# Patient Record
Sex: FEMALE | Race: WHITE | Employment: OTHER | URBAN - METROPOLITAN AREA
[De-identification: names, ages, dates, MRNs, and addresses within clinical notes are randomized per-mention and may not be internally consistent; named-entity substitution may affect disease eponyms.]

---

## 2017-01-05 PROBLEM — J45.41 MODERATE PERSISTENT ASTHMA WITH ACUTE EXACERBATION: Status: ACTIVE | Noted: 2017-01-05

## 2017-01-23 PROBLEM — J45.41 MODERATE PERSISTENT ASTHMA WITH ACUTE EXACERBATION: Status: RESOLVED | Noted: 2017-01-05 | Resolved: 2017-01-23

## 2017-01-23 PROBLEM — J45.20 MILD INTERMITTENT ASTHMA WITHOUT COMPLICATION: Status: ACTIVE | Noted: 2017-01-23

## 2017-03-01 PROBLEM — J96.90 RESPIRATORY FAILURE (HCC): Status: ACTIVE | Noted: 2017-03-01

## 2017-03-01 PROBLEM — J45.901 ASTHMA ATTACK: Status: ACTIVE | Noted: 2017-03-01

## 2017-03-01 PROBLEM — I10 HTN (HYPERTENSION): Status: ACTIVE | Noted: 2017-03-01

## 2017-11-14 PROBLEM — Z98.84 S/P GASTRIC BYPASS: Status: ACTIVE | Noted: 2017-11-14

## 2018-01-26 PROBLEM — M47.816 LUMBAR SPONDYLOSIS: Status: ACTIVE | Noted: 2018-01-26

## 2018-02-08 PROBLEM — M17.11 PRIMARY OSTEOARTHRITIS OF RIGHT KNEE: Status: ACTIVE | Noted: 2018-02-08

## 2018-03-08 PROBLEM — J11.1 INFLUENZA WITH RESPIRATORY MANIFESTATION OTHER THAN PNEUMONIA: Status: ACTIVE | Noted: 2018-03-08

## 2018-03-14 ENCOUNTER — OFFICE VISIT (OUTPATIENT)
Dept: FAMILY MEDICINE CLINIC | Age: 43
End: 2018-03-14
Payer: COMMERCIAL

## 2018-03-14 VITALS
HEIGHT: 66 IN | OXYGEN SATURATION: 98 % | TEMPERATURE: 98.3 F | HEART RATE: 88 BPM | BODY MASS INDEX: 47.09 KG/M2 | SYSTOLIC BLOOD PRESSURE: 101 MMHG | WEIGHT: 293 LBS | DIASTOLIC BLOOD PRESSURE: 65 MMHG

## 2018-03-14 DIAGNOSIS — J11.1 BRONCHITIS WITH FLU: Primary | ICD-10-CM

## 2018-03-14 PROCEDURE — 99213 OFFICE O/P EST LOW 20 MIN: CPT | Performed by: NURSE PRACTITIONER

## 2018-03-14 PROCEDURE — 99212 OFFICE O/P EST SF 10 MIN: CPT | Performed by: NURSE PRACTITIONER

## 2018-03-14 RX ORDER — AZITHROMYCIN 250 MG/1
TABLET, FILM COATED ORAL
Qty: 1 PACKET | Refills: 0 | Status: SHIPPED | OUTPATIENT
Start: 2018-03-14 | End: 2018-05-23 | Stop reason: ALTCHOICE

## 2018-03-14 ASSESSMENT — ENCOUNTER SYMPTOMS
COUGH: 1
CONSTIPATION: 0
WHEEZING: 0
DOUBLE VISION: 0
DIARRHEA: 0
NAUSEA: 0
SHORTNESS OF BREATH: 0
BLURRED VISION: 0
VOMITING: 0

## 2018-03-14 NOTE — PROGRESS NOTES
Chief Complaint   Patient presents with    Follow-Up from Hospital     was diagnosed with flu       HPI:  Patient presents today for  Follow up of influenza A. Was diagnosed on 3/8/18. She does report that she still feels a little tired. No longer has fevers or body aches. She has been taking ibuprofen 400 mg daily. She reports that she has being staying hydrated. Appetite has been better. She reports that she has started to  cough up a small amount of thick yellow sputum and is getting flushed often. No documented fever. 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 Denies n/v. No diarrhea. Prior to Visit Medications    Medication Sig Taking?  Authorizing Provider   hydrOXYzine (VISTARIL) 25 MG capsule Take 1 capsule by mouth 3 times daily as needed for Anxiety Yes Shubham Lindo, DO   ibuprofen (ADVIL;MOTRIN) 200 MG tablet Take 200 mg by mouth daily Yes Historical Provider, MD   TRINTELLIX 10 MG TABS tablet  Yes Historical Provider, MD   Multiple Vitamins-Minerals (THERAPEUTIC MULTIVITAMIN-MINERALS) tablet Take 1 tablet by mouth 2 times daily Yes Historical Provider, MD   Cholecalciferol (VITAMIN D3) 5000 units TABS Take by mouth daily Yes Historical Provider, MD   Ferrous Gluconate (IRON 27 PO) Take by mouth daily Yes Historical Provider, MD   CPAP Machine MISC by Does not apply route Yes Historical Provider, MD   omeprazole (PRILOSEC) 20 MG delayed release capsule Take 1 capsule by mouth Daily Yes Alysa Cobos MD   Fluticasone Furoate-Vilanterol (BREO ELLIPTA) 200-25 MCG/INH AEPB Inhale 200 mcg into the lungs daily Yes Evelia Watt DO   albuterol sulfate HFA (VENTOLIN HFA) 108 (90 BASE) MCG/ACT inhaler Inhale 2 puffs into the lungs every 6 hours as needed for Wheezing Yes Evelia Watt DO   albuterol (PROVENTIL) (2.5 MG/3ML) 0.083% nebulizer solution Take 3 mLs by nebulization every 6

## 2018-03-20 ENCOUNTER — OFFICE VISIT (OUTPATIENT)
Dept: OBGYN | Age: 43
End: 2018-03-20
Payer: COMMERCIAL

## 2018-03-20 VITALS
HEIGHT: 66 IN | RESPIRATION RATE: 14 BRPM | BODY MASS INDEX: 47.09 KG/M2 | DIASTOLIC BLOOD PRESSURE: 76 MMHG | SYSTOLIC BLOOD PRESSURE: 127 MMHG | HEART RATE: 92 BPM | WEIGHT: 293 LBS | TEMPERATURE: 98.7 F

## 2018-03-20 DIAGNOSIS — N93.8 DUB (DYSFUNCTIONAL UTERINE BLEEDING): Primary | ICD-10-CM

## 2018-03-20 DIAGNOSIS — D25.9 UTERINE LEIOMYOMA, UNSPECIFIED LOCATION: ICD-10-CM

## 2018-03-20 PROCEDURE — G8482 FLU IMMUNIZE ORDER/ADMIN: HCPCS | Performed by: NURSE PRACTITIONER

## 2018-03-20 PROCEDURE — G8427 DOCREV CUR MEDS BY ELIG CLIN: HCPCS | Performed by: NURSE PRACTITIONER

## 2018-03-20 PROCEDURE — 1036F TOBACCO NON-USER: CPT | Performed by: NURSE PRACTITIONER

## 2018-03-20 PROCEDURE — 99212 OFFICE O/P EST SF 10 MIN: CPT

## 2018-03-20 PROCEDURE — G8417 CALC BMI ABV UP PARAM F/U: HCPCS | Performed by: NURSE PRACTITIONER

## 2018-03-20 PROCEDURE — 99213 OFFICE O/P EST LOW 20 MIN: CPT | Performed by: NURSE PRACTITIONER

## 2018-03-20 NOTE — PROGRESS NOTES
Subjective:      Patient ID: Lalitha Friedman is a 38 y.o. female. HPI  Patient in today to discuss issues with menses. Did have an endometrial biopsy on 9/27/17 which showed:  Diagnosis:  Endometrium, biopsy: Scant disrupted fragments of proliferative phase  endometrium with evidence of glandular and stromal breakdown. Patient had weight loss surgery in November. Has lost ~140 lbs. States irregular periods have persisted despite weight loss. Last period last several weeks. Patient's last menstrual period was 02/24/2018. Has not been sexually active for many years, is considering resuming IC soon. Pelvic Ultrasound from 8/8/17 showed:  Comparison: Comparison is made to pelvic sonogram dated 03/10/2010. Findings: The uterus demonstrates normal smooth contour and   ultrasonographic appearance, measuring approximately 9.7 cm x 6.7 cm x   6.5 cm. There is a heterogeneous mass in the posterior uterine body   which measures 4.6 cm x 4.6 cm x 4.6 cm in size. There is a   well-circumscribed anechoic nodule in the cervix which measures 1.2 cm   x 1.2 cm x 1.3 cm in size that does not demonstrate flow on color   Doppler images. The endometrial stripe measures approximately 9.7 mm   in diameter, which is within normal limits for this age group. The right and left ovaries are not imaged or evaluated. Color doppler   flow imaging of the bilateral adnexa was not performed. Limited views provided of the urinary bladder are unremarkable. No   free intra-abdominal or pelvic fluid is visualized. Impression   1. Enlarged, myomatous uterus. 2. Nabothian cysts, as described above. 3. Right and left ovaries not imaged or evaluated. 4. Color Doppler flow imaging of the bilateral adnexa not performed. Review of Systems   Constitutional: Negative. Endocrine: Negative. Genitourinary: Positive for menstrual problem.        Objective:   Physical Exam   Constitutional: She appears

## 2018-05-07 ENCOUNTER — TELEPHONE (OUTPATIENT)
Dept: PHYSICAL MEDICINE AND REHAB | Age: 43
End: 2018-05-07

## 2018-05-09 ENCOUNTER — TELEPHONE (OUTPATIENT)
Dept: BARIATRICS/WEIGHT MGMT | Age: 43
End: 2018-05-09

## 2018-05-14 ENCOUNTER — HOSPITAL ENCOUNTER (OUTPATIENT)
Age: 43
Discharge: HOME | End: 2018-05-14
Payer: COMMERCIAL

## 2018-05-14 DIAGNOSIS — K91.2 POSTSURGICAL NONABSORPTION: ICD-10-CM

## 2018-05-14 LAB
ALBUMIN SERPL-MCNC: 3.6 G/DL (ref 3.5–5.2)
ALP BLD-CCNC: 78 U/L (ref 35–104)
ALT SERPL-CCNC: 8 U/L (ref 0–32)
ANION GAP SERPL CALCULATED.3IONS-SCNC: 11 MMOL/L (ref 7–16)
AST SERPL-CCNC: 13 U/L (ref 0–31)
BILIRUB SERPL-MCNC: 0.2 MG/DL (ref 0–1.2)
BUN BLDV-MCNC: 17 MG/DL (ref 6–20)
CALCIUM SERPL-MCNC: 8.8 MG/DL (ref 8.6–10.2)
CHLORIDE BLD-SCNC: 102 MMOL/L (ref 98–107)
CHOLESTEROL, TOTAL: 150 MG/DL (ref 0–199)
CO2: 26 MMOL/L (ref 22–29)
CREAT SERPL-MCNC: 0.7 MG/DL (ref 0.5–1)
FERRITIN: 8 NG/ML
FOLATE: 9.5 NG/ML (ref 4.8–24.2)
GFR AFRICAN AMERICAN: >60
GFR NON-AFRICAN AMERICAN: >60 ML/MIN/1.73
GLUCOSE BLD-MCNC: 96 MG/DL (ref 74–109)
HCT VFR BLD CALC: 33.1 % (ref 34–48)
HEMOGLOBIN: 10.3 G/DL (ref 11.5–15.5)
MCH RBC QN AUTO: 25.8 PG (ref 26–35)
MCHC RBC AUTO-ENTMCNC: 31.1 % (ref 32–34.5)
MCV RBC AUTO: 82.8 FL (ref 80–99.9)
PDW BLD-RTO: 15.4 FL (ref 11.5–15)
PLATELET # BLD: 582 E9/L (ref 130–450)
PMV BLD AUTO: 9.3 FL (ref 7–12)
POTASSIUM SERPL-SCNC: 4 MMOL/L (ref 3.5–5)
PREALBUMIN: 18 MG/DL (ref 20–40)
RBC # BLD: 4 E12/L (ref 3.5–5.5)
SODIUM BLD-SCNC: 139 MMOL/L (ref 132–146)
TOTAL PROTEIN: 6.7 G/DL (ref 6.4–8.3)
TRIGL SERPL-MCNC: 112 MG/DL (ref 0–149)
VITAMIN B-12: 540 PG/ML (ref 211–946)
VITAMIN D 25-HYDROXY: 23 NG/ML (ref 30–100)
WBC # BLD: 9.3 E9/L (ref 4.5–11.5)

## 2018-05-14 PROCEDURE — 85027 COMPLETE CBC AUTOMATED: CPT

## 2018-05-14 PROCEDURE — 82607 VITAMIN B-12: CPT

## 2018-05-14 PROCEDURE — 84478 ASSAY OF TRIGLYCERIDES: CPT

## 2018-05-14 PROCEDURE — 36415 COLL VENOUS BLD VENIPUNCTURE: CPT

## 2018-05-14 PROCEDURE — 82728 ASSAY OF FERRITIN: CPT

## 2018-05-14 PROCEDURE — 84134 ASSAY OF PREALBUMIN: CPT

## 2018-05-14 PROCEDURE — 82465 ASSAY BLD/SERUM CHOLESTEROL: CPT

## 2018-05-14 PROCEDURE — 80053 COMPREHEN METABOLIC PANEL: CPT

## 2018-05-14 PROCEDURE — 84425 ASSAY OF VITAMIN B-1: CPT

## 2018-05-14 PROCEDURE — 84630 ASSAY OF ZINC: CPT

## 2018-05-14 PROCEDURE — 82746 ASSAY OF FOLIC ACID SERUM: CPT

## 2018-05-14 PROCEDURE — 82306 VITAMIN D 25 HYDROXY: CPT

## 2018-05-17 LAB — ZINC: 57 UG/DL (ref 60–120)

## 2018-05-18 ENCOUNTER — TELEPHONE (OUTPATIENT)
Dept: PHYSICAL MEDICINE AND REHAB | Age: 43
End: 2018-05-18

## 2018-05-18 ENCOUNTER — TELEPHONE (OUTPATIENT)
Dept: FAMILY MEDICINE CLINIC | Age: 43
End: 2018-05-18

## 2018-05-18 LAB — VITAMIN B1 WHOLE BLOOD: 117 NMOL/L (ref 70–180)

## 2018-05-21 ENCOUNTER — OFFICE VISIT (OUTPATIENT)
Dept: OBGYN | Age: 43
End: 2018-05-21
Payer: COMMERCIAL

## 2018-05-21 VITALS
SYSTOLIC BLOOD PRESSURE: 139 MMHG | RESPIRATION RATE: 14 BRPM | DIASTOLIC BLOOD PRESSURE: 75 MMHG | WEIGHT: 293 LBS | BODY MASS INDEX: 47.09 KG/M2 | HEIGHT: 66 IN | TEMPERATURE: 98.5 F | HEART RATE: 103 BPM

## 2018-05-21 DIAGNOSIS — Z12.39 BREAST CANCER SCREENING: ICD-10-CM

## 2018-05-21 DIAGNOSIS — F32.81 PMDD (PREMENSTRUAL DYSPHORIC DISORDER): Primary | ICD-10-CM

## 2018-05-21 PROCEDURE — G8427 DOCREV CUR MEDS BY ELIG CLIN: HCPCS | Performed by: NURSE PRACTITIONER

## 2018-05-21 PROCEDURE — 99213 OFFICE O/P EST LOW 20 MIN: CPT | Performed by: NURSE PRACTITIONER

## 2018-05-21 PROCEDURE — G8417 CALC BMI ABV UP PARAM F/U: HCPCS | Performed by: NURSE PRACTITIONER

## 2018-05-21 PROCEDURE — 4004F PT TOBACCO SCREEN RCVD TLK: CPT | Performed by: NURSE PRACTITIONER

## 2018-05-21 PROCEDURE — 99212 OFFICE O/P EST SF 10 MIN: CPT | Performed by: NURSE PRACTITIONER

## 2018-05-21 RX ORDER — VORTIOXETINE 20 MG/1
20 TABLET, FILM COATED ORAL EVERY MORNING
Refills: 0 | COMMUNITY
Start: 2018-04-18 | End: 2020-01-24

## 2018-05-23 ENCOUNTER — INITIAL CONSULT (OUTPATIENT)
Dept: BARIATRICS/WEIGHT MGMT | Age: 43
End: 2018-05-23
Payer: COMMERCIAL

## 2018-05-23 ENCOUNTER — OFFICE VISIT (OUTPATIENT)
Dept: BARIATRICS/WEIGHT MGMT | Age: 43
End: 2018-05-23
Payer: COMMERCIAL

## 2018-05-23 VITALS
SYSTOLIC BLOOD PRESSURE: 122 MMHG | HEIGHT: 66 IN | BODY MASS INDEX: 47.09 KG/M2 | RESPIRATION RATE: 20 BRPM | TEMPERATURE: 98 F | DIASTOLIC BLOOD PRESSURE: 80 MMHG | WEIGHT: 293 LBS | HEART RATE: 74 BPM

## 2018-05-23 VITALS — BODY MASS INDEX: 47.09 KG/M2 | WEIGHT: 293 LBS | HEIGHT: 66 IN

## 2018-05-23 DIAGNOSIS — Z13.820 OSTEOPOROSIS SCREENING: ICD-10-CM

## 2018-05-23 DIAGNOSIS — Z71.3 DIETARY COUNSELING: Primary | ICD-10-CM

## 2018-05-23 DIAGNOSIS — K91.2 MALNUTRITION FOLLOWING GASTROINTESTINAL SURGERY: Primary | ICD-10-CM

## 2018-05-23 PROCEDURE — 4004F PT TOBACCO SCREEN RCVD TLK: CPT | Performed by: SURGERY

## 2018-05-23 PROCEDURE — 99999 PR OFFICE/OUTPT VISIT,PROCEDURE ONLY: CPT | Performed by: SURGERY

## 2018-05-23 PROCEDURE — G8417 CALC BMI ABV UP PARAM F/U: HCPCS | Performed by: SURGERY

## 2018-05-23 PROCEDURE — 99212 OFFICE O/P EST SF 10 MIN: CPT

## 2018-05-23 PROCEDURE — G8427 DOCREV CUR MEDS BY ELIG CLIN: HCPCS | Performed by: SURGERY

## 2018-05-23 PROCEDURE — 99214 OFFICE O/P EST MOD 30 MIN: CPT | Performed by: SURGERY

## 2018-06-07 ENCOUNTER — TELEPHONE (OUTPATIENT)
Dept: BARIATRICS/WEIGHT MGMT | Age: 43
End: 2018-06-07

## 2018-06-14 ENCOUNTER — OFFICE VISIT (OUTPATIENT)
Dept: FAMILY MEDICINE CLINIC | Age: 43
End: 2018-06-14
Payer: MEDICARE

## 2018-06-14 VITALS
DIASTOLIC BLOOD PRESSURE: 64 MMHG | SYSTOLIC BLOOD PRESSURE: 100 MMHG | RESPIRATION RATE: 18 BRPM | BODY MASS INDEX: 47.09 KG/M2 | HEIGHT: 66 IN | WEIGHT: 293 LBS | TEMPERATURE: 98.2 F | OXYGEN SATURATION: 98 % | HEART RATE: 88 BPM

## 2018-06-14 DIAGNOSIS — Z98.84 S/P GASTRIC BYPASS: Primary | ICD-10-CM

## 2018-06-14 DIAGNOSIS — N92.6 ABNORMAL MENSES: ICD-10-CM

## 2018-06-14 DIAGNOSIS — M47.816 SPONDYLOSIS OF LUMBAR REGION WITHOUT MYELOPATHY OR RADICULOPATHY: ICD-10-CM

## 2018-06-14 DIAGNOSIS — F41.9 ANXIETY: ICD-10-CM

## 2018-06-14 PROCEDURE — 4004F PT TOBACCO SCREEN RCVD TLK: CPT | Performed by: FAMILY MEDICINE

## 2018-06-14 PROCEDURE — 99213 OFFICE O/P EST LOW 20 MIN: CPT | Performed by: FAMILY MEDICINE

## 2018-06-14 PROCEDURE — G8427 DOCREV CUR MEDS BY ELIG CLIN: HCPCS | Performed by: FAMILY MEDICINE

## 2018-06-14 PROCEDURE — G8417 CALC BMI ABV UP PARAM F/U: HCPCS | Performed by: FAMILY MEDICINE

## 2018-06-14 PROCEDURE — 99212 OFFICE O/P EST SF 10 MIN: CPT | Performed by: FAMILY MEDICINE

## 2018-06-14 RX ORDER — HYDROXYZINE PAMOATE 25 MG/1
25 CAPSULE ORAL 3 TIMES DAILY PRN
Qty: 30 CAPSULE | Refills: 0 | Status: SHIPPED | OUTPATIENT
Start: 2018-06-14 | End: 2019-07-11 | Stop reason: SDUPTHER

## 2018-06-14 RX ORDER — TOPIRAMATE 25 MG/1
TABLET ORAL
Refills: 0 | COMMUNITY
Start: 2018-06-12 | End: 2018-10-29 | Stop reason: ALTCHOICE

## 2018-06-19 ASSESSMENT — ENCOUNTER SYMPTOMS
SHORTNESS OF BREATH: 0
DIARRHEA: 0
VOMITING: 0
COUGH: 0
CONSTIPATION: 0
WHEEZING: 0
NAUSEA: 0
ABDOMINAL PAIN: 0

## 2018-07-09 ENCOUNTER — TELEPHONE (OUTPATIENT)
Dept: PHYSICAL MEDICINE AND REHAB | Age: 43
End: 2018-07-09

## 2018-07-09 NOTE — TELEPHONE ENCOUNTER
Patient called the office stating that she is having pain in left knee which started approx 3-4 days ago. The pain is located in lower thigh behind the knee. Patient stated that she rates the pain at a 8  On a scale of 1-10 (ten being where the pain is as bad as it can be (when standing). Patient did make an appt to come in on 07/27/18. Is there something that she can do before her appt she can not take much motrin d/t history of gastric bypass which was done in November 2017. Patient was notified that Dr Ijeoma Spencer is out of the office until tomorrow. Please advise.  thank you

## 2018-07-10 NOTE — TELEPHONE ENCOUNTER
I have availability this week. She can come in for injection Ezra Vila will last longer so I suggest we try that but it will need a prior auth. It is a long acting corticosteroid. Please see if she wants to proceed with that or we can do the regular Kenalog that she had in the past but it probably won't last as long.

## 2018-07-11 NOTE — TELEPHONE ENCOUNTER
Spoke with patient and she was notified that we will need prior auth for the Roberto Leonardo and then the medication will be ordered and that will not be able to be completed before her appt on 07/17/18.  Patient decided to get the kenalog this time since she is in a lot of pain she will discuss Roberto Leonardo at her visit

## 2018-07-16 ENCOUNTER — TELEPHONE (OUTPATIENT)
Dept: OBGYN | Age: 43
End: 2018-07-16

## 2018-07-17 ENCOUNTER — OFFICE VISIT (OUTPATIENT)
Dept: PHYSICAL MEDICINE AND REHAB | Age: 43
End: 2018-07-17
Payer: MEDICARE

## 2018-07-17 VITALS
TEMPERATURE: 98.3 F | BODY MASS INDEX: 47.09 KG/M2 | WEIGHT: 293 LBS | SYSTOLIC BLOOD PRESSURE: 118 MMHG | DIASTOLIC BLOOD PRESSURE: 72 MMHG | HEART RATE: 77 BPM | HEIGHT: 66 IN | OXYGEN SATURATION: 96 %

## 2018-07-17 DIAGNOSIS — M17.12 ARTHRITIS OF KNEE, LEFT: Primary | ICD-10-CM

## 2018-07-17 PROCEDURE — 20611 DRAIN/INJ JOINT/BURSA W/US: CPT | Performed by: PHYSICAL MEDICINE & REHABILITATION

## 2018-07-17 RX ORDER — BUPIVACAINE HYDROCHLORIDE 2.5 MG/ML
7 INJECTION, SOLUTION EPIDURAL; INFILTRATION; INTRACAUDAL ONCE
Status: COMPLETED | OUTPATIENT
Start: 2018-07-17 | End: 2018-07-17

## 2018-07-17 RX ORDER — TRIAMCINOLONE ACETONIDE 40 MG/ML
40 INJECTION, SUSPENSION INTRA-ARTICULAR; INTRAMUSCULAR ONCE
Status: COMPLETED | OUTPATIENT
Start: 2018-07-17 | End: 2018-07-17

## 2018-07-17 RX ADMIN — TRIAMCINOLONE ACETONIDE 40 MG: 40 INJECTION, SUSPENSION INTRA-ARTICULAR; INTRAMUSCULAR at 10:59

## 2018-07-17 RX ADMIN — BUPIVACAINE HYDROCHLORIDE 17.5 MG: 2.5 INJECTION, SOLUTION EPIDURAL; INFILTRATION; INTRACAUDAL at 10:58

## 2018-07-17 NOTE — PROGRESS NOTES
Elena Lindsey D.O. [facility] Physical Medicine [facility] Rehabilitation  [address] [facility], [address] [place] [address] [place]  Phone: [phone]  Fax: [phone]    7/17/2018    Chief Complaint   Patient presents with    Injections     Left Knee Injection with US Guidance    Knee Pain     Left       HPI: Patient is here for right knee injection due to OA. Patient had gastric bypass in November reports a total of 200# weight loss. She is in lymphedema therapy for her legs. Since starting the lymphedema therapy she has increased low back pain radiating to her right lateral hip. Interval history has not changed since last visit 2/8/18. The past medical, past surgical, family and social histories were reviewed. Medications and allergies were reviewed. Allergies   Allergen Reactions    Pcn [Penicillins] Anaphylaxis and Other (See Comments)     Patient unsure    Food Hives     Berries - Red Blotches and Itching   Lactose Intolerance  Soy    Oxycontin [Oxycodone Hcl] Itching       Current Outpatient Prescriptions   Medication Sig Dispense Refill    topiramate (TOPAMAX) 25 MG tablet take 1 tablet by mouth at bedtime  0    hydrOXYzine (VISTARIL) 25 MG capsule Take 1 capsule by mouth 3 times daily as needed for Anxiety 30 capsule 0    TRINTELLIX 20 MG TABS tablet daily   0    ibuprofen (ADVIL;MOTRIN) 200 MG tablet Take 200 mg by mouth daily       ammonium lactate (LAC-HYDRIN) 12 % lotion Apply topically daily.  567 g 3    Multiple Vitamins-Minerals (THERAPEUTIC MULTIVITAMIN-MINERALS) tablet Take 1 tablet by mouth 2 times daily      Calcium-Iron-Vit D-Vit K (CALCIUM SOFT CHEWS) 605-3-1651-40 MG-UNT-MCG CHEW Take by mouth 3 times daily      Cholecalciferol (VITAMIN D3) 5000 units TABS Take by mouth daily      Ferrous Gluconate (IRON 27 PO) Take by mouth daily      CPAP Machine MISC by Does not apply route      omeprazole (PRILOSEC) 20 MG delayed release capsule Take 1 capsule by mouth There are no discontinued medications. PROCEDURE NOTE:   After explaining the indications, risks, benefits and alternatives of a left knee joint injection, the patient agreed to proceed. A permit was signed and scanned into the chart. The skin on the lateral knee was prepared with chloraprep. Using sterile technique, a 22 gauge, 1.5\" needle with 1 cc of Kenalog 40mg/cc and 8 cc of 1% lidocaine was directed to the knee joint using US guidance. After negative aspiration, the medication was injected. Adequate hemostasis was achieved and a bandage applied. The patient tolerated the procedure well and was educated in post injection care. The patient was clinically monitored after the injection and left the office without incident. There was post injection reduction in pain. Ultrasound images are scanned separately into the EMR. The patient was educated about the diagnosis, prognosis, indications, risks and benefits of treatment. An opportunity to ask questions was given to the patient and questions were answered. The patient agreed to proceed with the recommended treatment as described above. Follow up one week for right knee injection. Thank you for allowing me to participate in the care of your patient. Amada aLura D.O., P.T.   Board Certified Physical Medicine and Rehabilitation  Board Certified Electrodiagnostic Medicine

## 2018-07-26 ENCOUNTER — OFFICE VISIT (OUTPATIENT)
Dept: PHYSICAL MEDICINE AND REHAB | Age: 43
End: 2018-07-26
Payer: MEDICARE

## 2018-07-26 VITALS
HEIGHT: 66 IN | HEART RATE: 70 BPM | DIASTOLIC BLOOD PRESSURE: 80 MMHG | TEMPERATURE: 97.9 F | WEIGHT: 293 LBS | BODY MASS INDEX: 47.09 KG/M2 | SYSTOLIC BLOOD PRESSURE: 108 MMHG | OXYGEN SATURATION: 100 %

## 2018-07-26 DIAGNOSIS — M17.10 ARTHRITIS OF KNEE: Primary | ICD-10-CM

## 2018-07-26 PROCEDURE — 20611 DRAIN/INJ JOINT/BURSA W/US: CPT | Performed by: PHYSICAL MEDICINE & REHABILITATION

## 2018-07-26 RX ORDER — TRIAMCINOLONE ACETONIDE 40 MG/ML
40 INJECTION, SUSPENSION INTRA-ARTICULAR; INTRAMUSCULAR ONCE
Status: COMPLETED | OUTPATIENT
Start: 2018-07-26 | End: 2018-07-26

## 2018-07-26 RX ORDER — BUPIVACAINE HYDROCHLORIDE 2.5 MG/ML
7 INJECTION, SOLUTION INFILTRATION; PERINEURAL ONCE
Status: COMPLETED | OUTPATIENT
Start: 2018-07-26 | End: 2018-07-26

## 2018-07-26 RX ADMIN — BUPIVACAINE HYDROCHLORIDE 17.5 MG: 2.5 INJECTION, SOLUTION INFILTRATION; PERINEURAL at 10:18

## 2018-07-26 RX ADMIN — TRIAMCINOLONE ACETONIDE 40 MG: 40 INJECTION, SUSPENSION INTRA-ARTICULAR; INTRAMUSCULAR at 10:17

## 2018-07-26 NOTE — PROGRESS NOTES
Jackelin Roy D.O. [facility] Physical Medicine and Rehabilitation  [address] [facility] [place], [address] [place] [address] [place]  Phone: [phone]  Fax: [phone]    7/26/2018    Chief Complaint   Patient presents with    Injections     Right Knee Injection with US Guidance     Knee Pain       HPI: Patient is here for right knee injection due to OA. Interval history has not changed since last visit 7/1/18    The past medical, past surgical, family and social histories were reviewed. Medications and allergies were reviewed. Allergies   Allergen Reactions    Pcn [Penicillins] Anaphylaxis and Other (See Comments)     Patient unsure    Food Hives     Berries - Red Blotches and Itching   Lactose Intolerance  Soy    Oxycontin [Oxycodone Hcl] Itching       Current Outpatient Prescriptions   Medication Sig Dispense Refill    topiramate (TOPAMAX) 25 MG tablet take 1 tablet by mouth at bedtime  0    hydrOXYzine (VISTARIL) 25 MG capsule Take 1 capsule by mouth 3 times daily as needed for Anxiety 30 capsule 0    TRINTELLIX 20 MG TABS tablet daily   0    ibuprofen (ADVIL;MOTRIN) 200 MG tablet Take 200 mg by mouth daily       ammonium lactate (LAC-HYDRIN) 12 % lotion Apply topically daily. 567 g 3    Multiple Vitamins-Minerals (THERAPEUTIC MULTIVITAMIN-MINERALS) tablet Take 1 tablet by mouth 2 times daily      Calcium-Iron-Vit D-Vit K (CALCIUM SOFT CHEWS) 911-9-7234-40 MG-UNT-MCG CHEW Take by mouth 3 times daily      Cholecalciferol (VITAMIN D3) 5000 units TABS Take by mouth daily      Ferrous Gluconate (IRON 27 PO) Take by mouth daily      CPAP Machine MISC by Does not apply route      omeprazole (PRILOSEC) 20 MG delayed release capsule Take 1 capsule by mouth Daily 30 capsule 12    Menthol-Zinc Oxide (GOLD BOND EXTRA STRENGTH) POWD Apply to rash as needed.  1 Bottle 0    Fluticasone Furoate-Vilanterol (BREO ELLIPTA) 200-25 MCG/INH AEPB Inhale 200 mcg into the lungs daily 1 each 3    acetaminophen (APAP EXTRA STRENGTH) 500 MG tablet Take 1 tablet by mouth every 6 hours as needed for Pain 120 tablet 3    albuterol sulfate HFA (VENTOLIN HFA) 108 (90 BASE) MCG/ACT inhaler Inhale 2 puffs into the lungs every 6 hours as needed for Wheezing 1 Inhaler 3    albuterol (PROVENTIL) (2.5 MG/3ML) 0.083% nebulizer solution Take 3 mLs by nebulization every 6 hours as needed for Wheezing 120 each 3    Handicap Placard MISC by Does not apply route Duration: 5 years 1 each 0     Current Facility-Administered Medications   Medication Dose Route Frequency Provider Last Rate Last Dose    triamcinolone acetonide (KENALOG-40) injection 40 mg  40 mg Intra-articular Once Elvie Joe,         bupivacaine (MARCAINE) 0.25 % injection 17.5 mg  7 mL Intradermal Once Elviegypsy Joe DO           ROS: No fevers, chills, night sweats, new weakness, new paresthesia, incontinence of bowel or bladder. Physical exam  Blood pressure 108/80, pulse 70, temperature 97.9 °F (36.6 °C), height 5' 6\" (1.676 m), weight 296 lb (134.3 kg), SpO2 100 %, not currently breastfeeding. General: NAD, AA&OX3  There is no joint effusion, deformity, instability, swelling, erythema or warmth. Tender bilateral knee joint. AROM 0-90* bilaterally. Negative SLR. Tender lumbar paraspinals and right gluteal muscles. Impression:   1. Arthritis of knee        Plan:  Orders Placed This Encounter   Procedures    RI ARTHROCENTESIS ASPIR&/INJ MAJOR JT/BURSA W/US       Orders Placed This Encounter   Medications    triamcinolone acetonide (KENALOG-40) injection 40 mg    bupivacaine (MARCAINE) 0.25 % injection 17.5 mg   Check with Dr. Shavon Cuellar if ok for Prednisone due to recent gastric bypass. There are no discontinued medications. PROCEDURE NOTE:   After explaining the indications, risks, benefits and alternatives of a rightknee joint injection, the patient agreed to proceed. A permit was signed and scanned into the chart.  The skin on the lateral knee was prepared with chloraprep. Using sterile technique, a 22 gauge, 1.5\" needle with 1 cc of Kenalog 40mg/cc and 8 cc of 1% lidocaine was directed to the knee joint using US guidance. After negative aspiration, the medication was injected. Adequate hemostasis was achieved and a bandage applied. The patient tolerated the procedure well and was educated in post injection care. The patient was clinically monitored after the injection and left the office without incident. There was post injection reduction in pain. Ultrasound images are scanned separately into the EMR. The patient was educated about the diagnosis, prognosis, indications, risks and benefits of treatment. An opportunity to ask questions was given to the patient and questions were answered. The patient agreed to proceed with the recommended treatment as described above. Follow up prn    Thank you for allowing me to participate in the care of your patient. Jackelin Roy D.O., P.T.   Board Certified Physical Medicine and Rehabilitation  Board Certified Electrodiagnostic Medicine

## 2018-07-27 ENCOUNTER — OFFICE VISIT (OUTPATIENT)
Dept: BARIATRICS/WEIGHT MGMT | Age: 43
End: 2018-07-27
Payer: MEDICARE

## 2018-07-27 DIAGNOSIS — E61.1 IRON DEFICIENCY: ICD-10-CM

## 2018-07-27 DIAGNOSIS — E55.9 VITAMIN D DEFICIENCY: Primary | ICD-10-CM

## 2018-07-27 DIAGNOSIS — E60 ZINC DEFICIENCY: ICD-10-CM

## 2018-07-27 PROCEDURE — G8428 CUR MEDS NOT DOCUMENT: HCPCS | Performed by: INTERNAL MEDICINE

## 2018-07-27 PROCEDURE — G8417 CALC BMI ABV UP PARAM F/U: HCPCS | Performed by: INTERNAL MEDICINE

## 2018-07-27 PROCEDURE — 4004F PT TOBACCO SCREEN RCVD TLK: CPT | Performed by: INTERNAL MEDICINE

## 2018-07-27 PROCEDURE — 99211 OFF/OP EST MAY X REQ PHY/QHP: CPT

## 2018-07-27 PROCEDURE — 99201 PR OFFICE OUTPATIENT NEW 10 MINUTES: CPT | Performed by: INTERNAL MEDICINE

## 2018-07-27 RX ORDER — CHOLECALCIFEROL (VITAMIN D3) 1250 MCG
CAPSULE ORAL
Qty: 12 CAPSULE | Refills: 0 | Status: SHIPPED | OUTPATIENT
Start: 2018-07-27 | End: 2018-10-26 | Stop reason: DRUGHIGH

## 2018-07-27 RX ORDER — PRENATAL VIT/IRON FUM/FOLIC AC 28MG-0.8MG
2 TABLET ORAL DAILY
Qty: 30 TABLET | Refills: 0 | Status: ON HOLD | OUTPATIENT
Start: 2018-07-27 | End: 2019-06-23

## 2018-07-27 RX ORDER — ASCORBIC ACID 250 MG
250 TABLET ORAL DAILY
Qty: 30 TABLET | Refills: 3 | Status: ON HOLD | OUTPATIENT
Start: 2018-07-27 | End: 2019-06-23

## 2018-07-27 RX ORDER — LANOLIN ALCOHOL/MO/W.PET/CERES
325 CREAM (GRAM) TOPICAL
Qty: 90 TABLET | Refills: 3 | Status: SHIPPED | OUTPATIENT
Start: 2018-07-27 | End: 2018-08-01 | Stop reason: ALTCHOICE

## 2018-08-01 ENCOUNTER — TELEPHONE (OUTPATIENT)
Dept: BARIATRICS/WEIGHT MGMT | Age: 43
End: 2018-08-01

## 2018-08-01 DIAGNOSIS — E60 ZINC DEFICIENCY: ICD-10-CM

## 2018-08-01 DIAGNOSIS — E55.9 VITAMIN D DEFICIENCY: Primary | ICD-10-CM

## 2018-08-01 DIAGNOSIS — E61.1 IRON DEFICIENCY: Primary | ICD-10-CM

## 2018-08-01 DIAGNOSIS — E61.1 IRON DEFICIENCY: ICD-10-CM

## 2018-08-01 RX ORDER — FERROUS SULFATE 325(65) MG
325 TABLET ORAL
Qty: 90 TABLET | Refills: 3 | Status: ON HOLD | OUTPATIENT
Start: 2018-08-01 | End: 2019-06-23

## 2018-08-29 ENCOUNTER — HOSPITAL ENCOUNTER (OUTPATIENT)
Age: 43
Discharge: HOME | End: 2018-08-31
Payer: MEDICARE

## 2018-08-29 ENCOUNTER — OFFICE VISIT (OUTPATIENT)
Dept: OBGYN | Age: 43
End: 2018-08-29
Payer: MEDICARE

## 2018-08-29 ENCOUNTER — HOSPITAL ENCOUNTER (OUTPATIENT)
Age: 43
Discharge: HOME | End: 2018-08-29
Payer: MEDICARE

## 2018-08-29 VITALS
HEART RATE: 92 BPM | SYSTOLIC BLOOD PRESSURE: 144 MMHG | BODY MASS INDEX: 45 KG/M2 | TEMPERATURE: 98.7 F | RESPIRATION RATE: 18 BRPM | HEIGHT: 66 IN | DIASTOLIC BLOOD PRESSURE: 81 MMHG | WEIGHT: 280 LBS

## 2018-08-29 DIAGNOSIS — Z11.3 ROUTINE SCREENING FOR STI (SEXUALLY TRANSMITTED INFECTION): ICD-10-CM

## 2018-08-29 DIAGNOSIS — Z31.89 ENCOUNTER FOR FERTILITY PLANNING: ICD-10-CM

## 2018-08-29 DIAGNOSIS — N93.8 DUB (DYSFUNCTIONAL UTERINE BLEEDING): ICD-10-CM

## 2018-08-29 DIAGNOSIS — Z72.0 TOBACCO ABUSE: ICD-10-CM

## 2018-08-29 DIAGNOSIS — N93.8 DUB (DYSFUNCTIONAL UTERINE BLEEDING): Primary | ICD-10-CM

## 2018-08-29 LAB
CONTROL: NORMAL
FOLLICLE STIMULATING HORMONE: 6.6 MIU/ML
PREGNANCY TEST URINE, POC: NORMAL

## 2018-08-29 PROCEDURE — 81025 URINE PREGNANCY TEST: CPT | Performed by: NURSE PRACTITIONER

## 2018-08-29 PROCEDURE — 36415 COLL VENOUS BLD VENIPUNCTURE: CPT

## 2018-08-29 PROCEDURE — G8417 CALC BMI ABV UP PARAM F/U: HCPCS | Performed by: NURSE PRACTITIONER

## 2018-08-29 PROCEDURE — 80074 ACUTE HEPATITIS PANEL: CPT

## 2018-08-29 PROCEDURE — 83001 ASSAY OF GONADOTROPIN (FSH): CPT

## 2018-08-29 PROCEDURE — 86592 SYPHILIS TEST NON-TREP QUAL: CPT

## 2018-08-29 PROCEDURE — G8427 DOCREV CUR MEDS BY ELIG CLIN: HCPCS | Performed by: NURSE PRACTITIONER

## 2018-08-29 PROCEDURE — 87491 CHLMYD TRACH DNA AMP PROBE: CPT

## 2018-08-29 PROCEDURE — 86703 HIV-1/HIV-2 1 RESULT ANTBDY: CPT

## 2018-08-29 PROCEDURE — 99214 OFFICE O/P EST MOD 30 MIN: CPT | Performed by: NURSE PRACTITIONER

## 2018-08-29 PROCEDURE — 99212 OFFICE O/P EST SF 10 MIN: CPT | Performed by: NURSE PRACTITIONER

## 2018-08-29 PROCEDURE — 87591 N.GONORRHOEAE DNA AMP PROB: CPT

## 2018-08-29 PROCEDURE — 4004F PT TOBACCO SCREEN RCVD TLK: CPT | Performed by: NURSE PRACTITIONER

## 2018-08-29 NOTE — PROGRESS NOTES
Subjective:      Patient ID: Steven Verma is a 37 y.o. female. HPI  Patient in today for a problem visit as an established patient. Patient last last seen in our office in May of this year. Patient did have gastric bypass last year and has lost over 200 lbs. Patient has struggled with intermittent DUB. States periods were regular May, June and July. Started bleeding again 8/8 and has not stopped. Moderate flow. Not currently cramping. Is sexually active. One partner. Would like STD testing. Denies any symptoms or concerns about known exposures. Is considering trying to conceive. No other concerns today. Review of Systems   Constitutional: Negative. Endocrine: Negative. Genitourinary: Positive for menstrual problem. Negative for pelvic pain. Psychiatric/Behavioral: Negative. All other systems reviewed and are negative. Objective:   Physical Exam   Constitutional: She is oriented to person, place, and time. She appears well-developed and well-nourished. Genitourinary:   Genitourinary Comments: Pelvic deferred per patient request   Neurological: She is alert and oriented to person, place, and time. Psychiatric: She has a normal mood and affect. Nursing note and vitals reviewed. Assessment:      1. DUB (dysfunctional uterine bleeding)    2. Routine screening for STI (sexually transmitted infection)    3. Encounter for fertility planning    4. Tobacco abuse            Plan:      Orders Placed This Encounter   Procedures    US NON OB TRANSVAGINAL    US Pelvis Complete    FOLLICLE STIMULATING HORMONE    HIV Screen    RPR Reflex to Titer and TPPA    HEPATITIS PANEL, ACUTE    Chlamydia Probe RNA    GC Probe RNA    POCT urine pregnancy         Thirty minutes spent with patient. Greater than half was spent counseling patient on DUB treatment options and fertility.   I reviewed with patient the risks associated with advanced maternal age including but not limited to decreased

## 2018-08-30 LAB
HAV IGM SER IA-ACNC: NORMAL
HEPATITIS B CORE IGM ANTIBODY: NORMAL
HEPATITIS B SURFACE ANTIGEN INTERPRETATION: NORMAL
HEPATITIS C ANTIBODY INTERPRETATION: NORMAL
HIV-1 AND HIV-2 ANTIBODIES: NORMAL
RPR: NORMAL

## 2018-09-02 ENCOUNTER — HOSPITAL ENCOUNTER (OUTPATIENT)
Dept: ULTRASOUND IMAGING | Age: 43
Discharge: HOME | End: 2018-09-04
Payer: MEDICARE

## 2018-09-02 DIAGNOSIS — N93.8 DUB (DYSFUNCTIONAL UTERINE BLEEDING): ICD-10-CM

## 2018-09-02 PROCEDURE — 76830 TRANSVAGINAL US NON-OB: CPT

## 2018-09-02 PROCEDURE — 76856 US EXAM PELVIC COMPLETE: CPT

## 2018-09-05 ENCOUNTER — OFFICE VISIT (OUTPATIENT)
Dept: OBGYN | Age: 43
End: 2018-09-05
Payer: MEDICARE

## 2018-09-05 VITALS
RESPIRATION RATE: 14 BRPM | SYSTOLIC BLOOD PRESSURE: 123 MMHG | HEART RATE: 77 BPM | BODY MASS INDEX: 45 KG/M2 | TEMPERATURE: 98.8 F | DIASTOLIC BLOOD PRESSURE: 83 MMHG | WEIGHT: 280 LBS | HEIGHT: 66 IN

## 2018-09-05 DIAGNOSIS — D25.9 UTERINE LEIOMYOMA, UNSPECIFIED LOCATION: ICD-10-CM

## 2018-09-05 DIAGNOSIS — N93.8 DUB (DYSFUNCTIONAL UTERINE BLEEDING): Primary | ICD-10-CM

## 2018-09-05 DIAGNOSIS — Z31.89 ENCOUNTER FOR FERTILITY PLANNING: ICD-10-CM

## 2018-09-05 PROCEDURE — 99212 OFFICE O/P EST SF 10 MIN: CPT | Performed by: NURSE PRACTITIONER

## 2018-09-05 PROCEDURE — G8417 CALC BMI ABV UP PARAM F/U: HCPCS | Performed by: NURSE PRACTITIONER

## 2018-09-05 PROCEDURE — 99213 OFFICE O/P EST LOW 20 MIN: CPT | Performed by: NURSE PRACTITIONER

## 2018-09-05 PROCEDURE — 4004F PT TOBACCO SCREEN RCVD TLK: CPT | Performed by: NURSE PRACTITIONER

## 2018-09-05 PROCEDURE — G8427 DOCREV CUR MEDS BY ELIG CLIN: HCPCS | Performed by: NURSE PRACTITIONER

## 2018-09-06 LAB
CHLAMYDIA TRACHOMATIS AMPLIFIED DET: NORMAL
N GONORRHOEAE AMPLIFIED DET: NORMAL

## 2018-09-10 ENCOUNTER — OFFICE VISIT (OUTPATIENT)
Dept: FAMILY MEDICINE CLINIC | Age: 43
End: 2018-09-10
Payer: MEDICARE

## 2018-09-10 VITALS
OXYGEN SATURATION: 99 % | SYSTOLIC BLOOD PRESSURE: 102 MMHG | HEIGHT: 66 IN | TEMPERATURE: 97.9 F | WEIGHT: 282 LBS | DIASTOLIC BLOOD PRESSURE: 69 MMHG | BODY MASS INDEX: 45.32 KG/M2 | HEART RATE: 69 BPM

## 2018-09-10 DIAGNOSIS — L81.1 MELASMA: Primary | ICD-10-CM

## 2018-09-10 DIAGNOSIS — Z86.39 PERSONAL HISTORY OF OTHER ENDOCRINE, NUTRITIONAL AND METABOLIC DISEASE: ICD-10-CM

## 2018-09-10 LAB — HBA1C MFR BLD: 5.5 %

## 2018-09-10 PROCEDURE — G8417 CALC BMI ABV UP PARAM F/U: HCPCS | Performed by: STUDENT IN AN ORGANIZED HEALTH CARE EDUCATION/TRAINING PROGRAM

## 2018-09-10 PROCEDURE — G8427 DOCREV CUR MEDS BY ELIG CLIN: HCPCS | Performed by: STUDENT IN AN ORGANIZED HEALTH CARE EDUCATION/TRAINING PROGRAM

## 2018-09-10 PROCEDURE — 83036 HEMOGLOBIN GLYCOSYLATED A1C: CPT | Performed by: STUDENT IN AN ORGANIZED HEALTH CARE EDUCATION/TRAINING PROGRAM

## 2018-09-10 PROCEDURE — 4004F PT TOBACCO SCREEN RCVD TLK: CPT | Performed by: STUDENT IN AN ORGANIZED HEALTH CARE EDUCATION/TRAINING PROGRAM

## 2018-09-10 PROCEDURE — 99213 OFFICE O/P EST LOW 20 MIN: CPT | Performed by: STUDENT IN AN ORGANIZED HEALTH CARE EDUCATION/TRAINING PROGRAM

## 2018-09-10 PROCEDURE — 99212 OFFICE O/P EST SF 10 MIN: CPT | Performed by: STUDENT IN AN ORGANIZED HEALTH CARE EDUCATION/TRAINING PROGRAM

## 2018-09-10 ASSESSMENT — ENCOUNTER SYMPTOMS
EYE ITCHING: 0
WHEEZING: 0
EYE PAIN: 0
DIARRHEA: 0
STRIDOR: 0
SHORTNESS OF BREATH: 0
PHOTOPHOBIA: 0
NAUSEA: 0
EYE REDNESS: 0
SORE THROAT: 0
ABDOMINAL DISTENTION: 0
CONSTIPATION: 0
VOMITING: 0
COUGH: 0
COLOR CHANGE: 1

## 2018-09-10 NOTE — PROGRESS NOTES
9/10/2018     Pauline Mott (:  1975) is a 37 y.o. female, here for evaluation of the following medical concerns: Mole  Lesion on back with patient's observations stated as history of lesion unknown, it has simply been noticed recently, exam of this area shows normal complete skin exam, no suspicious lesions, skin tags that has been irritated. Cannot comment on changes in size or color. Skin Changes  Pauline Mott is a 37 y.o. female who complains of abnormally colored flat skin lesions for 3 week(s), worst in the summer. The lesions are not painful or itchy. She does comment they appear to be darkening compared to the rest of her skin. Review of Systems   Constitutional: Negative for activity change, appetite change, fatigue and fever. HENT: Negative for congestion, nosebleeds and sore throat. Eyes: Negative for photophobia, pain, redness and itching. Respiratory: Negative for cough, shortness of breath, wheezing and stridor. Cardiovascular: Negative for chest pain and leg swelling. Gastrointestinal: Negative for abdominal distention, constipation, diarrhea, nausea and vomiting. Endocrine: Negative for polydipsia and polyuria. Genitourinary: Negative for dysuria, frequency and urgency. Musculoskeletal: Negative for arthralgias, gait problem and myalgias. Skin: Positive for color change and rash. Negative for pallor and wound. Neurological: Negative for dizziness, weakness and light-headedness. Psychiatric/Behavioral: Negative for agitation and suicidal ideas. The patient is not nervous/anxious. Prior to Visit Medications    Medication Sig Taking?  Authorizing Provider   fluocin-hydroquinone-tretinoin (TRI-HI) 0.01-4-0.05 % cream Apply topically daily Yes Shasta Hollingsworth MD   zinc 50 MG CAPS Take 50 mg by mouth daily For two weeks Yes Cristobal Guerrero MD   ferrous sulfate 325 (65 Fe) MG tablet Take 1 tablet by mouth daily (with breakfast) , take with Vitamin C 250 mg tablet Yes Federica Acevedo MD   Cholecalciferol (VITAMIN D3) 76805 units CAPS Twice each week for 6 weeks Yes Federica Acevedo MD   Prenatal Vit-Fe Fumarate-FA (RA PRENATAL) 28-0.8 MG TABS Take 2 tablets by mouth daily Post bariatric surgery patient Yes Federica Acevedo MD   ascorbic acid (V-R VITAMIN C) 250 MG tablet Take 1 tablet by mouth daily Take with the iron supplement Yes Federica Acevedo MD   hydrOXYzine (VISTARIL) 25 MG capsule Take 1 capsule by mouth 3 times daily as needed for Anxiety Yes Jose Manuel Olson DO   TRINTELLIX 20 MG TABS tablet daily  Yes Historical Provider, MD   ibuprofen (ADVIL;MOTRIN) 200 MG tablet Take 200 mg by mouth daily  Yes Historical Provider, MD   Calcium-Iron-Vit D-Vit K (CALCIUM SOFT CHEWS) 708-8-8625-40 MG-UNT-MCG CHEW Take by mouth 3 times daily Yes Historical Provider, MD   Cholecalciferol (VITAMIN D3) 5000 units TABS Take by mouth daily Yes Historical Provider, MD   omeprazole (PRILOSEC) 20 MG delayed release capsule Take 1 capsule by mouth Daily Yes Anastasia Nieto MD   Menthol-Zinc Oxide (GOLD BOND EXTRA STRENGTH) POWD Apply to rash as needed. Yes Jose Manuel Olson DO   albuterol sulfate HFA (VENTOLIN HFA) 108 (90 BASE) MCG/ACT inhaler Inhale 2 puffs into the lungs every 6 hours as needed for Wheezing Yes Jose Manuel Olson DO   albuterol (PROVENTIL) (2.5 MG/3ML) 0.083% nebulizer solution Take 3 mLs by nebulization every 6 hours as needed for Wheezing Yes Jose Manuel Olson DO   topiramate (TOPAMAX) 25 MG tablet take 1 tablet by mouth at bedtime  Historical Provider, MD   ammonium lactate (LAC-HYDRIN) 12 % lotion Apply topically daily.   Jose Manuel Olson DO   Multiple Vitamins-Minerals (THERAPEUTIC MULTIVITAMIN-MINERALS) tablet Take 1 tablet by mouth 2 times daily  Historical Provider, MD   CPAP Machine MISC by Does not apply route  Historical Provider, MD   Fluticasone Furoate-Vilanterol (BREO ELLIPTA) 200-25 MCG/INH AEPB Inhale 200 mcg into the lungs daily  Jose Manuel Olson DO Her behavior is normal. Judgment and thought content normal.       ASSESSMENT/PLAN:  1. Melasma  - versus tinea versicolor  - Application of sunscreen daily  - fluocin-hydroquinone-tretinoin (TRI-HI) 0.01-4-0.05 % cream; Apply topically daily  Dispense: 30 g; Refill: 0    2. Personal history of other endocrine, nutritional and metabolic disease   - Screening  - POCT glycosylated hemoglobin (Hb A1C)      Return in about 2 months (around 11/10/2018) for f/u skin color changes. An electronic signature was used to authenticate this note.     --Maude Page MD on 9/10/2018 at 11:17 AM

## 2018-09-11 ENCOUNTER — TELEPHONE (OUTPATIENT)
Dept: FAMILY MEDICINE CLINIC | Age: 43
End: 2018-09-11

## 2018-09-18 ENCOUNTER — TELEPHONE (OUTPATIENT)
Dept: OBGYN | Age: 43
End: 2018-09-18

## 2018-09-25 ENCOUNTER — OFFICE VISIT (OUTPATIENT)
Dept: OBGYN | Age: 43
End: 2018-09-25
Payer: MEDICARE

## 2018-09-25 VITALS
HEIGHT: 66 IN | RESPIRATION RATE: 18 BRPM | DIASTOLIC BLOOD PRESSURE: 85 MMHG | SYSTOLIC BLOOD PRESSURE: 134 MMHG | TEMPERATURE: 98.1 F | WEIGHT: 280 LBS | HEART RATE: 93 BPM | BODY MASS INDEX: 45 KG/M2

## 2018-09-25 DIAGNOSIS — N88.2 CERVICAL OS STENOSIS: ICD-10-CM

## 2018-09-25 DIAGNOSIS — N93.8 DUB (DYSFUNCTIONAL UTERINE BLEEDING): Primary | ICD-10-CM

## 2018-09-25 LAB
CONTROL: NORMAL
PREGNANCY TEST URINE, POC: NORMAL

## 2018-09-25 PROCEDURE — 99214 OFFICE O/P EST MOD 30 MIN: CPT | Performed by: NURSE PRACTITIONER

## 2018-09-25 PROCEDURE — G8427 DOCREV CUR MEDS BY ELIG CLIN: HCPCS | Performed by: NURSE PRACTITIONER

## 2018-09-25 PROCEDURE — 81025 URINE PREGNANCY TEST: CPT | Performed by: NURSE PRACTITIONER

## 2018-09-25 PROCEDURE — G8417 CALC BMI ABV UP PARAM F/U: HCPCS | Performed by: NURSE PRACTITIONER

## 2018-09-25 PROCEDURE — 99213 OFFICE O/P EST LOW 20 MIN: CPT | Performed by: NURSE PRACTITIONER

## 2018-09-25 PROCEDURE — 4004F PT TOBACCO SCREEN RCVD TLK: CPT | Performed by: NURSE PRACTITIONER

## 2018-09-25 RX ORDER — MEDROXYPROGESTERONE ACETATE 10 MG/1
10 TABLET ORAL DAILY
Qty: 30 TABLET | Refills: 3 | Status: SHIPPED | OUTPATIENT
Start: 2018-09-25 | End: 2018-10-29 | Stop reason: ALTCHOICE

## 2018-09-25 NOTE — PROGRESS NOTES
Endometrial Biopsy Procedure Note    Pre-operative Diagnosis: DUB    Post-operative Diagnosis: same    Indications: abnormal uterine bleeding    Procedure Details    Urine pregnancy test was done and result was negative. The risks (including infection, bleeding, pain, and uterine perforation) and benefits of the procedure were explained to the patient and Written informed consent was obtained. Antibiotic prophylaxis against endocarditis was not indicated. The patient was placed in the dorsal lithotomy position. Bimanual exam showed the uterus to be in the neutral position. A Graves' speculum inserted in the vagina, and the cervix prepped with povidone iodine. Endocervical curettage with a Kevorkian curette was not performed. A sharp tenaculum was applied to the anterior lip of the cervix for stabilization. Cervical os stenosis encountered and procedure could not be completed. Condition:  Stable    Complications:  Cervical os stenosis    Plan:    I reviewed with patient that I was unable to complete procedure. I did encounter some cervical os stenosis and patient requested that I stop procedure due to discomfort. Will try Provera. If this does not offer relief will need referred to Dr. Macrina Gonzalez. Patient voiced understanding. Orders Placed This Encounter   Procedures    CBC    POCT urine pregnancy     Orders Placed This Encounter   Medications    medroxyPROGESTERone (PROVERA) 10 MG tablet     Sig: Take 1 tablet by mouth daily     Dispense:  30 tablet     Refill:  3     Return in about 4 weeks (around 10/23/2018) for follow-up. All questions and concerns addressed. Patient voices understanding of plan of care.

## 2018-09-25 NOTE — PROGRESS NOTES
Here for procedure endometrial biopsy. Procedure process reviewed with patient by nestor zabala cnp. Permit obtained. Urine pregnancy test done and was neg. Patient prepared for endometrial biopsy. .  Procedure was unsuccessful and could not be completed due to patient unable to tolerate and nestor zabala had to stop procedure. Patient very upset and crying . States is frustrated about her bleeding situation. Provera tab rx sent to pharmacy and instructions given by nestor zabala cnm. Discharge instructions given by nestor zabala cnm. Lab order given with instructions to have drawn in lab.

## 2018-10-02 ENCOUNTER — TELEPHONE (OUTPATIENT)
Dept: OBGYN | Age: 43
End: 2018-10-02

## 2018-10-02 NOTE — TELEPHONE ENCOUNTER
Patient called back, has been on provera x 7 days, is spotting and today noted scant flow. Told her provera is to help menstruation to become regular, not stop bleeding or menstrual flow.

## 2018-10-19 ENCOUNTER — HOSPITAL ENCOUNTER (OUTPATIENT)
Age: 43
Discharge: HOME | End: 2018-10-19
Payer: MEDICARE

## 2018-10-19 LAB
FERRITIN: 5 NG/ML
VITAMIN D 25-HYDROXY: 20 NG/ML (ref 30–100)

## 2018-10-19 PROCEDURE — 82728 ASSAY OF FERRITIN: CPT

## 2018-10-19 PROCEDURE — 84630 ASSAY OF ZINC: CPT

## 2018-10-19 PROCEDURE — 82306 VITAMIN D 25 HYDROXY: CPT

## 2018-10-19 PROCEDURE — 36415 COLL VENOUS BLD VENIPUNCTURE: CPT

## 2018-10-23 ENCOUNTER — HOSPITAL ENCOUNTER (OUTPATIENT)
Age: 43
Discharge: HOME | End: 2018-10-23
Payer: MEDICARE

## 2018-10-23 ENCOUNTER — OFFICE VISIT (OUTPATIENT)
Dept: OBGYN | Age: 43
End: 2018-10-23
Payer: MEDICARE

## 2018-10-23 VITALS
TEMPERATURE: 98 F | SYSTOLIC BLOOD PRESSURE: 119 MMHG | WEIGHT: 272 LBS | DIASTOLIC BLOOD PRESSURE: 80 MMHG | RESPIRATION RATE: 16 BRPM | HEART RATE: 78 BPM | BODY MASS INDEX: 43.9 KG/M2

## 2018-10-23 DIAGNOSIS — N93.8 DUB (DYSFUNCTIONAL UTERINE BLEEDING): Primary | ICD-10-CM

## 2018-10-23 DIAGNOSIS — Z83.2 FAMILY HISTORY OF FACTOR V LEIDEN MUTATION: ICD-10-CM

## 2018-10-23 LAB
CONTROL: NORMAL
PREGNANCY TEST URINE, POC: NORMAL

## 2018-10-23 PROCEDURE — G8417 CALC BMI ABV UP PARAM F/U: HCPCS | Performed by: NURSE PRACTITIONER

## 2018-10-23 PROCEDURE — 99212 OFFICE O/P EST SF 10 MIN: CPT | Performed by: NURSE PRACTITIONER

## 2018-10-23 PROCEDURE — G8427 DOCREV CUR MEDS BY ELIG CLIN: HCPCS | Performed by: NURSE PRACTITIONER

## 2018-10-23 PROCEDURE — 99213 OFFICE O/P EST LOW 20 MIN: CPT | Performed by: NURSE PRACTITIONER

## 2018-10-23 PROCEDURE — 81291 MTHFR GENE: CPT

## 2018-10-23 PROCEDURE — 81241 F5 GENE: CPT

## 2018-10-23 PROCEDURE — 81025 URINE PREGNANCY TEST: CPT | Performed by: NURSE PRACTITIONER

## 2018-10-23 PROCEDURE — G8484 FLU IMMUNIZE NO ADMIN: HCPCS | Performed by: NURSE PRACTITIONER

## 2018-10-23 PROCEDURE — 81400 MOPATH PROCEDURE LEVEL 1: CPT

## 2018-10-23 PROCEDURE — 81240 F2 GENE: CPT

## 2018-10-23 PROCEDURE — 36415 COLL VENOUS BLD VENIPUNCTURE: CPT

## 2018-10-23 PROCEDURE — 4004F PT TOBACCO SCREEN RCVD TLK: CPT | Performed by: NURSE PRACTITIONER

## 2018-10-24 LAB — ZINC: 63 UG/DL (ref 60–120)

## 2018-10-26 ENCOUNTER — OFFICE VISIT (OUTPATIENT)
Dept: BARIATRICS/WEIGHT MGMT | Age: 43
End: 2018-10-26
Payer: MEDICARE

## 2018-10-26 DIAGNOSIS — E60 ZINC DEFICIENCY: ICD-10-CM

## 2018-10-26 DIAGNOSIS — E55.9 VITAMIN D DEFICIENCY: Primary | ICD-10-CM

## 2018-10-26 DIAGNOSIS — E61.1 IRON DEFICIENCY: ICD-10-CM

## 2018-10-26 PROCEDURE — 99211 OFF/OP EST MAY X REQ PHY/QHP: CPT

## 2018-10-26 PROCEDURE — 99213 OFFICE O/P EST LOW 20 MIN: CPT | Performed by: INTERNAL MEDICINE

## 2018-10-26 PROCEDURE — G8484 FLU IMMUNIZE NO ADMIN: HCPCS | Performed by: INTERNAL MEDICINE

## 2018-10-26 PROCEDURE — G8428 CUR MEDS NOT DOCUMENT: HCPCS | Performed by: INTERNAL MEDICINE

## 2018-10-26 PROCEDURE — G8417 CALC BMI ABV UP PARAM F/U: HCPCS | Performed by: INTERNAL MEDICINE

## 2018-10-26 PROCEDURE — 4004F PT TOBACCO SCREEN RCVD TLK: CPT | Performed by: INTERNAL MEDICINE

## 2018-10-26 RX ORDER — CHOLECALCIFEROL (VITAMIN D3) 1250 MCG
CAPSULE ORAL
Qty: 12 CAPSULE | Refills: 0 | Status: ON HOLD | OUTPATIENT
Start: 2018-10-26 | End: 2019-06-23

## 2018-10-26 RX ORDER — MULTIVITAMIN,THERAPEUTIC
TABLET ORAL
Qty: 120 TABLET | Refills: 3 | Status: SHIPPED | OUTPATIENT
Start: 2018-10-26 | End: 2018-10-29 | Stop reason: ALTCHOICE

## 2018-10-26 NOTE — PROGRESS NOTES
Date of Encounter: 10/26/18    Chief Complaint: Low vitamin D    HPI:     Thomas Boyd presents to the clinic today for evaluation and treatment of a low vitamin D level. She had a RYGB on 11/14/18 and has developed vit D def, Zinc def and iron def    Lab and treatment history are as follows:   Date     Vit D-OH level  Ferritin  Zinc   Treatment prescribed    05/14/2018     23  8             57    None yet (not taking any vitamins due to cost); Vitamin D3 50k IU twice each week for six weeks; Zinc 50mg daily for 14 days, Ferrous sulfate 325 mg, two tablets with breakfast or lunch each day, take with Vitamin C   10/19/2018     20                        5                     63   Did not tolerate the iron b/c of GI problems and takes it infrequently (once weekly)  Having DUB with a lot of blood loss and is working with an OB/Gyn for this  Taking the vitamin D infrequently    ROS: GI: + N and constipation     PE:     There were no vitals taken for this visit. Pt is WN, WD, and in NAD    A/P:   1. Low Vitamin D - uncontrolled   Vitamin D3 50k IU three times each week for 4 weeks     2. Zinc def - controlled   Resume multivitamin     3. Iron def - uncontrolled           Ferrous sulfate 325 mg, one tablet with breakfast every other day take with Vitamin C      4. Post- RYGB          Cannot afford bariatric surgery vitamins (do not take with iron)   Takea multvitamin without iron two daily    2.   Follow up          Return to see me in five weeks          Repeat Vit D 25OH, ferritin, and zinc levels 2-3 days prior to appt with me    Svetlana Hall MD  10/26/18

## 2018-10-29 ENCOUNTER — APPOINTMENT (OUTPATIENT)
Dept: GENERAL RADIOLOGY | Age: 43
DRG: 853 | End: 2018-10-29
Payer: MEDICARE

## 2018-10-29 ENCOUNTER — HOSPITAL ENCOUNTER (INPATIENT)
Age: 43
LOS: 5 days | Discharge: HOME | DRG: 853 | End: 2018-11-03
Attending: EMERGENCY MEDICINE | Admitting: SURGERY
Payer: MEDICARE

## 2018-10-29 ENCOUNTER — HOSPITAL ENCOUNTER (EMERGENCY)
Age: 43
End: 2018-10-29
Payer: MEDICARE

## 2018-10-29 ENCOUNTER — ANESTHESIA (OUTPATIENT)
Dept: OPERATING ROOM | Age: 43
DRG: 853 | End: 2018-10-29
Payer: MEDICARE

## 2018-10-29 ENCOUNTER — ANESTHESIA EVENT (OUTPATIENT)
Dept: OPERATING ROOM | Age: 43
DRG: 853 | End: 2018-10-29
Payer: MEDICARE

## 2018-10-29 ENCOUNTER — APPOINTMENT (OUTPATIENT)
Dept: CT IMAGING | Age: 43
DRG: 853 | End: 2018-10-29
Payer: MEDICARE

## 2018-10-29 VITALS
OXYGEN SATURATION: 100 % | SYSTOLIC BLOOD PRESSURE: 139 MMHG | TEMPERATURE: 99.3 F | DIASTOLIC BLOOD PRESSURE: 83 MMHG | RESPIRATION RATE: 15 BRPM

## 2018-10-29 DIAGNOSIS — R19.8 PERFORATED VISCUS: Primary | ICD-10-CM

## 2018-10-29 PROBLEM — A41.9 SEVERE SEPSIS (HCC): Status: ACTIVE | Noted: 2018-10-29

## 2018-10-29 PROBLEM — R65.20 SEVERE SEPSIS (HCC): Status: ACTIVE | Noted: 2018-10-29

## 2018-10-29 LAB
ABO/RH: NORMAL
ALBUMIN SERPL-MCNC: 3.9 G/DL (ref 3.5–5.2)
ALP BLD-CCNC: 97 U/L (ref 35–104)
ALT SERPL-CCNC: 8 U/L (ref 0–32)
ANION GAP SERPL CALCULATED.3IONS-SCNC: 17 MMOL/L (ref 7–16)
ANTIBODY SCREEN: NORMAL
AST SERPL-CCNC: 25 U/L (ref 0–31)
BACTERIA: ABNORMAL /HPF
BASOPHILS ABSOLUTE: 0.07 E9/L (ref 0–0.2)
BASOPHILS RELATIVE PERCENT: 0.6 % (ref 0–2)
BILIRUB SERPL-MCNC: 0.3 MG/DL (ref 0–1.2)
BILIRUBIN URINE: NEGATIVE
BLOOD, URINE: ABNORMAL
BUN BLDV-MCNC: 12 MG/DL (ref 6–20)
CALCIUM SERPL-MCNC: 8.6 MG/DL (ref 8.6–10.2)
CHLORIDE BLD-SCNC: 101 MMOL/L (ref 98–107)
CLARITY: CLEAR
CO2: 19 MMOL/L (ref 22–29)
COLOR: YELLOW
CREAT SERPL-MCNC: 0.7 MG/DL (ref 0.5–1)
EKG ATRIAL RATE: 65 BPM
EKG P AXIS: 48 DEGREES
EKG P-R INTERVAL: 128 MS
EKG Q-T INTERVAL: 418 MS
EKG QRS DURATION: 86 MS
EKG QTC CALCULATION (BAZETT): 434 MS
EKG R AXIS: 29 DEGREES
EKG T AXIS: 38 DEGREES
EKG VENTRICULAR RATE: 65 BPM
EOSINOPHILS ABSOLUTE: 0.05 E9/L (ref 0.05–0.5)
EOSINOPHILS RELATIVE PERCENT: 0.4 % (ref 0–6)
EPITHELIAL CELLS, UA: ABNORMAL /HPF
GFR AFRICAN AMERICAN: >60
GFR NON-AFRICAN AMERICAN: >60 ML/MIN/1.73
GLUCOSE BLD-MCNC: 124 MG/DL (ref 74–109)
GLUCOSE URINE: NEGATIVE MG/DL
HCG QUALITATIVE: NEGATIVE
HCG(URINE) PREGNANCY TEST: NEGATIVE
HCT VFR BLD CALC: 29.3 % (ref 34–48)
HEMOGLOBIN: 8.6 G/DL (ref 11.5–15.5)
IMMATURE GRANULOCYTES #: 0.06 E9/L
IMMATURE GRANULOCYTES %: 0.5 % (ref 0–5)
INR BLD: 1.1
KETONES, URINE: 15 MG/DL
LACTIC ACID: 2.7 MMOL/L (ref 0.5–2.2)
LEUKOCYTE ESTERASE, URINE: NEGATIVE
LIPASE: 11 U/L (ref 13–60)
LYMPHOCYTES ABSOLUTE: 2.45 E9/L (ref 1.5–4)
LYMPHOCYTES RELATIVE PERCENT: 20.1 % (ref 20–42)
MCH RBC QN AUTO: 21.4 PG (ref 26–35)
MCHC RBC AUTO-ENTMCNC: 29.4 % (ref 32–34.5)
MCV RBC AUTO: 72.9 FL (ref 80–99.9)
MONOCYTES ABSOLUTE: 0.66 E9/L (ref 0.1–0.95)
MONOCYTES RELATIVE PERCENT: 5.4 % (ref 2–12)
MUCUS: PRESENT
NEUTROPHILS ABSOLUTE: 8.88 E9/L (ref 1.8–7.3)
NEUTROPHILS RELATIVE PERCENT: 73 % (ref 43–80)
NITRITE, URINE: POSITIVE
PDW BLD-RTO: 17.8 FL (ref 11.5–15)
PH UA: 5 (ref 5–9)
PLATELET # BLD: 900 E9/L (ref 130–450)
PMV BLD AUTO: 9.2 FL (ref 7–12)
POTASSIUM REFLEX MAGNESIUM: 3.7 MMOL/L (ref 3.5–5)
PROTEIN UA: NEGATIVE MG/DL
PROTHROMBIN TIME: 12.5 SEC (ref 9.3–12.4)
RBC # BLD: 4.02 E12/L (ref 3.5–5.5)
RBC UA: ABNORMAL /HPF (ref 0–2)
SODIUM BLD-SCNC: 137 MMOL/L (ref 132–146)
SPECIFIC GRAVITY UA: 1.01 (ref 1–1.03)
TOTAL PROTEIN: 7.2 G/DL (ref 6.4–8.3)
TROPONIN: <0.01 NG/ML (ref 0–0.03)
UROBILINOGEN, URINE: 0.2 E.U./DL
WBC # BLD: 12.2 E9/L (ref 4.5–11.5)
WBC UA: ABNORMAL /HPF (ref 0–5)

## 2018-10-29 PROCEDURE — 6360000002 HC RX W HCPCS: Performed by: EMERGENCY MEDICINE

## 2018-10-29 PROCEDURE — 74177 CT ABD & PELVIS W/CONTRAST: CPT

## 2018-10-29 PROCEDURE — 2580000003 HC RX 258: Performed by: STUDENT IN AN ORGANIZED HEALTH CARE EDUCATION/TRAINING PROGRAM

## 2018-10-29 PROCEDURE — 6360000002 HC RX W HCPCS

## 2018-10-29 PROCEDURE — 3600000002 HC SURGERY LEVEL 2 BASE: Performed by: SURGERY

## 2018-10-29 PROCEDURE — 93005 ELECTROCARDIOGRAM TRACING: CPT | Performed by: STUDENT IN AN ORGANIZED HEALTH CARE EDUCATION/TRAINING PROGRAM

## 2018-10-29 PROCEDURE — 96375 TX/PRO/DX INJ NEW DRUG ADDON: CPT

## 2018-10-29 PROCEDURE — 2500000003 HC RX 250 WO HCPCS: Performed by: EMERGENCY MEDICINE

## 2018-10-29 PROCEDURE — 71045 X-RAY EXAM CHEST 1 VIEW: CPT

## 2018-10-29 PROCEDURE — 85610 PROTHROMBIN TIME: CPT

## 2018-10-29 PROCEDURE — 84703 CHORIONIC GONADOTROPIN ASSAY: CPT

## 2018-10-29 PROCEDURE — 83690 ASSAY OF LIPASE: CPT

## 2018-10-29 PROCEDURE — 86901 BLOOD TYPING SEROLOGIC RH(D): CPT

## 2018-10-29 PROCEDURE — 84484 ASSAY OF TROPONIN QUANT: CPT

## 2018-10-29 PROCEDURE — 99223 1ST HOSP IP/OBS HIGH 75: CPT | Performed by: SURGERY

## 2018-10-29 PROCEDURE — 3600000012 HC SURGERY LEVEL 2 ADDTL 15MIN: Performed by: SURGERY

## 2018-10-29 PROCEDURE — C9113 INJ PANTOPRAZOLE SODIUM, VIA: HCPCS | Performed by: STUDENT IN AN ORGANIZED HEALTH CARE EDUCATION/TRAINING PROGRAM

## 2018-10-29 PROCEDURE — 36415 COLL VENOUS BLD VENIPUNCTURE: CPT

## 2018-10-29 PROCEDURE — 96365 THER/PROPH/DIAG IV INF INIT: CPT

## 2018-10-29 PROCEDURE — 86920 COMPATIBILITY TEST SPIN: CPT

## 2018-10-29 PROCEDURE — 6360000004 HC RX CONTRAST MEDICATION: Performed by: RADIOLOGY

## 2018-10-29 PROCEDURE — 2580000003 HC RX 258: Performed by: NURSE ANESTHETIST, CERTIFIED REGISTERED

## 2018-10-29 PROCEDURE — 6360000002 HC RX W HCPCS: Performed by: STUDENT IN AN ORGANIZED HEALTH CARE EDUCATION/TRAINING PROGRAM

## 2018-10-29 PROCEDURE — 43840 GSTRRPHY SUTR DUOL/GSTR ULCR: CPT | Performed by: SURGERY

## 2018-10-29 PROCEDURE — 99999 PR OFFICE/OUTPT VISIT,PROCEDURE ONLY: CPT | Performed by: HOSPITALIST

## 2018-10-29 PROCEDURE — 6360000002 HC RX W HCPCS: Performed by: SURGERY

## 2018-10-29 PROCEDURE — 2500000003 HC RX 250 WO HCPCS: Performed by: SURGERY

## 2018-10-29 PROCEDURE — 86900 BLOOD TYPING SEROLOGIC ABO: CPT

## 2018-10-29 PROCEDURE — 86850 RBC ANTIBODY SCREEN: CPT

## 2018-10-29 PROCEDURE — 94761 N-INVAS EAR/PLS OXIMETRY MLT: CPT

## 2018-10-29 PROCEDURE — 80053 COMPREHEN METABOLIC PANEL: CPT

## 2018-10-29 PROCEDURE — 2709999900 HC NON-CHARGEABLE SUPPLY: Performed by: SURGERY

## 2018-10-29 PROCEDURE — 6360000002 HC RX W HCPCS: Performed by: NURSE ANESTHETIST, CERTIFIED REGISTERED

## 2018-10-29 PROCEDURE — 96376 TX/PRO/DX INJ SAME DRUG ADON: CPT

## 2018-10-29 PROCEDURE — 6370000000 HC RX 637 (ALT 250 FOR IP): Performed by: STUDENT IN AN ORGANIZED HEALTH CARE EDUCATION/TRAINING PROGRAM

## 2018-10-29 PROCEDURE — 99285 EMERGENCY DEPT VISIT HI MDM: CPT

## 2018-10-29 PROCEDURE — 2500000003 HC RX 250 WO HCPCS: Performed by: NURSE ANESTHETIST, CERTIFIED REGISTERED

## 2018-10-29 PROCEDURE — 96374 THER/PROPH/DIAG INJ IV PUSH: CPT

## 2018-10-29 PROCEDURE — 7100000001 HC PACU RECOVERY - ADDTL 15 MIN: Performed by: SURGERY

## 2018-10-29 PROCEDURE — 87081 CULTURE SCREEN ONLY: CPT

## 2018-10-29 PROCEDURE — 81025 URINE PREGNANCY TEST: CPT

## 2018-10-29 PROCEDURE — 2060000000 HC ICU INTERMEDIATE R&B

## 2018-10-29 PROCEDURE — 2720000010 HC SURG SUPPLY STERILE: Performed by: SURGERY

## 2018-10-29 PROCEDURE — 7100000000 HC PACU RECOVERY - FIRST 15 MIN: Performed by: SURGERY

## 2018-10-29 PROCEDURE — 3700000000 HC ANESTHESIA ATTENDED CARE: Performed by: SURGERY

## 2018-10-29 PROCEDURE — 2580000003 HC RX 258: Performed by: EMERGENCY MEDICINE

## 2018-10-29 PROCEDURE — 2500000003 HC RX 250 WO HCPCS: Performed by: STUDENT IN AN ORGANIZED HEALTH CARE EDUCATION/TRAINING PROGRAM

## 2018-10-29 PROCEDURE — 6360000002 HC RX W HCPCS: Performed by: ANESTHESIOLOGY

## 2018-10-29 PROCEDURE — 0DU947Z SUPPLEMENT DUODENUM WITH AUTOLOGOUS TISSUE SUBSTITUTE, PERCUTANEOUS ENDOSCOPIC APPROACH: ICD-10-PCS | Performed by: SURGERY

## 2018-10-29 PROCEDURE — 81001 URINALYSIS AUTO W/SCOPE: CPT

## 2018-10-29 PROCEDURE — 49905 OMENTAL FLAP INTRA-ABDOM: CPT | Performed by: SURGERY

## 2018-10-29 PROCEDURE — 85025 COMPLETE CBC W/AUTO DIFF WBC: CPT

## 2018-10-29 PROCEDURE — 3700000001 HC ADD 15 MINUTES (ANESTHESIA): Performed by: SURGERY

## 2018-10-29 PROCEDURE — 83605 ASSAY OF LACTIC ACID: CPT

## 2018-10-29 RX ORDER — 0.9 % SODIUM CHLORIDE 0.9 %
10 VIAL (ML) INJECTION ONCE
Status: COMPLETED | OUTPATIENT
Start: 2018-10-29 | End: 2018-10-29

## 2018-10-29 RX ORDER — SODIUM CHLORIDE 0.9 % (FLUSH) 0.9 %
10 SYRINGE (ML) INJECTION PRN
Status: DISCONTINUED | OUTPATIENT
Start: 2018-10-29 | End: 2018-11-03 | Stop reason: HOSPADM

## 2018-10-29 RX ORDER — DIMETHICONE, OXYBENZONE, AND PADIMATE O 2; 2.5; 6.6 G/100G; G/100G; G/100G
STICK TOPICAL
Status: DISPENSED
Start: 2018-10-29 | End: 2018-10-30

## 2018-10-29 RX ORDER — NEOSTIGMINE METHYLSULFATE 1 MG/ML
INJECTION, SOLUTION INTRAVENOUS PRN
Status: DISCONTINUED | OUTPATIENT
Start: 2018-10-29 | End: 2018-10-29 | Stop reason: SDUPTHER

## 2018-10-29 RX ORDER — MORPHINE SULFATE 2 MG/ML
2 INJECTION, SOLUTION INTRAMUSCULAR; INTRAVENOUS
Status: DISCONTINUED | OUTPATIENT
Start: 2018-10-29 | End: 2018-10-29 | Stop reason: DRUGHIGH

## 2018-10-29 RX ORDER — 0.9 % SODIUM CHLORIDE 0.9 %
10 VIAL (ML) INJECTION DAILY
Status: DISCONTINUED | OUTPATIENT
Start: 2018-10-29 | End: 2018-10-30

## 2018-10-29 RX ORDER — BUPIVACAINE HYDROCHLORIDE AND EPINEPHRINE 2.5; 5 MG/ML; UG/ML
INJECTION, SOLUTION EPIDURAL; INFILTRATION; INTRACAUDAL; PERINEURAL PRN
Status: DISCONTINUED | OUTPATIENT
Start: 2018-10-29 | End: 2018-10-29 | Stop reason: HOSPADM

## 2018-10-29 RX ORDER — LORAZEPAM 2 MG/ML
0.5 INJECTION INTRAMUSCULAR EVERY 6 HOURS PRN
Status: DISCONTINUED | OUTPATIENT
Start: 2018-10-29 | End: 2018-11-03 | Stop reason: HOSPADM

## 2018-10-29 RX ORDER — ONDANSETRON 2 MG/ML
INJECTION INTRAMUSCULAR; INTRAVENOUS PRN
Status: DISCONTINUED | OUTPATIENT
Start: 2018-10-29 | End: 2018-10-29 | Stop reason: SDUPTHER

## 2018-10-29 RX ORDER — PANTOPRAZOLE SODIUM 40 MG/10ML
40 INJECTION, POWDER, LYOPHILIZED, FOR SOLUTION INTRAVENOUS ONCE
Status: COMPLETED | OUTPATIENT
Start: 2018-10-29 | End: 2018-10-29

## 2018-10-29 RX ORDER — LIDOCAINE HYDROCHLORIDE 20 MG/ML
INJECTION, SOLUTION INFILTRATION; PERINEURAL PRN
Status: DISCONTINUED | OUTPATIENT
Start: 2018-10-29 | End: 2018-10-29 | Stop reason: SDUPTHER

## 2018-10-29 RX ORDER — MORPHINE SULFATE 2 MG/ML
6 INJECTION, SOLUTION INTRAMUSCULAR; INTRAVENOUS ONCE
Status: COMPLETED | OUTPATIENT
Start: 2018-10-29 | End: 2018-10-29

## 2018-10-29 RX ORDER — MORPHINE SULFATE 2 MG/ML
4 INJECTION, SOLUTION INTRAMUSCULAR; INTRAVENOUS ONCE
Status: DISCONTINUED | OUTPATIENT
Start: 2018-10-29 | End: 2018-10-29

## 2018-10-29 RX ORDER — DEXAMETHASONE SODIUM PHOSPHATE 4 MG/ML
INJECTION, SOLUTION INTRA-ARTICULAR; INTRALESIONAL; INTRAMUSCULAR; INTRAVENOUS; SOFT TISSUE PRN
Status: DISCONTINUED | OUTPATIENT
Start: 2018-10-29 | End: 2018-10-29 | Stop reason: SDUPTHER

## 2018-10-29 RX ORDER — DIPHENHYDRAMINE HYDROCHLORIDE 50 MG/ML
25 INJECTION INTRAMUSCULAR; INTRAVENOUS ONCE
Status: COMPLETED | OUTPATIENT
Start: 2018-10-29 | End: 2018-10-29

## 2018-10-29 RX ORDER — ONDANSETRON 2 MG/ML
4 INJECTION INTRAMUSCULAR; INTRAVENOUS
Status: DISCONTINUED | OUTPATIENT
Start: 2018-10-29 | End: 2018-10-29 | Stop reason: HOSPADM

## 2018-10-29 RX ORDER — FENTANYL CITRATE 50 UG/ML
INJECTION, SOLUTION INTRAMUSCULAR; INTRAVENOUS PRN
Status: DISCONTINUED | OUTPATIENT
Start: 2018-10-29 | End: 2018-10-29 | Stop reason: SDUPTHER

## 2018-10-29 RX ORDER — PROPOFOL 10 MG/ML
INJECTION, EMULSION INTRAVENOUS PRN
Status: DISCONTINUED | OUTPATIENT
Start: 2018-10-29 | End: 2018-10-29 | Stop reason: SDUPTHER

## 2018-10-29 RX ORDER — ONDANSETRON 2 MG/ML
4 INJECTION INTRAMUSCULAR; INTRAVENOUS EVERY 6 HOURS PRN
Status: DISCONTINUED | OUTPATIENT
Start: 2018-10-29 | End: 2018-11-03 | Stop reason: HOSPADM

## 2018-10-29 RX ORDER — HYDROXYZINE PAMOATE 25 MG/1
25 CAPSULE ORAL 3 TIMES DAILY PRN
Status: DISCONTINUED | OUTPATIENT
Start: 2018-10-29 | End: 2018-11-03 | Stop reason: HOSPADM

## 2018-10-29 RX ORDER — LIDOCAINE HYDROCHLORIDE 10 MG/ML
INJECTION, SOLUTION EPIDURAL; INFILTRATION; INTRACAUDAL; PERINEURAL
Status: DISPENSED
Start: 2018-10-29 | End: 2018-10-30

## 2018-10-29 RX ORDER — FLUCONAZOLE 2 MG/ML
400 INJECTION, SOLUTION INTRAVENOUS EVERY 24 HOURS
Status: DISCONTINUED | OUTPATIENT
Start: 2018-10-29 | End: 2018-11-03 | Stop reason: HOSPADM

## 2018-10-29 RX ORDER — SODIUM CHLORIDE 9 MG/ML
INJECTION, SOLUTION INTRAVENOUS CONTINUOUS PRN
Status: DISCONTINUED | OUTPATIENT
Start: 2018-10-29 | End: 2018-10-29 | Stop reason: SDUPTHER

## 2018-10-29 RX ORDER — MORPHINE SULFATE 2 MG/ML
4 INJECTION, SOLUTION INTRAMUSCULAR; INTRAVENOUS
Status: DISCONTINUED | OUTPATIENT
Start: 2018-10-29 | End: 2018-11-03 | Stop reason: HOSPADM

## 2018-10-29 RX ORDER — MORPHINE SULFATE 2 MG/ML
2 INJECTION, SOLUTION INTRAMUSCULAR; INTRAVENOUS
Status: DISCONTINUED | OUTPATIENT
Start: 2018-10-29 | End: 2018-11-03 | Stop reason: HOSPADM

## 2018-10-29 RX ORDER — MORPHINE SULFATE 2 MG/ML
2 INJECTION, SOLUTION INTRAMUSCULAR; INTRAVENOUS ONCE
Status: COMPLETED | OUTPATIENT
Start: 2018-10-29 | End: 2018-10-29

## 2018-10-29 RX ORDER — ALBUTEROL SULFATE 2.5 MG/3ML
2.5 SOLUTION RESPIRATORY (INHALATION) EVERY 6 HOURS PRN
Status: DISCONTINUED | OUTPATIENT
Start: 2018-10-29 | End: 2018-11-03 | Stop reason: HOSPADM

## 2018-10-29 RX ORDER — GLYCOPYRROLATE 0.2 MG/ML
INJECTION INTRAMUSCULAR; INTRAVENOUS PRN
Status: DISCONTINUED | OUTPATIENT
Start: 2018-10-29 | End: 2018-10-29 | Stop reason: SDUPTHER

## 2018-10-29 RX ORDER — SODIUM CHLORIDE 9 MG/ML
INJECTION, SOLUTION INTRAVENOUS CONTINUOUS
Status: DISCONTINUED | OUTPATIENT
Start: 2018-10-29 | End: 2018-11-01

## 2018-10-29 RX ORDER — MORPHINE SULFATE 4 MG/ML
4 INJECTION, SOLUTION INTRAMUSCULAR; INTRAVENOUS
Status: DISCONTINUED | OUTPATIENT
Start: 2018-10-29 | End: 2018-10-29 | Stop reason: DRUGHIGH

## 2018-10-29 RX ORDER — SUCCINYLCHOLINE CHLORIDE 20 MG/ML
INJECTION INTRAMUSCULAR; INTRAVENOUS PRN
Status: DISCONTINUED | OUTPATIENT
Start: 2018-10-29 | End: 2018-10-29 | Stop reason: SDUPTHER

## 2018-10-29 RX ORDER — MIDAZOLAM HYDROCHLORIDE 1 MG/ML
INJECTION INTRAMUSCULAR; INTRAVENOUS
Status: COMPLETED
Start: 2018-10-29 | End: 2018-10-29

## 2018-10-29 RX ORDER — PANTOPRAZOLE SODIUM 40 MG/10ML
40 INJECTION, POWDER, LYOPHILIZED, FOR SOLUTION INTRAVENOUS DAILY
Status: DISCONTINUED | OUTPATIENT
Start: 2018-10-29 | End: 2018-10-30

## 2018-10-29 RX ORDER — ACETAMINOPHEN 325 MG/1
650 TABLET ORAL EVERY 4 HOURS PRN
Status: DISCONTINUED | OUTPATIENT
Start: 2018-10-29 | End: 2018-11-03 | Stop reason: HOSPADM

## 2018-10-29 RX ORDER — SODIUM CHLORIDE 0.9 % (FLUSH) 0.9 %
10 SYRINGE (ML) INJECTION EVERY 12 HOURS SCHEDULED
Status: DISCONTINUED | OUTPATIENT
Start: 2018-10-29 | End: 2018-11-03 | Stop reason: HOSPADM

## 2018-10-29 RX ORDER — MIDAZOLAM HYDROCHLORIDE 1 MG/ML
INJECTION INTRAMUSCULAR; INTRAVENOUS PRN
Status: DISCONTINUED | OUTPATIENT
Start: 2018-10-29 | End: 2018-10-29 | Stop reason: SDUPTHER

## 2018-10-29 RX ORDER — OYSTER SHELL CALCIUM WITH VITAMIN D 500; 200 MG/1; [IU]/1
1 TABLET, FILM COATED ORAL DAILY
Status: ON HOLD | COMMUNITY
End: 2019-06-23

## 2018-10-29 RX ADMIN — HYDROMORPHONE HYDROCHLORIDE 1 MG: 1 INJECTION, SOLUTION INTRAMUSCULAR; INTRAVENOUS; SUBCUTANEOUS at 23:30

## 2018-10-29 RX ADMIN — HYDROMORPHONE HYDROCHLORIDE 0.5 MG: 1 INJECTION, SOLUTION INTRAMUSCULAR; INTRAVENOUS; SUBCUTANEOUS at 18:01

## 2018-10-29 RX ADMIN — LIDOCAINE HYDROCHLORIDE: 20 SOLUTION ORAL; TOPICAL at 11:13

## 2018-10-29 RX ADMIN — DIPHENHYDRAMINE HYDROCHLORIDE 25 MG: 50 INJECTION, SOLUTION INTRAMUSCULAR; INTRAVENOUS at 11:08

## 2018-10-29 RX ADMIN — Medication 10 ML: at 20:42

## 2018-10-29 RX ADMIN — ONDANSETRON 4 MG: 2 INJECTION INTRAMUSCULAR; INTRAVENOUS at 11:03

## 2018-10-29 RX ADMIN — MORPHINE SULFATE 6 MG: 2 INJECTION, SOLUTION INTRAMUSCULAR; INTRAVENOUS at 11:03

## 2018-10-29 RX ADMIN — SUCCINYLCHOLINE CHLORIDE 140 MG: 20 INJECTION, SOLUTION INTRAMUSCULAR; INTRAVENOUS at 16:59

## 2018-10-29 RX ADMIN — PANTOPRAZOLE SODIUM 40 MG: 40 INJECTION, POWDER, FOR SOLUTION INTRAVENOUS at 11:07

## 2018-10-29 RX ADMIN — IOPAMIDOL 110 ML: 755 INJECTION, SOLUTION INTRAVENOUS at 12:29

## 2018-10-29 RX ADMIN — HYDROMORPHONE HYDROCHLORIDE 0.5 MG: 1 INJECTION, SOLUTION INTRAMUSCULAR; INTRAVENOUS; SUBCUTANEOUS at 18:32

## 2018-10-29 RX ADMIN — METRONIDAZOLE 500 MG: 500 INJECTION, SOLUTION INTRAVENOUS at 14:54

## 2018-10-29 RX ADMIN — METRONIDAZOLE 500 MG: 500 INJECTION, SOLUTION INTRAVENOUS at 23:32

## 2018-10-29 RX ADMIN — FENTANYL CITRATE 50 MCG: 50 INJECTION, SOLUTION INTRAMUSCULAR; INTRAVENOUS at 17:13

## 2018-10-29 RX ADMIN — HYDROMORPHONE HYDROCHLORIDE 1 MG: 1 INJECTION, SOLUTION INTRAMUSCULAR; INTRAVENOUS; SUBCUTANEOUS at 15:53

## 2018-10-29 RX ADMIN — SODIUM CHLORIDE 1000 ML: 9 INJECTION, SOLUTION INTRAVENOUS at 13:21

## 2018-10-29 RX ADMIN — DEXAMETHASONE SODIUM PHOSPHATE 10 MG: 4 INJECTION, SOLUTION INTRAMUSCULAR; INTRAVENOUS at 17:08

## 2018-10-29 RX ADMIN — FENTANYL CITRATE 50 MCG: 50 INJECTION, SOLUTION INTRAMUSCULAR; INTRAVENOUS at 17:44

## 2018-10-29 RX ADMIN — MIDAZOLAM HYDROCHLORIDE 2 MG: 1 INJECTION, SOLUTION INTRAMUSCULAR; INTRAVENOUS at 15:53

## 2018-10-29 RX ADMIN — GLYCOPYRROLATE 0.6 MG: 0.2 INJECTION, SOLUTION INTRAMUSCULAR; INTRAVENOUS at 17:28

## 2018-10-29 RX ADMIN — HYDROMORPHONE HYDROCHLORIDE 1 MG: 1 INJECTION, SOLUTION INTRAMUSCULAR; INTRAVENOUS; SUBCUTANEOUS at 20:40

## 2018-10-29 RX ADMIN — FENTANYL CITRATE 100 MCG: 50 INJECTION, SOLUTION INTRAMUSCULAR; INTRAVENOUS at 16:59

## 2018-10-29 RX ADMIN — PROPOFOL 150 MG: 10 INJECTION, EMULSION INTRAVENOUS at 16:59

## 2018-10-29 RX ADMIN — Medication 3 MG: at 17:28

## 2018-10-29 RX ADMIN — FENTANYL CITRATE 50 MCG: 50 INJECTION, SOLUTION INTRAMUSCULAR; INTRAVENOUS at 17:39

## 2018-10-29 RX ADMIN — HYDROMORPHONE HYDROCHLORIDE 1 MG: 1 INJECTION, SOLUTION INTRAMUSCULAR; INTRAVENOUS; SUBCUTANEOUS at 13:20

## 2018-10-29 RX ADMIN — FLUCONAZOLE 400 MG: 400 INJECTION, SOLUTION INTRAVENOUS at 20:46

## 2018-10-29 RX ADMIN — MIDAZOLAM HYDROCHLORIDE 2 MG: 1 INJECTION, SOLUTION INTRAMUSCULAR; INTRAVENOUS at 17:00

## 2018-10-29 RX ADMIN — MORPHINE SULFATE 2 MG: 2 INJECTION, SOLUTION INTRAMUSCULAR; INTRAVENOUS at 11:55

## 2018-10-29 RX ADMIN — ONDANSETRON HYDROCHLORIDE 4 MG: 2 INJECTION, SOLUTION INTRAMUSCULAR; INTRAVENOUS at 17:08

## 2018-10-29 RX ADMIN — PANTOPRAZOLE SODIUM 40 MG: 40 INJECTION, POWDER, FOR SOLUTION INTRAVENOUS at 20:42

## 2018-10-29 RX ADMIN — SODIUM CHLORIDE: 9 INJECTION, SOLUTION INTRAVENOUS at 16:50

## 2018-10-29 RX ADMIN — CEFTRIAXONE SODIUM 2 G: 2 INJECTION, POWDER, FOR SOLUTION INTRAMUSCULAR; INTRAVENOUS at 13:24

## 2018-10-29 RX ADMIN — Medication 10 ML: at 11:07

## 2018-10-29 RX ADMIN — LIDOCAINE HYDROCHLORIDE 100 MG: 20 INJECTION, SOLUTION INFILTRATION; PERINEURAL at 16:59

## 2018-10-29 RX ADMIN — HYDROMORPHONE HYDROCHLORIDE 0.5 MG: 1 INJECTION, SOLUTION INTRAMUSCULAR; INTRAVENOUS; SUBCUTANEOUS at 18:10

## 2018-10-29 RX ADMIN — SODIUM CHLORIDE: 9 INJECTION, SOLUTION INTRAVENOUS at 14:52

## 2018-10-29 ASSESSMENT — PAIN DESCRIPTION - PAIN TYPE
TYPE: SURGICAL PAIN
TYPE: ACUTE PAIN

## 2018-10-29 ASSESSMENT — ENCOUNTER SYMPTOMS
COUGH: 0
NAUSEA: 1
HEMATEMESIS: 0
CONSTIPATION: 0
SORE THROAT: 0
ABDOMINAL PAIN: 1
VOMITING: 1
DIARRHEA: 0
HEMATOCHEZIA: 0
FLATUS: 0
BELCHING: 1
SHORTNESS OF BREATH: 0

## 2018-10-29 ASSESSMENT — PAIN SCALES - GENERAL
PAINLEVEL_OUTOF10: 10
PAINLEVEL_OUTOF10: 10
PAINLEVEL_OUTOF10: 8
PAINLEVEL_OUTOF10: 10
PAINLEVEL_OUTOF10: 10
PAINLEVEL_OUTOF10: 7
PAINLEVEL_OUTOF10: 10
PAINLEVEL_OUTOF10: 10
PAINLEVEL_OUTOF10: 7
PAINLEVEL_OUTOF10: 8
PAINLEVEL_OUTOF10: 10

## 2018-10-29 ASSESSMENT — PAIN DESCRIPTION - DESCRIPTORS
DESCRIPTORS: SHARP;SQUEEZING
DESCRIPTORS: SHARP;SQUEEZING

## 2018-10-29 ASSESSMENT — PULMONARY FUNCTION TESTS
PIF_VALUE: 2
PIF_VALUE: 11
PIF_VALUE: 25
PIF_VALUE: 31
PIF_VALUE: 0
PIF_VALUE: 3
PIF_VALUE: 28
PIF_VALUE: 32
PIF_VALUE: 31
PIF_VALUE: 1
PIF_VALUE: 31
PIF_VALUE: 25
PIF_VALUE: 4
PIF_VALUE: 4
PIF_VALUE: 1
PIF_VALUE: 28
PIF_VALUE: 27
PIF_VALUE: 19
PIF_VALUE: 0
PIF_VALUE: 31
PIF_VALUE: 31
PIF_VALUE: 3
PIF_VALUE: 25
PIF_VALUE: 29
PIF_VALUE: 32
PIF_VALUE: 23
PIF_VALUE: 1
PIF_VALUE: 31
PIF_VALUE: 25
PIF_VALUE: 31
PIF_VALUE: 28
PIF_VALUE: 25
PIF_VALUE: 1
PIF_VALUE: 27
PIF_VALUE: 0
PIF_VALUE: 1
PIF_VALUE: 28
PIF_VALUE: 24
PIF_VALUE: 32
PIF_VALUE: 3
PIF_VALUE: 31
PIF_VALUE: 3
PIF_VALUE: 29
PIF_VALUE: 3
PIF_VALUE: 31
PIF_VALUE: 30

## 2018-10-29 ASSESSMENT — PAIN DESCRIPTION - PROGRESSION: CLINICAL_PROGRESSION: GRADUALLY WORSENING

## 2018-10-29 ASSESSMENT — PAIN - FUNCTIONAL ASSESSMENT: PAIN_FUNCTIONAL_ASSESSMENT: 0-10

## 2018-10-29 ASSESSMENT — PAIN DESCRIPTION - LOCATION
LOCATION: ABDOMEN

## 2018-10-29 ASSESSMENT — PAIN DESCRIPTION - ONSET: ONSET: GRADUAL

## 2018-10-29 ASSESSMENT — PAIN DESCRIPTION - FREQUENCY: FREQUENCY: CONTINUOUS

## 2018-10-29 NOTE — ANESTHESIA PRE PROCEDURE
by Does not apply route    Historical Provider, MD   albuterol sulfate HFA (VENTOLIN HFA) 108 (90 BASE) MCG/ACT inhaler Inhale 2 puffs into the lungs every 6 hours as needed for Wheezing 2/28/17   Chari GarvinchesterDO   albuterol (PROVENTIL) (2.5 MG/3ML) 0.083% nebulizer solution Take 3 mLs by nebulization every 6 hours as needed for Wheezing 2/26/17   Chari GarvinchesterDO   Handicap Placard MISC by Does not apply route Duration: 5 years 7/19/16   Costella Boeck, DO       Current medications:    Current Facility-Administered Medications   Medication Dose Route Frequency Provider Last Rate Last Dose    ondansetron (ZOFRAN) injection 4 mg  4 mg Intravenous Q6H PRN Keyshawn De La Rosa DO   4 mg at 10/29/18 1103    0.9 % sodium chloride infusion   Intravenous Continuous Meena Mustafa  mL/hr at 10/29/18 1452      cefTRIAXone (ROCEPHIN) 1 g in dextrose 5 % 50 mL IVPB (vial-mate)  1 g Intravenous Q24H Meena Mustafa MD        metronidazole (FLAGYL) 500 mg in NaCl 100 mL IVPB premix  500 mg Intravenous Q8H Meena Mustafa MD        HYDROmorphone (DILAUDID) injection 1 mg  1 mg Intravenous Q2H PRN Meena Mustafa MD   1 mg at 10/29/18 1553    ondansetron (ZOFRAN) injection 4 mg  4 mg Intravenous Q6H PRN Meena Mustafa MD        lidocaine PF 1 % injection              Current Outpatient Prescriptions   Medication Sig Dispense Refill    calcium-vitamin D (OSCAL-500) 500-200 MG-UNIT per tablet Take 1 tablet by mouth daily      Cholecalciferol (VITAMIN D3) 23370 units CAPS Take one capsule every Mon-Wed-Fri for four weeks (Patient taking differently: Take 1 capsule by mouth three times a week Monday, Wednesday, Thursday) 12 capsule 0    ferrous sulfate 325 (65 Fe) MG tablet Take 1 tablet by mouth daily (with breakfast) , take with Vitamin C 250 mg tablet (Patient taking differently: Take 325 mg by mouth daily (with breakfast) ) 90 tablet 3    Prenatal Vit-Fe Fumarate-FA (RA PRENATAL) 28-0.8 MG TABS Take 2 tablets without complication N87.31    Asthma attack J45. 0    HTN (hypertension) I10    Respiratory failure (HCC) J96.90    GERD without esophagitis K21.9    S/P gastric bypass Z98.84    Lumbar spondylosis M47.816    Primary osteoarthritis of right knee M17.11    Influenza with respiratory manifestation other than pneumonia J11.1    Vitamin D deficiency E55.9    Zinc deficiency E60    Iron deficiency E61.1    Perforated viscus R19.8       Past Medical History:        Diagnosis Date    Anemia     Arthritis     Asthma     Depression     Diabetes mellitus ([facility] [address]     pre diabetic    GERD without esophagitis     History of blood transfusion     History of gastric bypass     Hypertension     no meds    Iron deficiency 8/1/2018    Lumbar spondylosis 1/26/2018    Lymphedema     Obesity     Panic attacks     PONV (postoperative nausea and vomiting)     Sleep apnea, obstructive     Vitamin D deficiency 7/27/2018    Zinc deficiency 8/1/2018       Past Surgical History:        Procedure Laterality Date    APPENDECTOMY  1996    NATHANAEL-EN-Y GASTRIC BYPASS  11/14/2017       Social History:    Social History   Substance Use Topics    Smoking status: Current Every Day Smoker     Packs/day: 1.00     Years: 15.00     Types: Cigarettes     Start date: 10/29/1991     Last attempt to quit: 11/3/2016    Smokeless tobacco: Never Used    Alcohol use 3.6 oz/week     6 Standard drinks or equivalent per week      Comment: social                                Ready to quit: Not Answered  Counseling given: Not Answered      Vital Signs (Current):   Vitals:    10/29/18 1159 10/29/18 1333 10/29/18 1458 10/29/18 1531   BP: 116/66 122/71 136/81 136/85   Pulse: 70 78 77 84   Resp: 20 20 16 14   Temp:   98 °F (36.7 °C) 99.5 °F (37.5 °C)   TempSrc:    Temporal   SpO2: 100% 100% 98% 99%   Weight:       Height:                                                  BP Readings from Last 3 Encounters:   10/29/18 136/85   10/23/18

## 2018-10-29 NOTE — ANESTHESIA PROCEDURE NOTES
Arterial Line:    An arterial line was placed in the holding area for the following indication(s): continuous blood pressure monitoring. A 20 gauge (size), 1 and 3/4 inch (length), Arrow (type) catheter was placed, Seldinger technique not used, into the right radial artery and Lidocaine 1%, secured by Tegaderm. Anesthesia type: Local  Local infiltration: Injection    Events:  patient tolerated procedure well with no complications.     Additional notes:  Versed 2 mg IV given for sedation for a line placement  10/29/2018 4:30 PM10/29/2018 4:40 PM  Anesthesiologist: Milagro Ureña  Performed: Anesthesiologist   Preanesthetic Checklist  Completed: patient identified, site marked, surgical consent, pre-op evaluation, timeout performed, IV checked, risks and benefits discussed, monitors and equipment checked, anesthesia consent given, oxygen available and patient being monitored

## 2018-10-29 NOTE — ED PROVIDER NOTES
Gastrointestinal: Positive for abdominal pain, nausea and vomiting. Negative for anorexia, constipation, diarrhea, flatus, hematemesis, hematochezia and melena. Genitourinary: Negative for dysuria, hematuria, vaginal bleeding and vaginal discharge. Physical Exam   Constitutional: She is oriented to person, place, and time. Vital signs are normal. She appears well-developed and well-nourished. She is active and cooperative. She appears distressed. Morbidly obese female. HENT:   Head: Normocephalic and atraumatic. Right Ear: Hearing and external ear normal.   Left Ear: Hearing and external ear normal.   Nose: Nose normal.   Cardiovascular: Normal rate, regular rhythm and normal heart sounds. Pulmonary/Chest: Effort normal and breath sounds normal.   Abdominal: Soft. Normal appearance and bowel sounds are normal. There is tenderness in the epigastric area. Neurological: She is alert and oriented to person, place, and time. GCS eye subscore is 4. GCS verbal subscore is 5. GCS motor subscore is 6. Skin: Skin is warm, dry and intact. Psychiatric: She has a normal mood and affect. Her speech is normal and behavior is normal. Thought content normal.       Procedures    MDM  Number of Diagnoses or Management Options  Diagnosis management comments: I received a call from the radiologist informed me that the patient has large amount of free fluid in her bowel. He states that he is unsure exactly of the source but suspects a leak of the Viviane-en-Y anastomosis. I called Dr. Jo Callahan, patient's surgeon and informed him of this. He states that he will admit her and bring her in today. I spoke to patient and informed her of this, she is agreeable to admission at this time. I also started patient on Rocephin and Flagyl. Patient also given analgesia.            Labs      Radiology      EKG Interpretation.         --------------------------------------------- PAST HISTORY history, exam, medical decision making, and procedures and agree with all pertinent clinical information. CRITICAL CARE:   35 MINUTES. Please note that the withdrawal or failure to initiate urgent interventions for this patient would likely result in a life threatening deterioration or permanent disability. Accordingly this patient received the above mentioned time, excluding separately billable procedures. I have reviewed my findings and recommendations with the assigned Medical Resident, Delores Archuleta and members of family present at the time of disposition. My findings/plan: The encounter diagnosis was Perforated viscus.   Current Discharge Medication List        DO Manasa Noland DO  10/30/18 1147

## 2018-10-29 NOTE — ANESTHESIA POSTPROCEDURE EVALUATION
Department of Anesthesiology  Postprocedure Note    Patient: Stephanie Steele  MRN: [id number]  YOB: 1975  Date of evaluation: 10/29/2018  Time:  5:57 PM     Procedure Summary     Date:  10/29/18 Room / Location:  [facility] OR  / [facility] OR    Anesthesia Start:  1016 Anesthesia Stop:  2611    Procedure:  DIAGNOSTIC LAPAROSCOPY REPAIR PERFORATED VISCUS (N/A Abdomen) Diagnosis:  (UNKNOWN)    Surgeon:  Malorie Alexandra MD Responsible Provider:  Yfn Jeter MD    Anesthesia Type:  general ASA Status:  3 - Emergent          Anesthesia Type: general    Marielena Phase I: Marielena Score: 8    Marielena Phase II:      Last vitals: Reviewed and per EMR flowsheets.        Anesthesia Post Evaluation    Patient location during evaluation: PACU  Patient participation: complete - patient participated  Level of consciousness: awake and alert  Pain score: 3  Airway patency: patent  Nausea & Vomiting: no vomiting and no nausea  Complications: no  Cardiovascular status: hemodynamically stable  Respiratory status: spontaneous ventilation  Hydration status: stable

## 2018-10-29 NOTE — H&P
Take 325 mg by mouth daily (with breakfast)  8/1/18  Yes Patricia Ayala MD   Prenatal Vit-Fe Fumarate-FA (RA PRENATAL) 28-0.8 MG TABS Take 2 tablets by mouth daily Post bariatric surgery patient 7/27/18  Yes Patricia Ayala MD   ascorbic acid (V-R VITAMIN C) 250 MG tablet Take 1 tablet by mouth daily Take with the iron supplement  Patient taking differently: Take 250 mg by mouth daily  7/27/18  Yes Patricia Ayala MD   hydrOXYzine (VISTARIL) 25 MG capsule Take 1 capsule by mouth 3 times daily as needed for Anxiety 6/14/18  Yes Amanda Gama DO   TRINTELLIX 20 MG TABS tablet Take 20 mg by mouth every morning  4/18/18  Yes Historical Provider, MD   ibuprofen (ADVIL;MOTRIN) 200 MG tablet Take 200 mg by mouth every 8 hours as needed for Pain or Fever    Yes Historical Provider, MD   omeprazole (PRILOSEC) 20 MG delayed release capsule Take 1 capsule by mouth Daily  Patient taking differently: Take 20 mg by mouth every morning  11/1/17 11/1/18 Yes Yudy Ramirez MD   CPAP Machine MISC by Does not apply route    Historical Provider, MD   albuterol sulfate HFA (VENTOLIN HFA) 108 (90 BASE) MCG/ACT inhaler Inhale 2 puffs into the lungs every 6 hours as needed for Wheezing 2/28/17   Amanda Gama DO   albuterol (PROVENTIL) (2.5 MG/3ML) 0.083% nebulizer solution Take 3 mLs by nebulization every 6 hours as needed for Wheezing 2/26/17   Amanda Gama DO   Handicap Placard MISC by Does not apply route Duration: 5 years 7/19/16   Silke Mckeon DO       Allergies   Allergen Reactions    Pcn [Penicillins] Anaphylaxis and Other (See Comments)     Patient unsure    Food Hives     Berries - Red Blotches and Itching   Lactose Intolerance  Soy    Oxycontin [Oxycodone Hcl] Itching       Family History   Problem Relation Age of Onset    Heart Attack Mother 61        death from this    Diabetes Mother     Depression Mother     Diabetes Father     Stroke Father     Cancer Maternal Grandfather     Cancer Paternal Grandmother input(s): INR, PTT in the last 72 hours. Invalid input(s): PT    RADIOLOGY    Ct Abdomen Pelvis W Iv Contrast Additional Contrast? None    Result Date: 10/29/2018  Patient MRN:  [id number] : 1975 Age: 37 years Gender: Female Order Date:  10/29/2018 10:30 AM EXAM: CT ABDOMEN PELVIS W IV CONTRAST NUMBER OF IMAGES \ views:  1955 INDICATION:  abdominal pain s/p viviane en y gastric bypass COMPARISON: CT of the lumbar spine performed on 10/13/2016 TECHNIQUE: Axial computerized tomography sections of the abdomen and pelvis with sagittal and coronal MPR reconstructions were obtained from the top of the diaphragm to the pelvis. Low-dose CT  acquisition technique included one of following options; 1 . Automated exposure control, 2. Adjustment of MA and or KV according to patient's size or 3. Use of iterative reconstruction. FINDINGS: This study is limited in its evaluation of the gastrointestinal tract due to the lack of oral contrast. THORACIC BASE: Unremarkable. LIVER: Unremarkable. BILIARY: The gallbladder is present. PANCREAS: Unremarkable. SPLEEN: Unremarkable. ADRENALS:  Unremarkable. KIDNEYS:  Unremarkable. GI: Postsurgical changes consistent with a Viviane-en-Y are identified. A large amount of free air is identified within the upper abdomen. Multiple punctate foci of extraluminal air are identified surrounding the Viviane limb where it anastomoses with the proximal pouch of the stomach. The appendix is not visualized. PELVIS: Unremarkable. MSK: No acute osseous findings. Old T11, T12 and L1 compression fractures are again identified. OTHER: None. Massive amount of free intra-abdominal air consistent with a bowel perforation. The exact source of the free air is not certain but multiple small foci of extraluminal air raises the possibility of an anastomotic leak.  ALERT:  THIS IS AN ABNORMAL REPORT The findings were discussed by Dr. Zulma Ram with the ordering provider, [facility], at 1306 hours on

## 2018-10-30 LAB
ANION GAP SERPL CALCULATED.3IONS-SCNC: 9 MMOL/L (ref 7–16)
ANISOCYTOSIS: ABNORMAL
BASOPHILS ABSOLUTE: 0.03 E9/L (ref 0–0.2)
BASOPHILS RELATIVE PERCENT: 0.1 % (ref 0–2)
BUN BLDV-MCNC: 10 MG/DL (ref 6–20)
CALCIUM SERPL-MCNC: 8.4 MG/DL (ref 8.6–10.2)
CHLORIDE BLD-SCNC: 104 MMOL/L (ref 98–107)
CO2: 23 MMOL/L (ref 22–29)
CREAT SERPL-MCNC: 0.6 MG/DL (ref 0.5–1)
EOSINOPHILS ABSOLUTE: 0 E9/L (ref 0.05–0.5)
EOSINOPHILS RELATIVE PERCENT: 0 % (ref 0–6)
GFR AFRICAN AMERICAN: >60
GFR NON-AFRICAN AMERICAN: >60 ML/MIN/1.73
GLUCOSE BLD-MCNC: 156 MG/DL (ref 74–109)
HCT VFR BLD CALC: 26 % (ref 34–48)
HEMOGLOBIN: 7.7 G/DL (ref 11.5–15.5)
HYPOCHROMIA: ABNORMAL
IMMATURE GRANULOCYTES #: 0.29 E9/L
IMMATURE GRANULOCYTES %: 1 % (ref 0–5)
LACTIC ACID: 1 MMOL/L (ref 0.5–2.2)
LYMPHOCYTES ABSOLUTE: 0.87 E9/L (ref 1.5–4)
LYMPHOCYTES RELATIVE PERCENT: 2.9 % (ref 20–42)
MCH RBC QN AUTO: 21.9 PG (ref 26–35)
MCHC RBC AUTO-ENTMCNC: 29.6 % (ref 32–34.5)
MCV RBC AUTO: 73.9 FL (ref 80–99.9)
MONOCYTES ABSOLUTE: 0.9 E9/L (ref 0.1–0.95)
MONOCYTES RELATIVE PERCENT: 3 % (ref 2–12)
NEUTROPHILS ABSOLUTE: 27.84 E9/L (ref 1.8–7.3)
NEUTROPHILS RELATIVE PERCENT: 93 % (ref 43–80)
PDW BLD-RTO: 18 FL (ref 11.5–15)
PLATELET # BLD: 661 E9/L (ref 130–450)
PMV BLD AUTO: 9.1 FL (ref 7–12)
POIKILOCYTES: ABNORMAL
POTASSIUM REFLEX MAGNESIUM: 4.2 MMOL/L (ref 3.5–5)
RBC # BLD: 3.52 E12/L (ref 3.5–5.5)
SODIUM BLD-SCNC: 136 MMOL/L (ref 132–146)
TARGET CELLS: ABNORMAL
WBC # BLD: 29.9 E9/L (ref 4.5–11.5)

## 2018-10-30 PROCEDURE — 2060000000 HC ICU INTERMEDIATE R&B

## 2018-10-30 PROCEDURE — 2700000000 HC OXYGEN THERAPY PER DAY

## 2018-10-30 PROCEDURE — 80048 BASIC METABOLIC PNL TOTAL CA: CPT

## 2018-10-30 PROCEDURE — 6360000002 HC RX W HCPCS: Performed by: STUDENT IN AN ORGANIZED HEALTH CARE EDUCATION/TRAINING PROGRAM

## 2018-10-30 PROCEDURE — 99232 SBSQ HOSP IP/OBS MODERATE 35: CPT | Performed by: INTERNAL MEDICINE

## 2018-10-30 PROCEDURE — 6360000002 HC RX W HCPCS: Performed by: SURGERY

## 2018-10-30 PROCEDURE — APPSS30 APP SPLIT SHARED TIME 16-30 MINUTES: Performed by: NURSE PRACTITIONER

## 2018-10-30 PROCEDURE — 2500000003 HC RX 250 WO HCPCS: Performed by: STUDENT IN AN ORGANIZED HEALTH CARE EDUCATION/TRAINING PROGRAM

## 2018-10-30 PROCEDURE — 85025 COMPLETE CBC W/AUTO DIFF WBC: CPT

## 2018-10-30 PROCEDURE — 2580000003 HC RX 258: Performed by: SURGERY

## 2018-10-30 PROCEDURE — 36415 COLL VENOUS BLD VENIPUNCTURE: CPT

## 2018-10-30 PROCEDURE — 83605 ASSAY OF LACTIC ACID: CPT

## 2018-10-30 PROCEDURE — 99222 1ST HOSP IP/OBS MODERATE 55: CPT | Performed by: INTERNAL MEDICINE

## 2018-10-30 PROCEDURE — 2580000003 HC RX 258: Performed by: STUDENT IN AN ORGANIZED HEALTH CARE EDUCATION/TRAINING PROGRAM

## 2018-10-30 PROCEDURE — C9113 INJ PANTOPRAZOLE SODIUM, VIA: HCPCS | Performed by: STUDENT IN AN ORGANIZED HEALTH CARE EDUCATION/TRAINING PROGRAM

## 2018-10-30 PROCEDURE — 6360000002 HC RX W HCPCS: Performed by: INTERNAL MEDICINE

## 2018-10-30 PROCEDURE — C9113 INJ PANTOPRAZOLE SODIUM, VIA: HCPCS | Performed by: SURGERY

## 2018-10-30 RX ADMIN — HYDROMORPHONE HYDROCHLORIDE 1 MG: 1 INJECTION, SOLUTION INTRAMUSCULAR; INTRAVENOUS; SUBCUTANEOUS at 15:50

## 2018-10-30 RX ADMIN — PANTOPRAZOLE SODIUM 40 MG: 40 INJECTION, POWDER, FOR SOLUTION INTRAVENOUS at 08:31

## 2018-10-30 RX ADMIN — LORAZEPAM 0.5 MG: 2 INJECTION INTRAMUSCULAR; INTRAVENOUS at 12:49

## 2018-10-30 RX ADMIN — METRONIDAZOLE 500 MG: 500 INJECTION, SOLUTION INTRAVENOUS at 07:48

## 2018-10-30 RX ADMIN — METRONIDAZOLE 500 MG: 500 INJECTION, SOLUTION INTRAVENOUS at 15:51

## 2018-10-30 RX ADMIN — CEFTRIAXONE 1 G: 1 INJECTION, POWDER, FOR SOLUTION INTRAMUSCULAR; INTRAVENOUS at 13:35

## 2018-10-30 RX ADMIN — PANTOPRAZOLE SODIUM 8 MG/HR: 40 INJECTION, POWDER, FOR SOLUTION INTRAVENOUS at 23:23

## 2018-10-30 RX ADMIN — HYDROMORPHONE HYDROCHLORIDE 1 MG: 1 INJECTION, SOLUTION INTRAMUSCULAR; INTRAVENOUS; SUBCUTANEOUS at 18:14

## 2018-10-30 RX ADMIN — PANTOPRAZOLE SODIUM 8 MG/HR: 40 INJECTION, POWDER, FOR SOLUTION INTRAVENOUS at 13:35

## 2018-10-30 RX ADMIN — METRONIDAZOLE 500 MG: 500 INJECTION, SOLUTION INTRAVENOUS at 23:36

## 2018-10-30 RX ADMIN — Medication 10 ML: at 03:50

## 2018-10-30 RX ADMIN — HYDROMORPHONE HYDROCHLORIDE 1 MG: 1 INJECTION, SOLUTION INTRAMUSCULAR; INTRAVENOUS; SUBCUTANEOUS at 01:32

## 2018-10-30 RX ADMIN — ENOXAPARIN SODIUM 40 MG: 40 INJECTION SUBCUTANEOUS at 21:01

## 2018-10-30 RX ADMIN — SODIUM CHLORIDE 125 ML/HR: 9 INJECTION, SOLUTION INTRAVENOUS at 03:40

## 2018-10-30 RX ADMIN — SODIUM CHLORIDE 125 ML/HR: 9 INJECTION, SOLUTION INTRAVENOUS at 23:23

## 2018-10-30 RX ADMIN — HYDROMORPHONE HYDROCHLORIDE 1 MG: 1 INJECTION, SOLUTION INTRAMUSCULAR; INTRAVENOUS; SUBCUTANEOUS at 08:30

## 2018-10-30 RX ADMIN — HYDROMORPHONE HYDROCHLORIDE 1 MG: 1 INJECTION, SOLUTION INTRAMUSCULAR; INTRAVENOUS; SUBCUTANEOUS at 21:01

## 2018-10-30 RX ADMIN — HYDROMORPHONE HYDROCHLORIDE 1 MG: 1 INJECTION, SOLUTION INTRAMUSCULAR; INTRAVENOUS; SUBCUTANEOUS at 12:28

## 2018-10-30 RX ADMIN — HYDROMORPHONE HYDROCHLORIDE 1 MG: 1 INJECTION, SOLUTION INTRAMUSCULAR; INTRAVENOUS; SUBCUTANEOUS at 06:05

## 2018-10-30 RX ADMIN — Medication 10 ML: at 23:37

## 2018-10-30 RX ADMIN — FLUCONAZOLE 400 MG: 400 INJECTION, SOLUTION INTRAVENOUS at 21:01

## 2018-10-30 RX ADMIN — Medication 10 ML: at 01:32

## 2018-10-30 RX ADMIN — HYDROMORPHONE HYDROCHLORIDE 1 MG: 1 INJECTION, SOLUTION INTRAMUSCULAR; INTRAVENOUS; SUBCUTANEOUS at 23:36

## 2018-10-30 RX ADMIN — Medication 10 ML: at 18:14

## 2018-10-30 RX ADMIN — MORPHINE SULFATE 4 MG: 2 INJECTION, SOLUTION INTRAMUSCULAR; INTRAVENOUS at 11:02

## 2018-10-30 RX ADMIN — HYDROMORPHONE HYDROCHLORIDE 1 MG: 1 INJECTION, SOLUTION INTRAMUSCULAR; INTRAVENOUS; SUBCUTANEOUS at 03:49

## 2018-10-30 RX ADMIN — SODIUM CHLORIDE 10 ML: 9 INJECTION, SOLUTION INTRAMUSCULAR; INTRAVENOUS; SUBCUTANEOUS at 08:31

## 2018-10-30 RX ADMIN — Medication 10 ML: at 23:24

## 2018-10-30 RX ADMIN — Medication 10 ML: at 08:31

## 2018-10-30 RX ADMIN — Medication 10 ML: at 15:51

## 2018-10-30 RX ADMIN — Medication 10 ML: at 21:02

## 2018-10-30 RX ADMIN — SODIUM CHLORIDE: 9 INJECTION, SOLUTION INTRAVENOUS at 12:49

## 2018-10-30 ASSESSMENT — PAIN SCALES - GENERAL
PAINLEVEL_OUTOF10: 8
PAINLEVEL_OUTOF10: 6
PAINLEVEL_OUTOF10: 5
PAINLEVEL_OUTOF10: 6
PAINLEVEL_OUTOF10: 5
PAINLEVEL_OUTOF10: 6
PAINLEVEL_OUTOF10: 4
PAINLEVEL_OUTOF10: 8
PAINLEVEL_OUTOF10: 4
PAINLEVEL_OUTOF10: 10
PAINLEVEL_OUTOF10: 8
PAINLEVEL_OUTOF10: 7
PAINLEVEL_OUTOF10: 4
PAINLEVEL_OUTOF10: 6
PAINLEVEL_OUTOF10: 6
PAINLEVEL_OUTOF10: 0
PAINLEVEL_OUTOF10: 7
PAINLEVEL_OUTOF10: 7

## 2018-10-30 ASSESSMENT — PAIN DESCRIPTION - PAIN TYPE
TYPE: SURGICAL PAIN

## 2018-10-30 ASSESSMENT — PAIN DESCRIPTION - LOCATION
LOCATION: SHOULDER
LOCATION: ABDOMEN
LOCATION: SHOULDER
LOCATION: ABDOMEN;SHOULDER
LOCATION: SHOULDER;ABDOMEN
LOCATION: ABDOMEN
LOCATION: SHOULDER;ABDOMEN
LOCATION: SHOULDER;ABDOMEN

## 2018-10-30 ASSESSMENT — PAIN DESCRIPTION - DESCRIPTORS
DESCRIPTORS: BURNING;ACHING;STABBING
DESCRIPTORS: SHARP;SHOOTING;DISCOMFORT
DESCRIPTORS: SHOOTING;SHARP;DISCOMFORT

## 2018-10-30 ASSESSMENT — PAIN DESCRIPTION - ORIENTATION
ORIENTATION: LEFT
ORIENTATION: MID;LEFT
ORIENTATION: LEFT;MID
ORIENTATION: LEFT;MID
ORIENTATION: LEFT
ORIENTATION: LEFT;MID

## 2018-10-30 ASSESSMENT — PAIN DESCRIPTION - ONSET
ONSET: ON-GOING

## 2018-10-30 ASSESSMENT — PAIN DESCRIPTION - FREQUENCY
FREQUENCY: CONTINUOUS

## 2018-10-30 NOTE — OP NOTE
[address] [facility]                               OPERATIVE REPORT    PATIENT NAME: CHRISTIANO JARAMILLO                  :         1975  MED REC NO:   [id number]                            ROOM:  ACCOUNT NO:   [de-identified]                           ADMIT DATE:  10/29/2018  PROVIDER:     Ghazala Guillen MD    DATE OF PROCEDURE:  10/29/2018    PREOPERATIVE DIAGNOSIS:  Perforated viscus. POSTOPERATIVE DIAGNOSIS:  Perforated marginal ulcer. PROCEDURE PERFORMED:  Diagnostic laparoscopy with repair of perforated  ulcer and mobilization and placement of omental patch. SURGEON:  Ghazala Guillen MD.    ASSISTANT:  Dr. Vamsi Burton. ANESTHESIA:  General.    COMPLICATIONS:  None. FLUIDS:  Crystalloid. BLOOD LOSS:  Minimal.    DISPOSITION:  The patient will be admitted to the floor. SPECIMEN:  None. INDICATIONS:  This is a 18-year-old female with the aforementioned  diagnosis. I explained to her the risks, benefits, potential  outcomes, and alternative treatments of the aforementioned procedure  and she agreed to proceed understanding all risks and potential  outcomes. DESCRIPTION OF PROCEDURE:  The patient was brought to the operative  suite, placed under general anesthesia, was already given preoperative  antibiotics. She was then prepped and draped in normal sterile  condition. Coronado catheter had been placed. Once this was done, local  anesthetic was infiltrated in the supraumbilical area. A 5-mm  incision was made. A Veress needle was passed into the peritoneum. CO2 was used to insufflate the abdomen to a pressure of 15 mmHg. At  this time, Veress needle was removed and a 5-mm trocar was put in  place. A 10- and a 5-mm trocar were placed in the right lateral  abdomen and a 5-mm trocar was placed in the left lateral abdomen.    Once we entered the abdomen, obviously there was a perforated

## 2018-10-30 NOTE — PROGRESS NOTES
Surgery Progress Note            Chief complaint:   Patient Active Problem List   Diagnosis    Leg edema    Tobacco abuse    Panic attacks    Tennis elbow    Abnormal menses    Sprain of right shoulder    AYALA on CPAP    Compression fracture of lumbar vertebra (HCC)    Compression fracture of body of thoracic vertebra (HCC)    Spondylosis of lumbar region without myelopathy or radiculopathy    Mechanical low back pain    Morbid obesity ([facility] [address]    Mild intermittent asthma without complication    Asthma attack    HTN (hypertension)    Respiratory failure ([facility] [address]    GERD without esophagitis    S/P gastric bypass    Lumbar spondylosis    Primary osteoarthritis of right knee    Influenza with respiratory manifestation other than pneumonia    Vitamin D deficiency    Zinc deficiency    Iron deficiency    Perforated viscus    Severe sepsis ([facility] [address]       S: having anxiety attack    O:   Vitals:    10/30/18 1215   BP: 133/78   Pulse:    Resp:    Temp:    SpO2: 100%       Intake/Output Summary (Last 24 hours) at 10/30/18 1233  Last data filed at 10/30/18 0757   Gross per 24 hour   Intake           2278.3 ml   Output              845 ml   Net           1433.3 ml           Labs:  Recent Labs      10/29/18   1109  10/30/18   0450   WBC  12.2*  29.9*   HGB  8.6*  7.7*   HCT  29.3*  26.0*     Lab Results   Component Value Date    CREATININE 0.6 10/30/2018    BUN 10 10/30/2018     10/30/2018    K 4.2 10/30/2018     10/30/2018    CO2 23 10/30/2018     Recent Labs      10/29/18   1109   LIPASE  11*       Physical exam:   /78   Pulse 70   Temp 98.3 °F (36.8 °C) (Oral)   Resp 18   Ht 5' 6\" (1.676 m)   Wt 280 lb (127 kg)   LMP 09/06/2018 Comment: still bleeding  SpO2 100%   BMI 45.19 kg/m²   General appearance: NAD  Head: NCAT  Neck: supple, no masses  Lungs: equal chest rise bilateral  Heart: S1S2 present  Abdomen: soft, moderately tender, nondistended,  Incisions clean, drain with

## 2018-10-30 NOTE — CONSULTS
grossly intact to light touch sensation     LABS:  CBC:   Lab Results   Component Value Date    WBC 12.2 10/29/2018    RBC 4.02 10/29/2018    HGB 8.6 10/29/2018    HCT 29.3 10/29/2018     10/29/2018    MCV 72.9 10/29/2018     BMP:    Lab Results   Component Value Date     10/29/2018    K 3.7 10/29/2018     10/29/2018    CO2 19 10/29/2018    BUN 12 10/29/2018    CREATININE 0.7 10/29/2018    GLUCOSE 124 10/29/2018    GLUCOSE 94 2012    CALCIUM 8.6 10/29/2018     Hepatic Function Panel:    Lab Results   Component Value Date    ALKPHOS 97 10/29/2018    AST 25 10/29/2018    ALT 8 10/29/2018    PROT 7.2 10/29/2018    LABALBU 3.9 10/29/2018    LABALBU 3.9 2012    BILITOT 0.3 10/29/2018     Magnesium:    Lab Results   Component Value Date    MG 2.0 2016       PT/INR:    Lab Results   Component Value Date    PROTIME 12.5 10/29/2018    INR 1.1 10/29/2018     U/A:   Lab Results   Component Value Date    LEUKOCYTESUR Negative 10/29/2018    PHUR 5.0 10/29/2018    WBCUA 0-1 10/29/2018    RBCUA 0-1 10/29/2018    BACTERIA FEW 10/29/2018    SPECGRAV 1.015 10/29/2018    BLOODU TRACE-INTACT 10/29/2018    GLUCOSEU Negative 10/29/2018     ABG:  No results found for: PHART, QAO9EPB, PO2ART, G8SDRYHI, NRS2VTJ, BEART  TSH:    Lab Results   Component Value Date    TSH 3.890 2016     Cardiac Enzymes:   Lab Results   Component Value Date    CKTOTAL 36 2011    CKTOTAL 30 (L) 2011    CKTOTAL 36 2011    CKMB <0.2 2011    CKMB <0.2 2011    CKMB <0.2 2011    TROPONINI <0.01 10/29/2018    TROPONINI <0.01 2017    TROPONINI <0.01 2015       Radiology: Ct Abdomen Pelvis W Iv Contrast Additional Contrast? None    Result Date: 10/29/2018  Patient MRN:  [id number] : 1975 Age: 37 years Gender: Female Order Date:  10/29/2018 10:30 AM EXAM: CT ABDOMEN PELVIS W IV CONTRAST NUMBER OF IMAGES \ views:  1955 INDICATION:  abdominal pain s/p dayan en y gastric

## 2018-10-30 NOTE — PROGRESS NOTES
[facility] Progress Note    Admitting Date and Time: 10/29/2018 10:00 AM  Admit Dx: Perforated viscus [R19.8]  Perforated viscus [R19.8]    Subjective:  Patient is being followed for Perforated viscus [R19.8]  Perforated viscus [R19.8]     Patient awake, alert, sitting up in chair in room in no acute distress  Reporting overall pain much improved from prior to surgery  Per patient ongoing epigastric pain radiating into shoulder  Denies flatus  Wants to eat   Denies fevers or chills    ROS: denies fever, chills, cp, sob, n/v, HA unless stated above.       metroNIDAZOLE  500 mg Intravenous Q8H    sodium chloride flush  10 mL Intravenous 2 times per day    enoxaparin  40 mg Subcutaneous Daily    HYDROmorphone  1 mg Intravenous Once    fluconazole  400 mg Intravenous Q24H    cefTRIAXone (ROCEPHIN) IV  1 g Intravenous Q24H    VORTIoxetine  20 mg Oral QAM       ondansetron 4 mg Q6H PRN   HYDROmorphone 1 mg Q2H PRN   ondansetron 4 mg Q6H PRN   albuterol 2.5 mg Q6H PRN   sodium chloride flush 10 mL PRN   acetaminophen 650 mg Q4H PRN   morphine 2 mg Q3H PRN   Or     morphine 4 mg Q3H PRN   LORazepam 0.5 mg Q6H PRN   hydrOXYzine 25 mg TID PRN        Objective:    /78   Pulse 70   Temp 98.3 °F (36.8 °C) (Oral)   Resp 18   Ht 5' 6\" (1.676 m)   Wt 280 lb (127 kg)   LMP 09/06/2018 Comment: still bleeding  SpO2 100%   BMI 45.19 kg/m²      General Appearance: alert and oriented to person, place and time and in no acute distress  Skin: warm and dry  Head: normocephalic and atraumatic  Neck: neck supple and non tender without mass   Pulmonary/Chest: clear to auscultation bilaterally  Cardiovascular: normal rate, normal S1 and S2 and no carotid bruits  Abdomen: obese soft, tender, ndistended, normal bowel sounds, no masses or organomegaly  Extremities: no cyanosis, no clubbing and no edema  Neurologic: speech normal         Recent Labs      10/29/18   1109  10/30/18   0450   NA  137  136   K

## 2018-10-31 LAB
ANION GAP SERPL CALCULATED.3IONS-SCNC: 10 MMOL/L (ref 7–16)
BASOPHILS ABSOLUTE: 0.03 E9/L (ref 0–0.2)
BASOPHILS RELATIVE PERCENT: 0.2 % (ref 0–2)
BLOOD BANK DISPENSE STATUS: NORMAL
BLOOD BANK PRODUCT CODE: NORMAL
BPU ID: NORMAL
BUN BLDV-MCNC: 14 MG/DL (ref 6–20)
CALCIUM SERPL-MCNC: 8 MG/DL (ref 8.6–10.2)
CHLORIDE BLD-SCNC: 109 MMOL/L (ref 98–107)
CO2: 22 MMOL/L (ref 22–29)
CREAT SERPL-MCNC: 0.6 MG/DL (ref 0.5–1)
DESCRIPTION BLOOD BANK: NORMAL
EOSINOPHILS ABSOLUTE: 0.02 E9/L (ref 0.05–0.5)
EOSINOPHILS RELATIVE PERCENT: 0.1 % (ref 0–6)
GFR AFRICAN AMERICAN: >60
GFR NON-AFRICAN AMERICAN: >60 ML/MIN/1.73
GLUCOSE BLD-MCNC: 81 MG/DL (ref 74–109)
HCT VFR BLD CALC: 22.7 % (ref 34–48)
HEMOGLOBIN: 6.7 G/DL (ref 11.5–15.5)
IMMATURE GRANULOCYTES #: 0.16 E9/L
IMMATURE GRANULOCYTES %: 0.8 % (ref 0–5)
LACTIC ACID: 1.1 MMOL/L (ref 0.5–2.2)
LYMPHOCYTES ABSOLUTE: 2.08 E9/L (ref 1.5–4)
LYMPHOCYTES RELATIVE PERCENT: 10.6 % (ref 20–42)
MCH RBC QN AUTO: 21.8 PG (ref 26–35)
MCHC RBC AUTO-ENTMCNC: 29.5 % (ref 32–34.5)
MCV RBC AUTO: 73.7 FL (ref 80–99.9)
MONOCYTES ABSOLUTE: 0.77 E9/L (ref 0.1–0.95)
MONOCYTES RELATIVE PERCENT: 3.9 % (ref 2–12)
NEUTROPHILS ABSOLUTE: 16.61 E9/L (ref 1.8–7.3)
NEUTROPHILS RELATIVE PERCENT: 84.4 % (ref 43–80)
ORGANISM: ABNORMAL
PDW BLD-RTO: 17.8 FL (ref 11.5–15)
PLATELET # BLD: 582 E9/L (ref 130–450)
PMV BLD AUTO: 9.1 FL (ref 7–12)
POTASSIUM REFLEX MAGNESIUM: 4 MMOL/L (ref 3.5–5)
RBC # BLD: 3.08 E12/L (ref 3.5–5.5)
SODIUM BLD-SCNC: 141 MMOL/L (ref 132–146)
WBC # BLD: 19.7 E9/L (ref 4.5–11.5)

## 2018-10-31 PROCEDURE — 2060000000 HC ICU INTERMEDIATE R&B

## 2018-10-31 PROCEDURE — 85025 COMPLETE CBC W/AUTO DIFF WBC: CPT

## 2018-10-31 PROCEDURE — 36415 COLL VENOUS BLD VENIPUNCTURE: CPT

## 2018-10-31 PROCEDURE — 2500000003 HC RX 250 WO HCPCS: Performed by: STUDENT IN AN ORGANIZED HEALTH CARE EDUCATION/TRAINING PROGRAM

## 2018-10-31 PROCEDURE — 99232 SBSQ HOSP IP/OBS MODERATE 35: CPT | Performed by: INTERNAL MEDICINE

## 2018-10-31 PROCEDURE — C9113 INJ PANTOPRAZOLE SODIUM, VIA: HCPCS | Performed by: SURGERY

## 2018-10-31 PROCEDURE — P9016 RBC LEUKOCYTES REDUCED: HCPCS

## 2018-10-31 PROCEDURE — 99024 POSTOP FOLLOW-UP VISIT: CPT | Performed by: SURGERY

## 2018-10-31 PROCEDURE — APPSS30 APP SPLIT SHARED TIME 16-30 MINUTES: Performed by: NURSE PRACTITIONER

## 2018-10-31 PROCEDURE — 2580000003 HC RX 258: Performed by: SURGERY

## 2018-10-31 PROCEDURE — 2580000003 HC RX 258: Performed by: STUDENT IN AN ORGANIZED HEALTH CARE EDUCATION/TRAINING PROGRAM

## 2018-10-31 PROCEDURE — 2580000003 HC RX 258: Performed by: INTERNAL MEDICINE

## 2018-10-31 PROCEDURE — 83605 ASSAY OF LACTIC ACID: CPT

## 2018-10-31 PROCEDURE — 36430 TRANSFUSION BLD/BLD COMPNT: CPT

## 2018-10-31 PROCEDURE — 6370000000 HC RX 637 (ALT 250 FOR IP): Performed by: INTERNAL MEDICINE

## 2018-10-31 PROCEDURE — 6360000002 HC RX W HCPCS: Performed by: STUDENT IN AN ORGANIZED HEALTH CARE EDUCATION/TRAINING PROGRAM

## 2018-10-31 PROCEDURE — 80048 BASIC METABOLIC PNL TOTAL CA: CPT

## 2018-10-31 PROCEDURE — 6360000002 HC RX W HCPCS: Performed by: SURGERY

## 2018-10-31 RX ORDER — 0.9 % SODIUM CHLORIDE 0.9 %
250 INTRAVENOUS SOLUTION INTRAVENOUS ONCE
Status: DISCONTINUED | OUTPATIENT
Start: 2018-10-31 | End: 2018-11-03 | Stop reason: HOSPADM

## 2018-10-31 RX ORDER — HYDROMORPHONE HCL 110MG/55ML
1 PATIENT CONTROLLED ANALGESIA SYRINGE INTRAVENOUS
Status: DISCONTINUED | OUTPATIENT
Start: 2018-10-31 | End: 2018-11-03 | Stop reason: HOSPADM

## 2018-10-31 RX ORDER — 0.9 % SODIUM CHLORIDE 0.9 %
500 INTRAVENOUS SOLUTION INTRAVENOUS ONCE
Status: COMPLETED | OUTPATIENT
Start: 2018-10-31 | End: 2018-10-31

## 2018-10-31 RX ADMIN — PANTOPRAZOLE SODIUM 8 MG/HR: 40 INJECTION, POWDER, FOR SOLUTION INTRAVENOUS at 10:20

## 2018-10-31 RX ADMIN — SODIUM CHLORIDE: 9 INJECTION, SOLUTION INTRAVENOUS at 17:42

## 2018-10-31 RX ADMIN — MORPHINE SULFATE 4 MG: 2 INJECTION, SOLUTION INTRAMUSCULAR; INTRAVENOUS at 11:30

## 2018-10-31 RX ADMIN — MORPHINE SULFATE 2 MG: 2 INJECTION, SOLUTION INTRAMUSCULAR; INTRAVENOUS at 06:00

## 2018-10-31 RX ADMIN — SODIUM CHLORIDE: 9 INJECTION, SOLUTION INTRAVENOUS at 10:21

## 2018-10-31 RX ADMIN — MUPIROCIN: 20 OINTMENT TOPICAL at 13:12

## 2018-10-31 RX ADMIN — ONDANSETRON 4 MG: 2 INJECTION INTRAMUSCULAR; INTRAVENOUS at 11:30

## 2018-10-31 RX ADMIN — METRONIDAZOLE 500 MG: 500 INJECTION, SOLUTION INTRAVENOUS at 17:42

## 2018-10-31 RX ADMIN — HYDROMORPHONE HYDROCHLORIDE 1 MG: 2 INJECTION, SOLUTION INTRAMUSCULAR; INTRAVENOUS; SUBCUTANEOUS at 22:37

## 2018-10-31 RX ADMIN — METRONIDAZOLE 500 MG: 500 INJECTION, SOLUTION INTRAVENOUS at 10:20

## 2018-10-31 RX ADMIN — Medication 10 ML: at 20:19

## 2018-10-31 RX ADMIN — HYDROMORPHONE HYDROCHLORIDE 1 MG: 2 INJECTION, SOLUTION INTRAMUSCULAR; INTRAVENOUS; SUBCUTANEOUS at 13:02

## 2018-10-31 RX ADMIN — Medication 10 ML: at 10:21

## 2018-10-31 RX ADMIN — CEFTRIAXONE 1 G: 1 INJECTION, POWDER, FOR SOLUTION INTRAMUSCULAR; INTRAVENOUS at 13:03

## 2018-10-31 RX ADMIN — HYDROMORPHONE HYDROCHLORIDE 1 MG: 2 INJECTION, SOLUTION INTRAMUSCULAR; INTRAVENOUS; SUBCUTANEOUS at 20:19

## 2018-10-31 RX ADMIN — MUPIROCIN: 20 OINTMENT TOPICAL at 20:19

## 2018-10-31 RX ADMIN — SODIUM CHLORIDE 500 ML: 9 INJECTION, SOLUTION INTRAVENOUS at 05:59

## 2018-10-31 RX ADMIN — HYDROMORPHONE HYDROCHLORIDE 1 MG: 1 INJECTION, SOLUTION INTRAMUSCULAR; INTRAVENOUS; SUBCUTANEOUS at 09:12

## 2018-10-31 RX ADMIN — FLUCONAZOLE 400 MG: 400 INJECTION, SOLUTION INTRAVENOUS at 20:19

## 2018-10-31 RX ADMIN — MORPHINE SULFATE 4 MG: 2 INJECTION, SOLUTION INTRAMUSCULAR; INTRAVENOUS at 18:56

## 2018-10-31 RX ADMIN — HYDROMORPHONE HYDROCHLORIDE 1 MG: 2 INJECTION, SOLUTION INTRAMUSCULAR; INTRAVENOUS; SUBCUTANEOUS at 17:00

## 2018-10-31 RX ADMIN — Medication 10 ML: at 09:34

## 2018-10-31 RX ADMIN — PANTOPRAZOLE SODIUM 8 MG/HR: 40 INJECTION, POWDER, FOR SOLUTION INTRAVENOUS at 18:56

## 2018-10-31 ASSESSMENT — PAIN DESCRIPTION - FREQUENCY
FREQUENCY: CONTINUOUS

## 2018-10-31 ASSESSMENT — PAIN SCALES - GENERAL
PAINLEVEL_OUTOF10: 9
PAINLEVEL_OUTOF10: 0
PAINLEVEL_OUTOF10: 10
PAINLEVEL_OUTOF10: 8
PAINLEVEL_OUTOF10: 0
PAINLEVEL_OUTOF10: 6
PAINLEVEL_OUTOF10: 9
PAINLEVEL_OUTOF10: 10
PAINLEVEL_OUTOF10: 8
PAINLEVEL_OUTOF10: 8

## 2018-10-31 ASSESSMENT — PAIN DESCRIPTION - PAIN TYPE
TYPE: SURGICAL PAIN

## 2018-10-31 ASSESSMENT — PAIN DESCRIPTION - PROGRESSION
CLINICAL_PROGRESSION: GRADUALLY WORSENING

## 2018-10-31 ASSESSMENT — PAIN DESCRIPTION - DESCRIPTORS
DESCRIPTORS: CONSTANT;DISCOMFORT;ACHING
DESCRIPTORS: ACHING;CRAMPING;DISCOMFORT
DESCRIPTORS: ACHING;CONSTANT;CRAMPING
DESCRIPTORS: CONSTANT;OTHER (COMMENT)
DESCRIPTORS: ACHING;CRAMPING
DESCRIPTORS: ACHING;CRAMPING;DISCOMFORT

## 2018-10-31 ASSESSMENT — PAIN DESCRIPTION - ONSET
ONSET: ON-GOING
ONSET: ON-GOING
ONSET: GRADUAL
ONSET: ON-GOING
ONSET: ON-GOING

## 2018-10-31 ASSESSMENT — PAIN DESCRIPTION - LOCATION
LOCATION: ABDOMEN

## 2018-10-31 NOTE — PROGRESS NOTES
Spoke with Dr. Diana Hanley regarding positive MRSA screening. Orders received. Also spoke with Dr. Afua Taveras regarding pt c/o increased pain and blood count. Okay to r/p blood count in AM. No other new orders received.

## 2018-10-31 NOTE — PROGRESS NOTES
Notified Dr. Bobo Edgar of HGB results. Orders received. Also notified surgery of HGB results per Dr. oBbo Edgar.

## 2018-11-01 ENCOUNTER — APPOINTMENT (OUTPATIENT)
Dept: GENERAL RADIOLOGY | Age: 43
DRG: 853 | End: 2018-11-01
Payer: MEDICARE

## 2018-11-01 LAB
ANION GAP SERPL CALCULATED.3IONS-SCNC: 12 MMOL/L (ref 7–16)
BASOPHILS ABSOLUTE: 0.04 E9/L (ref 0–0.2)
BASOPHILS RELATIVE PERCENT: 0.3 % (ref 0–2)
BUN BLDV-MCNC: 10 MG/DL (ref 6–20)
CALCIUM SERPL-MCNC: 8.2 MG/DL (ref 8.6–10.2)
CHLORIDE BLD-SCNC: 108 MMOL/L (ref 98–107)
CO2: 22 MMOL/L (ref 22–29)
CREAT SERPL-MCNC: 0.6 MG/DL (ref 0.5–1)
EOSINOPHILS ABSOLUTE: 0.12 E9/L (ref 0.05–0.5)
EOSINOPHILS RELATIVE PERCENT: 0.9 % (ref 0–6)
GFR AFRICAN AMERICAN: >60
GFR NON-AFRICAN AMERICAN: >60 ML/MIN/1.73
GLUCOSE BLD-MCNC: 87 MG/DL (ref 74–109)
HCT VFR BLD CALC: 24.1 % (ref 34–48)
HEMOGLOBIN: 7 G/DL (ref 11.5–15.5)
IMMATURE GRANULOCYTES #: 0.08 E9/L
IMMATURE GRANULOCYTES %: 0.6 % (ref 0–5)
LACTIC ACID: 0.5 MMOL/L (ref 0.5–2.2)
LYMPHOCYTES ABSOLUTE: 1.87 E9/L (ref 1.5–4)
LYMPHOCYTES RELATIVE PERCENT: 14.3 % (ref 20–42)
MCH RBC QN AUTO: 21.9 PG (ref 26–35)
MCHC RBC AUTO-ENTMCNC: 29 % (ref 32–34.5)
MCV RBC AUTO: 75.5 FL (ref 80–99.9)
MONOCYTES ABSOLUTE: 0.75 E9/L (ref 0.1–0.95)
MONOCYTES RELATIVE PERCENT: 5.7 % (ref 2–12)
NEUTROPHILS ABSOLUTE: 10.22 E9/L (ref 1.8–7.3)
NEUTROPHILS RELATIVE PERCENT: 78.2 % (ref 43–80)
PDW BLD-RTO: 18.2 FL (ref 11.5–15)
PLATELET # BLD: 537 E9/L (ref 130–450)
PMV BLD AUTO: 9.2 FL (ref 7–12)
POTASSIUM REFLEX MAGNESIUM: 3.8 MMOL/L (ref 3.5–5)
RBC # BLD: 3.19 E12/L (ref 3.5–5.5)
SODIUM BLD-SCNC: 142 MMOL/L (ref 132–146)
WBC # BLD: 13.1 E9/L (ref 4.5–11.5)

## 2018-11-01 PROCEDURE — 80048 BASIC METABOLIC PNL TOTAL CA: CPT

## 2018-11-01 PROCEDURE — 2060000000 HC ICU INTERMEDIATE R&B

## 2018-11-01 PROCEDURE — 6360000002 HC RX W HCPCS: Performed by: INTERNAL MEDICINE

## 2018-11-01 PROCEDURE — 85025 COMPLETE CBC W/AUTO DIFF WBC: CPT

## 2018-11-01 PROCEDURE — 6370000000 HC RX 637 (ALT 250 FOR IP): Performed by: STUDENT IN AN ORGANIZED HEALTH CARE EDUCATION/TRAINING PROGRAM

## 2018-11-01 PROCEDURE — 6360000002 HC RX W HCPCS: Performed by: STUDENT IN AN ORGANIZED HEALTH CARE EDUCATION/TRAINING PROGRAM

## 2018-11-01 PROCEDURE — APPSS30 APP SPLIT SHARED TIME 16-30 MINUTES: Performed by: NURSE PRACTITIONER

## 2018-11-01 PROCEDURE — 6360000004 HC RX CONTRAST MEDICATION: Performed by: RADIOLOGY

## 2018-11-01 PROCEDURE — C9113 INJ PANTOPRAZOLE SODIUM, VIA: HCPCS | Performed by: SURGERY

## 2018-11-01 PROCEDURE — 2580000003 HC RX 258: Performed by: SURGERY

## 2018-11-01 PROCEDURE — 36415 COLL VENOUS BLD VENIPUNCTURE: CPT

## 2018-11-01 PROCEDURE — 83605 ASSAY OF LACTIC ACID: CPT

## 2018-11-01 PROCEDURE — 2580000003 HC RX 258: Performed by: INTERNAL MEDICINE

## 2018-11-01 PROCEDURE — 74241 FL UGI W KUB: CPT

## 2018-11-01 PROCEDURE — 2580000003 HC RX 258: Performed by: STUDENT IN AN ORGANIZED HEALTH CARE EDUCATION/TRAINING PROGRAM

## 2018-11-01 PROCEDURE — 6360000002 HC RX W HCPCS: Performed by: SURGERY

## 2018-11-01 PROCEDURE — 99232 SBSQ HOSP IP/OBS MODERATE 35: CPT | Performed by: INTERNAL MEDICINE

## 2018-11-01 PROCEDURE — 2500000003 HC RX 250 WO HCPCS: Performed by: STUDENT IN AN ORGANIZED HEALTH CARE EDUCATION/TRAINING PROGRAM

## 2018-11-01 RX ORDER — HYDROCODONE BITARTRATE AND ACETAMINOPHEN 5; 325 MG/1; MG/1
1 TABLET ORAL EVERY 6 HOURS PRN
Qty: 30 TABLET | Refills: 0 | Status: SHIPPED | OUTPATIENT
Start: 2018-11-01 | End: 2018-11-08

## 2018-11-01 RX ORDER — PANTOPRAZOLE SODIUM 40 MG/1
40 TABLET, DELAYED RELEASE ORAL
Status: DISCONTINUED | OUTPATIENT
Start: 2018-11-01 | End: 2018-11-03 | Stop reason: HOSPADM

## 2018-11-01 RX ORDER — HYDROCODONE BITARTRATE AND ACETAMINOPHEN 5; 325 MG/1; MG/1
1 TABLET ORAL EVERY 6 HOURS PRN
Status: DISCONTINUED | OUTPATIENT
Start: 2018-11-01 | End: 2018-11-03 | Stop reason: HOSPADM

## 2018-11-01 RX ORDER — PANTOPRAZOLE SODIUM 40 MG/1
40 TABLET, DELAYED RELEASE ORAL
Qty: 30 TABLET | Refills: 3 | Status: ON HOLD | OUTPATIENT
Start: 2018-11-01 | End: 2019-06-24 | Stop reason: HOSPADM

## 2018-11-01 RX ADMIN — Medication 10 ML: at 22:21

## 2018-11-01 RX ADMIN — Medication 10 ML: at 21:10

## 2018-11-01 RX ADMIN — FLUCONAZOLE 400 MG: 400 INJECTION, SOLUTION INTRAVENOUS at 22:20

## 2018-11-01 RX ADMIN — CEFTRIAXONE 1 G: 1 INJECTION, POWDER, FOR SOLUTION INTRAMUSCULAR; INTRAVENOUS at 13:57

## 2018-11-01 RX ADMIN — SODIUM CHLORIDE: 9 INJECTION, SOLUTION INTRAVENOUS at 12:51

## 2018-11-01 RX ADMIN — HYDROMORPHONE HYDROCHLORIDE 1 MG: 2 INJECTION, SOLUTION INTRAMUSCULAR; INTRAVENOUS; SUBCUTANEOUS at 02:41

## 2018-11-01 RX ADMIN — HYDROCODONE BITARTRATE AND ACETAMINOPHEN 1 TABLET: 5; 325 TABLET ORAL at 19:47

## 2018-11-01 RX ADMIN — HYDROMORPHONE HYDROCHLORIDE 1 MG: 2 INJECTION, SOLUTION INTRAMUSCULAR; INTRAVENOUS; SUBCUTANEOUS at 12:50

## 2018-11-01 RX ADMIN — MORPHINE SULFATE 4 MG: 2 INJECTION, SOLUTION INTRAMUSCULAR; INTRAVENOUS at 08:13

## 2018-11-01 RX ADMIN — Medication 10 ML: at 11:07

## 2018-11-01 RX ADMIN — ENOXAPARIN SODIUM 40 MG: 40 INJECTION SUBCUTANEOUS at 21:10

## 2018-11-01 RX ADMIN — MUPIROCIN: 20 OINTMENT TOPICAL at 08:13

## 2018-11-01 RX ADMIN — PANTOPRAZOLE SODIUM 8 MG/HR: 40 INJECTION, POWDER, FOR SOLUTION INTRAVENOUS at 13:57

## 2018-11-01 RX ADMIN — DIATRIZOATE MEGLUMINE AND DIATRIZOATE SODIUM 60 ML: 600; 100 SOLUTION ORAL; RECTAL at 12:03

## 2018-11-01 RX ADMIN — METRONIDAZOLE 500 MG: 500 INJECTION, SOLUTION INTRAVENOUS at 02:42

## 2018-11-01 RX ADMIN — METRONIDAZOLE 500 MG: 500 INJECTION, SOLUTION INTRAVENOUS at 12:51

## 2018-11-01 RX ADMIN — PANTOPRAZOLE SODIUM 40 MG: 40 TABLET, DELAYED RELEASE ORAL at 17:47

## 2018-11-01 RX ADMIN — ONDANSETRON 4 MG: 2 INJECTION INTRAMUSCULAR; INTRAVENOUS at 16:16

## 2018-11-01 RX ADMIN — HYDROMORPHONE HYDROCHLORIDE 1 MG: 2 INJECTION, SOLUTION INTRAMUSCULAR; INTRAVENOUS; SUBCUTANEOUS at 16:16

## 2018-11-01 RX ADMIN — METRONIDAZOLE 500 MG: 500 INJECTION, SOLUTION INTRAVENOUS at 21:10

## 2018-11-01 RX ADMIN — PANTOPRAZOLE SODIUM 8 MG/HR: 40 INJECTION, POWDER, FOR SOLUTION INTRAVENOUS at 03:33

## 2018-11-01 RX ADMIN — HYDROMORPHONE HYDROCHLORIDE 1 MG: 2 INJECTION, SOLUTION INTRAMUSCULAR; INTRAVENOUS; SUBCUTANEOUS at 06:26

## 2018-11-01 RX ADMIN — LORAZEPAM 0.5 MG: 2 INJECTION INTRAMUSCULAR; INTRAVENOUS at 21:11

## 2018-11-01 RX ADMIN — MUPIROCIN: 20 OINTMENT TOPICAL at 21:11

## 2018-11-01 RX ADMIN — HYDROMORPHONE HYDROCHLORIDE 1 MG: 2 INJECTION, SOLUTION INTRAMUSCULAR; INTRAVENOUS; SUBCUTANEOUS at 00:37

## 2018-11-01 RX ADMIN — MORPHINE SULFATE 4 MG: 2 INJECTION, SOLUTION INTRAMUSCULAR; INTRAVENOUS at 11:07

## 2018-11-01 ASSESSMENT — PAIN DESCRIPTION - FREQUENCY
FREQUENCY: CONTINUOUS

## 2018-11-01 ASSESSMENT — PAIN SCALES - GENERAL
PAINLEVEL_OUTOF10: 10
PAINLEVEL_OUTOF10: 7
PAINLEVEL_OUTOF10: 7
PAINLEVEL_OUTOF10: 5
PAINLEVEL_OUTOF10: 8
PAINLEVEL_OUTOF10: 7
PAINLEVEL_OUTOF10: 6
PAINLEVEL_OUTOF10: 8
PAINLEVEL_OUTOF10: 6
PAINLEVEL_OUTOF10: 8
PAINLEVEL_OUTOF10: 10
PAINLEVEL_OUTOF10: 6
PAINLEVEL_OUTOF10: 7
PAINLEVEL_OUTOF10: 10

## 2018-11-01 ASSESSMENT — PAIN DESCRIPTION - PROGRESSION
CLINICAL_PROGRESSION: GRADUALLY WORSENING

## 2018-11-01 ASSESSMENT — PAIN DESCRIPTION - ONSET
ONSET: ON-GOING

## 2018-11-01 ASSESSMENT — PAIN DESCRIPTION - DESCRIPTORS
DESCRIPTORS: BURNING
DESCRIPTORS: ACHING;CRAMPING;DISCOMFORT
DESCRIPTORS: ACHING;CRAMPING;DISCOMFORT
DESCRIPTORS: ACHING;CONSTANT;DISCOMFORT

## 2018-11-01 ASSESSMENT — PAIN DESCRIPTION - PAIN TYPE
TYPE: SURGICAL PAIN

## 2018-11-01 ASSESSMENT — PAIN DESCRIPTION - LOCATION
LOCATION: ABDOMEN

## 2018-11-01 ASSESSMENT — PAIN DESCRIPTION - ORIENTATION: ORIENTATION: RIGHT

## 2018-11-01 ASSESSMENT — PAIN DESCRIPTION - DIRECTION: RADIATING_TOWARDS: BACK

## 2018-11-02 LAB
ANION GAP SERPL CALCULATED.3IONS-SCNC: 15 MMOL/L (ref 7–16)
ANISOCYTOSIS: ABNORMAL
BASOPHILS ABSOLUTE: 0.04 E9/L (ref 0–0.2)
BASOPHILS RELATIVE PERCENT: 0.3 % (ref 0–2)
BUN BLDV-MCNC: 8 MG/DL (ref 6–20)
CALCIUM SERPL-MCNC: 8.4 MG/DL (ref 8.6–10.2)
CHLORIDE BLD-SCNC: 105 MMOL/L (ref 98–107)
CO2: 21 MMOL/L (ref 22–29)
CREAT SERPL-MCNC: 0.6 MG/DL (ref 0.5–1)
EOSINOPHILS ABSOLUTE: 0.18 E9/L (ref 0.05–0.5)
EOSINOPHILS RELATIVE PERCENT: 1.5 % (ref 0–6)
GFR AFRICAN AMERICAN: >60
GFR NON-AFRICAN AMERICAN: >60 ML/MIN/1.73
GLUCOSE BLD-MCNC: 82 MG/DL (ref 74–109)
HCT VFR BLD CALC: 28.6 % (ref 34–48)
HEMOGLOBIN: 8.2 G/DL (ref 11.5–15.5)
HYPOCHROMIA: ABNORMAL
IMMATURE GRANULOCYTES #: 0.07 E9/L
IMMATURE GRANULOCYTES %: 0.6 % (ref 0–5)
LYMPHOCYTES ABSOLUTE: 1.66 E9/L (ref 1.5–4)
LYMPHOCYTES RELATIVE PERCENT: 13.7 % (ref 20–42)
MAGNESIUM: 1.7 MG/DL (ref 1.6–2.6)
MCH RBC QN AUTO: 21.9 PG (ref 26–35)
MCHC RBC AUTO-ENTMCNC: 28.7 % (ref 32–34.5)
MCV RBC AUTO: 76.5 FL (ref 80–99.9)
MONOCYTES ABSOLUTE: 0.68 E9/L (ref 0.1–0.95)
MONOCYTES RELATIVE PERCENT: 5.6 % (ref 2–12)
NEUTROPHILS ABSOLUTE: 9.48 E9/L (ref 1.8–7.3)
NEUTROPHILS RELATIVE PERCENT: 78.3 % (ref 43–80)
PDW BLD-RTO: 18.3 FL (ref 11.5–15)
PLATELET # BLD: 593 E9/L (ref 130–450)
PMV BLD AUTO: 8.8 FL (ref 7–12)
POLYCHROMASIA: ABNORMAL
POTASSIUM REFLEX MAGNESIUM: 3.4 MMOL/L (ref 3.5–5)
RBC # BLD: 3.74 E12/L (ref 3.5–5.5)
SODIUM BLD-SCNC: 141 MMOL/L (ref 132–146)
WBC # BLD: 12.1 E9/L (ref 4.5–11.5)

## 2018-11-02 PROCEDURE — 6370000000 HC RX 637 (ALT 250 FOR IP): Performed by: INTERNAL MEDICINE

## 2018-11-02 PROCEDURE — APPSS30 APP SPLIT SHARED TIME 16-30 MINUTES: Performed by: NURSE PRACTITIONER

## 2018-11-02 PROCEDURE — 36415 COLL VENOUS BLD VENIPUNCTURE: CPT

## 2018-11-02 PROCEDURE — 2060000000 HC ICU INTERMEDIATE R&B

## 2018-11-02 PROCEDURE — 83735 ASSAY OF MAGNESIUM: CPT

## 2018-11-02 PROCEDURE — 6370000000 HC RX 637 (ALT 250 FOR IP): Performed by: STUDENT IN AN ORGANIZED HEALTH CARE EDUCATION/TRAINING PROGRAM

## 2018-11-02 PROCEDURE — 2500000003 HC RX 250 WO HCPCS: Performed by: STUDENT IN AN ORGANIZED HEALTH CARE EDUCATION/TRAINING PROGRAM

## 2018-11-02 PROCEDURE — 6360000002 HC RX W HCPCS: Performed by: SURGERY

## 2018-11-02 PROCEDURE — 6360000002 HC RX W HCPCS: Performed by: STUDENT IN AN ORGANIZED HEALTH CARE EDUCATION/TRAINING PROGRAM

## 2018-11-02 PROCEDURE — 2580000003 HC RX 258: Performed by: STUDENT IN AN ORGANIZED HEALTH CARE EDUCATION/TRAINING PROGRAM

## 2018-11-02 PROCEDURE — 80048 BASIC METABOLIC PNL TOTAL CA: CPT

## 2018-11-02 PROCEDURE — 99232 SBSQ HOSP IP/OBS MODERATE 35: CPT | Performed by: INTERNAL MEDICINE

## 2018-11-02 PROCEDURE — 6370000000 HC RX 637 (ALT 250 FOR IP): Performed by: NURSE PRACTITIONER

## 2018-11-02 PROCEDURE — 85025 COMPLETE CBC W/AUTO DIFF WBC: CPT

## 2018-11-02 RX ORDER — POTASSIUM CHLORIDE 20 MEQ/1
20 TABLET, EXTENDED RELEASE ORAL ONCE
Status: COMPLETED | OUTPATIENT
Start: 2018-11-02 | End: 2018-11-02

## 2018-11-02 RX ADMIN — POTASSIUM CHLORIDE 20 MEQ: 20 TABLET, EXTENDED RELEASE ORAL at 14:12

## 2018-11-02 RX ADMIN — PANTOPRAZOLE SODIUM 40 MG: 40 TABLET, DELAYED RELEASE ORAL at 16:24

## 2018-11-02 RX ADMIN — PANTOPRAZOLE SODIUM 40 MG: 40 TABLET, DELAYED RELEASE ORAL at 06:41

## 2018-11-02 RX ADMIN — Medication 10 ML: at 23:32

## 2018-11-02 RX ADMIN — VORTIOXETINE 20 MG: 10 TABLET, FILM COATED ORAL at 08:51

## 2018-11-02 RX ADMIN — ENOXAPARIN SODIUM 40 MG: 40 INJECTION SUBCUTANEOUS at 22:17

## 2018-11-02 RX ADMIN — METRONIDAZOLE 500 MG: 500 INJECTION, SOLUTION INTRAVENOUS at 11:48

## 2018-11-02 RX ADMIN — HYDROMORPHONE HYDROCHLORIDE 1 MG: 2 INJECTION, SOLUTION INTRAMUSCULAR; INTRAVENOUS; SUBCUTANEOUS at 00:42

## 2018-11-02 RX ADMIN — FLUCONAZOLE 400 MG: 400 INJECTION, SOLUTION INTRAVENOUS at 23:32

## 2018-11-02 RX ADMIN — HYDROCODONE BITARTRATE AND ACETAMINOPHEN 1 TABLET: 5; 325 TABLET ORAL at 23:32

## 2018-11-02 RX ADMIN — Medication 10 ML: at 00:42

## 2018-11-02 RX ADMIN — METRONIDAZOLE 500 MG: 500 INJECTION, SOLUTION INTRAVENOUS at 04:57

## 2018-11-02 RX ADMIN — MUPIROCIN: 20 OINTMENT TOPICAL at 08:51

## 2018-11-02 RX ADMIN — METRONIDAZOLE 500 MG: 500 INJECTION, SOLUTION INTRAVENOUS at 22:18

## 2018-11-02 RX ADMIN — CEFTRIAXONE 1 G: 1 INJECTION, POWDER, FOR SOLUTION INTRAMUSCULAR; INTRAVENOUS at 14:11

## 2018-11-02 RX ADMIN — Medication 10 ML: at 08:51

## 2018-11-02 RX ADMIN — HYDROCODONE BITARTRATE AND ACETAMINOPHEN 1 TABLET: 5; 325 TABLET ORAL at 06:41

## 2018-11-02 RX ADMIN — HYDROMORPHONE HYDROCHLORIDE 1 MG: 2 INJECTION, SOLUTION INTRAMUSCULAR; INTRAVENOUS; SUBCUTANEOUS at 08:51

## 2018-11-02 RX ADMIN — HYDROCODONE BITARTRATE AND ACETAMINOPHEN 1 TABLET: 5; 325 TABLET ORAL at 16:20

## 2018-11-02 RX ADMIN — MUPIROCIN: 20 OINTMENT TOPICAL at 22:18

## 2018-11-02 RX ADMIN — ONDANSETRON 4 MG: 2 INJECTION INTRAMUSCULAR; INTRAVENOUS at 14:26

## 2018-11-02 ASSESSMENT — PAIN DESCRIPTION - ONSET
ONSET: ON-GOING

## 2018-11-02 ASSESSMENT — PAIN DESCRIPTION - FREQUENCY
FREQUENCY: CONTINUOUS

## 2018-11-02 ASSESSMENT — PAIN SCALES - GENERAL
PAINLEVEL_OUTOF10: 5
PAINLEVEL_OUTOF10: 7
PAINLEVEL_OUTOF10: 7
PAINLEVEL_OUTOF10: 0
PAINLEVEL_OUTOF10: 8
PAINLEVEL_OUTOF10: 7
PAINLEVEL_OUTOF10: 6

## 2018-11-02 ASSESSMENT — PAIN DESCRIPTION - LOCATION
LOCATION: ABDOMEN

## 2018-11-02 ASSESSMENT — PAIN DESCRIPTION - PROGRESSION
CLINICAL_PROGRESSION: NOT CHANGED
CLINICAL_PROGRESSION: NOT CHANGED
CLINICAL_PROGRESSION: GRADUALLY WORSENING

## 2018-11-02 ASSESSMENT — PAIN DESCRIPTION - ORIENTATION
ORIENTATION: RIGHT

## 2018-11-02 ASSESSMENT — PAIN DESCRIPTION - PAIN TYPE
TYPE: SURGICAL PAIN

## 2018-11-02 ASSESSMENT — PAIN DESCRIPTION - DIRECTION: RADIATING_TOWARDS: BACK

## 2018-11-02 ASSESSMENT — PAIN DESCRIPTION - DESCRIPTORS
DESCRIPTORS: BURNING
DESCRIPTORS: DISCOMFORT;SORE
DESCRIPTORS: TENDER;SORE

## 2018-11-02 NOTE — PATIENT CARE CONFERENCE
P Quality Flow/Interdisciplinary Rounds Progress Note        Quality Flow Rounds held on November 2, 2018    Disciplines Attending:  Bedside Nurse, ,  and Nursing Unit [address] was admitted on 10/29/2018 10:00 AM    Anticipated Discharge Date:  Expected Discharge Date: 11/01/18    Disposition:    Perico Score:  Perico Scale Score: 19    Readmission Risk              Risk of Unplanned Readmission:        15           Discussed patient goal for the day, patient clinical progression, and barriers to discharge.   The following Goal(s) of the Day/Commitment(s) have been identified:  Monitor labs, intake, and output, continue IV rocephin, flagyl, and diflucan, discharge planning      Travis Roberts  November 2, 2018

## 2018-11-03 VITALS
TEMPERATURE: 97.7 F | HEART RATE: 66 BPM | OXYGEN SATURATION: 97 % | WEIGHT: 289.2 LBS | HEIGHT: 66 IN | SYSTOLIC BLOOD PRESSURE: 119 MMHG | BODY MASS INDEX: 46.48 KG/M2 | DIASTOLIC BLOOD PRESSURE: 76 MMHG | RESPIRATION RATE: 18 BRPM

## 2018-11-03 LAB
ANION GAP SERPL CALCULATED.3IONS-SCNC: 15 MMOL/L (ref 7–16)
ANISOCYTOSIS: ABNORMAL
BASOPHILS ABSOLUTE: 0.05 E9/L (ref 0–0.2)
BASOPHILS RELATIVE PERCENT: 0.5 % (ref 0–2)
BUN BLDV-MCNC: 7 MG/DL (ref 6–20)
BURR CELLS: ABNORMAL
CALCIUM SERPL-MCNC: 8.3 MG/DL (ref 8.6–10.2)
CHLORIDE BLD-SCNC: 103 MMOL/L (ref 98–107)
CO2: 20 MMOL/L (ref 22–29)
CREAT SERPL-MCNC: 0.5 MG/DL (ref 0.5–1)
EOSINOPHILS ABSOLUTE: 0.22 E9/L (ref 0.05–0.5)
EOSINOPHILS RELATIVE PERCENT: 2 % (ref 0–6)
GFR AFRICAN AMERICAN: >60
GFR NON-AFRICAN AMERICAN: >60 ML/MIN/1.73
GLUCOSE BLD-MCNC: 76 MG/DL (ref 74–109)
HCT VFR BLD CALC: 25.8 % (ref 34–48)
HEMOGLOBIN: 7.4 G/DL (ref 11.5–15.5)
HYPOCHROMIA: ABNORMAL
IMMATURE GRANULOCYTES #: 0.05 E9/L
IMMATURE GRANULOCYTES %: 0.5 % (ref 0–5)
LYMPHOCYTES ABSOLUTE: 1.79 E9/L (ref 1.5–4)
LYMPHOCYTES RELATIVE PERCENT: 16.7 % (ref 20–42)
MCH RBC QN AUTO: 21.6 PG (ref 26–35)
MCHC RBC AUTO-ENTMCNC: 28.7 % (ref 32–34.5)
MCV RBC AUTO: 75.4 FL (ref 80–99.9)
MONOCYTES ABSOLUTE: 0.63 E9/L (ref 0.1–0.95)
MONOCYTES RELATIVE PERCENT: 5.9 % (ref 2–12)
NEUTROPHILS ABSOLUTE: 8.01 E9/L (ref 1.8–7.3)
NEUTROPHILS RELATIVE PERCENT: 74.4 % (ref 43–80)
PDW BLD-RTO: 18.5 FL (ref 11.5–15)
PLATELET # BLD: 531 E9/L (ref 130–450)
PMV BLD AUTO: 9 FL (ref 7–12)
POIKILOCYTES: ABNORMAL
POTASSIUM REFLEX MAGNESIUM: 3.7 MMOL/L (ref 3.5–5)
RBC # BLD: 3.42 E12/L (ref 3.5–5.5)
SODIUM BLD-SCNC: 138 MMOL/L (ref 132–146)
TARGET CELLS: ABNORMAL
WBC # BLD: 10.8 E9/L (ref 4.5–11.5)

## 2018-11-03 PROCEDURE — 85025 COMPLETE CBC W/AUTO DIFF WBC: CPT

## 2018-11-03 PROCEDURE — 80048 BASIC METABOLIC PNL TOTAL CA: CPT

## 2018-11-03 PROCEDURE — 36415 COLL VENOUS BLD VENIPUNCTURE: CPT

## 2018-11-03 PROCEDURE — 6370000000 HC RX 637 (ALT 250 FOR IP): Performed by: STUDENT IN AN ORGANIZED HEALTH CARE EDUCATION/TRAINING PROGRAM

## 2018-11-03 PROCEDURE — 2500000003 HC RX 250 WO HCPCS: Performed by: STUDENT IN AN ORGANIZED HEALTH CARE EDUCATION/TRAINING PROGRAM

## 2018-11-03 RX ADMIN — PANTOPRAZOLE SODIUM 40 MG: 40 TABLET, DELAYED RELEASE ORAL at 07:12

## 2018-11-03 RX ADMIN — HYDROCODONE BITARTRATE AND ACETAMINOPHEN 1 TABLET: 5; 325 TABLET ORAL at 09:41

## 2018-11-03 RX ADMIN — METRONIDAZOLE 500 MG: 500 INJECTION, SOLUTION INTRAVENOUS at 05:14

## 2018-11-03 ASSESSMENT — PAIN SCALES - GENERAL: PAINLEVEL_OUTOF10: 7

## 2018-11-03 NOTE — PLAN OF CARE
Problem: Pain:  Goal: Pain level will decrease  Pain level will decrease       Outcome: Met This Shift      Problem: Pain:  Goal: Control of acute pain  Control of acute pain   Outcome: Met This Shift    Goal: Pain level will decrease  Pain level will decrease       Outcome: Met This Shift      Problem: Falls - Risk of:  Goal: Will remain free from falls  Will remain free from falls   Outcome: Met This Shift

## 2018-11-06 ENCOUNTER — OFFICE VISIT (OUTPATIENT)
Dept: FAMILY MEDICINE CLINIC | Age: 43
End: 2018-11-06
Payer: MEDICARE

## 2018-11-06 VITALS
BODY MASS INDEX: 44.82 KG/M2 | OXYGEN SATURATION: 98 % | HEART RATE: 84 BPM | DIASTOLIC BLOOD PRESSURE: 88 MMHG | WEIGHT: 278.9 LBS | RESPIRATION RATE: 20 BRPM | SYSTOLIC BLOOD PRESSURE: 122 MMHG | HEIGHT: 66 IN | TEMPERATURE: 98.6 F

## 2018-11-06 DIAGNOSIS — E66.01 MORBID OBESITY WITH BMI OF 45.0-49.9, ADULT (HCC): ICD-10-CM

## 2018-11-06 DIAGNOSIS — R22.9 SUBCUTANEOUS NODULE: ICD-10-CM

## 2018-11-06 DIAGNOSIS — K28.9 MARGINAL ULCER: Primary | ICD-10-CM

## 2018-11-06 PROBLEM — J11.1 INFLUENZA WITH RESPIRATORY MANIFESTATION OTHER THAN PNEUMONIA: Status: RESOLVED | Noted: 2018-03-08 | Resolved: 2018-11-06

## 2018-11-06 PROBLEM — J45.901 ASTHMA ATTACK: Status: RESOLVED | Noted: 2017-03-01 | Resolved: 2018-11-06

## 2018-11-06 PROBLEM — J96.90 RESPIRATORY FAILURE (HCC): Status: RESOLVED | Noted: 2017-03-01 | Resolved: 2018-11-06

## 2018-11-06 PROCEDURE — 1111F DSCHRG MED/CURRENT MED MERGE: CPT | Performed by: STUDENT IN AN ORGANIZED HEALTH CARE EDUCATION/TRAINING PROGRAM

## 2018-11-06 PROCEDURE — G8484 FLU IMMUNIZE NO ADMIN: HCPCS | Performed by: STUDENT IN AN ORGANIZED HEALTH CARE EDUCATION/TRAINING PROGRAM

## 2018-11-06 PROCEDURE — 4004F PT TOBACCO SCREEN RCVD TLK: CPT | Performed by: STUDENT IN AN ORGANIZED HEALTH CARE EDUCATION/TRAINING PROGRAM

## 2018-11-06 PROCEDURE — G8417 CALC BMI ABV UP PARAM F/U: HCPCS | Performed by: STUDENT IN AN ORGANIZED HEALTH CARE EDUCATION/TRAINING PROGRAM

## 2018-11-06 PROCEDURE — G8427 DOCREV CUR MEDS BY ELIG CLIN: HCPCS | Performed by: STUDENT IN AN ORGANIZED HEALTH CARE EDUCATION/TRAINING PROGRAM

## 2018-11-06 PROCEDURE — 99213 OFFICE O/P EST LOW 20 MIN: CPT | Performed by: STUDENT IN AN ORGANIZED HEALTH CARE EDUCATION/TRAINING PROGRAM

## 2018-11-06 ASSESSMENT — ENCOUNTER SYMPTOMS
COUGH: 0
BACK PAIN: 0
ABDOMINAL PAIN: 1
EYE DISCHARGE: 0
VOMITING: 0
RHINORRHEA: 0
EYE REDNESS: 0
DIARRHEA: 0
SHORTNESS OF BREATH: 0
SORE THROAT: 0
EYE ITCHING: 0
WHEEZING: 0
CONSTIPATION: 0
ABDOMINAL DISTENTION: 0
NAUSEA: 0

## 2018-11-06 NOTE — PROGRESS NOTES
Normal rate, regular rhythm, normal heart sounds and intact distal pulses. No murmur heard. Pulmonary/Chest: Effort normal and breath sounds normal. No respiratory distress. She has no wheezes. Abdominal: Soft. Bowel sounds are normal. She exhibits no distension. There is no tenderness. Three port site scars and g-tube site scar healing without erythema or drainage, nontender to palpation - c/d/i   Musculoskeletal: Normal range of motion. She exhibits no edema or tenderness. Neurological: She is alert and oriented to person, place, and time. She has normal reflexes. No cranial nerve deficit. Skin: Skin is warm and dry. No rash noted. No erythema. Psychiatric: She has a normal mood and affect. Her behavior is normal. Judgment and thought content normal.       ASSESSMENT/PLAN:  1. Marginal ulcer  - s/p perforation and laparoscopic repair  - tolerating diet  - pain well controlled  - Continue weaning pain medications  - Encouraged to call Dr Pascale Del Cid should abdominal pain recur. Follow up appointment scheduled for the 14th    2. Subcutaneous nodule  - Unlikely cellulitis  - Possible abscesses vs cysts  - Encourage warm compresses and encouraged not to pick or squeeze the areas  - Call for fever or chills or increased swelling or redness      Return if symptoms worsen or fail to improve. An electronic signature was used to authenticate this note.     --Kenneth Cruz MD on 11/6/2018 at 3:25 PM

## 2018-11-07 LAB — THROMBOPHILIA DNA ASSAY: NORMAL

## 2018-11-09 ENCOUNTER — HOSPITAL ENCOUNTER (OUTPATIENT)
Age: 43
Discharge: HOME | End: 2018-11-09
Payer: MEDICARE

## 2018-11-09 DIAGNOSIS — E60 ZINC DEFICIENCY: ICD-10-CM

## 2018-11-09 DIAGNOSIS — N93.8 DUB (DYSFUNCTIONAL UTERINE BLEEDING): ICD-10-CM

## 2018-11-09 DIAGNOSIS — E55.9 VITAMIN D DEFICIENCY: ICD-10-CM

## 2018-11-09 DIAGNOSIS — E61.1 IRON DEFICIENCY: ICD-10-CM

## 2018-11-09 DIAGNOSIS — K91.2 MALNUTRITION FOLLOWING GASTROINTESTINAL SURGERY: ICD-10-CM

## 2018-11-09 LAB
ALBUMIN SERPL-MCNC: 3 G/DL (ref 3.5–5.2)
ALP BLD-CCNC: 66 U/L (ref 35–104)
ALT SERPL-CCNC: 5 U/L (ref 0–32)
ANION GAP SERPL CALCULATED.3IONS-SCNC: 17 MMOL/L (ref 7–16)
AST SERPL-CCNC: 9 U/L (ref 0–31)
BILIRUB SERPL-MCNC: 0.2 MG/DL (ref 0–1.2)
BUN BLDV-MCNC: 2 MG/DL (ref 6–20)
CALCIUM SERPL-MCNC: 8.5 MG/DL (ref 8.6–10.2)
CHLORIDE BLD-SCNC: 101 MMOL/L (ref 98–107)
CHOLESTEROL, TOTAL: 119 MG/DL (ref 0–199)
CO2: 23 MMOL/L (ref 22–29)
CREAT SERPL-MCNC: 0.5 MG/DL (ref 0.5–1)
FERRITIN: 29 NG/ML
FOLATE: >20 NG/ML (ref 4.8–24.2)
GFR AFRICAN AMERICAN: >60
GFR NON-AFRICAN AMERICAN: >60 ML/MIN/1.73
GLUCOSE BLD-MCNC: 75 MG/DL (ref 74–99)
HCT VFR BLD CALC: 26.7 % (ref 34–48)
HEMOGLOBIN: 7.9 G/DL (ref 11.5–15.5)
MCH RBC QN AUTO: 21.2 PG (ref 26–35)
MCHC RBC AUTO-ENTMCNC: 29.6 % (ref 32–34.5)
MCV RBC AUTO: 71.8 FL (ref 80–99.9)
PDW BLD-RTO: 19.1 FL (ref 11.5–15)
PLATELET # BLD: 678 E9/L (ref 130–450)
PMV BLD AUTO: 9.3 FL (ref 7–12)
POTASSIUM SERPL-SCNC: 3.1 MMOL/L (ref 3.5–5)
PREALBUMIN: 8 MG/DL (ref 20–40)
RBC # BLD: 3.72 E12/L (ref 3.5–5.5)
SODIUM BLD-SCNC: 141 MMOL/L (ref 132–146)
TOTAL PROTEIN: 6.6 G/DL (ref 6.4–8.3)
TRIGL SERPL-MCNC: 95 MG/DL (ref 0–149)
VITAMIN B-12: 917 PG/ML (ref 211–946)
VITAMIN D 25-HYDROXY: 24 NG/ML (ref 30–100)
WBC # BLD: 7.9 E9/L (ref 4.5–11.5)

## 2018-11-09 PROCEDURE — 84425 ASSAY OF VITAMIN B-1: CPT

## 2018-11-09 PROCEDURE — 82746 ASSAY OF FOLIC ACID SERUM: CPT

## 2018-11-09 PROCEDURE — 84134 ASSAY OF PREALBUMIN: CPT

## 2018-11-09 PROCEDURE — 82306 VITAMIN D 25 HYDROXY: CPT

## 2018-11-09 PROCEDURE — 85027 COMPLETE CBC AUTOMATED: CPT

## 2018-11-09 PROCEDURE — 82465 ASSAY BLD/SERUM CHOLESTEROL: CPT

## 2018-11-09 PROCEDURE — 36415 COLL VENOUS BLD VENIPUNCTURE: CPT

## 2018-11-09 PROCEDURE — 84630 ASSAY OF ZINC: CPT

## 2018-11-09 PROCEDURE — 84478 ASSAY OF TRIGLYCERIDES: CPT

## 2018-11-09 PROCEDURE — 82607 VITAMIN B-12: CPT

## 2018-11-09 PROCEDURE — 80053 COMPREHEN METABOLIC PANEL: CPT

## 2018-11-09 PROCEDURE — 82728 ASSAY OF FERRITIN: CPT

## 2018-11-14 ENCOUNTER — OFFICE VISIT (OUTPATIENT)
Dept: BARIATRICS/WEIGHT MGMT | Age: 43
End: 2018-11-14
Payer: MEDICARE

## 2018-11-14 ENCOUNTER — INITIAL CONSULT (OUTPATIENT)
Dept: BARIATRICS/WEIGHT MGMT | Age: 43
End: 2018-11-14
Payer: MEDICAID

## 2018-11-14 VITALS
TEMPERATURE: 97.6 F | HEART RATE: 76 BPM | DIASTOLIC BLOOD PRESSURE: 78 MMHG | WEIGHT: 264 LBS | SYSTOLIC BLOOD PRESSURE: 132 MMHG | BODY MASS INDEX: 42.43 KG/M2 | HEIGHT: 66 IN | RESPIRATION RATE: 20 BRPM

## 2018-11-14 VITALS — BODY MASS INDEX: 42.61 KG/M2 | WEIGHT: 264 LBS

## 2018-11-14 DIAGNOSIS — Z71.3 DIETARY COUNSELING: Primary | ICD-10-CM

## 2018-11-14 DIAGNOSIS — K91.2 MALNUTRITION FOLLOWING GASTROINTESTINAL SURGERY: Primary | ICD-10-CM

## 2018-11-14 DIAGNOSIS — K28.9 ANASTOMOTIC ULCER: ICD-10-CM

## 2018-11-14 LAB — ZINC: 69 UG/DL (ref 60–120)

## 2018-11-14 PROCEDURE — 99024 POSTOP FOLLOW-UP VISIT: CPT | Performed by: SURGERY

## 2018-11-14 PROCEDURE — 99999 PR OFFICE/OUTPT VISIT,PROCEDURE ONLY: CPT | Performed by: SURGERY

## 2018-11-14 PROCEDURE — 99212 OFFICE O/P EST SF 10 MIN: CPT

## 2018-11-14 RX ORDER — SUCRALFATE 1 G/1
0.5 TABLET ORAL 4 TIMES DAILY
Qty: 120 TABLET | Refills: 1 | Status: ON HOLD | OUTPATIENT
Start: 2018-11-14 | End: 2019-06-23

## 2018-11-14 NOTE — PROGRESS NOTES
Pt here for 1 yr RYGB. Labs in process. To see Ihsan Marie. Water intake good. Vitamins are no good. No protein shakes. Pt can tolerate chicken or solid food. Bowels moving daily. Pt is still in a little discomfort in her stomach.
maria e Wilson MD  11/14/2018

## 2018-11-16 ENCOUNTER — OFFICE VISIT (OUTPATIENT)
Dept: OBGYN | Age: 43
End: 2018-11-16
Payer: MEDICARE

## 2018-11-16 VITALS
DIASTOLIC BLOOD PRESSURE: 76 MMHG | BODY MASS INDEX: 43.26 KG/M2 | TEMPERATURE: 98.2 F | HEART RATE: 83 BPM | WEIGHT: 268 LBS | SYSTOLIC BLOOD PRESSURE: 132 MMHG

## 2018-11-16 DIAGNOSIS — N93.8 DUB (DYSFUNCTIONAL UTERINE BLEEDING): Primary | ICD-10-CM

## 2018-11-16 PROCEDURE — 1111F DSCHRG MED/CURRENT MED MERGE: CPT | Performed by: OBSTETRICS & GYNECOLOGY

## 2018-11-16 PROCEDURE — 99212 OFFICE O/P EST SF 10 MIN: CPT | Performed by: OBSTETRICS & GYNECOLOGY

## 2018-11-16 PROCEDURE — G8417 CALC BMI ABV UP PARAM F/U: HCPCS | Performed by: OBSTETRICS & GYNECOLOGY

## 2018-11-16 PROCEDURE — G8427 DOCREV CUR MEDS BY ELIG CLIN: HCPCS | Performed by: OBSTETRICS & GYNECOLOGY

## 2018-11-16 PROCEDURE — 1036F TOBACCO NON-USER: CPT | Performed by: OBSTETRICS & GYNECOLOGY

## 2018-11-16 PROCEDURE — G8484 FLU IMMUNIZE NO ADMIN: HCPCS | Performed by: OBSTETRICS & GYNECOLOGY

## 2018-11-17 LAB — VITAMIN B1 WHOLE BLOOD: 33 NMOL/L (ref 70–180)

## 2018-11-27 ENCOUNTER — TELEPHONE (OUTPATIENT)
Dept: PHYSICAL MEDICINE AND REHAB | Age: 43
End: 2018-11-27

## 2018-11-27 NOTE — TELEPHONE ENCOUNTER
Patient called, requesting to have another knee injection. Would like left knee injection first. She was last seen 7/26/18 for right knee injection. Patient said at that visit you discussed another type of injection that could be given, but she is ok with getting another cortisone injection.  Please advise

## 2018-12-11 ENCOUNTER — OFFICE VISIT (OUTPATIENT)
Dept: PHYSICAL MEDICINE AND REHAB | Age: 43
End: 2018-12-11
Payer: MEDICARE

## 2018-12-11 VITALS
DIASTOLIC BLOOD PRESSURE: 76 MMHG | SYSTOLIC BLOOD PRESSURE: 119 MMHG | BODY MASS INDEX: 41.95 KG/M2 | HEIGHT: 66 IN | WEIGHT: 261 LBS | HEART RATE: 81 BPM | TEMPERATURE: 98 F

## 2018-12-11 DIAGNOSIS — M17.12 PRIMARY LOCALIZED OSTEOARTHRITIS OF LEFT KNEE: Primary | ICD-10-CM

## 2018-12-11 PROCEDURE — 20611 DRAIN/INJ JOINT/BURSA W/US: CPT | Performed by: PHYSICAL MEDICINE & REHABILITATION

## 2018-12-11 RX ORDER — LIDOCAINE HYDROCHLORIDE 10 MG/ML
8 INJECTION, SOLUTION INFILTRATION; PERINEURAL ONCE
Status: COMPLETED | OUTPATIENT
Start: 2018-12-11 | End: 2018-12-11

## 2018-12-11 RX ORDER — TRIAMCINOLONE ACETONIDE 40 MG/ML
40 INJECTION, SUSPENSION INTRA-ARTICULAR; INTRAMUSCULAR ONCE
Status: COMPLETED | OUTPATIENT
Start: 2018-12-11 | End: 2018-12-11

## 2018-12-11 RX ADMIN — TRIAMCINOLONE ACETONIDE 40 MG: 40 INJECTION, SUSPENSION INTRA-ARTICULAR; INTRAMUSCULAR at 09:59

## 2018-12-11 RX ADMIN — LIDOCAINE HYDROCHLORIDE 8 ML: 10 INJECTION, SOLUTION INFILTRATION; PERINEURAL at 09:59

## 2018-12-20 ENCOUNTER — OFFICE VISIT (OUTPATIENT)
Dept: PHYSICAL MEDICINE AND REHAB | Age: 43
End: 2018-12-20
Payer: MEDICARE

## 2018-12-20 VITALS
HEART RATE: 83 BPM | HEIGHT: 66 IN | TEMPERATURE: 97.9 F | SYSTOLIC BLOOD PRESSURE: 118 MMHG | BODY MASS INDEX: 39.7 KG/M2 | WEIGHT: 247 LBS | DIASTOLIC BLOOD PRESSURE: 76 MMHG

## 2018-12-20 DIAGNOSIS — M17.11 LOCALIZED OSTEOARTHRITIS OF RIGHT KNEE: ICD-10-CM

## 2018-12-20 DIAGNOSIS — G89.29 CHRONIC PAIN OF RIGHT KNEE: Primary | ICD-10-CM

## 2018-12-20 DIAGNOSIS — M25.561 CHRONIC PAIN OF RIGHT KNEE: Primary | ICD-10-CM

## 2018-12-20 PROCEDURE — 20611 DRAIN/INJ JOINT/BURSA W/US: CPT | Performed by: PHYSICAL MEDICINE & REHABILITATION

## 2018-12-20 RX ORDER — LIDOCAINE HYDROCHLORIDE 10 MG/ML
8 INJECTION, SOLUTION INFILTRATION; PERINEURAL ONCE
Status: COMPLETED | OUTPATIENT
Start: 2018-12-20 | End: 2018-12-20

## 2018-12-20 RX ORDER — TRIAMCINOLONE ACETONIDE 40 MG/ML
40 INJECTION, SUSPENSION INTRA-ARTICULAR; INTRAMUSCULAR ONCE
Status: COMPLETED | OUTPATIENT
Start: 2018-12-20 | End: 2018-12-20

## 2018-12-20 RX ADMIN — TRIAMCINOLONE ACETONIDE 40 MG: 40 INJECTION, SUSPENSION INTRA-ARTICULAR; INTRAMUSCULAR at 12:11

## 2018-12-20 RX ADMIN — LIDOCAINE HYDROCHLORIDE 8 ML: 10 INJECTION, SOLUTION INFILTRATION; PERINEURAL at 12:11

## 2018-12-31 ENCOUNTER — OFFICE VISIT (OUTPATIENT)
Dept: FAMILY MEDICINE CLINIC | Age: 43
End: 2018-12-31
Payer: MEDICARE

## 2018-12-31 VITALS
RESPIRATION RATE: 20 BRPM | OXYGEN SATURATION: 99 % | SYSTOLIC BLOOD PRESSURE: 117 MMHG | TEMPERATURE: 98.5 F | BODY MASS INDEX: 39.86 KG/M2 | DIASTOLIC BLOOD PRESSURE: 72 MMHG | HEART RATE: 78 BPM | HEIGHT: 66 IN | WEIGHT: 248 LBS

## 2018-12-31 DIAGNOSIS — J21.9 ACUTE BRONCHIOLITIS DUE TO UNSPECIFIED ORGANISM: ICD-10-CM

## 2018-12-31 DIAGNOSIS — J44.1 COPD EXACERBATION (HCC): Primary | ICD-10-CM

## 2018-12-31 PROCEDURE — 1036F TOBACCO NON-USER: CPT | Performed by: STUDENT IN AN ORGANIZED HEALTH CARE EDUCATION/TRAINING PROGRAM

## 2018-12-31 PROCEDURE — 99213 OFFICE O/P EST LOW 20 MIN: CPT | Performed by: FAMILY MEDICINE

## 2018-12-31 PROCEDURE — G8926 SPIRO NO PERF OR DOC: HCPCS | Performed by: STUDENT IN AN ORGANIZED HEALTH CARE EDUCATION/TRAINING PROGRAM

## 2018-12-31 PROCEDURE — G8484 FLU IMMUNIZE NO ADMIN: HCPCS | Performed by: STUDENT IN AN ORGANIZED HEALTH CARE EDUCATION/TRAINING PROGRAM

## 2018-12-31 PROCEDURE — 3023F SPIROM DOC REV: CPT | Performed by: STUDENT IN AN ORGANIZED HEALTH CARE EDUCATION/TRAINING PROGRAM

## 2018-12-31 PROCEDURE — G8427 DOCREV CUR MEDS BY ELIG CLIN: HCPCS | Performed by: STUDENT IN AN ORGANIZED HEALTH CARE EDUCATION/TRAINING PROGRAM

## 2018-12-31 PROCEDURE — 99212 OFFICE O/P EST SF 10 MIN: CPT | Performed by: STUDENT IN AN ORGANIZED HEALTH CARE EDUCATION/TRAINING PROGRAM

## 2018-12-31 PROCEDURE — G8417 CALC BMI ABV UP PARAM F/U: HCPCS | Performed by: STUDENT IN AN ORGANIZED HEALTH CARE EDUCATION/TRAINING PROGRAM

## 2018-12-31 RX ORDER — AZITHROMYCIN 250 MG/1
TABLET, FILM COATED ORAL
Qty: 6 TABLET | Refills: 0 | Status: ON HOLD | OUTPATIENT
Start: 2018-12-31 | End: 2019-06-23

## 2018-12-31 RX ORDER — ALBUTEROL SULFATE 90 UG/1
2 AEROSOL, METERED RESPIRATORY (INHALATION) EVERY 6 HOURS PRN
Qty: 1 INHALER | Refills: 3 | Status: ON HOLD | OUTPATIENT
Start: 2018-12-31 | End: 2019-11-27 | Stop reason: SDUPTHER

## 2018-12-31 NOTE — PATIENT INSTRUCTIONS
quitting for good. When should you call for help? Call 911 anytime you think you may need emergency care. For example, call if:    · You have severe trouble breathing.    Call your doctor now or seek immediate medical care if:    · You have new or worse trouble breathing.     · You cough up dark brown or bloody mucus (sputum).     · You have a new or higher fever.     · You have a new rash.    Watch closely for changes in your health, and be sure to contact your doctor if:    · You cough more deeply or more often, especially if you notice more mucus or a change in the color of your mucus.     · You are not getting better as expected. Where can you learn more? Go to [email or web address] and sign in to your KeyEffx account. Enter H333 in the TotSpot box to learn more about \"Bronchitis: Care Instructions. \"     If you do not have an account, please click on the \"Sign Up Now\" link. Current as of: December 6, 2017  Content Version: 11.8  © 0617-7782 [employer]. Care instructions adapted under license by [facility] ([facility]). If you have questions about a medical condition or this instruction, always ask your healthcare professional. [place] [address] any warranty or liability for your use of this information. azithromycin  Pronunciation:  jb willams MYE sin  Brand:  Azithromycin 3 Day Dose Pack, Azithromycin 5 Day Dose Pack, Zithromax, Zithromax TRI-KATIE, Zithromax Z-Katie, Zmax  What is the most important information I should know about azithromycin? You should not use this medication if you have ever had jaundice or liver problems caused by taking azithromycin. What is azithromycin? Azithromycin is an antibiotic that fights bacteria. Azithromycin is used to treat many different types of infections caused by bacteria, such as respiratory infections, skin infections, ear infections, and sexually transmitted diseases.   Azithromycin may also be with a special dose-measuring spoon or medicine cup. If you do not have a dose-measuring device, ask your pharmacist for one. Use this medicine for the full prescribed length of time. Your symptoms may improve before the infection is completely cleared. Skipping doses may also increase your risk of further infection that is resistant to antibiotics. Azithromycin will not treat a viral infection such as the flu or a common cold. Store at room temperature away from moisture and heat. Throw away any unused liquid medicine after 10 days. What happens if I miss a dose? Take the missed dose as soon as you remember. Skip the missed dose if it is almost time for your next scheduled dose. Do not  take extra medicine to make up the missed dose. What happens if I overdose? Seek emergency medical attention or call the Poison Help line at 1-640.553.1242. What should I avoid while taking azithromycin? Do not take antacids that contain aluminum or magnesium within 2 hours before or after you take azithromycin. This includes Acid Gone, Aldroxicon, Alternagel, Di-Gel, Gaviscon, Gelusil, Genaton, Maalox, Maldroxal, Milk of Magnesia, Mintox, Mylagen, Mylanta, Pepcid Complete, Rolaids, Rulox, and others. These antacids can make azithromycin less effective when taken at the same time. Antibiotic medicines can cause diarrhea, which may be a sign of a new infection. If you have diarrhea that is watery or bloody, call your doctor. Do not use anti-diarrhea medicine unless your doctor tells you to. Avoid exposure to sunlight or tanning beds. Azithromycin can make you sunburn more easily. Wear protective clothing and use sunscreen (SPF 30 or higher) when you are outdoors. What are the possible side effects of azithromycin?   Get emergency medical help if you have signs of an allergic reaction (hives, difficult breathing, swelling in your face or throat) or a severe skin reaction (fever, sore throat, burning in your eyes, skin pain, others, and use this medication only for the indication prescribed. Every effort has been made to ensure that the information provided by [address] is accurate, up-to-date, and complete, but no guarantee is made to that effect. Drug information contained herein may be time sensitive. [facility] information has been compiled for use by healthcare practitioners and consumers in the [place] and therefore [facility] does not warrant that uses outside of the [place] are appropriate, unless specifically indicated otherwise. [facility]'s drug information does not endorse drugs, diagnose patients or recommend therapy. [facility]'s drug information is an informational resource designed to assist licensed healthcare practitioners in caring for their patients and/or to serve consumers viewing this service as a supplement to, and not a substitute for, the expertise, skill, knowledge and judgment of healthcare practitioners. The absence of a warning for a given drug or drug combination in no way should be construed to indicate that the drug or drug combination is safe, effective or appropriate for any given patient. [facility] does not assume any responsibility for any aspect of healthcare administered with the aid of information [facility] provides. The information contained herein is not intended to cover all possible uses, directions, precautions, warnings, drug interactions, allergic reactions, or adverse effects. If you have questions about the drugs you are taking, check with your doctor, nurse or pharmacist.  Copyright 2597-8887 [address]: 17.07. Revision date: 9/29/2017. Care instructions adapted under license by [facility] ([facility]). If you have questions about a medical condition or this instruction, always ask your healthcare professional. [place] [address] any warranty or liability for your use of this information.

## 2018-12-31 NOTE — PROGRESS NOTES
F 43   1 week   Prod cough  Fever   Body aches   Congestion   No GI sxs    Had contact with a sick cousin        Blood pressure 117/72, pulse 78, temperature 98.5 °F (36.9 °C), temperature source Oral, resp. rate 20, height 5' 6\" (1.676 m), weight 248 lb (112.5 kg), SpO2 99 %, not currently breastfeeding. HEENT neck adenaopathy       Heart regular    Lungs wheezing   Coughing   Yellow sputum    abd non-tender      No edema    Pulses intact       Pale    Mucous membranes     rx Z-jassi  Hydration    Attending Physician Statement  I have discussed the case, including pertinent history and exam findings with the resident. I agree with the documented assessment and plan.
Soraya Romero MD   sucralfate (CARAFATE) 1 GM tablet Take 0.5 tablets by mouth 4 times daily  Jamie Arboleda MD   pantoprazole (PROTONIX) 40 MG tablet Take 1 tablet by mouth 2 times daily (before meals)  Ki Arroyo MD   calcium-vitamin D (Georgetta Holt) 500-200 MG-UNIT per tablet Take 1 tablet by mouth daily  Historical Provider, MD   Cholecalciferol (VITAMIN D3) 85682 units CAPS Take one capsule every Mon-Wed-Fri for four weeks  Patient taking differently: Take 1 capsule by mouth three times a week Monday, Wednesday, Thursday  Cammie Agudelo MD   ferrous sulfate 325 (65 Fe) MG tablet Take 1 tablet by mouth daily (with breakfast) , take with Vitamin C 250 mg tablet  Patient taking differently: Take 325 mg by mouth daily (with breakfast)   Cammie Agudelo MD   Prenatal Vit-Fe Fumarate-FA (RA PRENATAL) 28-0.8 MG TABS Take 2 tablets by mouth daily Post bariatric surgery patient  Cammie Agudelo MD   ascorbic acid (V-R VITAMIN C) 250 MG tablet Take 1 tablet by mouth daily Take with the iron supplement  Patient taking differently: Take 250 mg by mouth daily   Cammie Agudelo MD   hydrOXYzine (VISTARIL) 25 MG capsule Take 1 capsule by mouth 3 times daily as needed for Anxiety  Taz Neal DO   TRINTELLIX 20 MG TABS tablet Take 20 mg by mouth every morning   Historical Provider, MD   albuterol (PROVENTIL) (2.5 MG/3ML) 0.083% nebulizer solution Take 3 mLs by nebulization every 6 hours as needed for Wheezing  Taz Neal DO   Handicap Placard [address] [place] [address] by Does not apply route Duration: 5 years  Orma Antoine, DO        Social History   Substance Use Topics    Smoking status: Former Smoker     Packs/day: 1.00     Years: 15.00     Types: Cigarettes     Start date: 10/29/1991     Quit date: 10/29/2016    Smokeless tobacco: Never Used    Alcohol use 3.6 oz/week     6 Standard drinks or equivalent per week      Comment: social        Vitals:    12/31/18 1025   BP: 117/72   Site: Right Upper Arm   Position: Sitting   Cuff

## 2019-01-01 NOTE — PATIENT INSTRUCTIONS
Patient Education        Melasma: Care Instructions  Your Care Instructions  Melasma is a condition that causes dark patches on the face. Other names for melasma are the \"mask of pregnancy\" or chloasma. It often occurs during pregnancy, and it usually fades after delivery. Women who take birth control pills or hormone therapy (HT) also may get these patches. Doctors do not know exactly what causes melasma, but they think it may have something to do with changes in hormone levels, use of some medicines, or a reaction to some cosmetics. Exposure to the sun will make melasma more noticeable. You may feel self-conscious about dark patches on your face. But melasma does not cause other symptoms and does not lead to more serious problems. Medicated creams can lighten the patches. If these do not work and the patches bother you, your doctor might recommend a skin peel. Many health plans do not cover treatment for melasma because it is considered a cosmetic problem rather than a medical problem. Follow-up care is a key part of your treatment and safety. Be sure to make and go to all appointments, and call your doctor if you are having problems. It's also a good idea to know your test results and keep a list of the medicines you take. How can you care for yourself at home? · Wear a hat to shade your face when you are outside. Being in the sun without protection can make the patches darker. · Always wear sunscreen on exposed skin. Make sure to use a broad-spectrum sunscreen that has a sun protection factor (SPF) of 30 or higher. Use it every day, even when it is cloudy. · Avoid the sun between 10 a.m. and 4 p.m., which is the peak time for UV rays. · Use only gentle soap or cleanser on your face. Do not rub your face hard. · If your doctor prescribes or recommends face creams, use them exactly as directed. When should you call for help?   Watch closely for changes in your health, and be sure to contact your doctor unless your doctor has told you to. What are the possible side effects of fluocinolone, hydroquinone, and tretinoin topical?  Get emergency medical help if you have signs of an allergic reaction:  hives, severe itching; difficult breathing; swelling of your face, lips, tongue, or throat. Stop using this medicine and call your doctor at once if you have:  · darkening or discoloration of treated skin;  · irritation of your eyes, nose, or mouth;  · severe skin redness, itching, peeling, blistering, or crusting;  · severe burning or swelling of the skin; or  · possible signs of absorbing fluocinolone through your skin --worsening tiredness or muscle weakness; loss of appetite, diarrhea; weight loss or weight gain (especially in your face or your upper back and torso); slow wound healing, thinning skin, increased body hair; changes in sexual function; depression, anxiety, feeling irritable. Common side effects may include:  · acne; or  · mild redness, burning, itching, dryness, or peeling of your skin. This is not a complete list of side effects and others may occur. Call your doctor for medical advice about side effects. You may report side effects to FDA at 4-658-FDA-4810. What other drugs will affect fluocinolone, hydroquinone, and tretinoin topical?  It is not likely that other drugs you take orally or inject will have an effect on topically applied fluocinolone, hydroquinone, and tretinoin. But many drugs can interact with each other. Tell each of your health care providers about all medicines you use, including prescription and over-the-counter medicines, vitamins, and herbal products. Where can I get more information? Your pharmacist can provide more information about fluocinolone, hydroquinone, and tretinoin topical.    Remember, keep this and all other medicines out of the reach of children, never share your medicines with others, and use this medication only for the indication prescribed.   Every effort has been made to ensure that the information provided by [address] is accurate, up-to-date, and complete, but no guarantee is made to that effect. Drug information contained herein may be time sensitive. [facility] information has been compiled for use by healthcare practitioners and consumers in the [place] and therefore [facility] does not warrant that uses outside of the [place] are appropriate, unless specifically indicated otherwise. [facility]'s drug information does not endorse drugs, diagnose patients or recommend therapy. [facility]'s drug information is an informational resource designed to assist licensed healthcare practitioners in caring for their patients and/or to serve consumers viewing this service as a supplement to, and not a substitute for, the expertise, skill, knowledge and judgment of healthcare practitioners. The absence of a warning for a given drug or drug combination in no way should be construed to indicate that the drug or drug combination is safe, effective or appropriate for any given patient. [facility] does not assume any responsibility for any aspect of healthcare administered with the aid of information [facility] provides. The information contained herein is not intended to cover all possible uses, directions, precautions, warnings, drug interactions, allergic reactions, or adverse effects. If you have questions about the drugs you are taking, check with your doctor, nurse or pharmacist.  Copyright 0857-3323 [address] Version: 3.01. Revision date: 3/7/2016. Care instructions adapted under license by [facility] ([facility]). If you have questions about a medical condition or this instruction, always ask your healthcare professional. [place] [address] any warranty or liability for your use of this information. 7310

## 2019-01-16 ENCOUNTER — OFFICE VISIT (OUTPATIENT)
Dept: OBGYN | Age: 44
End: 2019-01-16
Payer: MEDICARE

## 2019-01-16 VITALS
SYSTOLIC BLOOD PRESSURE: 122 MMHG | RESPIRATION RATE: 18 BRPM | DIASTOLIC BLOOD PRESSURE: 61 MMHG | TEMPERATURE: 98.1 F | HEART RATE: 85 BPM | HEIGHT: 66 IN | BODY MASS INDEX: 38.89 KG/M2 | WEIGHT: 242 LBS

## 2019-01-16 DIAGNOSIS — N93.8 DUB (DYSFUNCTIONAL UTERINE BLEEDING): Primary | ICD-10-CM

## 2019-01-16 DIAGNOSIS — D21.9 FIBROID: ICD-10-CM

## 2019-01-16 DIAGNOSIS — Z31.89 ENCOUNTER FOR FERTILITY PLANNING: ICD-10-CM

## 2019-01-16 DIAGNOSIS — N88.2 CERVICAL OS STENOSIS: ICD-10-CM

## 2019-01-16 PROCEDURE — G8417 CALC BMI ABV UP PARAM F/U: HCPCS | Performed by: OBSTETRICS & GYNECOLOGY

## 2019-01-16 PROCEDURE — 99212 OFFICE O/P EST SF 10 MIN: CPT | Performed by: OBSTETRICS & GYNECOLOGY

## 2019-01-16 PROCEDURE — G8427 DOCREV CUR MEDS BY ELIG CLIN: HCPCS | Performed by: OBSTETRICS & GYNECOLOGY

## 2019-01-16 PROCEDURE — 1036F TOBACCO NON-USER: CPT | Performed by: OBSTETRICS & GYNECOLOGY

## 2019-01-16 PROCEDURE — G8484 FLU IMMUNIZE NO ADMIN: HCPCS | Performed by: OBSTETRICS & GYNECOLOGY

## 2019-02-22 ENCOUNTER — HOSPITAL ENCOUNTER (EMERGENCY)
Age: 44
Discharge: LEFT BEFORE DISCHARGE | End: 2019-02-22
Payer: MEDICARE

## 2019-02-28 ENCOUNTER — OFFICE VISIT (OUTPATIENT)
Dept: OBGYN | Age: 44
End: 2019-02-28
Payer: MEDICARE

## 2019-02-28 VITALS
TEMPERATURE: 98.6 F | HEIGHT: 66 IN | RESPIRATION RATE: 14 BRPM | WEIGHT: 245 LBS | SYSTOLIC BLOOD PRESSURE: 105 MMHG | HEART RATE: 68 BPM | DIASTOLIC BLOOD PRESSURE: 58 MMHG | BODY MASS INDEX: 39.37 KG/M2

## 2019-02-28 DIAGNOSIS — D21.9 FIBROID: Primary | ICD-10-CM

## 2019-02-28 PROCEDURE — G8484 FLU IMMUNIZE NO ADMIN: HCPCS | Performed by: OBSTETRICS & GYNECOLOGY

## 2019-02-28 PROCEDURE — 1036F TOBACCO NON-USER: CPT | Performed by: OBSTETRICS & GYNECOLOGY

## 2019-02-28 PROCEDURE — 99212 OFFICE O/P EST SF 10 MIN: CPT | Performed by: OBSTETRICS & GYNECOLOGY

## 2019-02-28 PROCEDURE — G8417 CALC BMI ABV UP PARAM F/U: HCPCS | Performed by: OBSTETRICS & GYNECOLOGY

## 2019-02-28 PROCEDURE — G8427 DOCREV CUR MEDS BY ELIG CLIN: HCPCS | Performed by: OBSTETRICS & GYNECOLOGY

## 2019-03-01 ENCOUNTER — TELEPHONE (OUTPATIENT)
Dept: OBGYN | Age: 44
End: 2019-03-01

## 2019-03-08 ENCOUNTER — TELEPHONE (OUTPATIENT)
Dept: BARIATRICS/WEIGHT MGMT | Age: 44
End: 2019-03-08

## 2019-03-08 RX ORDER — PANTOPRAZOLE SODIUM 40 MG/1
40 TABLET, DELAYED RELEASE ORAL
Qty: 60 TABLET | Refills: 3 | Status: ON HOLD | OUTPATIENT
Start: 2019-03-08 | End: 2019-06-23

## 2019-03-21 ENCOUNTER — TELEPHONE (OUTPATIENT)
Dept: PHYSICAL MEDICINE AND REHAB | Age: 44
End: 2019-03-21

## 2019-03-21 ENCOUNTER — OFFICE VISIT (OUTPATIENT)
Dept: PHYSICAL MEDICINE AND REHAB | Age: 44
End: 2019-03-21
Payer: MEDICARE

## 2019-03-21 VITALS
HEART RATE: 75 BPM | TEMPERATURE: 98.3 F | HEIGHT: 66 IN | SYSTOLIC BLOOD PRESSURE: 124 MMHG | WEIGHT: 244 LBS | BODY MASS INDEX: 39.21 KG/M2 | DIASTOLIC BLOOD PRESSURE: 74 MMHG

## 2019-03-21 DIAGNOSIS — M17.11 PRIMARY OSTEOARTHRITIS OF RIGHT KNEE: ICD-10-CM

## 2019-03-21 DIAGNOSIS — M17.12 PRIMARY LOCALIZED OSTEOARTHRITIS OF LEFT KNEE: ICD-10-CM

## 2019-03-21 DIAGNOSIS — G89.29 CHRONIC PAIN OF RIGHT KNEE: Primary | ICD-10-CM

## 2019-03-21 DIAGNOSIS — M25.561 CHRONIC PAIN OF RIGHT KNEE: Primary | ICD-10-CM

## 2019-03-21 PROCEDURE — 20611 DRAIN/INJ JOINT/BURSA W/US: CPT | Performed by: PHYSICAL MEDICINE & REHABILITATION

## 2019-03-22 ENCOUNTER — TELEPHONE (OUTPATIENT)
Dept: PHYSICAL MEDICINE AND REHAB | Age: 44
End: 2019-03-22

## 2019-03-27 ENCOUNTER — TELEPHONE (OUTPATIENT)
Dept: PHYSICAL MEDICINE AND REHAB | Age: 44
End: 2019-03-27

## 2019-03-27 ENCOUNTER — TELEPHONE (OUTPATIENT)
Dept: OBGYN | Age: 44
End: 2019-03-27

## 2019-05-15 ENCOUNTER — INITIAL CONSULT (OUTPATIENT)
Dept: BARIATRICS/WEIGHT MGMT | Age: 44
End: 2019-05-15
Payer: MEDICARE

## 2019-05-15 ENCOUNTER — HOSPITAL ENCOUNTER (OUTPATIENT)
Age: 44
Discharge: HOME | End: 2019-05-17
Payer: MEDICARE

## 2019-05-15 ENCOUNTER — OFFICE VISIT (OUTPATIENT)
Dept: OBGYN | Age: 44
End: 2019-05-15
Payer: MEDICARE

## 2019-05-15 ENCOUNTER — OFFICE VISIT (OUTPATIENT)
Dept: BARIATRICS/WEIGHT MGMT | Age: 44
End: 2019-05-15
Payer: MEDICARE

## 2019-05-15 VITALS
SYSTOLIC BLOOD PRESSURE: 135 MMHG | BODY MASS INDEX: 35.02 KG/M2 | WEIGHT: 217 LBS | RESPIRATION RATE: 18 BRPM | DIASTOLIC BLOOD PRESSURE: 73 MMHG | TEMPERATURE: 98.1 F | HEART RATE: 89 BPM

## 2019-05-15 VITALS
RESPIRATION RATE: 20 BRPM | DIASTOLIC BLOOD PRESSURE: 74 MMHG | WEIGHT: 216 LBS | SYSTOLIC BLOOD PRESSURE: 137 MMHG | BODY MASS INDEX: 34.72 KG/M2 | TEMPERATURE: 98.1 F | HEIGHT: 66 IN | HEART RATE: 88 BPM

## 2019-05-15 VITALS — WEIGHT: 216 LBS | HEIGHT: 66 IN | BODY MASS INDEX: 34.72 KG/M2

## 2019-05-15 DIAGNOSIS — Z71.3 NUTRITIONAL COUNSELING: Primary | ICD-10-CM

## 2019-05-15 DIAGNOSIS — Z20.2 EXPOSURE TO SEXUALLY TRANSMITTED DISEASE (STD): Primary | ICD-10-CM

## 2019-05-15 DIAGNOSIS — Z20.2 EXPOSURE TO SEXUALLY TRANSMITTED DISEASE (STD): ICD-10-CM

## 2019-05-15 DIAGNOSIS — R10.13 EPIGASTRIC PAIN: Primary | ICD-10-CM

## 2019-05-15 PROCEDURE — 4004F PT TOBACCO SCREEN RCVD TLK: CPT | Performed by: SURGERY

## 2019-05-15 PROCEDURE — G8417 CALC BMI ABV UP PARAM F/U: HCPCS | Performed by: SURGERY

## 2019-05-15 PROCEDURE — 4004F PT TOBACCO SCREEN RCVD TLK: CPT | Performed by: OBSTETRICS & GYNECOLOGY

## 2019-05-15 PROCEDURE — 87591 N.GONORRHOEAE DNA AMP PROB: CPT

## 2019-05-15 PROCEDURE — 36415 COLL VENOUS BLD VENIPUNCTURE: CPT

## 2019-05-15 PROCEDURE — 99212 OFFICE O/P EST SF 10 MIN: CPT

## 2019-05-15 PROCEDURE — 86592 SYPHILIS TEST NON-TREP QUAL: CPT

## 2019-05-15 PROCEDURE — 99999 PR OFFICE/OUTPT VISIT,PROCEDURE ONLY: CPT | Performed by: SURGERY

## 2019-05-15 PROCEDURE — G8427 DOCREV CUR MEDS BY ELIG CLIN: HCPCS | Performed by: SURGERY

## 2019-05-15 PROCEDURE — G8427 DOCREV CUR MEDS BY ELIG CLIN: HCPCS | Performed by: OBSTETRICS & GYNECOLOGY

## 2019-05-15 PROCEDURE — 80074 ACUTE HEPATITIS PANEL: CPT

## 2019-05-15 PROCEDURE — 99212 OFFICE O/P EST SF 10 MIN: CPT | Performed by: OBSTETRICS & GYNECOLOGY

## 2019-05-15 PROCEDURE — G8417 CALC BMI ABV UP PARAM F/U: HCPCS | Performed by: OBSTETRICS & GYNECOLOGY

## 2019-05-15 PROCEDURE — 99213 OFFICE O/P EST LOW 20 MIN: CPT | Performed by: OBSTETRICS & GYNECOLOGY

## 2019-05-15 PROCEDURE — 99213 OFFICE O/P EST LOW 20 MIN: CPT | Performed by: SURGERY

## 2019-05-15 PROCEDURE — 36415 COLL VENOUS BLD VENIPUNCTURE: CPT | Performed by: OBSTETRICS & GYNECOLOGY

## 2019-05-15 PROCEDURE — 87491 CHLMYD TRACH DNA AMP PROBE: CPT

## 2019-05-15 RX ORDER — HYDROCODONE BITARTRATE AND ACETAMINOPHEN 5; 325 MG/1; MG/1
1 TABLET ORAL EVERY 6 HOURS PRN
Qty: 28 TABLET | Refills: 0 | Status: SHIPPED | OUTPATIENT
Start: 2019-05-15 | End: 2019-05-22

## 2019-05-15 NOTE — PROGRESS NOTES
Madelyne Libman  5/15/2019  [address]     Viviane-en- Y Gastric Bypass  1.5 Year Post-Operative Follow-up     Madelyne Libman is a 37 y.o. female who is 1.5 years post Laparoscopic Viviane-en-Y Gastric Bypass surgery. Reports no pain or problems. She is not having swallowing difficulty, is compliant most of the time with the multivitamins and calcium + Vit D. She is meeting fluid recommendations of at least 64 ounces per day and is meeting protein recommendations. She  is not exercising: no regular exercise. Weight=216 lb (98 kg)  Today's weight represents a total weight loss of 255 pounds since surgery with 0 pounds regained since the lowest weight. Prior to Admission medications    Medication Sig Start Date End Date Taking?  Authorizing Provider   albuterol sulfate HFA (VENTOLIN HFA) 108 (90 Base) MCG/ACT inhaler Inhale 2 puffs into the lungs every 6 hours as needed for Wheezing 12/31/18  Yes Meet Niño MD   sucralfate (CARAFATE) 1 GM tablet Take 0.5 tablets by mouth 4 times daily 11/14/18  Yes Vandana Chavez MD   pantoprazole (PROTONIX) 40 MG tablet Take 1 tablet by mouth 2 times daily (before meals) 11/1/18  Yes Janette Clark MD   calcium-vitamin D ([place]) 500-200 MG-UNIT per tablet Take 1 tablet by mouth daily   Yes Historical Provider, MD   Cholecalciferol (VITAMIN D3) 60295 units CAPS Take one capsule every Mon-Wed-Fri for four weeks  Patient taking differently: Take 1 capsule by mouth three times a week Monday, Wednesday, Thursday 10/26/18  Yes Fadia Cabezas MD   ferrous sulfate 325 (65 Fe) MG tablet Take 1 tablet by mouth daily (with breakfast) , take with Vitamin C 250 mg tablet  Patient taking differently: Take 325 mg by mouth daily (with breakfast)  8/1/18  Yes Fadia Cabezas MD   Prenatal Vit-Fe Fumarate-FA (RA PRENATAL) 28-0.8 MG TABS Take 2 tablets by mouth daily Post bariatric surgery patient 7/27/18  Yes Fadia Cabezas MD   ascorbic acid (V-R VITAMIN C) 250 MG tablet Take 1 tablet by mouth daily Take with the iron supplement  Patient taking differently: Take 250 mg by mouth daily  7/27/18  Yes Zayra Jerome MD   hydrOXYzine (VISTARIL) 25 MG capsule Take 1 capsule by mouth 3 times daily as needed for Anxiety 6/14/18  Yes Mauricio Atkinson DO   albuterol (PROVENTIL) (2.5 MG/3ML) 0.083% nebulizer solution Take 3 mLs by nebulization every 6 hours as needed for Wheezing 2/26/17  Yes Mauricio Atkinson DO   Handicap Placard MISC by Does not apply route Duration: 5 years 7/19/16  Yes Elvie Joe,    pantoprazole (PROTONIX) 40 MG tablet Take 1 tablet by mouth 2 times daily (before meals) 3/8/19   Jeff Guillen MD   azithromycin (ZITHROMAX) 250 MG tablet 500 mg first day, 250 mg days 2-5 12/31/18   Ez George MD   TRINTELLIX 20 MG TABS tablet Take 20 mg by mouth every morning  4/18/18   Historical Provider, MD          Physical exam:   /74 (Site: Left Lower Arm, Position: Sitting, Cuff Size: Medium Adult)   Pulse 88   Temp 98.1 °F (36.7 °C) (Temporal)   Resp 20   Ht 5' 6\" (1.676 m)   Wt 216 lb (98 kg)   LMP 05/07/2019 (Approximate)   BMI 34.86 kg/m²    General appearance: alert, appears stated age and cooperative  Head: Normocephalic, without obvious abnormality, atraumatic  Neck: no adenopathy, no carotid bruit, no JVD, supple, symmetrical, trachea midline and thyroid not enlarged, symmetric, no tenderness/mass/nodules  Lungs: clear to auscultation bilaterally  Heart: regular rate and rhythm  Abdomen: soft, non-tender; bowel sounds normal; no masses,  no organomegaly  Extremities: extremities normal, atraumatic, no cyanosis or edema    Assessment: Post Viviane-en- Y Gastric Bypass. She does not complain of GERD,  does not have sleep apnea,  does not have diabetes,  does not have hypertension off medical treatment.  Cholesterol and triglycerides are normal.    Plan: she is smoking again and knows that she is at risk for another marginal

## 2019-05-15 NOTE — PROGRESS NOTES
Pt here for 18 month RYGB. No labs. Water intake 60 oz. Bowels moving daily. Pt is doing the vitamins and proteins when she remembers. Pt is asking for a refill on Zofran. Pt has had a couple dumping syndromes. Pt has started smoking again.

## 2019-05-15 NOTE — PROGRESS NOTES
Medical Nutrition Therapy (MNT) Assessment     Pt. Name: Aubrey Tomlinson   Date:5/15/2019  F/U Appt: Sx Type 18 [place] RYGB    Food Records Kept: No  24Hour Recall Completed at Office:No    Ht 5' 6\" (1.676 m)   Wt 216 lb (98 kg)   LMP 05/07/2019 (Approximate)   BMI 34.86 kg/m²  Height: 5' 6\" (1.676 m) Weight: 216 lb (98 kg)   IBW:ideal body weight   159 lbs % EBWL: 81%    Wt Readings from Last 3 Encounters:   05/15/19 216 lb (98 kg)   05/15/19 216 lb (98 kg)   05/15/19 217 lb (98.4 kg)                     Protein supplements: Pt. is currently not using a protein supplement and consuming the following grams of protein - pt is not sure how much protein she is taking in daily. Rd / Ld reviewed with patient based on 1.0 gram per kg of IBW patient needs 72 - 82 grams of protein total daily.       Subjective:                   Current MNT:       Bariatric 1200 calorie diet, DA, heart healthy, 60-80 gram protein - MVI, Calcium and Protein Supplements     MNT Advanced to:     Bariatric 1200 calorie diet, DA, heart healthy, 60-80 gram protein - MVI, Calcium and Protein Supplements      Allergies and Food Allergies and Food Intolerances: Lactose Intolerance // Allergy - Berries - Red Blotches and Itching and Soy Alergy    Nutritional Data  No - Poor appetite more than 5 days     No - NPO or clear liquid more than 3 days     No - Problem Chewing      No - Problem Swallowing      No - Problem Mouth Pain      No - Problem Denture  No - Missing Teeth         No - Pressure Sore    No - Open Wound    No - Surgical Wound  No - Documented: Sepsis or Infection    Yes - Nausea - D/T eating occasionally occurs  No - Vomiting    SWLC Bowel Protocol:  Patient states he / she has the following bowel movements per week - Daily  no - Diarrhea  No - Steatorrhea  No - Constipation  When was your last bowel movement  - Pt states they are soft and had one this am.   How much plain water are you drinking daily - All day long Water - Pt dietary compliance issues here. I have discussed with her the great importance of following the treatment plan exactly as directed in order to achieve a good medical outcome. Estimated Daily Nutritional Needs: Based on Bariatric procedure for Wt. Loss  Energy: Will be calculated at Maintenance Stage    Protein: 60 - 80 gms Daily    MNT Plan and Additional Education: RD/LD reviewed Bariatric Soft Diet incorporating in Raw Fruits, Raw Vegetables, Red Meat, and Rice. Encouraged pt to meet protein and fluid needs daily. Handouts given. Pt. verbalized understanding. Pt instructed to call with questions. Patient was instructed on the importance of increasing water intake to 48 - 64 oz. of water total daily. Pt. was also instructed he / she is allowed an additional 30 oz. of sugar free caffeine free clear liquid beverages for a total of 90 oz. of fluid total daily. Pt. was able to verbalize how he / she can get more water and fluids within the diet. Pt. verbalized understanding. A high fiber diet with plenty of fluids (up to 8 glasses of water daily) is suggested to relieve these symptoms. Metamucil, 1 tablespoon once or twice daily can be used to keep bowels regular if needed. Stressed with the pt the importance of compliaccy in preventing any problems or complications and also for long-term lifestyle change. MEDICAL NUTRITION THERAPY (MNT) - Nutrition Care Plan   (The patient has been educated and given written education material that reinforces the following dietary guidelines for Bariatric Surgery)  Goal:  Patient able to verbalize the following dietary concepts:  3 to 4 ounce portions per meal     6 small meals daily      60 to 80 grams of protein   48 to 64 ounces of fluid daily (water)     Always consume protein item first with all meals   Pt is aware wt loss is pt controlled.    Slow Meals - 30-35 chews per bite / Meals 30 - 45 minutes long            The RD / LD reviewed with the patient that the dietary goals of the bariatric patient is to ensure that patient is able to meet established nutritional needs for a Bariatric Surgery  Patient at this time in order to promote healing, prevent significant weight changes, prevent skin breakdown and abnormal lab values, prevent complications, and tolerance to diet and texture of foods. Pt is able to identify proper food choices and needed changes and is able to explain proper food preparation. RD / LD reviewed compliance with assigned diet stages, volume of food and drink consumed, timing of meals, amount of protein and energy intake, daily vitamin and mineral supplementation, identify food cravings and any adverse reactions associated with intake of food and drink. RD / LD uses behavioral tools such as goal setting, MI, and self-monitoring, to reinforce the anatomic and physiologic effects of the surgery, so that the described behaviors become more of a habit not simply a reaction to the altered anatomy. Care Plan:   · Rd/Ld Addressed Food Records or 24-Hour Recall  · Rd / Ld encouraged patient to exercise at least 30 minutes daily  · Rd / Ld instructed patient on how to increase oral protein intake within the diet. Pt. can verbalize he / she will need to consume 60 - 80 grams. · Rd / Ld instructed the patient on how to increase the use of protein supplements within the diet. · Pt. was instructed on how to increase water intake. Patient will need to consume 48 - 64 oz. of just plain water in addition to 30 oz. of non-caloric beverages. · Handouts given to patient  · RD / LD encouraged oral intake    · RD / LD encouraged pt. to keep food records.       · Pt. is able to verbalize diet concepts      Yes - Constipation Handout Given and Reviewed    Yes - High Fiber Handout Given and Reviewed      No - Ulcer Handout Given and Reviewed          No - Pt. was instructed to continue current MNT   Yes - Pt. diet was advanced to the following stage ( See above)   Yes - Supplements: initiated (See list below  - Pt. given instruction)     No - Supplemental foods:______________________  No - Pt. was placed on a altered meal schedule    Poor - Dietary Compliance

## 2019-05-15 NOTE — PROGRESS NOTES
Performed Blood draw on patient, specimens collected and sent to the lab for results-patient tolerated well.

## 2019-05-15 NOTE — PROGRESS NOTES
Here for STD testing. Recently broke up with boyfriend when she found out her we cheating on her. Only thing she noted is slight itching and discharge. Used  Monistat 3 day recently but the irritation got better but not completely gone.       Pelvic: Vulva:Normal, irritated from shaving   Vagina:Slight menses   Cx:Clear, cultures done     IMP: Possible exposure to std    PLAN: Cultures and RPR, Hep panel a her request.

## 2019-05-15 NOTE — PROGRESS NOTES
Assisted Dr Cristel Villaseñor with pelvic exam, 1 specimen collected for GC/CT and sent to lab. POC explained to patient and Discharge instructions given to patient. Pt verbalized understanding.

## 2019-05-16 ENCOUNTER — HOSPITAL ENCOUNTER (OUTPATIENT)
Age: 44
Discharge: HOME | End: 2019-05-16
Payer: MEDICARE

## 2019-05-16 DIAGNOSIS — R10.13 EPIGASTRIC PAIN: ICD-10-CM

## 2019-05-16 DIAGNOSIS — K91.2 POSTSURGICAL NONABSORPTION: Primary | ICD-10-CM

## 2019-05-16 LAB
ALBUMIN SERPL-MCNC: 3.7 G/DL (ref 3.5–5.2)
ALP BLD-CCNC: 80 U/L (ref 35–104)
ALT SERPL-CCNC: <5 U/L (ref 0–32)
ANION GAP SERPL CALCULATED.3IONS-SCNC: 10 MMOL/L (ref 7–16)
AST SERPL-CCNC: 9 U/L (ref 0–31)
BILIRUB SERPL-MCNC: 0.3 MG/DL (ref 0–1.2)
BUN BLDV-MCNC: 10 MG/DL (ref 6–20)
CALCIUM SERPL-MCNC: 8.6 MG/DL (ref 8.6–10.2)
CHLORIDE BLD-SCNC: 100 MMOL/L (ref 98–107)
CHOLESTEROL, TOTAL: 149 MG/DL (ref 0–199)
CO2: 26 MMOL/L (ref 22–29)
CREAT SERPL-MCNC: 0.6 MG/DL (ref 0.5–1)
FERRITIN: 6 NG/ML
FOLATE: 18.3 NG/ML (ref 4.8–24.2)
GFR AFRICAN AMERICAN: >60
GFR NON-AFRICAN AMERICAN: >60 ML/MIN/1.73
GLUCOSE BLD-MCNC: 90 MG/DL (ref 74–99)
HCT VFR BLD CALC: 24.7 % (ref 34–48)
HEMOGLOBIN: 7.1 G/DL (ref 11.5–15.5)
MCH RBC QN AUTO: 19.9 PG (ref 26–35)
MCHC RBC AUTO-ENTMCNC: 28.7 % (ref 32–34.5)
MCV RBC AUTO: 69.2 FL (ref 80–99.9)
PDW BLD-RTO: 18.5 FL (ref 11.5–15)
PLATELET # BLD: 620 E9/L (ref 130–450)
PMV BLD AUTO: 8.7 FL (ref 7–12)
POTASSIUM SERPL-SCNC: 4.1 MMOL/L (ref 3.5–5)
PREALBUMIN: 20 MG/DL (ref 20–40)
RBC # BLD: 3.57 E12/L (ref 3.5–5.5)
RPR: NORMAL
SODIUM BLD-SCNC: 136 MMOL/L (ref 132–146)
TOTAL PROTEIN: 6.9 G/DL (ref 6.4–8.3)
TRIGL SERPL-MCNC: 71 MG/DL (ref 0–149)
VITAMIN B-12: 359 PG/ML (ref 211–946)
VITAMIN D 25-HYDROXY: 27 NG/ML (ref 30–100)
WBC # BLD: 8.8 E9/L (ref 4.5–11.5)

## 2019-05-16 PROCEDURE — 84478 ASSAY OF TRIGLYCERIDES: CPT

## 2019-05-16 PROCEDURE — 85027 COMPLETE CBC AUTOMATED: CPT

## 2019-05-16 PROCEDURE — 82306 VITAMIN D 25 HYDROXY: CPT

## 2019-05-16 PROCEDURE — 82746 ASSAY OF FOLIC ACID SERUM: CPT

## 2019-05-16 PROCEDURE — 82465 ASSAY BLD/SERUM CHOLESTEROL: CPT

## 2019-05-16 PROCEDURE — 82728 ASSAY OF FERRITIN: CPT

## 2019-05-16 PROCEDURE — 84425 ASSAY OF VITAMIN B-1: CPT

## 2019-05-16 PROCEDURE — 82607 VITAMIN B-12: CPT

## 2019-05-16 PROCEDURE — 80053 COMPREHEN METABOLIC PANEL: CPT

## 2019-05-16 PROCEDURE — 84630 ASSAY OF ZINC: CPT

## 2019-05-16 PROCEDURE — 36415 COLL VENOUS BLD VENIPUNCTURE: CPT

## 2019-05-16 PROCEDURE — 84134 ASSAY OF PREALBUMIN: CPT

## 2019-05-17 DIAGNOSIS — R11.2 NAUSEA AND VOMITING IN ADULT: Primary | ICD-10-CM

## 2019-05-17 LAB
HAV IGM SER IA-ACNC: NORMAL
HEPATITIS B CORE IGM ANTIBODY: NORMAL
HEPATITIS B SURFACE ANTIGEN INTERPRETATION: NORMAL
HEPATITIS C ANTIBODY INTERPRETATION: NORMAL

## 2019-05-17 RX ORDER — ONDANSETRON 4 MG/1
4 TABLET, ORALLY DISINTEGRATING ORAL EVERY 8 HOURS PRN
Qty: 10 TABLET | Refills: 0 | Status: ON HOLD | OUTPATIENT
Start: 2019-05-17 | End: 2019-06-23

## 2019-05-20 LAB
CHLAMYDIA TRACHOMATIS AMPLIFIED DET: NORMAL
N GONORRHOEAE AMPLIFIED DET: NORMAL
VITAMIN B1 WHOLE BLOOD: 83 NMOL/L (ref 70–180)

## 2019-05-21 ENCOUNTER — TELEPHONE (OUTPATIENT)
Dept: OBGYN | Age: 44
End: 2019-05-21

## 2019-05-21 LAB — ZINC: 59.8 UG/DL (ref 60–120)

## 2019-05-21 NOTE — TELEPHONE ENCOUNTER
Patient called in for lab results and culture results. Notified patient that all results were negative. Pt verbalized understanding.

## 2019-06-22 ENCOUNTER — HOSPITAL ENCOUNTER (INPATIENT)
Age: 44
LOS: 1 days | Discharge: HOME | DRG: 812 | End: 2019-06-24
Attending: EMERGENCY MEDICINE | Admitting: INTERNAL MEDICINE
Payer: MEDICARE

## 2019-06-22 ENCOUNTER — APPOINTMENT (OUTPATIENT)
Dept: CT IMAGING | Age: 44
DRG: 812 | End: 2019-06-22
Payer: MEDICARE

## 2019-06-22 DIAGNOSIS — E61.1 IRON DEFICIENCY: ICD-10-CM

## 2019-06-22 DIAGNOSIS — E55.9 VITAMIN D DEFICIENCY: ICD-10-CM

## 2019-06-22 DIAGNOSIS — N30.90 CYSTITIS WITHOUT HEMATURIA: ICD-10-CM

## 2019-06-22 DIAGNOSIS — J44.1 COPD EXACERBATION (HCC): ICD-10-CM

## 2019-06-22 DIAGNOSIS — R55 NEAR SYNCOPE: ICD-10-CM

## 2019-06-22 DIAGNOSIS — E60 ZINC DEFICIENCY: ICD-10-CM

## 2019-06-22 DIAGNOSIS — D50.9 MICROCYTIC ANEMIA: Primary | ICD-10-CM

## 2019-06-22 LAB
ABO/RH: NORMAL
ALBUMIN SERPL-MCNC: 3.4 G/DL (ref 3.5–5.2)
ALP BLD-CCNC: 78 U/L (ref 35–104)
ALT SERPL-CCNC: <5 U/L (ref 0–32)
ANION GAP SERPL CALCULATED.3IONS-SCNC: 11 MMOL/L (ref 7–16)
ANISOCYTOSIS: ABNORMAL
ANTIBODY SCREEN: NORMAL
AST SERPL-CCNC: 12 U/L (ref 0–31)
BACTERIA: ABNORMAL /HPF
BASOPHILS ABSOLUTE: 0.07 E9/L (ref 0–0.2)
BASOPHILS RELATIVE PERCENT: 0.7 % (ref 0–2)
BILIRUB SERPL-MCNC: 0.2 MG/DL (ref 0–1.2)
BILIRUBIN URINE: NEGATIVE
BLOOD, URINE: ABNORMAL
BUN BLDV-MCNC: 11 MG/DL (ref 6–20)
CALCIUM SERPL-MCNC: 8.3 MG/DL (ref 8.6–10.2)
CHLORIDE BLD-SCNC: 99 MMOL/L (ref 98–107)
CLARITY: ABNORMAL
CO2: 22 MMOL/L (ref 22–29)
COLOR: YELLOW
CREAT SERPL-MCNC: 0.7 MG/DL (ref 0.5–1)
EOSINOPHILS ABSOLUTE: 0.03 E9/L (ref 0.05–0.5)
EOSINOPHILS RELATIVE PERCENT: 0.3 % (ref 0–6)
EPITHELIAL CELLS, UA: ABNORMAL /HPF
GFR AFRICAN AMERICAN: >60
GFR NON-AFRICAN AMERICAN: >60 ML/MIN/1.73
GLUCOSE BLD-MCNC: 95 MG/DL (ref 74–99)
GLUCOSE URINE: NEGATIVE MG/DL
HCG, URINE, POC: NEGATIVE
HCT VFR BLD CALC: 22.8 % (ref 34–48)
HEMOGLOBIN: 6.4 G/DL (ref 11.5–15.5)
HYPOCHROMIA: ABNORMAL
IMMATURE GRANULOCYTES #: 0.04 E9/L
IMMATURE GRANULOCYTES %: 0.4 % (ref 0–5)
KETONES, URINE: NEGATIVE MG/DL
LACTIC ACID: 0.9 MMOL/L (ref 0.5–2.2)
LEUKOCYTE ESTERASE, URINE: ABNORMAL
LYMPHOCYTES ABSOLUTE: 2.17 E9/L (ref 1.5–4)
LYMPHOCYTES RELATIVE PERCENT: 20.2 % (ref 20–42)
Lab: NORMAL
MCH RBC QN AUTO: 19 PG (ref 26–35)
MCHC RBC AUTO-ENTMCNC: 28.1 % (ref 32–34.5)
MCV RBC AUTO: 67.9 FL (ref 80–99.9)
MONOCYTES ABSOLUTE: 0.52 E9/L (ref 0.1–0.95)
MONOCYTES RELATIVE PERCENT: 4.8 % (ref 2–12)
NEGATIVE QC PASS/FAIL: NORMAL
NEUTROPHILS ABSOLUTE: 7.93 E9/L (ref 1.8–7.3)
NEUTROPHILS RELATIVE PERCENT: 73.6 % (ref 43–80)
NITRITE, URINE: POSITIVE
OVALOCYTES: ABNORMAL
PDW BLD-RTO: 19.4 FL (ref 11.5–15)
PH UA: 6 (ref 5–9)
PLATELET # BLD: 659 E9/L (ref 130–450)
PMV BLD AUTO: 8.9 FL (ref 7–12)
POIKILOCYTES: ABNORMAL
POSITIVE QC PASS/FAIL: NORMAL
POTASSIUM SERPL-SCNC: 4 MMOL/L (ref 3.5–5)
PROTEIN UA: NEGATIVE MG/DL
RBC # BLD: 3.36 E12/L (ref 3.5–5.5)
RBC UA: ABNORMAL /HPF (ref 0–2)
SCHISTOCYTES: ABNORMAL
SODIUM BLD-SCNC: 132 MMOL/L (ref 132–146)
SPECIFIC GRAVITY UA: 1.02 (ref 1–1.03)
TARGET CELLS: ABNORMAL
TEAR DROP CELLS: ABNORMAL
TOTAL PROTEIN: 6.2 G/DL (ref 6.4–8.3)
TOXIC GRANULATION: ABNORMAL
TROPONIN: <0.01 NG/ML (ref 0–0.03)
UROBILINOGEN, URINE: 0.2 E.U./DL
VACUOLATED NEUTROPHILS: ABNORMAL
WBC # BLD: 10.8 E9/L (ref 4.5–11.5)
WBC UA: >20 /HPF (ref 0–5)

## 2019-06-22 PROCEDURE — 6360000002 HC RX W HCPCS: Performed by: EMERGENCY MEDICINE

## 2019-06-22 PROCEDURE — 94761 N-INVAS EAR/PLS OXIMETRY MLT: CPT

## 2019-06-22 PROCEDURE — 2580000003 HC RX 258: Performed by: EMERGENCY MEDICINE

## 2019-06-22 PROCEDURE — 96365 THER/PROPH/DIAG IV INF INIT: CPT

## 2019-06-22 PROCEDURE — 83605 ASSAY OF LACTIC ACID: CPT

## 2019-06-22 PROCEDURE — 81001 URINALYSIS AUTO W/SCOPE: CPT

## 2019-06-22 PROCEDURE — 80053 COMPREHEN METABOLIC PANEL: CPT

## 2019-06-22 PROCEDURE — 84484 ASSAY OF TROPONIN QUANT: CPT

## 2019-06-22 PROCEDURE — 86850 RBC ANTIBODY SCREEN: CPT

## 2019-06-22 PROCEDURE — 86900 BLOOD TYPING SEROLOGIC ABO: CPT

## 2019-06-22 PROCEDURE — 99285 EMERGENCY DEPT VISIT HI MDM: CPT

## 2019-06-22 PROCEDURE — 86901 BLOOD TYPING SEROLOGIC RH(D): CPT

## 2019-06-22 PROCEDURE — 6370000000 HC RX 637 (ALT 250 FOR IP): Performed by: EMERGENCY MEDICINE

## 2019-06-22 PROCEDURE — 70450 CT HEAD/BRAIN W/O DYE: CPT

## 2019-06-22 PROCEDURE — 85025 COMPLETE CBC W/AUTO DIFF WBC: CPT

## 2019-06-22 PROCEDURE — 36415 COLL VENOUS BLD VENIPUNCTURE: CPT

## 2019-06-22 PROCEDURE — 86923 COMPATIBILITY TEST ELECTRIC: CPT

## 2019-06-22 RX ORDER — 0.9 % SODIUM CHLORIDE 0.9 %
1000 INTRAVENOUS SOLUTION INTRAVENOUS ONCE
Status: COMPLETED | OUTPATIENT
Start: 2019-06-22 | End: 2019-06-22

## 2019-06-22 RX ORDER — 0.9 % SODIUM CHLORIDE 0.9 %
250 INTRAVENOUS SOLUTION INTRAVENOUS ONCE
Status: COMPLETED | OUTPATIENT
Start: 2019-06-22 | End: 2019-06-23

## 2019-06-22 RX ORDER — MECLIZINE HCL 12.5 MG/1
25 TABLET ORAL ONCE
Status: COMPLETED | OUTPATIENT
Start: 2019-06-22 | End: 2019-06-22

## 2019-06-22 RX ADMIN — MECLIZINE 25 MG: 12.5 TABLET ORAL at 22:14

## 2019-06-22 RX ADMIN — SODIUM CHLORIDE 1000 ML: 9 INJECTION, SOLUTION INTRAVENOUS at 22:19

## 2019-06-22 RX ADMIN — CEFTRIAXONE 1 G: 1 INJECTION, POWDER, FOR SOLUTION INTRAMUSCULAR; INTRAVENOUS at 23:10

## 2019-06-23 PROBLEM — D64.9 ACUTE ANEMIA: Status: ACTIVE | Noted: 2019-06-23

## 2019-06-23 PROBLEM — I10 HTN (HYPERTENSION): Chronic | Status: ACTIVE | Noted: 2017-03-01

## 2019-06-23 PROBLEM — D64.9 ANEMIA: Status: ACTIVE | Noted: 2019-06-23

## 2019-06-23 PROBLEM — N34.2 INFECTIVE URETHRITIS: Status: ACTIVE | Noted: 2019-06-23

## 2019-06-23 PROBLEM — D50.9 MICROCYTIC ANEMIA: Status: ACTIVE | Noted: 2019-06-23

## 2019-06-23 PROBLEM — E61.1 IRON DEFICIENCY: Chronic | Status: ACTIVE | Noted: 2018-08-01

## 2019-06-23 PROBLEM — N30.00 ACUTE CYSTITIS WITHOUT HEMATURIA: Status: ACTIVE | Noted: 2019-06-23

## 2019-06-23 LAB
ALBUMIN SERPL-MCNC: 3.1 G/DL (ref 3.5–5.2)
ALP BLD-CCNC: 67 U/L (ref 35–104)
ALT SERPL-CCNC: <5 U/L (ref 0–32)
ANION GAP SERPL CALCULATED.3IONS-SCNC: 7 MMOL/L (ref 7–16)
ANISOCYTOSIS: ABNORMAL
AST SERPL-CCNC: 9 U/L (ref 0–31)
BASOPHILS ABSOLUTE: 0.07 E9/L (ref 0–0.2)
BASOPHILS RELATIVE PERCENT: 0.8 % (ref 0–2)
BILIRUB SERPL-MCNC: 0.6 MG/DL (ref 0–1.2)
BILIRUBIN DIRECT: <0.2 MG/DL (ref 0–0.3)
BILIRUBIN, INDIRECT: ABNORMAL MG/DL (ref 0–1)
BLOOD BANK DISPENSE STATUS: NORMAL
BLOOD BANK DISPENSE STATUS: NORMAL
BLOOD BANK PRODUCT CODE: NORMAL
BLOOD BANK PRODUCT CODE: NORMAL
BPU ID: NORMAL
BPU ID: NORMAL
BUN BLDV-MCNC: 9 MG/DL (ref 6–20)
CALCIUM SERPL-MCNC: 8 MG/DL (ref 8.6–10.2)
CHLORIDE BLD-SCNC: 106 MMOL/L (ref 98–107)
CO2: 25 MMOL/L (ref 22–29)
CREAT SERPL-MCNC: 0.6 MG/DL (ref 0.5–1)
DESCRIPTION BLOOD BANK: NORMAL
DESCRIPTION BLOOD BANK: NORMAL
EOSINOPHILS ABSOLUTE: 0.09 E9/L (ref 0.05–0.5)
EOSINOPHILS RELATIVE PERCENT: 1.1 % (ref 0–6)
FERRITIN: 5 NG/ML
GFR AFRICAN AMERICAN: >60
GFR NON-AFRICAN AMERICAN: >60 ML/MIN/1.73
GLUCOSE BLD-MCNC: 83 MG/DL (ref 74–99)
HCT VFR BLD CALC: 23.8 % (ref 34–48)
HCT VFR BLD CALC: 24.9 % (ref 34–48)
HCT VFR BLD CALC: 24.9 % (ref 34–48)
HEMOGLOBIN: 7.1 G/DL (ref 11.5–15.5)
HEMOGLOBIN: 7.3 G/DL (ref 11.5–15.5)
HEMOGLOBIN: 7.4 G/DL (ref 11.5–15.5)
HYPOCHROMIA: ABNORMAL
IMMATURE GRANULOCYTES #: 0.03 E9/L
IMMATURE GRANULOCYTES %: 0.4 % (ref 0–5)
IRON SATURATION: 26 % (ref 15–50)
IRON: 78 MCG/DL (ref 37–145)
LYMPHOCYTES ABSOLUTE: 2.87 E9/L (ref 1.5–4)
LYMPHOCYTES RELATIVE PERCENT: 34 % (ref 20–42)
MAGNESIUM: 2 MG/DL (ref 1.6–2.6)
MCH RBC QN AUTO: 21.1 PG (ref 26–35)
MCH RBC QN AUTO: 21.1 PG (ref 26–35)
MCHC RBC AUTO-ENTMCNC: 29.7 % (ref 32–34.5)
MCHC RBC AUTO-ENTMCNC: 29.8 % (ref 32–34.5)
MCV RBC AUTO: 70.8 FL (ref 80–99.9)
MCV RBC AUTO: 70.9 FL (ref 80–99.9)
MONOCYTES ABSOLUTE: 0.6 E9/L (ref 0.1–0.95)
MONOCYTES RELATIVE PERCENT: 7.1 % (ref 2–12)
NEUTROPHILS ABSOLUTE: 4.78 E9/L (ref 1.8–7.3)
NEUTROPHILS RELATIVE PERCENT: 56.6 % (ref 43–80)
OVALOCYTES: ABNORMAL
PDW BLD-RTO: 21.4 FL (ref 11.5–15)
PDW BLD-RTO: 21.6 FL (ref 11.5–15)
PLATELET # BLD: 535 E9/L (ref 130–450)
PLATELET # BLD: 581 E9/L (ref 130–450)
PMV BLD AUTO: 8.6 FL (ref 7–12)
PMV BLD AUTO: 9 FL (ref 7–12)
POIKILOCYTES: ABNORMAL
POLYCHROMASIA: ABNORMAL
POTASSIUM REFLEX MAGNESIUM: 3.8 MMOL/L (ref 3.5–5)
RBC # BLD: 3.36 E12/L (ref 3.5–5.5)
RBC # BLD: 3.51 E12/L (ref 3.5–5.5)
SODIUM BLD-SCNC: 138 MMOL/L (ref 132–146)
TARGET CELLS: ABNORMAL
TOTAL IRON BINDING CAPACITY: 301 MCG/DL (ref 250–450)
TOTAL PROTEIN: 5.5 G/DL (ref 6.4–8.3)
WBC # BLD: 7.3 E9/L (ref 4.5–11.5)
WBC # BLD: 8.4 E9/L (ref 4.5–11.5)

## 2019-06-23 PROCEDURE — 83550 IRON BINDING TEST: CPT

## 2019-06-23 PROCEDURE — 2580000003 HC RX 258: Performed by: INTERNAL MEDICINE

## 2019-06-23 PROCEDURE — 97530 THERAPEUTIC ACTIVITIES: CPT

## 2019-06-23 PROCEDURE — 99223 1ST HOSP IP/OBS HIGH 75: CPT | Performed by: INTERNAL MEDICINE

## 2019-06-23 PROCEDURE — 6360000002 HC RX W HCPCS: Performed by: SURGERY

## 2019-06-23 PROCEDURE — 6370000000 HC RX 637 (ALT 250 FOR IP): Performed by: INTERNAL MEDICINE

## 2019-06-23 PROCEDURE — C9113 INJ PANTOPRAZOLE SODIUM, VIA: HCPCS | Performed by: SURGERY

## 2019-06-23 PROCEDURE — 36415 COLL VENOUS BLD VENIPUNCTURE: CPT

## 2019-06-23 PROCEDURE — 36430 TRANSFUSION BLD/BLD COMPNT: CPT

## 2019-06-23 PROCEDURE — 80048 BASIC METABOLIC PNL TOTAL CA: CPT

## 2019-06-23 PROCEDURE — 97116 GAIT TRAINING THERAPY: CPT

## 2019-06-23 PROCEDURE — 80076 HEPATIC FUNCTION PANEL: CPT

## 2019-06-23 PROCEDURE — 85025 COMPLETE CBC W/AUTO DIFF WBC: CPT

## 2019-06-23 PROCEDURE — 2580000003 HC RX 258: Performed by: SURGERY

## 2019-06-23 PROCEDURE — P9016 RBC LEUKOCYTES REDUCED: HCPCS

## 2019-06-23 PROCEDURE — 85018 HEMOGLOBIN: CPT

## 2019-06-23 PROCEDURE — 97161 PT EVAL LOW COMPLEX 20 MIN: CPT

## 2019-06-23 PROCEDURE — 85014 HEMATOCRIT: CPT

## 2019-06-23 PROCEDURE — 1200000000 HC SEMI PRIVATE

## 2019-06-23 PROCEDURE — 2580000003 HC RX 258: Performed by: EMERGENCY MEDICINE

## 2019-06-23 PROCEDURE — 85027 COMPLETE CBC AUTOMATED: CPT

## 2019-06-23 PROCEDURE — C9113 INJ PANTOPRAZOLE SODIUM, VIA: HCPCS | Performed by: INTERNAL MEDICINE

## 2019-06-23 PROCEDURE — 83540 ASSAY OF IRON: CPT

## 2019-06-23 PROCEDURE — 6360000002 HC RX W HCPCS: Performed by: INTERNAL MEDICINE

## 2019-06-23 PROCEDURE — 83735 ASSAY OF MAGNESIUM: CPT

## 2019-06-23 PROCEDURE — 82728 ASSAY OF FERRITIN: CPT

## 2019-06-23 RX ORDER — SULFAMETHOXAZOLE AND TRIMETHOPRIM 800; 160 MG/1; MG/1
1 TABLET ORAL EVERY 12 HOURS SCHEDULED
Status: DISCONTINUED | OUTPATIENT
Start: 2019-06-23 | End: 2019-06-24 | Stop reason: HOSPADM

## 2019-06-23 RX ORDER — POTASSIUM CHLORIDE 7.45 MG/ML
10 INJECTION INTRAVENOUS PRN
Status: DISCONTINUED | OUTPATIENT
Start: 2019-06-23 | End: 2019-06-24 | Stop reason: HOSPADM

## 2019-06-23 RX ORDER — SODIUM CHLORIDE 0.9 % (FLUSH) 0.9 %
10 SYRINGE (ML) INJECTION EVERY 12 HOURS SCHEDULED
Status: DISCONTINUED | OUTPATIENT
Start: 2019-06-23 | End: 2019-06-24 | Stop reason: HOSPADM

## 2019-06-23 RX ORDER — ACETAMINOPHEN 325 MG/1
650 TABLET ORAL EVERY 4 HOURS PRN
Status: DISCONTINUED | OUTPATIENT
Start: 2019-06-23 | End: 2019-06-24 | Stop reason: HOSPADM

## 2019-06-23 RX ORDER — HYDROXYZINE PAMOATE 25 MG/1
25 CAPSULE ORAL 3 TIMES DAILY PRN
Status: DISCONTINUED | OUTPATIENT
Start: 2019-06-23 | End: 2019-06-24 | Stop reason: HOSPADM

## 2019-06-23 RX ORDER — SODIUM CHLORIDE 9 MG/ML
INJECTION, SOLUTION INTRAVENOUS CONTINUOUS
Status: ACTIVE | OUTPATIENT
Start: 2019-06-23 | End: 2019-06-23

## 2019-06-23 RX ORDER — POTASSIUM CHLORIDE 20 MEQ/1
40 TABLET, EXTENDED RELEASE ORAL PRN
Status: DISCONTINUED | OUTPATIENT
Start: 2019-06-23 | End: 2019-06-24 | Stop reason: HOSPADM

## 2019-06-23 RX ORDER — ONDANSETRON 2 MG/ML
4 INJECTION INTRAMUSCULAR; INTRAVENOUS EVERY 6 HOURS PRN
Status: DISCONTINUED | OUTPATIENT
Start: 2019-06-23 | End: 2019-06-24 | Stop reason: HOSPADM

## 2019-06-23 RX ORDER — MAGNESIUM SULFATE 1 G/100ML
1 INJECTION INTRAVENOUS PRN
Status: DISCONTINUED | OUTPATIENT
Start: 2019-06-23 | End: 2019-06-24 | Stop reason: HOSPADM

## 2019-06-23 RX ORDER — SODIUM CHLORIDE 0.9 % (FLUSH) 0.9 %
10 SYRINGE (ML) INJECTION PRN
Status: DISCONTINUED | OUTPATIENT
Start: 2019-06-23 | End: 2019-06-23 | Stop reason: SDUPTHER

## 2019-06-23 RX ORDER — ACETAMINOPHEN 325 MG/1
650 TABLET ORAL EVERY 4 HOURS PRN
Status: DISCONTINUED | OUTPATIENT
Start: 2019-06-23 | End: 2019-06-23 | Stop reason: SDUPTHER

## 2019-06-23 RX ORDER — SODIUM CHLORIDE 0.9 % (FLUSH) 0.9 %
10 SYRINGE (ML) INJECTION EVERY 12 HOURS SCHEDULED
Status: DISCONTINUED | OUTPATIENT
Start: 2019-06-23 | End: 2019-06-23 | Stop reason: SDUPTHER

## 2019-06-23 RX ORDER — SODIUM CHLORIDE 0.9 % (FLUSH) 0.9 %
10 SYRINGE (ML) INJECTION PRN
Status: DISCONTINUED | OUTPATIENT
Start: 2019-06-23 | End: 2019-06-24 | Stop reason: HOSPADM

## 2019-06-23 RX ORDER — PANTOPRAZOLE SODIUM 40 MG/10ML
40 INJECTION, POWDER, LYOPHILIZED, FOR SOLUTION INTRAVENOUS DAILY
Status: DISCONTINUED | OUTPATIENT
Start: 2019-06-23 | End: 2019-06-23

## 2019-06-23 RX ORDER — 0.9 % SODIUM CHLORIDE 0.9 %
10 VIAL (ML) INJECTION DAILY
Status: DISCONTINUED | OUTPATIENT
Start: 2019-06-23 | End: 2019-06-23

## 2019-06-23 RX ADMIN — SODIUM CHLORIDE 250 ML: 9 INJECTION, SOLUTION INTRAVENOUS at 00:18

## 2019-06-23 RX ADMIN — SULFAMETHOXAZOLE AND TRIMETHOPRIM 1 TABLET: 800; 160 TABLET ORAL at 08:39

## 2019-06-23 RX ADMIN — SODIUM CHLORIDE 8 MG/HR: 9 INJECTION, SOLUTION INTRAVENOUS at 18:51

## 2019-06-23 RX ADMIN — SODIUM CHLORIDE: 9 INJECTION, SOLUTION INTRAVENOUS at 06:04

## 2019-06-23 RX ADMIN — Medication 10 ML: at 03:24

## 2019-06-23 RX ADMIN — Medication 10 ML: at 03:23

## 2019-06-23 RX ADMIN — PANTOPRAZOLE SODIUM 40 MG: 40 INJECTION, POWDER, FOR SOLUTION INTRAVENOUS at 03:22

## 2019-06-23 RX ADMIN — SULFAMETHOXAZOLE AND TRIMETHOPRIM 1 TABLET: 800; 160 TABLET ORAL at 21:22

## 2019-06-23 RX ADMIN — VORTIOXETINE 20 MG: 10 TABLET, FILM COATED ORAL at 08:39

## 2019-06-23 ASSESSMENT — PAIN SCALES - GENERAL
PAINLEVEL_OUTOF10: 0

## 2019-06-23 NOTE — PROGRESS NOTES
Patient tolerated clear liquid diet and wanted to try full liquid diet. No complaints of increased pain or nausea.   Electronically signed by Christi Andino RN on 6/23/2019 at 4:53 PM

## 2019-06-23 NOTE — H&P
tablet Take 1 tablet by mouth 2 times daily (before meals)  Patient taking differently: Take 40 mg by mouth 2 times daily as needed  11/1/18  Yes Kimberlee Benitez MD   hydrOXYzine (VISTARIL) 25 MG capsule Take 1 capsule by mouth 3 times daily as needed for Anxiety 6/14/18  Yes Mauricio Atkinson DO   TRINTELLIX 20 MG TABS tablet Take 20 mg by mouth every morning  4/18/18  Yes Historical Provider, MD   Handicap Placard MISC by Does not apply route Duration: 5 years 7/19/16   Seven Joe DO       Allergies:    Pcn [penicillins]; Food; and Oxycontin [oxycodone hcl]  Social History:    reports that she has been smoking cigarettes. She started smoking about 27 years ago. She has a 15.00 pack-year smoking history. She has never used smokeless tobacco. She reports that she drinks about 3.6 oz of alcohol per week. She reports that she does not use drugs. Family History:   family history includes Cancer in her maternal grandfather and paternal grandmother; Depression in her mother; Diabetes in her father and mother; Heart Attack (age of onset: 61) in her mother; Stroke in her father and paternal grandfather; Substance Abuse in her brother. PHYSICAL EXAM:  Vitals:  BP (!) 106/51   Pulse 64   Temp 97.8 °F (36.6 °C) (Oral)   Resp 16   Ht 5' 6\" (1.676 m)   Wt 219 lb 11.2 oz (99.7 kg)   SpO2 98%   BMI 35.46 kg/m²   General Appearance: alert and oriented to person, place and time and in no acute distress.  Looks pale   Skin: warm and dry  Head: normocephalic and atraumatic  Eyes: Pale conjunctivae   Neck: neck supple and non tender without mass   Pulmonary/Chest: clear to auscultation bilaterally- no wheezes, rales or rhonchi, normal air movement, no respiratory distress  Cardiovascular: normal rate, normal S1 and S2 and no carotid bruits  Abdomen: soft, non-tender, non-distended, normal bowel sounds  Extremities: no cyanosis, no clubbing and no edema  Neurologic: no cranial nerve deficit and speech normal    LABS:  CBC  Recent Labs     06/22/19 2221 06/23/19  0658   WBC 10.8 8.4   HGB 6.4* 7.1*   HCT 22.8* 23.8*   MCV 67.9* 70.8*   * 535*     BMP  Recent Labs     06/22/19 2221 06/23/19  0658    138   K 4.0 3.8   CL 99 106   CO2 22 25   BUN 11 9   CREATININE 0.7 0.6   LABGLOM >60 >60   GLUCOSE 95 83   CALCIUM 8.3* 8.0*     Lab Results   Component Value Date    MG 2.0 06/23/2019    PHOS 3.2 08/12/2016     LFT  Recent Labs     06/22/19 2221 06/23/19  0658   ALT <5 <5   AST 12 9   ALKPHOS 78 79   BILITOT 0.2 0.6   BILIDIR  --  <0.2     Albumin  Lab Results   Component Value Date    LABALBU 3.1 (L) 06/23/2019    LABALBU 3.4 (L) 06/22/2019    LABALBU 3.7 05/16/2019     Lactic acid   No results for input(s): LACTSEPSIS in the last 72 hours. Cardiac  Troponin   Lab Results   Component Value Date    TROPONINI <0.01 06/22/2019    TROPONINI <0.01 10/29/2018    TROPONINI <0.01 03/01/2017     Pro-BNP   Lab Results   Component Value Date    PROBNP 18 03/01/2017    PROBNP 43 08/24/2014     Iron studies  Lab Results   Component Value Date    FERRITIN 6 05/16/2019     Radiology:   CT Head WO Contrast   Final Result   No acute intracranial process.        This report has been electronically signed by Shannan Meyer MD.        ASSESSMENT and PLAN:    Problem   Acute Anemia   Acute Cystitis Without Hematuria   Iron Deficiency   Gerd Without Esophagitis   Htn (Hypertension)     · Dropped hgb from 7.1 to 6.4, she received some fluid 1.5L, will transfuse 1 unit and monitor H H   · Pantoprazole IV   · The dizziness and fatigue is due to anemia   · Iron studies,   · Peripheral smear  · she refused rectal exam in ER, and denied any melena or blood in stool, her Hgb not far from last year (7.4)   · Had hx of perforated viscous in Oct, currently no complain of abdominal pain no tenderness   · Pt had bariatric surgery in the past   · C/w Vortloxetine       Code Status: Full   DVT prophylaxis: PCDs  Gi prophylaxis: Pantoprazole Diet: NPO     Electronically signed by Monserrat Arredondo MD on 6/23/2019 at 8:03 AM

## 2019-06-23 NOTE — PROGRESS NOTES
Patient had large brown soft and formed bowel movement. Occult stool was NEGATIVE.    Electronically signed by Deidra España RN on 6/23/2019 at 2:55 PM

## 2019-06-23 NOTE — PROGRESS NOTES
Occupational Therapy  Date:6/23/2019  Patient Name: Luis Felipe Given  Room: [facility]/6195-C     Occupational Therapy (OT) order received and OT evaluation attempted this morning. Patient reported that she is independent with ADLs and functional transfers/mobility and declined completion of OT evaluation. Patient indicated that she is eager to return home. No OT evaluation completed, per patient preference. Jennifer Zimmerman January, OTR/L  License Number: [id number]

## 2019-06-23 NOTE — PROGRESS NOTES
Physical Therapy    Facility/Department: [facility] SURGERY  Initial Assessment    NAME: Rosy Dietz  : 1975  MRN: [id number]    Date of Service: 2019    Discharge Recommendations:      PT Equipment Recommendations  Equipment Needed: No    Assessment      No Skilled PT: Independent with functional mobility   REQUIRES PT FOLLOW UP: No  Activity Tolerance  Activity Tolerance: Patient Tolerated treatment well       Patient Diagnosis(es): The primary encounter diagnosis was Microcytic anemia. Diagnoses of Near syncope and Cystitis without hematuria were also pertinent to this visit. has a past medical history of Anemia, Arthritis, Asthma, Depression, Diabetes mellitus ([facility] [address] GERD without esophagitis, History of blood transfusion, History of gastric bypass, Hypertension, Iron deficiency, Lumbar spondylosis, Lymphedema, Obesity, Panic attacks, PONV (postoperative nausea and vomiting), Sleep apnea, obstructive, Vitamin D deficiency, and Zinc deficiency. has a past surgical history that includes Appendectomy (); Viviane-en-Y Gastric Bypass (2017); skin biopsy; pr office/outpt visit,procedure only (N/A, 10/29/2018); and Stomach surgery.     Restrictions  Restrictions/Precautions  Restrictions/Precautions: General Precautions  Required Braces or Orthoses?: No  Vision/Hearing  Vision: Within Functional Limits  Hearing: Within functional limits     Subjective  General  Chart Reviewed: Yes  Patient assessed for rehabilitation services?: Yes  Family / Caregiver Present: No  Follows Commands: Within Functional Limits  Pain Screening  Patient Currently in Pain: Denies  Vital Signs  Patient Currently in Pain: Denies       Orientation  Orientation  Overall Orientation Status: Within Normal Limits  Social/Functional History  Social/Functional History  Lives With: Alone  Type of Home: House  Home Layout: One level  Home Access: Stairs to enter without rails  Entrance Stairs - Number of Steps: 2-3  Home Equipment: (No AD use PTA)  ADL Assistance: Independent  Homemaking Assistance: Independent  Ambulation Assistance: Independent  Transfer Assistance: Independent  Active : Yes  Mode of Transportation: Car  Cognition        Objective          AROM RLE (degrees)  RLE AROM: WNL  AROM LLE (degrees)  LLE AROM : WNL  Strength RLE  Strength RLE: WNL  Strength LLE  Strength LLE: WNL        Bed mobility  Rolling to Left: Independent  Rolling to Right: Independent  Supine to Sit: Independent  Sit to Supine: Independent  Scooting: Independent  Transfers  Sit to Stand: Independent  Stand to sit:  Independent  Ambulation  Ambulation?: Yes  Ambulation 1  Surface: level tile  Device: No Device  Assistance: Independent  Gait Deviations: None  Distance: 100'              Plan   Plan  Plan Comment: [facility] 24/24    G-Code       OutComes Score                                                  AM-PAC Score             Goals          Therapy Time   Individual Concurrent Group Co-treatment   Time In           Time Out           Minutes                   Kerry Coley PT

## 2019-06-23 NOTE — PLAN OF CARE
Problem: Falls - Risk of:  Goal: Will remain free from falls  Description  Will remain free from falls  Outcome: Met This Shift     Problem: Falls - Risk of:  Goal: Absence of physical injury  Description  Absence of physical injury  Outcome: Met This Shift     Problem: Sensory:  Goal: Pain level will decrease  Description  Pain level will decrease  Outcome: Met This Shift

## 2019-06-23 NOTE — ED PROVIDER NOTES
HPI:  6/22/19, Time: 10:09 PM        Madelyne Libman is a 37 y.o. female presenting to the ED for dizziness and near syncopal episode beginning 1 hour ago. The complaint has been intermittent, severe in severity, and worsened by nothing. Patient states that she thought she might pass out she did have some visual scotomas described as \"spots\" in her visual field. She has not had any focal extremity weakness no headache no neck stiffness associated. Patient denies any chest heaviness/pressure/tightness associated. She does have history of gastric bypass and iron deficiency anemia in the past.  She also has a history of panic attacks in the past per review of her chart notes. Patient has not had any slurred speech no difficulty speaking associated she denies any symptoms currently. No other aggravating factors are reported. No relieving factors are reported. Review of Systems:   Pertinent positives and negatives are stated within HPI, all other systems reviewed and are negative.    --------------------------------------------- PAST HISTORY ---------------------------------------------  Past Medical History:  has a past medical history of Anemia, Arthritis, Asthma, Depression, Diabetes mellitus ([facility] [address] GERD without esophagitis, History of blood transfusion, History of gastric bypass, Hypertension, Iron deficiency, Lumbar spondylosis, Lymphedema, Obesity, Panic attacks, PONV (postoperative nausea and vomiting), Sleep apnea, obstructive, Vitamin D deficiency, and Zinc deficiency. Past Surgical History:  has a past surgical history that includes Appendectomy (1996); Viviane-en-Y Gastric Bypass (11/14/2017); skin biopsy; pr office/outpt visit,procedure only (N/A, 10/29/2018); and Stomach surgery. Social History:  reports that she has been smoking cigarettes. She started smoking about 27 years ago. She has a 15.00 pack-year smoking history.  She has never used smokeless tobacco. She reports that she drinks about 3.6 oz of alcohol per week. She reports that she does not use drugs. Family History: family history includes Cancer in her maternal grandfather and paternal grandmother; Depression in her mother; Diabetes in her father and mother; Heart Attack (age of onset: 61) in her mother; Stroke in her father and paternal grandfather; Substance Abuse in her brother. The patients home medications have been reviewed. Allergies: Pcn [penicillins];  Food; and Oxycontin [oxycodone hcl]    -------------------------------------------------- RESULTS -------------------------------------------------  All laboratory and radiology results have been personally reviewed by myself   LABS:  Results for orders placed or performed during the hospital encounter of 06/22/19   CBC Auto Differential   Result Value Ref Range    WBC 10.8 4.5 - 11.5 E9/L    RBC 3.36 (L) 3.50 - 5.50 E12/L    Hemoglobin 6.4 (LL) 11.5 - 15.5 g/dL    Hematocrit 22.8 (L) 34.0 - 48.0 %    MCV 67.9 (L) 80.0 - 99.9 fL    MCH 19.0 (L) 26.0 - 35.0 pg    MCHC 28.1 (L) 32.0 - 34.5 %    RDW 19.4 (H) 11.5 - 15.0 fL    Platelets 299 (H) 974 - 450 E9/L    MPV 8.9 7.0 - 12.0 fL    Neutrophils % 73.6 43.0 - 80.0 %    Immature Granulocytes % 0.4 0.0 - 5.0 %    Lymphocytes % 20.2 20.0 - 42.0 %    Monocytes % 4.8 2.0 - 12.0 %    Eosinophils % 0.3 0.0 - 6.0 %    Basophils % 0.7 0.0 - 2.0 %    Neutrophils # 7.93 (H) 1.80 - 7.30 E9/L    Immature Granulocytes # 0.04 E9/L    Lymphocytes # 2.17 1.50 - 4.00 E9/L    Monocytes # 0.52 0.10 - 0.95 E9/L    Eosinophils # 0.03 (L) 0.05 - 0.50 E9/L    Basophils # 0.07 0.00 - 0.20 E9/L    Toxic Granulation 1+     Vacuolated Neutrophils 1+     Anisocytosis 2+     Hypochromia 3+     Poikilocytes 2+     Schistocytes 1+     Ovalocytes 1+     Target Cells 2+     Tear Drop Cells 2+    Comprehensive Metabolic Panel   Result Value Ref Range    Sodium 132 132 - 146 mmol/L    Potassium 4.0 3.5 - 5.0 mmol/L    Chloride 99 98 - 107 mmol/L    CO2 22 22 - 29 mmol/L    Anion Gap 11 7 - 16 mmol/L    Glucose 95 74 - 99 mg/dL    BUN 11 6 - 20 mg/dL    CREATININE 0.7 0.5 - 1.0 mg/dL    GFR Non-African American >60 >=60 mL/min/1.73    GFR African American >60     Calcium 8.3 (L) 8.6 - 10.2 mg/dL    Total Protein 6.2 (L) 6.4 - 8.3 g/dL    Alb 3.4 (L) 3.5 - 5.2 g/dL    Total Bilirubin 0.2 0.0 - 1.2 mg/dL    Alkaline Phosphatase 78 35 - 104 U/L    ALT <5 0 - 32 U/L    AST 12 0 - 31 U/L   Lactic Acid, Plasma   Result Value Ref Range    Lactic Acid 0.9 0.5 - 2.2 mmol/L   Urinalysis   Result Value Ref Range    Color, UA Yellow Straw/Yellow    Clarity, UA SL CLOUDY Clear    Glucose, Ur Negative Negative mg/dL    Bilirubin Urine Negative Negative    Ketones, Urine Negative Negative mg/dL    Specific Gravity, UA 1.020 1.005 - 1.030    Blood, Urine TRACE-LYSED Negative    pH, UA 6.0 5.0 - 9.0    Protein, UA Negative Negative mg/dL    Urobilinogen, Urine 0.2 <2.0 E.U./dL    Nitrite, Urine POSITIVE (A) Negative    Leukocyte Esterase, Urine LARGE (A) Negative   Troponin   Result Value Ref Range    Troponin <0.01 0.00 - 0.03 ng/mL   Microscopic Urinalysis   Result Value Ref Range    WBC, UA >20 0 - 5 /HPF    RBC, UA 1-3 0 - 2 /HPF    Epi Cells FEW /HPF    Bacteria, UA MODERATE (A) /HPF   POC Pregnancy Urine Qual   Result Value Ref Range    HCG, Urine, POC Negative Negative    Lot Number [id number]     Positive QC Pass/Fail Pass     Negative QC Pass/Fail Pass    TYPE AND SCREEN   Result Value Ref Range    ABO/Rh O POS     Antibody Screen NEG    PREPARE RBC (CROSSMATCH), 2 Units   Result Value Ref Range    Product Code Blood Bank [id number]     Description Blood Bank Red Blood Cells, Leuko-reduced     Unit Number [id number]     Dispense Status Blood Bank selected     Product Code Blood Bank [id number]     Description Blood Bank Red Blood Cells, Leuko-reduced     Unit Number [id number]     Dispense Status Blood Bank selected        RADIOLOGY:  Interpreted by Radiologist.  Sidney Reyna metabolic/electrolyte abnormalities, TIA, cardiac dysrhythmia, dehydration/orthostasis, to name a few. Counseling: The emergency provider has spoken with the patient and discussed todays results, in addition to providing specific details for the plan of care and counseling regarding the diagnosis and prognosis. Questions are answered at this time and they are agreeable with the plan. Consults:  Dr. Maximus Hurtado was consulted and he agreed to see the patient for colonoscopy consulation;  Dr. Nova Selvin [facility]) accepted the patient admission to Telemetry.  --------------------------------- IMPRESSION AND DISPOSITION ---------------------------------    IMPRESSION  1. Microcytic anemia    2. Near syncope    3. Cystitis without hematuria        DISPOSITION  Disposition: Admit to telemetry  Patient condition is stable      NOTE: This report was transcribed using voice recognition software.  Every effort was made to ensure accuracy; however, inadvertent computerized transcription errors may be present        Josi Aguila MD  06/23/19 0003

## 2019-06-23 NOTE — PROGRESS NOTES
P Quality Flow/Interdisciplinary Rounds Progress Note        Quality Flow Rounds held on June 23, 2019    Disciplines Attending:  Bedside Nurse, ,  and Nursing Unit [address] was admitted on 6/22/2019  9:49 PM    Anticipated Discharge Date:  Expected Discharge Date: 06/24/19    Disposition:    Perico Score:  Perico Scale Score: 21    Readmission Risk              Risk of Unplanned Readmission:        13           Discussed patient goal for the day, patient clinical progression, and barriers to discharge.   The following Goal(s) of the Day/Commitment(s) have been identified:  monitor H&H, [facility]  June 23, 2019

## 2019-06-23 NOTE — ED NOTES
Pt declines having rectal exam for hemoccult. Dr Adry Kingsley @ bedside.      Bertin Chung RN  06/22/19 1672

## 2019-06-24 VITALS
TEMPERATURE: 97.8 F | SYSTOLIC BLOOD PRESSURE: 101 MMHG | HEIGHT: 66 IN | RESPIRATION RATE: 16 BRPM | OXYGEN SATURATION: 97 % | DIASTOLIC BLOOD PRESSURE: 58 MMHG | BODY MASS INDEX: 35.31 KG/M2 | WEIGHT: 219.7 LBS | HEART RATE: 51 BPM

## 2019-06-24 LAB — PATHOLOGIST REVIEW: NORMAL

## 2019-06-24 PROCEDURE — 6370000000 HC RX 637 (ALT 250 FOR IP): Performed by: STUDENT IN AN ORGANIZED HEALTH CARE EDUCATION/TRAINING PROGRAM

## 2019-06-24 PROCEDURE — 99238 HOSP IP/OBS DSCHRG MGMT 30/<: CPT | Performed by: INTERNAL MEDICINE

## 2019-06-24 PROCEDURE — 6360000002 HC RX W HCPCS: Performed by: SURGERY

## 2019-06-24 PROCEDURE — 2580000003 HC RX 258: Performed by: INTERNAL MEDICINE

## 2019-06-24 PROCEDURE — 2580000003 HC RX 258: Performed by: SURGERY

## 2019-06-24 PROCEDURE — 6370000000 HC RX 637 (ALT 250 FOR IP): Performed by: INTERNAL MEDICINE

## 2019-06-24 PROCEDURE — C9113 INJ PANTOPRAZOLE SODIUM, VIA: HCPCS | Performed by: SURGERY

## 2019-06-24 RX ORDER — ASCORBIC ACID 250 MG
250 TABLET ORAL DAILY
Qty: 30 TABLET | Refills: 3 | Status: ON HOLD | COMMUNITY
Start: 2019-06-24 | End: 2022-03-09 | Stop reason: ALTCHOICE

## 2019-06-24 RX ORDER — OYSTER SHELL CALCIUM WITH VITAMIN D 500; 200 MG/1; [IU]/1
1 TABLET, FILM COATED ORAL DAILY
Qty: 30 TABLET | Refills: 3 | Status: ON HOLD | COMMUNITY
Start: 2019-06-24 | End: 2019-07-23

## 2019-06-24 RX ORDER — SUCRALFATE 1 G/1
0.5 TABLET ORAL 4 TIMES DAILY
Qty: 120 TABLET | Refills: 0 | Status: ON HOLD | OUTPATIENT
Start: 2019-06-24 | End: 2019-07-26 | Stop reason: HOSPADM

## 2019-06-24 RX ORDER — FERROUS SULFATE 325(65) MG
325 TABLET ORAL
Qty: 90 TABLET | Refills: 3 | Status: ON HOLD | COMMUNITY
Start: 2019-06-24 | End: 2020-06-27

## 2019-06-24 RX ORDER — CHOLECALCIFEROL (VITAMIN D3) 1250 MCG
CAPSULE ORAL
Qty: 12 CAPSULE | Refills: 0 | COMMUNITY
Start: 2019-06-24 | End: 2021-10-12 | Stop reason: ALTCHOICE

## 2019-06-24 RX ORDER — PANTOPRAZOLE SODIUM 40 MG/1
40 TABLET, DELAYED RELEASE ORAL
Status: DISCONTINUED | OUTPATIENT
Start: 2019-06-24 | End: 2019-06-24 | Stop reason: HOSPADM

## 2019-06-24 RX ORDER — PRENATAL VIT/IRON FUM/FOLIC AC 28MG-0.8MG
1 TABLET ORAL DAILY
Qty: 30 TABLET | Refills: 0 | Status: SHIPPED | OUTPATIENT
Start: 2019-06-24 | End: 2020-02-05

## 2019-06-24 RX ORDER — PANTOPRAZOLE SODIUM 40 MG/1
40 TABLET, DELAYED RELEASE ORAL
Qty: 30 TABLET | Refills: 0 | Status: ON HOLD | OUTPATIENT
Start: 2019-06-25 | End: 2019-07-26 | Stop reason: HOSPADM

## 2019-06-24 RX ORDER — SULFAMETHOXAZOLE AND TRIMETHOPRIM 800; 160 MG/1; MG/1
1 TABLET ORAL EVERY 12 HOURS SCHEDULED
Qty: 10 TABLET | Refills: 0 | Status: SHIPPED | OUTPATIENT
Start: 2019-06-24 | End: 2019-06-29

## 2019-06-24 RX ADMIN — PANTOPRAZOLE SODIUM 40 MG: 40 TABLET, DELAYED RELEASE ORAL at 08:06

## 2019-06-24 RX ADMIN — Medication 10 ML: at 08:09

## 2019-06-24 RX ADMIN — VORTIOXETINE 20 MG: 10 TABLET, FILM COATED ORAL at 08:06

## 2019-06-24 RX ADMIN — SULFAMETHOXAZOLE AND TRIMETHOPRIM 1 TABLET: 800; 160 TABLET ORAL at 08:06

## 2019-06-24 ASSESSMENT — PAIN SCALES - GENERAL: PAINLEVEL_OUTOF10: 0

## 2019-06-24 NOTE — DISCHARGE SUMMARY
[facility] EMERGENCY SERVICE Physician Discharge Summary       Kuldeep Mcdonald MD  [address] [facility] [id number] [facility] [address] [place] [id number]  [phone]    Schedule an appointment as soon as possible for a visit in 2 weeks  for scope planning       Activity level: As tolerated    Diet: DIET PEPTIC ULCER;    Dispo: Home with self-care    Condition on discharge-stable  Patient ID:  Johnie Elias  [id number]  37 y.o.  1975    Admit date: 6/22/2019    Discharge date and time:  6/24/2019  8:36 AM    Admission Diagnoses: Principal Problem:    Acute anemia  Active Problems:    HTN (hypertension)    GERD without esophagitis    Iron deficiency    Acute cystitis without hematuria    Microcytic anemia  Resolved Problems:    * No resolved hospital problems. *      Discharge Diagnoses: Principal Problem:    Acute anemia  Active Problems:    HTN (hypertension)    GERD without esophagitis    Iron deficiency    Acute cystitis without hematuria    Microcytic anemia  Resolved Problems:    * No resolved hospital problems. *      Consults:  IP CONSULT TO GENERAL SURGERY  IP CONSULT TO GENERAL SURGERY  IP CONSULT TO [facility] Course:   She is 47-year-old female with past medical history of morbid obesity status post gastric bypass,  Hx of perforated peptic ulcer, had follow up  EGD in 2017 neg  for ulcers, no NSAID use, iron deficiency anemia, was admitted with near syncopal episode while playing golf. She has history of iron deficiency anemia but has been noncompliant with iron supplements. She also had symptoms of UTI. On admission she was noted to have H&H of 6.4/22. 8. Last year her H&H was 7.1/24.7 in May 2019. She received 1 unit packed RBC. Following this her H&H remained stable. On discharge it is 7.4/24.9. She does not have any active bleeding. Her Hemoccult is negative.   Her symptoms are resolved she does not feel lightheaded or dizzy. Surgery was consulted-Dr. Kaelyn Molina suggested outpatient follow-up. She will be reevaluated for EGD as outpatient. She is tolerating diet very well. She is now getting discharged with outpatient follow-up with Dr. Kaelyn Molina. She is discharged on p.o. Protonix and p.o. Carafate. Her urinary tract infection was treated with p.o. Bactrim-she is discharged to complete the course. Her urine cultures are still pending. Her other comorbid conditions asthma, depression, prediabetes, GERD, hypertension, vitamin D deficiency were managed by continuing her home medications. On discharge she is stable.   Discharge Exam:  Vitals:    06/23/19 0743 06/23/19 1319 06/23/19 2012 06/24/19 0800   BP: (!) 106/51 102/65 108/65 (!) 101/58   Pulse: 64 54 62 51   Resp: 16 16 16 16   Temp: 97.8 °F (36.6 °C) 97.9 °F (36.6 °C) 98.1 °F (36.7 °C) 97.8 °F (36.6 °C)   TempSrc: Oral Oral Oral Oral   SpO2: 98% 99% 97% 97%   Weight:       Height:           General Appearance: alert and oriented to person, place and time,overweight, in no acute distress  Skin: warm and dry, no rash or erythema  Head: normocephalic and atraumatic  Eyes: pupils equal, round, and reactive to light, extraocular eye movements intact, conjunctivae normal  Neck: supple and non-tender without mass  Pulmonary/Chest: clear to auscultation bilaterally  Cardiovascular: normal rate, regular rhythm, normal S1 and S2  Abdomen: soft, non-tender, non-distended, normal bowel sounds, no masses or organomegaly  Extremities: no cyanosis, clubbing   Musculoskeletal: normal range of motion, no joint swelling, deformity or tenderness  Neurologic: reflexes normal and symmetric, no cranial nerve deficit,  speech normal      LABS:  Recent Labs     06/22/19 2221 06/23/19 0658    138   K 4.0 3.8   CL 99 106   CO2 22 25   BUN 11 9   CREATININE 0.7 0.6   GLUCOSE 95 83   CALCIUM 8.3* 8.0*       Recent Labs     06/22/19 2221 06/23/19  0658 06/23/19  1132 06/23/19  1836   WBC 10.8 8.4  --  7.3   RBC 3.36* 3.36*  --  3.51   HGB 6.4* 7.1* 7.3* 7.4*   HCT 22.8* 23.8* 24.9* 24.9*   MCV 67.9* 70.8*  --  70.9*   MCH 19.0* 21.1*  --  21.1*   MCHC 28.1* 29.8*  --  29.7*   RDW 19.4* 21.6*  --  21.4*   * 535*  --  581*   MPV 8.9 8.6  --  9.0       No results for input(s): POCGLU in the last 72 hours. Imaging:   CT Head WO Contrast   Final Result   No acute intracranial process.        This report has been electronically signed by Alvaro Garcia MD.          Patient Instructions:      Medication List      START taking these medications    sulfamethoxazole-trimethoprim 800-160 MG per tablet  Commonly known as:  BACTRIM DS  Take 1 tablet by mouth every 12 hours for 5 days        CHANGE how you take these medications    ascorbic acid 250 MG tablet  Commonly known as:  V-R VITAMIN C  Take 1 tablet by mouth daily Take with the iron supplement  What changed:  additional instructions     ferrous sulfate 325 (65 Fe) MG tablet  Take 1 tablet by mouth daily (with breakfast) , take with Vitamin C 250 mg tablet  What changed:  additional instructions     pantoprazole 40 MG tablet  Commonly known as:  PROTONIX  Take 1 tablet by mouth every morning (before breakfast)  Start taking on:  6/25/2019  What changed:  when to take this     RA PRENATAL 28-0.8 MG Tabs  Take 1 tablet by mouth daily Post bariatric surgery patient  What changed:  how much to take     Vitamin D3 15203 units Caps  Take one capsule every Mon-Wed-Fri for four weeks  What changed:    · how much to take  · how to take this  · when to take this  · additional instructions        CONTINUE taking these medications    calcium-vitamin D 500-200 MG-UNIT per tablet  Commonly known as:  OSCAL-500  Take 1 tablet by mouth daily     Handicap Placard Misc  by Does not apply route Duration: 5 years     hydrOXYzine 25 MG capsule  Commonly known as:  VISTARIL  Take 1 capsule by mouth 3 times daily as needed for Anxiety sucralfate 1 GM tablet  Commonly known as:  CARAFATE  Take 0.5 tablets by mouth 4 times daily     TRINTELLIX 20 MG Tabs tablet  Generic drug:  VORTIoxetine HBr           Where to Get Your Medications      You can get these medications from any pharmacy    Bring a paper prescription for each of these medications  · pantoprazole 40 MG tablet  · RA PRENATAL 28-0.8 MG Tabs  · sucralfate 1 GM tablet  · sulfamethoxazole-trimethoprim 800-160 MG per tablet  You don't need a prescription for these medications  · ascorbic acid 250 MG tablet  · calcium-vitamin D 500-200 MG-UNIT per tablet  · ferrous sulfate 325 (65 Fe) MG tablet  · Vitamin D3 44841 units Caps           Note that  30 minutes was spent in preparing discharge papers, discussing discharge with patient, medication review, etc.    Signed:  Electronically signed by Eric Constantino MD on 6/24/2019 at 8:36 AM    NOTE: This report was transcribed using voice recognition software. Every effort was made to ensure accuracy; however, inadvertent computerized transcription errors may be present.

## 2019-06-24 NOTE — PLAN OF CARE
Problem: Falls - Risk of:  Goal: Will remain free from falls  Description  Will remain free from falls  Outcome: Met This Shift     Problem:  Activity:  Goal: Fatigue will decrease  Description  Fatigue will decrease  Outcome: Met This Shift     Problem: Nutritional:  Goal: Maintenance of adequate nutrition will improve  Description  Maintenance of adequate nutrition will improve  Outcome: Met This Shift

## 2019-06-24 NOTE — CONSULTS
GENERAL SURGERY  CONSULT NOTE  6/24/2019    Physician Consulted: Dr. Clementine Fontaine  Reason for Consult: anemia  Referring Physician: Dr. Ty FOURNIER  Arjun Henry is a 37 y.o. female who presents for evaluation of lightheadedness and dizziness. She states she was at [employer] yesterday and started feeling lightheaded, black spots in front of her eyes which self resolved. She came in to investigate why this happened. She has a history of perforated marginal ulcer 10/2018. She denies abdominal pain, nausea, vomiting, black or bloody stools. Not sure when last EGD was, thinks it was when she had her perforated ulcer. She has never had a colonoscopy       Past Medical History:   Diagnosis Date    Anemia     Arthritis     Asthma     Depression     Diabetes mellitus ([address]     pre diabetic    GERD without esophagitis     History of blood transfusion     History of gastric bypass     Hypertension     no meds    Iron deficiency 8/1/2018    Lumbar spondylosis 1/26/2018    Lymphedema     Obesity     Panic attacks     PONV (postoperative nausea and vomiting)     Sleep apnea, obstructive     Vitamin D deficiency 7/27/2018    Zinc deficiency 8/1/2018       Past Surgical History:   Procedure Laterality Date    APPENDECTOMY  1996    CA OFFICE/OUTPT VISIT,PROCEDURE ONLY N/A 10/29/2018    DIAGNOSTIC LAPAROSCOPY REPAIR PERFORATED VISCUS performed by Alison Maher MD at [address] [place] [address]  11/14/2017    SKIN BIOPSY      STOMACH SURGERY         Medications Prior to Admission:    Prior to Admission medications    Medication Sig Start Date End Date Taking?  Authorizing Provider   pantoprazole (PROTONIX) 40 MG tablet Take 1 tablet by mouth 2 times daily (before meals)  Patient taking differently: Take 40 mg by mouth 2 times daily as needed  11/1/18  Yes Andreas Barnard MD   hydrOXYzine (VISTARIL) 25 MG capsule Take 1 capsule by mouth 3 times daily as needed for Anxiety 6/14/18  Yes Ira Bales, DO   TRINTELLIX 20 MG TABS tablet Take 20 mg by mouth every morning  18  Yes Historical Provider, MD   Handicap Placard MISC by Does not apply route Duration: 5 years 16   Kamlesh Ellison, DO       Allergies   Allergen Reactions    Pcn [Penicillins] Anaphylaxis and Other (See Comments)     Patient unsure    Food Hives     Berries - Red Blotches and Itching   Lactose Intolerance  Soy    Oxycontin [Oxycodone Hcl] Itching       Family History   Problem Relation Age of Onset    Heart Attack Mother [facility]        death from this    Diabetes Mother     Depression Mother     Diabetes Father     Stroke Father     Cancer Maternal Grandfather     Cancer Paternal Grandmother     Stroke Paternal Grandfather     Substance Abuse Brother        Social History     Tobacco Use    Smoking status: Current Every Day Smoker     Packs/day: 1.00     Years: 15.00     Pack years: 15.00     Types: Cigarettes     Start date: 10/29/1991     Last attempt to quit: 10/29/2016     Years since quittin.6    Smokeless tobacco: Never Used   Substance Use Topics    Alcohol use: Yes     Alcohol/week: 3.6 oz     Types: 6 Standard drinks or equivalent per week     Comment: social    Drug use: No         Review of Systems   General ROS: positive for  - lightheadedness, dizziness  Hematological and Lymphatic ROS: negative  Respiratory ROS: no cough, shortness of breath, or wheezing  Cardiovascular ROS: no chest pain or dyspnea on exertion  Gastrointestinal ROS: no abdominal pain, change in bowel habits, or black or bloody stools  Genito-Urinary ROS: no dysuria, trouble voiding, or hematuria  Musculoskeletal ROS: negative      PHYSICAL EXAM:    Vitals:    19   BP: 108/65   Pulse: 62   Resp: 16   Temp: 98.1 °F (36.7 °C)   SpO2: 97%       General Appearance:  awake, alert, oriented, in no acute distress  Skin:  Skin color, texture, turgor normal. No rashes or lesions.   Head/face:  NCAT  Eyes:  No gross abnormalities. Lungs:  Normal expansion. Clear to auscultation. No rales, rhonchi, or wheezing. Heart:  Heart regular rate and rhythm  Abdomen:  Soft, NT, ND  Extremities: Extremities warm to touch, pink, with no edema. Female Rectal: No hemorrhoids, normal rectal tone, no masses. Stool assess: Guiac negative    LABS:    CBC  Recent Labs     06/23/19  1836   WBC 7.3   HGB 7.4*   HCT 24.9*   *     BMP  Recent Labs     06/23/19  0658      K 3.8      CO2 25   BUN 9   CREATININE 0.6   CALCIUM 8.0*     Liver Function  Recent Labs     06/23/19  0658   BILITOT 0.6   BILIDIR <0.2   AST 9   ALT <5   ALKPHOS 67   PROT 5.5*   LABALBU 3.1*     RADIOLOGY    Ct Head Wo Contrast    Result Date: 6/22/2019  EXAM:  CT Head Without Contrast EXAM DATE/TIME:  6/22/2019 10:15 PM CLINICAL HISTORY:  37years old, female; Dizziness TECHNIQUE:  Imaging protocol: Axial computed tomography images of the head without contrast. Coronal and sagittal reformatted images were created and reviewed. Radiation optimization: All CT scans at this facility use at least one of these dose optimization techniques: automated exposure control; mA and/or kV adjustment per patient size (includes targeted exams where dose is matched to clinical indication); or iterative reconstruction. COMPARISON:  No relevant prior studies available. FINDINGS:  Brain: Normal. No hemorrhage. Unremarkable white matter. No mass effect. Ventricles: Normal. No hydrocephalus. Bones/joints: Normal. Skull base and overlying calvarium are intact. No lytic or osteosclerotic lesions. Sinuses: Visualized sinuses are unremarkable. No fluid levels. Mastoid air cells: Visualized mastoid air cells are well aerated. No mastoid effusion. Soft tissues: Unremarkable. No acute intracranial process.  This report has been electronically signed by Danyel Bauer MD.        ASSESSMENT:  37 y.o. female with anemia, history of RYGB, perforated marginal ulcer    PLAN:  Peptic ulcer diet  PPI daily  Will plan for scopes as outpatient as she is hemoccult negative  Follow up with Dr. Mack Prader in the office post dc for scope planning   D/w Dr. Mack Prader    Electronically signed by Ambar Monsivais DO on 6/24/19 at 5:11 AM

## 2019-06-24 NOTE — PLAN OF CARE
Problem: Falls - Risk of:  Goal: Will remain free from falls  6/24/2019 0950 by Alexus Watters RN  Outcome: Completed  6/24/2019 0309 by Swathi Mayer RN  Outcome: Met This Shift  Goal: Absence of physical injury  Outcome: Completed     Problem:  Activity:  Goal: Fatigue will decrease  6/24/2019 0950 by Alexus Watters RN  Outcome: Completed  6/24/2019 0309 by Swathi Mayer RN  Outcome: Met This Shift  Goal: Ability to tolerate increased activity will improve  Outcome: Completed  Goal: Ability to maintain optimal joint mobility will improve  Outcome: Completed     Problem: Cardiac:  Goal: Ability to maintain an adequate cardiac output will improve  Outcome: Completed  Goal: Ability to maintain adequate ventilation will improve  Outcome: Completed  Goal: Ability to achieve and maintain adequate cardiopulmonary perfusion will improve  Outcome: Completed     Problem: Nutritional:  Goal: Maintenance of adequate nutrition will improve  6/24/2019 0950 by Alexus Watters RN  Outcome: Completed  6/24/2019 0309 by Swathi Mayer RN  Outcome: Met This Shift     Problem: Physical Regulation:  Goal: Signs and symptoms of infection will decrease  Outcome: Completed  Goal: Will show no signs and symptoms of excessive bleeding  Outcome: Completed  Goal: Complications related to the disease process, condition or treatment will be avoided or minimized  Outcome: Completed     Problem: Sensory:  Goal: Pain level will decrease  Outcome: Completed     Problem: Tissue Perfusion:  Goal: Ability to maintain adequate tissue perfusion will improve  Outcome: Completed  Goal: Ability to maintain a stable neurologic state will improve  Outcome: Completed

## 2019-07-03 ENCOUNTER — OFFICE VISIT (OUTPATIENT)
Dept: BARIATRICS/WEIGHT MGMT | Age: 44
End: 2019-07-03
Payer: MEDICARE

## 2019-07-03 VITALS
SYSTOLIC BLOOD PRESSURE: 99 MMHG | BODY MASS INDEX: 33.27 KG/M2 | RESPIRATION RATE: 20 BRPM | WEIGHT: 207 LBS | DIASTOLIC BLOOD PRESSURE: 65 MMHG | TEMPERATURE: 98.1 F | HEIGHT: 66 IN | HEART RATE: 70 BPM

## 2019-07-03 DIAGNOSIS — D50.8 OTHER IRON DEFICIENCY ANEMIA: Primary | ICD-10-CM

## 2019-07-03 PROCEDURE — 99211 OFF/OP EST MAY X REQ PHY/QHP: CPT

## 2019-07-03 PROCEDURE — G8417 CALC BMI ABV UP PARAM F/U: HCPCS | Performed by: SURGERY

## 2019-07-03 PROCEDURE — 4004F PT TOBACCO SCREEN RCVD TLK: CPT | Performed by: SURGERY

## 2019-07-03 PROCEDURE — 1111F DSCHRG MED/CURRENT MED MERGE: CPT | Performed by: SURGERY

## 2019-07-03 PROCEDURE — 99213 OFFICE O/P EST LOW 20 MIN: CPT | Performed by: SURGERY

## 2019-07-03 PROCEDURE — G8427 DOCREV CUR MEDS BY ELIG CLIN: HCPCS | Performed by: SURGERY

## 2019-07-03 NOTE — PROGRESS NOTES
Surgery Progress Note            Chief complaint:   Patient Active Problem List   Diagnosis    Leg edema    Tobacco abuse    Panic attacks    Tennis elbow    Abnormal menses    Sprain of right shoulder    AYALA on CPAP    Compression fracture of lumbar vertebra (HCC)    Compression fracture of body of thoracic vertebra (HCC)    Spondylosis of lumbar region without myelopathy or radiculopathy    Mechanical low back pain    Morbid obesity ([facility]    Mild intermittent asthma without complication    HTN (hypertension)    GERD without esophagitis    S/P gastric bypass    Lumbar spondylosis    Primary osteoarthritis of right knee    Vitamin D deficiency    Zinc deficiency    Iron deficiency    Perforated viscus    Severe sepsis ([facility]    Sepsis ([facility]    Peritonitis ([facility]    Thrombocytosis (HCC)    Acute anemia    Acute cystitis without hematuria    Microcytic anemia       S: no acute changes but was admitted for anemia but was hemoccult negative and was discharged then. O:   Vitals:    07/03/19 1445   BP: 99/65   Pulse: 70   Resp: 20   Temp: 98.1 °F (36.7 °C)     No intake or output data in the 24 hours ending 07/03/19 1500        Labs:  No results for input(s): WBC, HGB, HCT in the last 72 hours. Invalid input(s): PLR  Lab Results   Component Value Date    CREATININE 0.6 06/23/2019    BUN 9 06/23/2019     06/23/2019    K 3.8 06/23/2019     06/23/2019    CO2 25 06/23/2019     No results for input(s): LIPASE, AMYLASE in the last 72 hours.     Physical exam:   BP 99/65 (Site: Left Upper Arm, Position: Sitting, Cuff Size: Medium Adult)   Pulse 70   Temp 98.1 °F (36.7 °C) (Temporal)   Resp 20   Ht 5' 6\" (1.676 m)   Wt 207 lb (93.9 kg)   LMP 07/01/2019 (Exact Date)   BMI 33.41 kg/m²   General appearance: NAD  Head: NCAT  Neck: supple, no masses  Lungs: equal chest rise bilateral  Heart: S1S2 present  Abdomen: soft, nontender, nondistended  Skin; no lesions  Gu: no cva

## 2019-07-11 ENCOUNTER — HOSPITAL ENCOUNTER (OUTPATIENT)
Age: 44
Discharge: HOME | End: 2019-07-13
Payer: MEDICARE

## 2019-07-11 ENCOUNTER — OFFICE VISIT (OUTPATIENT)
Dept: FAMILY MEDICINE CLINIC | Age: 44
End: 2019-07-11
Payer: MEDICARE

## 2019-07-11 VITALS
HEIGHT: 66 IN | BODY MASS INDEX: 34.23 KG/M2 | DIASTOLIC BLOOD PRESSURE: 74 MMHG | OXYGEN SATURATION: 96 % | HEART RATE: 66 BPM | SYSTOLIC BLOOD PRESSURE: 117 MMHG | TEMPERATURE: 98.1 F | RESPIRATION RATE: 16 BRPM | WEIGHT: 213 LBS

## 2019-07-11 DIAGNOSIS — E61.1 IRON DEFICIENCY: Chronic | ICD-10-CM

## 2019-07-11 DIAGNOSIS — D75.839 THROMBOCYTOSIS: ICD-10-CM

## 2019-07-11 DIAGNOSIS — E61.1 IRON DEFICIENCY: Primary | Chronic | ICD-10-CM

## 2019-07-11 DIAGNOSIS — K59.00 CONSTIPATION, UNSPECIFIED CONSTIPATION TYPE: ICD-10-CM

## 2019-07-11 DIAGNOSIS — F41.9 ANXIETY: ICD-10-CM

## 2019-07-11 DIAGNOSIS — R39.198 DECREASED URINE STREAM: ICD-10-CM

## 2019-07-11 DIAGNOSIS — B37.2 CANDIDA INFECTION OF FLEXURAL SKIN: ICD-10-CM

## 2019-07-11 LAB
BILIRUBIN, POC: NEGATIVE
BLOOD URINE, POC: NORMAL
CLARITY, POC: CLEAR
COLOR, POC: YELLOW
GLUCOSE URINE, POC: NEGATIVE
KETONES, POC: NEGATIVE
LEUKOCYTE EST, POC: NORMAL
NITRITE, POC: NEGATIVE
PH, POC: 6.5
PROTEIN, POC: NEGATIVE
SPECIFIC GRAVITY, POC: 1.02
UROBILINOGEN, POC: NORMAL

## 2019-07-11 PROCEDURE — 99212 OFFICE O/P EST SF 10 MIN: CPT | Performed by: STUDENT IN AN ORGANIZED HEALTH CARE EDUCATION/TRAINING PROGRAM

## 2019-07-11 PROCEDURE — G8417 CALC BMI ABV UP PARAM F/U: HCPCS | Performed by: STUDENT IN AN ORGANIZED HEALTH CARE EDUCATION/TRAINING PROGRAM

## 2019-07-11 PROCEDURE — G8428 CUR MEDS NOT DOCUMENT: HCPCS | Performed by: STUDENT IN AN ORGANIZED HEALTH CARE EDUCATION/TRAINING PROGRAM

## 2019-07-11 PROCEDURE — 36415 COLL VENOUS BLD VENIPUNCTURE: CPT | Performed by: FAMILY MEDICINE

## 2019-07-11 PROCEDURE — 4004F PT TOBACCO SCREEN RCVD TLK: CPT | Performed by: STUDENT IN AN ORGANIZED HEALTH CARE EDUCATION/TRAINING PROGRAM

## 2019-07-11 PROCEDURE — 36415 COLL VENOUS BLD VENIPUNCTURE: CPT

## 2019-07-11 PROCEDURE — 1111F DSCHRG MED/CURRENT MED MERGE: CPT | Performed by: STUDENT IN AN ORGANIZED HEALTH CARE EDUCATION/TRAINING PROGRAM

## 2019-07-11 PROCEDURE — G0444 DEPRESSION SCREEN ANNUAL: HCPCS | Performed by: STUDENT IN AN ORGANIZED HEALTH CARE EDUCATION/TRAINING PROGRAM

## 2019-07-11 PROCEDURE — 85025 COMPLETE CBC W/AUTO DIFF WBC: CPT

## 2019-07-11 PROCEDURE — 81002 URINALYSIS NONAUTO W/O SCOPE: CPT | Performed by: STUDENT IN AN ORGANIZED HEALTH CARE EDUCATION/TRAINING PROGRAM

## 2019-07-11 PROCEDURE — 99213 OFFICE O/P EST LOW 20 MIN: CPT | Performed by: STUDENT IN AN ORGANIZED HEALTH CARE EDUCATION/TRAINING PROGRAM

## 2019-07-11 RX ORDER — SENNOSIDES 8.6 MG
1 CAPSULE ORAL DAILY PRN
Qty: 30 CAPSULE | Refills: 0 | Status: ON HOLD
Start: 2019-07-11 | End: 2020-06-29 | Stop reason: HOSPADM

## 2019-07-11 RX ORDER — HYDROXYZINE PAMOATE 25 MG/1
25 CAPSULE ORAL 3 TIMES DAILY PRN
Qty: 30 CAPSULE | Refills: 3 | Status: SHIPPED | OUTPATIENT
Start: 2019-07-11 | End: 2019-10-24 | Stop reason: SDUPTHER

## 2019-07-11 RX ORDER — NYSTATIN 100000 [USP'U]/G
POWDER TOPICAL
Qty: 60 G | Refills: 5 | Status: ON HOLD | OUTPATIENT
Start: 2019-07-11 | End: 2019-07-23

## 2019-07-11 ASSESSMENT — PATIENT HEALTH QUESTIONNAIRE - PHQ9
2. FEELING DOWN, DEPRESSED OR HOPELESS: 2
SUM OF ALL RESPONSES TO PHQ QUESTIONS 1-9: 11
8. MOVING OR SPEAKING SO SLOWLY THAT OTHER PEOPLE COULD HAVE NOTICED. OR THE OPPOSITE, BEING SO FIGETY OR RESTLESS THAT YOU HAVE BEEN MOVING AROUND A LOT MORE THAN USUAL: 0
7. TROUBLE CONCENTRATING ON THINGS, SUCH AS READING THE NEWSPAPER OR WATCHING TELEVISION: 3
6. FEELING BAD ABOUT YOURSELF - OR THAT YOU ARE A FAILURE OR HAVE LET YOURSELF OR YOUR FAMILY DOWN: 0
SUM OF ALL RESPONSES TO PHQ QUESTIONS 1-9: 11
3. TROUBLE FALLING OR STAYING ASLEEP: 3
9. THOUGHTS THAT YOU WOULD BE BETTER OFF DEAD, OR OF HURTING YOURSELF: 0
10. IF YOU CHECKED OFF ANY PROBLEMS, HOW DIFFICULT HAVE THESE PROBLEMS MADE IT FOR YOU TO DO YOUR WORK, TAKE CARE OF THINGS AT HOME, OR GET ALONG WITH OTHER PEOPLE: 2
4. FEELING TIRED OR HAVING LITTLE ENERGY: 3
5. POOR APPETITE OR OVEREATING: 0

## 2019-07-11 NOTE — PATIENT INSTRUCTIONS
You may report side effects to FDA at [phone]-FDA-[phone]. What other drugs will affect nystatin topical?  It is not likely that other drugs you take orally or inject will have an effect on topically applied nystatin topical. But many drugs can interact with each other. Tell your doctor about all medications you use. This includes prescription, over-the-counter, vitamin, and herbal products. Do not start a new medication without telling your doctor. Where can I get more information? Your pharmacist can provide more information about nystatin topical.  Remember, keep this and all other medicines out of the reach of children, never share your medicines with others, and use this medication only for the indication prescribed. Every effort has been made to ensure that the information provided by [address] is accurate, up-to-date, and complete, but no guarantee is made to that effect. Drug information contained herein may be time sensitive. [facility] information has been compiled for use by healthcare practitioners and consumers in the [place] and therefore [facility] does not warrant that uses outside of the [place] are appropriate, unless specifically indicated otherwise. [facility]'s drug information does not endorse drugs, diagnose patients or recommend therapy. [facility] drug information is an informational resource designed to assist licensed healthcare practitioners in caring for their patients and/or to serve consumers viewing this service as a supplement to, and not a substitute for, the expertise, skill, knowledge and judgment of healthcare practitioners. The absence of a warning for a given drug or drug combination in no way should be construed to indicate that the drug or drug combination is safe, effective or appropriate for any given patient. [facility] does not assume any responsibility for any aspect of healthcare administered with the aid of information [facility] provides.  The information

## 2019-07-12 LAB
ANISOCYTOSIS: ABNORMAL
BASOPHILS ABSOLUTE: 0.08 E9/L (ref 0–0.2)
BASOPHILS RELATIVE PERCENT: 0.9 % (ref 0–2)
BURR CELLS: ABNORMAL
EOSINOPHILS ABSOLUTE: 0.08 E9/L (ref 0.05–0.5)
EOSINOPHILS RELATIVE PERCENT: 0.9 % (ref 0–6)
HCT VFR BLD CALC: 29.1 % (ref 34–48)
HEMOGLOBIN: 8.2 G/DL (ref 11.5–15.5)
HYPOCHROMIA: ABNORMAL
LYMPHOCYTES ABSOLUTE: 2.15 E9/L (ref 1.5–4)
LYMPHOCYTES RELATIVE PERCENT: 24.6 % (ref 20–42)
MCH RBC QN AUTO: 20.7 PG (ref 26–35)
MCHC RBC AUTO-ENTMCNC: 28.2 % (ref 32–34.5)
MCV RBC AUTO: 73.5 FL (ref 80–99.9)
MONOCYTES ABSOLUTE: 0.43 E9/L (ref 0.1–0.95)
MONOCYTES RELATIVE PERCENT: 5.3 % (ref 2–12)
MYELOCYTE PERCENT: 0.9 % (ref 0–0)
NEUTROPHILS ABSOLUTE: 5.85 E9/L (ref 1.8–7.3)
NEUTROPHILS RELATIVE PERCENT: 67.5 % (ref 43–80)
OVALOCYTES: ABNORMAL
PDW BLD-RTO: 24.8 FL (ref 11.5–15)
PLATELET # BLD: 682 E9/L (ref 130–450)
PMV BLD AUTO: 9 FL (ref 7–12)
POIKILOCYTES: ABNORMAL
POLYCHROMASIA: ABNORMAL
RBC # BLD: 3.96 E12/L (ref 3.5–5.5)
WBC # BLD: 8.6 E9/L (ref 4.5–11.5)

## 2019-07-12 RX ORDER — HYDROXYZINE HYDROCHLORIDE 25 MG/1
25 TABLET, FILM COATED ORAL EVERY 8 HOURS PRN
Qty: 60 TABLET | Refills: 0 | Status: SHIPPED | OUTPATIENT
Start: 2019-07-12 | End: 2019-07-22

## 2019-07-12 RX ORDER — HYDROXYZINE HYDROCHLORIDE 25 MG/1
25 TABLET, FILM COATED ORAL DAILY PRN
Qty: 30 TABLET | Refills: 1 | Status: SHIPPED | OUTPATIENT
Start: 2019-07-12 | End: 2019-07-12

## 2019-07-12 RX ORDER — DOCUSATE SODIUM 100 MG/1
100 CAPSULE, LIQUID FILLED ORAL DAILY PRN
Qty: 30 CAPSULE | Refills: 1 | Status: ON HOLD
Start: 2019-07-12 | End: 2020-06-29 | Stop reason: HOSPADM

## 2019-07-12 ASSESSMENT — ENCOUNTER SYMPTOMS
CONSTIPATION: 1
ABDOMINAL PAIN: 0
EYE ITCHING: 0
EYE DISCHARGE: 0
SORE THROAT: 0
SHORTNESS OF BREATH: 0
ABDOMINAL DISTENTION: 0
VOMITING: 0
EYE REDNESS: 0
RHINORRHEA: 0
WHEEZING: 0
COLOR CHANGE: 0
BACK PAIN: 0
DIARRHEA: 0
COUGH: 0
NAUSEA: 0

## 2019-07-20 ENCOUNTER — APPOINTMENT (OUTPATIENT)
Dept: GENERAL RADIOLOGY | Age: 44
DRG: 418 | End: 2019-07-20
Payer: MEDICARE

## 2019-07-20 ENCOUNTER — APPOINTMENT (OUTPATIENT)
Dept: CT IMAGING | Age: 44
DRG: 418 | End: 2019-07-20
Payer: MEDICARE

## 2019-07-20 ENCOUNTER — HOSPITAL ENCOUNTER (EMERGENCY)
Age: 44
Discharge: HOME | DRG: 418 | End: 2019-07-21
Attending: EMERGENCY MEDICINE
Payer: MEDICARE

## 2019-07-20 DIAGNOSIS — D64.9 ANEMIA, UNSPECIFIED TYPE: ICD-10-CM

## 2019-07-20 DIAGNOSIS — N13.30 HYDRONEPHROSIS OF LEFT KIDNEY: Primary | ICD-10-CM

## 2019-07-20 DIAGNOSIS — N20.1 URETERIC STONE: ICD-10-CM

## 2019-07-20 LAB
ABO/RH: NORMAL
ALBUMIN SERPL-MCNC: 3.8 G/DL (ref 3.5–5.2)
ALP BLD-CCNC: 77 U/L (ref 35–104)
ALT SERPL-CCNC: <5 U/L (ref 0–32)
ANION GAP SERPL CALCULATED.3IONS-SCNC: 16 MMOL/L (ref 7–16)
ANISOCYTOSIS: ABNORMAL
ANTIBODY SCREEN: NORMAL
APTT: 27.3 SEC (ref 24.5–35.1)
AST SERPL-CCNC: 10 U/L (ref 0–31)
BASOPHILS ABSOLUTE: 0.06 E9/L (ref 0–0.2)
BASOPHILS RELATIVE PERCENT: 0.9 % (ref 0–2)
BILIRUB SERPL-MCNC: 0.3 MG/DL (ref 0–1.2)
BUN BLDV-MCNC: 11 MG/DL (ref 6–20)
CALCIUM SERPL-MCNC: 8.5 MG/DL (ref 8.6–10.2)
CHLORIDE BLD-SCNC: 101 MMOL/L (ref 98–107)
CO2: 17 MMOL/L (ref 22–29)
CREAT SERPL-MCNC: 0.6 MG/DL (ref 0.5–1)
DAT C3: NORMAL
DAT IGG: NORMAL
DAT POLYSPECIFIC: NORMAL
DR. NOTIFY: NORMAL
EOSINOPHILS ABSOLUTE: 0.09 E9/L (ref 0.05–0.5)
EOSINOPHILS RELATIVE PERCENT: 1.3 % (ref 0–6)
ETHANOL: <10 MG/DL (ref 0–0.08)
GFR AFRICAN AMERICAN: >60
GFR NON-AFRICAN AMERICAN: >60 ML/MIN/1.73
GLUCOSE BLD-MCNC: 112 MG/DL (ref 74–99)
HCG QUALITATIVE: NEGATIVE
HCT VFR BLD CALC: 28.3 % (ref 34–48)
HEMOGLOBIN: 8.5 G/DL (ref 11.5–15.5)
HYPOCHROMIA: ABNORMAL
IMMATURE GRANULOCYTES #: 0.02 E9/L
IMMATURE GRANULOCYTES %: 0.3 % (ref 0–5)
INR BLD: 1
LACTIC ACID: 1.8 MMOL/L (ref 0.5–2.2)
LIPASE: 12 U/L (ref 13–60)
LYMPHOCYTES ABSOLUTE: 2.97 E9/L (ref 1.5–4)
LYMPHOCYTES RELATIVE PERCENT: 42.2 % (ref 20–42)
MCH RBC QN AUTO: 20.9 PG (ref 26–35)
MCHC RBC AUTO-ENTMCNC: 30 % (ref 32–34.5)
MCV RBC AUTO: 69.7 FL (ref 80–99.9)
MONOCYTES ABSOLUTE: 0.44 E9/L (ref 0.1–0.95)
MONOCYTES RELATIVE PERCENT: 6.3 % (ref 2–12)
NEUTROPHILS ABSOLUTE: 3.46 E9/L (ref 1.8–7.3)
NEUTROPHILS RELATIVE PERCENT: 49 % (ref 43–80)
OVALOCYTES: ABNORMAL
PDW BLD-RTO: 24 FL (ref 11.5–15)
PLATELET # BLD: 599 E9/L (ref 130–450)
PMV BLD AUTO: 8.5 FL (ref 7–12)
POIKILOCYTES: ABNORMAL
POLYCHROMASIA: ABNORMAL
POTASSIUM SERPL-SCNC: 3.7 MMOL/L (ref 3.5–5)
PROTHROMBIN TIME: 11.9 SEC (ref 9.3–12.4)
RBC # BLD: 4.06 E12/L (ref 3.5–5.5)
SODIUM BLD-SCNC: 134 MMOL/L (ref 132–146)
TARGET CELLS: ABNORMAL
TOTAL PROTEIN: 7 G/DL (ref 6.4–8.3)
TROPONIN: <0.01 NG/ML (ref 0–0.03)
WBC # BLD: 7 E9/L (ref 4.5–11.5)

## 2019-07-20 PROCEDURE — 6360000004 HC RX CONTRAST MEDICATION: Performed by: RADIOLOGY

## 2019-07-20 PROCEDURE — 74019 RADEX ABDOMEN 2 VIEWS: CPT

## 2019-07-20 PROCEDURE — 2500000003 HC RX 250 WO HCPCS: Performed by: EMERGENCY MEDICINE

## 2019-07-20 PROCEDURE — 99285 EMERGENCY DEPT VISIT HI MDM: CPT

## 2019-07-20 PROCEDURE — 96372 THER/PROPH/DIAG INJ SC/IM: CPT

## 2019-07-20 PROCEDURE — 86901 BLOOD TYPING SEROLOGIC RH(D): CPT

## 2019-07-20 PROCEDURE — 85730 THROMBOPLASTIN TIME PARTIAL: CPT

## 2019-07-20 PROCEDURE — 83605 ASSAY OF LACTIC ACID: CPT

## 2019-07-20 PROCEDURE — 85025 COMPLETE CBC W/AUTO DIFF WBC: CPT

## 2019-07-20 PROCEDURE — 86860 RBC ANTIBODY ELUTION: CPT

## 2019-07-20 PROCEDURE — 86900 BLOOD TYPING SEROLOGIC ABO: CPT

## 2019-07-20 PROCEDURE — 86880 COOMBS TEST DIRECT: CPT

## 2019-07-20 PROCEDURE — 93005 ELECTROCARDIOGRAM TRACING: CPT | Performed by: EMERGENCY MEDICINE

## 2019-07-20 PROCEDURE — 86850 RBC ANTIBODY SCREEN: CPT

## 2019-07-20 PROCEDURE — 85610 PROTHROMBIN TIME: CPT

## 2019-07-20 PROCEDURE — 84484 ASSAY OF TROPONIN QUANT: CPT

## 2019-07-20 PROCEDURE — 86870 RBC ANTIBODY IDENTIFICATION: CPT

## 2019-07-20 PROCEDURE — 83690 ASSAY OF LIPASE: CPT

## 2019-07-20 PROCEDURE — 74177 CT ABD & PELVIS W/CONTRAST: CPT

## 2019-07-20 PROCEDURE — 36415 COLL VENOUS BLD VENIPUNCTURE: CPT

## 2019-07-20 PROCEDURE — G0480 DRUG TEST DEF 1-7 CLASSES: HCPCS

## 2019-07-20 PROCEDURE — 80053 COMPREHEN METABOLIC PANEL: CPT

## 2019-07-20 PROCEDURE — 84703 CHORIONIC GONADOTROPIN ASSAY: CPT

## 2019-07-20 PROCEDURE — 96375 TX/PRO/DX INJ NEW DRUG ADDON: CPT

## 2019-07-20 PROCEDURE — 6370000000 HC RX 637 (ALT 250 FOR IP): Performed by: EMERGENCY MEDICINE

## 2019-07-20 PROCEDURE — 96374 THER/PROPH/DIAG INJ IV PUSH: CPT

## 2019-07-20 PROCEDURE — 2580000003 HC RX 258: Performed by: EMERGENCY MEDICINE

## 2019-07-20 PROCEDURE — 6360000002 HC RX W HCPCS: Performed by: EMERGENCY MEDICINE

## 2019-07-20 RX ORDER — ONDANSETRON 2 MG/ML
4 INJECTION INTRAMUSCULAR; INTRAVENOUS ONCE
Status: COMPLETED | OUTPATIENT
Start: 2019-07-20 | End: 2019-07-20

## 2019-07-20 RX ORDER — FENTANYL CITRATE 50 UG/ML
50 INJECTION, SOLUTION INTRAMUSCULAR; INTRAVENOUS ONCE
Status: COMPLETED | OUTPATIENT
Start: 2019-07-20 | End: 2019-07-20

## 2019-07-20 RX ORDER — PROMETHAZINE HYDROCHLORIDE 25 MG/ML
12.5 INJECTION, SOLUTION INTRAMUSCULAR; INTRAVENOUS ONCE
Status: COMPLETED | OUTPATIENT
Start: 2019-07-20 | End: 2019-07-20

## 2019-07-20 RX ORDER — 0.9 % SODIUM CHLORIDE 0.9 %
1000 INTRAVENOUS SOLUTION INTRAVENOUS ONCE
Status: COMPLETED | OUTPATIENT
Start: 2019-07-20 | End: 2019-07-20

## 2019-07-20 RX ORDER — MORPHINE SULFATE 4 MG/ML
6 INJECTION, SOLUTION INTRAMUSCULAR; INTRAVENOUS ONCE
Status: COMPLETED | OUTPATIENT
Start: 2019-07-20 | End: 2019-07-20

## 2019-07-20 RX ADMIN — TRIMETHOBENZAMIDE HYDROCHLORIDE 200 MG: 100 INJECTION INTRAMUSCULAR at 22:01

## 2019-07-20 RX ADMIN — FENTANYL CITRATE 50 MCG: 50 INJECTION, SOLUTION INTRAMUSCULAR; INTRAVENOUS at 21:33

## 2019-07-20 RX ADMIN — LIDOCAINE HYDROCHLORIDE: 20 SOLUTION ORAL; TOPICAL at 23:00

## 2019-07-20 RX ADMIN — FAMOTIDINE 20 MG: 10 INJECTION, SOLUTION INTRAVENOUS at 22:29

## 2019-07-20 RX ADMIN — PROMETHAZINE HYDROCHLORIDE 12.5 MG: 25 INJECTION INTRAMUSCULAR; INTRAVENOUS at 23:43

## 2019-07-20 RX ADMIN — SODIUM CHLORIDE 1000 ML: 9 INJECTION, SOLUTION INTRAVENOUS at 21:33

## 2019-07-20 RX ADMIN — IOPAMIDOL 110 ML: 755 INJECTION, SOLUTION INTRAVENOUS at 23:09

## 2019-07-20 RX ADMIN — MORPHINE SULFATE 6 MG: 4 INJECTION, SOLUTION INTRAMUSCULAR; INTRAVENOUS at 22:29

## 2019-07-20 RX ADMIN — ONDANSETRON 4 MG: 2 INJECTION INTRAMUSCULAR; INTRAVENOUS at 21:33

## 2019-07-20 ASSESSMENT — PAIN SCALES - GENERAL
PAINLEVEL_OUTOF10: 9
PAINLEVEL_OUTOF10: 10

## 2019-07-20 ASSESSMENT — ENCOUNTER SYMPTOMS
ABDOMINAL PAIN: 1
COLOR CHANGE: 0
NAUSEA: 1
SHORTNESS OF BREATH: 0
VOMITING: 0
SORE THROAT: 0
COUGH: 0

## 2019-07-20 ASSESSMENT — PAIN DESCRIPTION - PROGRESSION
CLINICAL_PROGRESSION: NOT CHANGED
CLINICAL_PROGRESSION: GRADUALLY IMPROVING

## 2019-07-20 ASSESSMENT — PAIN DESCRIPTION - LOCATION: LOCATION: ABDOMEN;BACK

## 2019-07-21 ENCOUNTER — TELEPHONE (OUTPATIENT)
Dept: OTHER | Facility: CLINIC | Age: 44
End: 2019-07-21

## 2019-07-21 VITALS
BODY MASS INDEX: 33.75 KG/M2 | HEART RATE: 64 BPM | RESPIRATION RATE: 16 BRPM | OXYGEN SATURATION: 99 % | HEIGHT: 66 IN | WEIGHT: 210 LBS | TEMPERATURE: 99 F | DIASTOLIC BLOOD PRESSURE: 71 MMHG | SYSTOLIC BLOOD PRESSURE: 138 MMHG

## 2019-07-21 LAB
ANTIBODY IDENTIFICATION: NORMAL
BACTERIA: ABNORMAL /HPF
BILIRUBIN URINE: NEGATIVE
BLOOD, URINE: ABNORMAL
CLARITY: CLEAR
COLOR: YELLOW
ELUATE ANTIBODY IDENTIFICATION: NORMAL
GLUCOSE URINE: NEGATIVE MG/DL
KETONES, URINE: ABNORMAL MG/DL
LEUKOCYTE ESTERASE, URINE: NEGATIVE
NITRITE, URINE: NEGATIVE
PH UA: 6.5 (ref 5–9)
PROTEIN UA: ABNORMAL MG/DL
RBC UA: >20 /HPF (ref 0–2)
SPECIFIC GRAVITY UA: 1.01 (ref 1–1.03)
UROBILINOGEN, URINE: 1 E.U./DL
WBC UA: ABNORMAL /HPF (ref 0–5)

## 2019-07-21 PROCEDURE — 6370000000 HC RX 637 (ALT 250 FOR IP)

## 2019-07-21 PROCEDURE — 6360000002 HC RX W HCPCS: Performed by: EMERGENCY MEDICINE

## 2019-07-21 PROCEDURE — 6370000000 HC RX 637 (ALT 250 FOR IP): Performed by: EMERGENCY MEDICINE

## 2019-07-21 PROCEDURE — 81001 URINALYSIS AUTO W/SCOPE: CPT

## 2019-07-21 PROCEDURE — 96375 TX/PRO/DX INJ NEW DRUG ADDON: CPT

## 2019-07-21 RX ORDER — KETOROLAC TROMETHAMINE 30 MG/ML
15 INJECTION, SOLUTION INTRAMUSCULAR; INTRAVENOUS ONCE
Status: COMPLETED | OUTPATIENT
Start: 2019-07-21 | End: 2019-07-21

## 2019-07-21 RX ORDER — TAMSULOSIN HYDROCHLORIDE 0.4 MG/1
0.4 CAPSULE ORAL DAILY
Qty: 30 CAPSULE | Refills: 0 | Status: SHIPPED | OUTPATIENT
Start: 2019-07-21 | End: 2019-09-16

## 2019-07-21 RX ORDER — OXYCODONE AND ACETAMINOPHEN 7.5; 325 MG/1; MG/1
1 TABLET ORAL ONCE
Status: DISCONTINUED | OUTPATIENT
Start: 2019-07-21 | End: 2019-07-21 | Stop reason: SDUPTHER

## 2019-07-21 RX ORDER — ONDANSETRON 4 MG/1
4 TABLET, ORALLY DISINTEGRATING ORAL EVERY 8 HOURS PRN
Qty: 10 TABLET | Refills: 0 | Status: SHIPPED | OUTPATIENT
Start: 2019-07-21 | End: 2019-08-16 | Stop reason: SDUPTHER

## 2019-07-21 RX ORDER — OXYCODONE HYDROCHLORIDE AND ACETAMINOPHEN 5; 325 MG/1; MG/1
1 TABLET ORAL ONCE
Status: COMPLETED | OUTPATIENT
Start: 2019-07-21 | End: 2019-07-21

## 2019-07-21 RX ORDER — OXYCODONE HYDROCHLORIDE AND ACETAMINOPHEN 5; 325 MG/1; MG/1
TABLET ORAL
Status: COMPLETED
Start: 2019-07-21 | End: 2019-07-21

## 2019-07-21 RX ORDER — OXYCODONE HYDROCHLORIDE 5 MG/1
2.5 TABLET ORAL ONCE
Status: COMPLETED | OUTPATIENT
Start: 2019-07-21 | End: 2019-07-21

## 2019-07-21 RX ORDER — OXYCODONE HYDROCHLORIDE AND ACETAMINOPHEN 5; 325 MG/1; MG/1
1 TABLET ORAL EVERY 6 HOURS PRN
Qty: 12 TABLET | Refills: 0 | Status: SHIPPED | OUTPATIENT
Start: 2019-07-21 | End: 2019-07-23 | Stop reason: SDUPTHER

## 2019-07-21 RX ADMIN — OXYCODONE HYDROCHLORIDE AND ACETAMINOPHEN 1 TABLET: 5; 325 TABLET ORAL at 00:33

## 2019-07-21 RX ADMIN — OXYCODONE HYDROCHLORIDE 2.5 MG: 5 TABLET ORAL at 00:44

## 2019-07-21 RX ADMIN — KETOROLAC TROMETHAMINE 15 MG: 30 INJECTION, SOLUTION INTRAMUSCULAR; INTRAVENOUS at 02:04

## 2019-07-21 ASSESSMENT — PAIN DESCRIPTION - PROGRESSION: CLINICAL_PROGRESSION: GRADUALLY IMPROVING

## 2019-07-21 ASSESSMENT — PAIN SCALES - GENERAL
PAINLEVEL_OUTOF10: 6
PAINLEVEL_OUTOF10: 6
PAINLEVEL_OUTOF10: 9
PAINLEVEL_OUTOF10: 9

## 2019-07-21 NOTE — ED PROVIDER NOTES
Stephanie Raman is a 37 y.o. female with PMH significant for diabetes, hypertension, anemia and depression presenting to the emergency department for sudden onset epigastric/LUQ abdominal pain after eating pretzels and drinking wine at [facility]. She reports that pain radiates to her back. Patient's past surgical history significant for gastric bypass and perforated viscus as post repair. The history is provided by the patient. Abdominal Pain   Pain location:  Epigastric and LUQ  Pain quality: burning    Pain radiates to:  Back  Pain severity:  Severe  Progression:  Unchanged  Chronicity:  New  Associated symptoms: nausea    Associated symptoms: no chest pain, no chills, no cough, no dysuria, no fever, no shortness of breath, no sore throat and no vomiting        Review of Systems   Constitutional: Negative for chills and fever. HENT: Negative for congestion and sore throat. Respiratory: Negative for cough and shortness of breath. Cardiovascular: Negative for chest pain. Gastrointestinal: Positive for abdominal pain and nausea. Negative for vomiting. Genitourinary: Negative for dysuria. Musculoskeletal: Negative for gait problem and neck pain. Skin: Negative for color change. Neurological: Negative for headaches. Psychiatric/Behavioral: Negative for confusion. Physical Exam   Constitutional: She is oriented to person, place, and time. She appears well-developed. She appears distressed. HENT:   Head: Normocephalic and atraumatic. Nose: Nose normal.   Eyes:   Conjunctival pallor   Neck: Normal range of motion. Neck supple. No crepitance or signs of subcutaneous emphysema   Cardiovascular: Normal rate, regular rhythm, intact distal pulses and normal pulses. No murmur heard. Pulmonary/Chest: Effort normal. She has no wheezes. She has no rhonchi. She has no rales. Abdominal: Soft. Bowel sounds are normal. She exhibits distension.  She exhibits no pulsatile midline walking room, patient is sleeping. Patient aware of results today upon awakening. Patient reports her pain is improved. No underlying evidence of infection. Patient be discharged home recommend follow-up with urology. Patient given signs and symptoms to return including fever or pain out of portion. Patient be discharged home. [MA]      ED Course User Index  [MA] Chana Manzano, DO       ----------------------------------------------- PAST HISTORY --------------------------------------------  Past Medical History:  has a past medical history of Anemia, Arthritis, Asthma, Depression, Diabetes mellitus ([facility] [address] GERD without esophagitis, History of blood transfusion, History of gastric bypass, Hypertension, Iron deficiency, Lumbar spondylosis, Lymphedema, Obesity, Panic attacks, PONV (postoperative nausea and vomiting), Sleep apnea, obstructive, Vitamin D deficiency, and Zinc deficiency. Past Surgical History:  has a past surgical history that includes Appendectomy (1996); Viviane-en-Y Gastric Bypass (11/14/2017); skin biopsy; pr office/outpt visit,procedure only (N/A, 10/29/2018); and Stomach surgery. Social History:  reports that she has been smoking cigarettes. She started smoking about 27 years ago. She has a 15.00 pack-year smoking history. She has never used smokeless tobacco. She reports that she drinks about 6.0 standard drinks of alcohol per week. She reports that she does not use drugs. Family History: family history includes Cancer in her maternal grandfather and paternal grandmother; Depression in her mother; Diabetes in her father and mother; Heart Attack (age of onset: 61) in her mother; Stroke in her father and paternal grandfather; Substance Abuse in her brother. The patients home medications have been reviewed. Allergies: Pcn [penicillins];  Food; and Oxycontin [oxycodone hcl]    ------------------------------------------------ RESULTS Color, UA Yellow Straw/Yellow    Clarity, UA Clear Clear    Glucose, Ur Negative Negative mg/dL    Bilirubin Urine Negative Negative    Ketones, Urine TRACE (A) Negative mg/dL    Specific Gravity, UA 1.010 1.005 - 1.030    Blood, Urine LARGE (A) Negative    pH, UA 6.5 5.0 - 9.0    Protein, UA TRACE Negative mg/dL    Urobilinogen, Urine 1.0 <2.0 E.U./dL    Nitrite, Urine Negative Negative    Leukocyte Esterase, Urine Negative Negative    WBC, UA 1-3 0 - 5 /HPF    RBC, UA >20 0 - 2 /HPF    Bacteria, UA FEW (A) /HPF   HCG Qualitative, Serum   Result Value Ref Range    hCG Qual NEGATIVE NEGATIVE   Ethanol   Result Value Ref Range    Ethanol Lvl <10 mg/dL   Protime-INR   Result Value Ref Range    Protime 11.9 9.3 - 12.4 sec    INR 1.0    APTT   Result Value Ref Range    aPTT 27.3 24.5 - 35.1 sec   Doctor Notification   Result Value Ref Range    DR. READ COMPL    EKG 12 Lead   Result Value Ref Range    Ventricular Rate 60 BPM    Atrial Rate 60 BPM    P-R Interval 140 ms    QRS Duration 84 ms    Q-T Interval 458 ms    QTc Calculation (Bazett) 458 ms    P Axis 39 degrees    R Axis 51 degrees    T Axis 63 degrees   TYPE AND SCREEN   Result Value Ref Range    ABO/Rh O POS     Antibody Screen POS    DIRECT ANTIGLOBULIN TEST   Result Value Ref Range    Polyspecific Sheyla POS    SUSIE PANEL   Result Value Ref Range    SUSIE IgG POS     SUSIE C3 NEG        RADIOLOGY:    All Radiology results interpreted by Radiologist unless otherwise noted. CT ABDOMEN PELVIS W IV CONTRAST Additional Contrast? None   Final Result   1. Moderate left hydronephrosis secondary to a 5 mm calculus in the downstream    right ureter. 2. Small volume of free intrapelvic fluid, nonspecific. 3. Mild mesenteric lymphadenopathy. This report has been electronically signed by Estefanía Acevedo MD.      XR ABDOMEN (2 VIEWS)   Final Result      No acute cardiopulmonary process. No dilated bowel identified within the upper abdomen.  No findings to suggest pneumoperitoneum. EKG:  This EKG is signed and interpreted by ED Physician. Time:  2219   Rate: 60  Rhythm: Sinus. Interpretation: no acute changes. Comparison: stable as compared to patient's most recent EKG on 10/29/18.    ---------------------------- NURSING NOTES AND VITALS REVIEWED -------------------------   The nursing notes within the ED encounter and vital signs as below have been reviewed. BP (!) 143/73   Pulse 62   Temp 99.1 °F (37.3 °C) (Axillary)   Resp 18   Ht 5' 6\" (1.676 m)   Wt 210 lb (95.3 kg)   LMP 07/20/2019   SpO2 99%   BMI 33.89 kg/m²   Oxygen Saturation Interpretation: Normal      ------------------------------------------PROGRESS NOTES -------------------------------------------    ED COURSE MEDICATIONS:                Medications   0.9 % sodium chloride bolus (0 mLs Intravenous Stopped 7/20/19 2300)   ondansetron (ZOFRAN) injection 4 mg (4 mg Intravenous Given 7/20/19 2133)   fentaNYL (SUBLIMAZE) injection 50 mcg (50 mcg Intravenous Given 7/20/19 2133)   trimethobenzamide (TIGAN) injection 200 mg (200 mg Intramuscular Given 7/20/19 2201)   morphine (PF) injection 6 mg (6 mg Intravenous Given 7/20/19 2229)   famotidine (PEPCID) injection 20 mg (20 mg Intravenous Given 7/20/19 2229)   iopamidol (ISOVUE-370) 76 % injection 110 mL (110 mLs Intravenous Given 7/20/19 2309)   aluminum & magnesium hydroxide-simethicone (MAALOX) 30 mL, lidocaine viscous hcl (XYLOCAINE) 5 mL (GI COCKTAIL) ( Oral Given 7/20/19 2300)   promethazine (PHENERGAN) injection 12.5 mg (12.5 mg Intramuscular Given 7/20/19 2343)   oxyCODONE-acetaminophen (PERCOCET) 5-325 MG per tablet 1 tablet (1 tablet Oral Given 7/21/19 0033)     And   oxyCODONE (ROXICODONE) immediate release tablet 2.5 mg (2.5 mg Oral Given 7/21/19 0044)       CONSULTATIONS:            none. PROCEDURES:            none.       COUNSELING:   I have spoken with the patient and discussed todays results, in addition to

## 2019-07-21 NOTE — TELEPHONE ENCOUNTER
Patient to be discharged. Confirmed first available appointment on Thursday July 25 @ 0920 with Dr. Nithya Ceja. Communicated to patient's ER Nurse, Rob Kay.   Appointment will appear on discharge AVS.

## 2019-07-21 NOTE — ED NOTES
Radiology Procedure Waiver   Name: Jhonathan Garcia  : 1975  MRN: [id number]    Date:  19    Time: 9:29 PM    Benefits of immediately proceeding with Radiology exam(s) without pre-testing outweigh the risks or are not indicated as specified below and therefore the following is/are being waived:    [x] Pregnancy test   [x] Patients LMP on-time and regular.   [] Patient had Tubal Ligation or has other Contraception Device. [] Patient  is Menopausal or Premenarcheal.    [] Patient had Full or Partial Hysterectomy. [] Protocol for Iodine allergy    [] MRI Questionnaire     [] BUN/Creatinine   [] Patient age w/no hx of renal dysfunction. [] Patient on Dialysis. [] Recent Normal Labs.   Electronically signed by Sean Campos DO on 19 at 9:29 PM               Sean Campos DO  Resident  [phone]

## 2019-07-22 ENCOUNTER — TELEPHONE (OUTPATIENT)
Dept: BARIATRICS/WEIGHT MGMT | Age: 44
End: 2019-07-22

## 2019-07-22 PROBLEM — R76.8 RED BLOOD CELL ANTIBODY POSITIVE: Chronic | Status: ACTIVE | Noted: 2019-07-22

## 2019-07-22 LAB
EKG ATRIAL RATE: 60 BPM
EKG P AXIS: 39 DEGREES
EKG P-R INTERVAL: 140 MS
EKG Q-T INTERVAL: 458 MS
EKG QRS DURATION: 84 MS
EKG QTC CALCULATION (BAZETT): 458 MS
EKG R AXIS: 51 DEGREES
EKG T AXIS: 63 DEGREES
EKG VENTRICULAR RATE: 60 BPM

## 2019-07-22 PROCEDURE — 93010 ELECTROCARDIOGRAM REPORT: CPT | Performed by: INTERNAL MEDICINE

## 2019-07-23 ENCOUNTER — HOSPITAL ENCOUNTER (OUTPATIENT)
Age: 44
Discharge: HOME | End: 2019-07-25
Payer: MEDICARE

## 2019-07-23 ENCOUNTER — OFFICE VISIT (OUTPATIENT)
Dept: FAMILY MEDICINE CLINIC | Age: 44
End: 2019-07-23
Payer: MEDICARE

## 2019-07-23 ENCOUNTER — APPOINTMENT (OUTPATIENT)
Dept: ULTRASOUND IMAGING | Age: 44
DRG: 418 | End: 2019-07-23
Payer: MEDICARE

## 2019-07-23 ENCOUNTER — HOSPITAL ENCOUNTER (INPATIENT)
Age: 44
LOS: 5 days | Discharge: HOME | DRG: 418 | End: 2019-07-28
Attending: EMERGENCY MEDICINE | Admitting: SURGERY
Payer: MEDICARE

## 2019-07-23 ENCOUNTER — APPOINTMENT (OUTPATIENT)
Dept: GENERAL RADIOLOGY | Age: 44
DRG: 418 | End: 2019-07-23
Payer: MEDICARE

## 2019-07-23 ENCOUNTER — HOSPITAL ENCOUNTER (OUTPATIENT)
Age: 44
Discharge: HOME | DRG: 418 | End: 2019-07-25
Payer: MEDICARE

## 2019-07-23 ENCOUNTER — HOSPITAL ENCOUNTER (OUTPATIENT)
Dept: ULTRASOUND IMAGING | Age: 44
Discharge: HOME | DRG: 418 | End: 2019-07-25
Payer: MEDICARE

## 2019-07-23 VITALS
BODY MASS INDEX: 33.89 KG/M2 | DIASTOLIC BLOOD PRESSURE: 73 MMHG | TEMPERATURE: 97.7 F | OXYGEN SATURATION: 100 % | RESPIRATION RATE: 22 BRPM | HEART RATE: 83 BPM | SYSTOLIC BLOOD PRESSURE: 124 MMHG | WEIGHT: 210 LBS

## 2019-07-23 DIAGNOSIS — N20.0 RENAL CALCULUS, LEFT: ICD-10-CM

## 2019-07-23 DIAGNOSIS — N20.1 URETERIC STONE: ICD-10-CM

## 2019-07-23 DIAGNOSIS — N13.30 HYDRONEPHROSIS, LEFT: ICD-10-CM

## 2019-07-23 DIAGNOSIS — N20.0 RENAL CALCULUS, LEFT: Primary | ICD-10-CM

## 2019-07-23 DIAGNOSIS — R52 PAIN: ICD-10-CM

## 2019-07-23 DIAGNOSIS — D64.9 ANEMIA, UNSPECIFIED TYPE: ICD-10-CM

## 2019-07-23 DIAGNOSIS — K80.20 CALCULUS OF GALLBLADDER WITHOUT CHOLECYSTITIS WITHOUT OBSTRUCTION: Primary | ICD-10-CM

## 2019-07-23 LAB
ALBUMIN SERPL-MCNC: 4.1 G/DL (ref 3.5–5.2)
ALP BLD-CCNC: 80 U/L (ref 35–104)
ALT SERPL-CCNC: <5 U/L (ref 0–32)
ANION GAP SERPL CALCULATED.3IONS-SCNC: 16 MMOL/L (ref 7–16)
ANION GAP SERPL CALCULATED.3IONS-SCNC: 22 MMOL/L (ref 7–16)
ANISOCYTOSIS: ABNORMAL
AST SERPL-CCNC: 13 U/L (ref 0–31)
BACTERIA: ABNORMAL /HPF
BASOPHILS ABSOLUTE: 0.06 E9/L (ref 0–0.2)
BASOPHILS RELATIVE PERCENT: 0.5 % (ref 0–2)
BILIRUB SERPL-MCNC: 0.4 MG/DL (ref 0–1.2)
BILIRUBIN URINE: ABNORMAL
BLOOD, URINE: ABNORMAL
BUN BLDV-MCNC: 7 MG/DL (ref 6–20)
BUN BLDV-MCNC: 8 MG/DL (ref 6–20)
CALCIUM SERPL-MCNC: 9 MG/DL (ref 8.6–10.2)
CALCIUM SERPL-MCNC: 9.5 MG/DL (ref 8.6–10.2)
CHLORIDE BLD-SCNC: 100 MMOL/L (ref 98–107)
CHLORIDE BLD-SCNC: 101 MMOL/L (ref 98–107)
CLARITY: CLEAR
CO2: 17 MMOL/L (ref 22–29)
CO2: 22 MMOL/L (ref 22–29)
COLOR: YELLOW
CREAT SERPL-MCNC: 0.7 MG/DL (ref 0.5–1)
CREAT SERPL-MCNC: 0.8 MG/DL (ref 0.5–1)
EOSINOPHILS ABSOLUTE: 0.04 E9/L (ref 0.05–0.5)
EOSINOPHILS RELATIVE PERCENT: 0.4 % (ref 0–6)
GFR AFRICAN AMERICAN: >60
GFR AFRICAN AMERICAN: >60
GFR NON-AFRICAN AMERICAN: >60 ML/MIN/1.73
GFR NON-AFRICAN AMERICAN: >60 ML/MIN/1.73
GLUCOSE BLD-MCNC: 81 MG/DL (ref 74–99)
GLUCOSE BLD-MCNC: 95 MG/DL (ref 74–99)
GLUCOSE URINE: NEGATIVE MG/DL
HCG, URINE, POC: NEGATIVE
HCT VFR BLD CALC: 29.5 % (ref 34–48)
HEMOGLOBIN: 8.7 G/DL (ref 11.5–15.5)
HYPOCHROMIA: ABNORMAL
IMMATURE GRANULOCYTES #: 0.04 E9/L
IMMATURE GRANULOCYTES %: 0.4 % (ref 0–5)
KETONES, URINE: >=80 MG/DL
LACTIC ACID: 1.6 MMOL/L (ref 0.5–2.2)
LEUKOCYTE ESTERASE, URINE: NEGATIVE
LIPASE: 10 U/L (ref 13–60)
LYMPHOCYTES ABSOLUTE: 2.1 E9/L (ref 1.5–4)
LYMPHOCYTES RELATIVE PERCENT: 18.9 % (ref 20–42)
Lab: NORMAL
MCH RBC QN AUTO: 20.9 PG (ref 26–35)
MCHC RBC AUTO-ENTMCNC: 29.5 % (ref 32–34.5)
MCV RBC AUTO: 70.7 FL (ref 80–99.9)
MONOCYTES ABSOLUTE: 0.51 E9/L (ref 0.1–0.95)
MONOCYTES RELATIVE PERCENT: 4.6 % (ref 2–12)
MUCUS: PRESENT
NEGATIVE QC PASS/FAIL: NORMAL
NEUTROPHILS ABSOLUTE: 8.34 E9/L (ref 1.8–7.3)
NEUTROPHILS RELATIVE PERCENT: 75.2 % (ref 43–80)
NITRITE, URINE: NEGATIVE
OVALOCYTES: ABNORMAL
PDW BLD-RTO: 24.2 FL (ref 11.5–15)
PH UA: 6 (ref 5–9)
PLATELET # BLD: 606 E9/L (ref 130–450)
PMV BLD AUTO: 8.9 FL (ref 7–12)
POIKILOCYTES: ABNORMAL
POLYCHROMASIA: ABNORMAL
POSITIVE QC PASS/FAIL: NORMAL
POTASSIUM SERPL-SCNC: 3.8 MMOL/L (ref 3.5–5)
POTASSIUM SERPL-SCNC: 3.9 MMOL/L (ref 3.5–5)
PROTEIN UA: NEGATIVE MG/DL
RBC # BLD: 4.17 E12/L (ref 3.5–5.5)
RBC UA: >20 /HPF (ref 0–2)
SODIUM BLD-SCNC: 139 MMOL/L (ref 132–146)
SODIUM BLD-SCNC: 139 MMOL/L (ref 132–146)
SPECIFIC GRAVITY UA: 1.01 (ref 1–1.03)
TEAR DROP CELLS: ABNORMAL
TOTAL PROTEIN: 7.3 G/DL (ref 6.4–8.3)
TROPONIN: <0.01 NG/ML (ref 0–0.03)
UROBILINOGEN, URINE: 0.2 E.U./DL
WBC # BLD: 11.1 E9/L (ref 4.5–11.5)
WBC UA: ABNORMAL /HPF (ref 0–5)

## 2019-07-23 PROCEDURE — 99213 OFFICE O/P EST LOW 20 MIN: CPT | Performed by: FAMILY MEDICINE

## 2019-07-23 PROCEDURE — 76775 US EXAM ABDO BACK WALL LIM: CPT

## 2019-07-23 PROCEDURE — 6360000002 HC RX W HCPCS: Performed by: SURGERY

## 2019-07-23 PROCEDURE — 81001 URINALYSIS AUTO W/SCOPE: CPT

## 2019-07-23 PROCEDURE — 85025 COMPLETE CBC W/AUTO DIFF WBC: CPT

## 2019-07-23 PROCEDURE — 76705 ECHO EXAM OF ABDOMEN: CPT

## 2019-07-23 PROCEDURE — 99285 EMERGENCY DEPT VISIT HI MDM: CPT

## 2019-07-23 PROCEDURE — 1111F DSCHRG MED/CURRENT MED MERGE: CPT | Performed by: FAMILY MEDICINE

## 2019-07-23 PROCEDURE — 6360000002 HC RX W HCPCS

## 2019-07-23 PROCEDURE — 96374 THER/PROPH/DIAG INJ IV PUSH: CPT

## 2019-07-23 PROCEDURE — 84484 ASSAY OF TROPONIN QUANT: CPT

## 2019-07-23 PROCEDURE — 6360000002 HC RX W HCPCS: Performed by: EMERGENCY MEDICINE

## 2019-07-23 PROCEDURE — 93005 ELECTROCARDIOGRAM TRACING: CPT | Performed by: EMERGENCY MEDICINE

## 2019-07-23 PROCEDURE — 80048 BASIC METABOLIC PNL TOTAL CA: CPT

## 2019-07-23 PROCEDURE — 1200000000 HC SEMI PRIVATE

## 2019-07-23 PROCEDURE — 99212 OFFICE O/P EST SF 10 MIN: CPT | Performed by: FAMILY MEDICINE

## 2019-07-23 PROCEDURE — 96372 THER/PROPH/DIAG INJ SC/IM: CPT

## 2019-07-23 PROCEDURE — 83690 ASSAY OF LIPASE: CPT

## 2019-07-23 PROCEDURE — 96372 THER/PROPH/DIAG INJ SC/IM: CPT | Performed by: FAMILY MEDICINE

## 2019-07-23 PROCEDURE — 4004F PT TOBACCO SCREEN RCVD TLK: CPT | Performed by: FAMILY MEDICINE

## 2019-07-23 PROCEDURE — 36415 COLL VENOUS BLD VENIPUNCTURE: CPT | Performed by: FAMILY MEDICINE

## 2019-07-23 PROCEDURE — G8427 DOCREV CUR MEDS BY ELIG CLIN: HCPCS | Performed by: FAMILY MEDICINE

## 2019-07-23 PROCEDURE — 99222 1ST HOSP IP/OBS MODERATE 55: CPT | Performed by: INTERNAL MEDICINE

## 2019-07-23 PROCEDURE — 2580000003 HC RX 258: Performed by: SURGERY

## 2019-07-23 PROCEDURE — G8417 CALC BMI ABV UP PARAM F/U: HCPCS | Performed by: FAMILY MEDICINE

## 2019-07-23 PROCEDURE — 74019 RADEX ABDOMEN 2 VIEWS: CPT

## 2019-07-23 PROCEDURE — 80053 COMPREHEN METABOLIC PANEL: CPT

## 2019-07-23 PROCEDURE — 83605 ASSAY OF LACTIC ACID: CPT

## 2019-07-23 RX ORDER — MORPHINE SULFATE 4 MG/ML
8 INJECTION, SOLUTION INTRAMUSCULAR; INTRAVENOUS ONCE
Status: COMPLETED | OUTPATIENT
Start: 2019-07-23 | End: 2019-07-23

## 2019-07-23 RX ORDER — PROMETHAZINE HYDROCHLORIDE 25 MG/ML
12.5 INJECTION, SOLUTION INTRAMUSCULAR; INTRAVENOUS ONCE
Status: COMPLETED | OUTPATIENT
Start: 2019-07-23 | End: 2019-07-23

## 2019-07-23 RX ORDER — ACETAMINOPHEN 325 MG/1
650 TABLET ORAL EVERY 4 HOURS PRN
Status: DISCONTINUED | OUTPATIENT
Start: 2019-07-23 | End: 2019-07-23 | Stop reason: SDUPTHER

## 2019-07-23 RX ORDER — MORPHINE SULFATE 4 MG/ML
4 INJECTION, SOLUTION INTRAMUSCULAR; INTRAVENOUS
Status: DISCONTINUED | OUTPATIENT
Start: 2019-07-23 | End: 2019-07-24

## 2019-07-23 RX ORDER — KETOROLAC TROMETHAMINE 30 MG/ML
60 INJECTION, SOLUTION INTRAMUSCULAR; INTRAVENOUS ONCE
Status: COMPLETED | OUTPATIENT
Start: 2019-07-23 | End: 2019-07-23

## 2019-07-23 RX ORDER — OXYCODONE HYDROCHLORIDE AND ACETAMINOPHEN 5; 325 MG/1; MG/1
1 TABLET ORAL EVERY 6 HOURS PRN
Qty: 6 TABLET | Refills: 0 | Status: SHIPPED | OUTPATIENT
Start: 2019-07-23 | End: 2019-07-26

## 2019-07-23 RX ORDER — ONDANSETRON 2 MG/ML
4 INJECTION INTRAMUSCULAR; INTRAVENOUS EVERY 6 HOURS PRN
Status: DISCONTINUED | OUTPATIENT
Start: 2019-07-23 | End: 2019-07-28 | Stop reason: HOSPADM

## 2019-07-23 RX ORDER — SODIUM CHLORIDE 0.9 % (FLUSH) 0.9 %
10 SYRINGE (ML) INJECTION PRN
Status: DISCONTINUED | OUTPATIENT
Start: 2019-07-23 | End: 2019-07-23 | Stop reason: SDUPTHER

## 2019-07-23 RX ORDER — ACETAMINOPHEN 325 MG/1
650 TABLET ORAL EVERY 4 HOURS PRN
Status: DISCONTINUED | OUTPATIENT
Start: 2019-07-23 | End: 2019-07-28 | Stop reason: HOSPADM

## 2019-07-23 RX ORDER — SODIUM CHLORIDE 0.9 % (FLUSH) 0.9 %
10 SYRINGE (ML) INJECTION EVERY 12 HOURS SCHEDULED
Status: DISCONTINUED | OUTPATIENT
Start: 2019-07-23 | End: 2019-07-23 | Stop reason: SDUPTHER

## 2019-07-23 RX ORDER — SODIUM CHLORIDE 0.9 % (FLUSH) 0.9 %
10 SYRINGE (ML) INJECTION PRN
Status: DISCONTINUED | OUTPATIENT
Start: 2019-07-23 | End: 2019-07-28 | Stop reason: HOSPADM

## 2019-07-23 RX ORDER — SODIUM CHLORIDE, SODIUM LACTATE, POTASSIUM CHLORIDE, CALCIUM CHLORIDE 600; 310; 30; 20 MG/100ML; MG/100ML; MG/100ML; MG/100ML
INJECTION, SOLUTION INTRAVENOUS CONTINUOUS
Status: DISCONTINUED | OUTPATIENT
Start: 2019-07-23 | End: 2019-07-25

## 2019-07-23 RX ORDER — SODIUM CHLORIDE 0.9 % (FLUSH) 0.9 %
10 SYRINGE (ML) INJECTION EVERY 12 HOURS SCHEDULED
Status: DISCONTINUED | OUTPATIENT
Start: 2019-07-23 | End: 2019-07-28 | Stop reason: HOSPADM

## 2019-07-23 RX ORDER — MORPHINE SULFATE 2 MG/ML
2 INJECTION, SOLUTION INTRAMUSCULAR; INTRAVENOUS
Status: DISCONTINUED | OUTPATIENT
Start: 2019-07-23 | End: 2019-07-24

## 2019-07-23 RX ADMIN — KETOROLAC TROMETHAMINE 60 MG: 30 INJECTION, SOLUTION INTRAMUSCULAR; INTRAVENOUS at 10:58

## 2019-07-23 RX ADMIN — PROMETHAZINE HYDROCHLORIDE 12.5 MG: 25 INJECTION INTRAMUSCULAR; INTRAVENOUS at 16:25

## 2019-07-23 RX ADMIN — SODIUM CHLORIDE, POTASSIUM CHLORIDE, SODIUM LACTATE AND CALCIUM CHLORIDE: 600; 310; 30; 20 INJECTION, SOLUTION INTRAVENOUS at 20:16

## 2019-07-23 RX ADMIN — MORPHINE SULFATE 8 MG: 4 INJECTION, SOLUTION INTRAMUSCULAR; INTRAVENOUS at 16:27

## 2019-07-23 RX ADMIN — Medication 10 ML: at 20:16

## 2019-07-23 RX ADMIN — MORPHINE SULFATE 4 MG: 4 INJECTION, SOLUTION INTRAMUSCULAR; INTRAVENOUS at 20:16

## 2019-07-23 ASSESSMENT — PAIN DESCRIPTION - LOCATION
LOCATION: ABDOMEN

## 2019-07-23 ASSESSMENT — ENCOUNTER SYMPTOMS
DIARRHEA: 0
ABDOMINAL PAIN: 1
VOMITING: 1
SORE THROAT: 0
CONSTIPATION: 0
NAUSEA: 1
SHORTNESS OF BREATH: 0
COLOR CHANGE: 0
COUGH: 0

## 2019-07-23 ASSESSMENT — PAIN DESCRIPTION - PAIN TYPE
TYPE: ACUTE PAIN

## 2019-07-23 ASSESSMENT — PAIN - FUNCTIONAL ASSESSMENT: PAIN_FUNCTIONAL_ASSESSMENT: ACTIVITIES ARE NOT PREVENTED

## 2019-07-23 ASSESSMENT — PATIENT HEALTH QUESTIONNAIRE - PHQ9
SUM OF ALL RESPONSES TO PHQ9 QUESTIONS 1 & 2: 0
2. FEELING DOWN, DEPRESSED OR HOPELESS: 0
1. LITTLE INTEREST OR PLEASURE IN DOING THINGS: 0
SUM OF ALL RESPONSES TO PHQ QUESTIONS 1-9: 0
SUM OF ALL RESPONSES TO PHQ QUESTIONS 1-9: 0

## 2019-07-23 ASSESSMENT — PAIN DESCRIPTION - DESCRIPTORS
DESCRIPTORS: CRAMPING;DISCOMFORT;RADIATING
DESCRIPTORS: ACHING

## 2019-07-23 ASSESSMENT — PAIN DESCRIPTION - PROGRESSION: CLINICAL_PROGRESSION: GRADUALLY WORSENING

## 2019-07-23 ASSESSMENT — PAIN DESCRIPTION - ONSET
ONSET: GRADUAL
ONSET: GRADUAL

## 2019-07-23 ASSESSMENT — PAIN DESCRIPTION - ORIENTATION
ORIENTATION: UPPER
ORIENTATION: UPPER

## 2019-07-23 ASSESSMENT — PAIN SCALES - GENERAL
PAINLEVEL_OUTOF10: 8
PAINLEVEL_OUTOF10: 8
PAINLEVEL_OUTOF10: 10
PAINLEVEL_OUTOF10: 8

## 2019-07-23 ASSESSMENT — PAIN DESCRIPTION - DIRECTION: RADIATING_TOWARDS: BACK

## 2019-07-23 ASSESSMENT — PAIN DESCRIPTION - FREQUENCY
FREQUENCY: CONTINUOUS
FREQUENCY: CONTINUOUS

## 2019-07-23 NOTE — PROGRESS NOTES
tablet by mouth daily (with breakfast) , take with Vitamin C 250 mg tablet (Patient not taking: Reported on 7/23/2019) 90 tablet 3    Prenatal Vit-Fe Fumarate-FA (RA PRENATAL) 28-0.8 MG TABS Take 1 tablet by mouth daily Post bariatric surgery patient (Patient not taking: Reported on 7/23/2019) 30 tablet 0    TRINTELLIX 20 MG TABS tablet Take 20 mg by mouth every morning   0     No current facility-administered medications for this visit. Review of Systems :  Review of Systems as per HPI  ______________________________________________________________________    Physical Exam :    Vitals: /73   Pulse 83   Temp 97.7 °F (36.5 °C) (Oral)   Resp 22   LMP 07/20/2019   SpO2 100%   General Appearance: Awake, alert, oriented, non toxic appearing but in significant visible distress 2/2 pain. HEENT: NCAT, MMM, no pallor or icterus. Chest wall/Lung: CTAB, respirations unlabored. No wheezing. Heart: RRR, no murmurs. Distal pulses intact. Abdomen: Soft and non distended but with diffuse tenderness to palpation. Bilateral CVA. + BSx4. Extremities: Extremities normal, atraumatic, no cyanosis, clubbing or edema.  ______________________________________________________________________    Assessment & Plan :    1. Renal calculus, left  2. Hydronephrosis, left  3. Ureteric stone  Toradol injection in office for pain  Rx for 2 extra days of Percocet  Continue home Flomax  Contacted Urology for close follow up, but unable to be seen in office today  [address] left kidney to ensure hydronephrosis is not worsening  Blood work to assess kidney function  Encouraged to proceed to ED if pain worsens, fevers present of patient feels uncomfortable at home    - ketorolac (TORADOL) injection 60 mg  - External Referral To Urology  - oxyCODONE-acetaminophen (PERCOCET) 5-325 MG per tablet; Take 1 tablet by mouth every 6 hours as needed for Pain for up to 3 days. One to two tablets every 6 hours as needed for pain.   Dispense: 6 tablet;

## 2019-07-23 NOTE — ED PROVIDER NOTES
6.0 standard drinks of alcohol per week. She reports that she does not use drugs. Family History: family history includes Cancer in her maternal grandfather and paternal grandmother; Depression in her mother; Diabetes in her father and mother; Heart Attack (age of onset: 61) in her mother; Stroke in her father and paternal grandfather; Substance Abuse in her brother. The patients home medications have been reviewed. Allergies: Pcn [penicillins];  Food; and Oxycontin [oxycodone hcl]    ------------------------------------------------ RESULTS ---------------------------------------------------    LABS:  Results for orders placed or performed during the hospital encounter of 07/23/19   CBC auto differential   Result Value Ref Range    WBC 11.1 4.5 - 11.5 E9/L    RBC 4.17 3.50 - 5.50 E12/L    Hemoglobin 8.7 (L) 11.5 - 15.5 g/dL    Hematocrit 29.5 (L) 34.0 - 48.0 %    MCV 70.7 (L) 80.0 - 99.9 fL    MCH 20.9 (L) 26.0 - 35.0 pg    MCHC 29.5 (L) 32.0 - 34.5 %    RDW 24.2 (H) 11.5 - 15.0 fL    Platelets 112 (H) 165 - 450 E9/L    MPV 8.9 7.0 - 12.0 fL    Neutrophils % 75.2 43.0 - 80.0 %    Immature Granulocytes % 0.4 0.0 - 5.0 %    Lymphocytes % 18.9 (L) 20.0 - 42.0 %    Monocytes % 4.6 2.0 - 12.0 %    Eosinophils % 0.4 0.0 - 6.0 %    Basophils % 0.5 0.0 - 2.0 %    Neutrophils # 8.34 (H) 1.80 - 7.30 E9/L    Immature Granulocytes # 0.04 E9/L    Lymphocytes # 2.10 1.50 - 4.00 E9/L    Monocytes # 0.51 0.10 - 0.95 E9/L    Eosinophils # 0.04 (L) 0.05 - 0.50 E9/L    Basophils # 0.06 0.00 - 0.20 E9/L    Anisocytosis 3+     Polychromasia 1+     Hypochromia 3+     Poikilocytes 1+     Ovalocytes 1+     Tear Drop Cells 1+    Comprehensive Metabolic Panel   Result Value Ref Range    Sodium 139 132 - 146 mmol/L    Potassium 3.8 3.5 - 5.0 mmol/L    Chloride 101 98 - 107 mmol/L    CO2 22 22 - 29 mmol/L    Anion Gap 16 7 - 16 mmol/L    Glucose 95 74 - 99 mg/dL    BUN 8 6 - 20 mg/dL    CREATININE 0.7 0.5 - 1.0 mg/dL    GFR Non-African American >60 >=60 mL/min/1.73    GFR African American >60     Calcium 9.0 8.6 - 10.2 mg/dL    Total Protein 7.3 6.4 - 8.3 g/dL    Alb 4.1 3.5 - 5.2 g/dL    Total Bilirubin 0.4 0.0 - 1.2 mg/dL    Alkaline Phosphatase 80 35 - 104 U/L    ALT <5 0 - 32 U/L    AST 13 0 - 31 U/L   Lactic Acid, Plasma   Result Value Ref Range    Lactic Acid 1.6 0.5 - 2.2 mmol/L   Lipase   Result Value Ref Range    Lipase 10 (L) 13 - 60 U/L   Urinalysis with Microscopic   Result Value Ref Range    Color, UA Yellow Straw/Yellow    Clarity, UA Clear Clear    Glucose, Ur Negative Negative mg/dL    Bilirubin Urine SMALL (A) Negative    Ketones, Urine >=80 (A) Negative mg/dL    Specific Gravity, UA 1.010 1.005 - 1.030    Blood, Urine LARGE (A) Negative    pH, UA 6.0 5.0 - 9.0    Protein, UA Negative Negative mg/dL    Urobilinogen, Urine 0.2 <2.0 E.U./dL    Nitrite, Urine Negative Negative    Leukocyte Esterase, Urine Negative Negative    Mucus, UA Present     WBC, UA 2-5 0 - 5 /HPF    RBC, UA >20 0 - 2 /HPF    Bacteria, UA FEW (A) /HPF   Troponin   Result Value Ref Range    Troponin <0.01 0.00 - 0.03 ng/mL   POC Pregnancy Urine Qual   Result Value Ref Range    HCG, Urine, POC Negative Negative    Lot Number [id number]     Positive QC Pass/Fail Pass     Negative QC Pass/Fail Pass    EKG 12 Lead   Result Value Ref Range    Ventricular Rate 51 BPM    Atrial Rate 51 BPM    P-R Interval 150 ms    QRS Duration 86 ms    Q-T Interval 480 ms    QTc Calculation (Bazett) 442 ms    P Axis 57 degrees    R Axis 30 degrees    T Axis 43 degrees       RADIOLOGY:    All Radiology results interpreted by Radiologist unless otherwise noted. US ABDOMEN LIMITED   Final Result   Cholelithiasis. Pelvocaliectasis on the right as   previously reported. Hepatomegaly. XR ABDOMEN (2 VIEWS)   Final Result   NON-SPECIFIC GAS PATTERN. Prominent colonic air              EKG:  This EKG is signed and interpreted by ED Physician.   Time:

## 2019-07-24 ENCOUNTER — ANESTHESIA EVENT (OUTPATIENT)
Dept: OPERATING ROOM | Age: 44
DRG: 418 | End: 2019-07-24
Payer: MEDICARE

## 2019-07-24 ENCOUNTER — ANESTHESIA (OUTPATIENT)
Dept: OPERATING ROOM | Age: 44
DRG: 418 | End: 2019-07-24
Payer: MEDICARE

## 2019-07-24 ENCOUNTER — APPOINTMENT (OUTPATIENT)
Dept: GENERAL RADIOLOGY | Age: 44
DRG: 418 | End: 2019-07-24
Payer: MEDICARE

## 2019-07-24 VITALS — SYSTOLIC BLOOD PRESSURE: 124 MMHG | DIASTOLIC BLOOD PRESSURE: 72 MMHG | OXYGEN SATURATION: 100 %

## 2019-07-24 LAB
ALBUMIN SERPL-MCNC: 3.4 G/DL (ref 3.5–5.2)
ALP BLD-CCNC: 63 U/L (ref 35–104)
ALT SERPL-CCNC: <5 U/L (ref 0–32)
ANION GAP SERPL CALCULATED.3IONS-SCNC: 11 MMOL/L (ref 7–16)
AST SERPL-CCNC: 14 U/L (ref 0–31)
BILIRUB SERPL-MCNC: 0.4 MG/DL (ref 0–1.2)
BUN BLDV-MCNC: 8 MG/DL (ref 6–20)
CALCIUM SERPL-MCNC: 8.4 MG/DL (ref 8.6–10.2)
CHLORIDE BLD-SCNC: 105 MMOL/L (ref 98–107)
CO2: 22 MMOL/L (ref 22–29)
CREAT SERPL-MCNC: 0.7 MG/DL (ref 0.5–1)
EKG ATRIAL RATE: 51 BPM
EKG P AXIS: 57 DEGREES
EKG P-R INTERVAL: 150 MS
EKG Q-T INTERVAL: 480 MS
EKG QRS DURATION: 86 MS
EKG QTC CALCULATION (BAZETT): 442 MS
EKG R AXIS: 30 DEGREES
EKG T AXIS: 43 DEGREES
EKG VENTRICULAR RATE: 51 BPM
GFR AFRICAN AMERICAN: >60
GFR NON-AFRICAN AMERICAN: >60 ML/MIN/1.73
GLUCOSE BLD-MCNC: 96 MG/DL (ref 74–99)
HCT VFR BLD CALC: 24.5 % (ref 34–48)
HEMOGLOBIN: 7.3 G/DL (ref 11.5–15.5)
MCH RBC QN AUTO: 21.5 PG (ref 26–35)
MCHC RBC AUTO-ENTMCNC: 29.8 % (ref 32–34.5)
MCV RBC AUTO: 72.1 FL (ref 80–99.9)
PDW BLD-RTO: 23.9 FL (ref 11.5–15)
PLATELET # BLD: 445 E9/L (ref 130–450)
PMV BLD AUTO: 9 FL (ref 7–12)
POTASSIUM REFLEX MAGNESIUM: 4.1 MMOL/L (ref 3.5–5)
RBC # BLD: 3.4 E12/L (ref 3.5–5.5)
SODIUM BLD-SCNC: 138 MMOL/L (ref 132–146)
TOTAL PROTEIN: 6.1 G/DL (ref 6.4–8.3)
WBC # BLD: 8 E9/L (ref 4.5–11.5)

## 2019-07-24 PROCEDURE — 3700000001 HC ADD 15 MINUTES (ANESTHESIA): Performed by: UROLOGY

## 2019-07-24 PROCEDURE — 6360000002 HC RX W HCPCS: Performed by: INTERNAL MEDICINE

## 2019-07-24 PROCEDURE — 6360000002 HC RX W HCPCS: Performed by: UROLOGY

## 2019-07-24 PROCEDURE — 3600000003 HC SURGERY LEVEL 3 BASE: Performed by: UROLOGY

## 2019-07-24 PROCEDURE — 2709999900 HC NON-CHARGEABLE SUPPLY: Performed by: UROLOGY

## 2019-07-24 PROCEDURE — 1200000000 HC SEMI PRIVATE

## 2019-07-24 PROCEDURE — 6360000002 HC RX W HCPCS: Performed by: SURGERY

## 2019-07-24 PROCEDURE — C1769 GUIDE WIRE: HCPCS | Performed by: UROLOGY

## 2019-07-24 PROCEDURE — BT141ZZ FLUOROSCOPY OF KIDNEYS, URETERS AND BLADDER USING LOW OSMOLAR CONTRAST: ICD-10-PCS | Performed by: UROLOGY

## 2019-07-24 PROCEDURE — 0TC68ZZ EXTIRPATION OF MATTER FROM RIGHT URETER, VIA NATURAL OR ARTIFICIAL OPENING ENDOSCOPIC: ICD-10-PCS | Performed by: UROLOGY

## 2019-07-24 PROCEDURE — APPSS30 APP SPLIT SHARED TIME 16-30 MINUTES: Performed by: PHYSICIAN ASSISTANT

## 2019-07-24 PROCEDURE — 85027 COMPLETE CBC AUTOMATED: CPT

## 2019-07-24 PROCEDURE — 99232 SBSQ HOSP IP/OBS MODERATE 35: CPT | Performed by: INTERNAL MEDICINE

## 2019-07-24 PROCEDURE — 80053 COMPREHEN METABOLIC PANEL: CPT

## 2019-07-24 PROCEDURE — 99999 PR OFFICE/OUTPT VISIT,PROCEDURE ONLY: CPT | Performed by: SURGERY

## 2019-07-24 PROCEDURE — 3600000013 HC SURGERY LEVEL 3 ADDTL 15MIN: Performed by: UROLOGY

## 2019-07-24 PROCEDURE — 74420 UROGRAPHY RTRGR +-KUB: CPT

## 2019-07-24 PROCEDURE — 93010 ELECTROCARDIOGRAM REPORT: CPT | Performed by: INTERNAL MEDICINE

## 2019-07-24 PROCEDURE — 0T768DZ DILATION OF RIGHT URETER WITH INTRALUMINAL DEVICE, VIA NATURAL OR ARTIFICIAL OPENING ENDOSCOPIC: ICD-10-PCS | Performed by: UROLOGY

## 2019-07-24 PROCEDURE — C9113 INJ PANTOPRAZOLE SODIUM, VIA: HCPCS | Performed by: UROLOGY

## 2019-07-24 PROCEDURE — C9113 INJ PANTOPRAZOLE SODIUM, VIA: HCPCS | Performed by: INTERNAL MEDICINE

## 2019-07-24 PROCEDURE — 6370000000 HC RX 637 (ALT 250 FOR IP): Performed by: UROLOGY

## 2019-07-24 PROCEDURE — 2580000003 HC RX 258: Performed by: NURSE ANESTHETIST, CERTIFIED REGISTERED

## 2019-07-24 PROCEDURE — 6360000002 HC RX W HCPCS: Performed by: PHYSICIAN ASSISTANT

## 2019-07-24 PROCEDURE — 2580000003 HC RX 258: Performed by: PHYSICIAN ASSISTANT

## 2019-07-24 PROCEDURE — 2580000003 HC RX 258: Performed by: SURGERY

## 2019-07-24 PROCEDURE — 6360000002 HC RX W HCPCS: Performed by: NURSE ANESTHETIST, CERTIFIED REGISTERED

## 2019-07-24 PROCEDURE — 3700000000 HC ANESTHESIA ATTENDED CARE: Performed by: UROLOGY

## 2019-07-24 PROCEDURE — 2580000003 HC RX 258: Performed by: UROLOGY

## 2019-07-24 PROCEDURE — C2617 STENT, NON-COR, TEM W/O DEL: HCPCS | Performed by: UROLOGY

## 2019-07-24 PROCEDURE — 6360000004 HC RX CONTRAST MEDICATION: Performed by: UROLOGY

## 2019-07-24 PROCEDURE — 2720000010 HC SURG SUPPLY STERILE: Performed by: UROLOGY

## 2019-07-24 PROCEDURE — 7100000010 HC PHASE II RECOVERY - FIRST 15 MIN: Performed by: UROLOGY

## 2019-07-24 PROCEDURE — 36415 COLL VENOUS BLD VENIPUNCTURE: CPT

## 2019-07-24 PROCEDURE — 7100000011 HC PHASE II RECOVERY - ADDTL 15 MIN: Performed by: UROLOGY

## 2019-07-24 DEVICE — URETERAL STENT
Type: IMPLANTABLE DEVICE | Site: URETER | Status: FUNCTIONAL
Brand: PERCUFLEX™

## 2019-07-24 RX ORDER — PROMETHAZINE HYDROCHLORIDE 25 MG/ML
12.5 INJECTION, SOLUTION INTRAMUSCULAR; INTRAVENOUS ONCE
Status: COMPLETED | OUTPATIENT
Start: 2019-07-24 | End: 2019-07-24

## 2019-07-24 RX ORDER — PRENATAL WITH FERROUS FUM AND FOLIC ACID 3080; 920; 120; 400; 22; 1.84; 3; 20; 10; 1; 12; 200; 27; 25; 2 [IU]/1; [IU]/1; MG/1; [IU]/1; MG/1; MG/1; MG/1; MG/1; MG/1; MG/1; UG/1; MG/1; MG/1; MG/1; MG/1
1 TABLET ORAL DAILY
Status: DISCONTINUED | OUTPATIENT
Start: 2019-07-24 | End: 2019-07-28 | Stop reason: HOSPADM

## 2019-07-24 RX ORDER — HYDROCODONE BITARTRATE AND ACETAMINOPHEN 5; 325 MG/1; MG/1
2 TABLET ORAL EVERY 4 HOURS PRN
Status: DISCONTINUED | OUTPATIENT
Start: 2019-07-24 | End: 2019-07-27 | Stop reason: SDUPTHER

## 2019-07-24 RX ORDER — MORPHINE SULFATE 2 MG/ML
2 INJECTION, SOLUTION INTRAMUSCULAR; INTRAVENOUS
Status: DISCONTINUED | OUTPATIENT
Start: 2019-07-24 | End: 2019-07-28 | Stop reason: HOSPADM

## 2019-07-24 RX ORDER — FENTANYL CITRATE 50 UG/ML
INJECTION, SOLUTION INTRAMUSCULAR; INTRAVENOUS PRN
Status: DISCONTINUED | OUTPATIENT
Start: 2019-07-24 | End: 2019-07-24 | Stop reason: SDUPTHER

## 2019-07-24 RX ORDER — SUCRALFATE 1 G/1
0.5 TABLET ORAL 4 TIMES DAILY
Status: DISCONTINUED | OUTPATIENT
Start: 2019-07-24 | End: 2019-07-28 | Stop reason: HOSPADM

## 2019-07-24 RX ORDER — ORPHENADRINE CITRATE 30 MG/ML
60 INJECTION INTRAMUSCULAR; INTRAVENOUS ONCE
Status: COMPLETED | OUTPATIENT
Start: 2019-07-24 | End: 2019-07-24

## 2019-07-24 RX ORDER — NICOTINE 21 MG/24HR
1 PATCH, TRANSDERMAL 24 HOURS TRANSDERMAL DAILY
Status: DISCONTINUED | OUTPATIENT
Start: 2019-07-24 | End: 2019-07-28 | Stop reason: HOSPADM

## 2019-07-24 RX ORDER — HYDROCODONE BITARTRATE AND ACETAMINOPHEN 5; 325 MG/1; MG/1
1 TABLET ORAL EVERY 4 HOURS PRN
Status: DISCONTINUED | OUTPATIENT
Start: 2019-07-24 | End: 2019-07-27 | Stop reason: SDUPTHER

## 2019-07-24 RX ORDER — MULTIVIT WITH MINERALS/LUTEIN
250 TABLET ORAL DAILY
Status: DISCONTINUED | OUTPATIENT
Start: 2019-07-24 | End: 2019-07-28 | Stop reason: HOSPADM

## 2019-07-24 RX ORDER — MIDAZOLAM HYDROCHLORIDE 1 MG/ML
INJECTION INTRAMUSCULAR; INTRAVENOUS PRN
Status: DISCONTINUED | OUTPATIENT
Start: 2019-07-24 | End: 2019-07-24 | Stop reason: SDUPTHER

## 2019-07-24 RX ORDER — FENTANYL CITRATE 50 UG/ML
50 INJECTION, SOLUTION INTRAMUSCULAR; INTRAVENOUS EVERY 5 MIN PRN
Status: DISCONTINUED | OUTPATIENT
Start: 2019-07-24 | End: 2019-07-24 | Stop reason: HOSPADM

## 2019-07-24 RX ORDER — SENNA PLUS 8.6 MG/1
1 TABLET ORAL DAILY PRN
Status: DISCONTINUED | OUTPATIENT
Start: 2019-07-24 | End: 2019-07-28 | Stop reason: HOSPADM

## 2019-07-24 RX ORDER — DOCUSATE SODIUM 100 MG/1
100 CAPSULE, LIQUID FILLED ORAL DAILY PRN
Status: DISCONTINUED | OUTPATIENT
Start: 2019-07-24 | End: 2019-07-28 | Stop reason: HOSPADM

## 2019-07-24 RX ORDER — PANTOPRAZOLE SODIUM 40 MG/10ML
40 INJECTION, POWDER, LYOPHILIZED, FOR SOLUTION INTRAVENOUS EVERY 12 HOURS
Status: DISCONTINUED | OUTPATIENT
Start: 2019-07-24 | End: 2019-07-28 | Stop reason: HOSPADM

## 2019-07-24 RX ORDER — SODIUM CHLORIDE 9 MG/ML
INJECTION, SOLUTION INTRAVENOUS CONTINUOUS PRN
Status: DISCONTINUED | OUTPATIENT
Start: 2019-07-24 | End: 2019-07-24 | Stop reason: SDUPTHER

## 2019-07-24 RX ORDER — SODIUM CHLORIDE 0.9 % (FLUSH) 0.9 %
10 SYRINGE (ML) INJECTION EVERY 12 HOURS SCHEDULED
Status: DISCONTINUED | OUTPATIENT
Start: 2019-07-24 | End: 2019-07-24 | Stop reason: HOSPADM

## 2019-07-24 RX ORDER — IRON POLYSACCHARIDE COMPLEX 150 MG
150 CAPSULE ORAL 2 TIMES DAILY
Status: DISCONTINUED | OUTPATIENT
Start: 2019-07-24 | End: 2019-07-28 | Stop reason: HOSPADM

## 2019-07-24 RX ORDER — PROPOFOL 10 MG/ML
INJECTION, EMULSION INTRAVENOUS PRN
Status: DISCONTINUED | OUTPATIENT
Start: 2019-07-24 | End: 2019-07-24 | Stop reason: SDUPTHER

## 2019-07-24 RX ORDER — SODIUM CHLORIDE 0.9 % (FLUSH) 0.9 %
10 SYRINGE (ML) INJECTION PRN
Status: DISCONTINUED | OUTPATIENT
Start: 2019-07-24 | End: 2019-07-24 | Stop reason: HOSPADM

## 2019-07-24 RX ORDER — TAMSULOSIN HYDROCHLORIDE 0.4 MG/1
0.4 CAPSULE ORAL DAILY
Status: DISCONTINUED | OUTPATIENT
Start: 2019-07-24 | End: 2019-07-28 | Stop reason: HOSPADM

## 2019-07-24 RX ORDER — CIPROFLOXACIN 2 MG/ML
400 INJECTION, SOLUTION INTRAVENOUS
Status: COMPLETED | OUTPATIENT
Start: 2019-07-24 | End: 2019-07-24

## 2019-07-24 RX ORDER — HYDROXYZINE PAMOATE 25 MG/1
25 CAPSULE ORAL 3 TIMES DAILY PRN
Status: DISCONTINUED | OUTPATIENT
Start: 2019-07-24 | End: 2019-07-28 | Stop reason: HOSPADM

## 2019-07-24 RX ADMIN — ONDANSETRON 4 MG: 2 INJECTION INTRAMUSCULAR; INTRAVENOUS at 00:22

## 2019-07-24 RX ADMIN — Medication 10 ML: at 21:24

## 2019-07-24 RX ADMIN — FENTANYL CITRATE 50 MCG: 50 INJECTION, SOLUTION INTRAMUSCULAR; INTRAVENOUS at 12:34

## 2019-07-24 RX ADMIN — Medication 10 ML: at 10:41

## 2019-07-24 RX ADMIN — PANTOPRAZOLE SODIUM 40 MG: 40 INJECTION, POWDER, FOR SOLUTION INTRAVENOUS at 21:24

## 2019-07-24 RX ADMIN — PROMETHAZINE HYDROCHLORIDE 12.5 MG: 25 INJECTION INTRAMUSCULAR; INTRAVENOUS at 10:23

## 2019-07-24 RX ADMIN — SODIUM CHLORIDE, POTASSIUM CHLORIDE, SODIUM LACTATE AND CALCIUM CHLORIDE: 600; 310; 30; 20 INJECTION, SOLUTION INTRAVENOUS at 03:47

## 2019-07-24 RX ADMIN — PANTOPRAZOLE SODIUM 40 MG: 40 INJECTION, POWDER, FOR SOLUTION INTRAVENOUS at 09:08

## 2019-07-24 RX ADMIN — Medication 10 ML: at 06:14

## 2019-07-24 RX ADMIN — SODIUM CHLORIDE, POTASSIUM CHLORIDE, SODIUM LACTATE AND CALCIUM CHLORIDE: 600; 310; 30; 20 INJECTION, SOLUTION INTRAVENOUS at 14:42

## 2019-07-24 RX ADMIN — MORPHINE SULFATE 4 MG: 4 INJECTION, SOLUTION INTRAMUSCULAR; INTRAVENOUS at 00:25

## 2019-07-24 RX ADMIN — VORTIOXETINE 20 MG: 10 TABLET, FILM COATED ORAL at 16:08

## 2019-07-24 RX ADMIN — Medication 10 ML: at 09:08

## 2019-07-24 RX ADMIN — TAMSULOSIN HYDROCHLORIDE 0.4 MG: 0.4 CAPSULE ORAL at 16:08

## 2019-07-24 RX ADMIN — SUCRALFATE 0.5 G: 1 TABLET ORAL at 21:24

## 2019-07-24 RX ADMIN — SODIUM CHLORIDE: 9 INJECTION, SOLUTION INTRAVENOUS at 12:21

## 2019-07-24 RX ADMIN — MORPHINE SULFATE 4 MG: 4 INJECTION, SOLUTION INTRAMUSCULAR; INTRAVENOUS at 08:58

## 2019-07-24 RX ADMIN — MIDAZOLAM HYDROCHLORIDE 2 MG: 1 INJECTION, SOLUTION INTRAMUSCULAR; INTRAVENOUS at 12:21

## 2019-07-24 RX ADMIN — SUCRALFATE 0.5 G: 1 TABLET ORAL at 17:57

## 2019-07-24 RX ADMIN — PROPOFOL 200 MG: 10 INJECTION, EMULSION INTRAVENOUS at 12:30

## 2019-07-24 RX ADMIN — SODIUM CHLORIDE 25 MG: 9 INJECTION, SOLUTION INTRAVENOUS at 10:35

## 2019-07-24 RX ADMIN — FENTANYL CITRATE 50 MCG: 50 INJECTION, SOLUTION INTRAMUSCULAR; INTRAVENOUS at 12:30

## 2019-07-24 RX ADMIN — SODIUM CHLORIDE 100 MG: 9 INJECTION, SOLUTION INTRAVENOUS at 16:00

## 2019-07-24 RX ADMIN — MORPHINE SULFATE 4 MG: 4 INJECTION, SOLUTION INTRAMUSCULAR; INTRAVENOUS at 11:39

## 2019-07-24 RX ADMIN — ORPHENADRINE CITRATE 60 MG: 30 INJECTION INTRAMUSCULAR; INTRAVENOUS at 03:46

## 2019-07-24 RX ADMIN — MORPHINE SULFATE 4 MG: 4 INJECTION, SOLUTION INTRAMUSCULAR; INTRAVENOUS at 06:14

## 2019-07-24 RX ADMIN — Medication 10 ML: at 16:00

## 2019-07-24 RX ADMIN — CIPROFLOXACIN 400 MG: 2 INJECTION, SOLUTION INTRAVENOUS at 11:53

## 2019-07-24 RX ADMIN — PROPOFOL 200 MG: 10 INJECTION, EMULSION INTRAVENOUS at 12:40

## 2019-07-24 RX ADMIN — Medication 10 ML: at 00:25

## 2019-07-24 ASSESSMENT — PULMONARY FUNCTION TESTS
PIF_VALUE: 1
PIF_VALUE: 0
PIF_VALUE: 1

## 2019-07-24 ASSESSMENT — PAIN SCALES - GENERAL
PAINLEVEL_OUTOF10: 2
PAINLEVEL_OUTOF10: 10
PAINLEVEL_OUTOF10: 4
PAINLEVEL_OUTOF10: 9
PAINLEVEL_OUTOF10: 9
PAINLEVEL_OUTOF10: 4
PAINLEVEL_OUTOF10: 2
PAINLEVEL_OUTOF10: 4
PAINLEVEL_OUTOF10: 5
PAINLEVEL_OUTOF10: 8
PAINLEVEL_OUTOF10: 4

## 2019-07-24 ASSESSMENT — PAIN DESCRIPTION - ONSET: ONSET: ON-GOING

## 2019-07-24 ASSESSMENT — PAIN DESCRIPTION - DESCRIPTORS
DESCRIPTORS: CRAMPING;SHARP
DESCRIPTORS: CRAMPING;SORE

## 2019-07-24 ASSESSMENT — PAIN DESCRIPTION - PAIN TYPE
TYPE: SURGICAL PAIN
TYPE: ACUTE PAIN

## 2019-07-24 ASSESSMENT — PAIN DESCRIPTION - ORIENTATION: ORIENTATION: UPPER

## 2019-07-24 ASSESSMENT — PAIN DESCRIPTION - FREQUENCY: FREQUENCY: CONTINUOUS

## 2019-07-24 ASSESSMENT — PAIN DESCRIPTION - LOCATION: LOCATION: ABDOMEN

## 2019-07-24 ASSESSMENT — PAIN DESCRIPTION - PROGRESSION: CLINICAL_PROGRESSION: NOT CHANGED

## 2019-07-24 ASSESSMENT — PAIN - FUNCTIONAL ASSESSMENT: PAIN_FUNCTIONAL_ASSESSMENT: ACTIVITIES ARE NOT PREVENTED

## 2019-07-24 NOTE — H&P
GENERAL SURGERY  HISTORY AND PHYSICAL  7/24/2019    Chief Complaint   Patient presents with    Abdominal Pain     upper, dx with gall stones today        HPI  Abdi Jacobs is a 37 y.o. female who presents for evaluation of abdominal pain. She has a past medical history of Laparoscopic Viviane-en-Y gastric bypass (11/17) and a perforated marginal ulcer (10/18). 7/20 she started having nausea and abdominal pain after going out to dinner. She describes a bandlike pain across her abdomen that radiates around to her spine. she was seen in the ED on 7/20 and was found to have an obstructing right ureteral stone causing hydronephrosis. She was discharged home with Flomax. Her last meal was last night which she tolerated without nausea or vomiting. She has not had a bowel movement since last week but is passing lots of flatus. She does endorse intermittent right upper quadrant pain with eating. Past Medical History:   Diagnosis Date    Anemia     Arthritis     Asthma     Depression     GERD without esophagitis     History of blood transfusion     History of gastric bypass     Hypertension     No meds following sustained weight loss after bariatric surgery.  Iron deficiency 8/1/2018    Lumbar spondylosis 1/26/2018    Lymphedema     Obesity     Panic attacks     Perforated marginal ulcer 10/29/2018    ~1 year after RnYGB    PONV (postoperative nausea and vomiting)     Prediabetes     BG has normalized following sustained weight loss after bariatric surgery.  Sleep apnea, obstructive     Off PAP therapy BG following sustained weight loss after bariatric surgery.     Vitamin D deficiency 7/27/2018    Zinc deficiency 8/1/2018       Past Surgical History:   Procedure Laterality Date    APPENDECTOMY  1996    NM OFFICE/OUTPT VISIT,PROCEDURE ONLY N/A 10/29/2018    DIAGNOSTIC LAPAROSCOPY REPAIR PERFORATED VISCUS performed by Azalia Alberto MD at [address] [place] [address]  11/14/2017  SKIN BIOPSY      STOMACH SURGERY         Prior to Admission medications    Medication Sig Start Date End Date Taking? Authorizing Provider   oxyCODONE-acetaminophen (PERCOCET) 5-325 MG per tablet Take 1 tablet by mouth every 6 hours as needed for Pain for up to 3 days. One to two tablets every 6 hours as needed for pain.  7/23/19 7/26/19 Yes Michael Gonsales MD   ondansetron (ZOFRAN ODT) 4 MG disintegrating tablet Take 1 tablet by mouth every 8 hours as needed for Nausea or Vomiting 7/21/19  Yes Rolf Rodriguez DO   tamsulosin [facility]) 0.4 MG capsule Take 1 capsule by mouth daily 7/21/19  Yes Rolf Rodriguez DO   docusate sodium (COLACE) 100 MG capsule Take 1 capsule by mouth daily as needed for Constipation 7/12/19  Yes Km Enciso MD   hydrOXYzine (VISTARIL) 25 MG capsule Take 1 capsule by mouth 3 times daily as needed for Anxiety 7/11/19  Yes Km Enciso MD   Sennosides (SENNA) 8.6 MG CAPS Take 1 capsule by mouth daily as needed (constipation) 7/11/19  Yes Km Enciso MD   ascorbic acid (V-R VITAMIN C) 250 MG tablet Take 1 tablet by mouth daily Take with the iron supplement 6/24/19  Yes Sherice Souza MD   Cholecalciferol (VITAMIN D3) 94825 units CAPS Take one capsule every Mon-Wed-Fri for four weeks 6/24/19  Yes Sherice Souza MD   ferrous sulfate 325 (65 Fe) MG tablet Take 1 tablet by mouth daily (with breakfast) , take with Vitamin C 250 mg tablet 6/24/19  Yes Sherice Souza MD   Prenatal Vit-Fe Fumarate-FA (RA PRENATAL) 28-0.8 MG TABS Take 1 tablet by mouth daily Post bariatric surgery patient 6/24/19  Yes Sherice Souza MD   pantoprazole (PROTONIX) 40 MG tablet Take 1 tablet by mouth every morning (before breakfast) 6/25/19  Yes Sherice Souza MD   sucralfate (CARAFATE) 1 GM tablet Take 0.5 tablets by mouth 4 times daily 6/24/19  Yes Sherice Souza MD   TRINTELLIX 20 MG TABS tablet Take 20 mg by mouth every morning  4/18/18  Yes Historical Provider, MD   Handicap abdominal pain. Right flank and back pain. Female Rectal: FOBT - light brown stool  GI: no cva tender  Neuro: no focal deficit    LABS:  CBC  Recent Labs     19  0355   WBC 8.0   HGB 7.3*   HCT 24.5*        BMP  Recent Labs     19  0355      K 4.1      CO2 22   BUN 8   CREATININE 0.7   CALCIUM 8.4*     Liver Function  Recent Labs     19  1614 19  0355   LIPASE 10*  --    BILITOT 0.4 0.4   AST 13 14   ALT <5 <5   ALKPHOS 80 63   PROT 7.3 6.1*   LABALBU 4.1 3.4*     Lab Results   Component Value Date    LACTA 1.6 2019         No results for input(s): INR, PTT in the last 72 hours. Invalid input(s): PT    RADIOLOGY    Us Abdomen Limited    Result Date: 2019  Patient MRN:  [id number] : 1975 Age: 37 years Gender: Female Order Date:  2019 4:15 PM EXAM: US ABDOMEN LIMITED NUMBER OF IMAGES:  43 INDICATION:  Acute RUQ pain, r/o cholecystitis RUQ pain, r/o cholecystitis COMPARISON: None Technique: Sonographic interrogation abdomen attention right upper quadrant per standard protocol. FINDINGS: Gallbladder wall normal thickness with no pericholecystic fluid. There are multiple small shadowing stones. No intra or extrahepatic biliary dilatation present. No elicitable sonographic Zamora's sign. Liver: Enlarged at 19.1 cm. The pancreas is obscured by bowel gas. . Right kidney long axis: 11.5 cm. Appearance: Pelvocaliectasis as previously reported. No evidence of pleural effusion or ascites. No other abdominal masses demonstrated     Cholelithiasis. Pelvocaliectasis on the right as previously reported. Hepatomegaly. Us Retroperitoneal Limited    Result Date: 2019  Patient MRN: [id number] : 1975 Age:  37 years Gender: Female Order Date: 2019 1:00 PM Exam: US RETROPERITONEAL LIMITED Number of Images: 61 views Indication:  N20.0 obstructing right ureteral stone with hydronephrosis. Follow-up.  Comparison: CT abdomen 2019 Technique: Sonographic interrogation retroperitoneum attention kidneys and bladder. Findings: Right kidney 11.0 cm long axis. There is moderate pelvocaliectasis present similar to what was seen on prior CT. Left kidney 14 cm in long axis. Small exophytic left renal cyst present 2.1 cm in long axis. There are multiple shadowing gallstones present. Normal caliber common duct. The urinary bladder does not appear pathologic. Right-sided pelvocaliectasis persists and is similar to what was seen on prior CT referenced above. Cholelithiasis. Left renal cyst.     Xr Abdomen (2 Views)    Result Date: 2019  Patient MRN: [id number] : 1975 Age:  37 years Gender: Female Order Date: 2019 4:45 PM Exam: XR ABDOMEN (2 VIEWS) Number of Images: 3 views Indication:   free air free air Comparison: None. Findings: Multiple views of the abdomen demonstrate no free intraperitoneal air. There is prominent colonic air is seen. The bowel gas pattern is normal. There is no evidence of organomegaly, abdominal mass or abnormal calcification. The osseous structures of the abdomen and pelvis appear to be normal.      NON-SPECIFIC GAS PATTERN. Prominent colonic air         ASSESSMENT:  37 y.o. female with abdominal pain and 5mm obstructing distal ureteral stone causing hydronephrosis. PLAN:  Urology consulted  Continue PPI,   Monitor abdominal exam   Monitor H/H- she is anemic and currently menstruating but is FOBT-. If abdominal pain does not improve she may benefit from an EGD due to her history of marginal ulcer. Will discuss with Dr. Eda Wylie.     Electronically signed by Cande Tinoco MD on 19 at 8:35 AM    Surgery Progress Note            Chief complaint:   Patient Active Problem List   Diagnosis    Leg edema    Tobacco abuse    Panic attacks    Tennis elbow    Abnormal menses    Sprain of right shoulder    AYALA on CPAP    Compression fracture of lumbar vertebra (HCC)    Compression fracture of body of thoracic

## 2019-07-24 NOTE — ANESTHESIA PRE PROCEDURE
100 mg in sodium chloride 0.9 % 100 mL IVPB  100 mg Intravenous Once Anh Roberts PA-C        sodium chloride flush 0.9 % injection 10 mL  10 mL Intravenous 2 times per day Kashmir Huffman MD   10 mL at 07/23/19 2016    sodium chloride flush 0.9 % injection 10 mL  10 mL Intravenous PRN Kashmir Huffman MD   10 mL at 07/24/19 1041    acetaminophen (TYLENOL) tablet 650 mg  650 mg Oral Q4H PRN Kashmir Huffman MD        ondansetron [facility]) injection 4 mg  4 mg Intravenous Q6H PRN Kashmir Huffman MD   4 mg at 07/24/19 0022    enoxaparin (LOVENOX) injection 40 mg  40 mg Subcutaneous Daily Kashmir Huffman MD   Stopped at 07/24/19 6122    lactated ringers infusion   Intravenous Continuous Kashmir Huffman  mL/hr at 07/24/19 0347      morphine (PF) injection 2 mg  2 mg Intravenous Q2H PRN Kashmir Huffman MD        Or   Oliva Boswell morphine sulfate (PF) injection 4 mg  4 mg Intravenous Q2H PRN Kashmir Huffman MD   4 mg at 07/24/19 1139       Allergies:     Allergies   Allergen Reactions    Pcn [Penicillins] Anaphylaxis and Other (See Comments)     Patient unsure    Food Hives     Berries - Red Blotches and Itching   Lactose Intolerance  Soy    Oxycontin [Oxycodone Hcl] Itching       Problem List:    Patient Active Problem List   Diagnosis Code    Leg edema R60.0    Tobacco abuse Z72.0    Panic attacks F41.0    Tennis elbow M77.10    Abnormal menses N92.6    Sprain of right shoulder S42.200A    AYALA on CPAP G47.33, Z99.89    Compression fracture of lumbar vertebra (HCC) S32.000A    Compression fracture of body of thoracic vertebra ([address] S22.000A    Spondylosis of lumbar region without myelopathy or radiculopathy M47.816    Mechanical low back pain M54.5    Morbid obesity ([address] E66.01    Mild intermittent asthma without complication C78.41    HTN (hypertension) I10    GERD without esophagitis K21.9    S/P gastric bypass Z98.84    Lumbar spondylosis M47.816    Primary osteoarthritis of right knee M17.11    Vitamin D deficiency E55.9    Zinc deficiency E60    Iron deficiency E61.1    Perforated viscus R19.8    Severe sepsis (HCC) A41.9, R65.20    Sepsis (HCC) A41.9    Peritonitis ([address] K65.9    Thrombocytosis (HCC) D47.3    Acute anemia D64.9    Acute cystitis without hematuria N30.00    Microcytic anemia D50.9    Red blood cell antibody positive R76.8    Symptomatic cholelithiasis K80.20       Past Medical History:        Diagnosis Date    Anemia     Arthritis     Asthma     Depression     GERD without esophagitis     History of blood transfusion     History of gastric bypass     Hypertension     No meds following sustained weight loss after bariatric surgery.  Iron deficiency 2018    Lumbar spondylosis 2018    Lymphedema     Obesity     Panic attacks     Perforated marginal ulcer 10/29/2018    ~1 year after RnYGB    PONV (postoperative nausea and vomiting)     Prediabetes     BG has normalized following sustained weight loss after bariatric surgery.  Sleep apnea, obstructive     Off PAP therapy BG following sustained weight loss after bariatric surgery.  Vitamin D deficiency 2018    Zinc deficiency 2018       Past Surgical History:        Procedure Laterality Date    APPENDECTOMY      CT OFFICE/OUTPT VISIT,PROCEDURE ONLY N/A 10/29/2018    DIAGNOSTIC LAPAROSCOPY REPAIR PERFORATED VISCUS performed by Jim Tran MD at [address] [place] [address]  2017    SKIN BIOPSY      STOMACH SURGERY         Social History:    Social History     Tobacco Use    Smoking status: Current Every Day Smoker     Packs/day: 1.00     Years: 15.00     Pack years: 15.00     Types: Cigarettes     Start date: 10/29/1991     Last attempt to quit: 10/29/2016     Years since quittin.7    Smokeless tobacco: Never Used   Substance Use Topics    Alcohol use:  Yes     Alcohol/week: 6.0 standard drinks     Types: 6 Standard drinks or equivalent per week Comment: social                                Ready to quit: Not Answered  Counseling given: Not Answered      Vital Signs (Current):   Vitals:    07/23/19 1837 07/23/19 1936 07/24/19 0049 07/24/19 0830   BP: 138/77 (!) 152/70  129/60   Pulse: 67 65  61   Resp: 16 16  16   Temp: 98.2 °F (36.8 °C) 98.5 °F (36.9 °C)  98.2 °F (36.8 °C)   TempSrc: Oral Oral  Oral   SpO2: 100% 100%  98%   Weight:  214 lb (97.1 kg) 215 lb (97.5 kg)    Height:  5' 6\" (1.676 m)                                                BP Readings from Last 3 Encounters:   07/24/19 129/60   07/23/19 124/73   07/21/19 138/71       NPO Status: Time of last liquid consumption: 2200                        Time of last solid consumption: 2200                        Date of last liquid consumption: 07/23/19                        Date of last solid food consumption: 07/23/19    BMI:   Wt Readings from Last 3 Encounters:   07/24/19 215 lb (97.5 kg)   07/23/19 210 lb (95.3 kg)   07/20/19 210 lb (95.3 kg)     Body mass index is 34.7 kg/m². CBC:   Lab Results   Component Value Date    WBC 8.0 07/24/2019    RBC 3.40 07/24/2019    HGB 7.3 07/24/2019    HCT 24.5 07/24/2019    MCV 72.1 07/24/2019    RDW 23.9 07/24/2019     07/24/2019       CMP:   Lab Results   Component Value Date     07/24/2019    K 4.1 07/24/2019     07/24/2019    CO2 22 07/24/2019    BUN 8 07/24/2019    CREATININE 0.7 07/24/2019    GFRAA >60 07/24/2019    LABGLOM >60 07/24/2019    GLUCOSE 96 07/24/2019    GLUCOSE 94 04/02/2012    PROT 6.1 07/24/2019    CALCIUM 8.4 07/24/2019    BILITOT 0.4 07/24/2019    ALKPHOS 63 07/24/2019    AST 14 07/24/2019    ALT <5 07/24/2019       POC Tests: No results for input(s): POCGLU, POCNA, POCK, POCCL, POCBUN, POCHEMO, POCHCT in the last 72 hours.     Coags:   Lab Results   Component Value Date    PROTIME 11.9 07/20/2019    INR 1.0 07/20/2019    APTT 27.3 07/20/2019       HCG (If Applicable):   Lab Results   Component Value Date

## 2019-07-24 NOTE — CONSULTS
APPENDECTOMY  1996    FL OFFICE/OUTPT VISIT,PROCEDURE ONLY N/A 10/29/2018    DIAGNOSTIC LAPAROSCOPY REPAIR PERFORATED VISCUS performed by Gilford Scale, MD at [address]  11/14/2017    SKIN BIOPSY      STOMACH SURGERY       Medications Prior to Admission:    Medications Prior to Admission: oxyCODONE-acetaminophen (PERCOCET) 5-325 MG per tablet, Take 1 tablet by mouth every 6 hours as needed for Pain for up to 3 days. One to two tablets every 6 hours as needed for pain. ondansetron (ZOFRAN ODT) 4 MG disintegrating tablet, Take 1 tablet by mouth every 8 hours as needed for Nausea or Vomiting  tamsulosin (FLOMAX) 0.4 MG capsule, Take 1 capsule by mouth daily  docusate sodium (COLACE) 100 MG capsule, Take 1 capsule by mouth daily as needed for Constipation  hydrOXYzine (VISTARIL) 25 MG capsule, Take 1 capsule by mouth 3 times daily as needed for Anxiety  Sennosides (SENNA) 8.6 MG CAPS, Take 1 capsule by mouth daily as needed (constipation)  ascorbic acid (V-R VITAMIN C) 250 MG tablet, Take 1 tablet by mouth daily Take with the iron supplement  Cholecalciferol (VITAMIN D3) 97086 units CAPS, Take one capsule every Mon-Wed-Fri for four weeks  ferrous sulfate 325 (65 Fe) MG tablet, Take 1 tablet by mouth daily (with breakfast) , take with Vitamin C 250 mg tablet  Prenatal Vit-Fe Fumarate-FA (RA PRENATAL) 28-0.8 MG TABS, Take 1 tablet by mouth daily Post bariatric surgery patient  pantoprazole (PROTONIX) 40 MG tablet, Take 1 tablet by mouth every morning (before breakfast)  sucralfate (CARAFATE) 1 GM tablet, Take 0.5 tablets by mouth 4 times daily  TRINTELLIX 20 MG TABS tablet, Take 20 mg by mouth every morning   [DISCONTINUED] nystatin (MYCOSTATIN) 573397 UNIT/GM powder, Apply topically 4 times daily.  (Patient not taking: Reported on 7/23/2019)  [DISCONTINUED] calcium-vitamin D (OSCAL-500) 500-200 MG-UNIT per tablet, Take 1 tablet by mouth daily (Patient not taking: Reported on murmur. and No rub or gallop. Abdomen: Non-distended; obese; soft; tender in the epigastrium and RUQ w/o GRRT; Zamora's sign: +/-; no palpable organomegaly or mass; bowel sounds present and normal.  Back: Minimal Rt CVAT and no left CVAT. There is b/l upper lumbar paraspinal tenderness as well as upper lumbar midline tenderness. Extremities: Chronic appearing b/l LE lymphedema without erythema. No clubbing, cyanosis or calf tenderness. Feet dry and warm. Skin: Warm and dry, no open lesions or rash. Neuro: Pt is awake, alert and oriented to time, place and person. Speech and cognition normal. Cranial nerves intact with no focal deficits. Motor strength 5/5 in UEs and LEs b/l. BJs symmetric and normal. Light touch sense at the toes normal b/l. Breast: deferred  Rectal: deferred  Genitalia:  Deferred      LABS:  Recent Labs     07/23/19  1135 07/23/19  1614    139   K 3.9 3.8    101   CO2 17* 22   BUN 7 8   CREATININE 0.8 0.7   GLUCOSE 81 95   CALCIUM 9.5 9.0   eGFR non-aa >60. AG 22 --> 16. Recent Labs     07/23/19  1614   WBC 11.1   RBC 4.17   HGB 8.7*   HCT 29.5*   MCV 70.7*   MCH 20.9*   MCHC 29.5*   RDW 24.2*   *   MPV 8.9   Diff: N 75.2, L 18.9, M 4.6, E 0.4, B 0.5%. Recent Labs     07/23/19  1135 07/23/19  1614   GLUCOSE 81 95      Ref. Range 7/23/2019 16:14   Lactic Acid Latest Ref Range: 0.5 - 2.2 mmol/L 1.6      Ref. Range 7/23/2019 16:14   Troponin Latest Ref Range: 0.00 - 0.03 ng/mL <0.01      Ref. Range 7/23/2019 16:14   Albumin Latest Ref Range: 3.5 - 5.2 g/dL 4.1   Alk Phos Latest Ref Range: 35 - 104 U/L 80   ALT Latest Ref Range: 0 - 32 U/L <5   AST Latest Ref Range: 0 - 31 U/L 13   Bilirubin Latest Ref Range: 0.0 - 1.2 mg/dL 0.4   Lipase Latest Ref Range: 13 - 60 U/L 10 (L)   Total Protein Latest Ref Range: 6.4 - 8.3 g/dL 7.3     UA:   Ref.  Range 7/23/2019 16:14   Color, UA Latest Ref Range: Straw/Yellow  Yellow   Clarity, UA Latest Ref Range: Clear  Clear   Glucose, UA depression  Asthma  Cigarette smoker  Morbid obesity s/p gastric bypass surgery      PLAN:    · Pt has been Rx morphine for analgesia. Wean off as soon as possible. · Continue PPI - switch to IV Protonix 40 mg q12h for now. Check FOBT. · Pt has been scheduled for an EGD in AM.  · Continue Carafate if ok with Surgery. · Further investigations per findings on EGD. · Consider HIDA scan as imaging so far shows only cholelithiasis w/o mention of cholecystitis. · F/u Hg. · Switch from FeSO4 to Niferex-150 1 tab BID for better iron bioavailability and fewer GI side effects. · Pt needs to get a monthly shot of B12 1000 mcg IM. · Rt ureterolithiasis with hydronephrosis: I suspect pt's acute sx are not d/t this. Urology consult in AM to guide further mgmt. F/u renal fx  · Nausea and dry heaves: Zofran prn. · Back pain: Exam suggests paraspinal muscle spasm, so will give pt a trial of a dose of Norflex 60 mg IV and f/u clinically - continue if beneficial.  · Anxiety / depression: Continue med as prior. · H/o asthma: Stable. · Cigarette smoker: Adv to quit. Rx nicotine patch 14 mg/d and advised to continue using it to quit smoking completely. · Adv to quit caffeine consumption in view of h/o marginal ulcer with perforation, anxiety / depression, need for smoking cessation. Also adv to avoid EtOH d/t h/o marginal ulcer and perf. · DVT prophylaxis - Recommend SCDs. Pt was evaluated in the presence of her night nurse Monique Prior. Thank you for consulting the [facility] at [facility] regarding management of your patient's medical problems. We will follow-up during this hospitalization.  Please note that over 50 minutes was spent in evaluating the patient, review of records and results, discussion with staff, etc.    Electronically signed by Raffy Cisneros Hospitalist Service at [facility]

## 2019-07-24 NOTE — CONSULTS
Take 1 tablet by mouth every 8 hours as needed for Nausea or Vomiting  tamsulosin (FLOMAX) 0.4 MG capsule, Take 1 capsule by mouth daily  docusate sodium (COLACE) 100 MG capsule, Take 1 capsule by mouth daily as needed for Constipation  hydrOXYzine (VISTARIL) 25 MG capsule, Take 1 capsule by mouth 3 times daily as needed for Anxiety  Sennosides (SENNA) 8.6 MG CAPS, Take 1 capsule by mouth daily as needed (constipation)  ascorbic acid (V-R VITAMIN C) 250 MG tablet, Take 1 tablet by mouth daily Take with the iron supplement  Cholecalciferol (VITAMIN D3) 27108 units CAPS, Take one capsule every Mon-Wed-Fri for four weeks  ferrous sulfate 325 (65 Fe) MG tablet, Take 1 tablet by mouth daily (with breakfast) , take with Vitamin C 250 mg tablet  Prenatal Vit-Fe Fumarate-FA (RA PRENATAL) 28-0.8 MG TABS, Take 1 tablet by mouth daily Post bariatric surgery patient  pantoprazole (PROTONIX) 40 MG tablet, Take 1 tablet by mouth every morning (before breakfast)  sucralfate (CARAFATE) 1 GM tablet, Take 0.5 tablets by mouth 4 times daily  TRINTELLIX 20 MG TABS tablet, Take 20 mg by mouth every morning   [DISCONTINUED] nystatin (MYCOSTATIN) 952074 UNIT/GM powder, Apply topically 4 times daily. (Patient not taking: Reported on 7/23/2019)  [DISCONTINUED] calcium-vitamin D (OSCAL-500) 500-200 MG-UNIT per tablet, Take 1 tablet by mouth daily (Patient not taking: Reported on 7/23/2019)  Handicap Placard MISC, by Does not apply route Duration: 5 years    Allergies:    Pcn [penicillins]; Food; and Oxycontin [oxycodone hcl]    Social History:    reports that she has been smoking cigarettes. She started smoking about 27 years ago. She has a 15.00 pack-year smoking history. She has never used smokeless tobacco. She reports that she drinks about 6.0 standard drinks of alcohol per week. She reports that she does not use drugs.     Family History:   Non-contributory to this urological problem  family history includes Cancer in her maternal

## 2019-07-24 NOTE — PROGRESS NOTES
go to the OR for stent insertion    3. Back pain  -Given a trial of Norflex    4. Anemia  -Hemoglobin currently 7.3  - Per surgery note FOBT negative, of note patient is currently menstruating since July 1st    5. Anxiety/depression  -Table, continue current regimen    6. H/o asthma  -Stable, continue current regimen    7. Dysfunctional uterine Bleeding   -Currently on iron supplementation  -With Dr. Afshan Ruth, patient states that she follows with her OB/GYN.       Electronically signed by Lisa Santos PA-C on 7/24/2019 at 9:04 AM

## 2019-07-24 NOTE — ANESTHESIA POSTPROCEDURE EVALUATION
Department of Anesthesiology  Postprocedure Note    Patient: Darinel Shrestha  MRN: [id number]  YOB: 1975  Date of evaluation: 7/24/2019  Time:  2:32 PM     Procedure Summary     Date:  07/24/19 Room / Location:  [facility] OR 06 / [facility] OR    Anesthesia Start:  4806 Anesthesia Stop:  5982    Procedure:  CYSTOSCOPY RETROGRADE PYELOGRAM URETEROSCOPY J STENT LASER LITHOTRIPSY RIGHT (Right ) Diagnosis:  (-)    Surgeon:  Lora Chambers DO Responsible Provider:  Gavin Morales DO    Anesthesia Type:  MAC ASA Status:  3          Anesthesia Type: MAC    Mairelena Phase I: Marielena Score: 10    Marielena Phase II:      Last vitals: Reviewed and per EMR flowsheets.        Anesthesia Post Evaluation    Patient location during evaluation: PACU  Patient participation: complete - patient participated  Level of consciousness: awake and alert  Airway patency: patent  Nausea & Vomiting: no nausea and no vomiting  Complications: no  Cardiovascular status: hemodynamically stable  Respiratory status: acceptable  Hydration status: euvolemic

## 2019-07-25 ENCOUNTER — APPOINTMENT (OUTPATIENT)
Dept: ULTRASOUND IMAGING | Age: 44
DRG: 418 | End: 2019-07-25
Payer: MEDICARE

## 2019-07-25 LAB
ABO/RH: NORMAL
ANTIBODY IDENTIFICATION: NORMAL
ANTIBODY IDENTIFICATION: NORMAL
ANTIBODY SCREEN: NORMAL
DAT C3: NORMAL
DAT IGG: NORMAL
DAT POLYSPECIFIC: NORMAL
DR. NOTIFY: NORMAL
HCT VFR BLD CALC: 22.9 % (ref 34–48)
HCT VFR BLD CALC: 24.4 % (ref 34–48)
HEMOGLOBIN: 6.7 G/DL (ref 11.5–15.5)
HEMOGLOBIN: 7.1 G/DL (ref 11.5–15.5)
MCH RBC QN AUTO: 21.6 PG (ref 26–35)
MCHC RBC AUTO-ENTMCNC: 29.3 % (ref 32–34.5)
MCV RBC AUTO: 73.9 FL (ref 80–99.9)
PDW BLD-RTO: 24.1 FL (ref 11.5–15)
PLATELET # BLD: 400 E9/L (ref 130–450)
PMV BLD AUTO: 8.8 FL (ref 7–12)
RBC # BLD: 3.1 E12/L (ref 3.5–5.5)
WBC # BLD: 7.1 E9/L (ref 4.5–11.5)

## 2019-07-25 PROCEDURE — 85018 HEMOGLOBIN: CPT

## 2019-07-25 PROCEDURE — 36415 COLL VENOUS BLD VENIPUNCTURE: CPT

## 2019-07-25 PROCEDURE — 6370000000 HC RX 637 (ALT 250 FOR IP): Performed by: STUDENT IN AN ORGANIZED HEALTH CARE EDUCATION/TRAINING PROGRAM

## 2019-07-25 PROCEDURE — 6360000002 HC RX W HCPCS: Performed by: UROLOGY

## 2019-07-25 PROCEDURE — 6370000000 HC RX 637 (ALT 250 FOR IP): Performed by: UROLOGY

## 2019-07-25 PROCEDURE — 86860 RBC ANTIBODY ELUTION: CPT

## 2019-07-25 PROCEDURE — 85014 HEMATOCRIT: CPT

## 2019-07-25 PROCEDURE — 1200000000 HC SEMI PRIVATE

## 2019-07-25 PROCEDURE — 86922 COMPATIBILITY TEST ANTIGLOB: CPT

## 2019-07-25 PROCEDURE — 2580000003 HC RX 258: Performed by: UROLOGY

## 2019-07-25 PROCEDURE — 2580000003 HC RX 258: Performed by: STUDENT IN AN ORGANIZED HEALTH CARE EDUCATION/TRAINING PROGRAM

## 2019-07-25 PROCEDURE — 86900 BLOOD TYPING SEROLOGIC ABO: CPT

## 2019-07-25 PROCEDURE — 6360000002 HC RX W HCPCS: Performed by: INTERNAL MEDICINE

## 2019-07-25 PROCEDURE — 99232 SBSQ HOSP IP/OBS MODERATE 35: CPT | Performed by: INTERNAL MEDICINE

## 2019-07-25 PROCEDURE — 85027 COMPLETE CBC AUTOMATED: CPT

## 2019-07-25 PROCEDURE — 6360000002 HC RX W HCPCS: Performed by: STUDENT IN AN ORGANIZED HEALTH CARE EDUCATION/TRAINING PROGRAM

## 2019-07-25 PROCEDURE — 76856 US EXAM PELVIC COMPLETE: CPT

## 2019-07-25 PROCEDURE — 86880 COOMBS TEST DIRECT: CPT

## 2019-07-25 PROCEDURE — C9113 INJ PANTOPRAZOLE SODIUM, VIA: HCPCS | Performed by: UROLOGY

## 2019-07-25 PROCEDURE — 86901 BLOOD TYPING SEROLOGIC RH(D): CPT

## 2019-07-25 PROCEDURE — 86870 RBC ANTIBODY IDENTIFICATION: CPT

## 2019-07-25 PROCEDURE — 86850 RBC ANTIBODY SCREEN: CPT

## 2019-07-25 PROCEDURE — APPSS30 APP SPLIT SHARED TIME 16-30 MINUTES: Performed by: PHYSICIAN ASSISTANT

## 2019-07-25 RX ORDER — 0.9 % SODIUM CHLORIDE 0.9 %
250 INTRAVENOUS SOLUTION INTRAVENOUS ONCE
Status: COMPLETED | OUTPATIENT
Start: 2019-07-25 | End: 2019-07-26

## 2019-07-25 RX ORDER — PHENAZOPYRIDINE HYDROCHLORIDE 100 MG/1
100 TABLET, FILM COATED ORAL 3 TIMES DAILY PRN
Status: DISCONTINUED | OUTPATIENT
Start: 2019-07-25 | End: 2019-07-28 | Stop reason: HOSPADM

## 2019-07-25 RX ADMIN — ONDANSETRON 4 MG: 2 INJECTION INTRAMUSCULAR; INTRAVENOUS at 20:02

## 2019-07-25 RX ADMIN — PHENAZOPYRIDINE 100 MG: 100 TABLET ORAL at 18:22

## 2019-07-25 RX ADMIN — PANTOPRAZOLE SODIUM 40 MG: 40 INJECTION, POWDER, FOR SOLUTION INTRAVENOUS at 20:07

## 2019-07-25 RX ADMIN — SODIUM CHLORIDE, POTASSIUM CHLORIDE, SODIUM LACTATE AND CALCIUM CHLORIDE: 600; 310; 30; 20 INJECTION, SOLUTION INTRAVENOUS at 08:20

## 2019-07-25 RX ADMIN — MORPHINE SULFATE 2 MG: 2 INJECTION, SOLUTION INTRAMUSCULAR; INTRAVENOUS at 20:02

## 2019-07-25 RX ADMIN — SUCRALFATE 0.5 G: 1 TABLET ORAL at 11:47

## 2019-07-25 RX ADMIN — PANTOPRAZOLE SODIUM 40 MG: 40 INJECTION, POWDER, FOR SOLUTION INTRAVENOUS at 08:28

## 2019-07-25 RX ADMIN — SUCRALFATE 0.5 G: 1 TABLET ORAL at 16:04

## 2019-07-25 RX ADMIN — TAMSULOSIN HYDROCHLORIDE 0.4 MG: 0.4 CAPSULE ORAL at 08:28

## 2019-07-25 RX ADMIN — SUCRALFATE 0.5 G: 1 TABLET ORAL at 06:00

## 2019-07-25 RX ADMIN — HYDROXYZINE PAMOATE 25 MG: 25 CAPSULE ORAL at 20:34

## 2019-07-25 RX ADMIN — Medication 1 TABLET: at 08:28

## 2019-07-25 RX ADMIN — DOCUSATE SODIUM 100 MG: 100 CAPSULE, LIQUID FILLED ORAL at 11:47

## 2019-07-25 RX ADMIN — HYDROCODONE BITARTRATE AND ACETAMINOPHEN 2 TABLET: 5; 325 TABLET ORAL at 00:59

## 2019-07-25 RX ADMIN — Medication 10 ML: at 20:07

## 2019-07-25 RX ADMIN — VORTIOXETINE 20 MG: 10 TABLET, FILM COATED ORAL at 08:28

## 2019-07-25 RX ADMIN — SUCRALFATE 0.5 G: 1 TABLET ORAL at 20:07

## 2019-07-25 RX ADMIN — ONDANSETRON 4 MG: 2 INJECTION INTRAMUSCULAR; INTRAVENOUS at 00:59

## 2019-07-25 RX ADMIN — HYDROMORPHONE HYDROCHLORIDE 1 MG: 1 INJECTION, SOLUTION INTRAMUSCULAR; INTRAVENOUS; SUBCUTANEOUS at 21:17

## 2019-07-25 RX ADMIN — ASCORBIC ACID TAB 250 MG 250 MG: 250 TAB at 08:28

## 2019-07-25 ASSESSMENT — PAIN DESCRIPTION - PROGRESSION
CLINICAL_PROGRESSION: NOT CHANGED
CLINICAL_PROGRESSION: NOT CHANGED

## 2019-07-25 ASSESSMENT — PAIN DESCRIPTION - ONSET
ONSET: ON-GOING
ONSET: ON-GOING

## 2019-07-25 ASSESSMENT — PAIN SCALES - GENERAL
PAINLEVEL_OUTOF10: 10
PAINLEVEL_OUTOF10: 9
PAINLEVEL_OUTOF10: 0
PAINLEVEL_OUTOF10: 8

## 2019-07-25 ASSESSMENT — PAIN DESCRIPTION - FREQUENCY
FREQUENCY: INTERMITTENT
FREQUENCY: INTERMITTENT

## 2019-07-25 ASSESSMENT — PAIN DESCRIPTION - ORIENTATION
ORIENTATION: RIGHT;LEFT;LOWER
ORIENTATION: RIGHT;LEFT;LOWER

## 2019-07-25 ASSESSMENT — PAIN - FUNCTIONAL ASSESSMENT
PAIN_FUNCTIONAL_ASSESSMENT: PREVENTS OR INTERFERES SOME ACTIVE ACTIVITIES AND ADLS
PAIN_FUNCTIONAL_ASSESSMENT: PREVENTS OR INTERFERES SOME ACTIVE ACTIVITIES AND ADLS

## 2019-07-25 ASSESSMENT — PAIN DESCRIPTION - DESCRIPTORS
DESCRIPTORS: ACHING;CRAMPING;SPASM
DESCRIPTORS: ACHING;CRAMPING;SPASM

## 2019-07-25 ASSESSMENT — PAIN DESCRIPTION - LOCATION
LOCATION: ABDOMEN
LOCATION: ABDOMEN

## 2019-07-25 ASSESSMENT — PAIN DESCRIPTION - PAIN TYPE
TYPE: ACUTE PAIN
TYPE: ACUTE PAIN

## 2019-07-25 NOTE — PROGRESS NOTES
Problems:    * No resolved hospital problems. *      Plan:  1. Epigastric pain  - General surgery following, possible plan for EGD pending abdominal pain  - Cholelithiasis noted but no cholecystis. May need HIDA scan      2. Right ureterolithiasis  -Urology consulted, S/p stent placement yesterday.      3. Back pain  - Slightly improved with Norflex      4. Anemia  - Hemoglobin currently 7.1, IVFs stopped. Will continue to monitor    - Per surgery note FOBT negative, of note patient is currently menstruating since July 1st.   - Occult stool ordered but pt has not had BM for sample      5. Anxiety/depression  -Stable, continue current regimen     6. H/o asthma  -Stable, continue current regimen     7. Dysfunctional uterine Bleeding   -Currently on iron supplementation. Given IV iron yesterday. Pt refusing oral iron today d/t constipation.   -With Dr. Samantha Starks, patient states that she follows with her OB/GYN. 8. Constipation  - Pt reports no BM since Saturday, Senna ordered     9. Urinary Incontinence  - Nursing placed call to urology, however no call back as of yet.    - IVFs have been stopped        Electronically signed by Sumit Alexander PA-C on 7/25/2019 at 9:33 AM

## 2019-07-25 NOTE — PATIENT CARE CONFERENCE
P Quality Flow/Interdisciplinary Rounds Progress Note        Quality Flow Rounds held on July 25, 2019    Disciplines Attending:  Bedside Nurse, ,  and Nursing Unit [address] was admitted on 7/23/2019  3:31 PM    Anticipated Discharge Date:  Expected Discharge Date: 07/26/19    Disposition:    Perico Score:  Perico Scale Score: 21    Readmission Risk              Risk of Unplanned Readmission:        20           Discussed patient goal for the day, patient clinical progression, and barriers to discharge.   The following Goal(s) of the Day/Commitment(s) have been identified:  monitor output      Willie Gore  July 25, 2019

## 2019-07-25 NOTE — PROGRESS NOTES
Paged urology regarding patient experiencing incontinence per PA request.  Electronically signed by Brianne Cavanaugh RN on 7/25/2019 at 10:21 AM

## 2019-07-26 ENCOUNTER — ANESTHESIA EVENT (OUTPATIENT)
Dept: OPERATING ROOM | Age: 44
DRG: 418 | End: 2019-07-26
Payer: MEDICARE

## 2019-07-26 ENCOUNTER — ANESTHESIA (OUTPATIENT)
Dept: OPERATING ROOM | Age: 44
DRG: 418 | End: 2019-07-26
Payer: MEDICARE

## 2019-07-26 LAB
ANTIBODY IDENTIFICATION: NORMAL
ANTIBODY IDENTIFICATION: NORMAL
ELUATE ANTIBODY IDENTIFICATION: NORMAL
HCT VFR BLD CALC: 23.5 % (ref 34–48)
HCT VFR BLD CALC: 24.9 % (ref 34–48)
HEMOGLOBIN: 6.9 G/DL (ref 11.5–15.5)
HEMOGLOBIN: 7.5 G/DL (ref 11.5–15.5)
MCH RBC QN AUTO: 21.4 PG (ref 26–35)
MCHC RBC AUTO-ENTMCNC: 29.4 % (ref 32–34.5)
MCV RBC AUTO: 73 FL (ref 80–99.9)
PDW BLD-RTO: 24.3 FL (ref 11.5–15)
PLATELET # BLD: 398 E9/L (ref 130–450)
PMV BLD AUTO: 8.8 FL (ref 7–12)
RBC # BLD: 3.22 E12/L (ref 3.5–5.5)
WBC # BLD: 8.8 E9/L (ref 4.5–11.5)

## 2019-07-26 PROCEDURE — P9016 RBC LEUKOCYTES REDUCED: HCPCS

## 2019-07-26 PROCEDURE — 99232 SBSQ HOSP IP/OBS MODERATE 35: CPT | Performed by: INTERNAL MEDICINE

## 2019-07-26 PROCEDURE — C9113 INJ PANTOPRAZOLE SODIUM, VIA: HCPCS | Performed by: UROLOGY

## 2019-07-26 PROCEDURE — 2580000003 HC RX 258: Performed by: UROLOGY

## 2019-07-26 PROCEDURE — 36430 TRANSFUSION BLD/BLD COMPNT: CPT

## 2019-07-26 PROCEDURE — 1200000000 HC SEMI PRIVATE

## 2019-07-26 PROCEDURE — 99232 SBSQ HOSP IP/OBS MODERATE 35: CPT | Performed by: SURGERY

## 2019-07-26 PROCEDURE — 6370000000 HC RX 637 (ALT 250 FOR IP): Performed by: STUDENT IN AN ORGANIZED HEALTH CARE EDUCATION/TRAINING PROGRAM

## 2019-07-26 PROCEDURE — 6370000000 HC RX 637 (ALT 250 FOR IP): Performed by: UROLOGY

## 2019-07-26 PROCEDURE — 87081 CULTURE SCREEN ONLY: CPT

## 2019-07-26 PROCEDURE — 85027 COMPLETE CBC AUTOMATED: CPT

## 2019-07-26 PROCEDURE — 85014 HEMATOCRIT: CPT

## 2019-07-26 PROCEDURE — 36415 COLL VENOUS BLD VENIPUNCTURE: CPT

## 2019-07-26 PROCEDURE — 6360000002 HC RX W HCPCS: Performed by: UROLOGY

## 2019-07-26 PROCEDURE — 86902 BLOOD TYPE ANTIGEN DONOR EA: CPT

## 2019-07-26 PROCEDURE — 6360000002 HC RX W HCPCS: Performed by: STUDENT IN AN ORGANIZED HEALTH CARE EDUCATION/TRAINING PROGRAM

## 2019-07-26 PROCEDURE — APPSS30 APP SPLIT SHARED TIME 16-30 MINUTES: Performed by: PHYSICIAN ASSISTANT

## 2019-07-26 PROCEDURE — 85018 HEMOGLOBIN: CPT

## 2019-07-26 PROCEDURE — 2580000003 HC RX 258: Performed by: STUDENT IN AN ORGANIZED HEALTH CARE EDUCATION/TRAINING PROGRAM

## 2019-07-26 RX ORDER — SODIUM CHLORIDE, SODIUM LACTATE, POTASSIUM CHLORIDE, CALCIUM CHLORIDE 600; 310; 30; 20 MG/100ML; MG/100ML; MG/100ML; MG/100ML
INJECTION, SOLUTION INTRAVENOUS CONTINUOUS
Status: DISCONTINUED | OUTPATIENT
Start: 2019-07-26 | End: 2019-07-28 | Stop reason: HOSPADM

## 2019-07-26 RX ORDER — SUCRALFATE 1 G/1
1 TABLET ORAL 4 TIMES DAILY
Qty: 120 TABLET | Refills: 3 | Status: SHIPPED | OUTPATIENT
Start: 2019-07-26 | End: 2020-03-19 | Stop reason: SDUPTHER

## 2019-07-26 RX ORDER — PANTOPRAZOLE SODIUM 40 MG/1
40 TABLET, DELAYED RELEASE ORAL
Qty: 180 TABLET | Refills: 1 | Status: SHIPPED | OUTPATIENT
Start: 2019-07-26 | End: 2020-07-20 | Stop reason: ALTCHOICE

## 2019-07-26 RX ADMIN — SODIUM CHLORIDE 250 ML: 9 INJECTION, SOLUTION INTRAVENOUS at 11:37

## 2019-07-26 RX ADMIN — MORPHINE SULFATE 2 MG: 2 INJECTION, SOLUTION INTRAMUSCULAR; INTRAVENOUS at 12:48

## 2019-07-26 RX ADMIN — TAMSULOSIN HYDROCHLORIDE 0.4 MG: 0.4 CAPSULE ORAL at 09:20

## 2019-07-26 RX ADMIN — PANTOPRAZOLE SODIUM 40 MG: 40 INJECTION, POWDER, FOR SOLUTION INTRAVENOUS at 22:26

## 2019-07-26 RX ADMIN — Medication 150 MG: at 09:20

## 2019-07-26 RX ADMIN — Medication 1 TABLET: at 09:20

## 2019-07-26 RX ADMIN — Medication 10 ML: at 12:49

## 2019-07-26 RX ADMIN — HYDROCODONE BITARTRATE AND ACETAMINOPHEN 2 TABLET: 5; 325 TABLET ORAL at 16:40

## 2019-07-26 RX ADMIN — HYDROCODONE BITARTRATE AND ACETAMINOPHEN 2 TABLET: 5; 325 TABLET ORAL at 00:31

## 2019-07-26 RX ADMIN — SUCRALFATE 0.5 G: 1 TABLET ORAL at 22:25

## 2019-07-26 RX ADMIN — Medication 150 MG: at 16:40

## 2019-07-26 RX ADMIN — ONDANSETRON 4 MG: 2 INJECTION INTRAMUSCULAR; INTRAVENOUS at 12:47

## 2019-07-26 RX ADMIN — Medication 10 ML: at 09:22

## 2019-07-26 RX ADMIN — SUCRALFATE 0.5 G: 1 TABLET ORAL at 11:46

## 2019-07-26 RX ADMIN — ASCORBIC ACID TAB 250 MG 250 MG: 250 TAB at 09:20

## 2019-07-26 RX ADMIN — Medication 10 ML: at 12:47

## 2019-07-26 RX ADMIN — VORTIOXETINE 20 MG: 10 TABLET, FILM COATED ORAL at 09:20

## 2019-07-26 RX ADMIN — SUCRALFATE 0.5 G: 1 TABLET ORAL at 06:30

## 2019-07-26 RX ADMIN — SUCRALFATE 0.5 G: 1 TABLET ORAL at 16:41

## 2019-07-26 RX ADMIN — PANTOPRAZOLE SODIUM 40 MG: 40 INJECTION, POWDER, FOR SOLUTION INTRAVENOUS at 09:22

## 2019-07-26 ASSESSMENT — PAIN DESCRIPTION - LOCATION
LOCATION: BACK;ABDOMEN
LOCATION: ABDOMEN
LOCATION: ABDOMEN

## 2019-07-26 ASSESSMENT — PAIN - FUNCTIONAL ASSESSMENT
PAIN_FUNCTIONAL_ASSESSMENT: ACTIVITIES ARE NOT PREVENTED
PAIN_FUNCTIONAL_ASSESSMENT: PREVENTS OR INTERFERES SOME ACTIVE ACTIVITIES AND ADLS
PAIN_FUNCTIONAL_ASSESSMENT: PREVENTS OR INTERFERES SOME ACTIVE ACTIVITIES AND ADLS

## 2019-07-26 ASSESSMENT — PAIN SCALES - GENERAL
PAINLEVEL_OUTOF10: 5
PAINLEVEL_OUTOF10: 7
PAINLEVEL_OUTOF10: 5
PAINLEVEL_OUTOF10: 8
PAINLEVEL_OUTOF10: 8
PAINLEVEL_OUTOF10: 5
PAINLEVEL_OUTOF10: 0

## 2019-07-26 ASSESSMENT — PAIN DESCRIPTION - ORIENTATION
ORIENTATION: RIGHT;LEFT;LOWER
ORIENTATION: RIGHT;UPPER

## 2019-07-26 ASSESSMENT — PAIN DESCRIPTION - FREQUENCY
FREQUENCY: INTERMITTENT
FREQUENCY: CONTINUOUS
FREQUENCY: INTERMITTENT

## 2019-07-26 ASSESSMENT — PAIN DESCRIPTION - PROGRESSION
CLINICAL_PROGRESSION: NOT CHANGED
CLINICAL_PROGRESSION: GRADUALLY IMPROVING
CLINICAL_PROGRESSION: GRADUALLY WORSENING

## 2019-07-26 ASSESSMENT — PAIN DESCRIPTION - DESCRIPTORS
DESCRIPTORS: SORE
DESCRIPTORS: ACHING;CRAMPING;SPASM
DESCRIPTORS: ACHING;DISCOMFORT;SORE

## 2019-07-26 ASSESSMENT — PAIN DESCRIPTION - PAIN TYPE
TYPE: ACUTE PAIN

## 2019-07-26 ASSESSMENT — PAIN DESCRIPTION - ONSET
ONSET: ON-GOING
ONSET: ON-GOING
ONSET: GRADUAL

## 2019-07-26 ASSESSMENT — PAIN DESCRIPTION - DIRECTION: RADIATING_TOWARDS: BACK

## 2019-07-26 NOTE — PLAN OF CARE
Problem: Pain:  Goal: Control of acute pain  Description  Control of acute pain  Outcome: Met This Shift     Problem: Nausea/Vomiting:  Goal: Able to drink  Description  Able to drink  Outcome: Met This Shift     Problem: Nausea/Vomiting:  Goal: Able to eat  Description  Able to eat  Outcome: Met This Shift

## 2019-07-26 NOTE — ANESTHESIA PRE PROCEDURE
mouth daily Post bariatric surgery patient 6/24/19   Arron Khalil MD   TRINTELLIX 20 MG TABS tablet Take 20 mg by mouth every morning  4/18/18   Historical Provider, MD   Handicap Placard [address] [place] [address] by Does not apply route Duration: 5 years 7/19/16   Miguel Ragland DO       Current medications:    No current facility-administered medications for this visit.       Current Outpatient Medications   Medication Sig Dispense Refill    pantoprazole (PROTONIX) 40 MG tablet Take 1 tablet by mouth 2 times daily (before meals) 180 tablet 1    sucralfate (CARAFATE) 1 GM tablet Take 1 tablet by mouth 4 times daily 120 tablet 3     Facility-Administered Medications Ordered in Other Visits   Medication Dose Route Frequency Provider Last Rate Last Dose    [START ON 7/27/2019] ceFAZolin (ANCEF) 2 g in dextrose 5 % 100 mL IVPB  2 g Intravenous See Admin Instructions Elidaconsuelo Sosa, DO        phenazopyridine (PYRIDIUM) tablet 100 mg  100 mg Oral TID PRN Nayeli Sykes MD   100 mg at 07/25/19 1822    HYDROmorphone (DILAUDID) injection 1 mg  1 mg Intravenous Q3H PRN Arlen Curry MD   1 mg at 07/25/19 2117    hydrOXYzine (VISTARIL) capsule 25 mg  25 mg Oral TID PRN Vladimir A Reyes, DO   25 mg at 07/25/19 2034    vitamin C tablet 250 mg  250 mg Oral Daily Vladimir A Reyes, DO   250 mg at 07/26/19 0920    iron polysaccharides (NIFEREX) capsule 150 mg  150 mg Oral BID Vladimir A Reyes, DO   150 mg at 07/26/19 1640    docusate sodium (COLACE) capsule 100 mg  100 mg Oral Daily PRN Vladimir A Reyes, DO   100 mg at 07/25/19 1147    pantoprazole (PROTONIX) injection 40 mg  40 mg Intravenous Q12H Vladimir A Reyes, DO   40 mg at 07/26/19 8271    prenatal vitamin 27-1 MG tablet 1 tablet  1 tablet Oral Daily Vladimir A Reyes, DO   1 tablet at 07/26/19 0920    senna (SENOKOT) tablet 8.6 mg  1 tablet Oral Daily PRN Vladimir A Reyes, DO        sucralfate (CARAFATE) tablet 0.5 g  0.5 g Oral 4x Daily Vladimir A Reyes, DO   0.5 g at 07/26/19 1641    tamsulosin

## 2019-07-27 VITALS — SYSTOLIC BLOOD PRESSURE: 149 MMHG | DIASTOLIC BLOOD PRESSURE: 78 MMHG | OXYGEN SATURATION: 100 % | TEMPERATURE: 96.1 F

## 2019-07-27 LAB
ANION GAP SERPL CALCULATED.3IONS-SCNC: 10 MMOL/L (ref 7–16)
BUN BLDV-MCNC: 11 MG/DL (ref 6–20)
CALCIUM SERPL-MCNC: 8.3 MG/DL (ref 8.6–10.2)
CHLORIDE BLD-SCNC: 104 MMOL/L (ref 98–107)
CO2: 24 MMOL/L (ref 22–29)
CREAT SERPL-MCNC: 0.7 MG/DL (ref 0.5–1)
GFR AFRICAN AMERICAN: >60
GFR NON-AFRICAN AMERICAN: >60 ML/MIN/1.73
GLUCOSE BLD-MCNC: 82 MG/DL (ref 74–99)
HCG(URINE) PREGNANCY TEST: NEGATIVE
HCT VFR BLD CALC: 25.7 % (ref 34–48)
HEMOGLOBIN: 7.6 G/DL (ref 11.5–15.5)
MCH RBC QN AUTO: 22.1 PG (ref 26–35)
MCHC RBC AUTO-ENTMCNC: 29.6 % (ref 32–34.5)
MCV RBC AUTO: 74.7 FL (ref 80–99.9)
PDW BLD-RTO: 25.1 FL (ref 11.5–15)
PLATELET # BLD: 345 E9/L (ref 130–450)
PMV BLD AUTO: 8.5 FL (ref 7–12)
POTASSIUM SERPL-SCNC: 3.7 MMOL/L (ref 3.5–5)
RBC # BLD: 3.44 E12/L (ref 3.5–5.5)
SODIUM BLD-SCNC: 138 MMOL/L (ref 132–146)
WBC # BLD: 8.2 E9/L (ref 4.5–11.5)

## 2019-07-27 PROCEDURE — C9113 INJ PANTOPRAZOLE SODIUM, VIA: HCPCS | Performed by: SURGERY

## 2019-07-27 PROCEDURE — 6370000000 HC RX 637 (ALT 250 FOR IP): Performed by: SURGERY

## 2019-07-27 PROCEDURE — 2500000003 HC RX 250 WO HCPCS: Performed by: SURGERY

## 2019-07-27 PROCEDURE — 88304 TISSUE EXAM BY PATHOLOGIST: CPT

## 2019-07-27 PROCEDURE — 36415 COLL VENOUS BLD VENIPUNCTURE: CPT

## 2019-07-27 PROCEDURE — 6360000002 HC RX W HCPCS: Performed by: STUDENT IN AN ORGANIZED HEALTH CARE EDUCATION/TRAINING PROGRAM

## 2019-07-27 PROCEDURE — 1200000000 HC SEMI PRIVATE

## 2019-07-27 PROCEDURE — 0DJ08ZZ INSPECTION OF UPPER INTESTINAL TRACT, VIA NATURAL OR ARTIFICIAL OPENING ENDOSCOPIC: ICD-10-PCS | Performed by: SURGERY

## 2019-07-27 PROCEDURE — 80048 BASIC METABOLIC PNL TOTAL CA: CPT

## 2019-07-27 PROCEDURE — 47562 LAPAROSCOPIC CHOLECYSTECTOMY: CPT | Performed by: SURGERY

## 2019-07-27 PROCEDURE — 2580000003 HC RX 258: Performed by: NURSE ANESTHETIST, CERTIFIED REGISTERED

## 2019-07-27 PROCEDURE — 6360000002 HC RX W HCPCS: Performed by: ANESTHESIOLOGY

## 2019-07-27 PROCEDURE — 43235 EGD DIAGNOSTIC BRUSH WASH: CPT | Performed by: SURGERY

## 2019-07-27 PROCEDURE — 85027 COMPLETE CBC AUTOMATED: CPT

## 2019-07-27 PROCEDURE — 7100000000 HC PACU RECOVERY - FIRST 15 MIN: Performed by: SURGERY

## 2019-07-27 PROCEDURE — 6360000002 HC RX W HCPCS: Performed by: NURSE ANESTHETIST, CERTIFIED REGISTERED

## 2019-07-27 PROCEDURE — 99024 POSTOP FOLLOW-UP VISIT: CPT | Performed by: SURGERY

## 2019-07-27 PROCEDURE — 7100000001 HC PACU RECOVERY - ADDTL 15 MIN: Performed by: SURGERY

## 2019-07-27 PROCEDURE — 2580000003 HC RX 258: Performed by: UROLOGY

## 2019-07-27 PROCEDURE — 3600000004 HC SURGERY LEVEL 4 BASE: Performed by: SURGERY

## 2019-07-27 PROCEDURE — 3700000000 HC ANESTHESIA ATTENDED CARE: Performed by: SURGERY

## 2019-07-27 PROCEDURE — 81025 URINE PREGNANCY TEST: CPT

## 2019-07-27 PROCEDURE — 2500000003 HC RX 250 WO HCPCS: Performed by: NURSE ANESTHETIST, CERTIFIED REGISTERED

## 2019-07-27 PROCEDURE — APPSS30 APP SPLIT SHARED TIME 16-30 MINUTES: Performed by: PHYSICIAN ASSISTANT

## 2019-07-27 PROCEDURE — 3700000001 HC ADD 15 MINUTES (ANESTHESIA): Performed by: SURGERY

## 2019-07-27 PROCEDURE — 0FT44ZZ RESECTION OF GALLBLADDER, PERCUTANEOUS ENDOSCOPIC APPROACH: ICD-10-PCS | Performed by: SURGERY

## 2019-07-27 PROCEDURE — 2709999900 HC NON-CHARGEABLE SUPPLY: Performed by: SURGERY

## 2019-07-27 PROCEDURE — 2720000010 HC SURG SUPPLY STERILE: Performed by: SURGERY

## 2019-07-27 PROCEDURE — 2580000003 HC RX 258: Performed by: SURGERY

## 2019-07-27 PROCEDURE — 6360000002 HC RX W HCPCS: Performed by: SURGERY

## 2019-07-27 PROCEDURE — 99232 SBSQ HOSP IP/OBS MODERATE 35: CPT | Performed by: INTERNAL MEDICINE

## 2019-07-27 PROCEDURE — 3600000014 HC SURGERY LEVEL 4 ADDTL 15MIN: Performed by: SURGERY

## 2019-07-27 RX ORDER — ROCURONIUM BROMIDE 10 MG/ML
INJECTION, SOLUTION INTRAVENOUS PRN
Status: DISCONTINUED | OUTPATIENT
Start: 2019-07-27 | End: 2019-07-27 | Stop reason: SDUPTHER

## 2019-07-27 RX ORDER — BUPIVACAINE HYDROCHLORIDE AND EPINEPHRINE 2.5; 5 MG/ML; UG/ML
INJECTION, SOLUTION EPIDURAL; INFILTRATION; INTRACAUDAL; PERINEURAL PRN
Status: DISCONTINUED | OUTPATIENT
Start: 2019-07-27 | End: 2019-07-27 | Stop reason: ALTCHOICE

## 2019-07-27 RX ORDER — SODIUM CHLORIDE, SODIUM LACTATE, POTASSIUM CHLORIDE, CALCIUM CHLORIDE 600; 310; 30; 20 MG/100ML; MG/100ML; MG/100ML; MG/100ML
INJECTION, SOLUTION INTRAVENOUS CONTINUOUS PRN
Status: DISCONTINUED | OUTPATIENT
Start: 2019-07-27 | End: 2019-07-27 | Stop reason: SDUPTHER

## 2019-07-27 RX ORDER — FENTANYL CITRATE 50 UG/ML
INJECTION, SOLUTION INTRAMUSCULAR; INTRAVENOUS PRN
Status: DISCONTINUED | OUTPATIENT
Start: 2019-07-27 | End: 2019-07-27 | Stop reason: SDUPTHER

## 2019-07-27 RX ORDER — PROMETHAZINE HYDROCHLORIDE 25 MG/ML
6.25 INJECTION, SOLUTION INTRAMUSCULAR; INTRAVENOUS
Status: DISCONTINUED | OUTPATIENT
Start: 2019-07-27 | End: 2019-07-27 | Stop reason: HOSPADM

## 2019-07-27 RX ORDER — HYDROCODONE BITARTRATE AND ACETAMINOPHEN 5; 325 MG/1; MG/1
2 TABLET ORAL EVERY 4 HOURS PRN
Status: DISCONTINUED | OUTPATIENT
Start: 2019-07-27 | End: 2019-07-28 | Stop reason: HOSPADM

## 2019-07-27 RX ORDER — HYDROCODONE BITARTRATE AND ACETAMINOPHEN 5; 325 MG/1; MG/1
1 TABLET ORAL EVERY 4 HOURS PRN
Status: DISCONTINUED | OUTPATIENT
Start: 2019-07-27 | End: 2019-07-28 | Stop reason: HOSPADM

## 2019-07-27 RX ORDER — DEXAMETHASONE SODIUM PHOSPHATE 4 MG/ML
INJECTION, SOLUTION INTRA-ARTICULAR; INTRALESIONAL; INTRAMUSCULAR; INTRAVENOUS; SOFT TISSUE PRN
Status: DISCONTINUED | OUTPATIENT
Start: 2019-07-27 | End: 2019-07-27 | Stop reason: SDUPTHER

## 2019-07-27 RX ORDER — ONDANSETRON 2 MG/ML
INJECTION INTRAMUSCULAR; INTRAVENOUS PRN
Status: DISCONTINUED | OUTPATIENT
Start: 2019-07-27 | End: 2019-07-27 | Stop reason: SDUPTHER

## 2019-07-27 RX ORDER — LIDOCAINE HYDROCHLORIDE 20 MG/ML
INJECTION, SOLUTION EPIDURAL; INFILTRATION; INTRACAUDAL; PERINEURAL PRN
Status: DISCONTINUED | OUTPATIENT
Start: 2019-07-27 | End: 2019-07-27 | Stop reason: SDUPTHER

## 2019-07-27 RX ORDER — MIDAZOLAM HYDROCHLORIDE 1 MG/ML
INJECTION INTRAMUSCULAR; INTRAVENOUS PRN
Status: DISCONTINUED | OUTPATIENT
Start: 2019-07-27 | End: 2019-07-27 | Stop reason: SDUPTHER

## 2019-07-27 RX ORDER — PROPOFOL 10 MG/ML
INJECTION, EMULSION INTRAVENOUS PRN
Status: DISCONTINUED | OUTPATIENT
Start: 2019-07-27 | End: 2019-07-27 | Stop reason: SDUPTHER

## 2019-07-27 RX ADMIN — PANTOPRAZOLE SODIUM 40 MG: 40 INJECTION, POWDER, FOR SOLUTION INTRAVENOUS at 12:04

## 2019-07-27 RX ADMIN — PROPOFOL 200 MG: 10 INJECTION, EMULSION INTRAVENOUS at 07:50

## 2019-07-27 RX ADMIN — Medication 10 ML: at 01:09

## 2019-07-27 RX ADMIN — DEXAMETHASONE SODIUM PHOSPHATE 8 MG: 4 INJECTION, SOLUTION INTRAMUSCULAR; INTRAVENOUS at 07:58

## 2019-07-27 RX ADMIN — HYDROMORPHONE HYDROCHLORIDE 0.5 MG: 1 INJECTION, SOLUTION INTRAMUSCULAR; INTRAVENOUS; SUBCUTANEOUS at 08:45

## 2019-07-27 RX ADMIN — Medication 150 MG: at 17:00

## 2019-07-27 RX ADMIN — LIDOCAINE HYDROCHLORIDE 100 MG: 20 INJECTION, SOLUTION EPIDURAL; INFILTRATION; INTRACAUDAL; PERINEURAL at 07:50

## 2019-07-27 RX ADMIN — ROCURONIUM BROMIDE 30 MG: 10 SOLUTION INTRAVENOUS at 07:50

## 2019-07-27 RX ADMIN — SUCRALFATE 0.5 G: 1 TABLET ORAL at 16:02

## 2019-07-27 RX ADMIN — HYDROMORPHONE HYDROCHLORIDE 0.5 MG: 1 INJECTION, SOLUTION INTRAMUSCULAR; INTRAVENOUS; SUBCUTANEOUS at 08:38

## 2019-07-27 RX ADMIN — Medication 10 ML: at 12:04

## 2019-07-27 RX ADMIN — SODIUM CHLORIDE, POTASSIUM CHLORIDE, SODIUM LACTATE AND CALCIUM CHLORIDE: 600; 310; 30; 20 INJECTION, SOLUTION INTRAVENOUS at 03:55

## 2019-07-27 RX ADMIN — ONDANSETRON HYDROCHLORIDE 4 MG: 2 INJECTION, SOLUTION INTRAMUSCULAR; INTRAVENOUS at 07:58

## 2019-07-27 RX ADMIN — MIDAZOLAM HYDROCHLORIDE 2 MG: 1 INJECTION, SOLUTION INTRAMUSCULAR; INTRAVENOUS at 07:45

## 2019-07-27 RX ADMIN — MORPHINE SULFATE 2 MG: 2 INJECTION, SOLUTION INTRAMUSCULAR; INTRAVENOUS at 01:09

## 2019-07-27 RX ADMIN — SUCRALFATE 0.5 G: 1 TABLET ORAL at 21:31

## 2019-07-27 RX ADMIN — SUCRALFATE 0.5 G: 1 TABLET ORAL at 12:05

## 2019-07-27 RX ADMIN — VORTIOXETINE 20 MG: 10 TABLET, FILM COATED ORAL at 12:04

## 2019-07-27 RX ADMIN — HYDROCODONE BITARTRATE AND ACETAMINOPHEN 1 TABLET: 5; 325 TABLET ORAL at 16:02

## 2019-07-27 RX ADMIN — FENTANYL CITRATE 100 MCG: 50 INJECTION, SOLUTION INTRAMUSCULAR; INTRAVENOUS at 07:50

## 2019-07-27 RX ADMIN — Medication 2 G: at 07:39

## 2019-07-27 RX ADMIN — SODIUM CHLORIDE, POTASSIUM CHLORIDE, SODIUM LACTATE AND CALCIUM CHLORIDE: 600; 310; 30; 20 INJECTION, SOLUTION INTRAVENOUS at 07:30

## 2019-07-27 RX ADMIN — HYDROCODONE BITARTRATE AND ACETAMINOPHEN 2 TABLET: 5; 325 TABLET ORAL at 21:31

## 2019-07-27 RX ADMIN — TAMSULOSIN HYDROCHLORIDE 0.4 MG: 0.4 CAPSULE ORAL at 12:04

## 2019-07-27 RX ADMIN — SODIUM CHLORIDE, POTASSIUM CHLORIDE, SODIUM LACTATE AND CALCIUM CHLORIDE: 600; 310; 30; 20 INJECTION, SOLUTION INTRAVENOUS at 15:36

## 2019-07-27 RX ADMIN — DOCUSATE SODIUM 100 MG: 100 CAPSULE, LIQUID FILLED ORAL at 16:02

## 2019-07-27 RX ADMIN — SUGAMMADEX 300 MG: 100 INJECTION, SOLUTION INTRAVENOUS at 08:22

## 2019-07-27 ASSESSMENT — PULMONARY FUNCTION TESTS
PIF_VALUE: 19
PIF_VALUE: 2
PIF_VALUE: 1
PIF_VALUE: 22
PIF_VALUE: 22
PIF_VALUE: 0
PIF_VALUE: 3
PIF_VALUE: 2
PIF_VALUE: 18
PIF_VALUE: 17
PIF_VALUE: 3
PIF_VALUE: 23
PIF_VALUE: 17
PIF_VALUE: 22
PIF_VALUE: 4
PIF_VALUE: 0
PIF_VALUE: 23
PIF_VALUE: 20
PIF_VALUE: 19
PIF_VALUE: 19
PIF_VALUE: 23
PIF_VALUE: 22
PIF_VALUE: 22
PIF_VALUE: 3
PIF_VALUE: 29
PIF_VALUE: 20
PIF_VALUE: 20
PIF_VALUE: 16
PIF_VALUE: 18
PIF_VALUE: 22
PIF_VALUE: 28
PIF_VALUE: 0
PIF_VALUE: 6
PIF_VALUE: 21
PIF_VALUE: 25
PIF_VALUE: 20
PIF_VALUE: 21
PIF_VALUE: 23
PIF_VALUE: 0
PIF_VALUE: 17
PIF_VALUE: 0

## 2019-07-27 ASSESSMENT — PAIN DESCRIPTION - PAIN TYPE
TYPE: SURGICAL PAIN
TYPE: ACUTE PAIN
TYPE: ACUTE PAIN
TYPE: SURGICAL PAIN

## 2019-07-27 ASSESSMENT — PAIN - FUNCTIONAL ASSESSMENT
PAIN_FUNCTIONAL_ASSESSMENT: ACTIVITIES ARE NOT PREVENTED
PAIN_FUNCTIONAL_ASSESSMENT: PREVENTS OR INTERFERES SOME ACTIVE ACTIVITIES AND ADLS
PAIN_FUNCTIONAL_ASSESSMENT: ACTIVITIES ARE NOT PREVENTED

## 2019-07-27 ASSESSMENT — PAIN DESCRIPTION - LOCATION
LOCATION: ABDOMEN;CHEST
LOCATION: ABDOMEN

## 2019-07-27 ASSESSMENT — PAIN DESCRIPTION - DIRECTION
RADIATING_TOWARDS: BACK

## 2019-07-27 ASSESSMENT — PAIN SCALES - GENERAL
PAINLEVEL_OUTOF10: 6
PAINLEVEL_OUTOF10: 7
PAINLEVEL_OUTOF10: 9
PAINLEVEL_OUTOF10: 0
PAINLEVEL_OUTOF10: 8
PAINLEVEL_OUTOF10: 7
PAINLEVEL_OUTOF10: 8
PAINLEVEL_OUTOF10: 4

## 2019-07-27 ASSESSMENT — PAIN DESCRIPTION - ONSET
ONSET: ON-GOING
ONSET: GRADUAL
ONSET: SUDDEN
ONSET: ON-GOING

## 2019-07-27 ASSESSMENT — PAIN DESCRIPTION - ORIENTATION
ORIENTATION: RIGHT;UPPER
ORIENTATION: MID
ORIENTATION: RIGHT;UPPER
ORIENTATION: RIGHT;UPPER

## 2019-07-27 ASSESSMENT — PAIN DESCRIPTION - DESCRIPTORS
DESCRIPTORS: ACHING;DISCOMFORT;SORE
DESCRIPTORS: DISCOMFORT;CRAMPING
DESCRIPTORS: DISCOMFORT;CRAMPING
DESCRIPTORS: CRAMPING;SHARP

## 2019-07-27 ASSESSMENT — PAIN DESCRIPTION - FREQUENCY
FREQUENCY: CONTINUOUS

## 2019-07-27 ASSESSMENT — PAIN DESCRIPTION - PROGRESSION: CLINICAL_PROGRESSION: GRADUALLY WORSENING

## 2019-07-27 NOTE — OP NOTE
DATE OF PROCEDURE:  7/27/2019      SURGEON:  Fanny Veras M.D.      ASSISTANT:  none      PREOPERATIVE DIAGNOSIS:  Symptomatic cholelithiasis. POSTOPERATIVE DIAGNOSIS:  same      OPERATION:  Laparoscopic cholecystectomy. ANESTHESIA:  General.      ESTIMATED BLOOD LOSS:minimal      COMPLICATIONS:  None. FLUIDS:  Crystalloid. SPECIMEN:  Gallbladder. DISPOSITION:  To  home. INDICATION:  Eladia Perez is a 37 y.o. female who presented     for evaluation of right upper quadrant abdominal pain consistent with  stones. We explained the risks, benefits, potential outcomes, and   alternatives of treatment to the aforementioned procedure, including risk of   potential biliary leak or bile duct injury requiring further surgeries, infection or   bleeding requiring procedure or surgeries. She agreed to proceed   understanding those risks and potential outcomes. PROCEDURE:  The patient was brought into the operative suite and was given   preoperative antibiotics 30 minutes before incision, was placed under general   anesthesia, and then was prepped and draped in normal sterile condition. Once this was done, a 5-mm incision was made supraumbilically and a Veress   needle was passed through this incision into the peritoneum. Once this was done, CO2 was used to insufflate the abdomen to a pressure of 15 mmHg. At that time, the Veress needle was removed and replaced with a 5-mm trocar. The laparoscope was placed through that trocar and port, and once this was done, under direct visualization with   the laparoscope, an additional  10-mm port was placed in the subxiphoid area. Two right lateral abdominal ports were placed, 5 mm in size, under direct   visualization with the laparoscope. Once this was done, the gallbladder was retracted cephaladly with blunt   graspers.   Blunt dissection as well as electrocautery were able to free the   gallbladder and expose the cystic duct and

## 2019-07-27 NOTE — ANESTHESIA POSTPROCEDURE EVALUATION
Department of Anesthesiology  Postprocedure Note    Patient: Bridget Reynolds  MRN: [id number]  YOB: 1975  Date of evaluation: 7/27/2019  Time:  10:00 AM     Procedure Summary     Date:  07/27/19 Room / Location:  [facility] OR 01 / [facility] OR    Anesthesia Start:  [facility]    Procedures:       CHOLECYSTECTOMY LAPAROSCOPIC (N/A Abdomen)      EGD ESOPHAGOGASTRODUODENOSCOPY (N/A Esophagus)      lap chidi and egd Diagnosis:  (Gallstones)    Surgeon:  Rand Delong MD Responsible Provider:  Jamin Teixeira MD    Anesthesia Type:  general ASA Status:  3          Anesthesia Type: general    Marielena Phase I: Marielena Score: 10    Marielena Phase II:      Last vitals: Reviewed and per EMR flowsheets.        Anesthesia Post Evaluation    Patient location during evaluation: PACU  Patient participation: complete - patient participated  Level of consciousness: awake and alert  Pain score: 3  Airway patency: patent  Nausea & Vomiting: no vomiting and no nausea  Complications: no  Cardiovascular status: hemodynamically stable  Respiratory status: spontaneous ventilation  Hydration status: stable

## 2019-07-28 VITALS
OXYGEN SATURATION: 100 % | BODY MASS INDEX: 37.28 KG/M2 | HEART RATE: 70 BPM | WEIGHT: 232 LBS | SYSTOLIC BLOOD PRESSURE: 140 MMHG | HEIGHT: 66 IN | TEMPERATURE: 97.6 F | DIASTOLIC BLOOD PRESSURE: 67 MMHG | RESPIRATION RATE: 16 BRPM

## 2019-07-28 LAB
HCT VFR BLD CALC: 25.9 % (ref 34–48)
HEMOGLOBIN: 7.7 G/DL (ref 11.5–15.5)
MCH RBC QN AUTO: 22 PG (ref 26–35)
MCHC RBC AUTO-ENTMCNC: 29.7 % (ref 32–34.5)
MCV RBC AUTO: 74 FL (ref 80–99.9)
ORGANISM: ABNORMAL
PDW BLD-RTO: 25.5 FL (ref 11.5–15)
PLATELET # BLD: 430 E9/L (ref 130–450)
PMV BLD AUTO: 8.9 FL (ref 7–12)
RBC # BLD: 3.5 E12/L (ref 3.5–5.5)
WBC # BLD: 9.2 E9/L (ref 4.5–11.5)

## 2019-07-28 PROCEDURE — 36415 COLL VENOUS BLD VENIPUNCTURE: CPT

## 2019-07-28 PROCEDURE — C9113 INJ PANTOPRAZOLE SODIUM, VIA: HCPCS | Performed by: SURGERY

## 2019-07-28 PROCEDURE — 6370000000 HC RX 637 (ALT 250 FOR IP): Performed by: SURGERY

## 2019-07-28 PROCEDURE — 99232 SBSQ HOSP IP/OBS MODERATE 35: CPT | Performed by: INTERNAL MEDICINE

## 2019-07-28 PROCEDURE — 6360000002 HC RX W HCPCS: Performed by: SURGERY

## 2019-07-28 PROCEDURE — APPSS30 APP SPLIT SHARED TIME 16-30 MINUTES: Performed by: PHYSICIAN ASSISTANT

## 2019-07-28 PROCEDURE — 2580000003 HC RX 258: Performed by: SURGERY

## 2019-07-28 PROCEDURE — 85027 COMPLETE CBC AUTOMATED: CPT

## 2019-07-28 PROCEDURE — 99024 POSTOP FOLLOW-UP VISIT: CPT | Performed by: SURGERY

## 2019-07-28 RX ORDER — POLYETHYLENE GLYCOL 3350 17 G/17G
17 POWDER, FOR SOLUTION ORAL DAILY
Status: DISCONTINUED | OUTPATIENT
Start: 2019-07-28 | End: 2019-07-28 | Stop reason: HOSPADM

## 2019-07-28 RX ORDER — HYDROCODONE BITARTRATE AND ACETAMINOPHEN 5; 325 MG/1; MG/1
1 TABLET ORAL EVERY 6 HOURS PRN
Qty: 10 TABLET | Refills: 0 | Status: SHIPPED | OUTPATIENT
Start: 2019-07-28 | End: 2019-07-31

## 2019-07-28 RX ADMIN — VORTIOXETINE 20 MG: 10 TABLET, FILM COATED ORAL at 09:13

## 2019-07-28 RX ADMIN — PANTOPRAZOLE SODIUM 40 MG: 40 INJECTION, POWDER, FOR SOLUTION INTRAVENOUS at 09:09

## 2019-07-28 RX ADMIN — SUCRALFATE 0.5 G: 1 TABLET ORAL at 06:21

## 2019-07-28 RX ADMIN — SUCRALFATE 0.5 G: 1 TABLET ORAL at 10:39

## 2019-07-28 RX ADMIN — Medication 10 ML: at 09:09

## 2019-07-28 RX ADMIN — HYDROCODONE BITARTRATE AND ACETAMINOPHEN 1 TABLET: 5; 325 TABLET ORAL at 11:37

## 2019-07-28 RX ADMIN — POLYETHYLENE GLYCOL 3350 17 G: 17 POWDER, FOR SOLUTION ORAL at 09:12

## 2019-07-28 RX ADMIN — HYDROCODONE BITARTRATE AND ACETAMINOPHEN 1 TABLET: 5; 325 TABLET ORAL at 04:28

## 2019-07-28 RX ADMIN — Medication 1 TABLET: at 09:11

## 2019-07-28 RX ADMIN — TAMSULOSIN HYDROCHLORIDE 0.4 MG: 0.4 CAPSULE ORAL at 09:13

## 2019-07-28 ASSESSMENT — PAIN DESCRIPTION - PAIN TYPE
TYPE: SURGICAL PAIN

## 2019-07-28 ASSESSMENT — PAIN SCALES - GENERAL
PAINLEVEL_OUTOF10: 0
PAINLEVEL_OUTOF10: 6
PAINLEVEL_OUTOF10: 6
PAINLEVEL_OUTOF10: 5
PAINLEVEL_OUTOF10: 0

## 2019-07-28 ASSESSMENT — PAIN DESCRIPTION - ONSET
ONSET: ON-GOING

## 2019-07-28 ASSESSMENT — PAIN DESCRIPTION - DESCRIPTORS
DESCRIPTORS: CRAMPING
DESCRIPTORS: CRAMPING
DESCRIPTORS: SORE
DESCRIPTORS: CRAMPING;SHARP

## 2019-07-28 ASSESSMENT — PAIN DESCRIPTION - LOCATION
LOCATION: ABDOMEN
LOCATION: ABDOMEN;CHEST

## 2019-07-28 ASSESSMENT — PAIN DESCRIPTION - DIRECTION
RADIATING_TOWARDS: BACK
RADIATING_TOWARDS: BACK

## 2019-07-28 ASSESSMENT — PAIN - FUNCTIONAL ASSESSMENT
PAIN_FUNCTIONAL_ASSESSMENT: ACTIVITIES ARE NOT PREVENTED

## 2019-07-28 ASSESSMENT — PAIN DESCRIPTION - ORIENTATION
ORIENTATION: RIGHT;UPPER

## 2019-07-28 ASSESSMENT — PAIN DESCRIPTION - FREQUENCY
FREQUENCY: INTERMITTENT
FREQUENCY: INTERMITTENT

## 2019-07-28 ASSESSMENT — PAIN DESCRIPTION - PROGRESSION
CLINICAL_PROGRESSION: GRADUALLY IMPROVING
CLINICAL_PROGRESSION: GRADUALLY IMPROVING

## 2019-07-28 NOTE — PROGRESS NOTES
JVD  Abdomen: soft, non-tender, non-distended, normal bowel sounds. Incision sites are approximated well no surrounding erythema. Recent Labs     07/27/19  0355      K 3.7      CO2 24   BUN 11   CREATININE 0.7   GLUCOSE 82   CALCIUM 8.3*       Recent Labs     07/26/19  0329 07/26/19  1833 07/27/19  0355 07/28/19  0420   WBC 8.8  --  8.2 9.2   RBC 3.22*  --  3.44* 3.50   HGB 6.9* 7.5* 7.6* 7.7*   HCT 23.5* 24.9* 25.7* 25.9*   MCV 73.0*  --  74.7* 74.0*   MCH 21.4*  --  22.1* 22.0*   MCHC 29.4*  --  29.6* 29.7*   RDW 24.3*  --  25.1* 25.5*     --  345 430   MPV 8.8  --  8.5 8.9       Radiology:   US PELVIS COMPLETE   Final Result   Dorsal fundal uterine fibroid of 4.3 cm diameter and some dependent   free pelvic fluid. The endometrial stripe is echogenic and normal in the fundal region,   but the course of the uterus contributes to indistinct endometrial   contours in the body and endocervical region, where the endometrium is   irregular, and clinical evaluation-or short-term follow-up with   therapy- may be necessary to exclude endometrial pathology in this age   range. This could be further characterized with MRI if necessary. 1.5 cm diameter cervical cyst.      Nonvisualization of the ovaries. ALERT:  THIS IS AN ABNORMAL REPORT-fundal endometrial fibroid and   abnormal appearance of the endometrial stripe, which is suboptimally   visualized on this examination. Endovaginal imaging or MRI is   recommended for further characterization if clinical intervention is   not elected initially. FL RETROGRADE PYELOGRAM W WO KUB   Final Result   Intraoperative fluoroscopy for bilateral retrograde   pyelogram revealing a duplex collecting system on the left, and   filling defect compatible with calculus in the right distal ureter,   followed by placement of a right ureteral stent. The exam has been dictated and signed by PARK Carter II MD, Radiologist, have reviewed the images and   report and concur with these findings. US ABDOMEN LIMITED   Final Result   Cholelithiasis. Pelvocaliectasis on the right as   previously reported. Hepatomegaly. XR ABDOMEN (2 VIEWS)   Final Result   NON-SPECIFIC GAS PATTERN. Prominent colonic air              Assessment:    Active Problems:    Symptomatic cholelithiasis    Upper abdominal pain    H/O gastric ulcer    Hydronephrosis with urinary obstruction due to ureteral calculus    Nausea and vomiting    Acute bilateral low back pain without sciatica    Anxiety and depression    Cigarette smoker    Ureterolithiasis  Resolved Problems:    * No resolved hospital problems. *      Plan:  1. Epigastric pain  - General surgery following, S/P Lap chidi. General surgery discharged pt today.       2. Right ureterolithiasis  -Urology consulted, S/p stent placement, that was subsequently removed as patient dislodged by pulling on strings.     3. Back pain  - improved with Norflex      4. Anemia  - Hemoglobin currently 7.7    5. Anxiety/depression  -Stable, continue current regimen     6. H/o asthma  -Stable, continue current regimen     7. Dysfunctional uterine Bleeding   -Consult placed to OB/GYN, given the patient follow-up as an outpatient.   -Ultrasound of the pelvis was completed, uterine fibroid was evident but appeared stable if not smaller. Per GYN documentation patient does not want to do anything that could affect her fertility and refused to have endometrial biopsy done in the office. 8. Constipation  - Resolved     9. Urinary Incontinence  - Resolved, stent was removed by urology as patient dislodged stent when pulling strings     10. Tobacco Use  -She has been without nicotine since admission. She states that she would like to continue cessation upon discharge. Discussed at length patient the modes for continuing nicotine sensation.         Electronically signed by Liz Banerjee

## 2019-07-28 NOTE — DISCHARGE SUMMARY
daily             TRINTELLIX 20 MG TABS tablet  Take 20 mg by mouth every morning                    Hospital Course: Had uncomplicated ureter stent placement of obstructing uropathy then lap chidi and EGD and uncomplicated post operative course and was discharged tolerating a diet, having good bowel function, ambulating independently and with adequate analgesia. Discharge Exam: /67   Pulse 69   Temp 97.7 °F (36.5 °C) (Oral)   Resp 16   Ht 5' 6\" (1.676 m)   Wt 232 lb (105.2 kg)   LMP 07/20/2019   SpO2 97%   BMI 37.45 kg/m²     General appearance: alert, appears stated age and cooperative  Head: Normocephalic, without obvious abnormality, atraumatic  Neck: no adenopathy, no carotid bruit, no JVD, supple, symmetrical, trachea midline and thyroid not enlarged, symmetric, no tenderness/mass/nodules  Lungs: clear to auscultation bilaterally  Heart: regular rate and rhythm, S1, S2 normal, no murmur, click, rub or gallop  Abdomen: soft, non-tender; bowel sounds normal; no masses,  no organomegaly and incisions clean and dry  Extremities: extremities normal, atraumatic, no cyanosis or edema      Disposition: home    Patient Instructions: Activity: activity as tolerated  Diet: bariatric diet  Wound Care: keep wound clean and dry    Follow-up with Dr. Michael Huerta in 2 weeks.     Signed:  Luana Hauser  7/28/2019  9:04 AM

## 2019-07-29 LAB
BLOOD BANK DISPENSE STATUS: NORMAL
BLOOD BANK DISPENSE STATUS: NORMAL
BLOOD BANK PRODUCT CODE: NORMAL
BLOOD BANK PRODUCT CODE: NORMAL
BPU ID: NORMAL
BPU ID: NORMAL
DESCRIPTION BLOOD BANK: NORMAL
DESCRIPTION BLOOD BANK: NORMAL

## 2019-08-16 ENCOUNTER — OFFICE VISIT (OUTPATIENT)
Dept: FAMILY MEDICINE CLINIC | Age: 44
End: 2019-08-16
Payer: MEDICARE

## 2019-08-16 VITALS
RESPIRATION RATE: 18 BRPM | HEIGHT: 66 IN | WEIGHT: 197 LBS | BODY MASS INDEX: 31.66 KG/M2 | SYSTOLIC BLOOD PRESSURE: 110 MMHG | TEMPERATURE: 97.6 F | HEART RATE: 78 BPM | DIASTOLIC BLOOD PRESSURE: 54 MMHG | OXYGEN SATURATION: 100 %

## 2019-08-16 DIAGNOSIS — D50.9 IRON DEFICIENCY ANEMIA, UNSPECIFIED IRON DEFICIENCY ANEMIA TYPE: ICD-10-CM

## 2019-08-16 DIAGNOSIS — Z87.442 PERSONAL HISTORY OF KIDNEY STONES: ICD-10-CM

## 2019-08-16 DIAGNOSIS — Z09 HOSPITAL DISCHARGE FOLLOW-UP: ICD-10-CM

## 2019-08-16 DIAGNOSIS — Z90.49 HISTORY OF LAPAROSCOPIC CHOLECYSTECTOMY: ICD-10-CM

## 2019-08-16 DIAGNOSIS — N94.6 DYSMENORRHEA: Primary | ICD-10-CM

## 2019-08-16 PROCEDURE — 1111F DSCHRG MED/CURRENT MED MERGE: CPT | Performed by: STUDENT IN AN ORGANIZED HEALTH CARE EDUCATION/TRAINING PROGRAM

## 2019-08-16 PROCEDURE — 4004F PT TOBACCO SCREEN RCVD TLK: CPT | Performed by: STUDENT IN AN ORGANIZED HEALTH CARE EDUCATION/TRAINING PROGRAM

## 2019-08-16 PROCEDURE — G8427 DOCREV CUR MEDS BY ELIG CLIN: HCPCS | Performed by: STUDENT IN AN ORGANIZED HEALTH CARE EDUCATION/TRAINING PROGRAM

## 2019-08-16 PROCEDURE — 99212 OFFICE O/P EST SF 10 MIN: CPT | Performed by: STUDENT IN AN ORGANIZED HEALTH CARE EDUCATION/TRAINING PROGRAM

## 2019-08-16 PROCEDURE — 99213 OFFICE O/P EST LOW 20 MIN: CPT | Performed by: STUDENT IN AN ORGANIZED HEALTH CARE EDUCATION/TRAINING PROGRAM

## 2019-08-16 PROCEDURE — G8417 CALC BMI ABV UP PARAM F/U: HCPCS | Performed by: STUDENT IN AN ORGANIZED HEALTH CARE EDUCATION/TRAINING PROGRAM

## 2019-08-16 RX ORDER — ONDANSETRON 4 MG/1
4 TABLET, ORALLY DISINTEGRATING ORAL EVERY 8 HOURS PRN
Qty: 10 TABLET | Refills: 0 | Status: SHIPPED | OUTPATIENT
Start: 2019-08-16 | End: 2019-10-24 | Stop reason: SDUPTHER

## 2019-08-17 ASSESSMENT — ENCOUNTER SYMPTOMS
WHEEZING: 0
ABDOMINAL PAIN: 0
COLOR CHANGE: 0
SHORTNESS OF BREATH: 0
SORE THROAT: 0
CONSTIPATION: 0
ANAL BLEEDING: 0
ABDOMINAL DISTENTION: 0
EYE ITCHING: 0
COUGH: 0
VOMITING: 0
BACK PAIN: 0
DIARRHEA: 0
EYE REDNESS: 0
NAUSEA: 0
BLOOD IN STOOL: 0
EYE DISCHARGE: 0
RHINORRHEA: 0
RECTAL PAIN: 0

## 2019-09-27 ENCOUNTER — OFFICE VISIT (OUTPATIENT)
Dept: PHYSICAL MEDICINE AND REHAB | Age: 44
End: 2019-09-27
Payer: MEDICARE

## 2019-09-27 VITALS
DIASTOLIC BLOOD PRESSURE: 81 MMHG | SYSTOLIC BLOOD PRESSURE: 133 MMHG | BODY MASS INDEX: 31.18 KG/M2 | WEIGHT: 194 LBS | TEMPERATURE: 97.9 F | HEIGHT: 66 IN | HEART RATE: 82 BPM

## 2019-09-27 DIAGNOSIS — G89.29 CHRONIC PAIN OF RIGHT KNEE: Primary | ICD-10-CM

## 2019-09-27 DIAGNOSIS — M25.561 CHRONIC PAIN OF RIGHT KNEE: Primary | ICD-10-CM

## 2019-09-27 DIAGNOSIS — M17.11 PRIMARY OSTEOARTHRITIS OF RIGHT KNEE: ICD-10-CM

## 2019-09-27 PROCEDURE — 20611 DRAIN/INJ JOINT/BURSA W/US: CPT | Performed by: PHYSICAL MEDICINE & REHABILITATION

## 2019-09-27 RX ORDER — LIDOCAINE HYDROCHLORIDE 10 MG/ML
7 INJECTION, SOLUTION INFILTRATION; PERINEURAL ONCE
Status: COMPLETED | OUTPATIENT
Start: 2019-09-27 | End: 2019-09-27

## 2019-09-27 RX ORDER — TRIAMCINOLONE ACETONIDE 40 MG/ML
40 INJECTION, SUSPENSION INTRA-ARTICULAR; INTRAMUSCULAR ONCE
Status: COMPLETED | OUTPATIENT
Start: 2019-09-27 | End: 2019-09-27

## 2019-09-27 RX ADMIN — LIDOCAINE HYDROCHLORIDE 7 ML: 10 INJECTION, SOLUTION INFILTRATION; PERINEURAL at 16:11

## 2019-09-27 RX ADMIN — TRIAMCINOLONE ACETONIDE 40 MG: 40 INJECTION, SUSPENSION INTRA-ARTICULAR; INTRAMUSCULAR at 16:12

## 2019-10-03 ENCOUNTER — OFFICE VISIT (OUTPATIENT)
Dept: PHYSICAL MEDICINE AND REHAB | Age: 44
End: 2019-10-03
Payer: MEDICARE

## 2019-10-03 VITALS
BODY MASS INDEX: 30.22 KG/M2 | WEIGHT: 188 LBS | DIASTOLIC BLOOD PRESSURE: 76 MMHG | HEART RATE: 76 BPM | SYSTOLIC BLOOD PRESSURE: 118 MMHG | TEMPERATURE: 98 F | HEIGHT: 66 IN

## 2019-10-03 DIAGNOSIS — M17.12 PRIMARY LOCALIZED OSTEOARTHRITIS OF LEFT KNEE: Primary | ICD-10-CM

## 2019-10-03 PROCEDURE — 20611 DRAIN/INJ JOINT/BURSA W/US: CPT | Performed by: PHYSICAL MEDICINE & REHABILITATION

## 2019-10-03 RX ORDER — TRIAMCINOLONE ACETONIDE 40 MG/ML
40 INJECTION, SUSPENSION INTRA-ARTICULAR; INTRAMUSCULAR ONCE
Status: COMPLETED | OUTPATIENT
Start: 2019-10-03 | End: 2019-10-03

## 2019-10-03 RX ORDER — LIDOCAINE HYDROCHLORIDE 10 MG/ML
7 INJECTION, SOLUTION INFILTRATION; PERINEURAL ONCE
Status: COMPLETED | OUTPATIENT
Start: 2019-10-03 | End: 2019-10-03

## 2019-10-03 RX ADMIN — TRIAMCINOLONE ACETONIDE 40 MG: 40 INJECTION, SUSPENSION INTRA-ARTICULAR; INTRAMUSCULAR at 10:35

## 2019-10-03 RX ADMIN — LIDOCAINE HYDROCHLORIDE 7 ML: 10 INJECTION, SOLUTION INFILTRATION; PERINEURAL at 10:34

## 2019-10-09 ENCOUNTER — TELEPHONE (OUTPATIENT)
Dept: PHYSICAL MEDICINE AND REHAB | Age: 44
End: 2019-10-09

## 2019-10-23 ENCOUNTER — HOSPITAL ENCOUNTER (OUTPATIENT)
Age: 44
Discharge: HOME | End: 2019-10-25

## 2019-10-23 DIAGNOSIS — F41.9 ANXIETY: ICD-10-CM

## 2019-10-23 PROCEDURE — 88305 TISSUE EXAM BY PATHOLOGIST: CPT

## 2019-10-24 RX ORDER — HYDROXYZINE PAMOATE 25 MG/1
25 CAPSULE ORAL 3 TIMES DAILY PRN
Qty: 30 CAPSULE | Refills: 3 | Status: SHIPPED | OUTPATIENT
Start: 2019-10-24 | End: 2019-10-29

## 2019-10-24 RX ORDER — ONDANSETRON 4 MG/1
4 TABLET, ORALLY DISINTEGRATING ORAL EVERY 8 HOURS PRN
Qty: 10 TABLET | Refills: 0 | Status: SHIPPED | OUTPATIENT
Start: 2019-10-24 | End: 2020-01-24

## 2019-10-25 ENCOUNTER — TELEPHONE (OUTPATIENT)
Dept: FAMILY MEDICINE CLINIC | Age: 44
End: 2019-10-25

## 2019-10-25 DIAGNOSIS — F41.9 ANXIETY: ICD-10-CM

## 2019-10-29 ENCOUNTER — TELEPHONE (OUTPATIENT)
Dept: FAMILY MEDICINE CLINIC | Age: 44
End: 2019-10-29

## 2019-10-29 DIAGNOSIS — F41.9 ANXIETY: ICD-10-CM

## 2019-10-29 RX ORDER — HYDROXYZINE PAMOATE 50 MG/1
50 CAPSULE ORAL 3 TIMES DAILY PRN
Qty: 90 CAPSULE | Refills: 1 | Status: SHIPPED | OUTPATIENT
Start: 2019-10-29 | End: 2019-10-29 | Stop reason: SDUPTHER

## 2019-10-29 RX ORDER — HYDROXYZINE PAMOATE 50 MG/1
50 CAPSULE ORAL 3 TIMES DAILY PRN
Qty: 90 CAPSULE | Refills: 1 | Status: SHIPPED
Start: 2019-10-29 | End: 2022-10-20

## 2019-11-04 ENCOUNTER — TELEPHONE (OUTPATIENT)
Dept: BARIATRICS/WEIGHT MGMT | Age: 44
End: 2019-11-04

## 2019-11-06 ENCOUNTER — HOSPITAL ENCOUNTER (OUTPATIENT)
Age: 44
Discharge: HOME | End: 2019-11-06
Payer: MEDICARE

## 2019-11-06 ENCOUNTER — HOSPITAL ENCOUNTER (EMERGENCY)
Age: 44
Discharge: HOME | End: 2019-11-06
Attending: EMERGENCY MEDICINE
Payer: MEDICARE

## 2019-11-06 ENCOUNTER — APPOINTMENT (OUTPATIENT)
Dept: CT IMAGING | Age: 44
End: 2019-11-06
Payer: MEDICARE

## 2019-11-06 VITALS
HEART RATE: 66 BPM | WEIGHT: 185 LBS | HEIGHT: 66 IN | OXYGEN SATURATION: 100 % | BODY MASS INDEX: 29.73 KG/M2 | RESPIRATION RATE: 14 BRPM | SYSTOLIC BLOOD PRESSURE: 106 MMHG | DIASTOLIC BLOOD PRESSURE: 61 MMHG | TEMPERATURE: 98 F

## 2019-11-06 DIAGNOSIS — K91.2 MALNUTRITION FOLLOWING GASTROINTESTINAL SURGERY: ICD-10-CM

## 2019-11-06 DIAGNOSIS — N39.0 URINARY TRACT INFECTION WITH HEMATURIA, SITE UNSPECIFIED: ICD-10-CM

## 2019-11-06 DIAGNOSIS — K91.2 POSTSURGICAL NONABSORPTION: ICD-10-CM

## 2019-11-06 DIAGNOSIS — R10.9 ABDOMINAL PAIN, UNSPECIFIED ABDOMINAL LOCATION: Primary | ICD-10-CM

## 2019-11-06 DIAGNOSIS — R31.9 URINARY TRACT INFECTION WITH HEMATURIA, SITE UNSPECIFIED: ICD-10-CM

## 2019-11-06 LAB
ALBUMIN SERPL-MCNC: 3.5 G/DL (ref 3.5–5.2)
ALBUMIN SERPL-MCNC: 3.6 G/DL (ref 3.5–5.2)
ALP BLD-CCNC: 94 U/L (ref 35–104)
ALP BLD-CCNC: 94 U/L (ref 35–104)
ALT SERPL-CCNC: <5 U/L (ref 0–32)
ALT SERPL-CCNC: <5 U/L (ref 0–32)
ANION GAP SERPL CALCULATED.3IONS-SCNC: 13 MMOL/L (ref 7–16)
ANION GAP SERPL CALCULATED.3IONS-SCNC: 13 MMOL/L (ref 7–16)
AST SERPL-CCNC: 10 U/L (ref 0–31)
AST SERPL-CCNC: 9 U/L (ref 0–31)
BACTERIA: ABNORMAL /HPF
BASOPHILS ABSOLUTE: 0.05 E9/L (ref 0–0.2)
BASOPHILS RELATIVE PERCENT: 0.6 % (ref 0–2)
BILIRUB SERPL-MCNC: 0.4 MG/DL (ref 0–1.2)
BILIRUB SERPL-MCNC: 0.4 MG/DL (ref 0–1.2)
BILIRUBIN URINE: ABNORMAL
BLOOD, URINE: ABNORMAL
BUN BLDV-MCNC: 12 MG/DL (ref 6–20)
BUN BLDV-MCNC: 12 MG/DL (ref 6–20)
CALCIUM SERPL-MCNC: 8.6 MG/DL (ref 8.6–10.2)
CALCIUM SERPL-MCNC: 8.7 MG/DL (ref 8.6–10.2)
CASTS: ABNORMAL /LPF
CHLORIDE BLD-SCNC: 100 MMOL/L (ref 98–107)
CHLORIDE BLD-SCNC: 99 MMOL/L (ref 98–107)
CHOLESTEROL, TOTAL: 138 MG/DL (ref 0–199)
CHP ED QC CHECK: NORMAL
CLARITY: ABNORMAL
CO2: 24 MMOL/L (ref 22–29)
CO2: 24 MMOL/L (ref 22–29)
COLOR: ABNORMAL
CREAT SERPL-MCNC: 0.7 MG/DL (ref 0.5–1)
CREAT SERPL-MCNC: 0.7 MG/DL (ref 0.5–1)
EKG ATRIAL RATE: 61 BPM
EKG P AXIS: 60 DEGREES
EKG P-R INTERVAL: 146 MS
EKG Q-T INTERVAL: 410 MS
EKG QRS DURATION: 84 MS
EKG QTC CALCULATION (BAZETT): 412 MS
EKG R AXIS: 61 DEGREES
EKG T AXIS: 51 DEGREES
EKG VENTRICULAR RATE: 61 BPM
EOSINOPHILS ABSOLUTE: 0.04 E9/L (ref 0.05–0.5)
EOSINOPHILS RELATIVE PERCENT: 0.5 % (ref 0–6)
EPITHELIAL CELLS, UA: ABNORMAL /HPF
FERRITIN: 5 NG/ML
FOLATE: 9.4 NG/ML (ref 4.8–24.2)
GFR AFRICAN AMERICAN: >60
GFR AFRICAN AMERICAN: >60
GFR NON-AFRICAN AMERICAN: >60 ML/MIN/1.73
GFR NON-AFRICAN AMERICAN: >60 ML/MIN/1.73
GLUCOSE BLD-MCNC: 86 MG/DL
GLUCOSE BLD-MCNC: 87 MG/DL (ref 74–99)
GLUCOSE BLD-MCNC: 89 MG/DL (ref 74–99)
GLUCOSE URINE: NEGATIVE MG/DL
HCG, URINE, POC: NEGATIVE
HCT VFR BLD CALC: 28 % (ref 34–48)
HCT VFR BLD CALC: 28.1 % (ref 34–48)
HEMOGLOBIN: 8.5 G/DL (ref 11.5–15.5)
HEMOGLOBIN: 8.5 G/DL (ref 11.5–15.5)
IMMATURE GRANULOCYTES #: 0.03 E9/L
IMMATURE GRANULOCYTES %: 0.3 % (ref 0–5)
KETONES, URINE: ABNORMAL MG/DL
LACTIC ACID: 0.8 MMOL/L (ref 0.5–2.2)
LEUKOCYTE ESTERASE, URINE: ABNORMAL
LIPASE: 6 U/L (ref 13–60)
LYMPHOCYTES ABSOLUTE: 2.41 E9/L (ref 1.5–4)
LYMPHOCYTES RELATIVE PERCENT: 27.8 % (ref 20–42)
Lab: NORMAL
MCH RBC QN AUTO: 23 PG (ref 26–35)
MCH RBC QN AUTO: 23.2 PG (ref 26–35)
MCHC RBC AUTO-ENTMCNC: 30.2 % (ref 32–34.5)
MCHC RBC AUTO-ENTMCNC: 30.4 % (ref 32–34.5)
MCV RBC AUTO: 76.2 FL (ref 80–99.9)
MCV RBC AUTO: 76.5 FL (ref 80–99.9)
METER GLUCOSE: 86 MG/DL (ref 74–99)
MONOCYTES ABSOLUTE: 0.45 E9/L (ref 0.1–0.95)
MONOCYTES RELATIVE PERCENT: 5.2 % (ref 2–12)
NEGATIVE QC PASS/FAIL: NORMAL
NEUTROPHILS ABSOLUTE: 5.68 E9/L (ref 1.8–7.3)
NEUTROPHILS RELATIVE PERCENT: 65.6 % (ref 43–80)
NITRITE, URINE: POSITIVE
PDW BLD-RTO: 17.6 FL (ref 11.5–15)
PDW BLD-RTO: 17.7 FL (ref 11.5–15)
PH UA: 6.5 (ref 5–9)
PLATELET # BLD: 551 E9/L (ref 130–450)
PLATELET # BLD: 592 E9/L (ref 130–450)
PMV BLD AUTO: 8.5 FL (ref 7–12)
PMV BLD AUTO: 8.7 FL (ref 7–12)
POSITIVE QC PASS/FAIL: NORMAL
POTASSIUM REFLEX MAGNESIUM: 3.9 MMOL/L (ref 3.5–5)
POTASSIUM SERPL-SCNC: 4.1 MMOL/L (ref 3.5–5)
PREALBUMIN: 16 MG/DL (ref 20–40)
PROTEIN UA: ABNORMAL MG/DL
RBC # BLD: 3.66 E12/L (ref 3.5–5.5)
RBC # BLD: 3.69 E12/L (ref 3.5–5.5)
RBC UA: ABNORMAL /HPF (ref 0–2)
SODIUM BLD-SCNC: 136 MMOL/L (ref 132–146)
SODIUM BLD-SCNC: 137 MMOL/L (ref 132–146)
SPECIFIC GRAVITY UA: 1.01 (ref 1–1.03)
TOTAL PROTEIN: 6.5 G/DL (ref 6.4–8.3)
TOTAL PROTEIN: 6.5 G/DL (ref 6.4–8.3)
TRIGL SERPL-MCNC: 62 MG/DL (ref 0–149)
TROPONIN: <0.01 NG/ML (ref 0–0.03)
UROBILINOGEN, URINE: 0.2 E.U./DL
VITAMIN B-12: 333 PG/ML (ref 211–946)
VITAMIN D 25-HYDROXY: 24 NG/ML (ref 30–100)
WBC # BLD: 13.3 E9/L (ref 4.5–11.5)
WBC # BLD: 8.7 E9/L (ref 4.5–11.5)
WBC UA: >20 /HPF (ref 0–5)

## 2019-11-06 PROCEDURE — 82306 VITAMIN D 25 HYDROXY: CPT

## 2019-11-06 PROCEDURE — 82746 ASSAY OF FOLIC ACID SERUM: CPT

## 2019-11-06 PROCEDURE — 82465 ASSAY BLD/SERUM CHOLESTEROL: CPT

## 2019-11-06 PROCEDURE — 36415 COLL VENOUS BLD VENIPUNCTURE: CPT

## 2019-11-06 PROCEDURE — 84484 ASSAY OF TROPONIN QUANT: CPT

## 2019-11-06 PROCEDURE — 84134 ASSAY OF PREALBUMIN: CPT

## 2019-11-06 PROCEDURE — 84478 ASSAY OF TRIGLYCERIDES: CPT

## 2019-11-06 PROCEDURE — 85025 COMPLETE CBC W/AUTO DIFF WBC: CPT

## 2019-11-06 PROCEDURE — 87088 URINE BACTERIA CULTURE: CPT

## 2019-11-06 PROCEDURE — 93005 ELECTROCARDIOGRAM TRACING: CPT | Performed by: EMERGENCY MEDICINE

## 2019-11-06 PROCEDURE — 99284 EMERGENCY DEPT VISIT MOD MDM: CPT

## 2019-11-06 PROCEDURE — 80053 COMPREHEN METABOLIC PANEL: CPT

## 2019-11-06 PROCEDURE — 83690 ASSAY OF LIPASE: CPT

## 2019-11-06 PROCEDURE — 81001 URINALYSIS AUTO W/SCOPE: CPT

## 2019-11-06 PROCEDURE — 82607 VITAMIN B-12: CPT

## 2019-11-06 PROCEDURE — 85027 COMPLETE CBC AUTOMATED: CPT

## 2019-11-06 PROCEDURE — 83605 ASSAY OF LACTIC ACID: CPT

## 2019-11-06 PROCEDURE — 82962 GLUCOSE BLOOD TEST: CPT

## 2019-11-06 PROCEDURE — 87186 SC STD MICRODIL/AGAR DIL: CPT

## 2019-11-06 PROCEDURE — 2580000003 HC RX 258: Performed by: EMERGENCY MEDICINE

## 2019-11-06 PROCEDURE — 96374 THER/PROPH/DIAG INJ IV PUSH: CPT

## 2019-11-06 PROCEDURE — 74177 CT ABD & PELVIS W/CONTRAST: CPT

## 2019-11-06 PROCEDURE — 6360000004 HC RX CONTRAST MEDICATION: Performed by: RADIOLOGY

## 2019-11-06 PROCEDURE — 82728 ASSAY OF FERRITIN: CPT

## 2019-11-06 PROCEDURE — 6360000002 HC RX W HCPCS: Performed by: EMERGENCY MEDICINE

## 2019-11-06 PROCEDURE — 93010 ELECTROCARDIOGRAM REPORT: CPT | Performed by: INTERNAL MEDICINE

## 2019-11-06 RX ORDER — 0.9 % SODIUM CHLORIDE 0.9 %
1000 INTRAVENOUS SOLUTION INTRAVENOUS ONCE
Status: COMPLETED | OUTPATIENT
Start: 2019-11-06 | End: 2019-11-06

## 2019-11-06 RX ORDER — NITROFURANTOIN 25; 75 MG/1; MG/1
100 CAPSULE ORAL 2 TIMES DAILY
Qty: 6 CAPSULE | Refills: 0 | Status: SHIPPED | OUTPATIENT
Start: 2019-11-06 | End: 2019-11-09

## 2019-11-06 RX ORDER — ONDANSETRON 2 MG/ML
4 INJECTION INTRAMUSCULAR; INTRAVENOUS ONCE
Status: DISCONTINUED | OUTPATIENT
Start: 2019-11-06 | End: 2019-11-06 | Stop reason: HOSPADM

## 2019-11-06 RX ORDER — FENTANYL CITRATE 50 UG/ML
25 INJECTION, SOLUTION INTRAMUSCULAR; INTRAVENOUS ONCE
Status: DISCONTINUED | OUTPATIENT
Start: 2019-11-06 | End: 2019-11-06 | Stop reason: HOSPADM

## 2019-11-06 RX ORDER — KETOROLAC TROMETHAMINE 30 MG/ML
15 INJECTION, SOLUTION INTRAMUSCULAR; INTRAVENOUS ONCE
Status: COMPLETED | OUTPATIENT
Start: 2019-11-06 | End: 2019-11-06

## 2019-11-06 RX ADMIN — IOPAMIDOL 110 ML: 755 INJECTION, SOLUTION INTRAVENOUS at 09:12

## 2019-11-06 RX ADMIN — SODIUM CHLORIDE 1000 ML: 9 INJECTION, SOLUTION INTRAVENOUS at 07:42

## 2019-11-06 RX ADMIN — KETOROLAC TROMETHAMINE 15 MG: 30 INJECTION, SOLUTION INTRAMUSCULAR; INTRAVENOUS at 08:02

## 2019-11-06 ASSESSMENT — ENCOUNTER SYMPTOMS
VOMITING: 0
DIARRHEA: 0
CHEST TIGHTNESS: 0
NAUSEA: 1
BACK PAIN: 0
ABDOMINAL PAIN: 1
COUGH: 0
SINUS PAIN: 0
SHORTNESS OF BREATH: 0
SORE THROAT: 0

## 2019-11-06 ASSESSMENT — PAIN SCALES - GENERAL
PAINLEVEL_OUTOF10: 0
PAINLEVEL_OUTOF10: 4
PAINLEVEL_OUTOF10: 6

## 2019-11-06 ASSESSMENT — PAIN DESCRIPTION - LOCATION: LOCATION: ABDOMEN

## 2019-11-07 PROBLEM — N84.0 ENDOMETRIAL POLYP: Status: ACTIVE | Noted: 2019-11-07

## 2019-11-07 PROBLEM — D25.0 SUBMUCOUS UTERINE FIBROID: Status: ACTIVE | Noted: 2019-11-07

## 2019-11-08 LAB
ORGANISM: ABNORMAL
URINE CULTURE, ROUTINE: ABNORMAL

## 2019-11-13 ENCOUNTER — INITIAL CONSULT (OUTPATIENT)
Dept: BARIATRICS/WEIGHT MGMT | Age: 44
End: 2019-11-13
Payer: MEDICARE

## 2019-11-13 ENCOUNTER — OFFICE VISIT (OUTPATIENT)
Dept: BARIATRICS/WEIGHT MGMT | Age: 44
End: 2019-11-13
Payer: MEDICARE

## 2019-11-13 VITALS
HEIGHT: 66 IN | DIASTOLIC BLOOD PRESSURE: 56 MMHG | WEIGHT: 181 LBS | RESPIRATION RATE: 20 BRPM | BODY MASS INDEX: 29.09 KG/M2 | SYSTOLIC BLOOD PRESSURE: 90 MMHG | TEMPERATURE: 96.1 F | HEART RATE: 116 BPM

## 2019-11-13 DIAGNOSIS — K91.2 MALNUTRITION FOLLOWING GASTROINTESTINAL SURGERY: Primary | ICD-10-CM

## 2019-11-13 DIAGNOSIS — D64.9 LOW HEMOGLOBIN AND LOW HEMATOCRIT: ICD-10-CM

## 2019-11-13 DIAGNOSIS — E55.9 VITAMIN D DEFICIENCY: ICD-10-CM

## 2019-11-13 DIAGNOSIS — E61.1 IRON DEFICIENCY: ICD-10-CM

## 2019-11-13 DIAGNOSIS — R79.0 LOW FERRITIN: Primary | ICD-10-CM

## 2019-11-13 DIAGNOSIS — E65 ABDOMINAL PANNUS: ICD-10-CM

## 2019-11-13 PROCEDURE — 99213 OFFICE O/P EST LOW 20 MIN: CPT | Performed by: SURGERY

## 2019-11-13 PROCEDURE — 99212 OFFICE O/P EST SF 10 MIN: CPT

## 2019-11-13 RX ORDER — ERGOCALCIFEROL 1.25 MG/1
50000 CAPSULE ORAL WEEKLY
Qty: 12 CAPSULE | Refills: 1 | Status: SHIPPED | OUTPATIENT
Start: 2019-11-13 | End: 2020-01-24

## 2019-11-13 RX ORDER — ERGOCALCIFEROL 1.25 MG/1
50000 CAPSULE ORAL
Qty: 24 CAPSULE | Refills: 0 | Status: SHIPPED | OUTPATIENT
Start: 2019-11-14 | End: 2020-01-24

## 2019-11-13 RX ORDER — ONDANSETRON 4 MG/1
4 TABLET, FILM COATED ORAL DAILY PRN
Qty: 30 TABLET | Refills: 0 | Status: SHIPPED | OUTPATIENT
Start: 2019-11-13 | End: 2020-03-19 | Stop reason: SDUPTHER

## 2019-11-14 ENCOUNTER — OFFICE VISIT (OUTPATIENT)
Dept: FAMILY MEDICINE CLINIC | Age: 44
End: 2019-11-14
Payer: MEDICARE

## 2019-11-14 VITALS
SYSTOLIC BLOOD PRESSURE: 91 MMHG | WEIGHT: 184 LBS | HEIGHT: 66 IN | HEART RATE: 84 BPM | RESPIRATION RATE: 18 BRPM | BODY MASS INDEX: 29.57 KG/M2 | DIASTOLIC BLOOD PRESSURE: 59 MMHG | OXYGEN SATURATION: 100 % | TEMPERATURE: 98.1 F

## 2019-11-14 DIAGNOSIS — R30.0 DYSURIA: ICD-10-CM

## 2019-11-14 DIAGNOSIS — S16.1XXA STRAIN OF NECK MUSCLE, INITIAL ENCOUNTER: ICD-10-CM

## 2019-11-14 DIAGNOSIS — Z72.0 TOBACCO ABUSE: Primary | ICD-10-CM

## 2019-11-14 LAB
BILIRUBIN, POC: ABNORMAL
BLOOD URINE, POC: ABNORMAL
CLARITY, POC: CLEAR
COLOR, POC: YELLOW
GLUCOSE URINE, POC: ABNORMAL
KETONES, POC: ABNORMAL
LEUKOCYTE EST, POC: ABNORMAL
NITRITE, POC: ABNORMAL
PH, POC: 7.5
PROTEIN, POC: ABNORMAL
SPECIFIC GRAVITY, POC: 1.02
UROBILINOGEN, POC: 1

## 2019-11-14 PROCEDURE — 99212 OFFICE O/P EST SF 10 MIN: CPT | Performed by: STUDENT IN AN ORGANIZED HEALTH CARE EDUCATION/TRAINING PROGRAM

## 2019-11-14 PROCEDURE — 99213 OFFICE O/P EST LOW 20 MIN: CPT | Performed by: STUDENT IN AN ORGANIZED HEALTH CARE EDUCATION/TRAINING PROGRAM

## 2019-11-14 PROCEDURE — 81002 URINALYSIS NONAUTO W/O SCOPE: CPT | Performed by: STUDENT IN AN ORGANIZED HEALTH CARE EDUCATION/TRAINING PROGRAM

## 2019-11-14 RX ORDER — VARENICLINE TARTRATE 25 MG
KIT ORAL
Qty: 1 BOX | Refills: 2 | Status: SHIPPED | OUTPATIENT
Start: 2019-11-14 | End: 2020-01-24

## 2019-11-14 RX ORDER — SULFAMETHOXAZOLE AND TRIMETHOPRIM 800; 160 MG/1; MG/1
1 TABLET ORAL 2 TIMES DAILY
Qty: 6 TABLET | Refills: 0 | Status: SHIPPED | OUTPATIENT
Start: 2019-11-14 | End: 2019-11-17

## 2019-11-14 RX ORDER — CYCLOBENZAPRINE HCL 10 MG
10 TABLET ORAL 3 TIMES DAILY PRN
Qty: 21 TABLET | Refills: 0 | Status: SHIPPED | OUTPATIENT
Start: 2019-11-14 | End: 2019-11-24

## 2019-11-14 RX ORDER — LIDOCAINE 50 MG/G
OINTMENT TOPICAL
Qty: 1 TUBE | Refills: 3 | Status: SHIPPED
Start: 2019-11-14 | End: 2020-02-06

## 2019-11-14 ASSESSMENT — ENCOUNTER SYMPTOMS
SHORTNESS OF BREATH: 0
EYE REDNESS: 0
VOMITING: 0
WHEEZING: 0
RHINORRHEA: 0
COLOR CHANGE: 0
SORE THROAT: 0
NAUSEA: 0
BACK PAIN: 0
EYE DISCHARGE: 0
DIARRHEA: 0
ABDOMINAL DISTENTION: 0
EYE PAIN: 1
CONSTIPATION: 0
PHOTOPHOBIA: 0
EYE ITCHING: 0
ABDOMINAL PAIN: 0
COUGH: 0

## 2019-11-19 ENCOUNTER — OFFICE VISIT (OUTPATIENT)
Dept: PHYSICAL MEDICINE AND REHAB | Age: 44
End: 2019-11-19
Payer: MEDICARE

## 2019-11-19 VITALS
DIASTOLIC BLOOD PRESSURE: 64 MMHG | WEIGHT: 177 LBS | HEIGHT: 66 IN | HEART RATE: 90 BPM | SYSTOLIC BLOOD PRESSURE: 118 MMHG | OXYGEN SATURATION: 100 % | BODY MASS INDEX: 28.45 KG/M2

## 2019-11-19 DIAGNOSIS — M17.12 PRIMARY LOCALIZED OSTEOARTHRITIS OF LEFT KNEE: Primary | ICD-10-CM

## 2019-11-19 DIAGNOSIS — G89.29 CHRONIC PAIN OF RIGHT KNEE: ICD-10-CM

## 2019-11-19 DIAGNOSIS — M25.561 CHRONIC PAIN OF RIGHT KNEE: ICD-10-CM

## 2019-11-19 DIAGNOSIS — M17.11 PRIMARY OSTEOARTHRITIS OF RIGHT KNEE: ICD-10-CM

## 2019-11-19 PROCEDURE — 99213 OFFICE O/P EST LOW 20 MIN: CPT | Performed by: PHYSICAL MEDICINE & REHABILITATION

## 2019-11-26 DIAGNOSIS — J44.1 COPD EXACERBATION (HCC): ICD-10-CM

## 2019-11-27 ENCOUNTER — TELEPHONE (OUTPATIENT)
Dept: PHYSICAL MEDICINE AND REHAB | Age: 44
End: 2019-11-27

## 2019-11-27 RX ORDER — ALBUTEROL SULFATE 90 UG/1
2 AEROSOL, METERED RESPIRATORY (INHALATION) EVERY 6 HOURS PRN
Qty: 1 INHALER | Refills: 3 | Status: SHIPPED
Start: 2019-11-27 | End: 2020-07-20 | Stop reason: SDUPTHER

## 2019-12-05 ENCOUNTER — HOSPITAL ENCOUNTER (EMERGENCY)
Age: 44
Discharge: HOME | End: 2019-12-06
Attending: EMERGENCY MEDICINE
Payer: MEDICARE

## 2019-12-05 ENCOUNTER — APPOINTMENT (OUTPATIENT)
Dept: GENERAL RADIOLOGY | Age: 44
End: 2019-12-05
Payer: MEDICARE

## 2019-12-05 ENCOUNTER — APPOINTMENT (OUTPATIENT)
Dept: CT IMAGING | Age: 44
End: 2019-12-05
Payer: MEDICARE

## 2019-12-05 ENCOUNTER — TELEPHONE (OUTPATIENT)
Dept: FAMILY MEDICINE CLINIC | Age: 44
End: 2019-12-05

## 2019-12-05 DIAGNOSIS — R10.13 EPIGASTRIC PAIN: Primary | ICD-10-CM

## 2019-12-05 DIAGNOSIS — M54.59 MECHANICAL LOW BACK PAIN: Primary | ICD-10-CM

## 2019-12-05 LAB
BACTERIA: ABNORMAL /HPF
BASOPHILS ABSOLUTE: 0.06 E9/L (ref 0–0.2)
BASOPHILS RELATIVE PERCENT: 0.8 % (ref 0–2)
BILIRUBIN URINE: NEGATIVE
BLOOD, URINE: NORMAL
CLARITY: CLEAR
COLOR: YELLOW
CRYSTALS, UA: ABNORMAL
EOSINOPHILS ABSOLUTE: 0.09 E9/L (ref 0.05–0.5)
EOSINOPHILS RELATIVE PERCENT: 1.2 % (ref 0–6)
EPITHELIAL CELLS, UA: ABNORMAL /HPF
GLUCOSE URINE: NEGATIVE MG/DL
HCT VFR BLD CALC: 24.9 % (ref 34–48)
HEMOGLOBIN: 7.5 G/DL (ref 11.5–15.5)
IMMATURE GRANULOCYTES #: 0.02 E9/L
IMMATURE GRANULOCYTES %: 0.3 % (ref 0–5)
INR BLD: 0.9
KETONES, URINE: NEGATIVE MG/DL
LEUKOCYTE ESTERASE, URINE: NEGATIVE
LYMPHOCYTES ABSOLUTE: 3.05 E9/L (ref 1.5–4)
LYMPHOCYTES RELATIVE PERCENT: 42.1 % (ref 20–42)
MCH RBC QN AUTO: 21.9 PG (ref 26–35)
MCHC RBC AUTO-ENTMCNC: 30.1 % (ref 32–34.5)
MCV RBC AUTO: 72.6 FL (ref 80–99.9)
MONOCYTES ABSOLUTE: 0.41 E9/L (ref 0.1–0.95)
MONOCYTES RELATIVE PERCENT: 5.7 % (ref 2–12)
NEUTROPHILS ABSOLUTE: 3.62 E9/L (ref 1.8–7.3)
NEUTROPHILS RELATIVE PERCENT: 49.9 % (ref 43–80)
NITRITE, URINE: NEGATIVE
PDW BLD-RTO: 17.8 FL (ref 11.5–15)
PH UA: 8 (ref 5–9)
PLATELET # BLD: 638 E9/L (ref 130–450)
PMV BLD AUTO: 8.3 FL (ref 7–12)
PROTEIN UA: NEGATIVE MG/DL
PROTHROMBIN TIME: 11.2 SEC (ref 9.3–12.4)
RBC # BLD: 3.43 E12/L (ref 3.5–5.5)
RBC UA: ABNORMAL /HPF (ref 0–2)
SPECIFIC GRAVITY UA: 1.01 (ref 1–1.03)
UROBILINOGEN, URINE: 0.2 E.U./DL
WBC # BLD: 7.3 E9/L (ref 4.5–11.5)
WBC UA: ABNORMAL /HPF (ref 0–5)

## 2019-12-05 PROCEDURE — 93005 ELECTROCARDIOGRAM TRACING: CPT | Performed by: EMERGENCY MEDICINE

## 2019-12-05 PROCEDURE — 83690 ASSAY OF LIPASE: CPT

## 2019-12-05 PROCEDURE — 80053 COMPREHEN METABOLIC PANEL: CPT

## 2019-12-05 PROCEDURE — 99284 EMERGENCY DEPT VISIT MOD MDM: CPT

## 2019-12-05 PROCEDURE — 83605 ASSAY OF LACTIC ACID: CPT

## 2019-12-05 PROCEDURE — 85025 COMPLETE CBC W/AUTO DIFF WBC: CPT

## 2019-12-05 PROCEDURE — 74022 RADEX COMPL AQT ABD SERIES: CPT

## 2019-12-05 PROCEDURE — 2580000003 HC RX 258: Performed by: EMERGENCY MEDICINE

## 2019-12-05 PROCEDURE — 96375 TX/PRO/DX INJ NEW DRUG ADDON: CPT

## 2019-12-05 PROCEDURE — 96374 THER/PROPH/DIAG INJ IV PUSH: CPT

## 2019-12-05 PROCEDURE — 74177 CT ABD & PELVIS W/CONTRAST: CPT

## 2019-12-05 PROCEDURE — 6360000002 HC RX W HCPCS: Performed by: EMERGENCY MEDICINE

## 2019-12-05 PROCEDURE — 85610 PROTHROMBIN TIME: CPT

## 2019-12-05 PROCEDURE — 84484 ASSAY OF TROPONIN QUANT: CPT

## 2019-12-05 PROCEDURE — 81001 URINALYSIS AUTO W/SCOPE: CPT

## 2019-12-05 PROCEDURE — 6360000004 HC RX CONTRAST MEDICATION: Performed by: RADIOLOGY

## 2019-12-05 RX ORDER — 0.9 % SODIUM CHLORIDE 0.9 %
1000 INTRAVENOUS SOLUTION INTRAVENOUS ONCE
Status: COMPLETED | OUTPATIENT
Start: 2019-12-05 | End: 2019-12-06

## 2019-12-05 RX ORDER — 0.9 % SODIUM CHLORIDE 0.9 %
500 INTRAVENOUS SOLUTION INTRAVENOUS ONCE
Status: DISCONTINUED | OUTPATIENT
Start: 2019-12-05 | End: 2019-12-05

## 2019-12-05 RX ORDER — FENTANYL CITRATE 50 UG/ML
25 INJECTION, SOLUTION INTRAMUSCULAR; INTRAVENOUS ONCE
Status: COMPLETED | OUTPATIENT
Start: 2019-12-05 | End: 2019-12-05

## 2019-12-05 RX ORDER — ONDANSETRON 2 MG/ML
4 INJECTION INTRAMUSCULAR; INTRAVENOUS ONCE
Status: COMPLETED | OUTPATIENT
Start: 2019-12-05 | End: 2019-12-05

## 2019-12-05 RX ADMIN — FENTANYL CITRATE 25 MCG: 50 INJECTION, SOLUTION INTRAMUSCULAR; INTRAVENOUS at 23:34

## 2019-12-05 RX ADMIN — SODIUM CHLORIDE 1000 ML: 9 INJECTION, SOLUTION INTRAVENOUS at 23:34

## 2019-12-05 RX ADMIN — ONDANSETRON 4 MG: 2 INJECTION INTRAMUSCULAR; INTRAVENOUS at 23:34

## 2019-12-05 ASSESSMENT — PAIN SCALES - GENERAL
PAINLEVEL_OUTOF10: 10
PAINLEVEL_OUTOF10: 8

## 2019-12-05 ASSESSMENT — PAIN DESCRIPTION - LOCATION: LOCATION: ABDOMEN

## 2019-12-05 ASSESSMENT — PAIN DESCRIPTION - PAIN TYPE: TYPE: ACUTE PAIN

## 2019-12-06 VITALS
OXYGEN SATURATION: 99 % | SYSTOLIC BLOOD PRESSURE: 104 MMHG | TEMPERATURE: 98.2 F | RESPIRATION RATE: 15 BRPM | WEIGHT: 180 LBS | DIASTOLIC BLOOD PRESSURE: 62 MMHG | BODY MASS INDEX: 28.93 KG/M2 | HEIGHT: 66 IN | HEART RATE: 74 BPM

## 2019-12-06 LAB
ALBUMIN SERPL-MCNC: 3.7 G/DL (ref 3.5–5.2)
ALP BLD-CCNC: 70 U/L (ref 35–104)
ALT SERPL-CCNC: 5 U/L (ref 0–32)
ANION GAP SERPL CALCULATED.3IONS-SCNC: 11 MMOL/L (ref 7–16)
AST SERPL-CCNC: 13 U/L (ref 0–31)
BILIRUB SERPL-MCNC: 0.2 MG/DL (ref 0–1.2)
BUN BLDV-MCNC: 14 MG/DL (ref 6–20)
CALCIUM SERPL-MCNC: 8.3 MG/DL (ref 8.6–10.2)
CHLORIDE BLD-SCNC: 99 MMOL/L (ref 98–107)
CO2: 23 MMOL/L (ref 22–29)
CREAT SERPL-MCNC: 0.8 MG/DL (ref 0.5–1)
EKG ATRIAL RATE: 58 BPM
EKG P AXIS: 63 DEGREES
EKG P-R INTERVAL: 152 MS
EKG Q-T INTERVAL: 450 MS
EKG QRS DURATION: 94 MS
EKG QTC CALCULATION (BAZETT): 441 MS
EKG R AXIS: 47 DEGREES
EKG T AXIS: 56 DEGREES
EKG VENTRICULAR RATE: 58 BPM
GFR AFRICAN AMERICAN: >60
GFR NON-AFRICAN AMERICAN: >60 ML/MIN/1.73
GLUCOSE BLD-MCNC: 84 MG/DL (ref 74–99)
HCG, URINE, POC: NEGATIVE
LACTIC ACID, SEPSIS: 1.8 MMOL/L (ref 0.5–1.9)
LIPASE: 13 U/L (ref 13–60)
Lab: NORMAL
NEGATIVE QC PASS/FAIL: NORMAL
POSITIVE QC PASS/FAIL: NORMAL
POTASSIUM SERPL-SCNC: 4 MMOL/L (ref 3.5–5)
SODIUM BLD-SCNC: 133 MMOL/L (ref 132–146)
TOTAL PROTEIN: 6.3 G/DL (ref 6.4–8.3)
TROPONIN: <0.01 NG/ML (ref 0–0.03)

## 2019-12-06 PROCEDURE — 93010 ELECTROCARDIOGRAM REPORT: CPT | Performed by: INTERNAL MEDICINE

## 2019-12-06 PROCEDURE — 2580000003 HC RX 258: Performed by: RADIOLOGY

## 2019-12-06 PROCEDURE — 6360000004 HC RX CONTRAST MEDICATION: Performed by: RADIOLOGY

## 2019-12-06 RX ORDER — SODIUM CHLORIDE 0.9 % (FLUSH) 0.9 %
10 SYRINGE (ML) INJECTION 2 TIMES DAILY
Status: DISCONTINUED | OUTPATIENT
Start: 2019-12-06 | End: 2019-12-06 | Stop reason: HOSPADM

## 2019-12-06 RX ADMIN — Medication 10 ML: at 00:28

## 2019-12-06 RX ADMIN — IOPAMIDOL 100 ML: 755 INJECTION, SOLUTION INTRAVENOUS at 00:32

## 2019-12-06 ASSESSMENT — PAIN DESCRIPTION - DESCRIPTORS: DESCRIPTORS: SHARP;THROBBING

## 2019-12-06 ASSESSMENT — PAIN SCALES - GENERAL
PAINLEVEL_OUTOF10: 0
PAINLEVEL_OUTOF10: 4

## 2019-12-06 ASSESSMENT — PAIN DESCRIPTION - PAIN TYPE: TYPE: ACUTE PAIN

## 2019-12-06 ASSESSMENT — PAIN DESCRIPTION - LOCATION: LOCATION: ABDOMEN

## 2019-12-20 ENCOUNTER — OFFICE VISIT (OUTPATIENT)
Dept: SURGERY | Age: 44
End: 2019-12-20
Payer: MEDICARE

## 2019-12-20 VITALS — BODY MASS INDEX: 29.89 KG/M2 | WEIGHT: 186 LBS | RESPIRATION RATE: 16 BRPM | HEIGHT: 66 IN

## 2019-12-20 DIAGNOSIS — F17.200 SMOKING ADDICTION: ICD-10-CM

## 2019-12-20 DIAGNOSIS — E65 ABDOMINAL PANNICULUS: Primary | ICD-10-CM

## 2019-12-20 PROCEDURE — 99214 OFFICE O/P EST MOD 30 MIN: CPT | Performed by: PHYSICIAN ASSISTANT

## 2020-01-06 ENCOUNTER — TELEPHONE (OUTPATIENT)
Dept: BARIATRICS/WEIGHT MGMT | Age: 45
End: 2020-01-06

## 2020-01-06 ENCOUNTER — TELEPHONE (OUTPATIENT)
Dept: PHYSICAL MEDICINE AND REHAB | Age: 45
End: 2020-01-06

## 2020-01-06 NOTE — TELEPHONE ENCOUNTER
Pt called and cancelled her appt on 1/8/20 because she didn't get her labs drawn. She stated that she will call to reschedule the appt once she gets the labs done.

## 2020-01-06 NOTE — LETTER
[address] [place] [address]  Phone: [phone]  Fax: [phone]      January 6, 2020     Ritesh Danni  [address] [place] [address]      Dear Ros Collado:    I am writing you because I have been informed of your missed appointment with Dr. Emma Mayer on 1/6/2020 for an injection. We ask that you please call 24 hours in advance if you are unable to make your appointment so that we can give that time to another patient in need. We care about you and the management of your healthcare and want to make sure that you follow up as recommended. I have to also mention, that in an effort to provide quality care and timely appointments to all my patients, it is our policy that patients be discharged from the practice if they do not show for three scheduled appointments; as this includes same-day appointments as well. You would be informed of this termination by mail, and would have 30 days from the date of discharge to locate another physician. We're sorry you were unable to keep your appointment. Please arrive 15 minutes early on 1/13/2020 for your injection that is scheduled on that day and time. Please call with any questions.      Sincerely,          Therese Jennings, DO

## 2020-01-13 ENCOUNTER — OFFICE VISIT (OUTPATIENT)
Dept: PHYSICAL MEDICINE AND REHAB | Age: 45
End: 2020-01-13
Payer: MEDICARE

## 2020-01-13 VITALS
WEIGHT: 182 LBS | TEMPERATURE: 98 F | HEIGHT: 66 IN | SYSTOLIC BLOOD PRESSURE: 118 MMHG | DIASTOLIC BLOOD PRESSURE: 75 MMHG | HEART RATE: 60 BPM | BODY MASS INDEX: 29.25 KG/M2

## 2020-01-13 PROCEDURE — 20611 DRAIN/INJ JOINT/BURSA W/US: CPT | Performed by: PHYSICAL MEDICINE & REHABILITATION

## 2020-01-13 RX ORDER — HYALURONATE SODIUM 10 MG/ML
20 SYRINGE (ML) INTRAARTICULAR WEEKLY
Status: COMPLETED | OUTPATIENT
Start: 2020-01-13 | End: 2020-01-27

## 2020-01-13 RX ADMIN — Medication 20 MG: at 11:19

## 2020-01-13 NOTE — PROGRESS NOTES
Anne Horne D.O. [facility]  [address] [facility]  Phone: [phone]  Fax: [phone]    1/13/2020    Chief Complaint   Patient presents with    Knee Pain     right knee Euflexxa #1       Last injection: n/a  Taking anticoagulants/antiplatelets: No  Diabetic: No  Febrile/active infection: No    After explaining the indications, risks, benefits and alternatives of a right knee joint injection, the patient agreed to proceed. The patient was placed in the supine position with slight knee flexion using a pillow. Ethyl chloride vapocoolant spray was applied. The skin on the medial knee was prepared with Chloraprep. Using ultrasound guidance and a no touch, aseptic technique, a 20 gauge, 1.5\" needle with 10 cc syringe was directed into  the knee joint deep to the medial retinaculum. After negative aspiration, the syringe was changed with needle left in place and 2 cc of Euflexxa  20mg/2cc was injected into the knee joint. The procedure was repeated on the contralateral side. The patient tolerated the procedure well and was educated in post injection care. Adequate hemostasis was achieved and a bandage applied to the injection sites. There was post injection reduction in pain. The patient was monitored clinically and left the office without incident. US images are uploaded separately into the electronic medical record. Anne Horne D.O., P.T.   Board Certified Physical Medicine and Rehabilitation  Board Certified Electrodiagnostic Medicine    Administrations This Visit     sodium hyaluronate (viscosup) injection 20 mg     Admin Date  01/13/2020  11:19 Action  Given Dose  20 mg Route  Intra-articular Site   Administered By  Miriam Jaramillo LPN    Ordering Provider:  Giles Edwards DO    NDC:  [address]-9    Lot#:  [id number]    :  Sharon Cain    Patient Supplied?:  No    Comments:  EXP: 2020-05-03

## 2020-01-15 ENCOUNTER — HOSPITAL ENCOUNTER (OUTPATIENT)
Age: 45
Discharge: HOME | End: 2020-01-17

## 2020-01-15 LAB
ABO/RH: NORMAL
ANION GAP SERPL CALCULATED.3IONS-SCNC: 8 MMOL/L (ref 7–16)
ANTIBODY SCREEN: NORMAL
BUN BLDV-MCNC: 13 MG/DL (ref 6–20)
C ANTIGEN: NORMAL
CALCIUM SERPL-MCNC: 8.1 MG/DL (ref 8.6–10.2)
CHLORIDE BLD-SCNC: 105 MMOL/L (ref 98–107)
CO2: 25 MMOL/L (ref 22–29)
CREAT SERPL-MCNC: 0.6 MG/DL (ref 0.5–1)
DAT POLYSPECIFIC: NORMAL
DR. NOTIFY: NORMAL
E ANTIGEN: NORMAL
GFR AFRICAN AMERICAN: >60
GFR NON-AFRICAN AMERICAN: >60 ML/MIN/1.73
GLUCOSE BLD-MCNC: 78 MG/DL (ref 74–99)
HCT VFR BLD CALC: 24.5 % (ref 34–48)
HEMOGLOBIN: 7.2 G/DL (ref 11.5–15.5)
KELL ANTIGEN: NORMAL
MCH RBC QN AUTO: 22.2 PG (ref 26–35)
MCHC RBC AUTO-ENTMCNC: 29.4 % (ref 32–34.5)
MCV RBC AUTO: 75.4 FL (ref 80–99.9)
PDW BLD-RTO: 20 FL (ref 11.5–15)
PLATELET # BLD: 486 E9/L (ref 130–450)
PMV BLD AUTO: 8.9 FL (ref 7–12)
POTASSIUM SERPL-SCNC: 3.9 MMOL/L (ref 3.5–5)
RBC # BLD: 3.25 E12/L (ref 3.5–5.5)
SODIUM BLD-SCNC: 138 MMOL/L (ref 132–146)
WBC # BLD: 5.9 E9/L (ref 4.5–11.5)

## 2020-01-15 PROCEDURE — 87081 CULTURE SCREEN ONLY: CPT

## 2020-01-15 PROCEDURE — 86880 COOMBS TEST DIRECT: CPT

## 2020-01-15 PROCEDURE — 86905 BLOOD TYPING RBC ANTIGENS: CPT

## 2020-01-15 PROCEDURE — 86922 COMPATIBILITY TEST ANTIGLOB: CPT

## 2020-01-15 PROCEDURE — 86901 BLOOD TYPING SEROLOGIC RH(D): CPT

## 2020-01-15 PROCEDURE — 85027 COMPLETE CBC AUTOMATED: CPT

## 2020-01-15 PROCEDURE — 80048 BASIC METABOLIC PNL TOTAL CA: CPT

## 2020-01-15 PROCEDURE — 86900 BLOOD TYPING SEROLOGIC ABO: CPT

## 2020-01-15 PROCEDURE — 86850 RBC ANTIBODY SCREEN: CPT

## 2020-01-15 PROCEDURE — 86870 RBC ANTIBODY IDENTIFICATION: CPT

## 2020-01-16 LAB
ANTIBODY IDENTIFICATION: NORMAL
ANTIBODY IDENTIFICATION: NORMAL

## 2020-01-17 LAB — MRSA CULTURE ONLY: NORMAL

## 2020-01-20 ENCOUNTER — OFFICE VISIT (OUTPATIENT)
Dept: PHYSICAL MEDICINE AND REHAB | Age: 45
End: 2020-01-20
Payer: MEDICARE

## 2020-01-20 VITALS — TEMPERATURE: 97.8 F | BODY MASS INDEX: 29.38 KG/M2 | HEIGHT: 66 IN

## 2020-01-20 PROBLEM — M17.12 LOCALIZED OSTEOARTHRITIS OF LEFT KNEE: Status: ACTIVE | Noted: 2020-01-20

## 2020-01-20 PROBLEM — N30.00 ACUTE CYSTITIS WITHOUT HEMATURIA: Status: RESOLVED | Noted: 2019-06-23 | Resolved: 2020-01-20

## 2020-01-20 PROBLEM — D64.9 ACUTE ANEMIA: Status: RESOLVED | Noted: 2019-06-23 | Resolved: 2020-01-20

## 2020-01-20 PROBLEM — R19.8 PERFORATED VISCUS: Status: RESOLVED | Noted: 2018-10-29 | Resolved: 2020-01-20

## 2020-01-20 PROBLEM — R65.20 SEVERE SEPSIS (HCC): Status: RESOLVED | Noted: 2018-10-29 | Resolved: 2020-01-20

## 2020-01-20 PROBLEM — A41.9 SEVERE SEPSIS (HCC): Status: RESOLVED | Noted: 2018-10-29 | Resolved: 2020-01-20

## 2020-01-20 PROBLEM — K80.20 SYMPTOMATIC CHOLELITHIASIS: Status: RESOLVED | Noted: 2019-07-23 | Resolved: 2020-01-20

## 2020-01-20 PROCEDURE — 20611 DRAIN/INJ JOINT/BURSA W/US: CPT | Performed by: PHYSICAL MEDICINE & REHABILITATION

## 2020-01-20 RX ADMIN — Medication 20 MG: at 11:48

## 2020-01-20 NOTE — PROGRESS NOTES
Patient would like to have the Euflexxa series performed on her left knee now. Authorization for this has not been started. I will fax Euflexxa reimbursement form to the company to check on benefits for the left knee. Once approved, we will need to order the medication and then schedule the injections. Patient returns next week for the final Euflexxa injection into her right knee. Reimbursement form scanned in.

## 2020-01-22 ENCOUNTER — HOSPITAL ENCOUNTER (OUTPATIENT)
Age: 45
Discharge: HOME | End: 2020-01-24

## 2020-01-22 PROCEDURE — 88307 TISSUE EXAM BY PATHOLOGIST: CPT

## 2020-01-23 ENCOUNTER — HOSPITAL ENCOUNTER (OUTPATIENT)
Age: 45
Discharge: HOME | End: 2020-01-25

## 2020-01-23 LAB
ANION GAP SERPL CALCULATED.3IONS-SCNC: 10 MMOL/L (ref 7–16)
BLOOD BANK DISPENSE STATUS: NORMAL
BLOOD BANK PRODUCT CODE: NORMAL
BPU ID: NORMAL
BUN BLDV-MCNC: 9 MG/DL (ref 6–20)
CALCIUM SERPL-MCNC: 8.4 MG/DL (ref 8.6–10.2)
CHLORIDE BLD-SCNC: 100 MMOL/L (ref 98–107)
CO2: 27 MMOL/L (ref 22–29)
CREAT SERPL-MCNC: 0.6 MG/DL (ref 0.5–1)
DESCRIPTION BLOOD BANK: NORMAL
GFR AFRICAN AMERICAN: >60
GFR NON-AFRICAN AMERICAN: >60 ML/MIN/1.73
GLUCOSE BLD-MCNC: 78 MG/DL (ref 74–99)
HCT VFR BLD CALC: 23.1 % (ref 34–48)
HCT VFR BLD CALC: 26.7 % (ref 34–48)
HEMOGLOBIN: 6.7 G/DL (ref 11.5–15.5)
HEMOGLOBIN: 7.7 G/DL (ref 11.5–15.5)
MCH RBC QN AUTO: 22 PG (ref 26–35)
MCHC RBC AUTO-ENTMCNC: 28.8 % (ref 32–34.5)
MCV RBC AUTO: 76.3 FL (ref 80–99.9)
PDW BLD-RTO: 19.9 FL (ref 11.5–15)
PLATELET # BLD: 445 E9/L (ref 130–450)
PMV BLD AUTO: 8.8 FL (ref 7–12)
POTASSIUM SERPL-SCNC: 4.3 MMOL/L (ref 3.5–5)
RBC # BLD: 3.5 E12/L (ref 3.5–5.5)
SODIUM BLD-SCNC: 137 MMOL/L (ref 132–146)
WBC # BLD: 7.6 E9/L (ref 4.5–11.5)

## 2020-01-23 PROCEDURE — 80048 BASIC METABOLIC PNL TOTAL CA: CPT

## 2020-01-23 PROCEDURE — 85014 HEMATOCRIT: CPT

## 2020-01-23 PROCEDURE — 85027 COMPLETE CBC AUTOMATED: CPT

## 2020-01-23 PROCEDURE — 85018 HEMOGLOBIN: CPT

## 2020-01-24 ENCOUNTER — HOSPITAL ENCOUNTER (OUTPATIENT)
Age: 45
Discharge: HOME | End: 2020-01-26
Payer: MEDICARE

## 2020-01-24 ENCOUNTER — OFFICE VISIT (OUTPATIENT)
Dept: FAMILY MEDICINE CLINIC | Age: 45
End: 2020-01-24
Payer: MEDICARE

## 2020-01-24 VITALS
WEIGHT: 195 LBS | TEMPERATURE: 98.9 F | HEIGHT: 66 IN | RESPIRATION RATE: 16 BRPM | OXYGEN SATURATION: 97 % | DIASTOLIC BLOOD PRESSURE: 62 MMHG | BODY MASS INDEX: 31.34 KG/M2 | SYSTOLIC BLOOD PRESSURE: 104 MMHG | HEART RATE: 74 BPM

## 2020-01-24 PROCEDURE — 0100U HC RESPIRPTHGN MULT REV TRANS & AMP PRB TECH 21 TRGT: CPT

## 2020-01-24 PROCEDURE — 99213 OFFICE O/P EST LOW 20 MIN: CPT | Performed by: STUDENT IN AN ORGANIZED HEALTH CARE EDUCATION/TRAINING PROGRAM

## 2020-01-24 PROCEDURE — 99212 OFFICE O/P EST SF 10 MIN: CPT | Performed by: STUDENT IN AN ORGANIZED HEALTH CARE EDUCATION/TRAINING PROGRAM

## 2020-01-24 RX ORDER — GUAIFENESIN 600 MG/1
1200 TABLET, EXTENDED RELEASE ORAL 2 TIMES DAILY
Qty: 30 TABLET | Refills: 1 | Status: SHIPPED
Start: 2020-01-24 | End: 2020-02-06

## 2020-01-24 NOTE — PROGRESS NOTES
S: 53 y.o. female here for now POD #2 s/p hysterectomy. The night after the surgery had a cough with sputum and wheezing. No use of albuterol No fevers. O: VS: /62 (Site: Right Upper Arm, Position: Sitting, Cuff Size: Medium Adult)   Pulse 74   Temp 98.9 °F (37.2 °C) (Oral)   Resp 16   Ht 5' 6\" (1.676 m)   Wt 195 lb (88.5 kg)   SpO2 97%   BMI 31.47 kg/m²    General: NAD   CV:  RRR, no gallops, rubs, or murmurs   Resp: lungs cta, upper airway noises    Abd:  Soft, nontender, no masses    Ext:  no C/C/E   Submandibular lad   Heent: nares nl and pharyx and tms nl  Impression/Plan:   1. URI-humidifier, flonase, mucinex, call if symptoms persist for antibiotics    Rt\o prn      Attending Physician Statement  I have discussed the case, including pertinent history and exam findings with the resident. I agree with the documented assessment and plan.         Vangie Blackwell MD

## 2020-01-24 NOTE — PROGRESS NOTES
2020     Nicol Falk (:  1975) is a 53 y.o. female, here for evaluation of the following medical concerns:    HPI  laparscopic Hysterectomy two days ago with dr. Donald Farooq reported went well. Last night started feeling wheezy and cough with yellow phlegm. This morning with burning in her sinuses. Has had sinus infections in the past. Denies SOB, sore throat, fever or chills. Feels flushed in the face. Denies dysuria, nausea, vomiting or diarrhea. Typically tolerates anesthesia pretty well. Review of Systems   Constitutional: Negative for activity change, appetite change, chills, diaphoresis, fatigue, fever and unexpected weight change. HENT: Positive for congestion. Negative for ear discharge, ear pain, facial swelling, sinus pressure, sinus pain, sneezing, sore throat and trouble swallowing. Respiratory: Positive for cough. Negative for chest tightness, shortness of breath and wheezing. Cardiovascular: Negative for chest pain, palpitations and leg swelling. Gastrointestinal: Negative for abdominal pain, constipation, diarrhea, nausea and vomiting. Genitourinary: Negative for difficulty urinating, dysuria, frequency, hematuria and urgency. Musculoskeletal: Negative for arthralgias, back pain, gait problem, joint swelling and myalgias. Neurological: Negative for dizziness, syncope, weakness and headaches. Psychiatric/Behavioral: Negative for sleep disturbance and suicidal ideas. The patient is not nervous/anxious. Prior to Visit Medications    Medication Sig Taking? Authorizing Provider   guaiFENesin (MUCINEX) 600 MG extended release tablet Take 2 tablets by mouth 2 times daily Yes Broderick Casey MD   albuterol sulfate HFA (VENTOLIN HFA) 108 (90 Base) MCG/ACT inhaler Inhale 2 puffs into the lungs every 6 hours as needed for Wheezing Yes Broderick Casey MD   lidocaine (XYLOCAINE) 5 % ointment Apply topically as needed.  Yes Broderick Casey MD Estimated body mass index is 31.47 kg/m² as calculated from the following:    Height as of this encounter: 5' 6\" (1.676 m). Weight as of this encounter: 195 lb (88.5 kg). Physical Exam  Constitutional:       General: She is not in acute distress. Appearance: She is well-developed. HENT:      Head: Normocephalic and atraumatic. Right Ear: Tympanic membrane and external ear normal. There is no impacted cerumen. Left Ear: Tympanic membrane and external ear normal. There is no impacted cerumen. Nose: Nose normal. No congestion or rhinorrhea. Mouth/Throat:      Mouth: Mucous membranes are moist.      Pharynx: No oropharyngeal exudate or posterior oropharyngeal erythema. Eyes:      General:         Right eye: No discharge. Left eye: No discharge. Extraocular Movements: Extraocular movements intact. Conjunctiva/sclera: Conjunctivae normal.      Pupils: Pupils are equal, round, and reactive to light. Neck:      Musculoskeletal: Normal range of motion and neck supple. Thyroid: No thyromegaly. Cardiovascular:      Rate and Rhythm: Normal rate and regular rhythm. Heart sounds: Normal heart sounds. No murmur. Pulmonary:      Effort: Pulmonary effort is normal. No respiratory distress. Breath sounds: Normal breath sounds. No stridor. No wheezing or rhonchi. Abdominal:      Tenderness: There is abdominal tenderness. Lymphadenopathy:      Cervical: No cervical adenopathy. Skin:     General: Skin is warm and dry. Findings: No erythema or rash. Neurological:      Mental Status: She is alert. Cranial Nerves: No cranial nerve deficit. Deep Tendon Reflexes: Reflexes are normal and symmetric. Psychiatric:         Mood and Affect: Mood normal.         Behavior: Behavior normal.         Thought Content: Thought content normal.         Judgment: Judgment normal.         ASSESSMENT/PLAN:  1.  Viral URI  - advised patient to call in if symptoms continue through the weekend  - conservative treatment advised  - advised to try and obtain humidifier, if not steam bath  - guaiFENesin (MUCINEX) 600 MG extended release tablet; Take 2 tablets by mouth 2 times daily  Dispense: 30 tablet; Refill: 1  - Respiratory Panel, Molecular; Future      Return if symptoms worsen or fail to improve. An electronic signature was used to authenticate this note.     --Gonzalo Ortega MD on 1/25/2020 at 3:51 PM

## 2020-01-25 ASSESSMENT — ENCOUNTER SYMPTOMS
FACIAL SWELLING: 0
SORE THROAT: 0
NAUSEA: 0
DIARRHEA: 0
CONSTIPATION: 0
BACK PAIN: 0
CHEST TIGHTNESS: 0
WHEEZING: 0
VOMITING: 0
SINUS PAIN: 0
SINUS PRESSURE: 0
TROUBLE SWALLOWING: 0
SHORTNESS OF BREATH: 0
ABDOMINAL PAIN: 0
COUGH: 1

## 2020-01-27 ENCOUNTER — OFFICE VISIT (OUTPATIENT)
Dept: PHYSICAL MEDICINE AND REHAB | Age: 45
End: 2020-01-27
Payer: MEDICARE

## 2020-01-27 ENCOUNTER — TELEPHONE (OUTPATIENT)
Dept: FAMILY MEDICINE CLINIC | Age: 45
End: 2020-01-27

## 2020-01-27 VITALS — WEIGHT: 185 LBS | HEIGHT: 66 IN | BODY MASS INDEX: 29.73 KG/M2 | TEMPERATURE: 98.2 F

## 2020-01-27 PROCEDURE — 20611 DRAIN/INJ JOINT/BURSA W/US: CPT | Performed by: PHYSICAL MEDICINE & REHABILITATION

## 2020-01-27 RX ORDER — ACETAMINOPHEN AND CODEINE PHOSPHATE 300; 30 MG/1; MG/1
TABLET ORAL
COMMUNITY
Start: 2020-01-23 | End: 2020-02-06

## 2020-01-27 RX ORDER — DOXYCYCLINE HYCLATE 100 MG
100 TABLET ORAL 2 TIMES DAILY
Qty: 14 TABLET | Refills: 0 | Status: SHIPPED | OUTPATIENT
Start: 2020-01-27 | End: 2020-02-03

## 2020-01-27 RX ADMIN — Medication 20 MG: at 10:56

## 2020-01-27 NOTE — TELEPHONE ENCOUNTER
Patient phoned stated that she is still really sick  Lab is not back yet.   Patient would like to see if she can get something   Please advise  Thank you April

## 2020-01-27 NOTE — TELEPHONE ENCOUNTER
Patient phoned stated that the medication was not ordered to the pharmacy.   Plus when she starts to take the medication how long is is contagious  She was wondering what the lab showed  Please advise  Thank you April

## 2020-01-30 NOTE — PROGRESS NOTES
 Alcohol use: Yes     Alcohol/week: 6.0 standard drinks     Types: 6 Standard drinks or equivalent per week     Comment: social    Drug use: No    Sexual activity: Yes     Partners: Male   Lifestyle    Physical activity:     Days per week: Not on file     Minutes per session: Not on file    Stress: Not on file   Relationships    Social connections:     Talks on phone: Not on file     Gets together: Not on file     Attends Evangelical service: Not on file     Active member of club or organization: Not on file     Attends meetings of clubs or organizations: Not on file     Relationship status: Not on file    Intimate partner violence:     Fear of current or ex partner: Not on file     Emotionally abused: Not on file     Physically abused: Not on file     Forced sexual activity: Not on file   Other Topics Concern    Not on file   Social History Narrative    Not on file       Family History   Problem Relation Age of Onset    Heart Attack Mother 61        death from this   Selwyn Hollis Diabetes Mother     Depression Mother     Diabetes Father     Stroke Father     Cancer Maternal Grandfather     Cancer Paternal Grandmother     Stroke Paternal Grandfather     Substance Abuse Brother        REVIEW OF SYSTEMS:     Patient specifically denies fever/chills, chest pain, shortness of breath, new bowel or bladder complaints. All other review of systems was negative. PHYSICAL EXAMINATION:      /70   Pulse 82   Temp 98.3 °F (36.8 °C) (Oral)   Resp 16   Ht 5' 6\" (1.676 m)   Wt 190 lb (86.2 kg)   SpO2 99%   BMI 30.67 kg/m²     General:      General appearance:pleasant and well-hydrated, in no distress and A & O x3  Build:Overweight  Function:Rises from a seated position easily.     HEENT:    Head:normocephalic, atraumatic  Pupils:regular, round, equal  Sclera: icterus absent    Lungs:    Breathing:normal breathing pattern    Abdomen:    Shape:non-distended  Tenderness:none  Guarding:none    Lumbar opioids    Reviewed referral documents and imaging  OARRS report reviewed  Compound cream  TENS  Offered TPI vs b/l L3,4,5 MNBB   Completed PT  Patient encouraged to stay active and to continue to lose weight  Treatment plan discussed with the patient including medication and procedure side effects     CC:  Referring physician    XAVIER Anne.

## 2020-01-31 ENCOUNTER — TELEPHONE (OUTPATIENT)
Dept: PHYSICAL MEDICINE AND REHAB | Age: 45
End: 2020-01-31

## 2020-01-31 ENCOUNTER — TELEPHONE (OUTPATIENT)
Dept: BARIATRICS/WEIGHT MGMT | Age: 45
End: 2020-01-31

## 2020-01-31 ENCOUNTER — OFFICE VISIT (OUTPATIENT)
Dept: PAIN MANAGEMENT | Age: 45
End: 2020-01-31
Payer: MEDICARE

## 2020-01-31 VITALS
HEIGHT: 66 IN | WEIGHT: 190 LBS | HEART RATE: 82 BPM | DIASTOLIC BLOOD PRESSURE: 70 MMHG | BODY MASS INDEX: 30.53 KG/M2 | OXYGEN SATURATION: 99 % | SYSTOLIC BLOOD PRESSURE: 112 MMHG | TEMPERATURE: 98.3 F | RESPIRATION RATE: 16 BRPM

## 2020-01-31 PROBLEM — M54.50 CHRONIC BILATERAL LOW BACK PAIN WITHOUT SCIATICA: Status: ACTIVE | Noted: 2020-01-31

## 2020-01-31 PROBLEM — M79.10 MYALGIA: Status: ACTIVE | Noted: 2020-01-31

## 2020-01-31 PROBLEM — G89.29 CHRONIC BILATERAL LOW BACK PAIN WITHOUT SCIATICA: Status: ACTIVE | Noted: 2020-01-31

## 2020-01-31 PROBLEM — G89.4 CHRONIC PAIN SYNDROME: Status: ACTIVE | Noted: 2020-01-31

## 2020-01-31 PROBLEM — M47.817 LUMBOSACRAL SPONDYLOSIS WITHOUT MYELOPATHY: Status: ACTIVE | Noted: 2020-01-31

## 2020-01-31 PROCEDURE — 99204 OFFICE O/P NEW MOD 45 MIN: CPT | Performed by: PAIN MEDICINE

## 2020-01-31 NOTE — TELEPHONE ENCOUNTER
Quick follow up call to patient to check effectiveness of knee injection. Left message to call office if she had any concerns.

## 2020-01-31 NOTE — TELEPHONE ENCOUNTER
Patient called in and is having trouble with food sticking. No pain or nausea. Appointment set for Wednesday with Dr. Marsha Petty. I discussed staying on full liquids or very soft food until appointment. If any thing worse instructed to go to [facility]. She will go back to her Protonix and Carafate.

## 2020-01-31 NOTE — PROGRESS NOTES
Dean Clark presents to the [facility] on 1/31/2020. Reshma Mcgraw is complaining of pain lower back  The pain is constant. The pain is described as aching and numb. Pain is rated on her best day at a 5, on her worst day at a 9, and on average at a 5 on the VAS scale. She took her last dose of Tylenol with codeine /Norco     Any procedures since your last visit: No, with  % relief. Pacemaker or defibrilator: No managed by     She is not on NSAIDS and is not on anticoagulation medications to include none and is managed by      /70   Pulse 82   Temp 98.3 °F (36.8 °C) (Oral)   Resp 16   Ht 5' 6\" (1.676 m)   Wt 190 lb (86.2 kg)   SpO2 99%   BMI 30.67 kg/m²      No LMP recorded. (Menstrual status: Irregular periods).

## 2020-02-03 ENCOUNTER — TELEPHONE (OUTPATIENT)
Dept: PHYSICAL MEDICINE AND REHAB | Age: 45
End: 2020-02-03

## 2020-02-05 ENCOUNTER — OFFICE VISIT (OUTPATIENT)
Dept: BARIATRICS/WEIGHT MGMT | Age: 45
End: 2020-02-05
Payer: MEDICARE

## 2020-02-05 ENCOUNTER — TELEPHONE (OUTPATIENT)
Dept: BARIATRICS/WEIGHT MGMT | Age: 45
End: 2020-02-05

## 2020-02-05 VITALS
WEIGHT: 180 LBS | BODY MASS INDEX: 28.93 KG/M2 | RESPIRATION RATE: 20 BRPM | SYSTOLIC BLOOD PRESSURE: 95 MMHG | HEIGHT: 66 IN | TEMPERATURE: 97.6 F | DIASTOLIC BLOOD PRESSURE: 52 MMHG | HEART RATE: 59 BPM

## 2020-02-05 PROCEDURE — 99212 OFFICE O/P EST SF 10 MIN: CPT

## 2020-02-05 PROCEDURE — 99213 OFFICE O/P EST LOW 20 MIN: CPT | Performed by: SURGERY

## 2020-02-05 RX ORDER — ONDANSETRON 4 MG/1
4 TABLET, ORALLY DISINTEGRATING ORAL 3 TIMES DAILY PRN
Qty: 21 TABLET | Refills: 0 | Status: SHIPPED
Start: 2020-02-05 | End: 2020-02-06

## 2020-02-05 NOTE — TELEPHONE ENCOUNTER
Called [facility]. No auth needed for EGD. Reference # [id number] Michaela.  I called pt marie on  to call back to schedule ASAP

## 2020-02-05 NOTE — PROGRESS NOTES
Bariatric Surgery History and Physical    Patient's Name/Date of Birth: Kednal Nelson / 1975    Date: February 5, 2020     Surgeon: Ugo Arrington M.D.    PCP: Jon Newman MD     Chief Complaint: epigastric pain    HPI:   Kendal Nelson is a 53 y.o. female who presents for evaluation of epigastric pain with hx of marginal ulcer s/p rygb. Timing is daily, radiation to epigastrium, alleviated by nothing and started weeks ago and severity is 2-5/10      Past Medical History:   Diagnosis Date    Anemia     Arthritis     Asthma     Depression     GERD without esophagitis     History of blood transfusion     History of gastric bypass     Hydronephrosis with urinary obstruction due to ureteral calculus     Hypertension     No meds following sustained weight loss after bariatric surgery.  Iron deficiency 8/1/2018    Localized osteoarthritis of left knee 1/20/2020    Lumbar spondylosis 1/26/2018    Lymphedema     Obesity     Panic attacks     Perforated marginal ulcer 10/29/2018    ~1 year after RnYGB    PONV (postoperative nausea and vomiting)     Prediabetes     BG has normalized following sustained weight loss after bariatric surgery.  Sleep apnea, obstructive     Off PAP therapy BG following sustained weight loss after bariatric surgery.     Vitamin D deficiency 7/27/2018    Zinc deficiency 8/1/2018       Past Surgical History:   Procedure Laterality Date    APPENDECTOMY  1996    CHOLECYSTECTOMY, LAPAROSCOPIC N/A 7/27/2019    CHOLECYSTECTOMY LAPAROSCOPIC performed by Radha Delgado MD at [address] [facility] [address]  10/23/2019    [facility]  [place]    [facility]    LITHOTRIPSY Right 7/24/2019    CYSTOSCOPY RETROGRADE PYELOGRAM URETEROSCOPY J STENT LASER LITHOTRIPSY RIGHT performed by Abram Chambers DO at [facility] LITHOTRIPSY      [place] OFFICE/OUTPT VISIT,PROCEDURE ONLY N/A 10/29/2018    DIAGNOSTIC LAPAROSCOPY REPAIR PERFORATED VISCUS performed by Venkatesh Graves

## 2020-02-05 NOTE — PROGRESS NOTES
Pt states that it feels like she has food that gets stuck and has abdominal pain. That has been ongoing for a couple months. She did recently have a hysterectomy and is feeling very tired.

## 2020-02-05 NOTE — PATIENT INSTRUCTIONS
Please continue to take your vitamin and mineral supplements as instructed. If you received a blood work prescription today for laboratory monitoring due prior to your next routine follow-up visit, please have this blood work obtained 10 to 14 days prior to your next visit. It is important to fast for 12 hours prior to routine weight loss surgery blood work, EXCEPT for drinking water, to ensure accuracy of results. Please report nausea, vomiting, abdominal pain, or any other problems you experience to your surgeon. For problems related to weight loss surgery, it is best to go to [address] [place] [address] Emergency Department and have your surgeon paged.

## 2020-02-06 ENCOUNTER — PREP FOR PROCEDURE (OUTPATIENT)
Dept: BARIATRICS/WEIGHT MGMT | Age: 45
End: 2020-02-06

## 2020-02-06 RX ORDER — SODIUM CHLORIDE, SODIUM LACTATE, POTASSIUM CHLORIDE, CALCIUM CHLORIDE 600; 310; 30; 20 MG/100ML; MG/100ML; MG/100ML; MG/100ML
INJECTION, SOLUTION INTRAVENOUS CONTINUOUS
Status: CANCELLED | OUTPATIENT
Start: 2020-02-06

## 2020-02-07 ENCOUNTER — HOSPITAL ENCOUNTER (OUTPATIENT)
Age: 45
Setting detail: OUTPATIENT SURGERY
Discharge: HOME | End: 2020-02-07
Attending: SURGERY | Admitting: SURGERY
Payer: MEDICARE

## 2020-02-07 ENCOUNTER — ANESTHESIA EVENT (OUTPATIENT)
Dept: ENDOSCOPY | Age: 45
End: 2020-02-07
Payer: MEDICARE

## 2020-02-07 ENCOUNTER — ANESTHESIA (OUTPATIENT)
Dept: ENDOSCOPY | Age: 45
End: 2020-02-07
Payer: MEDICARE

## 2020-02-07 VITALS
DIASTOLIC BLOOD PRESSURE: 78 MMHG | OXYGEN SATURATION: 92 % | SYSTOLIC BLOOD PRESSURE: 127 MMHG | RESPIRATION RATE: 23 BRPM

## 2020-02-07 VITALS
HEIGHT: 66 IN | RESPIRATION RATE: 18 BRPM | BODY MASS INDEX: 29.09 KG/M2 | DIASTOLIC BLOOD PRESSURE: 55 MMHG | WEIGHT: 181 LBS | TEMPERATURE: 97.4 F | OXYGEN SATURATION: 99 % | HEART RATE: 69 BPM | SYSTOLIC BLOOD PRESSURE: 93 MMHG

## 2020-02-07 PROCEDURE — 7100000011 HC PHASE II RECOVERY - ADDTL 15 MIN: Performed by: SURGERY

## 2020-02-07 PROCEDURE — 2580000003 HC RX 258: Performed by: SURGERY

## 2020-02-07 PROCEDURE — 3609012400 HC EGD TRANSORAL BIOPSY SINGLE/MULTIPLE: Performed by: SURGERY

## 2020-02-07 PROCEDURE — 88305 TISSUE EXAM BY PATHOLOGIST: CPT

## 2020-02-07 PROCEDURE — 88342 IMHCHEM/IMCYTCHM 1ST ANTB: CPT

## 2020-02-07 PROCEDURE — 6360000002 HC RX W HCPCS

## 2020-02-07 PROCEDURE — 99024 POSTOP FOLLOW-UP VISIT: CPT | Performed by: SURGERY

## 2020-02-07 PROCEDURE — 7100000010 HC PHASE II RECOVERY - FIRST 15 MIN: Performed by: SURGERY

## 2020-02-07 PROCEDURE — 3700000000 HC ANESTHESIA ATTENDED CARE: Performed by: SURGERY

## 2020-02-07 PROCEDURE — 43239 EGD BIOPSY SINGLE/MULTIPLE: CPT | Performed by: SURGERY

## 2020-02-07 PROCEDURE — 2709999900 HC NON-CHARGEABLE SUPPLY: Performed by: SURGERY

## 2020-02-07 RX ORDER — PROPOFOL 10 MG/ML
INJECTION, EMULSION INTRAVENOUS PRN
Status: DISCONTINUED | OUTPATIENT
Start: 2020-02-07 | End: 2020-02-07 | Stop reason: SDUPTHER

## 2020-02-07 RX ORDER — SODIUM CHLORIDE, SODIUM LACTATE, POTASSIUM CHLORIDE, CALCIUM CHLORIDE 600; 310; 30; 20 MG/100ML; MG/100ML; MG/100ML; MG/100ML
INJECTION, SOLUTION INTRAVENOUS CONTINUOUS
Status: DISCONTINUED | OUTPATIENT
Start: 2020-02-07 | End: 2020-02-07 | Stop reason: HOSPADM

## 2020-02-07 RX ADMIN — SODIUM CHLORIDE, POTASSIUM CHLORIDE, SODIUM LACTATE AND CALCIUM CHLORIDE: 600; 310; 30; 20 INJECTION, SOLUTION INTRAVENOUS at 09:48

## 2020-02-07 RX ADMIN — PROPOFOL 180 MG: 10 INJECTION, EMULSION INTRAVENOUS at 10:50

## 2020-02-07 RX ADMIN — SODIUM CHLORIDE, POTASSIUM CHLORIDE, SODIUM LACTATE AND CALCIUM CHLORIDE: 600; 310; 30; 20 INJECTION, SOLUTION INTRAVENOUS at 10:44

## 2020-02-07 ASSESSMENT — PAIN SCALES - GENERAL
PAINLEVEL_OUTOF10: 0
PAINLEVEL_OUTOF10: 0

## 2020-02-07 ASSESSMENT — LIFESTYLE VARIABLES: SMOKING_STATUS: 1

## 2020-02-07 ASSESSMENT — PAIN DESCRIPTION - DESCRIPTORS: DESCRIPTORS: SORE

## 2020-02-07 ASSESSMENT — PAIN - FUNCTIONAL ASSESSMENT: PAIN_FUNCTIONAL_ASSESSMENT: 0-10

## 2020-02-07 NOTE — ANESTHESIA PRE PROCEDURE
 Lymphedema     Obesity     Panic attacks     Perforated marginal ulcer 10/29/2018    ~1 year after RnYGB    PONV (postoperative nausea and vomiting)     Prediabetes     BG has normalized following sustained weight loss after bariatric surgery.  Sleep apnea, obstructive     Off PAP therapy BG following sustained weight loss after bariatric surgery.  Vitamin D deficiency 7/27/2018    Zinc deficiency 8/1/2018       Past Surgical History:        Procedure Laterality Date    APPENDECTOMY  1996    CHOLECYSTECTOMY, LAPAROSCOPIC N/A 7/27/2019    CHOLECYSTECTOMY LAPAROSCOPIC performed by Devora Adam MD at [address]  10/23/2019    HYSTERECTOMY  01/22/2020    [facility]    LITHOTRIPSY Right 7/24/2019    CYSTOSCOPY RETROGRADE PYELOGRAM URETEROSCOPY J STENT LASER LITHOTRIPSY RIGHT performed by Mine Chambers DO at [facility] LITHOTRIPSY      DC OFFICE/OUTPT VISIT,PROCEDURE ONLY N/A 10/29/2018    DIAGNOSTIC LAPAROSCOPY REPAIR PERFORATED VISCUS performed by Devora Adam MD at [address]  11/14/2017    STOMACH SURGERY      UPPER GASTROINTESTINAL ENDOSCOPY N/A 7/27/2019    EGD ESOPHAGOGASTRODUODENOSCOPY performed by Devora Adam MD at [address] [place] [address] History:    Social History     Tobacco Use    Smoking status: Current Every Day Smoker     Packs/day: 0.50     Years: 15.00     Pack years: 7.50     Types: Cigarettes     Start date: 10/29/1991    Smokeless tobacco: Never Used   Substance Use Topics    Alcohol use:  Yes     Alcohol/week: 6.0 standard drinks     Types: 6 Standard drinks or equivalent per week     Comment: social                                Ready to quit: Not Answered  Counseling given: Not Answered      Vital Signs (Current):   Vitals:    02/07/20 0930   BP: (!) 104/58   Pulse: 68   Resp: 14   Temp: 36.9 °C (98.4 °F)   TempSrc: Temporal   SpO2: 99%   Weight: 181 lb (82.1 kg)   Height: 5' 6\" (1.676 m) BP Readings from Last 3 Encounters:   02/07/20 (!) 104/58   02/05/20 (!) 95/52   01/31/20 112/70       NPO Status: Time of last liquid consumption: 2330                        Time of last solid consumption: 2330                        Date of last liquid consumption: 02/06/20                        Date of last solid food consumption: 02/06/20    BMI:   Wt Readings from Last 3 Encounters:   02/07/20 181 lb (82.1 kg)   02/05/20 180 lb (81.6 kg)   01/31/20 190 lb (86.2 kg)     Body mass index is 29.21 kg/m². CBC:   Lab Results   Component Value Date    WBC 7.6 01/23/2020    RBC 3.50 01/23/2020    HGB 7.7 01/23/2020    HCT 26.7 01/23/2020    MCV 76.3 01/23/2020    RDW 19.9 01/23/2020     01/23/2020       CMP:   Lab Results   Component Value Date     01/23/2020    K 4.3 01/23/2020    K 3.9 11/06/2019     01/23/2020    CO2 27 01/23/2020    BUN 9 01/23/2020    CREATININE 0.6 01/23/2020    GFRAA >60 01/23/2020    LABGLOM >60 01/23/2020    GLUCOSE 78 01/23/2020    GLUCOSE 94 04/02/2012    PROT 6.3 12/05/2019    CALCIUM 8.4 01/23/2020    BILITOT 0.2 12/05/2019    ALKPHOS 70 12/05/2019    AST 13 12/05/2019    ALT 5 12/05/2019       POC Tests: No results for input(s): POCGLU, POCNA, POCK, POCCL, POCBUN, POCHEMO, POCHCT in the last 72 hours.     Coags:   Lab Results   Component Value Date    PROTIME 11.2 12/05/2019    INR 0.9 12/05/2019    APTT 27.3 07/20/2019       HCG (If Applicable):   Lab Results   Component Value Date    PREGTESTUR NEGATIVE 07/27/2019        ABGs: No results found for: PHART, PO2ART, HRU9LQW, ZSJ2PDA, BEART, U7FZHIML     Type & Screen (If Applicable):  No results found for: [facility]     EKG 12/05/2019    Sinus bradycardia  Otherwise normal ECG  When compared with ECG of 06-NOV-2019 07:01,  No significant change was found  Confirmed by Anastasia Dobson ([phone]) on 12/6/2019 6:47:14 AM      Anesthesia Evaluation  Patient summary reviewed and Nursing notes reviewed   history of anesthetic complications: PONV. Airway: Mallampati: I  TM distance: >3 FB   Neck ROM: limited  Mouth opening: > = 3 FB Dental: normal exam         Pulmonary:normal exam    (+) recent URI:  sleep apnea:  asthma: allergic asthma, current smoker          Patient did not smoke on day of surgery. ROS comment: Hx: uses inhaler and nebulizers PRN, off CPAP since gastric bypass surgery and weight loss   Cardiovascular:    (+) hypertension:,       ECG reviewed  Rhythm: regular  Rate: normal  Echocardiogram reviewed  Stress test reviewed       Beta Blocker:  Not on Beta Blocker         Neuro/Psych:   (+) psychiatric history: stable with treatmentdepression/anxiety              ROS comment: Hx: Mechanical low back pain  Lumbar spondylosis  Chronic pain syndrome  Chronic bilateral low back pain without sciatica  Myalgia  Lumbosacral spondylosis without myelopathy     GI/Hepatic/Renal:   (+) GERD: poorly controlled, PUD, renal disease (Hx: kidney stone removed 07/2019): kidney stones,          ROS comment: Hx: S/P gastric bypass. Endo/Other:    (+) blood dyscrasia: anemia, arthritis: OA., .                  ROS comment: Hx: Endometrial polyp  Vitamin D deficiency    Zinc deficiency  Abdominal:           Vascular: negative vascular ROS. Anesthesia Plan      MAC     ASA 3     (#20 R AC)  Induction: intravenous. MIPS: Prophylactic antiemetics administered. Anesthetic plan and risks discussed with patient. Plan discussed with CRNA and attending.                   Jessica Bai RN   2/7/2020

## 2020-02-07 NOTE — ANESTHESIA POSTPROCEDURE EVALUATION
Department of Anesthesiology  Postprocedure Note    Patient: Sherice Lara  MRN: [id number]  YOB: 1975  Date of evaluation: 2/7/2020  Time:  3:26 PM     Procedure Summary     Date:  02/07/20 Room / Location:  [address] [place] [address] / [address] [place], [place] [address]    Anesthesia Start:  7370 Anesthesia Stop:  7896    Procedure:  EGD BIOPSY (N/A ) Diagnosis:  (GERD)    Surgeon:  Ina Anderson MD Responsible Provider:  Howard Wiggins MD    Anesthesia Type:  MAC ASA Status:  3          Anesthesia Type: MAC    Marielena Phase I: Marielena Score: 10    Marielena Phase II: Marielena Score: 10    Last vitals: Reviewed and per EMR flowsheets.        Anesthesia Post Evaluation    Patient location during evaluation: PACU  Patient participation: complete - patient participated  Level of consciousness: awake and alert  Pain score: 0  Airway patency: patent  Nausea & Vomiting: no vomiting and no nausea  Complications: no  Cardiovascular status: hemodynamically stable  Respiratory status: acceptable  Hydration status: stable

## 2020-02-07 NOTE — H&P
VISIT,PROCEDURE ONLY N/A 10/29/2018     DIAGNOSTIC LAPAROSCOPY REPAIR PERFORATED VISCUS performed by Manuelito Escobar MD at [address] [place] [address]   11/14/2017    SKIN BIOPSY        STOMACH SURGERY        UPPER GASTROINTESTINAL ENDOSCOPY N/A 7/27/2019     EGD ESOPHAGOGASTRODUODENOSCOPY performed by Manuelito Escobar MD at [facility] OR            Current Facility-Administered Medications          Current Outpatient Medications   Medication Sig Dispense Refill    Multiple Vitamins-Minerals (MULTIVITAMIN ADULT PO) Take by mouth        acetaminophen-codeine (TYLENOL #3) 300-30 MG per tablet          guaiFENesin (MUCINEX) 600 MG extended release tablet Take 2 tablets by mouth 2 times daily 30 tablet 1    albuterol sulfate HFA (VENTOLIN HFA) 108 (90 Base) MCG/ACT inhaler Inhale 2 puffs into the lungs every 6 hours as needed for Wheezing 1 Inhaler 3    ondansetron (ZOFRAN) 4 MG tablet Take 1 tablet by mouth daily as needed for Nausea or Vomiting 30 tablet 0    hydrOXYzine (VISTARIL) 50 MG capsule Take 1 capsule by mouth 3 times daily as needed for Anxiety 90 capsule 1    pantoprazole (PROTONIX) 40 MG tablet Take 1 tablet by mouth 2 times daily (before meals) 180 tablet 1    sucralfate (CARAFATE) 1 GM tablet Take 1 tablet by mouth 4 times daily 120 tablet 3    docusate sodium (COLACE) 100 MG capsule Take 1 capsule by mouth daily as needed for Constipation 30 capsule 1    Sennosides (SENNA) 8.6 MG CAPS Take 1 capsule by mouth daily as needed (constipation) 30 capsule 0    ascorbic acid (V-R VITAMIN C) 250 MG tablet Take 1 tablet by mouth daily Take with the iron supplement 30 tablet 3    Cholecalciferol (VITAMIN D3) 07891 units CAPS Take one capsule every Mon-Wed-Fri for four weeks 12 capsule 0    ferrous sulfate 325 (65 Fe) MG tablet Take 1 tablet by mouth daily (with breakfast) , take with Vitamin C 250 mg tablet 90 tablet 3    Handicap Placard MISC by Does not apply route Duration: 5 years 1 each 0    lidocaine (XYLOCAINE) 5 % ointment Apply topically as needed. (Patient not taking: Reported on 1/31/2020) 1 Tube 3      No current facility-administered medications for this visit.                   Allergies   Allergen Reactions    Pcn [Penicillins] Anaphylaxis and Other (See Comments)       Patient unsure    Food Other (See Comments)       Berries - hives  Lactose Intolerance to milk-diarrhea  Soy-increases menses occurrences            The patient has a family history that is negative for severe cardiovascular or respiratory issues, negative for reaction to anesthesia.     Social History               Socioeconomic History    Marital status: Single       Spouse name: Not on file    Number of children: Not on file    Years of education: Not on file    Highest education level: Not on file   Occupational History    Occupation: unemployed/disability   Social Needs    Financial resource strain: Not on file    Food insecurity:       Worry: Not on file       Inability: Not on file    Transportation needs:       Medical: Not on file       Non-medical: Not on file   Tobacco Use    Smoking status: Current Every Day Smoker       Packs/day: 0.25       Years: 15.00       Pack years: 3.75       Types: Cigarettes       Start date: 10/29/1991    Smokeless tobacco: Never Used   Substance and Sexual Activity    Alcohol use:  Yes       Alcohol/week: 6.0 standard drinks       Types: 6 Standard drinks or equivalent per week       Comment: social    Drug use: No    Sexual activity: Yes       Partners: Male   Lifestyle    Physical activity:       Days per week: Not on file       Minutes per session: Not on file    Stress: Not on file   Relationships    Social connections:       Talks on phone: Not on file       Gets together: Not on file       Attends Sikh service: Not on file       Active member of club or organization: Not on file       Attends meetings of clubs or organizations: Not on file       Relationship status: Not on file    Intimate partner violence:       Fear of current or ex partner: Not on file       Emotionally abused: Not on file       Physically abused: Not on file       Forced sexual activity: Not on file   Other Topics Concern    Not on file   Social History Narrative    Not on file                  Review of Systems  Review of Systems -  General ROS: negative for - chills, fatigue or malaise  ENT ROS: negative for - hearing change, nasal congestion or nasal discharge  Allergy and Immunology ROS: negative for - hives, itchy/watery eyes or nasal congestion  Hematological and Lymphatic ROS: negative for - blood clots, blood transfusions, bruising or fatigue  Endocrine ROS: negative for - malaise/lethargy, mood swings, palpitations or polydipsia/polyuria  Respiratory ROS: negative for - sputum changes, stridor, tachypnea or wheezing  Cardiovascular ROS: negative for - irregular heartbeat, loss of consciousness, murmur or orthopnea  Gastrointestinal ROS: negative for - constipation, diarrhea, gas/bloating, heartburn or hematemesis  Genito-Urinary ROS: negative for -  genital discharge, genital ulcers or hematuria  Musculoskeletal ROS: negative for - gait disturbance, muscle pain or muscular weakness     Physical exam:   LMP  (Exact Date)     General appearance:  NAD  Head: NCAT, PERRLA, EOMI, red conjunctiva  Neck: supple, no masses  Lungs: CTAB, equal chest rise bilateral  Heart: Reg rate  Abdomen: soft, nontender, nondistended  Skin; no lesions  Gu: no cva tenderness  Extremities: extremities normal, atraumatic, no cyanosis or edema     Assessment:  53 y.o. female with epigastric pain/ dysphagia and nausea     Plan:   Will do EGD with possible dilation to assess.      Ezequiel Cota MD

## 2020-02-07 NOTE — OP NOTE
SURGEON: Mustapha Laurent M.D. PREOPERATIVE DIAGNOSES:  S/p rygb with dysphagia    POSTOPERATIVE DIAGNOSES: marginal ulcer smaller but still present     OPERATION: [id number]-uqvwpv-chqfletzeie with biopsy    BLOOD LOSS: 0ML    ANESTHESIA: LMAC    COMPLICATIONS: None. OPERATIONS: The patient was placed on the table in left lateral decubitus position and sedated. Bite block was placed. A lubricated scope was easily passed into the upper esophagus which looked normal. The distal esophagus looked normal. The scope was passed into the stomach pouch which was normal. Biopsy was taken to check for H. pylori. The scope was then passed through the anastomosis which was 14mm and had ulcer on jejunal side. retroflex showed no hiatal hernia The scope was then withdrawn. The patient tolerated the procedure well.      Physician Signature: Electronically signed by Dr. Alysia Zelaya

## 2020-02-10 ENCOUNTER — OFFICE VISIT (OUTPATIENT)
Dept: PHYSICAL MEDICINE AND REHAB | Age: 45
End: 2020-02-10
Payer: MEDICARE

## 2020-02-10 VITALS
HEART RATE: 69 BPM | DIASTOLIC BLOOD PRESSURE: 60 MMHG | HEIGHT: 66 IN | WEIGHT: 181 LBS | BODY MASS INDEX: 29.09 KG/M2 | SYSTOLIC BLOOD PRESSURE: 95 MMHG

## 2020-02-10 PROCEDURE — 20611 DRAIN/INJ JOINT/BURSA W/US: CPT | Performed by: PHYSICAL MEDICINE & REHABILITATION

## 2020-02-10 RX ORDER — HYALURONATE SODIUM 10 MG/ML
20 SYRINGE (ML) INTRAARTICULAR WEEKLY
Status: COMPLETED | OUTPATIENT
Start: 2020-02-10 | End: 2020-02-24

## 2020-02-10 RX ADMIN — Medication 20 MG: at 16:25

## 2020-02-10 NOTE — PROGRESS NOTES
Fred Darden D.O. [facility]  [address] [place]  Phone: [phone]  Fax: [phone]    2/12/2020    Chief Complaint   Patient presents with    Knee Pain     left knee Euflexxa #1 injection       Last injection: n/a  Taking anticoagulants/antiplatelets: No  Diabetic: No  Febrile/active infection: No    After explaining the indications, risks, benefits and alternatives of a left knee joint injection, the patient agreed to proceed. The patient was placed in the supine position with slight knee flexion using a pillow. Ethyl chloride vapocoolant spray was applied. The skin on the lateral knee was prepared with Chloraprep. Using ultrasound guidance and a no touch, aseptic technique, a 20 gauge, 1.5\" needle with 10 cc syringe was directed into  the knee joint. After negative aspiration, the syringe was changed with needle left in place and 2 cc of Euflexxa  20mg/2cc was injected into the knee joint. The procedure was repeated on the contralateral side. The patient tolerated the procedure well and was educated in post injection care. Adequate hemostasis was achieved and a bandage applied to the injection sites. There was post injection reduction in pain. The patient was monitored clinically and left the office without incident. US images are uploaded separately into the electronic medical record. Fred Darden D.O., P.T.   Board Certified Physical Medicine and Rehabilitation  Board Certified Electrodiagnostic Medicine    Administrations This Visit     sodium hyaluronate (viscosup) injection 20 mg     Admin Date  02/10/2020  16:25 Action  Given Dose  20 mg Route  Intra-articular Site  Knee Left Administered By  Micki Pablo MA    Ordering Provider:  Ananda Welhc DO    NDC:  [address]-2    Lot#:  [id number]    :  Jamal Silverio    Patient Supplied?:  No    Comments:  EXP: 12/2020

## 2020-02-17 ENCOUNTER — OFFICE VISIT (OUTPATIENT)
Dept: PHYSICAL MEDICINE AND REHAB | Age: 45
End: 2020-02-17
Payer: MEDICARE

## 2020-02-17 ENCOUNTER — TELEPHONE (OUTPATIENT)
Dept: FAMILY MEDICINE CLINIC | Age: 45
End: 2020-02-17

## 2020-02-17 VITALS
WEIGHT: 175 LBS | TEMPERATURE: 98.5 F | DIASTOLIC BLOOD PRESSURE: 60 MMHG | BODY MASS INDEX: 28.12 KG/M2 | HEART RATE: 73 BPM | HEIGHT: 66 IN | SYSTOLIC BLOOD PRESSURE: 112 MMHG

## 2020-02-17 PROCEDURE — 20611 DRAIN/INJ JOINT/BURSA W/US: CPT | Performed by: PHYSICAL MEDICINE & REHABILITATION

## 2020-02-17 RX ADMIN — Medication 20 MG: at 10:36

## 2020-02-17 NOTE — TELEPHONE ENCOUNTER
Henrry Shrestha called in and is asking if you could please order a cbc for her, she is stating that she is feeling very tired lately and is thinking her counts are low.   Thank you

## 2020-02-18 ENCOUNTER — HOSPITAL ENCOUNTER (OUTPATIENT)
Age: 45
Discharge: HOME | End: 2020-02-20
Payer: MEDICARE

## 2020-02-18 ENCOUNTER — NURSE ONLY (OUTPATIENT)
Dept: FAMILY MEDICINE CLINIC | Age: 45
End: 2020-02-18
Payer: MEDICARE

## 2020-02-18 LAB
ANISOCYTOSIS: ABNORMAL
BASOPHILS ABSOLUTE: 0.05 E9/L (ref 0–0.2)
BASOPHILS RELATIVE PERCENT: 0.7 % (ref 0–2)
EOSINOPHILS ABSOLUTE: 0.13 E9/L (ref 0.05–0.5)
EOSINOPHILS RELATIVE PERCENT: 1.8 % (ref 0–6)
HCT VFR BLD CALC: 29.8 % (ref 34–48)
HEMOGLOBIN: 8.2 G/DL (ref 11.5–15.5)
HYPOCHROMIA: ABNORMAL
IMMATURE GRANULOCYTES #: 0.03 E9/L
IMMATURE GRANULOCYTES %: 0.4 % (ref 0–5)
LYMPHOCYTES ABSOLUTE: 2.46 E9/L (ref 1.5–4)
LYMPHOCYTES RELATIVE PERCENT: 33.2 % (ref 20–42)
MCH RBC QN AUTO: 20.9 PG (ref 26–35)
MCHC RBC AUTO-ENTMCNC: 27.5 % (ref 32–34.5)
MCV RBC AUTO: 76 FL (ref 80–99.9)
MONOCYTES ABSOLUTE: 0.51 E9/L (ref 0.1–0.95)
MONOCYTES RELATIVE PERCENT: 6.9 % (ref 2–12)
NEUTROPHILS ABSOLUTE: 4.22 E9/L (ref 1.8–7.3)
NEUTROPHILS RELATIVE PERCENT: 57 % (ref 43–80)
OVALOCYTES: ABNORMAL
PDW BLD-RTO: 20.5 FL (ref 11.5–15)
PLATELET # BLD: 698 E9/L (ref 130–450)
PMV BLD AUTO: 8.7 FL (ref 7–12)
POIKILOCYTES: ABNORMAL
POLYCHROMASIA: ABNORMAL
RBC # BLD: 3.92 E12/L (ref 3.5–5.5)
TARGET CELLS: ABNORMAL
TEAR DROP CELLS: ABNORMAL
WBC # BLD: 7.4 E9/L (ref 4.5–11.5)

## 2020-02-18 PROCEDURE — 36415 COLL VENOUS BLD VENIPUNCTURE: CPT

## 2020-02-18 PROCEDURE — 85025 COMPLETE CBC W/AUTO DIFF WBC: CPT

## 2020-02-19 ENCOUNTER — OFFICE VISIT (OUTPATIENT)
Dept: BARIATRICS/WEIGHT MGMT | Age: 45
End: 2020-02-19
Payer: MEDICARE

## 2020-02-19 ENCOUNTER — TELEPHONE (OUTPATIENT)
Dept: FAMILY MEDICINE CLINIC | Age: 45
End: 2020-02-19

## 2020-02-19 VITALS
TEMPERATURE: 97.6 F | SYSTOLIC BLOOD PRESSURE: 102 MMHG | HEIGHT: 66 IN | BODY MASS INDEX: 27.8 KG/M2 | WEIGHT: 173 LBS | DIASTOLIC BLOOD PRESSURE: 59 MMHG | HEART RATE: 73 BPM | RESPIRATION RATE: 20 BRPM

## 2020-02-19 PROCEDURE — 99213 OFFICE O/P EST LOW 20 MIN: CPT | Performed by: SURGERY

## 2020-02-19 PROCEDURE — 99211 OFF/OP EST MAY X REQ PHY/QHP: CPT

## 2020-02-19 NOTE — PROGRESS NOTES
issues     P: we discussed at length the ulcer and its likely cause as being a smoker and she understands not to take nsaids because she also complains of aches and pains. Cont bid ppi and qid carafate.     Bertha Jiménez MD  2/19/2020

## 2020-02-19 NOTE — PATIENT INSTRUCTIONS
Please continue to take your vitamin and mineral supplements as instructed. If you received a blood work prescription today for laboratory monitoring due prior to your next routine follow-up visit, please have this blood work obtained 10 to 14 days prior to your next visit. It is important to fast for 12 hours prior to routine weight loss surgery blood work, EXCEPT for drinking water, to ensure accuracy of results. Please report nausea, vomiting, abdominal pain, or any other problems you experience to your surgeon. For problems related to weight loss surgery, it is best to go to [address] [place] [address] Emergency Department and have your surgeon paged.

## 2020-02-24 ENCOUNTER — OFFICE VISIT (OUTPATIENT)
Dept: PHYSICAL MEDICINE AND REHAB | Age: 45
End: 2020-02-24
Payer: MEDICARE

## 2020-02-24 VITALS
HEIGHT: 66 IN | HEART RATE: 71 BPM | DIASTOLIC BLOOD PRESSURE: 76 MMHG | BODY MASS INDEX: 28.28 KG/M2 | WEIGHT: 176 LBS | SYSTOLIC BLOOD PRESSURE: 121 MMHG | TEMPERATURE: 97.9 F

## 2020-02-24 PROCEDURE — 20611 DRAIN/INJ JOINT/BURSA W/US: CPT | Performed by: PHYSICAL MEDICINE & REHABILITATION

## 2020-02-24 RX ADMIN — Medication 20 MG: at 08:39

## 2020-02-28 ENCOUNTER — TELEPHONE (OUTPATIENT)
Dept: PHYSICAL MEDICINE AND REHAB | Age: 45
End: 2020-02-28

## 2020-02-28 NOTE — TELEPHONE ENCOUNTER
Quick follow up call to patient to check effectiveness of her #3 Euflexxa injection. Left message should she have any further concerns to call the office.

## 2020-03-09 ENCOUNTER — OFFICE VISIT (OUTPATIENT)
Dept: PHYSICAL MEDICINE AND REHAB | Age: 45
End: 2020-03-09
Payer: MEDICARE

## 2020-03-09 VITALS
TEMPERATURE: 98.4 F | SYSTOLIC BLOOD PRESSURE: 118 MMHG | HEIGHT: 66 IN | BODY MASS INDEX: 27.97 KG/M2 | DIASTOLIC BLOOD PRESSURE: 68 MMHG | HEART RATE: 60 BPM | WEIGHT: 174 LBS

## 2020-03-09 PROCEDURE — 20611 DRAIN/INJ JOINT/BURSA W/US: CPT | Performed by: PHYSICAL MEDICINE & REHABILITATION

## 2020-03-09 RX ORDER — LIDOCAINE HYDROCHLORIDE 10 MG/ML
7 INJECTION, SOLUTION INFILTRATION; PERINEURAL ONCE
Status: COMPLETED | OUTPATIENT
Start: 2020-03-09 | End: 2020-03-09

## 2020-03-09 RX ORDER — DULOXETIN HYDROCHLORIDE 30 MG/1
30 CAPSULE, DELAYED RELEASE ORAL DAILY
COMMUNITY
Start: 2020-03-04 | End: 2020-06-22 | Stop reason: SINTOL

## 2020-03-09 RX ORDER — TRIAMCINOLONE ACETONIDE 40 MG/ML
40 INJECTION, SUSPENSION INTRA-ARTICULAR; INTRAMUSCULAR ONCE
Status: COMPLETED | OUTPATIENT
Start: 2020-03-09 | End: 2020-03-09

## 2020-03-09 RX ADMIN — LIDOCAINE HYDROCHLORIDE 7 ML: 10 INJECTION, SOLUTION INFILTRATION; PERINEURAL at 11:57

## 2020-03-09 RX ADMIN — TRIAMCINOLONE ACETONIDE 40 MG: 40 INJECTION, SUSPENSION INTRA-ARTICULAR; INTRAMUSCULAR at 11:58

## 2020-03-09 NOTE — PROGRESS NOTES
Odalys Montero D.O. [facility]  [address] [facility] [place]  Phone: [phone]  Fax: [phone]    3/9/2020    Chief Complaint   Patient presents with    Knee Pain     Right Knee joint Steroid Injection       Last injection: 9/27/19  Taking anticoagulants/antiplatelets: No  Diabetic: No  Febrile/active infection: No    After explaining the indications, risks, benefits and alternatives of a right knee joint injection, the patient agreed to proceed. A permit was signed and scanned into the chart. The skin on the lateral knee was prepared with chloraprep. Using sterile technique, a 22 gauge, 1.5\" needle with 1 cc of Kenalog 40mg/cc and 8 cc of 1% lidocaine was directed to the knee joint using US guidance. After negative aspiration, the medication was injected. Adequate hemostasis was achieved and a bandage applied. The patient tolerated the procedure well and was educated in post injection care. The patient was clinically monitored after the injection and left the office without incident. There was post injection reduction in pain. Ultrasound images are scanned separately into the EMR. Odalys Montero D.O., P.T.   Board Certified Physical Medicine and Rehabilitation  Board Certified Electrodiagnostic Medicine    Administrations This Visit     lidocaine 1 % injection 7 mL     Admin Date  03/09/2020  11:57 Action  Given Dose  7 mL Route  Other Site   Administered By  Cathleen Prater MA    Ordering Provider:  Blossom Sullivan DO ND:  0253-2536-66    Lot#:  [id number]    :  PRINCE    Patient Supplied?:  No    Comments:  EXP:  03/2021          triamcinolone acetonide (KENALOG-40) injection 40 mg     Admin Date  03/09/2020  11:58 Action  Given Dose  40 mg Route  Intra-articular Site   Administered By  Cathleen Prater MA    Ordering Provider:  Blossom Sullivan DO    NDC:  3879-7130-69    Lot#:  [id number]    :  B[employer]M [employer]

## 2020-03-12 ENCOUNTER — TELEPHONE (OUTPATIENT)
Dept: BARIATRICS/WEIGHT MGMT | Age: 45
End: 2020-03-12

## 2020-03-12 ENCOUNTER — TELEPHONE (OUTPATIENT)
Dept: PHYSICAL MEDICINE AND REHAB | Age: 45
End: 2020-03-12

## 2020-03-12 NOTE — TELEPHONE ENCOUNTER
Patient called in today with concerns of no bowel movements from Monday 3/9/20, I encouraged her to take 2 colace today to see if that helps. Patient will call me back in the morning with results. No fever, no belly pains.

## 2020-03-13 ENCOUNTER — TELEPHONE (OUTPATIENT)
Dept: BARIATRICS/WEIGHT MGMT | Age: 45
End: 2020-03-13

## 2020-03-19 RX ORDER — ONDANSETRON 4 MG/1
4 TABLET, FILM COATED ORAL DAILY PRN
Qty: 30 TABLET | Refills: 0 | Status: SHIPPED
Start: 2020-03-19 | End: 2020-12-28 | Stop reason: SDUPTHER

## 2020-03-19 RX ORDER — SUCRALFATE 1 G/1
1 TABLET ORAL 4 TIMES DAILY
Qty: 120 TABLET | Refills: 3 | Status: SHIPPED
Start: 2020-03-19 | End: 2020-07-20

## 2020-03-24 ENCOUNTER — TELEPHONE (OUTPATIENT)
Dept: PAIN MANAGEMENT | Age: 45
End: 2020-03-24

## 2020-03-27 ENCOUNTER — TELEPHONE (OUTPATIENT)
Dept: PAIN MANAGEMENT | Age: 45
End: 2020-03-27

## 2020-03-31 ENCOUNTER — VIRTUAL VISIT (OUTPATIENT)
Dept: PAIN MANAGEMENT | Age: 45
End: 2020-03-31
Payer: MEDICARE

## 2020-03-31 ENCOUNTER — TELEPHONE (OUTPATIENT)
Dept: SURGERY | Age: 45
End: 2020-03-31

## 2020-03-31 PROCEDURE — 99214 OFFICE O/P EST MOD 30 MIN: CPT | Performed by: PHYSICIAN ASSISTANT

## 2020-03-31 RX ORDER — TIZANIDINE 4 MG/1
4 TABLET ORAL 2 TIMES DAILY PRN
Qty: 20 TABLET | Refills: 0 | Status: ON HOLD
Start: 2020-03-31 | End: 2020-10-22 | Stop reason: HOSPADM

## 2020-03-31 NOTE — TELEPHONE ENCOUNTER
Abdominal panniculectomy procedure last discussed on 12/20/2019      Need photos to forward with prior authorization     Patient needs to be nicotine free prior to procedure    Patient needs to see Jimmy Parikh prior to procedure scheduled

## 2020-03-31 NOTE — PROGRESS NOTES
with the patient including medication side effects      Controlled Substance Monitoring:    Acute and Chronic Pain Monitoring:   RX Monitoring 3/31/2020   Periodic Controlled Substance Monitoring Possible medication side effects, risk of tolerance/dependence & alternative treatments discussed. ;No signs of potential drug abuse or diversion identified. ;Assessed functional status. We discussed with the patient that combining opioids, benzodiazepines, alcohol, illicit drugs or sleep aids increases the risk of respiratory depression including death. We discussed that these medications may cause drowsiness, sedation or dizziness and have counseled the patient not to drive or operate machinery. We have discussed that these medications will be prescribed only by one provider. We have discussed with the patient about age related risk factors and have thoroughly discussed the importance of taking these medications as prescribed. The patient verbalizes understanding. Patient advised regarding steps to help prevent the spread of COVID-19   SOURCE - https://nabele-laura.info/. html     1-Stay home except to get medical care  2-Clean your hands often for atleast 20 seconds, avoid touching: Avoid touching your eyes, nose, and mouth with unwashed hands. 3-Seek medical attention: Seek prompt medical attention if your illness is worsening (e.g., difficulty breathing). Call you doctor first.  3-Wear a facemask if you are sick   4-Cover your coughs and sneezes           I affirm this is a Patient Initiated Episode with an established Patient who has not had a related appointment within my department in the past 7 days or scheduled within the next 24 hours.     Total Time: 22 minutes 29 seconds    Cc: Referring physician

## 2020-04-02 NOTE — TELEPHONE ENCOUNTER
Patient notified prior authorization for procedure last discussed with Jef Sims will not be done until surgery schedule opens up for the office due to Covid-19. Per patient she would like to have procedure August or September 2020    Patient needs to quit smoking prior to procedure(patient has lab req.,for nicotine levels to be drawn)    Needs to see  once authorized by insurance     Wound Vac will need ordered prior to procedure      Photos attached to our procedure sheet    May need quote for abdominal skin removal for naval reconstruction (will be discussed with  on appmt. ,)

## 2020-04-09 ENCOUNTER — CLINICAL DOCUMENTATION (OUTPATIENT)
Dept: FAMILY MEDICINE CLINIC | Age: 45
End: 2020-04-09

## 2020-04-09 ENCOUNTER — TELEPHONE (OUTPATIENT)
Dept: BARIATRICS/WEIGHT MGMT | Age: 45
End: 2020-04-09

## 2020-04-09 PROBLEM — D25.0 SUBMUCOUS UTERINE FIBROID: Status: RESOLVED | Noted: 2019-11-07 | Resolved: 2020-04-09

## 2020-04-09 PROBLEM — N84.0 ENDOMETRIAL POLYP: Status: RESOLVED | Noted: 2019-11-07 | Resolved: 2020-04-09

## 2020-04-09 PROBLEM — M47.816 LUMBAR SPONDYLOSIS: Status: RESOLVED | Noted: 2018-01-26 | Resolved: 2020-04-09

## 2020-04-09 PROBLEM — M79.10 MYALGIA: Status: RESOLVED | Noted: 2020-01-31 | Resolved: 2020-04-09

## 2020-04-14 ENCOUNTER — TELEPHONE (OUTPATIENT)
Dept: BARIATRICS/WEIGHT MGMT | Age: 45
End: 2020-04-14

## 2020-04-14 NOTE — TELEPHONE ENCOUNTER
Patient called in and is still having some burning in stomach. She is till smoking average 10-12 cigarettes daily. Her insurance wont cover Protonix anymore. I will discuss with Dr. Shavon Meyers tomorrow and recall. We discussed at length need to discontinue smoking.  She refused referral to the Tobacco cessation program.

## 2020-04-15 ENCOUNTER — TELEPHONE (OUTPATIENT)
Dept: BARIATRICS/WEIGHT MGMT | Age: 45
End: 2020-04-15

## 2020-04-15 RX ORDER — OMEPRAZOLE 20 MG/1
20 CAPSULE, DELAYED RELEASE ORAL DAILY
Qty: 90 CAPSULE | Refills: 0 | Status: ON HOLD
Start: 2020-04-15 | End: 2020-06-29 | Stop reason: HOSPADM

## 2020-06-01 ENCOUNTER — TELEPHONE (OUTPATIENT)
Dept: ADMINISTRATIVE | Age: 45
End: 2020-06-01

## 2020-06-01 NOTE — TELEPHONE ENCOUNTER
The patient will be due for the right knee steroid injection on or after 6/9/20 (last given on 3/9/20). The left knee steroid injection is due anytime. That was last done on 10/3/19. All other injections in between have been gel injections. I spoke with the patient.  She wants to schedule the right knee injection first. I scheduled on 6/9/20 3:15p for the right knee steroid injection, and 6/22/20 3:45p for the left knee steroid injection

## 2020-06-08 ENCOUNTER — HOSPITAL ENCOUNTER (EMERGENCY)
Age: 45
Discharge: HOME | End: 2020-06-08
Attending: EMERGENCY MEDICINE
Payer: MEDICARE

## 2020-06-08 ENCOUNTER — APPOINTMENT (OUTPATIENT)
Dept: GENERAL RADIOLOGY | Age: 45
End: 2020-06-08
Payer: MEDICARE

## 2020-06-08 VITALS
HEART RATE: 95 BPM | DIASTOLIC BLOOD PRESSURE: 56 MMHG | TEMPERATURE: 98.6 F | OXYGEN SATURATION: 99 % | BODY MASS INDEX: 28.28 KG/M2 | SYSTOLIC BLOOD PRESSURE: 100 MMHG | WEIGHT: 176 LBS | HEIGHT: 66 IN | RESPIRATION RATE: 16 BRPM

## 2020-06-08 LAB
ALBUMIN SERPL-MCNC: 3.8 G/DL (ref 3.5–5.2)
ALP BLD-CCNC: 75 U/L (ref 35–104)
ALT SERPL-CCNC: 9 U/L (ref 0–32)
ANION GAP SERPL CALCULATED.3IONS-SCNC: 8 MMOL/L (ref 7–16)
AST SERPL-CCNC: 17 U/L (ref 0–31)
BASOPHILS ABSOLUTE: 0.1 E9/L (ref 0–0.2)
BASOPHILS RELATIVE PERCENT: 1 % (ref 0–2)
BILIRUB SERPL-MCNC: 0.2 MG/DL (ref 0–1.2)
BILIRUBIN URINE: NEGATIVE
BLOOD, URINE: NEGATIVE
BUN BLDV-MCNC: 15 MG/DL (ref 6–20)
CALCIUM SERPL-MCNC: 8.7 MG/DL (ref 8.6–10.2)
CHLORIDE BLD-SCNC: 100 MMOL/L (ref 98–107)
CLARITY: CLEAR
CO2: 26 MMOL/L (ref 22–29)
COLOR: YELLOW
CREAT SERPL-MCNC: 0.6 MG/DL (ref 0.5–1)
EOSINOPHILS ABSOLUTE: 0.14 E9/L (ref 0.05–0.5)
EOSINOPHILS RELATIVE PERCENT: 1.3 % (ref 0–6)
GFR AFRICAN AMERICAN: >60
GFR NON-AFRICAN AMERICAN: >60 ML/MIN/1.73
GLUCOSE BLD-MCNC: 83 MG/DL (ref 74–99)
GLUCOSE URINE: NEGATIVE MG/DL
HCT VFR BLD CALC: 30.3 % (ref 34–48)
HEMOGLOBIN: 9.2 G/DL (ref 11.5–15.5)
IMMATURE GRANULOCYTES #: 0.03 E9/L
IMMATURE GRANULOCYTES %: 0.3 % (ref 0–5)
KETONES, URINE: NEGATIVE MG/DL
LACTIC ACID: 0.6 MMOL/L (ref 0.5–2.2)
LEUKOCYTE ESTERASE, URINE: NEGATIVE
LIPASE: 9 U/L (ref 13–60)
LYMPHOCYTES ABSOLUTE: 4.1 E9/L (ref 1.5–4)
LYMPHOCYTES RELATIVE PERCENT: 39.2 % (ref 20–42)
MAGNESIUM: 1.9 MG/DL (ref 1.6–2.6)
MCH RBC QN AUTO: 23.5 PG (ref 26–35)
MCHC RBC AUTO-ENTMCNC: 30.4 % (ref 32–34.5)
MCV RBC AUTO: 77.5 FL (ref 80–99.9)
MONOCYTES ABSOLUTE: 0.59 E9/L (ref 0.1–0.95)
MONOCYTES RELATIVE PERCENT: 5.6 % (ref 2–12)
NEUTROPHILS ABSOLUTE: 5.51 E9/L (ref 1.8–7.3)
NEUTROPHILS RELATIVE PERCENT: 52.6 % (ref 43–80)
NITRITE, URINE: NEGATIVE
PDW BLD-RTO: 19.9 FL (ref 11.5–15)
PH UA: 6 (ref 5–9)
PLATELET # BLD: 520 E9/L (ref 130–450)
PMV BLD AUTO: 8.3 FL (ref 7–12)
POTASSIUM REFLEX MAGNESIUM: 3.9 MMOL/L (ref 3.5–5)
PROTEIN UA: NEGATIVE MG/DL
RBC # BLD: 3.91 E12/L (ref 3.5–5.5)
SODIUM BLD-SCNC: 134 MMOL/L (ref 132–146)
SPECIFIC GRAVITY UA: 1.01 (ref 1–1.03)
TOTAL PROTEIN: 6.8 G/DL (ref 6.4–8.3)
TROPONIN: <0.01 NG/ML (ref 0–0.03)
TSH SERPL DL<=0.05 MIU/L-ACNC: 2.22 UIU/ML (ref 0.27–4.2)
UROBILINOGEN, URINE: 0.2 E.U./DL
WBC # BLD: 10.5 E9/L (ref 4.5–11.5)

## 2020-06-08 PROCEDURE — 2580000003 HC RX 258: Performed by: STUDENT IN AN ORGANIZED HEALTH CARE EDUCATION/TRAINING PROGRAM

## 2020-06-08 PROCEDURE — 84443 ASSAY THYROID STIM HORMONE: CPT

## 2020-06-08 PROCEDURE — 85025 COMPLETE CBC W/AUTO DIFF WBC: CPT

## 2020-06-08 PROCEDURE — 81003 URINALYSIS AUTO W/O SCOPE: CPT

## 2020-06-08 PROCEDURE — 71045 X-RAY EXAM CHEST 1 VIEW: CPT

## 2020-06-08 PROCEDURE — 83735 ASSAY OF MAGNESIUM: CPT

## 2020-06-08 PROCEDURE — 96361 HYDRATE IV INFUSION ADD-ON: CPT

## 2020-06-08 PROCEDURE — 80053 COMPREHEN METABOLIC PANEL: CPT

## 2020-06-08 PROCEDURE — 84484 ASSAY OF TROPONIN QUANT: CPT

## 2020-06-08 PROCEDURE — 96360 HYDRATION IV INFUSION INIT: CPT

## 2020-06-08 PROCEDURE — 83605 ASSAY OF LACTIC ACID: CPT

## 2020-06-08 PROCEDURE — 93005 ELECTROCARDIOGRAM TRACING: CPT | Performed by: STUDENT IN AN ORGANIZED HEALTH CARE EDUCATION/TRAINING PROGRAM

## 2020-06-08 PROCEDURE — 83690 ASSAY OF LIPASE: CPT

## 2020-06-08 PROCEDURE — 99284 EMERGENCY DEPT VISIT MOD MDM: CPT

## 2020-06-08 RX ORDER — 0.9 % SODIUM CHLORIDE 0.9 %
500 INTRAVENOUS SOLUTION INTRAVENOUS ONCE
Status: DISCONTINUED | OUTPATIENT
Start: 2020-06-08 | End: 2020-06-08

## 2020-06-08 RX ORDER — 0.9 % SODIUM CHLORIDE 0.9 %
1000 INTRAVENOUS SOLUTION INTRAVENOUS ONCE
Status: COMPLETED | OUTPATIENT
Start: 2020-06-08 | End: 2020-06-08

## 2020-06-08 RX ADMIN — SODIUM CHLORIDE 1000 ML: 9 INJECTION, SOLUTION INTRAVENOUS at 20:44

## 2020-06-09 ENCOUNTER — CARE COORDINATION (OUTPATIENT)
Dept: CARE COORDINATION | Age: 45
End: 2020-06-09

## 2020-06-09 ENCOUNTER — OFFICE VISIT (OUTPATIENT)
Dept: FAMILY MEDICINE CLINIC | Age: 45
End: 2020-06-09
Payer: MEDICARE

## 2020-06-09 VITALS
TEMPERATURE: 97.2 F | HEIGHT: 66 IN | RESPIRATION RATE: 18 BRPM | SYSTOLIC BLOOD PRESSURE: 94 MMHG | WEIGHT: 179 LBS | DIASTOLIC BLOOD PRESSURE: 62 MMHG | BODY MASS INDEX: 28.77 KG/M2 | OXYGEN SATURATION: 98 % | HEART RATE: 70 BPM

## 2020-06-09 LAB
EKG ATRIAL RATE: 44 BPM
EKG P AXIS: 59 DEGREES
EKG P-R INTERVAL: 148 MS
EKG Q-T INTERVAL: 472 MS
EKG QRS DURATION: 94 MS
EKG QTC CALCULATION (BAZETT): 403 MS
EKG R AXIS: 58 DEGREES
EKG T AXIS: 51 DEGREES
EKG VENTRICULAR RATE: 44 BPM

## 2020-06-09 PROCEDURE — 93010 ELECTROCARDIOGRAM REPORT: CPT | Performed by: INTERNAL MEDICINE

## 2020-06-09 PROCEDURE — 99212 OFFICE O/P EST SF 10 MIN: CPT | Performed by: STUDENT IN AN ORGANIZED HEALTH CARE EDUCATION/TRAINING PROGRAM

## 2020-06-09 PROCEDURE — 99213 OFFICE O/P EST LOW 20 MIN: CPT | Performed by: STUDENT IN AN ORGANIZED HEALTH CARE EDUCATION/TRAINING PROGRAM

## 2020-06-09 ASSESSMENT — ENCOUNTER SYMPTOMS
APNEA: 0
COUGH: 0
EYE DISCHARGE: 0
ABDOMINAL DISTENTION: 0
CHOKING: 0
DIARRHEA: 0
WHEEZING: 0
SHORTNESS OF BREATH: 0
CONSTIPATION: 0
BLOOD IN STOOL: 0
EYE PAIN: 0
PHOTOPHOBIA: 0
EYE REDNESS: 0
CHEST TIGHTNESS: 0
ABDOMINAL PAIN: 1
BACK PAIN: 0
VOMITING: 0
NAUSEA: 0
COLOR CHANGE: 0
STRIDOR: 0
EYE ITCHING: 0

## 2020-06-09 NOTE — CARE COORDINATION
Attempted to reach patient by phone. HIPAA compliant message left on patient's voicemail; CTN contact information provided.

## 2020-06-09 NOTE — PROGRESS NOTES
following sustained weight loss after bariatric surgery.  Iron deficiency 8/1/2018    Localized osteoarthritis of left knee 1/20/2020    Lumbar spondylosis 1/26/2018    Lymphedema     Obesity     Panic attacks     Perforated marginal ulcer 10/29/2018    ~1 year after RnYGB    PONV (postoperative nausea and vomiting)     Prediabetes     BG has normalized following sustained weight loss after bariatric surgery.  Sleep apnea, obstructive     Off PAP therapy BG following sustained weight loss after bariatric surgery.     Vitamin D deficiency 7/27/2018    Zinc deficiency 8/1/2018       Past Surgical History:        Procedure Laterality Date    APPENDECTOMY  1996    CHOLECYSTECTOMY, LAPAROSCOPIC N/A 7/27/2019    CHOLECYSTECTOMY LAPAROSCOPIC performed by José Miguel Cain MD at [address]  10/23/2019    [place]    LITHOTRIPSY Right 7/24/2019    CYSTOSCOPY RETROGRADE PYELOGRAM URETEROSCOPY J STENT LASER LITHOTRIPSY RIGHT performed by Mel Chambers DO at [facility] LITHOTRIPSY      SC OFFICE/OUTPT VISIT,PROCEDURE ONLY N/A 10/29/2018    DIAGNOSTIC LAPAROSCOPY REPAIR PERFORATED VISCUS performed by José Miguel Cain MD at [address]  11/14/2017    STOMACH SURGERY      UPPER GASTROINTESTINAL ENDOSCOPY N/A 7/27/2019    EGD ESOPHAGOGASTRODUODENOSCOPY performed by José Miguel Cain MD at [address] [facility] N/A 2/7/2020    EGD BIOPSY performed by José Miguel Cain MD at [facility] ENDOSCOPY       Allergies:    Pcn [penicillins] and Food    Social History:   Social History     Socioeconomic History    Marital status: Single     Spouse name: Not on file    Number of children: Not on file    Years of education: Not on file    Highest education level: Not on file   Occupational History    Occupation: unemployed/disability   Social Needs    Financial resource strain: Not on file    Food insecurity

## 2020-06-10 ASSESSMENT — ENCOUNTER SYMPTOMS
ABDOMINAL PAIN: 0
SHORTNESS OF BREATH: 0
COUGH: 0

## 2020-06-11 ENCOUNTER — TELEPHONE (OUTPATIENT)
Dept: FAMILY MEDICINE CLINIC | Age: 45
End: 2020-06-11

## 2020-06-22 ENCOUNTER — OFFICE VISIT (OUTPATIENT)
Dept: PHYSICAL MEDICINE AND REHAB | Age: 45
End: 2020-06-22
Payer: MEDICARE

## 2020-06-22 VITALS
HEIGHT: 66 IN | BODY MASS INDEX: 28.28 KG/M2 | TEMPERATURE: 97.4 F | HEART RATE: 67 BPM | DIASTOLIC BLOOD PRESSURE: 68 MMHG | WEIGHT: 176 LBS | SYSTOLIC BLOOD PRESSURE: 126 MMHG

## 2020-06-22 PROCEDURE — 20611 DRAIN/INJ JOINT/BURSA W/US: CPT | Performed by: PHYSICAL MEDICINE & REHABILITATION

## 2020-06-22 RX ORDER — TRIAMCINOLONE ACETONIDE 40 MG/ML
40 INJECTION, SUSPENSION INTRA-ARTICULAR; INTRAMUSCULAR ONCE
Status: COMPLETED | OUTPATIENT
Start: 2020-06-22 | End: 2020-06-22

## 2020-06-22 RX ORDER — LIDOCAINE HYDROCHLORIDE 10 MG/ML
7 INJECTION, SOLUTION INFILTRATION; PERINEURAL ONCE
Status: COMPLETED | OUTPATIENT
Start: 2020-06-22 | End: 2020-06-22

## 2020-06-22 RX ADMIN — TRIAMCINOLONE ACETONIDE 40 MG: 40 INJECTION, SUSPENSION INTRA-ARTICULAR; INTRAMUSCULAR at 16:18

## 2020-06-22 RX ADMIN — LIDOCAINE HYDROCHLORIDE 7 ML: 10 INJECTION, SOLUTION INFILTRATION; PERINEURAL at 16:17

## 2020-06-22 NOTE — PROGRESS NOTES
Eulalia Lew D.O. [facility]  [address] [place]  Phone: [phone]  Fax: [phone]    6/22/2020    Chief Complaint   Patient presents with    Knee Pain       Last injection: 3/9/20  Taking anticoagulants/antiplatelets: No  Diabetic: No  Febrile/active infection: No    After explaining the indications, risks, benefits and alternatives of a right knee joint injection, the patient agreed to proceed. A permit was signed and scanned into the chart. The skin on the lateral knee was prepared with chloraprep. Using sterile technique, a 22 gauge, 1.5\" needle with 1 cc of Kenalog 40mg/cc and 8 cc of 1% lidocaine was directed to the knee joint using US guidance. After negative aspiration, the medication was injected. Adequate hemostasis was achieved and a bandage applied. The patient tolerated the procedure well and was educated in post injection care. The patient was clinically monitored after the injection and left the office without incident. There was post injection reduction in pain. Ultrasound images are scanned separately into the EMR. Eulalia Lew D.O., P.T.   Board Certified Physical Medicine and Rehabilitation  Board Certified Electrodiagnostic Medicine    Administrations This Visit     lidocaine 1 % injection 7 mL     Admin Date  06/22/2020  16:17 Action  Given Dose  7 mL Route  Other Site   Administered By  Seamus Garvey MA    Ordering Provider:  Hayward Peabody, DO    NDC:  1271-9145-66    Lot#:  [id number]    :  Kristin Lara    Patient Supplied?:  No    Comments:  EXP:  05/2021          triamcinolone acetonide (KENALOG-40) injection 40 mg     Admin Date  06/22/2020  16:18 Action  Given Dose  40 mg Route  Intra-articular Site   Administered By  Seamus Garvey MA    Ordering Provider:  Hayward Peabody, DO    ND:  9688-9916-80    Lot#:  [id number]    :  [facility] (PRIMARY CARE)    Patient Supplied?:  No Comments:  EXP:  JUN 2021

## 2020-06-24 ENCOUNTER — TELEPHONE (OUTPATIENT)
Dept: PHYSICAL MEDICINE AND REHAB | Age: 45
End: 2020-06-24

## 2020-06-24 NOTE — TELEPHONE ENCOUNTER
I completed/faxed a PA request to [facility] for bilateral knee Euflexxa Injections, requesting date of services starting 8/1/2020. Auth Pending.

## 2020-06-24 NOTE — TELEPHONE ENCOUNTER
Per chart message from Dr. Silva Garcia on 6/22/20, \"Ana Luisa would be due for bilateral knee Euflexxa on 6/27/20. Kecia Mustache place prior authorization but let patient know which one we decided to request. Tarun Osler would suggest she wait 1-2 months from now so we can try to get her on a schedule alternating between the Euflexxa and steroid.  After the next Euflexxa injection if it doesn't last the full 6 months then I would recommend we alternate the Euflexxa with the Loren Jeans so she would get one or the other every three months if her insurance will cover Loren Jeans. If they won't we can continue to use kenalog. I would wait to request the Loren Jeans until she gets the Euflexxa injection. \"    I called the patient and discussed this with her. I told her that I will start working on the [employer] for both knees and will coordinate appointments accordingly. She voiced understanding.

## 2020-06-27 ENCOUNTER — APPOINTMENT (OUTPATIENT)
Dept: CT IMAGING | Age: 45
DRG: 326 | End: 2020-06-27
Payer: MEDICARE

## 2020-06-27 ENCOUNTER — ANESTHESIA (OUTPATIENT)
Dept: OPERATING ROOM | Age: 45
DRG: 326 | End: 2020-06-27
Payer: MEDICARE

## 2020-06-27 ENCOUNTER — APPOINTMENT (OUTPATIENT)
Dept: GENERAL RADIOLOGY | Age: 45
DRG: 326 | End: 2020-06-27
Payer: MEDICARE

## 2020-06-27 ENCOUNTER — HOSPITAL ENCOUNTER (INPATIENT)
Age: 45
LOS: 2 days | Discharge: HOME | DRG: 326 | End: 2020-06-29
Attending: EMERGENCY MEDICINE | Admitting: SURGERY
Payer: MEDICARE

## 2020-06-27 ENCOUNTER — ANESTHESIA EVENT (OUTPATIENT)
Dept: OPERATING ROOM | Age: 45
DRG: 326 | End: 2020-06-27
Payer: MEDICARE

## 2020-06-27 VITALS
DIASTOLIC BLOOD PRESSURE: 100 MMHG | SYSTOLIC BLOOD PRESSURE: 149 MMHG | TEMPERATURE: 97.7 F | RESPIRATION RATE: 21 BRPM | OXYGEN SATURATION: 100 %

## 2020-06-27 PROBLEM — K63.1 PERFORATED BOWEL (HCC): Status: ACTIVE | Noted: 2020-06-27

## 2020-06-27 PROBLEM — R19.8 PERFORATED ABDOMINAL VISCUS: Status: ACTIVE | Noted: 2020-06-27

## 2020-06-27 LAB
ABO/RH: NORMAL
ALBUMIN SERPL-MCNC: 3.8 G/DL (ref 3.5–5.2)
ALP BLD-CCNC: 65 U/L (ref 35–104)
ALT SERPL-CCNC: 10 U/L (ref 0–32)
ANION GAP SERPL CALCULATED.3IONS-SCNC: 13 MMOL/L (ref 7–16)
ANISOCYTOSIS: ABNORMAL
ANTIBODY IDENTIFICATION: NORMAL
ANTIBODY SCREEN: NORMAL
APTT: 26.7 SEC (ref 24.5–35.1)
AST SERPL-CCNC: 14 U/L (ref 0–31)
BASOPHILS ABSOLUTE: 0.08 E9/L (ref 0–0.2)
BASOPHILS RELATIVE PERCENT: 1 % (ref 0–2)
BILIRUB SERPL-MCNC: 0.3 MG/DL (ref 0–1.2)
BUN BLDV-MCNC: 16 MG/DL (ref 6–20)
CALCIUM SERPL-MCNC: 8.9 MG/DL (ref 8.6–10.2)
CHLORIDE BLD-SCNC: 103 MMOL/L (ref 98–107)
CO2: 23 MMOL/L (ref 22–29)
CREAT SERPL-MCNC: 0.6 MG/DL (ref 0.5–1)
DAT POLYSPECIFIC: NORMAL
DR. NOTIFY: NORMAL
EKG ATRIAL RATE: 53 BPM
EKG P AXIS: 48 DEGREES
EKG P-R INTERVAL: 144 MS
EKG Q-T INTERVAL: 464 MS
EKG QRS DURATION: 82 MS
EKG QTC CALCULATION (BAZETT): 435 MS
EKG R AXIS: 59 DEGREES
EKG T AXIS: 66 DEGREES
EKG VENTRICULAR RATE: 53 BPM
EOSINOPHILS ABSOLUTE: 0.12 E9/L (ref 0.05–0.5)
EOSINOPHILS RELATIVE PERCENT: 1.5 % (ref 0–6)
GFR AFRICAN AMERICAN: >60
GFR AFRICAN AMERICAN: >60
GFR NON-AFRICAN AMERICAN: >60 ML/MIN/1.73
GFR NON-AFRICAN AMERICAN: >60 ML/MIN/1.73
GLUCOSE BLD-MCNC: 83 MG/DL (ref 74–99)
GLUCOSE BLD-MCNC: 89 MG/DL (ref 74–99)
HCG QUALITATIVE: NEGATIVE
HCT VFR BLD CALC: 33.9 % (ref 34–48)
HEMOGLOBIN: 10.4 G/DL (ref 11.5–15.5)
HYPOCHROMIA: ABNORMAL
IMMATURE GRANULOCYTES #: 0.02 E9/L
IMMATURE GRANULOCYTES %: 0.2 % (ref 0–5)
INR BLD: 1
LACTIC ACID, SEPSIS: 2.4 MMOL/L (ref 0.5–1.9)
LIPASE: 17 U/L (ref 13–60)
LYMPHOCYTES ABSOLUTE: 4.16 E9/L (ref 1.5–4)
LYMPHOCYTES RELATIVE PERCENT: 50.4 % (ref 20–42)
MCH RBC QN AUTO: 23.1 PG (ref 26–35)
MCHC RBC AUTO-ENTMCNC: 30.7 % (ref 32–34.5)
MCV RBC AUTO: 75.2 FL (ref 80–99.9)
MONOCYTES ABSOLUTE: 0.49 E9/L (ref 0.1–0.95)
MONOCYTES RELATIVE PERCENT: 5.9 % (ref 2–12)
NEUTROPHILS ABSOLUTE: 3.39 E9/L (ref 1.8–7.3)
NEUTROPHILS RELATIVE PERCENT: 41 % (ref 43–80)
OVALOCYTES: ABNORMAL
PDW BLD-RTO: 19.2 FL (ref 11.5–15)
PERFORMED ON: ABNORMAL
PLATELET # BLD: 611 E9/L (ref 130–450)
PMV BLD AUTO: 8.4 FL (ref 7–12)
POC CHLORIDE: 105 MMOL/L (ref 100–108)
POC CREATININE: 0.6 MG/DL (ref 0.5–1)
POC POTASSIUM: 5.4 MMOL/L (ref 3.5–5)
POC SODIUM: 136 MMOL/L (ref 132–146)
POIKILOCYTES: ABNORMAL
POLYCHROMASIA: ABNORMAL
POTASSIUM REFLEX MAGNESIUM: 4 MMOL/L (ref 3.5–5)
PROTHROMBIN TIME: 10.7 SEC (ref 9.3–12.4)
RBC # BLD: 4.51 E12/L (ref 3.5–5.5)
SCHISTOCYTES: ABNORMAL
SODIUM BLD-SCNC: 139 MMOL/L (ref 132–146)
TOTAL PROTEIN: 6.6 G/DL (ref 6.4–8.3)
TROPONIN: <0.01 NG/ML (ref 0–0.03)
WBC # BLD: 8.3 E9/L (ref 4.5–11.5)

## 2020-06-27 PROCEDURE — 84484 ASSAY OF TROPONIN QUANT: CPT

## 2020-06-27 PROCEDURE — 84703 CHORIONIC GONADOTROPIN ASSAY: CPT

## 2020-06-27 PROCEDURE — 96365 THER/PROPH/DIAG IV INF INIT: CPT

## 2020-06-27 PROCEDURE — 2580000003 HC RX 258: Performed by: EMERGENCY MEDICINE

## 2020-06-27 PROCEDURE — C9113 INJ PANTOPRAZOLE SODIUM, VIA: HCPCS | Performed by: EMERGENCY MEDICINE

## 2020-06-27 PROCEDURE — 2580000003 HC RX 258: Performed by: RADIOLOGY

## 2020-06-27 PROCEDURE — 96367 TX/PROPH/DG ADDL SEQ IV INF: CPT

## 2020-06-27 PROCEDURE — 6360000002 HC RX W HCPCS: Performed by: STUDENT IN AN ORGANIZED HEALTH CARE EDUCATION/TRAINING PROGRAM

## 2020-06-27 PROCEDURE — 0DU647Z SUPPLEMENT STOMACH WITH AUTOLOGOUS TISSUE SUBSTITUTE, PERCUTANEOUS ENDOSCOPIC APPROACH: ICD-10-PCS | Performed by: SURGERY

## 2020-06-27 PROCEDURE — 86880 COOMBS TEST DIRECT: CPT

## 2020-06-27 PROCEDURE — 6360000002 HC RX W HCPCS: Performed by: SURGERY

## 2020-06-27 PROCEDURE — 6360000004 HC RX CONTRAST MEDICATION: Performed by: RADIOLOGY

## 2020-06-27 PROCEDURE — 2580000003 HC RX 258: Performed by: SURGERY

## 2020-06-27 PROCEDURE — 49905 OMENTAL FLAP INTRA-ABDOM: CPT | Performed by: SURGERY

## 2020-06-27 PROCEDURE — 2580000003 HC RX 258

## 2020-06-27 PROCEDURE — C9113 INJ PANTOPRAZOLE SODIUM, VIA: HCPCS | Performed by: SURGERY

## 2020-06-27 PROCEDURE — 84132 ASSAY OF SERUM POTASSIUM: CPT

## 2020-06-27 PROCEDURE — 96375 TX/PRO/DX INJ NEW DRUG ADDON: CPT

## 2020-06-27 PROCEDURE — 2500000003 HC RX 250 WO HCPCS

## 2020-06-27 PROCEDURE — 2700000000 HC OXYGEN THERAPY PER DAY

## 2020-06-27 PROCEDURE — 7100000001 HC PACU RECOVERY - ADDTL 15 MIN: Performed by: SURGERY

## 2020-06-27 PROCEDURE — 93005 ELECTROCARDIOGRAM TRACING: CPT | Performed by: EMERGENCY MEDICINE

## 2020-06-27 PROCEDURE — 85730 THROMBOPLASTIN TIME PARTIAL: CPT

## 2020-06-27 PROCEDURE — 83690 ASSAY OF LIPASE: CPT

## 2020-06-27 PROCEDURE — 85610 PROTHROMBIN TIME: CPT

## 2020-06-27 PROCEDURE — 0DJ08ZZ INSPECTION OF UPPER INTESTINAL TRACT, VIA NATURAL OR ARTIFICIAL OPENING ENDOSCOPIC: ICD-10-PCS | Performed by: SURGERY

## 2020-06-27 PROCEDURE — 85025 COMPLETE CBC W/AUTO DIFF WBC: CPT

## 2020-06-27 PROCEDURE — 2060000000 HC ICU INTERMEDIATE R&B

## 2020-06-27 PROCEDURE — 86850 RBC ANTIBODY SCREEN: CPT

## 2020-06-27 PROCEDURE — 6360000002 HC RX W HCPCS

## 2020-06-27 PROCEDURE — 2709999900 HC NON-CHARGEABLE SUPPLY: Performed by: SURGERY

## 2020-06-27 PROCEDURE — 82947 ASSAY GLUCOSE BLOOD QUANT: CPT

## 2020-06-27 PROCEDURE — 3700000001 HC ADD 15 MINUTES (ANESTHESIA): Performed by: SURGERY

## 2020-06-27 PROCEDURE — 2580000003 HC RX 258: Performed by: STUDENT IN AN ORGANIZED HEALTH CARE EDUCATION/TRAINING PROGRAM

## 2020-06-27 PROCEDURE — 82435 ASSAY OF BLOOD CHLORIDE: CPT

## 2020-06-27 PROCEDURE — 86922 COMPATIBILITY TEST ANTIGLOB: CPT

## 2020-06-27 PROCEDURE — 74177 CT ABD & PELVIS W/CONTRAST: CPT

## 2020-06-27 PROCEDURE — 6360000002 HC RX W HCPCS: Performed by: ANESTHESIOLOGY

## 2020-06-27 PROCEDURE — 0FN04ZZ RELEASE LIVER, PERCUTANEOUS ENDOSCOPIC APPROACH: ICD-10-PCS | Performed by: SURGERY

## 2020-06-27 PROCEDURE — 71045 X-RAY EXAM CHEST 1 VIEW: CPT

## 2020-06-27 PROCEDURE — 96376 TX/PRO/DX INJ SAME DRUG ADON: CPT

## 2020-06-27 PROCEDURE — 2720000010 HC SURG SUPPLY STERILE: Performed by: SURGERY

## 2020-06-27 PROCEDURE — 82565 ASSAY OF CREATININE: CPT

## 2020-06-27 PROCEDURE — 3600000012 HC SURGERY LEVEL 2 ADDTL 15MIN: Performed by: SURGERY

## 2020-06-27 PROCEDURE — 3700000000 HC ANESTHESIA ATTENDED CARE: Performed by: SURGERY

## 2020-06-27 PROCEDURE — 6360000002 HC RX W HCPCS: Performed by: EMERGENCY MEDICINE

## 2020-06-27 PROCEDURE — 86900 BLOOD TYPING SEROLOGIC ABO: CPT

## 2020-06-27 PROCEDURE — 99285 EMERGENCY DEPT VISIT HI MDM: CPT

## 2020-06-27 PROCEDURE — 86870 RBC ANTIBODY IDENTIFICATION: CPT

## 2020-06-27 PROCEDURE — 83605 ASSAY OF LACTIC ACID: CPT

## 2020-06-27 PROCEDURE — 3600000002 HC SURGERY LEVEL 2 BASE: Performed by: SURGERY

## 2020-06-27 PROCEDURE — 7100000000 HC PACU RECOVERY - FIRST 15 MIN: Performed by: SURGERY

## 2020-06-27 PROCEDURE — 93010 ELECTROCARDIOGRAM REPORT: CPT | Performed by: INTERNAL MEDICINE

## 2020-06-27 PROCEDURE — 99222 1ST HOSP IP/OBS MODERATE 55: CPT | Performed by: SURGERY

## 2020-06-27 PROCEDURE — 43659 UNLISTED LAPS PX STOMACH: CPT | Performed by: SURGERY

## 2020-06-27 PROCEDURE — 80053 COMPREHEN METABOLIC PANEL: CPT

## 2020-06-27 PROCEDURE — 6370000000 HC RX 637 (ALT 250 FOR IP): Performed by: SURGERY

## 2020-06-27 PROCEDURE — 84295 ASSAY OF SERUM SODIUM: CPT

## 2020-06-27 PROCEDURE — 86901 BLOOD TYPING SEROLOGIC RH(D): CPT

## 2020-06-27 RX ORDER — MORPHINE SULFATE 2 MG/ML
2 INJECTION, SOLUTION INTRAMUSCULAR; INTRAVENOUS EVERY 4 HOURS PRN
Status: DISCONTINUED | OUTPATIENT
Start: 2020-06-27 | End: 2020-06-29

## 2020-06-27 RX ORDER — LABETALOL HYDROCHLORIDE 5 MG/ML
5 INJECTION, SOLUTION INTRAVENOUS EVERY 10 MIN PRN
Status: DISCONTINUED | OUTPATIENT
Start: 2020-06-27 | End: 2020-06-27 | Stop reason: HOSPADM

## 2020-06-27 RX ORDER — LIDOCAINE HYDROCHLORIDE 20 MG/ML
INJECTION, SOLUTION INTRAVENOUS PRN
Status: DISCONTINUED | OUTPATIENT
Start: 2020-06-27 | End: 2020-06-27 | Stop reason: SDUPTHER

## 2020-06-27 RX ORDER — ONDANSETRON 2 MG/ML
4 INJECTION INTRAMUSCULAR; INTRAVENOUS EVERY 6 HOURS PRN
Status: DISCONTINUED | OUTPATIENT
Start: 2020-06-27 | End: 2020-06-29 | Stop reason: HOSPADM

## 2020-06-27 RX ORDER — SODIUM CHLORIDE 0.9 % (FLUSH) 0.9 %
10 SYRINGE (ML) INJECTION ONCE
Status: COMPLETED | OUTPATIENT
Start: 2020-06-27 | End: 2020-06-27

## 2020-06-27 RX ORDER — CIPROFLOXACIN 2 MG/ML
400 INJECTION, SOLUTION INTRAVENOUS EVERY 12 HOURS
Status: DISCONTINUED | OUTPATIENT
Start: 2020-06-27 | End: 2020-06-29 | Stop reason: HOSPADM

## 2020-06-27 RX ORDER — ROCURONIUM BROMIDE 10 MG/ML
INJECTION, SOLUTION INTRAVENOUS PRN
Status: DISCONTINUED | OUTPATIENT
Start: 2020-06-27 | End: 2020-06-27 | Stop reason: SDUPTHER

## 2020-06-27 RX ORDER — FENTANYL CITRATE 50 UG/ML
25 INJECTION, SOLUTION INTRAMUSCULAR; INTRAVENOUS ONCE
Status: COMPLETED | OUTPATIENT
Start: 2020-06-27 | End: 2020-06-27

## 2020-06-27 RX ORDER — MORPHINE SULFATE 4 MG/ML
4 INJECTION, SOLUTION INTRAMUSCULAR; INTRAVENOUS EVERY 4 HOURS PRN
Status: DISCONTINUED | OUTPATIENT
Start: 2020-06-27 | End: 2020-06-29

## 2020-06-27 RX ORDER — SODIUM CHLORIDE, SODIUM LACTATE, POTASSIUM CHLORIDE, CALCIUM CHLORIDE 600; 310; 30; 20 MG/100ML; MG/100ML; MG/100ML; MG/100ML
1000 INJECTION, SOLUTION INTRAVENOUS ONCE
Status: COMPLETED | OUTPATIENT
Start: 2020-06-27 | End: 2020-06-27

## 2020-06-27 RX ORDER — SODIUM CHLORIDE 0.9 % (FLUSH) 0.9 %
10 SYRINGE (ML) INJECTION EVERY 12 HOURS SCHEDULED
Status: DISCONTINUED | OUTPATIENT
Start: 2020-06-27 | End: 2020-06-29 | Stop reason: HOSPADM

## 2020-06-27 RX ORDER — ONDANSETRON 2 MG/ML
4 INJECTION INTRAMUSCULAR; INTRAVENOUS ONCE
Status: COMPLETED | OUTPATIENT
Start: 2020-06-27 | End: 2020-06-27

## 2020-06-27 RX ORDER — OXYCODONE HCL 5 MG/5 ML
5 SOLUTION, ORAL ORAL EVERY 4 HOURS PRN
Status: DISCONTINUED | OUTPATIENT
Start: 2020-06-27 | End: 2020-06-29

## 2020-06-27 RX ORDER — FENTANYL CITRATE 50 UG/ML
INJECTION, SOLUTION INTRAMUSCULAR; INTRAVENOUS PRN
Status: DISCONTINUED | OUTPATIENT
Start: 2020-06-27 | End: 2020-06-27 | Stop reason: SDUPTHER

## 2020-06-27 RX ORDER — SENNA AND DOCUSATE SODIUM 50; 8.6 MG/1; MG/1
1 TABLET, FILM COATED ORAL 2 TIMES DAILY
Status: DISCONTINUED | OUTPATIENT
Start: 2020-06-27 | End: 2020-06-29 | Stop reason: HOSPADM

## 2020-06-27 RX ORDER — PROPOFOL 10 MG/ML
INJECTION, EMULSION INTRAVENOUS PRN
Status: DISCONTINUED | OUTPATIENT
Start: 2020-06-27 | End: 2020-06-27 | Stop reason: SDUPTHER

## 2020-06-27 RX ORDER — MEPERIDINE HYDROCHLORIDE 25 MG/ML
12.5 INJECTION INTRAMUSCULAR; INTRAVENOUS; SUBCUTANEOUS EVERY 5 MIN PRN
Status: DISCONTINUED | OUTPATIENT
Start: 2020-06-27 | End: 2020-06-27 | Stop reason: HOSPADM

## 2020-06-27 RX ORDER — POLYETHYLENE GLYCOL 3350 17 G/17G
17 POWDER, FOR SOLUTION ORAL DAILY
Status: DISCONTINUED | OUTPATIENT
Start: 2020-06-27 | End: 2020-06-29 | Stop reason: HOSPADM

## 2020-06-27 RX ORDER — ONDANSETRON 4 MG/1
4 TABLET, ORALLY DISINTEGRATING ORAL EVERY 8 HOURS PRN
Status: DISCONTINUED | OUTPATIENT
Start: 2020-06-27 | End: 2020-06-29 | Stop reason: HOSPADM

## 2020-06-27 RX ORDER — PROMETHAZINE HYDROCHLORIDE 25 MG/ML
25 INJECTION, SOLUTION INTRAMUSCULAR; INTRAVENOUS PRN
Status: DISCONTINUED | OUTPATIENT
Start: 2020-06-27 | End: 2020-06-27 | Stop reason: HOSPADM

## 2020-06-27 RX ORDER — GLYCOPYRROLATE 1 MG/5 ML
SYRINGE (ML) INTRAVENOUS PRN
Status: DISCONTINUED | OUTPATIENT
Start: 2020-06-27 | End: 2020-06-27 | Stop reason: SDUPTHER

## 2020-06-27 RX ORDER — 0.9 % SODIUM CHLORIDE 0.9 %
1000 INTRAVENOUS SOLUTION INTRAVENOUS ONCE
Status: COMPLETED | OUTPATIENT
Start: 2020-06-27 | End: 2020-06-27

## 2020-06-27 RX ORDER — ONDANSETRON 2 MG/ML
INJECTION INTRAMUSCULAR; INTRAVENOUS PRN
Status: DISCONTINUED | OUTPATIENT
Start: 2020-06-27 | End: 2020-06-27 | Stop reason: SDUPTHER

## 2020-06-27 RX ORDER — SODIUM CHLORIDE 9 MG/ML
INJECTION, SOLUTION INTRAVENOUS CONTINUOUS
Status: DISCONTINUED | OUTPATIENT
Start: 2020-06-27 | End: 2020-06-27

## 2020-06-27 RX ORDER — SODIUM CHLORIDE 0.9 % (FLUSH) 0.9 %
10 SYRINGE (ML) INJECTION PRN
Status: DISCONTINUED | OUTPATIENT
Start: 2020-06-27 | End: 2020-06-29 | Stop reason: HOSPADM

## 2020-06-27 RX ORDER — SUCRALFATE 1 G/1
0.5 TABLET ORAL EVERY 4 HOURS
Status: DISCONTINUED | OUTPATIENT
Start: 2020-06-27 | End: 2020-06-29 | Stop reason: HOSPADM

## 2020-06-27 RX ORDER — SODIUM CHLORIDE 0.9 % (FLUSH) 0.9 %
SYRINGE (ML) INJECTION
Status: COMPLETED
Start: 2020-06-27 | End: 2020-06-27

## 2020-06-27 RX ORDER — SUCCINYLCHOLINE/SOD CL,ISO/PF 200MG/10ML
SYRINGE (ML) INTRAVENOUS PRN
Status: DISCONTINUED | OUTPATIENT
Start: 2020-06-27 | End: 2020-06-27 | Stop reason: SDUPTHER

## 2020-06-27 RX ORDER — NEOSTIGMINE METHYLSULFATE 1 MG/ML
INJECTION, SOLUTION INTRAVENOUS PRN
Status: DISCONTINUED | OUTPATIENT
Start: 2020-06-27 | End: 2020-06-27 | Stop reason: SDUPTHER

## 2020-06-27 RX ORDER — DEXAMETHASONE SODIUM PHOSPHATE 10 MG/ML
INJECTION INTRAMUSCULAR; INTRAVENOUS PRN
Status: DISCONTINUED | OUTPATIENT
Start: 2020-06-27 | End: 2020-06-27 | Stop reason: SDUPTHER

## 2020-06-27 RX ORDER — SODIUM CHLORIDE, SODIUM LACTATE, POTASSIUM CHLORIDE, CALCIUM CHLORIDE 600; 310; 30; 20 MG/100ML; MG/100ML; MG/100ML; MG/100ML
INJECTION, SOLUTION INTRAVENOUS CONTINUOUS
Status: DISCONTINUED | OUTPATIENT
Start: 2020-06-27 | End: 2020-06-29

## 2020-06-27 RX ORDER — PANTOPRAZOLE SODIUM 40 MG/10ML
40 INJECTION, POWDER, LYOPHILIZED, FOR SOLUTION INTRAVENOUS ONCE
Status: COMPLETED | OUTPATIENT
Start: 2020-06-27 | End: 2020-06-27

## 2020-06-27 RX ORDER — 0.9 % SODIUM CHLORIDE 0.9 %
1000 INTRAVENOUS SOLUTION INTRAVENOUS ONCE
Status: DISCONTINUED | OUTPATIENT
Start: 2020-06-27 | End: 2020-06-27

## 2020-06-27 RX ADMIN — SODIUM CHLORIDE 1000 ML: 9 INJECTION, SOLUTION INTRAVENOUS at 06:21

## 2020-06-27 RX ADMIN — MEROPENEM 1 G: 1 INJECTION, POWDER, FOR SOLUTION INTRAVENOUS at 07:19

## 2020-06-27 RX ADMIN — OXYCODONE HYDROCHLORIDE 5 MG: 5 SOLUTION ORAL at 17:53

## 2020-06-27 RX ADMIN — DEXAMETHASONE SODIUM PHOSPHATE 10 MG: 10 INJECTION INTRAMUSCULAR; INTRAVENOUS at 11:53

## 2020-06-27 RX ADMIN — SUCRALFATE 0.5 G: 1 TABLET ORAL at 15:40

## 2020-06-27 RX ADMIN — ONDANSETRON 4 MG: 2 INJECTION INTRAMUSCULAR; INTRAVENOUS at 06:21

## 2020-06-27 RX ADMIN — FENTANYL CITRATE 100 MCG: 50 INJECTION, SOLUTION INTRAMUSCULAR; INTRAVENOUS at 11:44

## 2020-06-27 RX ADMIN — VANCOMYCIN HYDROCHLORIDE 1.5 G: 10 INJECTION, POWDER, LYOPHILIZED, FOR SOLUTION INTRAVENOUS at 07:22

## 2020-06-27 RX ADMIN — MORPHINE SULFATE 4 MG: 4 INJECTION, SOLUTION INTRAMUSCULAR; INTRAVENOUS at 22:11

## 2020-06-27 RX ADMIN — HYDROMORPHONE HYDROCHLORIDE 0.5 MG: 1 INJECTION, SOLUTION INTRAMUSCULAR; INTRAVENOUS; SUBCUTANEOUS at 13:22

## 2020-06-27 RX ADMIN — ENOXAPARIN SODIUM 40 MG: 40 INJECTION SUBCUTANEOUS at 15:46

## 2020-06-27 RX ADMIN — Medication: at 15:41

## 2020-06-27 RX ADMIN — FENTANYL CITRATE 25 MCG: 0.05 INJECTION, SOLUTION INTRAMUSCULAR; INTRAVENOUS at 06:21

## 2020-06-27 RX ADMIN — SUGAMMADEX 160 MG: 100 INJECTION, SOLUTION INTRAVENOUS at 13:06

## 2020-06-27 RX ADMIN — DOCUSATE SODIUM 50 MG AND SENNOSIDES 8.6 MG 1 TABLET: 8.6; 5 TABLET, FILM COATED ORAL at 15:46

## 2020-06-27 RX ADMIN — Medication 10 ML: at 07:36

## 2020-06-27 RX ADMIN — ROCURONIUM BROMIDE 20 MG: 10 INJECTION, SOLUTION INTRAVENOUS at 11:50

## 2020-06-27 RX ADMIN — HYDROMORPHONE HYDROCHLORIDE 1 MG: 1 INJECTION, SOLUTION INTRAMUSCULAR; INTRAVENOUS; SUBCUTANEOUS at 07:46

## 2020-06-27 RX ADMIN — ROCURONIUM BROMIDE 10 MG: 10 INJECTION, SOLUTION INTRAVENOUS at 12:30

## 2020-06-27 RX ADMIN — LIDOCAINE HYDROCHLORIDE 80 MG: 20 INJECTION, SOLUTION INTRAVENOUS at 11:44

## 2020-06-27 RX ADMIN — Medication 140 MG: at 11:44

## 2020-06-27 RX ADMIN — SODIUM CHLORIDE 8 MG/HR: 9 INJECTION, SOLUTION INTRAVENOUS at 15:40

## 2020-06-27 RX ADMIN — IOPAMIDOL 110 ML: 755 INJECTION, SOLUTION INTRAVENOUS at 07:35

## 2020-06-27 RX ADMIN — CIPROFLOXACIN 400 MG: 2 INJECTION, SOLUTION INTRAVENOUS at 15:40

## 2020-06-27 RX ADMIN — Medication 3 MG: at 12:58

## 2020-06-27 RX ADMIN — HYDROMORPHONE HYDROCHLORIDE 0.5 MG: 1 INJECTION, SOLUTION INTRAMUSCULAR; INTRAVENOUS; SUBCUTANEOUS at 13:34

## 2020-06-27 RX ADMIN — ONDANSETRON 4 MG: 2 INJECTION INTRAMUSCULAR; INTRAVENOUS at 07:52

## 2020-06-27 RX ADMIN — HYDROMORPHONE HYDROCHLORIDE 1 MG: 1 INJECTION, SOLUTION INTRAMUSCULAR; INTRAVENOUS; SUBCUTANEOUS at 09:03

## 2020-06-27 RX ADMIN — ONDANSETRON 4 MG: 2 INJECTION INTRAMUSCULAR; INTRAVENOUS at 22:11

## 2020-06-27 RX ADMIN — PROPOFOL 180 MG: 10 INJECTION, EMULSION INTRAVENOUS at 11:44

## 2020-06-27 RX ADMIN — ROCURONIUM BROMIDE 10 MG: 10 INJECTION, SOLUTION INTRAVENOUS at 11:44

## 2020-06-27 RX ADMIN — SODIUM CHLORIDE, POTASSIUM CHLORIDE, SODIUM LACTATE AND CALCIUM CHLORIDE 1000 ML: 600; 310; 30; 20 INJECTION, SOLUTION INTRAVENOUS at 09:00

## 2020-06-27 RX ADMIN — SODIUM CHLORIDE: 9 INJECTION, SOLUTION INTRAVENOUS at 12:40

## 2020-06-27 RX ADMIN — IOHEXOL 50 ML: 240 INJECTION, SOLUTION INTRATHECAL; INTRAVASCULAR; INTRAVENOUS; ORAL at 07:48

## 2020-06-27 RX ADMIN — DOCUSATE SODIUM 50 MG AND SENNOSIDES 8.6 MG 1 TABLET: 8.6; 5 TABLET, FILM COATED ORAL at 19:57

## 2020-06-27 RX ADMIN — POLYETHYLENE GLYCOL 3350 17 G: 17 POWDER, FOR SOLUTION ORAL at 15:40

## 2020-06-27 RX ADMIN — SODIUM CHLORIDE: 9 INJECTION, SOLUTION INTRAVENOUS at 11:07

## 2020-06-27 RX ADMIN — HYDROMORPHONE HYDROCHLORIDE 0.5 MG: 1 INJECTION, SOLUTION INTRAMUSCULAR; INTRAVENOUS; SUBCUTANEOUS at 13:29

## 2020-06-27 RX ADMIN — MEPERIDINE HYDROCHLORIDE 12.5 MG: 25 INJECTION INTRAMUSCULAR; INTRAVENOUS; SUBCUTANEOUS at 13:20

## 2020-06-27 RX ADMIN — FENTANYL CITRATE 50 MCG: 50 INJECTION, SOLUTION INTRAMUSCULAR; INTRAVENOUS at 12:40

## 2020-06-27 RX ADMIN — SODIUM CHLORIDE, POTASSIUM CHLORIDE, SODIUM LACTATE AND CALCIUM CHLORIDE: 600; 310; 30; 20 INJECTION, SOLUTION INTRAVENOUS at 15:41

## 2020-06-27 RX ADMIN — FENTANYL CITRATE 50 MCG: 50 INJECTION, SOLUTION INTRAMUSCULAR; INTRAVENOUS at 11:41

## 2020-06-27 RX ADMIN — MEPERIDINE HYDROCHLORIDE 12.5 MG: 25 INJECTION INTRAMUSCULAR; INTRAVENOUS; SUBCUTANEOUS at 13:15

## 2020-06-27 RX ADMIN — PANTOPRAZOLE SODIUM 40 MG: 40 INJECTION, POWDER, FOR SOLUTION INTRAVENOUS at 06:21

## 2020-06-27 RX ADMIN — HYDROMORPHONE HYDROCHLORIDE 1 MG: 1 INJECTION, SOLUTION INTRAMUSCULAR; INTRAVENOUS; SUBCUTANEOUS at 10:17

## 2020-06-27 RX ADMIN — Medication 0.6 MG: at 12:58

## 2020-06-27 RX ADMIN — FENTANYL CITRATE 50 MCG: 50 INJECTION, SOLUTION INTRAMUSCULAR; INTRAVENOUS at 12:17

## 2020-06-27 RX ADMIN — ROCURONIUM BROMIDE 10 MG: 10 INJECTION, SOLUTION INTRAVENOUS at 12:23

## 2020-06-27 RX ADMIN — ONDANSETRON HYDROCHLORIDE 4 MG: 2 INJECTION, SOLUTION INTRAMUSCULAR; INTRAVENOUS at 11:58

## 2020-06-27 ASSESSMENT — PULMONARY FUNCTION TESTS
PIF_VALUE: 26
PIF_VALUE: 25
PIF_VALUE: 29
PIF_VALUE: 27
PIF_VALUE: 1
PIF_VALUE: 26
PIF_VALUE: 20
PIF_VALUE: 4
PIF_VALUE: 22
PIF_VALUE: 2
PIF_VALUE: 29
PIF_VALUE: 28
PIF_VALUE: 3
PIF_VALUE: 36
PIF_VALUE: 21
PIF_VALUE: 29
PIF_VALUE: 31
PIF_VALUE: 7
PIF_VALUE: 28
PIF_VALUE: 30
PIF_VALUE: 28
PIF_VALUE: 27
PIF_VALUE: 28
PIF_VALUE: 29
PIF_VALUE: 21
PIF_VALUE: 29
PIF_VALUE: 21
PIF_VALUE: 25
PIF_VALUE: 30
PIF_VALUE: 26
PIF_VALUE: 29
PIF_VALUE: 30
PIF_VALUE: 29
PIF_VALUE: 4
PIF_VALUE: 29
PIF_VALUE: 19
PIF_VALUE: 29
PIF_VALUE: 27
PIF_VALUE: 21
PIF_VALUE: 34
PIF_VALUE: 29
PIF_VALUE: 28
PIF_VALUE: 21
PIF_VALUE: 29
PIF_VALUE: 5
PIF_VALUE: 21
PIF_VALUE: 19
PIF_VALUE: 29
PIF_VALUE: 19
PIF_VALUE: 29
PIF_VALUE: 28
PIF_VALUE: 33
PIF_VALUE: 30
PIF_VALUE: 21
PIF_VALUE: 28
PIF_VALUE: 18
PIF_VALUE: 11
PIF_VALUE: 25
PIF_VALUE: 2
PIF_VALUE: 23
PIF_VALUE: 19
PIF_VALUE: 29
PIF_VALUE: 10
PIF_VALUE: 21
PIF_VALUE: 26
PIF_VALUE: 21
PIF_VALUE: 20
PIF_VALUE: 23
PIF_VALUE: 2
PIF_VALUE: 25
PIF_VALUE: 21
PIF_VALUE: 5
PIF_VALUE: 0
PIF_VALUE: 28
PIF_VALUE: 0
PIF_VALUE: 27
PIF_VALUE: 20
PIF_VALUE: 29
PIF_VALUE: 1

## 2020-06-27 ASSESSMENT — PAIN DESCRIPTION - DESCRIPTORS
DESCRIPTORS: CONSTANT;DISCOMFORT;SORE
DESCRIPTORS: DISCOMFORT;CRAMPING
DESCRIPTORS: CONSTANT;SORE
DESCRIPTORS: CONSTANT;DISCOMFORT;SORE

## 2020-06-27 ASSESSMENT — PAIN DESCRIPTION - FREQUENCY
FREQUENCY: CONTINUOUS

## 2020-06-27 ASSESSMENT — PAIN SCALES - GENERAL
PAINLEVEL_OUTOF10: 7
PAINLEVEL_OUTOF10: 9
PAINLEVEL_OUTOF10: 7
PAINLEVEL_OUTOF10: 8
PAINLEVEL_OUTOF10: 8
PAINLEVEL_OUTOF10: 10
PAINLEVEL_OUTOF10: 8
PAINLEVEL_OUTOF10: 9
PAINLEVEL_OUTOF10: 5
PAINLEVEL_OUTOF10: 0
PAINLEVEL_OUTOF10: 9
PAINLEVEL_OUTOF10: 10
PAINLEVEL_OUTOF10: 7
PAINLEVEL_OUTOF10: 10

## 2020-06-27 ASSESSMENT — PAIN DESCRIPTION - LOCATION
LOCATION: ABDOMEN

## 2020-06-27 ASSESSMENT — PAIN DESCRIPTION - ONSET
ONSET: ON-GOING
ONSET: GRADUAL
ONSET: ON-GOING

## 2020-06-27 ASSESSMENT — PAIN DESCRIPTION - PROGRESSION
CLINICAL_PROGRESSION: GRADUALLY WORSENING
CLINICAL_PROGRESSION: NOT CHANGED
CLINICAL_PROGRESSION: NOT CHANGED

## 2020-06-27 ASSESSMENT — PAIN DESCRIPTION - PAIN TYPE
TYPE: SURGICAL PAIN
TYPE: ACUTE PAIN

## 2020-06-27 ASSESSMENT — PAIN - FUNCTIONAL ASSESSMENT
PAIN_FUNCTIONAL_ASSESSMENT: PREVENTS OR INTERFERES SOME ACTIVE ACTIVITIES AND ADLS

## 2020-06-27 ASSESSMENT — PAIN DESCRIPTION - ORIENTATION: ORIENTATION: MID

## 2020-06-27 ASSESSMENT — LIFESTYLE VARIABLES: SMOKING_STATUS: 1

## 2020-06-27 NOTE — ANESTHESIA PRE PROCEDURE
Take 1 tablet by mouth daily (with breakfast) , take with Vitamin C 250 mg tablet (Patient not taking: Reported on 6/22/2020) 90 tablet 3    Handicap Placard MISC by Does not apply route Duration: 5 years 1 each 0     Facility-Administered Medications Ordered in Other Visits   Medication Dose Route Frequency Provider Last Rate Last Dose    HYDROmorphone (DILAUDID) injection 0.5 mg  0.5 mg Intravenous Q2H PRN Danitza Brewer MD        Or   Kisha Corinnajono HYDROmorphone (DILAUDID) injection 1 mg  1 mg Intravenous Q2H PRN Danitza Brewer MD   1 mg at 06/27/20 1017    0.9 % sodium chloride infusion   Intravenous Continuous Dang Duval MD        [facility]) 1 g in sodium chloride 0.9 % 100 mL IVPB  1 g Intravenous Q8H Dang Duval MD           Allergies:     Allergies   Allergen Reactions    Pcn [Penicillins] Anaphylaxis and Other (See Comments)     Patient unsure    Food Other (See Comments)     Berries - hives  Lactose Intolerance to milk-diarrhea  Soy-increases menses occurrences         Problem List:    Patient Active Problem List   Diagnosis Code    Tobacco abuse Z72.0    Panic attacks F41.0    AYALA on CPAP G47.33, Z99.89    Morbid obesity ([address] E66.01    Mild intermittent asthma without complication M99.72    HTN (hypertension) I10    GERD without esophagitis K21.9    S/P gastric bypass Z98.84    Primary osteoarthritis of right knee M17.11    Vitamin D deficiency E55.9    Zinc deficiency E60    Iron deficiency E61.1    Thrombocytosis (HCC) D47.3    Microcytic anemia D50.9    Red blood cell antibody positive R76.8    Anxiety and depression F41.9, F32.9    Localized osteoarthritis of left knee M17.12    Chronic pain syndrome G89.4    Chronic bilateral low back pain without sciatica M54.5, G89.29    Lumbosacral spondylosis without myelopathy M47.817    Other dysphagia R13.19    Perforated bowel ([address] K63.1       Past Medical History:        Diagnosis Date    Anemia     Arthritis     Asthma     6/27/2020

## 2020-06-27 NOTE — ED PROVIDER NOTES
office/outpt visit,procedure only (N/A, 10/29/2018); Stomach surgery; Lithotripsy (Right, 7/24/2019); Cholecystectomy, laparoscopic (N/A, 7/27/2019); Upper gastrointestinal endoscopy (N/A, 7/27/2019); Lithotripsy; Dilation and curettage of uterus (10/23/2019); Hysterectomy (01/22/2020); and Upper gastrointestinal endoscopy (N/A, 2/7/2020). Social History:  reports that she has been smoking cigarettes. She started smoking about 28 years ago. She has a 7.50 pack-year smoking history. She has never used smokeless tobacco. She reports current alcohol use of about 6.0 standard drinks of alcohol per week. She reports that she does not use drugs. Family History: family history includes Cancer in her maternal grandfather and paternal grandmother; Depression in her mother; Diabetes in her father and mother; Heart Attack (age of onset: 61) in her mother; Stroke in her father and paternal grandfather; Substance Abuse in her brother. . Unless otherwise noted, family history is non contributory    The patients home medications have been reviewed. Allergies: Pcn [penicillins] and Food    I have reviewed the past medical history, past surgical history, social history, and family history    ---------------------------------------------------PHYSICAL EXAM--------------------------------------    Constitutional/General: Alert and oriented x3  Head: Normocephalic and atraumatic  Eyes: PERRL, EOMI, sclera non icteric  Mouth: Oropharynx clear, handling secretions,   Neck: Supple, full ROM, no stridor, no meningeal signs  Respiratory: Lungs clear to auscultation bilaterally, no wheezes, rales, or rhonchi. Not in respiratory distress  Cardiovascular:  Bradycardic but Regular rhythm. No murmurs, no aortic murmurs, no gallops, or rubs. 2+ distal pulses. Equal extremity pulses.    Chest: No chest wall tenderness  Gastrointestinal:  Abdomen rigid and distended, epigastric and left upper quadrant tenderness, Non distended. +guarding and rigidity. No pulsatile masses. Musculoskeletal: Moves all extremities x 4. Warm and well perfused, no clubbing, cyanosis, or edema. Capillary refill <3 seconds  Skin: skin warm and dry. No rashes. Neurologic: GCS 15, no focal deficits        -------------------------------------------------- RESULTS -------------------------------------------------  I have personally reviewed all laboratory and imaging results for this patient. Results are listed below.      LABS: (Lab results interpreted by me)  Results for orders placed or performed during the hospital encounter of 06/27/20   Lipase   Result Value Ref Range    Lipase 17 13 - 60 U/L   Troponin   Result Value Ref Range    Troponin <0.01 0.00 - 0.03 ng/mL   CBC Auto Differential   Result Value Ref Range    WBC 8.3 4.5 - 11.5 E9/L    RBC 4.51 3.50 - 5.50 E12/L    Hemoglobin 10.4 (L) 11.5 - 15.5 g/dL    Hematocrit 33.9 (L) 34.0 - 48.0 %    MCV 75.2 (L) 80.0 - 99.9 fL    MCH 23.1 (L) 26.0 - 35.0 pg    MCHC 30.7 (L) 32.0 - 34.5 %    RDW 19.2 (H) 11.5 - 15.0 fL    Platelets 374 (H) 354 - 450 E9/L    MPV 8.4 7.0 - 12.0 fL    Neutrophils % 41.0 (L) 43.0 - 80.0 %    Immature Granulocytes % 0.2 0.0 - 5.0 %    Lymphocytes % 50.4 (H) 20.0 - 42.0 %    Monocytes % 5.9 2.0 - 12.0 %    Eosinophils % 1.5 0.0 - 6.0 %    Basophils % 1.0 0.0 - 2.0 %    Neutrophils Absolute 3.39 1.80 - 7.30 E9/L    Immature Granulocytes # 0.02 E9/L    Lymphocytes Absolute 4.16 (H) 1.50 - 4.00 E9/L    Monocytes Absolute 0.49 0.10 - 0.95 E9/L    Eosinophils Absolute 0.12 0.05 - 0.50 E9/L    Basophils Absolute 0.08 0.00 - 0.20 E9/L    Anisocytosis 2+     Polychromasia 1+     Hypochromia 1+     Poikilocytes 1+     Schistocytes 1+     Ovalocytes 1+    Comprehensive Metabolic Panel w/ Reflex to MG   Result Value Ref Range    Sodium 139 132 - 146 mmol/L    Potassium reflex Magnesium 4.0 3.5 - 5.0 mmol/L    Chloride 103 98 - 107 mmol/L    CO2 23 22 - 29 mmol/L    Anion Gap 13 7 - 16 mmol/L    Glucose 89 Final Result      1. Massive free intraperitoneal air which is thought likely caused by   a perforated gastric ulcer just adjacent to the gastrojejunostomy   anastomosis. 2. Abdominal pelvic ascites. XR CHEST PORTABLE   Final Result      NO ACUTE CARDIOPULMONARY PROCESS      Free air is is seen underneath both hemidiaphragms suggesting of   possible ruptured hollow viscus or postsurgical.               EKG Interpretation  Interpreted by emergency department physician, Dr. Dianne Flores     Date of EK20  Time: 717    Rhythm: sinus bradycardia  Rate: bradycardia  Axis: normal  Conduction: normal  ST Segments: no acute change  T Waves: no acute change    Clinical Impression: sinus bradycardia, no acute ischemic changes  Comparison to prior EKG: stable as compared to patient's most recent EKG      ------------------------- NURSING NOTES AND VITALS REVIEWED ---------------------------   The nursing notes within the ED encounter and vital signs as below have been reviewed by myself  /67   Pulse 72   Temp 97.4 °F (36.3 °C) (Temporal)   Resp 14   Ht 5' 6\" (1.676 m)   Wt 176 lb (79.8 kg)   LMP 10/21/2019 (Exact Date)   SpO2 100%   BMI 28.41 kg/m²     Oxygen Saturation Interpretation: Normal    The patients available past medical records and past encounters were reviewed.         ------------------------------ ED COURSE/MEDICAL DECISION MAKING----------------------  Medications   lactated ringers infusion (has no administration in time range)   sodium chloride flush 0.9 % injection 10 mL (has no administration in time range)   sodium chloride flush 0.9 % injection 10 mL (has no administration in time range)   enoxaparin (LOVENOX) injection 40 mg (has no administration in time range)   morphine (PF) injection 2 mg (has no administration in time range)     Or   morphine sulfate (PF) injection 4 mg (has no administration in time range)   polyethylene glycol (GLYCOLAX) packet 17 g (has no administration in time range)   sennosides-docusate sodium (SENOKOT-S) 8.6-50 MG tablet 1 tablet (has no administration in time range)   ondansetron (ZOFRAN-ODT) disintegrating tablet 4 mg (has no administration in time range)     Or   ondansetron (ZOFRAN) injection 4 mg (has no administration in time range)   pantoprazole (PROTONIX) 80 mg in sodium chloride 0.9 % 100 mL infusion (has no administration in time range)   sucralfate (CARAFATE) tablet 0.5 g (has no administration in time range)   oxyCODONE (ROXICODONE) 5 MG/5ML solution 5 mg (has no administration in time range)   ciprofloxacin (CIPRO) IVPB 400 mg (has no administration in time range)   sodium chloride flush 0.9 % injection (has no administration in time range)   0.9 % sodium chloride bolus (0 mLs Intravenous Stopped 6/27/20 0720)   fentaNYL (SUBLIMAZE) injection 25 mcg (25 mcg Intravenous Given 6/27/20 0621)   pantoprazole (PROTONIX) injection 40 mg (40 mg Intravenous Given 6/27/20 0621)   ondansetron (ZOFRAN) injection 4 mg (4 mg Intravenous Given 6/27/20 0621)   vancomycin 1.5 g in dextrose 5% 300 mL IVPB (0 g Intravenous Stopped 6/27/20 0922)   meropenem (MERREM) 1 g in sodium chloride 0.9 % 100 mL IVPB (mini-bag) (0 g Intravenous Stopped 6/27/20 0753)   sodium chloride flush 0.9 % injection 10 mL (10 mLs Intravenous Given 6/27/20 0736)   iopamidol (ISOVUE-370) 76 % injection 110 mL (110 mLs Intravenous Given 6/27/20 0735)   iohexol (OMNIPAQUE 240) injection 50 mL (50 mLs Oral Given 6/27/20 0748)   ondansetron (ZOFRAN) injection 4 mg (4 mg Intravenous Given 6/27/20 0752)   lactated ringers infusion 1,000 mL (1,000 mLs Intravenous New Bag 6/27/20 0900)   HYDROmorphone (DILAUDID) injection 1 mg (1 mg Intravenous Given 6/27/20 0903)           The cardiac monitor revealed sinus bradycardia with a heart rate in the 60s as interpreted by me.  The cardiac monitor was ordered secondary to the patient's abdominal pain and to monitor the patient for dysrhythmia. CPT [id number]       I, Dr. Sarah Farris, am the primary provider of record    Medical Decision Making:   Chest x-ray demonstrates intra-abdominal free air consistent with perforated viscus. Emergent consult placed to general surgery, they evaluated in the emergency department and are taking to the operating room for definitive repair. Oxygen Saturation Interpretation: 100 % on RA. Normal      Re-Evaluations:       No change      This patient's ED course included: a personal history and physicial examination, re-evaluation prior to disposition, multiple bedside re-evaluations, IV medications, cardiac monitoring, continuous pulse oximetry and complex medical decision making and emergency management    This patient has remained hemodynamically stable during their ED course. Consultations:  Dr. Marine Florentino covering Dr. Alicia Louise:  Please note that the withdrawal or failure to initiate urgent interventions for this patient would likely result in a life threatening deterioration or permanent disability. Accordingly this patient received 30 minutes of critical care time, including coordination of care, and direct bedside care and excluding separately billable procedures. Counseling: The emergency provider has spoken with the patient and discussed todays results, in addition to providing specific details for the plan of care and counseling regarding the diagnosis and prognosis. Questions are answered at this time and they are agreeable with the plan.       --------------------------------- IMPRESSION AND DISPOSITION ---------------------------------    IMPRESSION  1. Perforated abdominal viscus        DISPOSITION  Disposition: Admit to operating room  Patient condition is serious        NOTE: This report was transcribed using voice recognition software.  Every effort was made to ensure accuracy; however, inadvertent computerized transcription errors may be present       [address] [place] [address]

## 2020-06-27 NOTE — H&P
General Surgery History and Physical  [facility]    Patient's Name/Date of Birth: Nato Raman / 1975    Date: June 27, 2020     Surgeon: Youlanda Castleman, M.D.    PCP: Mary Downs MD     Chief Complaint: epigastric pain    HPI:   Nato Raman is a 53 y.o. female who presents for evaluation of epigastric pain. Timing is constant, radiation to midline, alleviated by none and started this AM and severity is 10/10. hx of RNY 2017, laparoscopic revision for perforated marginal ulcer 2018, and cholecystectomy 2019 who presents for severe epigastric/LUQ pain since 5:30am today. It has progressively worsened, along with nausea and increasing bloating, it hurts to even talk now. She still takes PPI/carafate but has had a lot of \"indigestion\" (but denies heartburn). Denies NSAIDs but took one baby ASA this morning for the pain. Smokes <1ppd and drinks 1 glass of wine socially. Last PO intake was coffee at 11ish last night. Passed flatus and small BM today. Hx of previous perforation and revision but continues.     Patient Active Problem List   Diagnosis    Tobacco abuse    Panic attacks    AYALA on CPAP    Morbid obesity (HCC)    Mild intermittent asthma without complication    HTN (hypertension)    GERD without esophagitis    S/P gastric bypass    Primary osteoarthritis of right knee    Vitamin D deficiency    Zinc deficiency    Iron deficiency    Thrombocytosis (HCC)    Microcytic anemia    Red blood cell antibody positive    Anxiety and depression    Localized osteoarthritis of left knee    Chronic pain syndrome    Chronic bilateral low back pain without sciatica    Lumbosacral spondylosis without myelopathy    Other dysphagia    Perforated bowel ([address]       Past Medical History:   Diagnosis Date    Anemia     Arthritis     Asthma     Depression     GERD without esophagitis     History of blood transfusion     History of gastric bypass     Hydronephrosis with for reaction to anesthesia. Time spent reviewing past medical, surgical, social and family history, vitals, nursing assessment and images. No changes from above documented history. Social History     Socioeconomic History    Marital status: Single     Spouse name: Not on file    Number of children: Not on file    Years of education: Not on file    Highest education level: Not on file   Occupational History    Occupation: unemployed/disability   Social Needs    Financial resource strain: Not on file    Food insecurity     Worry: Not on file     Inability: Not on file   [employer] needs     Medical: Not on file     Non-medical: Not on file   Tobacco Use    Smoking status: Current Every Day Smoker     Packs/day: 0.50     Years: 15.00     Pack years: 7.50     Types: Cigarettes     Start date: 10/29/1991    Smokeless tobacco: Never Used   Substance and Sexual Activity    Alcohol use:  Yes     Alcohol/week: 6.0 standard drinks     Types: 6 Standard drinks or equivalent per week     Comment: social    Drug use: No    Sexual activity: Yes     Partners: Male     Birth control/protection: Surgical   Lifestyle    Physical activity     Days per week: Not on file     Minutes per session: Not on file    Stress: Not on file   Relationships    Social connections     Talks on phone: Not on file     Gets together: Not on file     Attends Confucianist service: Not on file     Active member of club or organization: Not on file     Attends meetings of clubs or organizations: Not on file     Relationship status: Not on file    Intimate partner violence     Fear of current or ex partner: Not on file     Emotionally abused: Not on file     Physically abused: Not on file     Forced sexual activity: Not on file   Other Topics Concern    Not on file   Social History Narrative    Not on file       I have reviewed relevant labs from this admission and interpretation is included in my assessment and plan    Review of Systems    A complete 10 system review was performed and are otherwise negative unless mentioned in the above HPI. Specific negatives are listed below but may not include all those reviewed. General ROS: negative obtundation, AMS  ENT ROS: negative rhinorrhea, epistaxis  Allergy and Immunology ROS: negative itchy/watery eyes or nasal congestion  Hematological and Lymphatic ROS: negative spontaneous bleeding or bruising  Endocrine ROS: negative  lethargy, mood swings, palpitations or polydipsia/polyuria  Respiratory ROS: negative sputum changes, stridor, tachypnea or wheezing  Cardiovascular ROS: negative for - loss of consciousness, murmur or orthopnea  Gastrointestinal ROS: negative for - hematochezia or hematemesis  Genito-Urinary ROS: negative for -  genital discharge or hematuria  Musculoskeletal ROS: negative for - focal weakness, gangrene  Psych/Neuro ROS: negative for - visual or auditory hallucinations, suicidal ideation    Physical exam:   /85   Pulse 82   Temp 98 °F (36.7 °C)   Resp 20   Ht 5' 6\" (1.676 m)   Wt 176 lb (79.8 kg)   LMP 10/21/2019 (Exact Date)   SpO2 93%   BMI 28.41 kg/m²   General appearance:  NAD, appears stated age  Head: NCAT, PERRLA, EOMI, red conjunctiva  Neck: supple, no masses, trachea midline  Lungs: Equal chest rise bilateral, no retractions, no wheezing  Heart: Reg rate  Abdomen: soft, distended, peritonitis  Skin; warm and dry, no cyanosis  Gu: no cva tenderness  Extremities: atraumatic, no focal motor deficits, no open wounds  Psych: No tremor, visual hallucinations      Radiology: I reviewed relevant abdominal imaging from this admission and that available in the EMR including CT abd/pel from admission.  My assessment is massive free air    Assessment:  Thomas Stephenson is a 53 y.o. female with perforated visus, suspect marginal ulcer recurrence and perforation  Patient Active Problem List   Diagnosis    Tobacco abuse    Panic attacks    AYALA on CPAP   

## 2020-06-27 NOTE — PROGRESS NOTES
PATIENT'S CLOTHES, EARRINGS AND CELL PHONE PLACED IN A BELONGINGS BAG (DONE IN THE ER). PATIENT STICKER ON BAG AND SENT WITH PATIENT TO PACU - Jeffery Diaz RN.

## 2020-06-27 NOTE — ANESTHESIA POSTPROCEDURE EVALUATION
Department of Anesthesiology  Postprocedure Note    Patient: Alexus Ellington  MRN: [id number]  YOB: 1975  Date of evaluation: 6/27/2020  Time:  2:29 PM     Procedure Summary     Date:  06/27/20 Room / Location:  [facility] OR 08 / [facility]    Anesthesia Start:  8478 Anesthesia Stop:  1316    Procedures:       DIAGNOSTIC  LAPAROSCOPY, REPAIR PERFORATED MARGINAL ULCER, UPPER ENDOSCOPY. [facility] PATCH (N/A Abdomen)      EGD DIAGNOSTIC ONLY (N/A ) Diagnosis:  (free air in abdomen)    Surgeon:  Matt Garcia MD Responsible Provider:  Andreina Monique MD    Anesthesia Type:  general ASA Status:  3 - Emergent          Anesthesia Type: general    Marielena Phase I: Marielena Score: 9    Marielena Phase II:      Last vitals: Reviewed and per EMR flowsheets.        Anesthesia Post Evaluation    Patient location during evaluation: PACU  Patient participation: complete - patient participated  Level of consciousness: awake  Airway patency: patent  Nausea & Vomiting: no nausea and no vomiting  Complications: no  Cardiovascular status: hemodynamically stable  Respiratory status: acceptable  Hydration status: stable

## 2020-06-27 NOTE — ED NOTES
Patient unable to tolerate oral contrast. CT and Dr Juancarlos Vera notified.      Nolan Marshall RN  06/27/20 8338

## 2020-06-27 NOTE — H&P
100 MG capsule Take 1 capsule by mouth daily as needed for Constipation 30 capsule 1    Sennosides (SENNA) 8.6 MG CAPS Take 1 capsule by mouth daily as needed (constipation) 30 capsule 0    ascorbic acid (V-R VITAMIN C) 250 MG tablet Take 1 tablet by mouth daily Take with the iron supplement 30 tablet 3    Cholecalciferol (VITAMIN D3) 30125 units CAPS Take one capsule every Mon-Wed-Fri for four weeks 12 capsule 0    Handicap Placard MISC by Does not apply route Duration: 5 years 1 each 0       ROS:   Const: No fever, chills, night sweats, no recent unexplained weight gain/loss  HEENT: No blurred vision, double vision; no URI symptoms  Resp: No cough, no sputum, no pleuritic chest pain, no sob  Cardio: No chest pain, no exertional dyspnea, no PND, no orthopnea, no palpitation, no leg swelling. GI: No dysphagia, no reflux; + abdominal pain, no n/v; no c/d. No hematochezia    : No dysuria, no frequency, hesitancy; no hematuria  MSK: no joint pain, no myalgia, no change in ROM  Neuro: no focal weakness, no slurred speech, no double vision, no numbness or tingling in extremities  Endo: no heat/cold intolerance, no polyphagia, polydipsia or polyuria  Hem: no increased bleeding, no bruising, no lymphadenopathy  Skin: no skin changes  Psych: no depressed mood, no suicidal ideation    PE:  Blood pressure 117/67, pulse 72, temperature 97.4 °F (36.3 °C), temperature source Temporal, resp. rate 14, height 5' 6\" (1.676 m), weight 176 lb (79.8 kg), last menstrual period 10/21/2019, SpO2 100 %, not currently breastfeeding. General: Alert, cooperative, no acute distress. HEENT: Normocephalic, atraumatic. PERRLA, conjunctiva/corneas clear, EOM's intact, no pallor or icterus. Moist oral mucosa   neck: Supple, symmetrical, trachea midline, no JVD. Chest: No tenderness or deformity, full & symmetric excursion  Lung: Clear to auscultation bilaterally,  respirations unlabored.  No rales/wheezing/rubs  Heart: RRR, S1 and S2

## 2020-06-28 LAB
ANION GAP SERPL CALCULATED.3IONS-SCNC: 9 MMOL/L (ref 7–16)
BASOPHILS ABSOLUTE: 0.02 E9/L (ref 0–0.2)
BASOPHILS RELATIVE PERCENT: 0.1 % (ref 0–2)
BUN BLDV-MCNC: 10 MG/DL (ref 6–20)
CALCIUM SERPL-MCNC: 7.8 MG/DL (ref 8.6–10.2)
CHLORIDE BLD-SCNC: 106 MMOL/L (ref 98–107)
CO2: 23 MMOL/L (ref 22–29)
CREAT SERPL-MCNC: 0.5 MG/DL (ref 0.5–1)
EOSINOPHILS ABSOLUTE: 0.02 E9/L (ref 0.05–0.5)
EOSINOPHILS RELATIVE PERCENT: 0.1 % (ref 0–6)
GFR AFRICAN AMERICAN: >60
GFR NON-AFRICAN AMERICAN: >60 ML/MIN/1.73
GLUCOSE BLD-MCNC: 89 MG/DL (ref 74–99)
HCT VFR BLD CALC: 26 % (ref 34–48)
HEMOGLOBIN: 8.1 G/DL (ref 11.5–15.5)
IMMATURE GRANULOCYTES #: 0.12 E9/L
IMMATURE GRANULOCYTES %: 0.6 % (ref 0–5)
LYMPHOCYTES ABSOLUTE: 2.34 E9/L (ref 1.5–4)
LYMPHOCYTES RELATIVE PERCENT: 12.3 % (ref 20–42)
MCH RBC QN AUTO: 23.3 PG (ref 26–35)
MCHC RBC AUTO-ENTMCNC: 31.2 % (ref 32–34.5)
MCV RBC AUTO: 74.9 FL (ref 80–99.9)
MONOCYTES ABSOLUTE: 0.9 E9/L (ref 0.1–0.95)
MONOCYTES RELATIVE PERCENT: 4.7 % (ref 2–12)
NEUTROPHILS ABSOLUTE: 15.67 E9/L (ref 1.8–7.3)
NEUTROPHILS RELATIVE PERCENT: 82.2 % (ref 43–80)
PDW BLD-RTO: 18.7 FL (ref 11.5–15)
PLATELET # BLD: 457 E9/L (ref 130–450)
PMV BLD AUTO: 8.9 FL (ref 7–12)
POTASSIUM REFLEX MAGNESIUM: 4 MMOL/L (ref 3.5–5)
RBC # BLD: 3.47 E12/L (ref 3.5–5.5)
SODIUM BLD-SCNC: 138 MMOL/L (ref 132–146)
WBC # BLD: 19.1 E9/L (ref 4.5–11.5)

## 2020-06-28 PROCEDURE — 2580000003 HC RX 258: Performed by: SURGERY

## 2020-06-28 PROCEDURE — 6370000000 HC RX 637 (ALT 250 FOR IP): Performed by: STUDENT IN AN ORGANIZED HEALTH CARE EDUCATION/TRAINING PROGRAM

## 2020-06-28 PROCEDURE — 99222 1ST HOSP IP/OBS MODERATE 55: CPT | Performed by: FAMILY MEDICINE

## 2020-06-28 PROCEDURE — 80048 BASIC METABOLIC PNL TOTAL CA: CPT

## 2020-06-28 PROCEDURE — 2060000000 HC ICU INTERMEDIATE R&B

## 2020-06-28 PROCEDURE — 36415 COLL VENOUS BLD VENIPUNCTURE: CPT

## 2020-06-28 PROCEDURE — 6370000000 HC RX 637 (ALT 250 FOR IP): Performed by: SURGERY

## 2020-06-28 PROCEDURE — 6360000002 HC RX W HCPCS: Performed by: SURGERY

## 2020-06-28 PROCEDURE — 85025 COMPLETE CBC W/AUTO DIFF WBC: CPT

## 2020-06-28 RX ORDER — LORAZEPAM 0.5 MG/1
0.5 TABLET ORAL 2 TIMES DAILY PRN
Status: DISCONTINUED | OUTPATIENT
Start: 2020-06-28 | End: 2020-06-29 | Stop reason: HOSPADM

## 2020-06-28 RX ADMIN — POLYETHYLENE GLYCOL 3350 17 G: 17 POWDER, FOR SOLUTION ORAL at 08:36

## 2020-06-28 RX ADMIN — SUCRALFATE 0.5 G: 1 TABLET ORAL at 11:40

## 2020-06-28 RX ADMIN — SODIUM CHLORIDE, PRESERVATIVE FREE 10 ML: 5 INJECTION INTRAVENOUS at 20:34

## 2020-06-28 RX ADMIN — DOCUSATE SODIUM 50 MG AND SENNOSIDES 8.6 MG 1 TABLET: 8.6; 5 TABLET, FILM COATED ORAL at 08:35

## 2020-06-28 RX ADMIN — MORPHINE SULFATE 4 MG: 4 INJECTION, SOLUTION INTRAMUSCULAR; INTRAVENOUS at 03:59

## 2020-06-28 RX ADMIN — OXYCODONE HYDROCHLORIDE 5 MG: 5 SOLUTION ORAL at 12:10

## 2020-06-28 RX ADMIN — SODIUM CHLORIDE, POTASSIUM CHLORIDE, SODIUM LACTATE AND CALCIUM CHLORIDE: 600; 310; 30; 20 INJECTION, SOLUTION INTRAVENOUS at 05:47

## 2020-06-28 RX ADMIN — ENOXAPARIN SODIUM 40 MG: 40 INJECTION SUBCUTANEOUS at 08:36

## 2020-06-28 RX ADMIN — LORAZEPAM 0.5 MG: 0.5 TABLET ORAL at 19:16

## 2020-06-28 RX ADMIN — SUCRALFATE 0.5 G: 1 TABLET ORAL at 00:39

## 2020-06-28 RX ADMIN — CIPROFLOXACIN 400 MG: 2 INJECTION, SOLUTION INTRAVENOUS at 03:46

## 2020-06-28 RX ADMIN — OXYCODONE HYDROCHLORIDE 5 MG: 5 SOLUTION ORAL at 01:29

## 2020-06-28 RX ADMIN — MORPHINE SULFATE 4 MG: 4 INJECTION, SOLUTION INTRAMUSCULAR; INTRAVENOUS at 22:37

## 2020-06-28 RX ADMIN — MORPHINE SULFATE 2 MG: 2 INJECTION, SOLUTION INTRAMUSCULAR; INTRAVENOUS at 08:33

## 2020-06-28 RX ADMIN — SUCRALFATE 0.5 G: 1 TABLET ORAL at 14:38

## 2020-06-28 RX ADMIN — CIPROFLOXACIN 400 MG: 2 INJECTION, SOLUTION INTRAVENOUS at 14:38

## 2020-06-28 RX ADMIN — MORPHINE SULFATE 2 MG: 2 INJECTION, SOLUTION INTRAMUSCULAR; INTRAVENOUS at 13:59

## 2020-06-28 RX ADMIN — MORPHINE SULFATE 2 MG: 2 INJECTION, SOLUTION INTRAMUSCULAR; INTRAVENOUS at 18:12

## 2020-06-28 RX ADMIN — OXYCODONE HYDROCHLORIDE 5 MG: 5 SOLUTION ORAL at 22:46

## 2020-06-28 RX ADMIN — SUCRALFATE 0.5 G: 1 TABLET ORAL at 17:12

## 2020-06-28 RX ADMIN — DOCUSATE SODIUM 50 MG AND SENNOSIDES 8.6 MG 1 TABLET: 8.6; 5 TABLET, FILM COATED ORAL at 20:34

## 2020-06-28 ASSESSMENT — PAIN SCALES - GENERAL
PAINLEVEL_OUTOF10: 9
PAINLEVEL_OUTOF10: 10
PAINLEVEL_OUTOF10: 9
PAINLEVEL_OUTOF10: 10
PAINLEVEL_OUTOF10: 0
PAINLEVEL_OUTOF10: 9
PAINLEVEL_OUTOF10: 10
PAINLEVEL_OUTOF10: 7
PAINLEVEL_OUTOF10: 8
PAINLEVEL_OUTOF10: 10
PAINLEVEL_OUTOF10: 3
PAINLEVEL_OUTOF10: 9

## 2020-06-28 ASSESSMENT — PAIN DESCRIPTION - PROGRESSION
CLINICAL_PROGRESSION: GRADUALLY WORSENING
CLINICAL_PROGRESSION: GRADUALLY IMPROVING

## 2020-06-28 ASSESSMENT — PAIN DESCRIPTION - ONSET
ONSET: ON-GOING

## 2020-06-28 ASSESSMENT — PAIN DESCRIPTION - FREQUENCY
FREQUENCY: CONTINUOUS
FREQUENCY: CONTINUOUS

## 2020-06-28 ASSESSMENT — PAIN DESCRIPTION - LOCATION
LOCATION: ABDOMEN

## 2020-06-28 ASSESSMENT — PAIN DESCRIPTION - PAIN TYPE
TYPE: SURGICAL PAIN
TYPE: SURGICAL PAIN

## 2020-06-28 ASSESSMENT — PAIN DESCRIPTION - ORIENTATION
ORIENTATION: MID

## 2020-06-28 ASSESSMENT — PAIN DESCRIPTION - DESCRIPTORS
DESCRIPTORS: ACHING;CONSTANT
DESCRIPTORS: CONSTANT;ACHING;DISCOMFORT

## 2020-06-28 NOTE — PLAN OF CARE
Problem: Falls - Risk of:  Goal: Will remain free from falls  Description: Will remain free from falls  Outcome: Met This Shift  Goal: Absence of physical injury  Description: Absence of physical injury  Outcome: Met This Shift     Problem: HEMODYNAMIC STATUS  Goal: Patient has stable vital signs and fluid balance  Outcome: Met This Shift     Problem: OXYGENATION/RESPIRATORY FUNCTION  Goal: Patient will achieve/maintain normal respiratory rate/effort  Outcome: Met This Shift

## 2020-06-28 NOTE — PROGRESS NOTES
[facility]  Family Medicine Attending    S: 53 y.o. female with history of RYGB who presented with a perforated marginal ulcer, s/p repair on 6/27/20. Has a history of anxiety, previously treated with hydroxyzine. Today, reports post op pain, as expected. Just received a dose of morphine, which helps to \"take the edge off\", but finds it difficult to take a deep breath because of the pain. Feeling very anxious. Otherwise denies dyspnea, no chest pain or pressure. Has passed flatus, no BM yet. No new symptoms or concerns otherwise. O: VS- Blood pressure (!) 96/55, pulse 71, temperature 98.3 °F (36.8 °C), temperature source Temporal, resp. rate 19, height 5' 6\" (1.676 m), weight 176 lb (79.8 kg), last menstrual period 10/21/2019, SpO2 96 %, not currently breastfeeding. Exam is as noted by resident with the following changes, additions or corrections:  Gen: NAD, but appears somewhat anxious; respirations unlabored at rest  CV: RRR  Resp: CTAB, unlabored  Ext:  1+ edema bilaterally    Impressions: Active Problems:    Perforated abdominal viscus  Resolved Problems:    * No resolved hospital problems. *      Plan:   Gen Surg management appreciated. Pain control. Will resume home supplements and medications as diet is advanced. Caution in treating anxiety; can provide medications if need be, work to achieve pain control and follow symptoms closely. Attending Physician Statement  I have reviewed the chart and seen the patient with the resident(s). I personally reviewed images, EKG's and similar tests, if present. I personally reviewed and performed key elements of the history and exam.  I have reviewed and confirmed student and/or resident history and exam with changes as indicated above. I agree with the assessment, plan and orders as documented by the resident. Please refer to the resident and/or student note for additional information.       Marcelina Hernández

## 2020-06-29 VITALS
RESPIRATION RATE: 14 BRPM | OXYGEN SATURATION: 98 % | HEART RATE: 75 BPM | SYSTOLIC BLOOD PRESSURE: 107 MMHG | BODY MASS INDEX: 28.28 KG/M2 | HEIGHT: 66 IN | TEMPERATURE: 98.7 F | WEIGHT: 176 LBS | DIASTOLIC BLOOD PRESSURE: 56 MMHG

## 2020-06-29 LAB
ANION GAP SERPL CALCULATED.3IONS-SCNC: 12 MMOL/L (ref 7–16)
BASOPHILS ABSOLUTE: 0.05 E9/L (ref 0–0.2)
BASOPHILS RELATIVE PERCENT: 0.4 % (ref 0–2)
BUN BLDV-MCNC: 9 MG/DL (ref 6–20)
CALCIUM SERPL-MCNC: 8.3 MG/DL (ref 8.6–10.2)
CHLORIDE BLD-SCNC: 100 MMOL/L (ref 98–107)
CO2: 24 MMOL/L (ref 22–29)
CREAT SERPL-MCNC: 0.6 MG/DL (ref 0.5–1)
EOSINOPHILS ABSOLUTE: 0.28 E9/L (ref 0.05–0.5)
EOSINOPHILS RELATIVE PERCENT: 2.4 % (ref 0–6)
GFR AFRICAN AMERICAN: >60
GFR NON-AFRICAN AMERICAN: >60 ML/MIN/1.73
GLUCOSE BLD-MCNC: 82 MG/DL (ref 74–99)
HCT VFR BLD CALC: 26.2 % (ref 34–48)
HEMOGLOBIN: 8.2 G/DL (ref 11.5–15.5)
IMMATURE GRANULOCYTES #: 0.05 E9/L
IMMATURE GRANULOCYTES %: 0.4 % (ref 0–5)
LYMPHOCYTES ABSOLUTE: 1.67 E9/L (ref 1.5–4)
LYMPHOCYTES RELATIVE PERCENT: 14.2 % (ref 20–42)
MCH RBC QN AUTO: 23.6 PG (ref 26–35)
MCHC RBC AUTO-ENTMCNC: 31.3 % (ref 32–34.5)
MCV RBC AUTO: 75.3 FL (ref 80–99.9)
MONOCYTES ABSOLUTE: 0.65 E9/L (ref 0.1–0.95)
MONOCYTES RELATIVE PERCENT: 5.5 % (ref 2–12)
NEUTROPHILS ABSOLUTE: 9.03 E9/L (ref 1.8–7.3)
NEUTROPHILS RELATIVE PERCENT: 77.1 % (ref 43–80)
PDW BLD-RTO: 18.7 FL (ref 11.5–15)
PLATELET # BLD: 480 E9/L (ref 130–450)
PMV BLD AUTO: 9.1 FL (ref 7–12)
POTASSIUM REFLEX MAGNESIUM: 3.8 MMOL/L (ref 3.5–5)
RBC # BLD: 3.48 E12/L (ref 3.5–5.5)
SODIUM BLD-SCNC: 136 MMOL/L (ref 132–146)
WBC # BLD: 11.7 E9/L (ref 4.5–11.5)

## 2020-06-29 PROCEDURE — C9113 INJ PANTOPRAZOLE SODIUM, VIA: HCPCS | Performed by: STUDENT IN AN ORGANIZED HEALTH CARE EDUCATION/TRAINING PROGRAM

## 2020-06-29 PROCEDURE — 2580000003 HC RX 258: Performed by: SURGERY

## 2020-06-29 PROCEDURE — 99232 SBSQ HOSP IP/OBS MODERATE 35: CPT | Performed by: FAMILY MEDICINE

## 2020-06-29 PROCEDURE — 85025 COMPLETE CBC W/AUTO DIFF WBC: CPT

## 2020-06-29 PROCEDURE — 6370000000 HC RX 637 (ALT 250 FOR IP): Performed by: SURGERY

## 2020-06-29 PROCEDURE — 80048 BASIC METABOLIC PNL TOTAL CA: CPT

## 2020-06-29 PROCEDURE — 6370000000 HC RX 637 (ALT 250 FOR IP): Performed by: STUDENT IN AN ORGANIZED HEALTH CARE EDUCATION/TRAINING PROGRAM

## 2020-06-29 PROCEDURE — 6360000002 HC RX W HCPCS: Performed by: STUDENT IN AN ORGANIZED HEALTH CARE EDUCATION/TRAINING PROGRAM

## 2020-06-29 PROCEDURE — 6360000002 HC RX W HCPCS: Performed by: SURGERY

## 2020-06-29 PROCEDURE — 36415 COLL VENOUS BLD VENIPUNCTURE: CPT

## 2020-06-29 RX ORDER — PANTOPRAZOLE SODIUM 40 MG/10ML
40 INJECTION, POWDER, LYOPHILIZED, FOR SOLUTION INTRAVENOUS 2 TIMES DAILY
Status: DISCONTINUED | OUTPATIENT
Start: 2020-06-29 | End: 2020-06-29 | Stop reason: HOSPADM

## 2020-06-29 RX ORDER — ACETAMINOPHEN 325 MG/1
650 TABLET ORAL
Status: DISCONTINUED | OUTPATIENT
Start: 2020-06-29 | End: 2020-06-29 | Stop reason: HOSPADM

## 2020-06-29 RX ORDER — BISACODYL 10 MG
10 SUPPOSITORY, RECTAL RECTAL ONCE
Status: DISCONTINUED | OUTPATIENT
Start: 2020-06-29 | End: 2020-06-29

## 2020-06-29 RX ORDER — DEXTROSE, SODIUM CHLORIDE, AND POTASSIUM CHLORIDE 5; .45; .15 G/100ML; G/100ML; G/100ML
INJECTION INTRAVENOUS CONTINUOUS
Status: DISCONTINUED | OUTPATIENT
Start: 2020-06-29 | End: 2020-06-29

## 2020-06-29 RX ORDER — BISACODYL 10 MG
10 SUPPOSITORY, RECTAL RECTAL DAILY PRN
Status: DISCONTINUED | OUTPATIENT
Start: 2020-06-29 | End: 2020-06-29 | Stop reason: HOSPADM

## 2020-06-29 RX ORDER — SENNA AND DOCUSATE SODIUM 50; 8.6 MG/1; MG/1
1 TABLET, FILM COATED ORAL 2 TIMES DAILY
Qty: 30 TABLET | Refills: 5 | Status: ON HOLD | OUTPATIENT
Start: 2020-06-29 | End: 2022-03-09 | Stop reason: ALTCHOICE

## 2020-06-29 RX ORDER — OXYCODONE HCL 5 MG/5 ML
10 SOLUTION, ORAL ORAL EVERY 4 HOURS PRN
Status: DISCONTINUED | OUTPATIENT
Start: 2020-06-29 | End: 2020-06-29 | Stop reason: HOSPADM

## 2020-06-29 RX ORDER — OXYCODONE HYDROCHLORIDE 5 MG/1
5 TABLET ORAL EVERY 6 HOURS PRN
Qty: 20 TABLET | Refills: 0 | Status: SHIPPED | OUTPATIENT
Start: 2020-06-29 | End: 2020-07-06

## 2020-06-29 RX ORDER — OXYCODONE HCL 5 MG/5 ML
5 SOLUTION, ORAL ORAL EVERY 4 HOURS PRN
Status: DISCONTINUED | OUTPATIENT
Start: 2020-06-29 | End: 2020-06-29 | Stop reason: HOSPADM

## 2020-06-29 RX ORDER — METHOCARBAMOL 500 MG/1
500 TABLET, FILM COATED ORAL 4 TIMES DAILY
Status: DISCONTINUED | OUTPATIENT
Start: 2020-06-29 | End: 2020-06-29 | Stop reason: HOSPADM

## 2020-06-29 RX ORDER — MORPHINE SULFATE 2 MG/ML
2 INJECTION, SOLUTION INTRAMUSCULAR; INTRAVENOUS EVERY 4 HOURS PRN
Status: DISCONTINUED | OUTPATIENT
Start: 2020-06-29 | End: 2020-06-29

## 2020-06-29 RX ADMIN — ENOXAPARIN SODIUM 40 MG: 40 INJECTION SUBCUTANEOUS at 08:14

## 2020-06-29 RX ADMIN — METHOCARBAMOL TABLETS 500 MG: 500 TABLET, COATED ORAL at 13:03

## 2020-06-29 RX ADMIN — METHOCARBAMOL TABLETS 500 MG: 500 TABLET, COATED ORAL at 06:49

## 2020-06-29 RX ADMIN — OXYCODONE HYDROCHLORIDE 5 MG: 5 SOLUTION ORAL at 10:24

## 2020-06-29 RX ADMIN — ACETAMINOPHEN 650 MG: 325 TABLET ORAL at 06:48

## 2020-06-29 RX ADMIN — SODIUM CHLORIDE, PRESERVATIVE FREE 10 ML: 5 INJECTION INTRAVENOUS at 08:15

## 2020-06-29 RX ADMIN — SUCRALFATE 0.5 G: 1 TABLET ORAL at 06:49

## 2020-06-29 RX ADMIN — PANTOPRAZOLE SODIUM 40 MG: 40 INJECTION, POWDER, FOR SOLUTION INTRAVENOUS at 08:14

## 2020-06-29 RX ADMIN — CIPROFLOXACIN 400 MG: 2 INJECTION, SOLUTION INTRAVENOUS at 03:18

## 2020-06-29 RX ADMIN — DOCUSATE SODIUM 50 MG AND SENNOSIDES 8.6 MG 1 TABLET: 8.6; 5 TABLET, FILM COATED ORAL at 08:14

## 2020-06-29 RX ADMIN — POLYETHYLENE GLYCOL 3350 17 G: 17 POWDER, FOR SOLUTION ORAL at 08:14

## 2020-06-29 RX ADMIN — LORAZEPAM 0.5 MG: 0.5 TABLET ORAL at 08:14

## 2020-06-29 RX ADMIN — SUCRALFATE 0.5 G: 1 TABLET ORAL at 10:24

## 2020-06-29 RX ADMIN — ACETAMINOPHEN 650 MG: 325 TABLET ORAL at 10:25

## 2020-06-29 RX ADMIN — SUCRALFATE 0.5 G: 1 TABLET ORAL at 06:02

## 2020-06-29 ASSESSMENT — PAIN SCALES - GENERAL
PAINLEVEL_OUTOF10: 3
PAINLEVEL_OUTOF10: 7
PAINLEVEL_OUTOF10: 8
PAINLEVEL_OUTOF10: 8

## 2020-06-29 ASSESSMENT — PAIN - FUNCTIONAL ASSESSMENT: PAIN_FUNCTIONAL_ASSESSMENT: ACTIVITIES ARE NOT PREVENTED

## 2020-06-29 ASSESSMENT — PAIN DESCRIPTION - LOCATION: LOCATION: ABDOMEN

## 2020-06-29 ASSESSMENT — PAIN DESCRIPTION - PAIN TYPE: TYPE: SURGICAL PAIN

## 2020-06-29 ASSESSMENT — PAIN DESCRIPTION - DESCRIPTORS: DESCRIPTORS: ACHING;CRAMPING

## 2020-06-29 ASSESSMENT — PAIN DESCRIPTION - FREQUENCY: FREQUENCY: INTERMITTENT

## 2020-06-29 NOTE — PROGRESS NOTES
Paged Dr Dalila Merino by jaime serve in regards to pt being discharged. She wants to go home and she says she was told she could go. Per the notes discharge is not mentioned. Waiting on message back from doctor.

## 2020-06-29 NOTE — PROGRESS NOTES
GENERAL SURGERY  DAILY PROGRESS NOTE  6/29/2020    Subjective:  -BM, + flatus, -N/V. Tolerated CLD.     Objective:  BP (!) 107/55   Pulse 78   Temp 98.7 °F (37.1 °C) (Temporal)   Resp 18   Ht 5' 6\" (1.676 m)   Wt 176 lb (79.8 kg)   LMP 10/21/2019 (Exact Date)   SpO2 98%   BMI 28.41 kg/m²     GENERAL:  Sitting up in bed, no apparent distress  LUNGS:  No increased work of breathing, no cyanosis  CARDIOVASCULAR:  Extremities warm and well perfused, normal rate   ABDOMEN:  Soft, appropriate moderate silvano incisional tenderness, moderately distended, no guarding or rebound, incisions c/d/i  SKIN: Warm and dry    Assessment/Plan:  53 y.o. female sp marginal ulcer with jose alejandro patch repair 6/27    -advance to peptic ulcer diet, d/c IVF  -ok to restart home medications  -continue carafate, protonix and antibiotics  -encourage ambulation  -encourage smoking cessation    Electronically signed by Joelle Moreno MD on 6/29/2020 at 5:41 AM

## 2020-06-29 NOTE — DISCHARGE SUMMARY
tiZANidine (ZANAFLEX) 4 MG tablet Take 1 tablet by mouth 2 times daily as needed (spasms)  Qty: 20 tablet, Refills: 0      sucralfate (CARAFATE) 1 GM tablet Take 1 tablet by mouth 4 times daily  Qty: 120 tablet, Refills: 3    Associated Diagnoses: Marginal ulcer      ondansetron (ZOFRAN) 4 MG tablet Take 1 tablet by mouth daily as needed for Nausea or Vomiting  Qty: 30 tablet, Refills: 0    Associated Diagnoses: Nausea and vomiting in adult      Multiple Vitamins-Minerals (MULTIVITAMIN ADULT PO) Take by mouth daily       albuterol sulfate HFA (VENTOLIN HFA) 108 (90 Base) MCG/ACT inhaler Inhale 2 puffs into the lungs every 6 hours as needed for Wheezing  Qty: 1 Inhaler, Refills: 3    Associated Diagnoses: COPD exacerbation (HCC)      hydrOXYzine (VISTARIL) 50 MG capsule Take 1 capsule by mouth 3 times daily as needed for Anxiety  Qty: 90 capsule, Refills: 1    Associated Diagnoses: Anxiety      pantoprazole (PROTONIX) 40 MG tablet Take 1 tablet by mouth 2 times daily (before meals)  Qty: 180 tablet, Refills: 1      ascorbic acid (V-R VITAMIN C) 250 MG tablet Take 1 tablet by mouth daily Take with the iron supplement  Qty: 30 tablet, Refills: 3    Associated Diagnoses: Vitamin D deficiency; Zinc deficiency; Iron deficiency      Cholecalciferol (VITAMIN D3) 91141 units CAPS Take one capsule every Mon-Wed-Fri for four weeks  Qty: 12 capsule, Refills: 0    Associated Diagnoses: Vitamin D deficiency; Zinc deficiency; Iron deficiency      Handicap Placard MISC by Does not apply route Duration: 5 years  Qty: 1 each, Refills: 0             Discharge instructions:         Stopping Smoking: Care Instructions  Your Care Instructions     Cigarette smokers crave the nicotine in cigarettes. Giving it up is much harder than simply changing a habit. Your body has to stop craving the nicotine. It is hard to quit, but you can do it. There are many tools that people use to quit smoking.  You may find that combining tools works best time. Once you quit, do not even take a puff. Get rid of all ashtrays and lighters after your last cigarette. Clean your house and your clothes so that they do not smell of smoke. · Learn how to be a nonsmoker. Think about ways you can avoid those things that make you reach for a cigarette. ? Avoid situations that put you at greatest risk for smoking. For some people, it is hard to have a drink with friends without smoking. For others, they might skip a coffee break with coworkers who smoke. ? Change your daily routine. Take a different route to work or eat a meal in a different place. · Cut down on stress. Calm yourself or release tension by doing an activity you enjoy, such as reading a book, taking a hot bath, or gardening. · Talk to your doctor or pharmacist about nicotine replacement therapy, which replaces the nicotine in your body. You still get nicotine but you do not use tobacco. Nicotine replacement products help you slowly reduce the amount of nicotine you need. These products come in several forms, many of them available over-the-counter:  ? Nicotine patches  ? Nicotine gum and lozenges  ? Nicotine inhaler  · Ask your doctor about bupropion (Wellbutrin) or varenicline (Chantix), which are prescription medicines. They do not contain nicotine. They help you by reducing withdrawal symptoms, such as stress and anxiety. · Some people find hypnosis, acupuncture, and massage helpful for ending the smoking habit. · Eat a healthy diet and get regular exercise. Having healthy habits will help your body move past its craving for nicotine. · Be prepared to keep trying. Most people are not successful the first few times they try to quit. Do not get mad at yourself if you smoke again. Make a list of things you learned and think about when you want to try again, such as next week, next month, or next year. Where can you learn more? Go to [email or web address] and sign in to your MyChart account. Enter T327 in the [facility] box to learn more about \"Stopping Smoking: Care Instructions. \"     If you do not have an account, please click on the \"Sign Up Now\" link. Current as of: March 12, 2020               Content Version: 12.5  © 4744-4343 Healthwise, Incorporated. Care instructions adapted under license by [facility] ([facility]). If you have questions about a medical condition or this instruction, always ask your healthcare professional. [address] any warranty or liability for your use of this information. Peptic Ulcer Disease: Care Instructions  Your Care Instructions     Peptic ulcers are sores on the inside of the stomach or the small intestine (such as a duodenal ulcer). They are most often caused by an infection with bacteria or from use of nonsteroidal anti-inflammatory drugs (NSAIDs). NSAIDs include aspirin, ibuprofen (Advil), and naproxen (Aleve). Your doctor may have prescribed medicine to reduce stomach acid. You also may need to take antibiotics if your peptic ulcers are caused by an infection. You can help yourself heal and keep ulcers from coming back by making some changes in your lifestyle. Quit smoking. Limit alcohol. Follow-up care is a key part of your treatment and safety. Be sure to make and go to all appointments, and call your doctor if you are having problems. It's also a good idea to know your test results and keep a list of the medicines you take. How can you care for yourself at home? · Be safe with medicines. Take your medicines exactly as prescribed. Call your doctor if you think you are having a problem with your medicine. · Do not take aspirin or other NSAIDs such as ibuprofen (Advil or Motrin) or naproxen (Aleve). Ask your doctor what you can take for pain. · Do not smoke. Smoking can make ulcers worse. If you need help quitting, talk to your doctor about stop-smoking programs and medicines.  These can increase your chances

## 2020-06-29 NOTE — OP NOTE
[address]                  [address] [place],  [facility]                                OPERATIVE REPORT    PATIENT NAME: CHRISTIANO JARAMILLO                  :        1975  MED REC NO:   [id number]                            ROOM:       [facility]  ACCOUNT NO:   [de-identified]                           ADMIT DATE: 2020  PROVIDER:     Matt Garcia MD    DATE OF PROCEDURE:  2020    PRIMARY CARE DOCTOR:  Domo Andrea MD.    PREOPERATIVE DIAGNOSES:  Perforated viscus, intraabdominal free air. POSTOPERATIVE DIAGNOSIS:  Perforated marginal ulcer with peritonitis. PROCEDURE PERFORMED:  Diagnostic laparoscopy with repair of perforated  marginal ulcer with omental flap and [place] Patch, upper endoscopy. ANESTHESIA:  General endotracheal.    SURGEON:  Matt Garcia MD.    ASSISTANT:  Dr. Michael Hernandez. COMPLICATIONS:  None. ESTIMATED BLOOD LOSS:  5 mL. FLUIDS:  Crystalloid. SPECIMENS:  None. DESCRIPTION OF PROCEDURE:  This is a 23-year-old female with a history  of a Viviane-en-Y gastric bypass. She is also a smoker and has a history  of perforated marginal ulcer in the past with revision. She  subsequently presented again with perforation consistent with marginal  ulcer. After being explained the risks, benefits, and alternatives of  the procedure, she agreed to proceed. She was taken to the operating  room, placed supine, administered general anesthesia, and intubated. Once the area of the skin was adequately sedated, she was prepped and  draped in normal sterile fashion. Timeout was performed to confirm  surgical site and the patient's name. She received preoperative  antibiotic in the form of meropenem. I initially made a 5-mm incision  superior to the umbilicus and inserted a Veress needle, confirmed needle  placement, insufflated to 15 mmHg.   As we inserted the Veress needle, we  immediately appreciated a rush of air

## 2020-06-30 ENCOUNTER — CARE COORDINATION (OUTPATIENT)
Dept: CASE MANAGEMENT | Age: 45
End: 2020-06-30

## 2020-07-01 ENCOUNTER — TELEPHONE (OUTPATIENT)
Dept: PHYSICAL MEDICINE AND REHAB | Age: 45
End: 2020-07-01

## 2020-07-01 ENCOUNTER — CARE COORDINATION (OUTPATIENT)
Dept: CASE MANAGEMENT | Age: 45
End: 2020-07-01

## 2020-07-01 LAB
BLOOD BANK DISPENSE STATUS: NORMAL
BLOOD BANK PRODUCT CODE: NORMAL
BPU ID: NORMAL
DESCRIPTION BLOOD BANK: NORMAL

## 2020-07-01 NOTE — CARE COORDINATION
P[facility] Transitions Initial Follow Up Call    Call within 2 business days of discharge: Yes    Patient: Jc Brownlee Patient : 1975   MRN: [id number]  Reason for Admission: Perforated abdominal viscus  Discharge Date: 20 RARS: Readmission Risk Score: 17      Last Discharge [facility]       Complaint Diagnosis Description Type Department Provider    20 Abdominal Pain Perforated abdominal viscus . .. ED to Hosp-Admission (Discharged) (ADMITTED) Truong Strong MD; [facility]. .. Spoke with: No one    Facility: [facility]    Received vm from patient. Attempted to return call. No answer. Left message with contact information for this CTN.     Follow Up  Future Appointments   Date Time Provider Dylon Nicole   7/15/2020 10:30 AM [facility] ECHO LAB RM-2 [facility] [place] [facility]   2020  2:00 PM Nafisa Osborne MD Surg Weight [facility]       Gabriel Sorto RN

## 2020-07-01 NOTE — CARE COORDINATION
[facility] Transitions Initial Follow Up Call    Call within 2 business days of discharge: Yes    Patient: Griselda Bay Patient : 1975   MRN: [id number]  Reason for Admission: Perforated abdominal viscus  Discharge Date: 20 RARS: Readmission Risk Score: 17      Last Discharge [facility]       Complaint Diagnosis Description Type Department Provider    20 Abdominal Pain Perforated abdominal viscus . .. ED to Hosp-Admission (Discharged) (ADMITTED) Yair Lock MD; [address] [place] [address]. .. Spoke with: No one    Facility: [facility]    Second attempt to reach the patient for initial Care Transition call post hospital discharge. Message left with CTN's contact information requesting return phone call.       Follow Up  Future Appointments   Date Time Provider Dylon Nicole   7/15/2020 10:30 AM [facility] ECHO LAB RM-2 [facility] ECHO [facility] [place] [facility]\""   2020  2:00 PM Renetta Smith MD Surg Weight [facility]       Jose Ac RN

## 2020-07-01 NOTE — TELEPHONE ENCOUNTER
Called to follow up on right knee injection done in the office on 06/22/20. Left message with callback number and instructed patient to call office with any questions or concerns.

## 2020-07-02 ENCOUNTER — CARE COORDINATION (OUTPATIENT)
Dept: CASE MANAGEMENT | Age: 45
End: 2020-07-02

## 2020-07-02 NOTE — CARE COORDINATION
Pily 45 Transitions Initial Follow Up Call    Call within 2 business days of discharge: No    Patient: Garrick Quiroz Patient : 1975   MRN: [id number]  Reason for Admission: Perforated abdominal viscus  Discharge Date: 20 RARS: Readmission Risk Score: 17      Last Discharge [facility]       Complaint Diagnosis Description Type Department Provider    20 Abdominal Pain Perforated abdominal viscus . .. ED to Hosp-Admission (Discharged) (ADMITTED) Mikaela Schmitt MD; [facility]. .. Spoke with: No one    Facility: [facility]    Third and final attempt to reach the patient for initial Care Transition call post hospital discharge. Message left with CTN's contact information requesting return phone call. No further outreach scheduled at this time due to inability to contact patient. This CTN to resolve episode and sign off.       Follow Up  Future Appointments   Date Time Provider Dylon Nicole   7/15/2020 10:30 AM [facility] ECHO LAB RM-2 [facility]   2020  2:00 PM Lisa Garcia MD Surg Weight [facility]       Goldie Yadav RN

## 2020-07-07 ENCOUNTER — HOSPITAL ENCOUNTER (EMERGENCY)
Age: 45
Discharge: HOME | End: 2020-07-07
Attending: EMERGENCY MEDICINE
Payer: MEDICARE

## 2020-07-07 ENCOUNTER — APPOINTMENT (OUTPATIENT)
Dept: CT IMAGING | Age: 45
End: 2020-07-07
Payer: MEDICARE

## 2020-07-07 VITALS
DIASTOLIC BLOOD PRESSURE: 66 MMHG | HEART RATE: 69 BPM | TEMPERATURE: 98.8 F | HEIGHT: 66 IN | SYSTOLIC BLOOD PRESSURE: 111 MMHG | OXYGEN SATURATION: 98 % | WEIGHT: 176 LBS | RESPIRATION RATE: 16 BRPM | BODY MASS INDEX: 28.28 KG/M2

## 2020-07-07 LAB
ALBUMIN SERPL-MCNC: 3.3 G/DL (ref 3.5–5.2)
ALP BLD-CCNC: 105 U/L (ref 35–104)
ALT SERPL-CCNC: 15 U/L (ref 0–32)
ANION GAP SERPL CALCULATED.3IONS-SCNC: 11 MMOL/L (ref 7–16)
AST SERPL-CCNC: 20 U/L (ref 0–31)
BASOPHILS ABSOLUTE: 0.09 E9/L (ref 0–0.2)
BASOPHILS RELATIVE PERCENT: 1.1 % (ref 0–2)
BILIRUB SERPL-MCNC: <0.2 MG/DL (ref 0–1.2)
BILIRUBIN URINE: NEGATIVE
BLOOD, URINE: NEGATIVE
BUN BLDV-MCNC: 11 MG/DL (ref 6–20)
CALCIUM SERPL-MCNC: 8.6 MG/DL (ref 8.6–10.2)
CHLORIDE BLD-SCNC: 102 MMOL/L (ref 98–107)
CLARITY: CLEAR
CO2: 25 MMOL/L (ref 22–29)
COLOR: YELLOW
CREAT SERPL-MCNC: 0.6 MG/DL (ref 0.5–1)
EOSINOPHILS ABSOLUTE: 0.18 E9/L (ref 0.05–0.5)
EOSINOPHILS RELATIVE PERCENT: 2.3 % (ref 0–6)
GFR AFRICAN AMERICAN: >60
GFR NON-AFRICAN AMERICAN: >60 ML/MIN/1.73
GLUCOSE BLD-MCNC: 79 MG/DL (ref 74–99)
GLUCOSE URINE: NEGATIVE MG/DL
HCT VFR BLD CALC: 29.2 % (ref 34–48)
HEMOGLOBIN: 8.9 G/DL (ref 11.5–15.5)
IMMATURE GRANULOCYTES #: 0.02 E9/L
IMMATURE GRANULOCYTES %: 0.3 % (ref 0–5)
KETONES, URINE: NEGATIVE MG/DL
LACTIC ACID: 0.8 MMOL/L (ref 0.5–2.2)
LEUKOCYTE ESTERASE, URINE: NEGATIVE
LYMPHOCYTES ABSOLUTE: 3.04 E9/L (ref 1.5–4)
LYMPHOCYTES RELATIVE PERCENT: 38.2 % (ref 20–42)
MCH RBC QN AUTO: 23.7 PG (ref 26–35)
MCHC RBC AUTO-ENTMCNC: 30.5 % (ref 32–34.5)
MCV RBC AUTO: 77.9 FL (ref 80–99.9)
MONOCYTES ABSOLUTE: 0.38 E9/L (ref 0.1–0.95)
MONOCYTES RELATIVE PERCENT: 4.8 % (ref 2–12)
NEUTROPHILS ABSOLUTE: 4.24 E9/L (ref 1.8–7.3)
NEUTROPHILS RELATIVE PERCENT: 53.3 % (ref 43–80)
NITRITE, URINE: NEGATIVE
PDW BLD-RTO: 18.9 FL (ref 11.5–15)
PH UA: 7.5 (ref 5–9)
PLATELET # BLD: 634 E9/L (ref 130–450)
PMV BLD AUTO: 8.4 FL (ref 7–12)
POTASSIUM SERPL-SCNC: 4.3 MMOL/L (ref 3.5–5)
PROTEIN UA: NEGATIVE MG/DL
RBC # BLD: 3.75 E12/L (ref 3.5–5.5)
SODIUM BLD-SCNC: 138 MMOL/L (ref 132–146)
SPECIFIC GRAVITY UA: 1.02 (ref 1–1.03)
TOTAL PROTEIN: 6.6 G/DL (ref 6.4–8.3)
UROBILINOGEN, URINE: 0.2 E.U./DL
WBC # BLD: 8 E9/L (ref 4.5–11.5)

## 2020-07-07 PROCEDURE — 80053 COMPREHEN METABOLIC PANEL: CPT

## 2020-07-07 PROCEDURE — 81003 URINALYSIS AUTO W/O SCOPE: CPT

## 2020-07-07 PROCEDURE — 6360000002 HC RX W HCPCS: Performed by: NURSE PRACTITIONER

## 2020-07-07 PROCEDURE — 70450 CT HEAD/BRAIN W/O DYE: CPT

## 2020-07-07 PROCEDURE — 85025 COMPLETE CBC W/AUTO DIFF WBC: CPT

## 2020-07-07 PROCEDURE — 2580000003 HC RX 258: Performed by: NURSE PRACTITIONER

## 2020-07-07 PROCEDURE — 96374 THER/PROPH/DIAG INJ IV PUSH: CPT

## 2020-07-07 PROCEDURE — 6370000000 HC RX 637 (ALT 250 FOR IP): Performed by: NURSE PRACTITIONER

## 2020-07-07 PROCEDURE — 6360000004 HC RX CONTRAST MEDICATION: Performed by: RADIOLOGY

## 2020-07-07 PROCEDURE — 74177 CT ABD & PELVIS W/CONTRAST: CPT

## 2020-07-07 PROCEDURE — 83605 ASSAY OF LACTIC ACID: CPT

## 2020-07-07 PROCEDURE — 99284 EMERGENCY DEPT VISIT MOD MDM: CPT

## 2020-07-07 RX ORDER — 0.9 % SODIUM CHLORIDE 0.9 %
1000 INTRAVENOUS SOLUTION INTRAVENOUS ONCE
Status: COMPLETED | OUTPATIENT
Start: 2020-07-07 | End: 2020-07-07

## 2020-07-07 RX ORDER — DIPHENHYDRAMINE HYDROCHLORIDE 50 MG/ML
25 INJECTION INTRAMUSCULAR; INTRAVENOUS ONCE
Status: COMPLETED | OUTPATIENT
Start: 2020-07-07 | End: 2020-07-07

## 2020-07-07 RX ORDER — ACETAMINOPHEN 500 MG
1000 TABLET ORAL ONCE
Status: COMPLETED | OUTPATIENT
Start: 2020-07-07 | End: 2020-07-07

## 2020-07-07 RX ORDER — PHENAZOPYRIDINE HYDROCHLORIDE 100 MG/1
100 TABLET, FILM COATED ORAL 3 TIMES DAILY PRN
Qty: 9 TABLET | Refills: 0 | Status: SHIPPED | OUTPATIENT
Start: 2020-07-07 | End: 2020-07-10

## 2020-07-07 RX ADMIN — IOPAMIDOL 110 ML: 755 INJECTION, SOLUTION INTRAVENOUS at 15:26

## 2020-07-07 RX ADMIN — ACETAMINOPHEN 1000 MG: 500 TABLET ORAL at 14:28

## 2020-07-07 RX ADMIN — DIPHENHYDRAMINE HYDROCHLORIDE 25 MG: 50 INJECTION, SOLUTION INTRAMUSCULAR; INTRAVENOUS at 14:28

## 2020-07-07 RX ADMIN — SODIUM CHLORIDE 1000 ML: 9 INJECTION, SOLUTION INTRAVENOUS at 14:28

## 2020-07-07 ASSESSMENT — PAIN SCALES - GENERAL
PAINLEVEL_OUTOF10: 7
PAINLEVEL_OUTOF10: 7

## 2020-07-07 ASSESSMENT — PAIN DESCRIPTION - PAIN TYPE: TYPE: ACUTE PAIN

## 2020-07-07 ASSESSMENT — PAIN DESCRIPTION - LOCATION: LOCATION: PELVIS

## 2020-07-07 ASSESSMENT — PAIN DESCRIPTION - FREQUENCY: FREQUENCY: CONTINUOUS

## 2020-07-07 ASSESSMENT — PAIN - FUNCTIONAL ASSESSMENT: PAIN_FUNCTIONAL_ASSESSMENT: ACTIVITIES ARE NOT PREVENTED

## 2020-07-07 ASSESSMENT — PAIN DESCRIPTION - ORIENTATION: ORIENTATION: MID

## 2020-07-07 ASSESSMENT — PAIN DESCRIPTION - DESCRIPTORS: DESCRIPTORS: CRAMPING

## 2020-07-07 NOTE — ED NOTES
Radiology Procedure Waiver   Name: Panfilo Carl  : 1975  MRN: [id number]    Date:  20    Time: 3:16 PM EDT    Benefits of immediately proceeding with Radiology exam(s) without pre-testing outweigh the risks or are not indicated as specified below and therefore the following is/are being waived:    [] Pregnancy test   [] Patients LMP on-time and regular.   [] Patient had Tubal Ligation or has other Contraception Device. [] Patient  is Menopausal or Premenarcheal.    [] Patient had Full or Partial Hysterectomy. [] Protocol for Iodine allergy    [] MRI Questionnaire     [] BUN/Creatinine   [] Patient age w/no hx of renal dysfunction. [] Patient on Dialysis. [] Recent Normal Labs.   Electronically signed by JOHN Downing CNP on 20 at 3:16 PM EDT             JOHN Downing CNP  20 [id number]

## 2020-07-07 NOTE — ED PROVIDER NOTES
ED Attending  CC: Viviana           Department of Emergency Medicine   ED  Provider Note  Admit Date/RoomTime: 7/7/2020  1:11 PM  ED Room: DARREL/DARREL  MRN: [id number]  Chief Complaint: Dysuria (Had surgery July 27 and cordero was removed 29th, pt still experiencing dysuria, denies hematuria) and Headache (Left side after nap today upon waking)       History of Present Illness   Source of history provided by:  patient. History/Exam Limitations: none. Cedric Villa is a 53 y.o. female who has a past medical history of:   Past Medical History:   Diagnosis Date    Anemia     Arthritis     Asthma     Depression     GERD without esophagitis     History of blood transfusion     History of gastric bypass     Hydronephrosis with urinary obstruction due to ureteral calculus     Hypertension     No meds following sustained weight loss after bariatric surgery.  Iron deficiency 8/1/2018    Localized osteoarthritis of left knee 1/20/2020    Lumbar spondylosis 1/26/2018    Lymphedema     Obesity     Panic attacks     Perforated marginal ulcer 10/29/2018    ~1 year after RnYGB    PONV (postoperative nausea and vomiting)     Prediabetes     BG has normalized following sustained weight loss after bariatric surgery.  Sleep apnea, obstructive     Off PAP therapy BG following sustained weight loss after bariatric surgery.  Vitamin D deficiency 7/27/2018    Zinc deficiency 8/1/2018    presents to the ED by private car and is alone for complaints of dysuria that has been on going since having a cordero catheter removed on Monday. Had surgery on 6/27/2024 lapraoscopically  for a perforated viscera and ulceration by Dr. Danita Hernandez. She complains of lower abdominal pain on the left side and suprapubic region. She feels she has pressure over the bladder and gas pains.  She denies any fever, chills, nausea, vomiting, chest pain, shortness of breath, hemoptysis, bloody or tarry stools, leg swelling or signs of infection to her healing abdominal wounds. Patient also states after eating breakfast this morning she took a short nap she woke up with a left-sided headache. She states this is not the first or worst headache that she has ever had. Patient denies any recent head trauma or any anticoagulation. She denies any fever, chills, nausea, vomiting, the patient or any other symptoms. Patient states she did take an old tramadol that she had from previous dental encounters and does not feel it is helped the headache. She denies any photophobia, neck stiffness, chest pain, shortness of breath, leg pain, leg swelling, dizziness or hemoptysis. The complaint has been persistent, moderate in severity. ROS    Pertinent positives and negatives are stated within HPI, all other systems reviewed and are negative. Past Surgical History:   Procedure Laterality Date    APPENDECTOMY  1996    CHOLECYSTECTOMY, LAPAROSCOPIC N/A 7/27/2019    CHOLECYSTECTOMY LAPAROSCOPIC performed by Rohan Hernandez MD at [facility]  10/23/2019    HYSTERECTOMY  01/22/2020    [facility]    LAPAROSCOPY N/A 6/27/2020    DIAGNOSTIC  LAPAROSCOPY, REPAIR PERFORATED MARGINAL ULCER, UPPER ENDOSCOPY.  [facility] PATCH performed by Mary Moore MD at [facility] LITHOTRIPSY Right 7/24/2019    CYSTOSCOPY RETROGRADE PYELOGRAM URETEROSCOPY J STENT LASER LITHOTRIPSY RIGHT performed by Yelena Chambers DO at [facility] LITHOTRIPSY      [facility] OFFICE/OUTPT VISIT,PROCEDURE ONLY N/A 10/29/2018    DIAGNOSTIC LAPAROSCOPY REPAIR PERFORATED VISCUS performed by Rohan Hernandez MD at [address] [place] [address]  11/14/2017    STOMACH SURGERY      UPPER GASTROINTESTINAL ENDOSCOPY N/A 7/27/2019    EGD ESOPHAGOGASTRODUODENOSCOPY performed by Rohan Hernandez MD at [address] [place] [address] N/A 2/7/2020    EGD BIOPSY performed by Rohan Hernandez MD at [facility] N/A 6/27/2020    EGD DIAGNOSTIC ONLY performed by Godwin Godinez MD at [address] History:  reports that she has been smoking cigarettes. She started smoking about 28 years ago. She has a 7.50 pack-year smoking history. She has never used smokeless tobacco. She reports current alcohol use of about 6.0 standard drinks of alcohol per week. She reports that she does not use drugs. Family History: family history includes Cancer in her maternal grandfather and paternal grandmother; Depression in her mother; Diabetes in her father and mother; Heart Attack (age of onset: 61) in her mother; Stroke in her father and paternal grandfather; Substance Abuse in her brother. Allergies: Pcn [penicillins] and Food    Physical Exam   Oxygen Saturation Interpretation: Normal.   ED Triage Vitals [07/07/20 1324]   BP Temp Temp src Pulse Resp SpO2 Height Weight   111/66 98.8 °F (37.1 °C) -- 69 16 98 % 5' 6\" (1.676 m) 176 lb (79.8 kg)       Physical Exam  · Constitutional/General: Alert and oriented x3, well appearing, non toxic  · HEENT:  NC/NT. PERRLA,  Airway patent. · Neck: Supple, full ROM, non tender to palpation in the midline, no stridor, no crepitus, no meningeal signs  · Respiratory: Lungs clear to auscultation bilaterally, no wheezes, rales, or rhonchi. Not in respiratory distress  · CV:  Regular rate. Regular rhythm. No murmurs, gallops, or rubs. 2+ distal pulses  · Chest: No chest wall tenderness  · GI:  Abdomen Soft, mild tenderness suprapubically, Non distended. +BS. No rebound, guarding, or rigidity. No pulsatile masses. Incisions clean dry and have adhesive intact. · Musculoskeletal: Moves all extremities x 4. Warm and well perfused, no clubbing, cyanosis, or edema. Capillary refill <3 seconds  · Integument: skin warm and dry. No rashes.    · Lymphatic: no lymphadenopathy noted  · Neurologic: GCS 15, no focal deficits, symmetric strength 5/5 in the upper and lower extremities bilaterally  · Psychiatric: Normal Affect    Lab / Imaging Results   (All laboratory and radiology results have been personally reviewed by myself)  Labs:  Results for orders placed or performed during the hospital encounter of 07/07/20   Urinalysis   Result Value Ref Range    Color, UA Yellow Straw/Yellow    Clarity, UA Clear Clear    Glucose, Ur Negative Negative mg/dL    Bilirubin Urine Negative Negative    Ketones, Urine Negative Negative mg/dL    Specific Gravity, UA 1.020 1.005 - 1.030    Blood, Urine Negative Negative    pH, UA 7.5 5.0 - 9.0    Protein, UA Negative Negative mg/dL    Urobilinogen, Urine 0.2 <2.0 E.U./dL    Nitrite, Urine Negative Negative    Leukocyte Esterase, Urine Negative Negative   CBC Auto Differential   Result Value Ref Range    WBC 8.0 4.5 - 11.5 E9/L    RBC 3.75 3.50 - 5.50 E12/L    Hemoglobin 8.9 (L) 11.5 - 15.5 g/dL    Hematocrit 29.2 (L) 34.0 - 48.0 %    MCV 77.9 (L) 80.0 - 99.9 fL    MCH 23.7 (L) 26.0 - 35.0 pg    MCHC 30.5 (L) 32.0 - 34.5 %    RDW 18.9 (H) 11.5 - 15.0 fL    Platelets 407 (H) 024 - 450 E9/L    MPV 8.4 7.0 - 12.0 fL    Neutrophils % 53.3 43.0 - 80.0 %    Immature Granulocytes % 0.3 0.0 - 5.0 %    Lymphocytes % 38.2 20.0 - 42.0 %    Monocytes % 4.8 2.0 - 12.0 %    Eosinophils % 2.3 0.0 - 6.0 %    Basophils % 1.1 0.0 - 2.0 %    Neutrophils Absolute 4.24 1.80 - 7.30 E9/L    Immature Granulocytes # 0.02 E9/L    Lymphocytes Absolute 3.04 1.50 - 4.00 E9/L    Monocytes Absolute 0.38 0.10 - 0.95 E9/L    Eosinophils Absolute 0.18 0.05 - 0.50 E9/L    Basophils Absolute 0.09 0.00 - 0.20 E9/L   Comprehensive Metabolic Panel   Result Value Ref Range    Sodium 138 132 - 146 mmol/L    Potassium 4.3 3.5 - 5.0 mmol/L    Chloride 102 98 - 107 mmol/L    CO2 25 22 - 29 mmol/L    Anion Gap 11 7 - 16 mmol/L    Glucose 79 74 - 99 mg/dL    BUN 11 6 - 20 mg/dL    CREATININE 0.6 0.5 - 1.0 mg/dL    GFR Non-African American >60 >=60 mL/min/1.73    GFR African American >60     Calcium 8.6 8.6 - 10.2 mg/dL    Total Protein 6.6 6.4 - 8.3 g/dL specific signs and symptoms warranting immediate return to the ED for re evaluation and follow up with PCP and surgeon. Counseling: The emergency provider has spoken with the patient and discussed todays results, in addition to providing specific details for the plan of care and counseling regarding the diagnosis and prognosis. Questions are answered at this time and they are agreeable with the plan. Assessment      1. Lower abdominal pain    2. Dysuria    3. Nonintractable headache, unspecified chronicity pattern, unspecified headache type      Plan   Discharge to home  Patient condition is good    New Medications     Discharge Medication List as of 7/7/2020  4:46 PM      START taking these medications    Details   phenazopyridine (PYRIDIUM) 100 MG tablet Take 1 tablet by mouth 3 times daily as needed for Pain, Disp-9 tablet, R-0Print           Electronically signed by JOHN Chavez CNP   DD: 7/7/20  **This report was transcribed using voice recognition software. Every effort was made to ensure accuracy; however, inadvertent computerized transcription errors may be present.   END OF ED PROVIDER NOTE     JOHN Chavez CNP  07/08/20 0021

## 2020-07-08 ENCOUNTER — CARE COORDINATION (OUTPATIENT)
Dept: CARE COORDINATION | Age: 45
End: 2020-07-08

## 2020-07-08 ENCOUNTER — TELEPHONE (OUTPATIENT)
Dept: ADMINISTRATIVE | Age: 45
End: 2020-07-08

## 2020-07-08 NOTE — TELEPHONE ENCOUNTER
The patient called the office. I informed her of the authorization. She will wait to schedule after discussing with Dr. Brandy Gaines at her next steroid injection on 7/20/20.

## 2020-07-08 NOTE — CARE COORDINATION
Call #1    Attempted outreach to complete ED f/u note. Left message with contact information requesting call back. Encouraged f/u with Dr. Anat Ross [phone] and educated regarding MyChart sign up, number noted on pg. 4 of AVS.   Noted: f/u with Dr. Karishma Sepulveda scheduled 7/20/20. PLAN    If no outreach by Mesha Dias, ED f/u call #2 tomorrow.

## 2020-07-08 NOTE — TELEPHONE ENCOUNTER
Called and spoke with the patient to make her an appointment for left knee injection.   Patient is scheduled on 7-

## 2020-07-09 ENCOUNTER — CARE COORDINATION (OUTPATIENT)
Dept: CARE COORDINATION | Age: 45
End: 2020-07-09

## 2020-07-09 NOTE — CARE COORDINATION
Call #2    Attempted outreach to complete ED f/u note. Left message with contact information requesting call back. Encouraged continued social distancing, good handwashing, and mask wearing. Reminded of upcoming appointment with Dr. Montserrat Lanier on 7/10 @ 11:00 a.m. PLAN    If no outreach by Jad Gordillo, will resolve episode d/t UTR x 2 calls.

## 2020-07-10 ENCOUNTER — OFFICE VISIT (OUTPATIENT)
Dept: BARIATRICS/WEIGHT MGMT | Age: 45
End: 2020-07-10
Payer: MEDICARE

## 2020-07-10 VITALS
SYSTOLIC BLOOD PRESSURE: 191 MMHG | DIASTOLIC BLOOD PRESSURE: 132 MMHG | HEART RATE: 86 BPM | RESPIRATION RATE: 86 BRPM | TEMPERATURE: 98.3 F | BODY MASS INDEX: 27 KG/M2 | HEIGHT: 66 IN | WEIGHT: 168 LBS

## 2020-07-10 PROCEDURE — 99211 OFF/OP EST MAY X REQ PHY/QHP: CPT

## 2020-07-10 PROCEDURE — 99024 POSTOP FOLLOW-UP VISIT: CPT | Performed by: SURGERY

## 2020-07-10 RX ORDER — OMEPRAZOLE 20 MG/1
20 CAPSULE, DELAYED RELEASE ORAL DAILY
COMMUNITY
End: 2020-08-10 | Stop reason: SDUPTHER

## 2020-07-10 NOTE — PROGRESS NOTES
Pt is here for post op f/u from perforated ulcer. Pt states that she is gets lower abdomen pain that doubles her over and headaches. She went to the ER for it but they didn't find anything. She's still getting pain when she sits and gets up. Incisions are healing well. Pt is taking her prescription of Carafate and Omeprazole    Opioid Questions for BSTOP:  1. Enter the total number of opiods used after discharge  6  2. Did patient return unused opiods to a verified prescription disposal location?  No

## 2020-07-10 NOTE — PATIENT INSTRUCTIONS

## 2020-07-10 NOTE — PROGRESS NOTES
General Surgery Office Note  Jerry Woodard MD, MS    Patient's Name/Date of Birth: Kimberly Lee / 1975    Date: July 10, 2020     Surgeon: Raji Salvador MD    Chief Complaint: s/p repair perf marginal ulcer    Patient Active Problem List   Diagnosis    Tobacco abuse    Panic attacks    AYALA on CPAP    Morbid obesity ([address]    Mild intermittent asthma without complication    HTN (hypertension)    GERD without esophagitis    S/P gastric bypass    Primary osteoarthritis of right knee    Vitamin D deficiency    Zinc deficiency    Iron deficiency    Thrombocytosis (HCC)    Microcytic anemia    Red blood cell antibody positive    Anxiety and depression    Localized osteoarthritis of left knee    Chronic pain syndrome    Chronic bilateral low back pain without sciatica    Lumbosacral spondylosis without myelopathy    Other dysphagia    Perforated abdominal viscus       Subjective: STILL SMOKING, stopped for 3 days after surgery. She is taking carafate. Some lower abd pain, no nausea. Objective:  BP (!) 191/132 (Site: Right Upper Arm, Position: Sitting, Cuff Size: Large Adult)   Pulse 86   Temp 98.3 °F (36.8 °C) (Temporal)   Resp (!) 86   Ht 5' 6\" (1.676 m)   Wt 168 lb (76.2 kg)   LMP 10/21/2019 (Exact Date)   BMI 27.12 kg/m²   Labs:  Recent Labs     07/07/20  1417   WBC 8.0   HGB 8.9*   HCT 29.2*     Lab Results   Component Value Date    CREATININE 0.6 07/07/2020    BUN 11 07/07/2020     07/07/2020    K 4.3 07/07/2020     07/07/2020    CO2 25 07/07/2020     No results for input(s): LIPASE, AMYLASE in the last 72 hours.       General appearance: AA, NAD  HEENT: NCAT, PERRLA, EOMI  Lungs: Clear, equal rise bilateral  Heart: Reg  Abdomen: soft, nondistended, nontender, incisions well healed, no signs of infection, no cellulitis, no hematoma  Skin: No lesions, incisions well healed  Psych: No distress, conversive, no hallucinations  : No ulcers

## 2020-07-15 ENCOUNTER — HOSPITAL ENCOUNTER (OUTPATIENT)
Dept: NON INVASIVE DIAGNOSTICS | Age: 45
Discharge: HOME | End: 2020-07-15
Payer: MEDICARE

## 2020-07-15 VITALS — HEIGHT: 66 IN | BODY MASS INDEX: 27 KG/M2 | WEIGHT: 168 LBS

## 2020-07-15 LAB
LV EF: 65 %
LVEF MODALITY: NORMAL

## 2020-07-15 PROCEDURE — 93306 TTE W/DOPPLER COMPLETE: CPT

## 2020-07-20 ENCOUNTER — TELEPHONE (OUTPATIENT)
Dept: ADMINISTRATIVE | Age: 45
End: 2020-07-20

## 2020-07-20 ENCOUNTER — VIRTUAL VISIT (OUTPATIENT)
Dept: FAMILY MEDICINE CLINIC | Age: 45
End: 2020-07-20
Payer: MEDICARE

## 2020-07-20 PROCEDURE — 99213 OFFICE O/P EST LOW 20 MIN: CPT | Performed by: STUDENT IN AN ORGANIZED HEALTH CARE EDUCATION/TRAINING PROGRAM

## 2020-07-20 RX ORDER — ALBUTEROL SULFATE 90 UG/1
2 AEROSOL, METERED RESPIRATORY (INHALATION) EVERY 6 HOURS PRN
Qty: 1 INHALER | Refills: 3 | Status: SHIPPED
Start: 2020-07-20 | End: 2020-10-19

## 2020-07-20 RX ORDER — SUCRALFATE 1 G/1
TABLET ORAL
Qty: 120 TABLET | Refills: 3 | Status: SHIPPED
Start: 2020-07-20 | End: 2021-08-03 | Stop reason: SDUPTHER

## 2020-07-20 RX ORDER — BUPROPION HYDROCHLORIDE 100 MG/1
200 TABLET, EXTENDED RELEASE ORAL 2 TIMES DAILY
COMMUNITY
Start: 2020-07-14 | End: 2021-08-27 | Stop reason: DRUGHIGH

## 2020-07-20 RX ORDER — NICOTINE 21 MG/24HR
1 PATCH, TRANSDERMAL 24 HOURS TRANSDERMAL EVERY 24 HOURS
Qty: 42 PATCH | Refills: 0 | Status: SHIPPED
Start: 2020-07-20 | End: 2021-08-27

## 2020-07-20 ASSESSMENT — ENCOUNTER SYMPTOMS
SHORTNESS OF BREATH: 0
ABDOMINAL DISTENTION: 0
COLOR CHANGE: 0
VOMITING: 0
ABDOMINAL PAIN: 0
BLOOD IN STOOL: 0
NAUSEA: 0
CHEST TIGHTNESS: 0
COUGH: 0

## 2020-07-20 NOTE — TELEPHONE ENCOUNTER
Patient called in today to cancel appt for injection would like to r/s for next Monday.  Best call back is [phone]

## 2020-07-20 NOTE — PROGRESS NOTES
2020    TELEHEALTH EVALUATION -- Audio/Visual (During KMVPM-10 public health emergency)    HPI:     Cedric Villa (:  1975) has requested an audio/video evaluation for the following concern(s):     Hospital dischareg follow up. Patient was admitted from 2020 to 2020 for perforated marginal ulcer. She underwent laparoscopic surgery. She report of doing well. She had some urinary discomfort and lower abdominal pain after removal of catheter. Now it is resolved. Patient was seen by surgery team post up. She taking medications regularly. Denies any blood in stool, hematemesis, abdominal distention, nausea and vomiting. Review of Systems   Constitutional: Negative for activity change, fatigue and fever. HENT: Negative for congestion. Respiratory: Negative for cough, chest tightness and shortness of breath. Cardiovascular: Negative for chest pain, palpitations and leg swelling. Gastrointestinal: Negative for abdominal distention, abdominal pain, blood in stool, nausea and vomiting. Genitourinary: Negative for difficulty urinating and dysuria. Musculoskeletal: Negative for arthralgias. Skin: Negative for color change. Neurological: Negative for dizziness. Hematological: Negative for adenopathy. Psychiatric/Behavioral: Negative for agitation. Prior to Visit Medications    Medication Sig Taking? Authorizing Provider   buPROPion (WELLBUTRIN SR) 100 MG extended release tablet Take 1 tablet by mouth 2 times daily Yes Historical Provider, MD   omeprazole (PRILOSEC) 20 MG delayed release capsule Take 20 mg by mouth daily Yes Historical Provider, MD   sennosides-docusate sodium (SENOKOT-S) 8.6-50 MG tablet Take 1 tablet by mouth 2 times daily Yes Breanna Marshall MD   hydrocortisone 2.5 % cream Apply topically 2 times daily.  Yes Linnea Justice MD   tiZANidine (ZANAFLEX) 4 MG tablet Take 1 tablet by mouth 2 times daily as needed (spasms) Yes CATHI Carr   sucralfate (CARAFATE) 1 GM tablet Take 1 tablet by mouth 4 times daily Yes Lanning Eisenmenger, MD   ondansetron (ZOFRAN) 4 MG tablet Take 1 tablet by mouth daily as needed for Nausea or Vomiting Yes Lanning Eisenmenger, MD   Multiple Vitamins-Minerals (MULTIVITAMIN ADULT PO) Take by mouth daily  Yes Historical Provider, MD   albuterol sulfate HFA (VENTOLIN HFA) 108 (90 Base) MCG/ACT inhaler Inhale 2 puffs into the lungs every 6 hours as needed for Wheezing Yes Prabha De Paz MD   hydrOXYzine (VISTARIL) 50 MG capsule Take 1 capsule by mouth 3 times daily as needed for Anxiety Yes Parviz Ribeiro MD   ascorbic acid (V-R VITAMIN C) 250 MG tablet Take 1 tablet by mouth daily Take with the iron supplement Yes Ruchi Ibrahim MD   Cholecalciferol (VITAMIN D3) 58153 units CAPS Take one capsule every Mon-Wed-Fri for four weeks Yes Ruchi Ibrahim MD   Handicap Placard MISC by Does not apply route Duration: 5 years Yes Ynes Tomlinson,        Social History     Tobacco Use    Smoking status: Current Every Day Smoker     Packs/day: 0.50     Years: 15.00     Pack years: 7.50     Types: Cigarettes     Start date: 10/29/1991    Smokeless tobacco: Never Used   Substance Use Topics    Alcohol use: Yes     Alcohol/week: 6.0 standard drinks     Types: 6 Standard drinks or equivalent per week     Comment: social    Drug use: No        PHYSICAL EXAMINATION:    Constitutional: [x] Appears well-developed and well-nourished [] No apparent distress      [] Abnormal-   Mental status  [x] Alert and awake  [x] Oriented to person/place/time []Able to follow commands    Pulmonary/Chest: [x] Respiratory effort normal.  [x] No visualized signs of difficulty breathing or respiratory distress  Neurological:  Alert and oriented x 3 , moving all extremities               ASSESSMENT/PLAN: Ms Nora Coe is seen today for hospital discharge follow up and medication refill.     1. S/P Laparoscopic surgery for perforated ulce  HX of bariatric surgery  - doing well  - had a visit with surgery clinic post up  - continue Prilosec and Carafate     2. Dermatitis  - symptoms are improving with hydrocortisoen cream   - no flare up  - hydrocortisone 2.5 % cream; Apply topically 2 times daily. Dispense: 28 g; Refill: 0    3. Tobacco abuse  - patient is taking tobacco cessation class  - she is trying to cut down  - smokes >10 cigarettes  in day  - Already on Wellbutrin  - requested for nicotine patch  - 21 inc nicotine patch QD for 6 weeks    4. COPD  - Stable  - refilled for albuterol inhaler     RTC in 1 month for smoking counseling    Ry Hernández is a 53 y.o. female being evaluated by a Virtual Visit (video visit) encounter to address concerns as mentioned above. A caregiver was present when appropriate. Due to this being a TeleHealth encounter (During [facility]- public health emergency), evaluation of the following organ systems was limited: Vitals/Constitutional/EENT/Resp/CV/GI//MS/Neuro/Skin/Heme-Lymph-Imm. Pursuant to the emergency declaration under the [address] [place] [address], [address] [place] [address] authority and the [employer] and [employer] Act, this Virtual Visit was conducted with patient's (and/or legal guardian's) consent, to reduce the patient's risk of exposure to COVID-19 and provide necessary medical care. The patient (and/or legal guardian) has also been advised to contact this office for worsening conditions or problems, and seek emergency medical treatment and/or call 911 if deemed necessary. Patient identification was verified at the start of the visit: Yes    Total time spent on this encounter: 20 minutes    Services were provided through a video synchronous discussion virtually to substitute for in-person clinic visit. --Cheyenne Falk MD on 7/20/2020 at 9:37 AM    An electronic signature was used to authenticate this note.

## 2020-07-20 NOTE — PROGRESS NOTES
[facility] Primary Care Video Visit Precepting Note      This Telehealth visit was performed as two-way, audio-video technology platform. Verbal consent was taken from patient as noted in resident's chart. Subjective:  53 y.o. female COPD, hospital discharge f/u for lap 2/2 perforated ulcer. H/o bariatric surgery. Today no c/o. No n/v/f/c/blood in stool  FHx CAD, got Echo which was nl. Still having rash, wants refill of hydrocortisone. Smoker, on wellbutrin but wants nicotine patch    Objective:    General appearance:   NAD      Impression: hospital f/u perforated ulcer. Smoker. rash  Plan:   Carafate, omeprazole, CTM  Nicotine and wellbutrin  Hydrocortisone  rtc 1 mo for smoking     Attending Physician Statement  I have reviewed the chart, including available radiology or labs, with the resident. I have discussed the case with the resident, including pertinent history and exam findings. I agree with the assessment, plans, and orders as documented by the resident. Please refer to the resident note for additional information.     Electronically signed by Rey Tejada MD on 7/20/20 at 9:57 AM EDT

## 2020-07-28 ENCOUNTER — OFFICE VISIT (OUTPATIENT)
Dept: PHYSICAL MEDICINE AND REHAB | Age: 45
End: 2020-07-28
Payer: MEDICARE

## 2020-07-28 VITALS
SYSTOLIC BLOOD PRESSURE: 122 MMHG | BODY MASS INDEX: 27.16 KG/M2 | TEMPERATURE: 97.2 F | HEIGHT: 66 IN | DIASTOLIC BLOOD PRESSURE: 68 MMHG | WEIGHT: 169 LBS

## 2020-07-28 PROCEDURE — 20611 DRAIN/INJ JOINT/BURSA W/US: CPT | Performed by: PHYSICAL MEDICINE & REHABILITATION

## 2020-07-28 RX ORDER — LIDOCAINE HYDROCHLORIDE 10 MG/ML
7 INJECTION, SOLUTION INFILTRATION; PERINEURAL ONCE
Status: COMPLETED | OUTPATIENT
Start: 2020-07-28 | End: 2020-07-28

## 2020-07-28 RX ORDER — TRIAMCINOLONE ACETONIDE 40 MG/ML
40 INJECTION, SUSPENSION INTRA-ARTICULAR; INTRAMUSCULAR ONCE
Status: COMPLETED | OUTPATIENT
Start: 2020-07-28 | End: 2020-07-28

## 2020-07-28 RX ADMIN — LIDOCAINE HYDROCHLORIDE 7 ML: 10 INJECTION, SOLUTION INFILTRATION; PERINEURAL at 12:00

## 2020-07-28 RX ADMIN — TRIAMCINOLONE ACETONIDE 40 MG: 40 INJECTION, SUSPENSION INTRA-ARTICULAR; INTRAMUSCULAR at 12:01

## 2020-08-01 ENCOUNTER — HOSPITAL ENCOUNTER (EMERGENCY)
Age: 45
Discharge: HOME | End: 2020-08-01
Attending: EMERGENCY MEDICINE
Payer: MEDICARE

## 2020-08-01 ENCOUNTER — APPOINTMENT (OUTPATIENT)
Dept: CT IMAGING | Age: 45
End: 2020-08-01
Payer: MEDICARE

## 2020-08-01 VITALS
TEMPERATURE: 99.1 F | WEIGHT: 168 LBS | SYSTOLIC BLOOD PRESSURE: 115 MMHG | HEART RATE: 75 BPM | BODY MASS INDEX: 27 KG/M2 | HEIGHT: 66 IN | OXYGEN SATURATION: 100 % | RESPIRATION RATE: 14 BRPM | DIASTOLIC BLOOD PRESSURE: 60 MMHG

## 2020-08-01 LAB
ALBUMIN SERPL-MCNC: 3.9 G/DL (ref 3.5–5.2)
ALP BLD-CCNC: 83 U/L (ref 35–104)
ALT SERPL-CCNC: 9 U/L (ref 0–32)
ANION GAP SERPL CALCULATED.3IONS-SCNC: 9 MMOL/L (ref 7–16)
AST SERPL-CCNC: 36 U/L (ref 0–31)
BACTERIA: ABNORMAL /HPF
BASOPHILS ABSOLUTE: 0.06 E9/L (ref 0–0.2)
BASOPHILS RELATIVE PERCENT: 0.7 % (ref 0–2)
BILIRUB SERPL-MCNC: 0.4 MG/DL (ref 0–1.2)
BILIRUBIN URINE: NEGATIVE
BLOOD, URINE: NORMAL
BUN BLDV-MCNC: 10 MG/DL (ref 6–20)
CALCIUM SERPL-MCNC: 9 MG/DL (ref 8.6–10.2)
CHLORIDE BLD-SCNC: 101 MMOL/L (ref 98–107)
CLARITY: CLEAR
CO2: 26 MMOL/L (ref 22–29)
COLOR: YELLOW
CREAT SERPL-MCNC: 0.6 MG/DL (ref 0.5–1)
EKG ATRIAL RATE: 54 BPM
EKG P AXIS: 63 DEGREES
EKG P-R INTERVAL: 152 MS
EKG Q-T INTERVAL: 432 MS
EKG QRS DURATION: 80 MS
EKG QTC CALCULATION (BAZETT): 409 MS
EKG R AXIS: 55 DEGREES
EKG T AXIS: 67 DEGREES
EKG VENTRICULAR RATE: 54 BPM
EOSINOPHILS ABSOLUTE: 0.06 E9/L (ref 0.05–0.5)
EOSINOPHILS RELATIVE PERCENT: 0.7 % (ref 0–6)
EPITHELIAL CELLS, UA: ABNORMAL /HPF
GFR AFRICAN AMERICAN: >60
GFR NON-AFRICAN AMERICAN: >60 ML/MIN/1.73
GLUCOSE BLD-MCNC: 81 MG/DL (ref 74–99)
GLUCOSE URINE: NEGATIVE MG/DL
HCT VFR BLD CALC: 33 % (ref 34–48)
HEMOGLOBIN: 10.2 G/DL (ref 11.5–15.5)
IMMATURE GRANULOCYTES #: 0.01 E9/L
IMMATURE GRANULOCYTES %: 0.1 % (ref 0–5)
KETONES, URINE: NEGATIVE MG/DL
LACTIC ACID: 1.3 MMOL/L (ref 0.5–2.2)
LEUKOCYTE ESTERASE, URINE: NEGATIVE
LIPASE: 15 U/L (ref 13–60)
LYMPHOCYTES ABSOLUTE: 2.11 E9/L (ref 1.5–4)
LYMPHOCYTES RELATIVE PERCENT: 24.9 % (ref 20–42)
MCH RBC QN AUTO: 23.6 PG (ref 26–35)
MCHC RBC AUTO-ENTMCNC: 30.9 % (ref 32–34.5)
MCV RBC AUTO: 76.2 FL (ref 80–99.9)
MONOCYTES ABSOLUTE: 0.66 E9/L (ref 0.1–0.95)
MONOCYTES RELATIVE PERCENT: 7.8 % (ref 2–12)
NEUTROPHILS ABSOLUTE: 5.56 E9/L (ref 1.8–7.3)
NEUTROPHILS RELATIVE PERCENT: 65.8 % (ref 43–80)
NITRITE, URINE: NEGATIVE
PDW BLD-RTO: 19 FL (ref 11.5–15)
PH UA: 8 (ref 5–9)
PLATELET # BLD: 354 E9/L (ref 130–450)
PMV BLD AUTO: 8.4 FL (ref 7–12)
POTASSIUM SERPL-SCNC: 4 MMOL/L (ref 3.5–5)
POTASSIUM SERPL-SCNC: 5.5 MMOL/L (ref 3.5–5)
PROTEIN UA: NEGATIVE MG/DL
RBC # BLD: 4.33 E12/L (ref 3.5–5.5)
RBC UA: ABNORMAL /HPF (ref 0–2)
SODIUM BLD-SCNC: 136 MMOL/L (ref 132–146)
SPECIFIC GRAVITY UA: 1.01 (ref 1–1.03)
TOTAL PROTEIN: 7.2 G/DL (ref 6.4–8.3)
UROBILINOGEN, URINE: 0.2 E.U./DL
WBC # BLD: 8.5 E9/L (ref 4.5–11.5)
WBC UA: ABNORMAL /HPF (ref 0–5)

## 2020-08-01 PROCEDURE — 96361 HYDRATE IV INFUSION ADD-ON: CPT

## 2020-08-01 PROCEDURE — 85025 COMPLETE CBC W/AUTO DIFF WBC: CPT

## 2020-08-01 PROCEDURE — 2500000003 HC RX 250 WO HCPCS: Performed by: EMERGENCY MEDICINE

## 2020-08-01 PROCEDURE — 80053 COMPREHEN METABOLIC PANEL: CPT

## 2020-08-01 PROCEDURE — 6360000002 HC RX W HCPCS: Performed by: EMERGENCY MEDICINE

## 2020-08-01 PROCEDURE — 93010 ELECTROCARDIOGRAM REPORT: CPT | Performed by: INTERNAL MEDICINE

## 2020-08-01 PROCEDURE — 81001 URINALYSIS AUTO W/SCOPE: CPT

## 2020-08-01 PROCEDURE — 96375 TX/PRO/DX INJ NEW DRUG ADDON: CPT

## 2020-08-01 PROCEDURE — 93005 ELECTROCARDIOGRAM TRACING: CPT | Performed by: EMERGENCY MEDICINE

## 2020-08-01 PROCEDURE — 6370000000 HC RX 637 (ALT 250 FOR IP): Performed by: EMERGENCY MEDICINE

## 2020-08-01 PROCEDURE — 83690 ASSAY OF LIPASE: CPT

## 2020-08-01 PROCEDURE — 2580000003 HC RX 258: Performed by: EMERGENCY MEDICINE

## 2020-08-01 PROCEDURE — 99284 EMERGENCY DEPT VISIT MOD MDM: CPT

## 2020-08-01 PROCEDURE — 83605 ASSAY OF LACTIC ACID: CPT

## 2020-08-01 PROCEDURE — 96374 THER/PROPH/DIAG INJ IV PUSH: CPT

## 2020-08-01 PROCEDURE — 6360000004 HC RX CONTRAST MEDICATION: Performed by: RADIOLOGY

## 2020-08-01 PROCEDURE — 84132 ASSAY OF SERUM POTASSIUM: CPT

## 2020-08-01 PROCEDURE — 74178 CT ABD&PLV WO CNTR FLWD CNTR: CPT

## 2020-08-01 RX ORDER — 0.9 % SODIUM CHLORIDE 0.9 %
1000 INTRAVENOUS SOLUTION INTRAVENOUS ONCE
Status: COMPLETED | OUTPATIENT
Start: 2020-08-01 | End: 2020-08-01

## 2020-08-01 RX ORDER — FAMOTIDINE 20 MG/1
20 TABLET, FILM COATED ORAL 2 TIMES DAILY
Qty: 14 TABLET | Refills: 0 | Status: SHIPPED | OUTPATIENT
Start: 2020-08-01 | End: 2020-08-14

## 2020-08-01 RX ORDER — ONDANSETRON 2 MG/ML
4 INJECTION INTRAMUSCULAR; INTRAVENOUS ONCE
Status: COMPLETED | OUTPATIENT
Start: 2020-08-01 | End: 2020-08-01

## 2020-08-01 RX ORDER — CALCIUM GLUCONATE 94 MG/ML
1 INJECTION, SOLUTION INTRAVENOUS ONCE
Status: DISCONTINUED | OUTPATIENT
Start: 2020-08-01 | End: 2020-08-01 | Stop reason: HOSPADM

## 2020-08-01 RX ORDER — ONDANSETRON 4 MG/1
4 TABLET, ORALLY DISINTEGRATING ORAL EVERY 8 HOURS PRN
Qty: 24 TABLET | Refills: 0 | Status: SHIPPED | OUTPATIENT
Start: 2020-08-01 | End: 2020-10-07 | Stop reason: SDUPTHER

## 2020-08-01 RX ORDER — MORPHINE SULFATE 4 MG/ML
4 INJECTION, SOLUTION INTRAMUSCULAR; INTRAVENOUS ONCE
Status: COMPLETED | OUTPATIENT
Start: 2020-08-01 | End: 2020-08-01

## 2020-08-01 RX ORDER — HYDROCODONE BITARTRATE AND ACETAMINOPHEN 5; 325 MG/1; MG/1
1 TABLET ORAL EVERY 6 HOURS PRN
Qty: 12 TABLET | Refills: 0 | Status: SHIPPED | OUTPATIENT
Start: 2020-08-01 | End: 2020-08-04

## 2020-08-01 RX ORDER — MORPHINE SULFATE 4 MG/ML
4 INJECTION, SOLUTION INTRAMUSCULAR; INTRAVENOUS ONCE
Status: DISCONTINUED | OUTPATIENT
Start: 2020-08-01 | End: 2020-08-01 | Stop reason: HOSPADM

## 2020-08-01 RX ADMIN — SODIUM CHLORIDE 1000 ML: 9 INJECTION, SOLUTION INTRAVENOUS at 08:29

## 2020-08-01 RX ADMIN — MORPHINE SULFATE 4 MG: 4 INJECTION, SOLUTION INTRAMUSCULAR; INTRAVENOUS at 08:29

## 2020-08-01 RX ADMIN — FAMOTIDINE 20 MG: 10 INJECTION, SOLUTION INTRAVENOUS at 10:02

## 2020-08-01 RX ADMIN — IOPAMIDOL 110 ML: 755 INJECTION, SOLUTION INTRAVENOUS at 09:00

## 2020-08-01 RX ADMIN — ONDANSETRON 4 MG: 2 INJECTION INTRAMUSCULAR; INTRAVENOUS at 08:29

## 2020-08-01 RX ADMIN — LIDOCAINE HYDROCHLORIDE: 20 SOLUTION ORAL; TOPICAL at 10:01

## 2020-08-01 ASSESSMENT — PAIN SCALES - GENERAL
PAINLEVEL_OUTOF10: 9
PAINLEVEL_OUTOF10: 9

## 2020-08-01 ASSESSMENT — PAIN DESCRIPTION - ORIENTATION: ORIENTATION: LOWER

## 2020-08-01 ASSESSMENT — PAIN DESCRIPTION - FREQUENCY: FREQUENCY: INTERMITTENT

## 2020-08-01 ASSESSMENT — PAIN DESCRIPTION - LOCATION: LOCATION: ABDOMEN

## 2020-08-01 ASSESSMENT — PAIN DESCRIPTION - DESCRIPTORS: DESCRIPTORS: SHARP

## 2020-08-01 NOTE — ED PROVIDER NOTES
curettage of uterus (10/23/2019); Hysterectomy (01/22/2020); Upper gastrointestinal endoscopy (N/A, 2/7/2020); laparoscopy (N/A, 6/27/2020); and Upper gastrointestinal endoscopy (N/A, 6/27/2020). Social History:  reports that she has been smoking cigarettes. She started smoking about 28 years ago. She has a 7.50 pack-year smoking history. She has never used smokeless tobacco. She reports current alcohol use of about 6.0 standard drinks of alcohol per week. She reports that she does not use drugs. Family History: family history includes Cancer in her maternal grandfather and paternal grandmother; Depression in her mother; Diabetes in her father and mother; Heart Attack (age of onset: 61) in her mother; Stroke in her father and paternal grandfather; Substance Abuse in her brother. The patients home medications have been reviewed.     Allergies: Pcn [penicillins] and Food    -------------------------------------------------- RESULTS -------------------------------------------------  All laboratory and radiology results have been personally reviewed by myself   LABS:  Results for orders placed or performed during the hospital encounter of 08/01/20   CBC Auto Differential   Result Value Ref Range    WBC 8.5 4.5 - 11.5 E9/L    RBC 4.33 3.50 - 5.50 E12/L    Hemoglobin 10.2 (L) 11.5 - 15.5 g/dL    Hematocrit 33.0 (L) 34.0 - 48.0 %    MCV 76.2 (L) 80.0 - 99.9 fL    MCH 23.6 (L) 26.0 - 35.0 pg    MCHC 30.9 (L) 32.0 - 34.5 %    RDW 19.0 (H) 11.5 - 15.0 fL    Platelets 986 139 - 531 E9/L    MPV 8.4 7.0 - 12.0 fL    Neutrophils % 65.8 43.0 - 80.0 %    Immature Granulocytes % 0.1 0.0 - 5.0 %    Lymphocytes % 24.9 20.0 - 42.0 %    Monocytes % 7.8 2.0 - 12.0 %    Eosinophils % 0.7 0.0 - 6.0 %    Basophils % 0.7 0.0 - 2.0 %    Neutrophils Absolute 5.56 1.80 - 7.30 E9/L    Immature Granulocytes # 0.01 E9/L    Lymphocytes Absolute 2.11 1.50 - 4.00 E9/L    Monocytes Absolute 0.66 0.10 - 0.95 E9/L    Eosinophils Absolute 0.06 0.05 - 0.50 E9/L    Basophils Absolute 0.06 0.00 - 0.20 E9/L   Comprehensive Metabolic Panel   Result Value Ref Range    Sodium 136 132 - 146 mmol/L    Potassium 5.5 (H) 3.5 - 5.0 mmol/L    Chloride 101 98 - 107 mmol/L    CO2 26 22 - 29 mmol/L    Anion Gap 9 7 - 16 mmol/L    Glucose 81 74 - 99 mg/dL    BUN 10 6 - 20 mg/dL    CREATININE 0.6 0.5 - 1.0 mg/dL    GFR Non-African American >60 >=60 mL/min/1.73    GFR African American >60     Calcium 9.0 8.6 - 10.2 mg/dL    Total Protein 7.2 6.4 - 8.3 g/dL    Alb 3.9 3.5 - 5.2 g/dL    Total Bilirubin 0.4 0.0 - 1.2 mg/dL    Alkaline Phosphatase 83 35 - 104 U/L    ALT 9 0 - 32 U/L    AST 36 (H) 0 - 31 U/L   Lipase   Result Value Ref Range    Lipase 15 13 - 60 U/L   Lactic Acid, Plasma   Result Value Ref Range    Lactic Acid 1.3 0.5 - 2.2 mmol/L   Urinalysis   Result Value Ref Range    Color, UA Yellow Straw/Yellow    Clarity, UA Clear Clear    Glucose, Ur Negative Negative mg/dL    Bilirubin Urine Negative Negative    Ketones, Urine Negative Negative mg/dL    Specific Gravity, UA 1.015 1.005 - 1.030    Blood, Urine TRACE-LYSED Negative    pH, UA 8.0 5.0 - 9.0    Protein, UA Negative Negative mg/dL    Urobilinogen, Urine 0.2 <2.0 E.U./dL    Nitrite, Urine Negative Negative    Leukocyte Esterase, Urine Negative Negative   Microscopic Urinalysis   Result Value Ref Range    WBC, UA 0-1 0 - 5 /HPF    RBC, UA 0-1 0 - 2 /HPF    Epithelial Cells, UA RARE /HPF    Bacteria, UA RARE (A) None Seen /HPF   Potassium   Result Value Ref Range    Potassium 4.0 3.5 - 5.0 mmol/L       RADIOLOGY:  Interpreted by Radiologist.  CT ABDOMEN PELVIS W WO CONTRAST Additional Contrast? None   Final Result   1. No acute findings within the abdomen or pelvis. 2.  Dilatation of the intrahepatic bile ducts which is probably due to   reservoir fact from cholecystectomy. .   3.  Mildly hepatomegaly measuring up to 23 cm in craniocaudal   dimension. 4.  Tiny nonobstructing bilateral renal stones.    5.

## 2020-08-07 ENCOUNTER — TELEPHONE (OUTPATIENT)
Dept: PHYSICAL MEDICINE AND REHAB | Age: 45
End: 2020-08-07

## 2020-08-07 NOTE — TELEPHONE ENCOUNTER
Called patient to follow up on left knee injection done in the office on 07/28/20. Patient states that the left knee is feeling much better. She rates the pain a 5/10.

## 2020-08-10 ENCOUNTER — TELEPHONE (OUTPATIENT)
Dept: BARIATRICS/WEIGHT MGMT | Age: 45
End: 2020-08-10

## 2020-08-10 RX ORDER — OMEPRAZOLE 20 MG/1
20 CAPSULE, DELAYED RELEASE ORAL DAILY
Qty: 30 CAPSULE | Refills: 2 | Status: ON HOLD
Start: 2020-08-10 | End: 2020-10-22 | Stop reason: SDUPTHER

## 2020-08-12 ENCOUNTER — TELEPHONE (OUTPATIENT)
Dept: PHYSICAL MEDICINE AND REHAB | Age: 45
End: 2020-08-12

## 2020-08-12 ENCOUNTER — TELEMEDICINE (OUTPATIENT)
Dept: BARIATRICS/WEIGHT MGMT | Age: 45
End: 2020-08-12
Payer: MEDICARE

## 2020-08-12 PROCEDURE — 90791 PSYCH DIAGNOSTIC EVALUATION: CPT | Performed by: SOCIAL WORKER

## 2020-08-12 NOTE — PATIENT INSTRUCTIONS
Assignments/Recommendations: 3-4 follow-up sessions with SW for further education/evaluation. Complete entries in \"Why We Eat\" and \"Identifying and Handling Cravings\" to discuss next session. Attend [facility] support group. Follow-up with: present provider, therapist Little Baumgarten all scheduled appointments at [facility].

## 2020-08-12 NOTE — TELEPHONE ENCOUNTER
Called and left message on patient voicemail that we need to reschedule her appointment for 8-. Will wait for return call from patient.
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
## 2020-08-14 ENCOUNTER — APPOINTMENT (OUTPATIENT)
Dept: CT IMAGING | Age: 45
End: 2020-08-14
Payer: MEDICARE

## 2020-08-14 ENCOUNTER — HOSPITAL ENCOUNTER (EMERGENCY)
Age: 45
Discharge: HOME | End: 2020-08-14
Attending: EMERGENCY MEDICINE
Payer: MEDICARE

## 2020-08-14 VITALS
RESPIRATION RATE: 18 BRPM | HEART RATE: 93 BPM | WEIGHT: 166 LBS | SYSTOLIC BLOOD PRESSURE: 110 MMHG | DIASTOLIC BLOOD PRESSURE: 61 MMHG | BODY MASS INDEX: 26.68 KG/M2 | HEIGHT: 66 IN | TEMPERATURE: 98.7 F | OXYGEN SATURATION: 98 %

## 2020-08-14 LAB
ALBUMIN SERPL-MCNC: 3.7 G/DL (ref 3.5–5.2)
ALP BLD-CCNC: 97 U/L (ref 35–104)
ALT SERPL-CCNC: 9 U/L (ref 0–32)
ANION GAP SERPL CALCULATED.3IONS-SCNC: 6 MMOL/L (ref 7–16)
AST SERPL-CCNC: 13 U/L (ref 0–31)
BACTERIA: NORMAL /HPF
BASOPHILS ABSOLUTE: 0.07 E9/L (ref 0–0.2)
BASOPHILS RELATIVE PERCENT: 0.6 % (ref 0–2)
BILIRUB SERPL-MCNC: 0.3 MG/DL (ref 0–1.2)
BILIRUBIN URINE: NEGATIVE
BLOOD, URINE: NEGATIVE
BUN BLDV-MCNC: 12 MG/DL (ref 6–20)
CALCIUM SERPL-MCNC: 8.8 MG/DL (ref 8.6–10.2)
CHLORIDE BLD-SCNC: 104 MMOL/L (ref 98–107)
CLARITY: CLEAR
CO2: 28 MMOL/L (ref 22–29)
COLOR: YELLOW
CREAT SERPL-MCNC: 0.7 MG/DL (ref 0.5–1)
EOSINOPHILS ABSOLUTE: 0.1 E9/L (ref 0.05–0.5)
EOSINOPHILS RELATIVE PERCENT: 0.8 % (ref 0–6)
GFR AFRICAN AMERICAN: >60
GFR NON-AFRICAN AMERICAN: >60 ML/MIN/1.73
GLUCOSE BLD-MCNC: 72 MG/DL (ref 74–99)
GLUCOSE URINE: NEGATIVE MG/DL
HCG, URINE, POC: NEGATIVE
HCT VFR BLD CALC: 31.7 % (ref 34–48)
HEMOGLOBIN: 9.7 G/DL (ref 11.5–15.5)
IMMATURE GRANULOCYTES #: 0.03 E9/L
IMMATURE GRANULOCYTES %: 0.3 % (ref 0–5)
KETONES, URINE: NEGATIVE MG/DL
LACTIC ACID, SEPSIS: 1.1 MMOL/L (ref 0.5–1.9)
LEUKOCYTE ESTERASE, URINE: NEGATIVE
LIPASE: 12 U/L (ref 13–60)
LYMPHOCYTES ABSOLUTE: 2.58 E9/L (ref 1.5–4)
LYMPHOCYTES RELATIVE PERCENT: 21.8 % (ref 20–42)
Lab: NORMAL
MCH RBC QN AUTO: 23.8 PG (ref 26–35)
MCHC RBC AUTO-ENTMCNC: 30.6 % (ref 32–34.5)
MCV RBC AUTO: 77.7 FL (ref 80–99.9)
MONOCYTES ABSOLUTE: 0.72 E9/L (ref 0.1–0.95)
MONOCYTES RELATIVE PERCENT: 6.1 % (ref 2–12)
NEGATIVE QC PASS/FAIL: NORMAL
NEUTROPHILS ABSOLUTE: 8.32 E9/L (ref 1.8–7.3)
NEUTROPHILS RELATIVE PERCENT: 70.4 % (ref 43–80)
NITRITE, URINE: NEGATIVE
PDW BLD-RTO: 19.9 FL (ref 11.5–15)
PH UA: 7.5 (ref 5–9)
PLATELET # BLD: 571 E9/L (ref 130–450)
PMV BLD AUTO: 8.7 FL (ref 7–12)
POSITIVE QC PASS/FAIL: NORMAL
POTASSIUM REFLEX MAGNESIUM: 4.1 MMOL/L (ref 3.5–5)
PROTEIN UA: NEGATIVE MG/DL
RBC # BLD: 4.08 E12/L (ref 3.5–5.5)
RBC UA: NORMAL /HPF (ref 0–2)
SODIUM BLD-SCNC: 138 MMOL/L (ref 132–146)
SPECIFIC GRAVITY UA: 1.01 (ref 1–1.03)
TOTAL PROTEIN: 6.7 G/DL (ref 6.4–8.3)
UROBILINOGEN, URINE: 0.2 E.U./DL
WBC # BLD: 11.8 E9/L (ref 4.5–11.5)
WBC UA: NORMAL /HPF (ref 0–5)

## 2020-08-14 PROCEDURE — 81001 URINALYSIS AUTO W/SCOPE: CPT

## 2020-08-14 PROCEDURE — 6370000000 HC RX 637 (ALT 250 FOR IP): Performed by: STUDENT IN AN ORGANIZED HEALTH CARE EDUCATION/TRAINING PROGRAM

## 2020-08-14 PROCEDURE — 74177 CT ABD & PELVIS W/CONTRAST: CPT

## 2020-08-14 PROCEDURE — 6360000002 HC RX W HCPCS: Performed by: STUDENT IN AN ORGANIZED HEALTH CARE EDUCATION/TRAINING PROGRAM

## 2020-08-14 PROCEDURE — U0003 INFECTIOUS AGENT DETECTION BY NUCLEIC ACID (DNA OR RNA); SEVERE ACUTE RESPIRATORY SYNDROME CORONAVIRUS 2 (SARS-COV-2) (CORONAVIRUS DISEASE [COVID-19]), AMPLIFIED PROBE TECHNIQUE, MAKING USE OF HIGH THROUGHPUT TECHNOLOGIES AS DESCRIBED BY CMS-2020-01-R: HCPCS

## 2020-08-14 PROCEDURE — 99284 EMERGENCY DEPT VISIT MOD MDM: CPT

## 2020-08-14 PROCEDURE — 96374 THER/PROPH/DIAG INJ IV PUSH: CPT

## 2020-08-14 PROCEDURE — 83605 ASSAY OF LACTIC ACID: CPT

## 2020-08-14 PROCEDURE — 85025 COMPLETE CBC W/AUTO DIFF WBC: CPT

## 2020-08-14 PROCEDURE — 80053 COMPREHEN METABOLIC PANEL: CPT

## 2020-08-14 PROCEDURE — 6360000004 HC RX CONTRAST MEDICATION: Performed by: RADIOLOGY

## 2020-08-14 PROCEDURE — 83690 ASSAY OF LIPASE: CPT

## 2020-08-14 RX ORDER — DICYCLOMINE HYDROCHLORIDE 10 MG/1
20 CAPSULE ORAL 4 TIMES DAILY
Qty: 80 CAPSULE | Refills: 0 | Status: SHIPPED | OUTPATIENT
Start: 2020-08-14 | End: 2021-08-03 | Stop reason: SDUPTHER

## 2020-08-14 RX ORDER — FENTANYL CITRATE 50 UG/ML
50 INJECTION, SOLUTION INTRAMUSCULAR; INTRAVENOUS ONCE
Status: COMPLETED | OUTPATIENT
Start: 2020-08-14 | End: 2020-08-14

## 2020-08-14 RX ORDER — OXYCODONE HYDROCHLORIDE AND ACETAMINOPHEN 5; 325 MG/1; MG/1
1 TABLET ORAL ONCE
Status: COMPLETED | OUTPATIENT
Start: 2020-08-14 | End: 2020-08-14

## 2020-08-14 RX ADMIN — FENTANYL CITRATE 50 MCG: 50 INJECTION, SOLUTION INTRAMUSCULAR; INTRAVENOUS at 13:22

## 2020-08-14 RX ADMIN — OXYCODONE HYDROCHLORIDE AND ACETAMINOPHEN 1 TABLET: 5; 325 TABLET ORAL at 15:28

## 2020-08-14 RX ADMIN — IOPAMIDOL 110 ML: 755 INJECTION, SOLUTION INTRAVENOUS at 14:19

## 2020-08-14 ASSESSMENT — PAIN SCALES - GENERAL
PAINLEVEL_OUTOF10: 9
PAINLEVEL_OUTOF10: 7
PAINLEVEL_OUTOF10: 7
PAINLEVEL_OUTOF10: 9

## 2020-08-14 ASSESSMENT — PAIN DESCRIPTION - FREQUENCY: FREQUENCY: CONTINUOUS

## 2020-08-14 ASSESSMENT — PAIN DESCRIPTION - ONSET: ONSET: PROGRESSIVE

## 2020-08-14 ASSESSMENT — PAIN DESCRIPTION - LOCATION
LOCATION: PELVIS;ABDOMEN
LOCATION: ABDOMEN

## 2020-08-14 ASSESSMENT — PAIN DESCRIPTION - PAIN TYPE
TYPE: ACUTE PAIN
TYPE: ACUTE PAIN

## 2020-08-14 ASSESSMENT — PAIN DESCRIPTION - ORIENTATION: ORIENTATION: LOWER;MID

## 2020-08-14 ASSESSMENT — PAIN DESCRIPTION - DESCRIPTORS: DESCRIPTORS: BURNING;CONSTANT

## 2020-08-16 ASSESSMENT — ENCOUNTER SYMPTOMS
DIARRHEA: 0
COLOR CHANGE: 0
WHEEZING: 0
CONSTIPATION: 0
BACK PAIN: 0
COUGH: 0
ABDOMINAL PAIN: 1
VOMITING: 0
NAUSEA: 0
CHEST TIGHTNESS: 0
SHORTNESS OF BREATH: 0

## 2020-08-16 NOTE — ED PROVIDER NOTES
Patient is a 68-year-old female with past medical history significant for gastric bypass surgery with recurrent perforated marginal ulcers, GERD, hysterectomy who presents the ED today with chief complaint of abdominal pain. She localizes the pain to the lower portion of her abdomen, states that it starts in the right lower quadrant and radiates over to the left. Patient states that she has had kidney stones in the past and she called her urologist this morning thinking that it might be a kidney stone but was told by the urologist that the stones were much too small to cause any pain and she is come to the ED for further evaluation. She describes the pain as a burning sensation. She endorses having regular bowel movements and passing flatus. She denies any headache, dizziness, fever, chest pain, cough, shortness of breath, nausea, vomiting, diarrhea, constipation, urinary symptoms. The history is provided by the patient. Review of Systems   Constitutional: Negative for chills, fatigue and fever. HENT: Negative for ear pain. Eyes: Negative for visual disturbance. Respiratory: Negative for cough, chest tightness, shortness of breath and wheezing. Cardiovascular: Negative for chest pain and palpitations. Gastrointestinal: Positive for abdominal pain. Negative for constipation, diarrhea, nausea and vomiting. Genitourinary: Negative for dysuria and frequency. Musculoskeletal: Negative for back pain. Skin: Negative for color change and wound. Neurological: Negative for headaches. Psychiatric/Behavioral: Negative for confusion. Physical Exam  Constitutional:       General: She is awake. Appearance: Normal appearance. She is well-developed and overweight. HENT:      Head: Normocephalic and atraumatic. Right Ear: Hearing and external ear normal.      Left Ear: Hearing and external ear normal.      Nose: Nose normal.      Mouth/Throat:      Lips: Pink.       Mouth: Mucous membranes are moist.      Pharynx: Oropharynx is clear. Uvula midline. Eyes:      General: Lids are normal.      Extraocular Movements: Extraocular movements intact. Conjunctiva/sclera: Conjunctivae normal.   Neck:      Musculoskeletal: Normal range of motion and neck supple. Trachea: Phonation normal.   Cardiovascular:      Rate and Rhythm: Normal rate and regular rhythm. Heart sounds: Normal heart sounds. Pulmonary:      Effort: Pulmonary effort is normal.      Breath sounds: Normal breath sounds and air entry. Abdominal:      General: There is no distension. Palpations: Abdomen is soft. Tenderness: There is abdominal tenderness in the right lower quadrant, suprapubic area and left lower quadrant. There is no guarding or rebound. Skin:     General: Skin is warm and dry. Neurological:      General: No focal deficit present. Mental Status: She is alert and oriented to person, place, and time. GCS: GCS eye subscore is 4. GCS verbal subscore is 5. GCS motor subscore is 6. Psychiatric:         Attention and Perception: Attention and perception normal.         Mood and Affect: Mood and affect normal.         Speech: Speech normal.         Behavior: Behavior normal. Behavior is cooperative. Thought Content: Thought content normal.         Cognition and Memory: Cognition and memory normal.         Judgment: Judgment normal.          Procedures     MDM  Number of Diagnoses or Management Options  Abdominal pain, unspecified abdominal location:   Lower abdominal pain:   Diagnosis management comments: Patient's labs all within normal limits, abdominal CT does demonstrate modest pelvic free fluid with stable biliary dilatation likely secondary to postcholecystectomy state. Patient was given some analgesia which did somewhat help her symptoms. I spoke to patient informed of findings.   Informed him that the only abnormality noted on her work-up was that she did have some small free fluid in her pelvis. Informed this is possibly secondary to ongoing ovulatory cycles as her ovaries are still in place despite having hysterectomy. Also informed her that it is possible she has a ovarian cyst on the right which is causing her pain. Informed her that she should follow with her OB and PCP if symptoms do not resolve. She verbalized understanding of plan and is agreeable to discharge at this time. All questions have been answered       ED Course as of Aug 16 1140   Fri Aug 14, 2020   150 ATTENDING PROVIDER ATTESTATION:     I have personally performed and/or participated in the history, exam, medical decision making, and procedures and agree with all pertinent clinical information unless otherwise noted. I have also reviewed and agree with the past medical, family and social history unless otherwise noted. I have discussed this patient in detail with the resident and provided the instruction and education regarding the evidence-based evaluation and treatment of abdominal pain  History: Patient with bilateral quadrant lower, abdominal pain. Recent history of kidney stones as well. No fever or chills. My findings: Liyah Osborne is a 50 y.o. female whom is in no distress. Physical exam reveals she is alert and oriented. Heart is regular, lungs are clear and equal bilaterally. Abdomen is soft with some vague tenderness bilateral lower abdomen. No rebound or guarding. No Rovsing sign. Extremities with good distal pulses and capillary refill. My plan: Symptomatic and supportive care.  CT, labs    Electronically signed by Ady Osorio DO on 8/14/20 at 3:03 PM EDT          [TG]      ED Course User Index  [TG] Ady Osorio DO          ED Course as of Aug 16 1145   Fri Aug 14, 2020   1505 ATTENDING PROVIDER ATTESTATION:     I have personally performed and/or participated in the history, exam, medical decision making, and procedures and agree with all pertinent clinical information unless otherwise noted. I have also reviewed and agree with the past medical, family and social history unless otherwise noted. I have discussed this patient in detail with the resident and provided the instruction and education regarding the evidence-based evaluation and treatment of abdominal pain  History: Patient with bilateral quadrant lower, abdominal pain. Recent history of kidney stones as well. No fever or chills. My findings: Liyah Osborne is a 50 y.o. female whom is in no distress. Physical exam reveals she is alert and oriented. Heart is regular, lungs are clear and equal bilaterally. Abdomen is soft with some vague tenderness bilateral lower abdomen. No rebound or guarding. No Rovsing sign. Extremities with good distal pulses and capillary refill. My plan: Symptomatic and supportive care. CT, labs    Electronically signed by Ady Osorio DO on 8/14/20 at 3:03 PM EDT          [TG]      ED Course User Index  [TG] Ady Osorio DO       --------------------------------------------- PAST HISTORY ---------------------------------------------  Past Medical History:  has a past medical history of Anemia, Arthritis, Asthma, Depression, GERD without esophagitis, History of blood transfusion, History of gastric bypass, Hydronephrosis with urinary obstruction due to ureteral calculus, Hypertension, Iron deficiency, Localized osteoarthritis of left knee, Lumbar spondylosis, Lymphedema, Obesity, Panic attacks, Perforated marginal ulcer, PONV (postoperative nausea and vomiting), Prediabetes, Sleep apnea, obstructive, Vitamin D deficiency, and Zinc deficiency. Past Surgical History:  has a past surgical history that includes Appendectomy (1996); Viviane-en-Y Gastric Bypass (11/14/2017); pr office/outpt visit,procedure only (N/A, 10/29/2018); Stomach surgery; Lithotripsy (Right, 7/24/2019); Cholecystectomy, laparoscopic (N/A, 7/27/2019);  Upper gastrointestinal endoscopy (N/A, 7/27/2019); Lithotripsy; Dilation and curettage of uterus (10/23/2019); Hysterectomy (01/22/2020); Upper gastrointestinal endoscopy (N/A, 2/7/2020); laparoscopy (N/A, 6/27/2020); and Upper gastrointestinal endoscopy (N/A, 6/27/2020). Social History:  reports that she has been smoking cigarettes. She started smoking about 28 years ago. She has a 7.50 pack-year smoking history. She has never used smokeless tobacco. She reports current alcohol use of about 6.0 standard drinks of alcohol per week. She reports that she does not use drugs. Family History: family history includes Cancer in her maternal grandfather and paternal grandmother; Depression in her mother; Diabetes in her father and mother; Heart Attack (age of onset: 61) in her mother; Stroke in her father and paternal grandfather; Substance Abuse in her brother. The patients home medications have been reviewed.     Allergies: Pcn [penicillins] and Food    -------------------------------------------------- RESULTS -------------------------------------------------  Labs:  Results for orders placed or performed during the hospital encounter of 08/14/20   CBC auto differential   Result Value Ref Range    WBC 11.8 (H) 4.5 - 11.5 E9/L    RBC 4.08 3.50 - 5.50 E12/L    Hemoglobin 9.7 (L) 11.5 - 15.5 g/dL    Hematocrit 31.7 (L) 34.0 - 48.0 %    MCV 77.7 (L) 80.0 - 99.9 fL    MCH 23.8 (L) 26.0 - 35.0 pg    MCHC 30.6 (L) 32.0 - 34.5 %    RDW 19.9 (H) 11.5 - 15.0 fL    Platelets 552 (H) 136 - 450 E9/L    MPV 8.7 7.0 - 12.0 fL    Neutrophils % 70.4 43.0 - 80.0 %    Immature Granulocytes % 0.3 0.0 - 5.0 %    Lymphocytes % 21.8 20.0 - 42.0 %    Monocytes % 6.1 2.0 - 12.0 %    Eosinophils % 0.8 0.0 - 6.0 %    Basophils % 0.6 0.0 - 2.0 %    Neutrophils Absolute 8.32 (H) 1.80 - 7.30 E9/L    Immature Granulocytes # 0.03 E9/L    Lymphocytes Absolute 2.58 1.50 - 4.00 E9/L    Monocytes Absolute 0.72 0.10 - 0.95 E9/L    Eosinophils Absolute 0.10 0.05 - 0.50 E9/L Basophils Absolute 0.07 0.00 - 0.20 E9/L   Comprehensive Metabolic Panel w/ Reflex to MG   Result Value Ref Range    Sodium 138 132 - 146 mmol/L    Potassium reflex Magnesium 4.1 3.5 - 5.0 mmol/L    Chloride 104 98 - 107 mmol/L    CO2 28 22 - 29 mmol/L    Anion Gap 6 (L) 7 - 16 mmol/L    Glucose 72 (L) 74 - 99 mg/dL    BUN 12 6 - 20 mg/dL    CREATININE 0.7 0.5 - 1.0 mg/dL    GFR Non-African American >60 >=60 mL/min/1.73    GFR African American >60     Calcium 8.8 8.6 - 10.2 mg/dL    Total Protein 6.7 6.4 - 8.3 g/dL    Alb 3.7 3.5 - 5.2 g/dL    Total Bilirubin 0.3 0.0 - 1.2 mg/dL    Alkaline Phosphatase 97 35 - 104 U/L    ALT 9 0 - 32 U/L    AST 13 0 - 31 U/L   Lipase   Result Value Ref Range    Lipase 12 (L) 13 - 60 U/L   Lactate, Sepsis   Result Value Ref Range    Lactic Acid, Sepsis 1.1 0.5 - 1.9 mmol/L   Urinalysis with Microscopic   Result Value Ref Range    Color, UA Yellow Straw/Yellow    Clarity, UA Clear Clear    Glucose, Ur Negative Negative mg/dL    Bilirubin Urine Negative Negative    Ketones, Urine Negative Negative mg/dL    Specific Gravity, UA 1.015 1.005 - 1.030    Blood, Urine Negative Negative    pH, UA 7.5 5.0 - 9.0    Protein, UA Negative Negative mg/dL    Urobilinogen, Urine 0.2 <2.0 E.U./dL    Nitrite, Urine Negative Negative    Leukocyte Esterase, Urine Negative Negative    WBC, UA NONE 0 - 5 /HPF    RBC, UA NONE 0 - 2 /HPF    Bacteria, UA NONE SEEN None Seen /HPF   Covid-19 Ambulatory   Result Value Ref Range    Source NP swab    POC Pregnancy Urine Qual   Result Value Ref Range    HCG, Urine, POC Negative Negative    Lot Number [id number]     Positive QC Pass/Fail Pass     Negative QC Pass/Fail Pass        Radiology:  CT ABDOMEN PELVIS W IV CONTRAST Additional Contrast? None   Final Result      1. Modest pelvic free fluid which is nonspecific and could simply   relate to ongoing ovulatory cycles. It is not entirely clear if this   patient's ovaries remain after hysterectomy.    2. Stable abdominal pain        Disposition:  Patient's disposition: Discharge to home  Patient's condition is stable.        Brian Hickey DO  Resident  08/16/20 1148

## 2020-09-04 ENCOUNTER — OFFICE VISIT (OUTPATIENT)
Dept: PHYSICAL MEDICINE AND REHAB | Age: 45
End: 2020-09-04
Payer: MEDICARE

## 2020-09-04 VITALS
WEIGHT: 168 LBS | SYSTOLIC BLOOD PRESSURE: 108 MMHG | HEART RATE: 63 BPM | BODY MASS INDEX: 27 KG/M2 | HEIGHT: 66 IN | TEMPERATURE: 97 F | DIASTOLIC BLOOD PRESSURE: 68 MMHG

## 2020-09-04 PROCEDURE — 20611 DRAIN/INJ JOINT/BURSA W/US: CPT | Performed by: PHYSICAL MEDICINE & REHABILITATION

## 2020-09-04 RX ORDER — HYALURONATE SODIUM 10 MG/ML
40 SYRINGE (ML) INTRAARTICULAR WEEKLY
Status: COMPLETED | OUTPATIENT
Start: 2020-09-04 | End: 2020-09-18

## 2020-09-04 RX ADMIN — Medication 40 MG: at 09:34

## 2020-09-04 NOTE — PROGRESS NOTES
Steven Lopez D.O. [facility]  [address] [facility] [place]  Phone: [phone]  Fax: [phone]    9/4/2020    Chief Complaint   Patient presents with    Knee Pain     injection Aditya Church #1 bilateral       Last injection: 2/24/20  Taking anticoagulants/antiplatelets: No  Diabetic: No  Febrile/active infection: No    After explaining the indications, risks, benefits and alternatives of a bilateral knee joint injection, the patient agreed to proceed. A permit was scanned into the media. The patient was placed in the seated position. The skin on the lateral knee was prepared with betadine and alcohol. Ethyl Chloride vapocoolant spray was used for local anesthesia. Using an aseptic, no touch technique, a 20 gauge, 1.5\" needle a 10 cc syringe was directed to the knee joint using ultrasound guidance. After negative aspiration,  The syringe was changed with the needle left in place to a  2 cc syringe of Euflexxa  20mg/2cc and  the medication was injected. Adequate hemostasis was achieved and a bandage as applied. The procedure was repeated on the other side. The patient was monitored post injection clinically and left the office without incident. The procedure was repeated on the contralateral side. The patient tolerated the procedure well and was educated in post injection care. There was post injection reduction in pain. Ultrasound images are scanned to the media. Steven Lopez D.O., P.T.   Board Certified Physical Medicine and Rehabilitation  Board Certified Electrodiagnostic Medicine    Administrations This Visit     sodium hyaluronate (viscosup) injection 40 mg     Admin Date  09/04/2020  09:34 Action  Given Dose  40 mg Route  Intra-articular Site   Administered By  Gerardo Mendieta MA    Ordering Provider:  Lala Harrison DO    NDC:  [address]-7    Lot#:  [id number]    :  Corrinne Ruts    Patient Supplied?:  No    Comments:  EXP:  03/2021

## 2020-09-11 ENCOUNTER — OFFICE VISIT (OUTPATIENT)
Dept: PHYSICAL MEDICINE AND REHAB | Age: 45
End: 2020-09-11
Payer: MEDICARE

## 2020-09-11 VITALS
SYSTOLIC BLOOD PRESSURE: 114 MMHG | BODY MASS INDEX: 27.16 KG/M2 | HEIGHT: 66 IN | TEMPERATURE: 97.2 F | WEIGHT: 169 LBS | DIASTOLIC BLOOD PRESSURE: 70 MMHG

## 2020-09-11 PROCEDURE — 20611 DRAIN/INJ JOINT/BURSA W/US: CPT | Performed by: PHYSICAL MEDICINE & REHABILITATION

## 2020-09-11 RX ADMIN — Medication 40 MG: at 09:00

## 2020-09-11 NOTE — PROGRESS NOTES
Chase Hawthorne D.O. [facility]  [address] [facility] [place]  Phone: [phone]  Fax: [phone]    9/11/2020    Chief Complaint   Patient presents with    Injections     Bilateral Knee Euflexxa Injections #2       Last injection: 2/24/20  Taking anticoagulants/antiplatelets: No  Diabetic: No  Febrile/active infection: No    After explaining the indications, risks, benefits and alternatives of a bilateral knee joint injection, the patient agreed to proceed. A permit was scanned into the media. The patient was placed in the seated position. The skin on the lateral knee was prepared with betadine and alcohol. Ethyl Chloride vapocoolant spray was used for local anesthesia. Using an aseptic, no touch technique, a 20 gauge, 1.5\" needle a 10 cc syringe was directed to the knee joint using ultrasound guidance. After negative aspiration,  The syringe was changed with the needle left in place to a  2 cc syringe of Euflexxa  20mg/2cc and  the medication was injected. Adequate hemostasis was achieved and a bandage as applied. The procedure was repeated on the other side. The patient was monitored post injection clinically and left the office without incident. The procedure was repeated on the contralateral side. The patient tolerated the procedure well and was educated in post injection care. There was post injection reduction in pain. Ultrasound images are scanned to the media. Chase Hawthorne D.O., P.T.   Board Certified Physical Medicine and Rehabilitation  Board Certified Electrodiagnostic Medicine

## 2020-09-18 ENCOUNTER — OFFICE VISIT (OUTPATIENT)
Dept: PHYSICAL MEDICINE AND REHAB | Age: 45
End: 2020-09-18
Payer: MEDICARE

## 2020-09-18 VITALS
DIASTOLIC BLOOD PRESSURE: 69 MMHG | HEART RATE: 65 BPM | TEMPERATURE: 97 F | SYSTOLIC BLOOD PRESSURE: 102 MMHG | HEIGHT: 66 IN | WEIGHT: 169 LBS | BODY MASS INDEX: 27.16 KG/M2

## 2020-09-18 PROCEDURE — 20611 DRAIN/INJ JOINT/BURSA W/US: CPT | Performed by: PHYSICAL MEDICINE & REHABILITATION

## 2020-09-18 RX ADMIN — Medication 40 MG: at 09:38

## 2020-09-23 ENCOUNTER — TELEMEDICINE (OUTPATIENT)
Dept: BARIATRICS/WEIGHT MGMT | Age: 45
End: 2020-09-23
Payer: MEDICARE

## 2020-09-23 NOTE — PROGRESS NOTES
intent    TREATMENT PLAN:  Goal: Increase understanding of role of emotional factors contributing to issues with food and post op weight gain and strategies to cope. Interventions in session:  Explored emotional, behavioral, cognitive and environmental factors contributing to continued use of tobacco products. Discussed readiness for change, motivations and challenges to smoking cessation. Provided psycho education regarding stages of change and the role the amygdala plays in creating anxiety. Assignments/Recommendations: Continue follow-up with SW.  Research \"retraining the brain\" and strategies to manage anxiety. Follow up with: present providers, therapist  Deanna Velásquez re: anxiety and all scheduled appointment at [facility].       Next steps: Schedule follow up with me for  60MIN in 4 weeks        Patient and/or family/guardian verbalizes understanding of and agreement with treatment recommendations and plan: yes    Start time: 11:05 am         End time: 12:02 pm    Visit Time: [address] [facility], [facility]

## 2020-10-07 RX ORDER — ONDANSETRON 4 MG/1
4 TABLET, ORALLY DISINTEGRATING ORAL EVERY 8 HOURS PRN
Qty: 24 TABLET | Refills: 0 | Status: SHIPPED
Start: 2020-10-07 | End: 2020-11-04 | Stop reason: ALTCHOICE

## 2020-10-19 RX ORDER — ALBUTEROL SULFATE 90 UG/1
AEROSOL, METERED RESPIRATORY (INHALATION)
Qty: 8.5 G | Refills: 1 | Status: SHIPPED
Start: 2020-10-19 | End: 2020-12-07

## 2020-10-21 ENCOUNTER — HOSPITAL ENCOUNTER (OUTPATIENT)
Age: 45
Setting detail: OBSERVATION
Discharge: HOME | End: 2020-10-22
Attending: EMERGENCY MEDICINE | Admitting: INTERNAL MEDICINE
Payer: MEDICARE

## 2020-10-21 ENCOUNTER — APPOINTMENT (OUTPATIENT)
Dept: CT IMAGING | Age: 45
End: 2020-10-21
Payer: MEDICARE

## 2020-10-21 ENCOUNTER — TELEPHONE (OUTPATIENT)
Dept: BARIATRICS/WEIGHT MGMT | Age: 45
End: 2020-10-21

## 2020-10-21 PROBLEM — R10.9 CHRONIC ABDOMINAL PAIN: Status: ACTIVE | Noted: 2020-10-21

## 2020-10-21 PROBLEM — G89.29 CHRONIC ABDOMINAL PAIN: Status: ACTIVE | Noted: 2020-10-21

## 2020-10-21 PROBLEM — R10.9 ABDOMINAL PAIN OF MULTIPLE SITES: Status: ACTIVE | Noted: 2020-10-21

## 2020-10-21 LAB
ALBUMIN SERPL-MCNC: 3.3 G/DL (ref 3.5–5.2)
ALP BLD-CCNC: 78 U/L (ref 35–104)
ALT SERPL-CCNC: 7 U/L (ref 0–32)
ANION GAP SERPL CALCULATED.3IONS-SCNC: 4 MMOL/L (ref 7–16)
AST SERPL-CCNC: 20 U/L (ref 0–31)
BACTERIA: ABNORMAL /HPF
BASOPHILS ABSOLUTE: 0.07 E9/L (ref 0–0.2)
BASOPHILS RELATIVE PERCENT: 0.8 % (ref 0–2)
BILIRUB SERPL-MCNC: <0.2 MG/DL (ref 0–1.2)
BILIRUBIN URINE: NEGATIVE
BLOOD, URINE: ABNORMAL
BUN BLDV-MCNC: 10 MG/DL (ref 6–20)
CALCIUM SERPL-MCNC: 8.4 MG/DL (ref 8.6–10.2)
CHLORIDE BLD-SCNC: 104 MMOL/L (ref 98–107)
CLARITY: CLEAR
CO2: 27 MMOL/L (ref 22–29)
COLOR: YELLOW
CREAT SERPL-MCNC: 0.6 MG/DL (ref 0.5–1)
EOSINOPHILS ABSOLUTE: 0.11 E9/L (ref 0.05–0.5)
EOSINOPHILS RELATIVE PERCENT: 1.2 % (ref 0–6)
GFR AFRICAN AMERICAN: >60
GFR NON-AFRICAN AMERICAN: >60 ML/MIN/1.73
GLUCOSE BLD-MCNC: 81 MG/DL (ref 74–99)
GLUCOSE URINE: NEGATIVE MG/DL
HCT VFR BLD CALC: 30 % (ref 34–48)
HEMOGLOBIN: 9.2 G/DL (ref 11.5–15.5)
IMMATURE GRANULOCYTES #: 0.03 E9/L
IMMATURE GRANULOCYTES %: 0.3 % (ref 0–5)
KETONES, URINE: NEGATIVE MG/DL
LEUKOCYTE ESTERASE, URINE: ABNORMAL
LIPASE: 11 U/L (ref 13–60)
LYMPHOCYTES ABSOLUTE: 2.43 E9/L (ref 1.5–4)
LYMPHOCYTES RELATIVE PERCENT: 26.1 % (ref 20–42)
MCH RBC QN AUTO: 24.3 PG (ref 26–35)
MCHC RBC AUTO-ENTMCNC: 30.7 % (ref 32–34.5)
MCV RBC AUTO: 79.2 FL (ref 80–99.9)
MONOCYTES ABSOLUTE: 0.47 E9/L (ref 0.1–0.95)
MONOCYTES RELATIVE PERCENT: 5 % (ref 2–12)
NEUTROPHILS ABSOLUTE: 6.21 E9/L (ref 1.8–7.3)
NEUTROPHILS RELATIVE PERCENT: 66.6 % (ref 43–80)
NITRITE, URINE: NEGATIVE
PDW BLD-RTO: 19.7 FL (ref 11.5–15)
PH UA: 8 (ref 5–9)
PLATELET # BLD: 493 E9/L (ref 130–450)
PMV BLD AUTO: 8.3 FL (ref 7–12)
POTASSIUM REFLEX MAGNESIUM: 5.1 MMOL/L (ref 3.5–5)
PROTEIN UA: NEGATIVE MG/DL
RBC # BLD: 3.79 E12/L (ref 3.5–5.5)
RBC UA: ABNORMAL /HPF (ref 0–2)
SODIUM BLD-SCNC: 135 MMOL/L (ref 132–146)
SPECIFIC GRAVITY UA: 1.02 (ref 1–1.03)
TOTAL PROTEIN: 6.3 G/DL (ref 6.4–8.3)
UROBILINOGEN, URINE: 0.2 E.U./DL
WBC # BLD: 9.3 E9/L (ref 4.5–11.5)
WBC UA: ABNORMAL /HPF (ref 0–5)

## 2020-10-21 PROCEDURE — G0378 HOSPITAL OBSERVATION PER HR: HCPCS

## 2020-10-21 PROCEDURE — 81001 URINALYSIS AUTO W/SCOPE: CPT

## 2020-10-21 PROCEDURE — 2580000003 HC RX 258: Performed by: INTERNAL MEDICINE

## 2020-10-21 PROCEDURE — 85025 COMPLETE CBC W/AUTO DIFF WBC: CPT

## 2020-10-21 PROCEDURE — 6360000002 HC RX W HCPCS: Performed by: INTERNAL MEDICINE

## 2020-10-21 PROCEDURE — 96372 THER/PROPH/DIAG INJ SC/IM: CPT

## 2020-10-21 PROCEDURE — 99285 EMERGENCY DEPT VISIT HI MDM: CPT

## 2020-10-21 PROCEDURE — 96361 HYDRATE IV INFUSION ADD-ON: CPT

## 2020-10-21 PROCEDURE — 74177 CT ABD & PELVIS W/CONTRAST: CPT

## 2020-10-21 PROCEDURE — 83690 ASSAY OF LIPASE: CPT

## 2020-10-21 PROCEDURE — 96375 TX/PRO/DX INJ NEW DRUG ADDON: CPT

## 2020-10-21 PROCEDURE — 6360000002 HC RX W HCPCS: Performed by: STUDENT IN AN ORGANIZED HEALTH CARE EDUCATION/TRAINING PROGRAM

## 2020-10-21 PROCEDURE — 96374 THER/PROPH/DIAG INJ IV PUSH: CPT

## 2020-10-21 PROCEDURE — 96376 TX/PRO/DX INJ SAME DRUG ADON: CPT

## 2020-10-21 PROCEDURE — 6360000004 HC RX CONTRAST MEDICATION: Performed by: RADIOLOGY

## 2020-10-21 PROCEDURE — 93005 ELECTROCARDIOGRAM TRACING: CPT | Performed by: STUDENT IN AN ORGANIZED HEALTH CARE EDUCATION/TRAINING PROGRAM

## 2020-10-21 PROCEDURE — 80053 COMPREHEN METABOLIC PANEL: CPT

## 2020-10-21 PROCEDURE — 6370000000 HC RX 637 (ALT 250 FOR IP): Performed by: INTERNAL MEDICINE

## 2020-10-21 PROCEDURE — 74176 CT ABD & PELVIS W/O CONTRAST: CPT

## 2020-10-21 PROCEDURE — 99219 PR INITIAL OBSERVATION CARE/DAY 50 MINUTES: CPT | Performed by: INTERNAL MEDICINE

## 2020-10-21 RX ORDER — SUCRALFATE 1 G/1
1 TABLET ORAL
Status: DISCONTINUED | OUTPATIENT
Start: 2020-10-21 | End: 2020-10-22 | Stop reason: HOSPADM

## 2020-10-21 RX ORDER — ALBUTEROL SULFATE 90 UG/1
1 AEROSOL, METERED RESPIRATORY (INHALATION) EVERY 4 HOURS PRN
Status: DISCONTINUED | OUTPATIENT
Start: 2020-10-21 | End: 2020-10-21 | Stop reason: ALTCHOICE

## 2020-10-21 RX ORDER — DICYCLOMINE HYDROCHLORIDE 10 MG/ML
20 INJECTION INTRAMUSCULAR ONCE
Status: COMPLETED | OUTPATIENT
Start: 2020-10-21 | End: 2020-10-21

## 2020-10-21 RX ORDER — BUPROPION HYDROCHLORIDE 100 MG/1
100 TABLET, EXTENDED RELEASE ORAL 2 TIMES DAILY
Status: DISCONTINUED | OUTPATIENT
Start: 2020-10-21 | End: 2020-10-22 | Stop reason: HOSPADM

## 2020-10-21 RX ORDER — METOCLOPRAMIDE HYDROCHLORIDE 5 MG/ML
10 INJECTION INTRAMUSCULAR; INTRAVENOUS EVERY 6 HOURS
Status: COMPLETED | OUTPATIENT
Start: 2020-10-21 | End: 2020-10-22

## 2020-10-21 RX ORDER — FENTANYL CITRATE 50 UG/ML
25 INJECTION, SOLUTION INTRAMUSCULAR; INTRAVENOUS ONCE
Status: COMPLETED | OUTPATIENT
Start: 2020-10-21 | End: 2020-10-21

## 2020-10-21 RX ORDER — SIMETHICONE 80 MG
80 TABLET,CHEWABLE ORAL EVERY 6 HOURS PRN
Status: DISCONTINUED | OUTPATIENT
Start: 2020-10-21 | End: 2020-10-22 | Stop reason: HOSPADM

## 2020-10-21 RX ORDER — MORPHINE SULFATE 4 MG/ML
4 INJECTION, SOLUTION INTRAMUSCULAR; INTRAVENOUS
Status: DISCONTINUED | OUTPATIENT
Start: 2020-10-21 | End: 2020-10-22 | Stop reason: HOSPADM

## 2020-10-21 RX ORDER — HYDROXYZINE PAMOATE 25 MG/1
50 CAPSULE ORAL 3 TIMES DAILY PRN
Status: DISCONTINUED | OUTPATIENT
Start: 2020-10-21 | End: 2020-10-22 | Stop reason: HOSPADM

## 2020-10-21 RX ORDER — ALBUTEROL SULFATE 2.5 MG/3ML
2.5 SOLUTION RESPIRATORY (INHALATION) EVERY 4 HOURS PRN
Status: DISCONTINUED | OUTPATIENT
Start: 2020-10-21 | End: 2020-10-22 | Stop reason: HOSPADM

## 2020-10-21 RX ORDER — ONDANSETRON 2 MG/ML
4 INJECTION INTRAMUSCULAR; INTRAVENOUS ONCE
Status: COMPLETED | OUTPATIENT
Start: 2020-10-21 | End: 2020-10-21

## 2020-10-21 RX ORDER — PANTOPRAZOLE SODIUM 40 MG/10ML
40 INJECTION, POWDER, LYOPHILIZED, FOR SOLUTION INTRAVENOUS
Status: DISCONTINUED | OUTPATIENT
Start: 2020-10-22 | End: 2020-10-22 | Stop reason: HOSPADM

## 2020-10-21 RX ORDER — 0.9 % SODIUM CHLORIDE 0.9 %
1000 INTRAVENOUS SOLUTION INTRAVENOUS ONCE
Status: COMPLETED | OUTPATIENT
Start: 2020-10-21 | End: 2020-10-21

## 2020-10-21 RX ORDER — TIZANIDINE 4 MG/1
4 TABLET ORAL 2 TIMES DAILY PRN
Status: DISCONTINUED | OUTPATIENT
Start: 2020-10-21 | End: 2020-10-22 | Stop reason: HOSPADM

## 2020-10-21 RX ADMIN — SIMETHICONE 80 MG: 80 TABLET, CHEWABLE ORAL at 21:52

## 2020-10-21 RX ADMIN — SUCRALFATE 1 G: 1 TABLET ORAL at 21:23

## 2020-10-21 RX ADMIN — ONDANSETRON 4 MG: 2 INJECTION INTRAMUSCULAR; INTRAVENOUS at 14:14

## 2020-10-21 RX ADMIN — METOCLOPRAMIDE 10 MG: 5 INJECTION, SOLUTION INTRAMUSCULAR; INTRAVENOUS at 21:23

## 2020-10-21 RX ADMIN — IOHEXOL 50 ML: 240 INJECTION, SOLUTION INTRATHECAL; INTRAVASCULAR; INTRAVENOUS; ORAL at 14:59

## 2020-10-21 RX ADMIN — SODIUM CHLORIDE 1000 ML: 9 INJECTION, SOLUTION INTRAVENOUS at 21:01

## 2020-10-21 RX ADMIN — FENTANYL CITRATE 25 MCG: 50 INJECTION, SOLUTION INTRAMUSCULAR; INTRAVENOUS at 14:14

## 2020-10-21 RX ADMIN — FENTANYL CITRATE 25 MCG: 50 INJECTION, SOLUTION INTRAMUSCULAR; INTRAVENOUS at 17:32

## 2020-10-21 RX ADMIN — IOPAMIDOL 75 ML: 755 INJECTION, SOLUTION INTRAVENOUS at 14:59

## 2020-10-21 RX ADMIN — HYDROXYZINE PAMOATE 50 MG: 25 CAPSULE ORAL at 21:52

## 2020-10-21 RX ADMIN — DICYCLOMINE HYDROCHLORIDE 20 MG: 20 INJECTION, SOLUTION INTRAMUSCULAR at 22:36

## 2020-10-21 ASSESSMENT — PAIN DESCRIPTION - PROGRESSION: CLINICAL_PROGRESSION: GRADUALLY WORSENING

## 2020-10-21 ASSESSMENT — ENCOUNTER SYMPTOMS
VOMITING: 0
DIARRHEA: 0
SORE THROAT: 0
NAUSEA: 0
COUGH: 0
CONSTIPATION: 0
RHINORRHEA: 0
BACK PAIN: 1
BLOOD IN STOOL: 0
ABDOMINAL PAIN: 1
ABDOMINAL DISTENTION: 1
SHORTNESS OF BREATH: 0
WHEEZING: 0

## 2020-10-21 ASSESSMENT — PAIN DESCRIPTION - DESCRIPTORS: DESCRIPTORS: ACHING;CRAMPING

## 2020-10-21 ASSESSMENT — PAIN SCALES - GENERAL
PAINLEVEL_OUTOF10: 8
PAINLEVEL_OUTOF10: 10
PAINLEVEL_OUTOF10: 8
PAINLEVEL_OUTOF10: 7
PAINLEVEL_OUTOF10: 9

## 2020-10-21 ASSESSMENT — PAIN DESCRIPTION - ONSET: ONSET: ON-GOING

## 2020-10-21 ASSESSMENT — PAIN - FUNCTIONAL ASSESSMENT: PAIN_FUNCTIONAL_ASSESSMENT: ACTIVITIES ARE NOT PREVENTED

## 2020-10-21 ASSESSMENT — PAIN DESCRIPTION - FREQUENCY: FREQUENCY: CONTINUOUS

## 2020-10-21 ASSESSMENT — PAIN DESCRIPTION - ORIENTATION: ORIENTATION: MID;LOWER

## 2020-10-21 ASSESSMENT — PAIN DESCRIPTION - PAIN TYPE: TYPE: ACUTE PAIN

## 2020-10-21 ASSESSMENT — PAIN DESCRIPTION - LOCATION
LOCATION: ABDOMEN
LOCATION: ABDOMEN

## 2020-10-21 NOTE — TELEPHONE ENCOUNTER
SW spoke with PT briefly via My Chart VV. PT appeared to be in distress and when asked she (PT) reported that she was having severe pain in her abdominal region. PT does report a history of ulcers and previous treatment for perforations. Discussed having the nurse Veronica Clemens) call the PT however after some discussion PT agreed that she should go to the ED as pain was not subsiding. PT stated that she would call her brother to take her to the ED. PT will call to  provide outcome of ED visit and   reschedule appointment.     KATTY Bocanegra

## 2020-10-21 NOTE — ED PROVIDER NOTES
Name: Harmony Ramirez  : 1975  MRN: [id number]    Date: 10/21/2020    Benefits of immediately proceeding with Radiology exam outweigh the risks and therefore the following is being waived:      [x] Pregnancy test    [] Protocol for Iodine allergy    [] MRI questionnaire    [] BUN/Creatinine        Pa Jones MD

## 2020-10-21 NOTE — H&P
[facility] History and Physical        Chief Complaint:  Abdominal pain   History of Present Illness   The patient is a 50 y.o. female      history of GERD, peptic ulcer disease with history of perforation x2, and gastric bypass in 11/14/2017 who presents to the ED due to epigastric pain and lower abdominal pain, both of which began this morning shortly after waking up and having a bowel movement. Patient describes the pain as sharp, her lower abdominal pain radiates to her lower back, epigastric pain was made worse with eating small amount of eggs this morning, she took Carafate with no improvement of her symptoms. She states that the pain was accompanied with bloating and distension of her abdomen which is tender to the touch. Symptoms are moderate in severity. Endorses nausea 2/2 pain. She states that passing flatus and belching brings her minor relief and the pain medication given in the ED has brought the pain to a 6/10 in severity from a 9/10 on arrival.     [facility]  \"Pre-syncope  -DDX: Orthostatic VS vasovagal vs less likely Cardiac. Orthostatics were positive in the office. Bradycardia seen on ECG in the ED. Hx of anxiety, and panic attacks. Will consider cardiology consult pending results of studies ordered. - CT Head WO Contrast; Future  - Echocardiogram complete; Future   2. Bradycardia  3. Dermatitis\"    - hx taken from the   REVIEW OF SYSTEMS:  Denies fever, chills, chest pain, SOB, dysuria, hematochezia or melena     Past Medical History:      Diagnosis Date    Anemia     Arthritis     Asthma     Depression     GERD without esophagitis     History of blood transfusion     History of gastric bypass     Hydronephrosis with urinary obstruction due to ureteral calculus     Hypertension     No meds following sustained weight loss after bariatric surgery.     Iron deficiency 8/1/2018    Localized osteoarthritis of left knee 1/20/2020    Lumbar spondylosis 1/26/2018    Lymphedema     Obesity     Panic attacks     Perforated marginal ulcer 10/29/2018    ~1 year after RnYGB    PONV (postoperative nausea and vomiting)     Prediabetes     BG has normalized following sustained weight loss after bariatric surgery.  Sleep apnea, obstructive     Off PAP therapy BG following sustained weight loss after bariatric surgery.  Vitamin D deficiency 7/27/2018    Zinc deficiency 8/1/2018       Past Surgical History:        Procedure Laterality Date    APPENDECTOMY  1996    CHOLECYSTECTOMY, LAPAROSCOPIC N/A 7/27/2019    CHOLECYSTECTOMY LAPAROSCOPIC performed by Barney Scott MD at [address] [facility]  10/23/2019    HYSTERECTOMY  01/22/2020    [facility]    LAPAROSCOPY N/A 6/27/2020    DIAGNOSTIC  LAPAROSCOPY, REPAIR PERFORATED MARGINAL ULCER, UPPER ENDOSCOPY. [facility] PATCH performed by Lilibeth Talamantes MD at [facility] LITHOTRIPSY Right 7/24/2019    CYSTOSCOPY RETROGRADE PYELOGRAM URETEROSCOPY J STENT LASER LITHOTRIPSY RIGHT performed by Mian Chambers DO at [facility] LITHOTRIPSY      [place] OFFICE/OUTPT VISIT,PROCEDURE ONLY N/A 10/29/2018    DIAGNOSTIC LAPAROSCOPY REPAIR PERFORATED VISCUS performed by Barney Scott MD at [address] [place] [address]  11/14/2017    STOMACH SURGERY      UPPER GASTROINTESTINAL ENDOSCOPY N/A 7/27/2019    EGD ESOPHAGOGASTRODUODENOSCOPY performed by Barney Scott MD at [address] [facility] N/A 2/7/2020    EGD BIOPSY performed by Barney Scott MD at [address] [place] [address] 6/27/2020    EGD DIAGNOSTIC ONLY performed by Lilibeth Talamantes MD at [address] [facility] Medications:  Prior to Admission medications    Medication Sig Start Date End Date Taking?  Authorizing Provider   albuterol sulfate  (90 Base) MCG/ACT inhaler inhale 2 puffs by mouth and INTO THE LUNGS every 6 hours if needed for wheezing 10/19/20 Paul Mosquera MD   ondansetron (ZOFRAN ODT) 4 MG disintegrating tablet Take 1 tablet by mouth every 8 hours as needed for Nausea or Vomiting 10/7/20   Paul Mosquera MD   dicyclomine (BENTYL) 10 MG capsule Take 2 capsules by mouth 4 times daily for 10 days 8/14/20 9/11/20  Keyshawn De La Rosa DO   omeprazole (PRILOSEC) 20 MG delayed release capsule Take 1 capsule by mouth daily 8/10/20   Shireen Caro MD   buPROPion Clarance Glee SR) 100 MG extended release tablet Take 1 tablet by mouth 2 times daily 7/14/20   Historical Provider, MD   hydrocortisone 2.5 % cream Apply topically 2 times daily. 7/20/20   Ann Marie Laurent MD   sucralfate (CARAFATE) 1 GM tablet take 1 tablet by mouth four times a day 7/20/20   Shireen Caro MD   nicotine (NICODERM CQ) 21 MG/24HR Place 1 patch onto the skin every 24 hours 7/20/20 8/31/20  Paul Mosquera MD   sennosides-docusate sodium (SENOKOT-S) 8.6-50 MG tablet Take 1 tablet by mouth 2 times daily 6/29/20   Corbin Lara MD   tiZANidine (ZANAFLEX) 4 MG tablet Take 1 tablet by mouth 2 times daily as needed (spasms) 3/31/20   CATHI Foley Junior   ondansetron [facility]) 4 MG tablet Take 1 tablet by mouth daily as needed for Nausea or Vomiting 3/19/20   Shireen Caro MD   Multiple Vitamins-Minerals (MULTIVITAMIN ADULT PO) Take by mouth daily     Historical Provider, MD   hydrOXYzine (VISTARIL) 50 MG capsule Take 1 capsule by mouth 3 times daily as needed for Anxiety 10/29/19   Milton Matta MD   ascorbic acid (V-R VITAMIN C) 250 MG tablet Take 1 tablet by mouth daily Take with the iron supplement 6/24/19   Jacque Cummings MD   Cholecalciferol (VITAMIN D3) 82449 units CAPS Take one capsule every Mon-Wed-Fri for four weeks 6/24/19   Jacque Cummings MD   Handicap Placard MISC by Does not apply route Duration: 5 years 7/19/16   Valri Nephew, DO       Allergies:  Pcn [penicillins] and Food    Social History:   TOBACCO:   reports that she has been smoking cigarettes.  She started smoking about 29 years ago. She has a 7.50 pack-year smoking history. She has never used smokeless tobacco.  ETOH:   reports current alcohol use of about 6.0 standard drinks of alcohol per week. Family History:       Problem Relation Age of Onset    Heart Attack Mother 61        death from this    Diabetes Mother     Depression Mother     Diabetes Father     Stroke Father     Cancer Maternal Grandfather     Cancer Paternal Grandmother     Stroke Paternal Grandfather     Substance Abuse Brother       Or deferred/otherwise considered non contributory to current admission  PHYSICAL EXAM:    VS: /62   Pulse 50   Temp 97.3 °F (36.3 °C) (Temporal)   Resp 16   Ht 5' 6\" (1.676 m)   Wt 168 lb (76.2 kg)   LMP 10/21/2019 (Exact Date)   SpO2 100%   BMI 27.12 kg/m²     General Appearance:     no acute distress. Psych:  HEENT:    A.O. As per HPI details  NC/AT, PERRL, no pallor no icterus, lips/ext mucous membrane grossly N    Neck:   Supple, trachea midline, no obvious JVD   Resp:     CTAB, No wheezes, No rhonchi   Chest wall:    No tenderness or deformity   Heart:    Regular rate and rhythm, S1 and S2 normal, no rub or gallop. Abdomen: Moderate distension with guarding. Normal bowel sounds   Genitalia & Rectal:    Deferred.    Extremities x4:   Extremities normal, atraumatic, no cyanosis, no clubbing   Musculoskeletal:      NO active synovitis or swollen b/l wrists, 2-5 MCPs examined   Skin:   Skin color, texture, turgor fairly normal, no ACUTE rashes or lesions in lower legs and arms examined   Lymph nodes:   Cervical nodes grossly normal   Neurologic:  Grossly symmetric  strength in UEs and LEs with symmetric grossly intact to light touch sensation     LABS:  CBC:   Lab Results   Component Value Date    WBC 9.3 10/21/2020    RBC 3.79 10/21/2020    HGB 9.2 10/21/2020    HCT 30.0 10/21/2020     10/21/2020    MCV 79.2 10/21/2020     BMP:    Lab Results   Component Value Date     10/21/2020 K 5.1 10/21/2020     10/21/2020    CO2 27 10/21/2020    BUN 10 10/21/2020    CREATININE 0.6 10/21/2020    GLUCOSE 81 10/21/2020    GLUCOSE 94 04/02/2012    CALCIUM 8.4 10/21/2020     Hepatic Function Panel:    Lab Results   Component Value Date    ALKPHOS 78 10/21/2020    AST 20 10/21/2020    ALT 7 10/21/2020    PROT 6.3 10/21/2020    LABALBU 3.3 10/21/2020    LABALBU 3.9 04/02/2012    BILITOT <0.2 10/21/2020     Magnesium:    Lab Results   Component Value Date    MG 1.9 06/08/2020       PT/INR:    Lab Results   Component Value Date    PROTIME 10.7 06/27/2020    INR 1.0 06/27/2020     U/A:   Lab Results   Component Value Date    NITRITE pos 11/14/2019    LEUKOCYTESUR TRACE 10/21/2020    PHUR 8.0 10/21/2020    WBCUA 0-1 10/21/2020    RBCUA 5-10 10/21/2020    BACTERIA RARE 10/21/2020    SPECGRAV 1.020 10/21/2020    BLOODU SMALL 10/21/2020    GLUCOSEU Negative 10/21/2020     ABG:  No results found for: PHART, LTA8GXZ, PO2ART, K6XFDKGP, IVT6MJZ, BEART  TSH:    Lab Results   Component Value Date    TSH 2.220 06/08/2020     Cardiac Enzymes:   Lab Results   Component Value Date    CKTOTAL 36 04/25/2011    CKTOTAL 30 (L) 04/25/2011    CKTOTAL 36 04/24/2011    CKMB <0.2 04/25/2011    CKMB <0.2 04/25/2011    CKMB <0.2 04/24/2011    TROPONINI <0.01 06/27/2020    TROPONINI <0.01 06/08/2020    TROPONINI <0.01 12/05/2019       Radiology: Ct Abdomen Pelvis Wo Contrast Additional Contrast? None    Result Date: 10/21/2020  EXAMINATION: CT OF THE ABDOMEN AND PELVIS WITHOUT CONTRAST 10/21/2020 6:26 pm TECHNIQUE: CT of the abdomen and pelvis was performed without the administration of intravenous contrast. Multiplanar reformatted images are provided for review. Dose modulation, iterative reconstruction, and/or weight based adjustment of the mA/kV was utilized to reduce the radiation dose to as low as reasonably achievable.  COMPARISON: CT of the abdomen and pelvis obtained on the same day at 15:00.  CT of the abdomen and pelvis from August 14, 2020. HISTORY: ORDERING SYSTEM PROVIDED HISTORY: evaluate oral contrast movement TECHNOLOGIST PROVIDED HISTORY: Reason for exam:->evaluate oral contrast movement Additional Contrast?->None FINDINGS: Lower Chest: Lung bases appear clear. Minimal coronary artery disease. Organs: No hepatic mass or intrahepatic ductal dilatation. Mild periportal edema. Patient has undergone previous cholecystectomy. Normal appearance of the spleen, adrenal glands, and pancreas. Kidneys, ureters, and bladder: 23 mm low-attenuation lesion off the posterior lower pole of the left kidney series 2, image 87. This cannot be accurately assessed given the presence of intravenous contrast from prior scan. There is no hydronephrosis. No filling defects identified in the opacified portions of the bilateral ureters nor bladder. The previously described cystic structure posterior and to the left of the bladder does not fill with contrast on this exam. GI/Bowel: Patient has undergone previous gastric bypass surgery. No obvious complications in this region. Normal appearance of the small bowel as imaged. Normal appearance of cecum. Appendix not clearly seen. No evidence of bowel obstruction or inflammation. Oral contrast material has progressed into the distal small bowel, right hemicolon, and portions of the transverse colon. Vasculature: Scattered atherosclerotic plaque in the abdominal aorta and branch vessels Pelvis: Uterus not clearly seen. Left adnexal cystic structure less well visualized on this exam measures approximately 4.8 cm. Peritoneum/Retroperitoneum: No free intraperitoneal air. Small amount of free fluid in the pelvis. No concerning mesenteric nor retroperitoneal adenopathy. Bones/Soft Tissues: Diffuse body wall edema. Anterior abdominal wall appears intact. No change in anterior wedge compression deformity of the T12 and L1 vertebrae. Slight anterolisthesis of L4 on L5 is also unchanged. Multilevel degenerative endplate spondylosis. Osseous mineralization appears slightly decreased. 1.  Oral contrast has progressed into the right hemicolon and right aspect of the transverse colon. (Oral contrast progression can also be assessed via an abdominal radiograph if further needed.) 2. Generalized body wall edema. Mild periportal edema. 3.  Left adnexal fluid cystic structure measures 4.8 cm. Patient is status post hysterectomy. (Consider routine non emergent pelvic ultrasound for further evaluation.) 4. Postsurgical changes in the upper abdomen consistent with gastric bypass surgery and cholecystectomy. Ct Abdomen Pelvis W Iv Contrast Additional Contrast? Oral    Result Date: 10/21/2020  EXAMINATION: CT OF THE ABDOMEN AND PELVIS WITH CONTRAST 10/21/2020 3:00 pm TECHNIQUE: CT of the abdomen and pelvis was performed with the administration of intravenous contrast. Multiplanar reformatted images are provided for review. Dose modulation, iterative reconstruction, and/or weight based adjustment of the mA/kV was utilized to reduce the radiation dose to as low as reasonably achievable. COMPARISON: August 14, 2020 HISTORY: ORDERING SYSTEM PROVIDED HISTORY: abdominal pain, hx gastric bypass, hx perforated peptic ulcers, TECHNOLOGIST PROVIDED HISTORY: Reason for exam:->abdominal pain, hx gastric bypass, hx perforated peptic ulcers, Additional Contrast?->Oral FINDINGS: There is generalized body wall edema. There is generalized hazy appearance of abdominopelvic mesenteric fat. Postsurgical changes are present in the gastric location related to gastric bypass surgery. There are multiple gas filled distended loops of small bowel throughout the abdomen measuring up to 2.5 cm in diameter. Large bowel is nondistended. The appendix is not definitively visualized, however no focal inflammatory changes seen in its expected location. There is a calculus measuring 3 mm located in lower pole right kidney. Urinary bladder is normal in contour. There is a cyst located within pelvis posterior to urinary bladder and expected location of the left adnexa which measures 5.3 x 3.2 cm. Small free fluid is also present within the pelvis at level of the cul-de-sac. Liver is unremarkable. There is evidence of cholecystectomy. No evidence of acute pancreatitis. Spleen is nonenlarged. No evidence of pneumonia in the lung bases. 1.  Generalized body wall edema. 2.  Non-specific hazy appearance of abdominal and pelvic mesenteric fat. 3.  Multiple mildly distended gas-filled loops of small bowel throughout the abdomen. Findings could suggest adynamic ileus versus partial or early small bowel obstruction. Oral contrast is present within many loops of small bowel, yet has not reached the colon. Follow-up radiograph may be helpful for further evaluation. 4.  Small free fluid present within pelvis at level of cul-de-sac. 5.  Cyst is present within left aspect of the pelvis which is new since prior and may be of ovarian origin. Ultrasound follow-up may be helpful for further evaluation. 6.  Nonobstructing 3 mm renal calculus. Assessment & Plan   ACTIVE hospital problems being addressed/reassessed for this admission:  Principal Problem:    Chronic abdominal pain  Active Problems:    AYALA on CPAP    Morbid obesity (HCC)    HTN (hypertension)    S/P gastric bypass    Chronic pain syndrome    Chronic bilateral low back pain without sciatica  Resolved Problems:    * No resolved hospital problems. *    The patient is a 50 y.o. female      history of GERD, peptic ulcer disease with history of perforation x2, and gastric bypass in 11/14/2017 who presents to the ED due to epigastric pain and lower abdominal pain, both of which began this morning shortly after waking up and having a bowel movement.   Patient describes the pain as sharp, her lower abdominal pain radiates to her lower back, epigastric pain was made worse with eating small amount of eggs this morning, she took Carafate with no improvement of her symptoms. She states that the pain was accompanied with bloating and distension of her abdomen which is tender to the touch. Symptoms are moderate in severity. + nausea 2/2 pain. She states that passing flatus and belching brings her minor relief and the pain medication given in the ED has brought the pain to a 6/10 in severity from a 9/10 on arrival.     1. Abdominal pain -- r/o partial small bowel obstruction, 2 to adhesions== gasseous distenstion relieved by bleachling and passing gas    Rule out dyspepsia   hx of ulcer perforation, s/p gastric bypass,    - Denies constipation or diarrhea   - Able to pass flatus with some relief to symptoms   - No hematochezia or melena   - Given Reglan for N&V=-- possible gastroparesis- bloating  , Bentyl for abd muscle cramps, simethicone for distension     - CT findings listed above, signs of possible adynamic ileus or early small bowel obstruction   - Ordered morning lipase, amylase and lactate to r/o pancreatitis and mesenteric ischemia   - Liquid diet and bowel rest overnight, reassess in the AM    - Monitor bowel habits and change in flatus overnight     2. Chronic back pain -- takes Zanaflex at home for muscle spasms, long standing, no surgeries   - Stated back pain is aggravated by bowel, possible relation with mesenteric ischemia    - Given Zanaflex 4 mg PRN for muscle spasms    3. HTN   -- with history of sleep apena, controlled by CPAP   - /68 in the ED        4.  Hx of tobacco use -- attempted to quit smoking post bypass   - Nicoderm--prn if cravings present      Code status/DVT prophylaxis and PLAN --see orders   Note extensive time spent coordinating care between [address] [facility] [address], ER and floor nurses, and transitioning care over to day providers  Plan of care/ clinical impressions/communication specifics detailed below:        Jeet Cross MD   Night Officer, overnight admitting doctor at [facility] call day time doctor   for questions after 7:30am    Covering for [facility]  If Qs please call [phone]  Electronically signed by Desiree Singh MD on 10/21/2020 at 7:27 PM

## 2020-10-21 NOTE — ED PROVIDER NOTES
nausea and vomiting. Endocrine: Negative for polydipsia and polyuria. Genitourinary: Negative for dysuria, flank pain, frequency and hematuria. Musculoskeletal: Positive for back pain (radiation from lower abdominal pain). Negative for myalgias. Skin: Negative for rash and wound. Neurological: Negative for weakness and headaches. Psychiatric/Behavioral: Negative for confusion. Physical Exam  Vitals signs and nursing note reviewed. Constitutional:       General: She is not in acute distress. Appearance: She is well-developed. She is not ill-appearing. HENT:      Head: Normocephalic and atraumatic. Mouth/Throat:      Mouth: Mucous membranes are moist.      Pharynx: Oropharynx is clear. Eyes:      Extraocular Movements: Extraocular movements intact. Pupils: Pupils are equal, round, and reactive to light. Cardiovascular:      Rate and Rhythm: Normal rate and regular rhythm. Heart sounds: Normal heart sounds. No murmur. Pulmonary:      Effort: Pulmonary effort is normal.      Breath sounds: Normal breath sounds. No wheezing or rales. Abdominal:      General: Abdomen is flat. Bowel sounds are normal. There is no distension (patient reports, however no appreciable distention on exam). Palpations: Abdomen is soft. Tenderness: There is generalized abdominal tenderness and tenderness in the right lower quadrant, epigastric area and left lower quadrant. There is no right CVA tenderness, left CVA tenderness, guarding or rebound. Skin:     General: Skin is warm and dry. Capillary Refill: Capillary refill takes less than 2 seconds. Neurological:      General: No focal deficit present. Mental Status: She is alert and oriented to person, place, and time. Motor: No weakness.    Psychiatric:         Mood and Affect: Mood normal.         Behavior: Behavior normal.          Procedures         MDM     75-year-old female with history of peptic ulcer disease patient has remained hemodynamically stable throughout this encounter. I have discussed this patient with my attending, who has seen the patient and agrees with this disposition. Patient was seen and evaluated by myself and my attending Yamini Mark MD. Assessment and Plan discussed with attending provider, please see attestation for final plan of care. ED Course as of Oct 21 1940   Wed Oct 21, 2020   1710 Consult placed to Dr. Marisel Yanez (general surgery, bariatric surgery specialist). [VG]   5832 Patient reassessed, her pain is returning, repeat dose of 25 mcg fentanyl ordered. [VG]   5641 Spoke with Dr. Niesha Wu, discussed case, he feels admission is reasonable, he requests we order a non-contrast CT to evaluate for movement of oral contrast from earlier today. [VG]   2376 CT abd/pelv wo contrast notes progression of PO contrast into ascending and transverse colon. [VG]      ED Course User Index  [VG] Bryce Daniel, DO       --------------------------------------------- PAST HISTORY ---------------------------------------------  Past Medical History:  has a past medical history of Anemia, Arthritis, Asthma, Depression, GERD without esophagitis, History of blood transfusion, History of gastric bypass, Hydronephrosis with urinary obstruction due to ureteral calculus, Hypertension, Iron deficiency, Localized osteoarthritis of left knee, Lumbar spondylosis, Lymphedema, Obesity, Panic attacks, Perforated marginal ulcer, PONV (postoperative nausea and vomiting), Prediabetes, Sleep apnea, obstructive, Vitamin D deficiency, and Zinc deficiency. Past Surgical History:  has a past surgical history that includes Appendectomy (1996); Viviane-en-Y Gastric Bypass (11/14/2017); pr office/outpt visit,procedure only (N/A, 10/29/2018); Stomach surgery; Lithotripsy (Right, 7/24/2019); Cholecystectomy, laparoscopic (N/A, 7/27/2019); Upper gastrointestinal endoscopy (N/A, 7/27/2019);  Lithotripsy; Comprehensive Metabolic Panel w/ Reflex to MG   Result Value Ref Range    Sodium 135 132 - 146 mmol/L    Potassium reflex Magnesium 5.1 (H) 3.5 - 5.0 mmol/L    Chloride 104 98 - 107 mmol/L    CO2 27 22 - 29 mmol/L    Anion Gap 4 (L) 7 - 16 mmol/L    Glucose 81 74 - 99 mg/dL    BUN 10 6 - 20 mg/dL    CREATININE 0.6 0.5 - 1.0 mg/dL    GFR Non-African American >60 >=60 mL/min/1.73    GFR African American >60     Calcium 8.4 (L) 8.6 - 10.2 mg/dL    Total Protein 6.3 (L) 6.4 - 8.3 g/dL    Alb 3.3 (L) 3.5 - 5.2 g/dL    Total Bilirubin <0.2 0.0 - 1.2 mg/dL    Alkaline Phosphatase 78 35 - 104 U/L    ALT 7 0 - 32 U/L    AST 20 0 - 31 U/L   Lipase   Result Value Ref Range    Lipase 11 (L) 13 - 60 U/L   Urinalysis with Microscopic   Result Value Ref Range    Color, UA Yellow Straw/Yellow    Clarity, UA Clear Clear    Glucose, Ur Negative Negative mg/dL    Bilirubin Urine Negative Negative    Ketones, Urine Negative Negative mg/dL    Specific Gravity, UA 1.020 1.005 - 1.030    Blood, Urine SMALL (A) Negative    pH, UA 8.0 5.0 - 9.0    Protein, UA Negative Negative mg/dL    Urobilinogen, Urine 0.2 <2.0 E.U./dL    Nitrite, Urine Negative Negative    Leukocyte Esterase, Urine TRACE (A) Negative    WBC, UA 0-1 0 - 5 /HPF    RBC, UA 5-10 (A) 0 - 2 /HPF    Bacteria, UA RARE (A) None Seen /HPF   EKG 12 Lead   Result Value Ref Range    Ventricular Rate 50 BPM    Atrial Rate 50 BPM    P-R Interval 156 ms    QRS Duration 82 ms    Q-T Interval 464 ms    QTc Calculation (Bazett) 423 ms    P Axis 48 degrees    R Axis 46 degrees    T Axis 53 degrees       RADIOLOGY:  CT ABDOMEN PELVIS WO CONTRAST Additional Contrast? None   Final Result   1. Oral contrast has progressed into the right hemicolon and right aspect of   the transverse colon. (Oral contrast progression can also be assessed via an abdominal radiograph   if further needed.)      2. Generalized body wall edema. Mild periportal edema.       3.  Left adnexal fluid cystic structure measures 4.8 cm. Patient is status   post hysterectomy. (Consider routine non emergent pelvic ultrasound for further evaluation.)      4. Postsurgical changes in the upper abdomen consistent with gastric bypass   surgery and cholecystectomy. CT ABDOMEN PELVIS W IV CONTRAST Additional Contrast? Oral   Final Result   1. Generalized body wall edema. 2.  Non-specific hazy appearance of abdominal and pelvic mesenteric fat. 3.  Multiple mildly distended gas-filled loops of small bowel throughout the   abdomen. Findings could suggest adynamic ileus versus partial or early small   bowel obstruction. Oral contrast is present within many loops of small   bowel, yet has not reached the colon. Follow-up radiograph may be helpful   for further evaluation. 4.  Small free fluid present within pelvis at level of cul-de-sac. 5.  Cyst is present within left aspect of the pelvis which is new since prior   and may be of ovarian origin. Ultrasound follow-up may be helpful for   further evaluation. 6.  Nonobstructing 3 mm renal calculus. EKG:  This EKG is signed and interpreted by the emergency department physician. Rate: 50  Rhythm: Sinus tianna  Interpretation: no acute changes  Comparison: stable as compared to patient's most recent EKG      ------------------------- NURSING NOTES AND VITALS REVIEWED ---------------------------  Date / Time Roomed:  10/21/2020 12:29 PM  ED Bed Assignment:  17/17    The nursing notes within the ED encounter and vital signs as below have been reviewed.      Patient Vitals for the past 24 hrs:   BP Temp Temp src Pulse Resp SpO2 Height Weight   10/21/20 1730 107/62 -- -- 50 16 100 % 5' 6\" (1.676 m) 168 lb (76.2 kg)   10/21/20 1516 117/74 -- -- 56 16 100 % -- --   10/21/20 1418 118/68 -- -- 58 16 100 % -- --   10/21/20 1127 (!) 116/59 -- -- 58 -- 100 % -- --   10/21/20 1045 -- 97.3 °F (36.3 °C) Temporal 70 16 99 % -- --       Oxygen Saturation Interpretation: Normal    ------------------------------------------ PROGRESS NOTES ------------------------------------------  Re-evaluation(s):  Time: 1920  Patients symptoms are improving  Repeat physical examination is improved    Counseling:  I have spoken with the patient and discussed todays results, in addition to providing specific details for the plan of care and counseling regarding the diagnosis and prognosis. Their questions are answered at this time and they are agreeable with the plan of admission.    --------------------------------- ADDITIONAL PROVIDER NOTES ---------------------------------  Consultations:  Time: 1929. Spoke with Dr. Mitesh Galdamez. Discussed case. They will admit the patient. This patient's ED course included: a personal history and physicial examination, re-evaluation prior to disposition, multiple bedside re-evaluations, IV medications, cardiac monitoring, continuous pulse oximetry and complex medical decision making and emergency management    This patient has remained hemodynamically stable during their ED course. Diagnosis:  1. Epigastric pain    2. Lower abdominal pain    3. Nausea        Disposition:  Patient's disposition: Admit to med/surg floor  Patient's condition is stable.          Praveena Molina DO  Resident  10/21/20 [facility]  Resident  10/21/20 0589

## 2020-10-21 NOTE — ED NOTES
Faxed SBAR to floor, spoke with Callie Paul who received fax.  Pt chimed     Janelle Denney RN  10/21/20 1958

## 2020-10-22 VITALS
HEIGHT: 66 IN | RESPIRATION RATE: 12 BRPM | WEIGHT: 168 LBS | TEMPERATURE: 98.7 F | SYSTOLIC BLOOD PRESSURE: 96 MMHG | DIASTOLIC BLOOD PRESSURE: 54 MMHG | BODY MASS INDEX: 27 KG/M2 | OXYGEN SATURATION: 97 % | HEART RATE: 53 BPM

## 2020-10-22 LAB
ALBUMIN SERPL-MCNC: 3 G/DL (ref 3.5–5.2)
ALP BLD-CCNC: 66 U/L (ref 35–104)
ALT SERPL-CCNC: <5 U/L (ref 0–32)
AMYLASE: 20 U/L (ref 20–100)
ANION GAP SERPL CALCULATED.3IONS-SCNC: 6 MMOL/L (ref 7–16)
AST SERPL-CCNC: 12 U/L (ref 0–31)
BASOPHILS ABSOLUTE: 0.07 E9/L (ref 0–0.2)
BASOPHILS RELATIVE PERCENT: 1.2 % (ref 0–2)
BILIRUB SERPL-MCNC: 0.2 MG/DL (ref 0–1.2)
BUN BLDV-MCNC: 8 MG/DL (ref 6–20)
CALCIUM SERPL-MCNC: 8.2 MG/DL (ref 8.6–10.2)
CHLORIDE BLD-SCNC: 105 MMOL/L (ref 98–107)
CO2: 26 MMOL/L (ref 22–29)
CREAT SERPL-MCNC: 0.7 MG/DL (ref 0.5–1)
EKG ATRIAL RATE: 50 BPM
EKG P AXIS: 48 DEGREES
EKG P-R INTERVAL: 156 MS
EKG Q-T INTERVAL: 464 MS
EKG QRS DURATION: 82 MS
EKG QTC CALCULATION (BAZETT): 423 MS
EKG R AXIS: 46 DEGREES
EKG T AXIS: 53 DEGREES
EKG VENTRICULAR RATE: 50 BPM
EOSINOPHILS ABSOLUTE: 0.15 E9/L (ref 0.05–0.5)
EOSINOPHILS RELATIVE PERCENT: 2.6 % (ref 0–6)
GFR AFRICAN AMERICAN: >60
GFR NON-AFRICAN AMERICAN: >60 ML/MIN/1.73
GLUCOSE BLD-MCNC: 97 MG/DL (ref 74–99)
HCT VFR BLD CALC: 25 % (ref 34–48)
HEMOGLOBIN: 7.7 G/DL (ref 11.5–15.5)
IMMATURE GRANULOCYTES #: 0.01 E9/L
IMMATURE GRANULOCYTES %: 0.2 % (ref 0–5)
LACTIC ACID: 0.5 MMOL/L (ref 0.5–2.2)
LIPASE: 8 U/L (ref 13–60)
LYMPHOCYTES ABSOLUTE: 2.25 E9/L (ref 1.5–4)
LYMPHOCYTES RELATIVE PERCENT: 39.1 % (ref 20–42)
MCH RBC QN AUTO: 24.2 PG (ref 26–35)
MCHC RBC AUTO-ENTMCNC: 30.8 % (ref 32–34.5)
MCV RBC AUTO: 78.6 FL (ref 80–99.9)
MONOCYTES ABSOLUTE: 0.44 E9/L (ref 0.1–0.95)
MONOCYTES RELATIVE PERCENT: 7.6 % (ref 2–12)
NEUTROPHILS ABSOLUTE: 2.84 E9/L (ref 1.8–7.3)
NEUTROPHILS RELATIVE PERCENT: 49.3 % (ref 43–80)
PDW BLD-RTO: 19.6 FL (ref 11.5–15)
PLATELET # BLD: 415 E9/L (ref 130–450)
PMV BLD AUTO: 8.2 FL (ref 7–12)
POTASSIUM SERPL-SCNC: 4.1 MMOL/L (ref 3.5–5)
RBC # BLD: 3.18 E12/L (ref 3.5–5.5)
SODIUM BLD-SCNC: 137 MMOL/L (ref 132–146)
TOTAL PROTEIN: 5.2 G/DL (ref 6.4–8.3)
WBC # BLD: 5.8 E9/L (ref 4.5–11.5)

## 2020-10-22 PROCEDURE — 99217 PR OBSERVATION CARE DISCHARGE MANAGEMENT: CPT | Performed by: INTERNAL MEDICINE

## 2020-10-22 PROCEDURE — G0378 HOSPITAL OBSERVATION PER HR: HCPCS

## 2020-10-22 PROCEDURE — 6360000002 HC RX W HCPCS: Performed by: INTERNAL MEDICINE

## 2020-10-22 PROCEDURE — 83605 ASSAY OF LACTIC ACID: CPT

## 2020-10-22 PROCEDURE — C9113 INJ PANTOPRAZOLE SODIUM, VIA: HCPCS | Performed by: INTERNAL MEDICINE

## 2020-10-22 PROCEDURE — 96375 TX/PRO/DX INJ NEW DRUG ADDON: CPT

## 2020-10-22 PROCEDURE — 93010 ELECTROCARDIOGRAM REPORT: CPT | Performed by: INTERNAL MEDICINE

## 2020-10-22 PROCEDURE — 82150 ASSAY OF AMYLASE: CPT

## 2020-10-22 PROCEDURE — 6370000000 HC RX 637 (ALT 250 FOR IP): Performed by: INTERNAL MEDICINE

## 2020-10-22 PROCEDURE — 85025 COMPLETE CBC W/AUTO DIFF WBC: CPT

## 2020-10-22 PROCEDURE — 96376 TX/PRO/DX INJ SAME DRUG ADON: CPT

## 2020-10-22 PROCEDURE — 80053 COMPREHEN METABOLIC PANEL: CPT

## 2020-10-22 PROCEDURE — 36415 COLL VENOUS BLD VENIPUNCTURE: CPT

## 2020-10-22 PROCEDURE — 83690 ASSAY OF LIPASE: CPT

## 2020-10-22 PROCEDURE — 99213 OFFICE O/P EST LOW 20 MIN: CPT | Performed by: SURGERY

## 2020-10-22 RX ORDER — OMEPRAZOLE 20 MG/1
20 CAPSULE, DELAYED RELEASE ORAL 2 TIMES DAILY
Qty: 30 CAPSULE | Refills: 2 | Status: SHIPPED | OUTPATIENT
Start: 2020-10-22 | End: 2021-06-18 | Stop reason: SDUPTHER

## 2020-10-22 RX ADMIN — SUCRALFATE 1 G: 1 TABLET ORAL at 10:13

## 2020-10-22 RX ADMIN — PANTOPRAZOLE SODIUM 40 MG: 40 INJECTION, POWDER, FOR SOLUTION INTRAVENOUS at 10:07

## 2020-10-22 RX ADMIN — SIMETHICONE 80 MG: 80 TABLET, CHEWABLE ORAL at 10:07

## 2020-10-22 RX ADMIN — METOCLOPRAMIDE 10 MG: 5 INJECTION, SOLUTION INTRAMUSCULAR; INTRAVENOUS at 03:06

## 2020-10-22 RX ADMIN — METOCLOPRAMIDE 10 MG: 5 INJECTION, SOLUTION INTRAMUSCULAR; INTRAVENOUS at 10:07

## 2020-10-22 RX ADMIN — BUPROPION HYDROCHLORIDE 100 MG: 100 TABLET, EXTENDED RELEASE ORAL at 10:07

## 2020-10-22 ASSESSMENT — PAIN DESCRIPTION - ONSET: ONSET: ON-GOING

## 2020-10-22 ASSESSMENT — PAIN DESCRIPTION - PROGRESSION: CLINICAL_PROGRESSION: GRADUALLY WORSENING

## 2020-10-22 ASSESSMENT — PAIN DESCRIPTION - FREQUENCY: FREQUENCY: CONTINUOUS

## 2020-10-22 ASSESSMENT — PAIN DESCRIPTION - LOCATION: LOCATION: NECK;BACK

## 2020-10-22 ASSESSMENT — PAIN - FUNCTIONAL ASSESSMENT: PAIN_FUNCTIONAL_ASSESSMENT: ACTIVITIES ARE NOT PREVENTED

## 2020-10-22 ASSESSMENT — PAIN SCALES - GENERAL: PAINLEVEL_OUTOF10: 6

## 2020-10-22 ASSESSMENT — PAIN DESCRIPTION - DESCRIPTORS: DESCRIPTORS: ACHING;DISCOMFORT;CONSTANT

## 2020-10-22 NOTE — CONSULTS
GENERAL SURGERY  CONSULT NOTE  10/22/2020    Physician Consulted: Dr. Alfa Jackson covering for Dr. Delarosa Signs  Reason for Consult: Abdominal pain  Referring Physician: Dr. Nicolasa Nuñez    Chief Complaint   Patient presents with    Abdominal Pain     HPI  Bhaskar Seay is a 50 y.o. female past medical history of gastric bypass with history of perforated marginal ulcer that was repaired in June 2020 with an omental flap and Anthonette Slimmer patch. She presents today with epigastric abdominal pain radiating to her back which began yesterday morning. Pain is worse with eating. Her antacid medication has not been helping with her symptoms. She admits to nausea as well. Similar pain as the last episode. States she is compliant with daily antacids. Moving her bowels normally. Past surgical history of gastric bypass in 2017, cholecystectomy, appendectomy, hysterectomy, two laparoscopic repairs of perforated marginal ulcer 2017 and 6/2020. Last EGD was 6 months ago at the time of her perforation repair. CT scans yesterday show no perforation, no obstruction; passage of PO contrast to the colon. Past Medical History:   Diagnosis Date    Anemia     Arthritis     Asthma     Depression     GERD without esophagitis     History of blood transfusion     History of gastric bypass     Hydronephrosis with urinary obstruction due to ureteral calculus     Hypertension     No meds following sustained weight loss after bariatric surgery.  Iron deficiency 8/1/2018    Localized osteoarthritis of left knee 1/20/2020    Lumbar spondylosis 1/26/2018    Lymphedema     Obesity     Panic attacks     Perforated marginal ulcer 10/29/2018    ~1 year after RnYGB    PONV (postoperative nausea and vomiting)     Prediabetes     BG has normalized following sustained weight loss after bariatric surgery.  Sleep apnea, obstructive     Off PAP therapy BG following sustained weight loss after bariatric surgery.     Vitamin D deficiency 7/27/2018    Zinc deficiency 8/1/2018       Past Surgical History:   Procedure Laterality Date    APPENDECTOMY  1996    CHOLECYSTECTOMY, LAPAROSCOPIC N/A 7/27/2019    CHOLECYSTECTOMY LAPAROSCOPIC performed by Pj Lees MD at [address]  10/23/2019    [place]  [place]    [facility]    LAPAROSCOPY N/A 6/27/2020    DIAGNOSTIC  LAPAROSCOPY, REPAIR PERFORATED MARGINAL ULCER, UPPER ENDOSCOPY. [facility] PATCH performed by Yaz Amador MD at [facility] LITHOTRIPSY Right 7/24/2019    CYSTOSCOPY RETROGRADE PYELOGRAM URETEROSCOPY J STENT LASER LITHOTRIPSY RIGHT [facility] LITHOTRIPSY      IN OFFICE/OUTPT VISIT,PROCEDURE ONLY N/A 10/29/2018    DIAGNOSTIC LAPAROSCOPY REPAIR PERFORATED VISCUS performed by Pj Lees MD at [address] [place] [address]  11/14/2017    STOMACH SURGERY      UPPER GASTROINTESTINAL ENDOSCOPY N/A 7/27/2019    EGD ESOPHAGOGASTRODUODENOSCOPY performed by Pj Lees MD at [facility]. N/A 2/7/2020    EGD BIOPSY performed by Pj Lees MD at [address] [place] [address] 6/27/2020    EGD DIAGNOSTIC ONLY performed by Yaz Amador MD at [address] [place] [address]       Medications Prior to Admission:    Prior to Admission medications    Medication Sig Start Date End Date Taking?  Authorizing Provider   albuterol sulfate  (90 Base) MCG/ACT inhaler inhale 2 puffs by mouth and INTO THE LUNGS every 6 hours if needed for wheezing 10/19/20  Yes Herb Villa MD   ondansetron (ZOFRAN ODT) 4 MG disintegrating tablet Take 1 tablet by mouth every 8 hours as needed for Nausea or Vomiting 10/7/20  Yes Herb Villa MD   omeprazole (PRILOSEC) 20 MG delayed release capsule Take 1 capsule by mouth daily 8/10/20  Yes Pj Lees MD   buPROPion [facility] SR) 100 MG extended release tablet Take 1 tablet by mouth 2 times daily 7/14/20  Yes Historical Provider, MD   sucralfate (CARAFATE) 1 GM tablet take 1 tablet by mouth four times a day 7/20/20  Yes Khang Childs MD   sennosides-docusate sodium (SENOKOT-S) 8.6-50 MG tablet Take 1 tablet by mouth 2 times daily 6/29/20  Yes Charlotte Umanzor MD   tiZANidine (ZANAFLEX) 4 MG tablet Take 1 tablet by mouth 2 times daily as needed (spasms) 3/31/20  Yes CATHI Espitia   ondansetron (ZOFRAN) 4 MG tablet Take 1 tablet by mouth daily as needed for Nausea or Vomiting 3/19/20  Yes Khang Childs MD   Multiple Vitamins-Minerals (MULTIVITAMIN ADULT PO) Take by mouth daily    Yes Historical Provider, MD   hydrOXYzine (VISTARIL) 50 MG capsule Take 1 capsule by mouth 3 times daily as needed for Anxiety 10/29/19  Yes Rufino Herman MD   ascorbic acid (V-R VITAMIN C) 250 MG tablet Take 1 tablet by mouth daily Take with the iron supplement 6/24/19  Yes Jon Rodas MD   Cholecalciferol (VITAMIN D3) 62456 units CAPS Take one capsule every Mon-Wed-Fri for four weeks 6/24/19  Yes Jon Rodas MD   dicyclomine (BENTYL) 10 MG capsule Take 2 capsules by mouth 4 times daily for 10 days 8/14/20 9/11/20  Keyshawn De La Rosa DO   hydrocortisone 2.5 % cream Apply topically 2 times daily.  7/20/20   Cory Torres MD   nicotine (NICODERM CQ) 21 MG/24HR Place 1 patch onto the skin every 24 hours 7/20/20 8/31/20  Cory Torres MD   Handicap Placard MISC by Does not apply route Duration: 5 years 7/19/16   Trish Scheuermann, DO       Allergies   Allergen Reactions    Pcn [Penicillins] Anaphylaxis and Other (See Comments)     Patient unsure    Food Other (See Comments)     Berries - hives  Lactose Intolerance to milk-diarrhea  Soy-increases menses occurrences         Family History   Problem Relation Age of Onset    Heart Attack Mother 61        death from this    Diabetes Mother     Depression Mother     Diabetes Father     Stroke Father     Cancer Maternal Grandfather     Cancer Paternal Grandmother     Stroke Paternal abdomen and pelvis was performed without the administration of intravenous contrast. Multiplanar reformatted images are provided for review. Dose modulation, iterative reconstruction, and/or weight based adjustment of the mA/kV was utilized to reduce the radiation dose to as low as reasonably achievable. COMPARISON: CT of the abdomen and pelvis obtained on the same day at 15:00.  CT of the abdomen and pelvis from August 14, 2020. HISTORY: ORDERING SYSTEM PROVIDED HISTORY: evaluate oral contrast movement TECHNOLOGIST PROVIDED HISTORY: Reason for exam:->evaluate oral contrast movement Additional Contrast?->None FINDINGS: Lower Chest: Lung bases appear clear. Minimal coronary artery disease. Organs: No hepatic mass or intrahepatic ductal dilatation. Mild periportal edema. Patient has undergone previous cholecystectomy. Normal appearance of the spleen, adrenal glands, and pancreas. Kidneys, ureters, and bladder: 23 mm low-attenuation lesion off the posterior lower pole of the left kidney series 2, image 87. This cannot be accurately assessed given the presence of intravenous contrast from prior scan. There is no hydronephrosis. No filling defects identified in the opacified portions of the bilateral ureters nor bladder. The previously described cystic structure posterior and to the left of the bladder does not fill with contrast on this exam. GI/Bowel: Patient has undergone previous gastric bypass surgery. No obvious complications in this region. Normal appearance of the small bowel as imaged. Normal appearance of cecum. Appendix not clearly seen. No evidence of bowel obstruction or inflammation. Oral contrast material has progressed into the distal small bowel, right hemicolon, and portions of the transverse colon. Vasculature: Scattered atherosclerotic plaque in the abdominal aorta and branch vessels Pelvis: Uterus not clearly seen.   Left adnexal cystic structure less well visualized on this exam measures approximately 4.8 cm. Peritoneum/Retroperitoneum: No free intraperitoneal air. Small amount of free fluid in the pelvis. No concerning mesenteric nor retroperitoneal adenopathy. Bones/Soft Tissues: Diffuse body wall edema. Anterior abdominal wall appears intact. No change in anterior wedge compression deformity of the T12 and L1 vertebrae. Slight anterolisthesis of L4 on L5 is also unchanged. Multilevel degenerative endplate spondylosis. Osseous mineralization appears slightly decreased. 1.  Oral contrast has progressed into the right hemicolon and right aspect of the transverse colon. (Oral contrast progression can also be assessed via an abdominal radiograph if further needed.) 2. Generalized body wall edema. Mild periportal edema. 3.  Left adnexal fluid cystic structure measures 4.8 cm. Patient is status post hysterectomy. (Consider routine non emergent pelvic ultrasound for further evaluation.) 4. Postsurgical changes in the upper abdomen consistent with gastric bypass surgery and cholecystectomy. Ct Abdomen Pelvis W Iv Contrast Additional Contrast? Oral    Result Date: 10/21/2020  EXAMINATION: CT OF THE ABDOMEN AND PELVIS WITH CONTRAST 10/21/2020 3:00 pm TECHNIQUE: CT of the abdomen and pelvis was performed with the administration of intravenous contrast. Multiplanar reformatted images are provided for review. Dose modulation, iterative reconstruction, and/or weight based adjustment of the mA/kV was utilized to reduce the radiation dose to as low as reasonably achievable. COMPARISON: August 14, 2020 HISTORY: ORDERING SYSTEM PROVIDED HISTORY: abdominal pain, hx gastric bypass, hx perforated peptic ulcers, TECHNOLOGIST PROVIDED HISTORY: Reason for exam:->abdominal pain, hx gastric bypass, hx perforated peptic ulcers, Additional Contrast?->Oral FINDINGS: There is generalized body wall edema. There is generalized hazy appearance of abdominopelvic mesenteric fat.   Postsurgical changes

## 2020-10-22 NOTE — CONSULTS
GENERAL SURGERY  CONSULT NOTE  10/22/2020    Physician Consulted: Dr. Paige Penny  Reason for Consult: Abdominal pain  Referring Physician: Dr. Marina Falk    Chief Complaint   Patient presents with    Abdominal Pain     HPI  Monique Albarran is a 50 y.o. female past medical history of gastric bypass with history of perforated marginal ulcer that was repaired in June 2020 with an omental flap and Leane Pun patch. She presents today with epigastric abdominal pain radiating to her back which began yesterday morning. Pain is worse with eating. Her antacid medication has not been helping with her symptoms. She admits to nausea as well. Similar pain as the last episode. States she is compliant with daily antacids. Moving her bowels normally. Past surgical history of gastric bypass in 2017, cholecystectomy, appendectomy, hysterectomy, two laparoscopic repairs of perforated marginal ulcer 2017 and 6/2020. Last EGD was 6 months ago at the time of her perforation repair. CT scans yesterday show no perforation, no obstruction; passage of PO contrast to the colon. Past Medical History:   Diagnosis Date    Anemia     Arthritis     Asthma     Depression     GERD without esophagitis     History of blood transfusion     History of gastric bypass     Hydronephrosis with urinary obstruction due to ureteral calculus     Hypertension     No meds following sustained weight loss after bariatric surgery.  Iron deficiency 8/1/2018    Localized osteoarthritis of left knee 1/20/2020    Lumbar spondylosis 1/26/2018    Lymphedema     Obesity     Panic attacks     Perforated marginal ulcer 10/29/2018    ~1 year after RnYGB    PONV (postoperative nausea and vomiting)     Prediabetes     BG has normalized following sustained weight loss after bariatric surgery.  Sleep apnea, obstructive     Off PAP therapy BG following sustained weight loss after bariatric surgery.     Vitamin D deficiency 7/27/2018    Zinc deficiency 8/1/2018       Past Surgical History:   Procedure Laterality Date    APPENDECTOMY  1996    CHOLECYSTECTOMY, LAPAROSCOPIC N/A 7/27/2019    CHOLECYSTECTOMY LAPAROSCOPIC performed by Julissa Sabillon MD at [address]  10/23/2019    [facility]    LAPAROSCOPY N/A 6/27/2020    DIAGNOSTIC  LAPAROSCOPY, REPAIR PERFORATED MARGINAL ULCER, UPPER ENDOSCOPY. [facility] PATCH performed by Jon Garner MD at [facility] LITHOTRIPSY Right 7/24/2019    CYSTOSCOPY RETROGRADE PYELOGRAM URETEROSCOPY J STENT LASER LITHOTRIPSY RIGHT performed by Jaz Chambers DO at [facility] LITHOTRIPSY      VT OFFICE/OUTPT VISIT,PROCEDURE ONLY N/A 10/29/2018    DIAGNOSTIC LAPAROSCOPY REPAIR PERFORATED VISCUS performed by Julissa Sabillon MD at [address]  11/14/2017    STOMACH SURGERY      UPPER GASTROINTESTINAL ENDOSCOPY N/A 7/27/2019    EGD ESOPHAGOGASTRODUODENOSCOPY performed by Julissa Sabillon MD at [place] [address] N/A 2/7/2020    EGD BIOPSY performed by Julisas Sabillon MD at [facility] 6/27/2020    EGD DIAGNOSTIC ONLY performed by Jon Garner MD at [address]       Medications Prior to Admission:    Prior to Admission medications    Medication Sig Start Date End Date Taking?  Authorizing Provider   albuterol sulfate  (90 Base) MCG/ACT inhaler inhale 2 puffs by mouth and INTO THE LUNGS every 6 hours if needed for wheezing 10/19/20  Yes Harpreet Livingston MD   ondansetron (ZOFRAN ODT) 4 MG disintegrating tablet Take 1 tablet by mouth every 8 hours as needed for Nausea or Vomiting 10/7/20  Yes Harpreet Livingston MD   omeprazole (PRILOSEC) 20 MG delayed release capsule Take 1 capsule by mouth daily 8/10/20  Yes Julissa Sabillon MD   buPROPion [facility] SR) 100 MG extended release tablet Take 1 tablet by mouth 2 times daily 7/14/20  Yes Historical Provider, MD   sucralfate (CARAFATE) 1 GM tablet take 1 tablet by mouth four times a day 7/20/20  Yes Camden Waldrop MD   sennosides-docusate sodium (SENOKOT-S) 8.6-50 MG tablet Take 1 tablet by mouth 2 times daily 6/29/20  Yes Katelin Anne MD   tiZANidine (ZANAFLEX) 4 MG tablet Take 1 tablet by mouth 2 times daily as needed (spasms) 3/31/20  Yes CATHI Perez   ondansetron (ZOFRAN) 4 MG tablet Take 1 tablet by mouth daily as needed for Nausea or Vomiting 3/19/20  Yes Camden Waldrop MD   Multiple Vitamins-Minerals (MULTIVITAMIN ADULT PO) Take by mouth daily    Yes Historical Provider, MD   hydrOXYzine (VISTARIL) 50 MG capsule Take 1 capsule by mouth 3 times daily as needed for Anxiety 10/29/19  Yes Jarad Gay MD   ascorbic acid (V-R VITAMIN C) 250 MG tablet Take 1 tablet by mouth daily Take with the iron supplement 6/24/19  Yes Gibson Timmons MD   Cholecalciferol (VITAMIN D3) 11179 units CAPS Take one capsule every Mon-Wed-Fri for four weeks 6/24/19  Yes Gibson Timmons MD   dicyclomine (BENTYL) 10 MG capsule Take 2 capsules by mouth 4 times daily for 10 days 8/14/20 9/11/20  Keyshawn De La Rosa DO   hydrocortisone 2.5 % cream Apply topically 2 times daily.  7/20/20   Eric De La Vega MD   nicotine (NICODERM CQ) 21 MG/24HR Place 1 patch onto the skin every 24 hours 7/20/20 8/31/20  Eric De La Vega MD   Handicap Placard MISC by Does not apply route Duration: 5 years 7/19/16   Omid Diego DO       Allergies   Allergen Reactions    Pcn [Penicillins] Anaphylaxis and Other (See Comments)     Patient unsure    Food Other (See Comments)     Berries - hives  Lactose Intolerance to milk-diarrhea  Soy-increases menses occurrences         Family History   Problem Relation Age of Onset    Heart Attack Mother 61        death from this    Diabetes Mother     Depression Mother     Diabetes Father     Stroke Father     Cancer Maternal Grandfather     Cancer Paternal Grandmother     Stroke Paternal Grandfather     Substance Abuse Brother        Social History     Tobacco Use    Smoking status: Current Every Day Smoker     Packs/day: 0.50     Years: 15.00     Pack years: 7.50     Types: Cigarettes     Start date: 10/29/1991    Smokeless tobacco: Never Used   Substance Use Topics    Alcohol use: Yes     Alcohol/week: 6.0 standard drinks     Types: 6 Standard drinks or equivalent per week     Comment: social    Drug use: No         Review of Systems   General ROS: negative  Hematological and Lymphatic ROS: negative  Respiratory ROS: no cough, shortness of breath, or wheezing  Cardiovascular ROS: no chest pain or dyspnea on exertion  Gastrointestinal ROS: positive for - abdominal pain  Genito-Urinary ROS: no dysuria, trouble voiding, or hematuria  Musculoskeletal ROS: negative      PHYSICAL EXAM:    Vitals:    10/22/20 1115   BP: (!) 96/54   Pulse: 53   Resp: 12   Temp: 98.7 °F (37.1 °C)   SpO2: 97%       General Appearance:  awake, alert, oriented, in no acute distress  Skin: No rashes or lesions. Head/face:  NCAT  Eyes:  No gross abnormalities. Lungs:  Breathing Pattern: regular, no distress  Heart:  Heart regular rate and rhythm  Abdomen:  Soft, TTP epigastric, non distended   Extremities: Extremities warm to touch, pink, with no edema. LABS:    CBC  Recent Labs     10/22/20  0435   WBC 5.8   HGB 7.7*   HCT 25.0*        BMP  Recent Labs     10/22/20  0435      K 4.1      CO2 26   BUN 8   CREATININE 0.7   CALCIUM 8.2*     Liver Function  Recent Labs     10/22/20  0435   AMYLASE 20   LIPASE 8*   BILITOT 0.2   AST 12   ALT <5   ALKPHOS 66   PROT 5.2*   LABALBU 3.0*     No results for input(s): LACTATE in the last 72 hours. No results for input(s): INR, PTT in the last 72 hours.     Invalid input(s): PT    RADIOLOGY    Ct Abdomen Pelvis Wo Contrast Additional Contrast? None    Result Date: 10/21/2020  EXAMINATION: CT OF THE ABDOMEN AND PELVIS WITHOUT CONTRAST 10/21/2020 6:26 pm TECHNIQUE: CT of the abdomen and pelvis was performed without the administration of intravenous contrast. Multiplanar reformatted images are provided for review. Dose modulation, iterative reconstruction, and/or weight based adjustment of the mA/kV was utilized to reduce the radiation dose to as low as reasonably achievable. COMPARISON: CT of the abdomen and pelvis obtained on the same day at 15:00.  CT of the abdomen and pelvis from August 14, 2020. HISTORY: ORDERING SYSTEM PROVIDED HISTORY: evaluate oral contrast movement TECHNOLOGIST PROVIDED HISTORY: Reason for exam:->evaluate oral contrast movement Additional Contrast?->None FINDINGS: Lower Chest: Lung bases appear clear. Minimal coronary artery disease. Organs: No hepatic mass or intrahepatic ductal dilatation. Mild periportal edema. Patient has undergone previous cholecystectomy. Normal appearance of the spleen, adrenal glands, and pancreas. Kidneys, ureters, and bladder: 23 mm low-attenuation lesion off the posterior lower pole of the left kidney series 2, image 87. This cannot be accurately assessed given the presence of intravenous contrast from prior scan. There is no hydronephrosis. No filling defects identified in the opacified portions of the bilateral ureters nor bladder. The previously described cystic structure posterior and to the left of the bladder does not fill with contrast on this exam. GI/Bowel: Patient has undergone previous gastric bypass surgery. No obvious complications in this region. Normal appearance of the small bowel as imaged. Normal appearance of cecum. Appendix not clearly seen. No evidence of bowel obstruction or inflammation. Oral contrast material has progressed into the distal small bowel, right hemicolon, and portions of the transverse colon. Vasculature: Scattered atherosclerotic plaque in the abdominal aorta and branch vessels Pelvis: Uterus not clearly seen.   Left adnexal cystic structure less well visualized on this exam measures approximately 4.8 cm. Peritoneum/Retroperitoneum: No free intraperitoneal air. Small amount of free fluid in the pelvis. No concerning mesenteric nor retroperitoneal adenopathy. Bones/Soft Tissues: Diffuse body wall edema. Anterior abdominal wall appears intact. No change in anterior wedge compression deformity of the T12 and L1 vertebrae. Slight anterolisthesis of L4 on L5 is also unchanged. Multilevel degenerative endplate spondylosis. Osseous mineralization appears slightly decreased. 1.  Oral contrast has progressed into the right hemicolon and right aspect of the transverse colon. (Oral contrast progression can also be assessed via an abdominal radiograph if further needed.) 2. Generalized body wall edema. Mild periportal edema. 3.  Left adnexal fluid cystic structure measures 4.8 cm. Patient is status post hysterectomy. (Consider routine non emergent pelvic ultrasound for further evaluation.) 4. Postsurgical changes in the upper abdomen consistent with gastric bypass surgery and cholecystectomy. Ct Abdomen Pelvis W Iv Contrast Additional Contrast? Oral    Result Date: 10/21/2020  EXAMINATION: CT OF THE ABDOMEN AND PELVIS WITH CONTRAST 10/21/2020 3:00 pm TECHNIQUE: CT of the abdomen and pelvis was performed with the administration of intravenous contrast. Multiplanar reformatted images are provided for review. Dose modulation, iterative reconstruction, and/or weight based adjustment of the mA/kV was utilized to reduce the radiation dose to as low as reasonably achievable. COMPARISON: August 14, 2020 HISTORY: ORDERING SYSTEM PROVIDED HISTORY: abdominal pain, hx gastric bypass, hx perforated peptic ulcers, TECHNOLOGIST PROVIDED HISTORY: Reason for exam:->abdominal pain, hx gastric bypass, hx perforated peptic ulcers, Additional Contrast?->Oral FINDINGS: There is generalized body wall edema. There is generalized hazy appearance of abdominopelvic mesenteric fat.   Postsurgical changes are present in the gastric

## 2020-10-22 NOTE — DISCHARGE SUMMARY
[facility] Physician Discharge Summary       Follow-up With  Details  Why  [address], [address] [facility] [address] [place] [address] Road  [phone]   Elaine Beatty, [address] [facility] [address] [place] [address]  [phone]       Activity level: As tolerated    Diet: DIET BARIATRIC REGULAR TEXTURE;    Dispo: Home with self-care    Condition on discharge-stable    Patient ID:  Jacqlkacey Hendricks  [id number]  50 y.o.  1975    Admit date: 10/21/2020    Discharge date and time:  10/22/2020  3:10 PM    Admission Diagnoses: Principal Problem:    Chronic abdominal pain  Active Problems:    AYALA on CPAP    Morbid obesity (HCC)    HTN (hypertension)    S/P gastric bypass    Epigastric pain    Chronic pain syndrome    Chronic bilateral low back pain without sciatica    Abdominal pain of multiple sites  Resolved Problems:    * No resolved hospital problems. *      Discharge Diagnoses: Principal Problem:    Chronic abdominal pain  Active Problems:    AYALA on CPAP    Morbid obesity (HCC)    HTN (hypertension)    S/P gastric bypass    Epigastric pain    Chronic pain syndrome    Chronic bilateral low back pain without sciatica    Abdominal pain of multiple sites  Resolved Problems:    * No resolved hospital problems. *      Consults:  IP CONSULT TO GENERAL SURGERY    Hospital Course:   She is a 78-year-old female with past medical history of marginal ulcers ,marginal  ulcer with perforation , gastric bypass surgery, GERD- she  was admitted with epigastric/upper abdominal-and some lower abdominal pain. She described pain as sharp, radiating to back, worse with small amount of eggs in the morning prior to arrival. PPI/Carafate without any help. She also described bloating and dyspepsia. PPI/Carafate were continued. Other supportive treatment was continued. General surgery was consulted. Overnight she  pass some gas and she felt better.   This morning she tolerated breakfast followed by lunch without any problem. Currently she is pain-free /symptom-free. No overnight issues noted. She was  evaluated by Dr. Margoth Mcconnell. Surgery okayed discharge. PPI is increased to twice daily as below. She will continue Carafate. She is advised to follow with PCP/surgery as outpatient. On discharge she  is stable. Other comorbid conditions were managed by continuing home medications. Discharge Exam:  Vitals:    10/21/20 1951 10/21/20 2032 10/22/20 0845 10/22/20 1115   BP: 104/67 96/67 105/64 (!) 96/54   Pulse: 57 54 55 53   Resp: 16 16 14 12   Temp: 97.5 °F (36.4 °C) 97.6 °F (36.4 °C) 98.2 °F (36.8 °C) 98.7 °F (37.1 °C)   TempSrc: Oral Oral Oral Oral   SpO2: 97% 98% 99% 97%   Weight:       Height:           General Appearance: alert and oriented to person, place and time, in no acute distress  Skin: warm and dry  Head: normocephalic and atraumatic  Eyes: pupils equal, round, and reactive to light, extraocular eye movements intact, conjunctivae normal  Neck: supple and non-tender without mass  Pulmonary/Chest: clear to auscultation bilaterally  Cardiovascular: normal rate, regular rhythm, normal S1 and S2  Abdomen: soft, non-tender, non-distended, normal bowel sounds, no masses or organomegaly  Extremities: no cyanosis, clubbing   Musculoskeletal: normal range of motion  Neurologic no cranial nerve deficit,  speech normal        LABS:  Recent Labs     10/21/20  1251 10/22/20  0435    137   K 5.1* 4.1    105   CO2 27 26   BUN 10 8   CREATININE 0.6 0.7   GLUCOSE 81 97   CALCIUM 8.4* 8.2*       Recent Labs     10/21/20  1251 10/22/20  0435   WBC 9.3 5.8   RBC 3.79 3.18*   HGB 9.2* 7.7*   HCT 30.0* 25.0*   MCV 79.2* 78.6*   MCH 24.3* 24.2*   MCHC 30.7* 30.8*   RDW 19.7* 19.6*   * 415   MPV 8.3 8.2       No results for input(s): POCGLU in the last 72 hours. Imaging:   CT ABDOMEN PELVIS WO CONTRAST Additional Contrast? None   Final Result   1.   Oral contrast has not apply route Duration: 5 years     hydrocortisone 2.5 % cream  Apply topically 2 times daily. hydrOXYzine 50 MG capsule  Commonly known as:  VISTARIL  Take 1 capsule by mouth 3 times daily as needed for Anxiety     MULTIVITAMIN ADULT PO     nicotine 21 MG/24HR  Commonly known as:  NICODERM CQ  Place 1 patch onto the skin every 24 hours     omeprazole 20 MG delayed release capsule  Commonly known as:  PRILOSEC  Take 1 capsule by mouth daily     ondansetron 4 MG disintegrating tablet  Commonly known as:  Zofran ODT  Take 1 tablet by mouth every 8 hours as needed for Nausea or Vomiting     ondansetron 4 MG tablet  Commonly known as:  ZOFRAN  Take 1 tablet by mouth daily as needed for Nausea or Vomiting     sennosides-docusate sodium 8.6-50 MG tablet  Commonly known as:  SENOKOT-S  Take 1 tablet by mouth 2 times daily     sucralfate 1 GM tablet  Commonly known as:  CARAFATE  take 1 tablet by mouth four times a day     Vitamin D3 1.25 MG ([address] UT) Caps  Take one capsule every Mon-Wed-Fri for four weeks        STOP taking these medications    tiZANidine 4 MG tablet  Commonly known as:  Zanaflex          On discharge she was advised to increase Prilosec to bid    Note that 30 minutes was spent in preparing discharge papers, discussing discharge with patient, medication review, etc.    Signed:  Electronically signed by Jon Rodas MD on 10/22/2020 at 3:10 PM    NOTE: This report was transcribed using voice recognition software. Every effort was made to ensure accuracy; however, inadvertent computerized transcription errors may be present.

## 2020-11-03 ENCOUNTER — TELEPHONE (OUTPATIENT)
Dept: BARIATRICS/WEIGHT MGMT | Age: 45
End: 2020-11-03

## 2020-11-04 ENCOUNTER — HOSPITAL ENCOUNTER (OUTPATIENT)
Age: 45
Discharge: HOME | End: 2020-11-04
Payer: MEDICARE

## 2020-11-04 ENCOUNTER — OFFICE VISIT (OUTPATIENT)
Dept: BARIATRICS/WEIGHT MGMT | Age: 45
End: 2020-11-04
Payer: MEDICARE

## 2020-11-04 VITALS
BODY MASS INDEX: 27.97 KG/M2 | TEMPERATURE: 97.9 F | HEIGHT: 66 IN | DIASTOLIC BLOOD PRESSURE: 72 MMHG | WEIGHT: 174 LBS | HEART RATE: 72 BPM | SYSTOLIC BLOOD PRESSURE: 106 MMHG | RESPIRATION RATE: 20 BRPM

## 2020-11-04 LAB
ALBUMIN SERPL-MCNC: 3.4 G/DL (ref 3.5–5.2)
ALP BLD-CCNC: 84 U/L (ref 35–104)
ALT SERPL-CCNC: <5 U/L (ref 0–32)
ANION GAP SERPL CALCULATED.3IONS-SCNC: 5 MMOL/L (ref 7–16)
AST SERPL-CCNC: 12 U/L (ref 0–31)
BILIRUB SERPL-MCNC: 0.2 MG/DL (ref 0–1.2)
BUN BLDV-MCNC: 15 MG/DL (ref 6–20)
CALCIUM SERPL-MCNC: 8.5 MG/DL (ref 8.6–10.2)
CHLORIDE BLD-SCNC: 103 MMOL/L (ref 98–107)
CHOLESTEROL, TOTAL: 140 MG/DL (ref 0–199)
CO2: 27 MMOL/L (ref 22–29)
CREAT SERPL-MCNC: 0.7 MG/DL (ref 0.5–1)
FERRITIN: 4 NG/ML
FOLATE: 15 NG/ML (ref 4.8–24.2)
GFR AFRICAN AMERICAN: >60
GFR NON-AFRICAN AMERICAN: >60 ML/MIN/1.73
GLUCOSE BLD-MCNC: 81 MG/DL (ref 74–99)
HCT VFR BLD CALC: 29.2 % (ref 34–48)
HEMOGLOBIN: 8.8 G/DL (ref 11.5–15.5)
MCH RBC QN AUTO: 24 PG (ref 26–35)
MCHC RBC AUTO-ENTMCNC: 30.1 % (ref 32–34.5)
MCV RBC AUTO: 79.8 FL (ref 80–99.9)
PDW BLD-RTO: 19.3 FL (ref 11.5–15)
PLATELET # BLD: 521 E9/L (ref 130–450)
PMV BLD AUTO: 8.1 FL (ref 7–12)
POTASSIUM SERPL-SCNC: 4.5 MMOL/L (ref 3.5–5)
PREALBUMIN: 17 MG/DL (ref 20–40)
RBC # BLD: 3.66 E12/L (ref 3.5–5.5)
SODIUM BLD-SCNC: 135 MMOL/L (ref 132–146)
TOTAL PROTEIN: 6.5 G/DL (ref 6.4–8.3)
TRIGL SERPL-MCNC: 58 MG/DL (ref 0–149)
VITAMIN B-12: 347 PG/ML (ref 211–946)
VITAMIN D 25-HYDROXY: 30 NG/ML (ref 30–100)
WBC # BLD: 6.4 E9/L (ref 4.5–11.5)

## 2020-11-04 PROCEDURE — 82525 ASSAY OF COPPER: CPT

## 2020-11-04 PROCEDURE — 85027 COMPLETE CBC AUTOMATED: CPT

## 2020-11-04 PROCEDURE — 82728 ASSAY OF FERRITIN: CPT

## 2020-11-04 PROCEDURE — 84425 ASSAY OF VITAMIN B-1: CPT

## 2020-11-04 PROCEDURE — 82306 VITAMIN D 25 HYDROXY: CPT

## 2020-11-04 PROCEDURE — 82607 VITAMIN B-12: CPT

## 2020-11-04 PROCEDURE — 84255 ASSAY OF SELENIUM: CPT

## 2020-11-04 PROCEDURE — 80053 COMPREHEN METABOLIC PANEL: CPT

## 2020-11-04 PROCEDURE — 82746 ASSAY OF FOLIC ACID SERUM: CPT

## 2020-11-04 PROCEDURE — 82465 ASSAY BLD/SERUM CHOLESTEROL: CPT

## 2020-11-04 PROCEDURE — 84134 ASSAY OF PREALBUMIN: CPT

## 2020-11-04 PROCEDURE — 99214 OFFICE O/P EST MOD 30 MIN: CPT | Performed by: SURGERY

## 2020-11-04 PROCEDURE — 84478 ASSAY OF TRIGLYCERIDES: CPT

## 2020-11-04 PROCEDURE — 84630 ASSAY OF ZINC: CPT

## 2020-11-04 PROCEDURE — 36415 COLL VENOUS BLD VENIPUNCTURE: CPT

## 2020-11-04 PROCEDURE — 99211 OFF/OP EST MAY X REQ PHY/QHP: CPT

## 2020-11-04 NOTE — PATIENT INSTRUCTIONS
Please continue to take your vitamin and mineral supplements as instructed. If you received a blood work prescription today for laboratory monitoring due prior to your next routine follow-up visit, please have this blood work obtained 10 to 14 days prior to your next visit. It is important to fast for 12 hours prior to routine weight loss surgery blood work, EXCEPT for drinking water, to ensure accuracy of results. Please report nausea, vomiting, abdominal pain, or any other problems you experience to your surgeon. For problems related to weight loss surgery, it is best to go to [address] [place] [address] Emergency Department and have your surgeon paged.

## 2020-11-04 NOTE — PROGRESS NOTES
Halle Mantilla  11/4/2020  [address]     Viviane-en- Y Gastric Bypass  3 Year Post-Operative Follow-up     Halle Mantilla is a 50 y.o. female who is 3 years post Laparoscopic Viviane-en-Y Gastric Bypass surgery. Reports recurrent ulcers and is continually smoking. She is not having swallowing difficulty, is compliant most of the time with the multivitamins and calcium + Vit D. She is meeting fluid recommendations of at least 64 ounces per day and is meeting protein recommendations. She  is not exercising: no regular exercise. Weight=174 lb (78.9 kg)  Today's weight represents a total weight loss of 297 pounds since surgery with 6 pounds regained since the lowest weight. Prior to Admission medications    Medication Sig Start Date End Date Taking? Authorizing Provider   omeprazole (PRILOSEC) 20 MG delayed release capsule Take 1 capsule by mouth 2 times daily 10/22/20  Yes Luis Santacruz MD   albuterol sulfate  (90 Base) MCG/ACT inhaler inhale 2 puffs by mouth and INTO THE LUNGS every 6 hours if needed for wheezing 10/19/20  Yes Helga Bautista MD   buPROPion [facility] SR) 100 MG extended release tablet Take 1 tablet by mouth 2 times daily 7/14/20  Yes Historical Provider, MD   hydrocortisone 2.5 % cream Apply topically 2 times daily.  7/20/20  Yes Helga Bautista MD   sucralfate (CARAFATE) 1 GM tablet take 1 tablet by mouth four times a day 7/20/20  Yes Cindy Preston MD   Multiple Vitamins-Minerals (MULTIVITAMIN ADULT PO) Take by mouth daily    Yes Historical Provider, MD   hydrOXYzine (VISTARIL) 50 MG capsule Take 1 capsule by mouth 3 times daily as needed for Anxiety 10/29/19  Yes Madelin New MD   ascorbic acid (V-R VITAMIN C) 250 MG tablet Take 1 tablet by mouth daily Take with the iron supplement 6/24/19  Yes Luis Santacruz MD   Cholecalciferol (VITAMIN D3) 60184 units CAPS Take one capsule every Mon-Wed-Fri for four weeks 6/24/19  Yes Luis Santacruz MD Handicap Placard MISC by Does not apply route Duration: 5 years 7/19/16  Yes Elvie Joe, DO   dicyclomine (BENTYL) 10 MG capsule Take 2 capsules by mouth 4 times daily for 10 days 8/14/20 9/11/20  Keyshawn De La Rosa, DO   nicotine (NICODERM CQ) 21 MG/24HR Place 1 patch onto the skin every 24 hours 7/20/20 8/31/20  Gabi Lester MD   sennosides-docusate sodium (SENOKOT-S) 8.6-50 MG tablet Take 1 tablet by mouth 2 times daily 6/29/20   Luisa Guido MD   ondansetron [facility]) 4 MG tablet Take 1 tablet by mouth daily as needed for Nausea or Vomiting 3/19/20   Mc Murphy MD          Physical exam:   /72 (Site: Left Upper Arm, Position: Sitting, Cuff Size: Medium Adult)   Pulse 72   Temp 97.9 °F (36.6 °C) (Temporal)   Resp 20   Ht 5' 6\" (1.676 m)   Wt 174 lb (78.9 kg)   LMP 10/21/2019 (Exact Date)   BMI 28.08 kg/m²    General appearance: alert, appears stated age and cooperative  Head: Normocephalic, without obvious abnormality, atraumatic  Neck: no adenopathy, no carotid bruit, no JVD, supple, symmetrical, trachea midline and thyroid not enlarged, symmetric, no tenderness/mass/nodules  Lungs: clear to auscultation bilaterally  Heart: regular rate and rhythm  Abdomen: soft, non-tender; bowel sounds normal; no masses,  no organomegaly  Extremities: extremities normal, atraumatic, no cyanosis or edema    Assessment: Post Viviane-en- Y Gastric Bypass. She does not complain of GERD,  does not have sleep apnea,  does not have diabetes,  does not have hypertension off medical treatment. Cholesterol and triglycerides are normal. Recurrent ulcers with perforations and surgery multiple times    Plan:  Stop smoking or ulcers will cont to recur. Continue to eat a high protein, low calorie diet, eat small portions very slowly and chew well before swallowing. Drink plenty of water and fluids. Make sure to use fiber to keep the bowels regular. Try to exercise 7 days per week, maintain adequate variety and balance. Always notify the clinic if you have any medical problems. Follow up in 12 months.       Physician Signature: Electronically signed by Dr. Mireya Herrera MD

## 2020-11-09 LAB — VITAMIN B1 WHOLE BLOOD: 106 NMOL/L (ref 70–180)

## 2020-11-10 LAB
COPPER: 128.6 UG/DL (ref 80–155)
SELENIUM: 131.5 UG/L (ref 23–190)
ZINC: 53.2 UG/DL (ref 60–120)

## 2020-12-07 RX ORDER — ALBUTEROL SULFATE 90 UG/1
AEROSOL, METERED RESPIRATORY (INHALATION)
Qty: 8.5 G | Refills: 1 | Status: SHIPPED
Start: 2020-12-07 | End: 2021-02-01

## 2020-12-28 RX ORDER — ONDANSETRON 4 MG/1
4 TABLET, FILM COATED ORAL DAILY PRN
Qty: 30 TABLET | Refills: 0 | Status: SHIPPED
Start: 2020-12-28 | End: 2021-02-09

## 2021-01-29 DIAGNOSIS — J44.1 COPD EXACERBATION (HCC): ICD-10-CM

## 2021-02-01 RX ORDER — ALBUTEROL SULFATE 90 UG/1
AEROSOL, METERED RESPIRATORY (INHALATION)
Qty: 8.5 G | Refills: 1 | Status: SHIPPED
Start: 2021-02-01 | End: 2021-03-19

## 2021-02-07 DIAGNOSIS — R11.2 NAUSEA AND VOMITING IN ADULT: ICD-10-CM

## 2021-02-09 RX ORDER — ONDANSETRON 4 MG/1
TABLET, ORALLY DISINTEGRATING ORAL
Qty: 24 TABLET | Refills: 0 | Status: SHIPPED
Start: 2021-02-09 | End: 2021-10-12 | Stop reason: SDUPTHER

## 2021-02-09 RX ORDER — ONDANSETRON 4 MG/1
TABLET, FILM COATED ORAL
Qty: 30 TABLET | Refills: 0 | Status: SHIPPED
Start: 2021-02-09 | End: 2021-02-12

## 2021-02-09 NOTE — TELEPHONE ENCOUNTER
Last Appointment:  7/20/2020  Future Appointments   Date Time Provider Dylon Nicole   11/3/2021  2:00 PM Roya Ibanez MD [facility]urg [facility]

## 2021-02-10 ENCOUNTER — TELEPHONE (OUTPATIENT)
Dept: BARIATRICS/WEIGHT MGMT | Age: 46
End: 2021-02-10

## 2021-02-10 DIAGNOSIS — K28.9 MARGINAL ULCER: Primary | ICD-10-CM

## 2021-02-10 RX ORDER — OMEPRAZOLE 20 MG/1
20 CAPSULE, DELAYED RELEASE ORAL DAILY
Qty: 30 CAPSULE | Refills: 2 | Status: SHIPPED
Start: 2021-02-10 | End: 2021-05-10

## 2021-03-10 ENCOUNTER — HOSPITAL ENCOUNTER (EMERGENCY)
Age: 46
Discharge: HOME | End: 2021-03-10
Payer: MEDICARE

## 2021-03-10 ENCOUNTER — APPOINTMENT (OUTPATIENT)
Dept: CT IMAGING | Age: 46
End: 2021-03-10
Payer: MEDICARE

## 2021-03-10 VITALS
TEMPERATURE: 97.8 F | DIASTOLIC BLOOD PRESSURE: 64 MMHG | SYSTOLIC BLOOD PRESSURE: 99 MMHG | RESPIRATION RATE: 14 BRPM | WEIGHT: 179 LBS | HEIGHT: 66 IN | HEART RATE: 75 BPM | OXYGEN SATURATION: 99 % | BODY MASS INDEX: 28.77 KG/M2

## 2021-03-10 DIAGNOSIS — R10.9 FLANK PAIN: Primary | ICD-10-CM

## 2021-03-10 LAB
ANION GAP SERPL CALCULATED.3IONS-SCNC: 7 MMOL/L (ref 7–16)
BASOPHILS ABSOLUTE: 0.07 E9/L (ref 0–0.2)
BASOPHILS RELATIVE PERCENT: 1 % (ref 0–2)
BILIRUBIN URINE: NEGATIVE
BLOOD, URINE: NEGATIVE
BUN BLDV-MCNC: 12 MG/DL (ref 6–20)
CALCIUM SERPL-MCNC: 8.6 MG/DL (ref 8.6–10.2)
CHLORIDE BLD-SCNC: 103 MMOL/L (ref 98–107)
CLARITY: CLEAR
CO2: 27 MMOL/L (ref 22–29)
COLOR: YELLOW
CREAT SERPL-MCNC: 0.6 MG/DL (ref 0.5–1)
EOSINOPHILS ABSOLUTE: 0.15 E9/L (ref 0.05–0.5)
EOSINOPHILS RELATIVE PERCENT: 2.1 % (ref 0–6)
GFR AFRICAN AMERICAN: >60
GFR NON-AFRICAN AMERICAN: >60 ML/MIN/1.73
GLUCOSE BLD-MCNC: 83 MG/DL (ref 74–99)
GLUCOSE URINE: NEGATIVE MG/DL
HCT VFR BLD CALC: 29.9 % (ref 34–48)
HEMOGLOBIN: 9 G/DL (ref 11.5–15.5)
IMMATURE GRANULOCYTES #: 0.02 E9/L
IMMATURE GRANULOCYTES %: 0.3 % (ref 0–5)
KETONES, URINE: NEGATIVE MG/DL
LEUKOCYTE ESTERASE, URINE: NEGATIVE
LYMPHOCYTES ABSOLUTE: 2.56 E9/L (ref 1.5–4)
LYMPHOCYTES RELATIVE PERCENT: 35.3 % (ref 20–42)
MCH RBC QN AUTO: 23.7 PG (ref 26–35)
MCHC RBC AUTO-ENTMCNC: 30.1 % (ref 32–34.5)
MCV RBC AUTO: 78.9 FL (ref 80–99.9)
MONOCYTES ABSOLUTE: 0.47 E9/L (ref 0.1–0.95)
MONOCYTES RELATIVE PERCENT: 6.5 % (ref 2–12)
NEUTROPHILS ABSOLUTE: 3.98 E9/L (ref 1.8–7.3)
NEUTROPHILS RELATIVE PERCENT: 54.8 % (ref 43–80)
NITRITE, URINE: NEGATIVE
PDW BLD-RTO: 19 FL (ref 11.5–15)
PH UA: 6 (ref 5–9)
PLATELET # BLD: 452 E9/L (ref 130–450)
PMV BLD AUTO: 8.4 FL (ref 7–12)
POTASSIUM SERPL-SCNC: 4.5 MMOL/L (ref 3.5–5)
PROTEIN UA: NEGATIVE MG/DL
RBC # BLD: 3.79 E12/L (ref 3.5–5.5)
SODIUM BLD-SCNC: 137 MMOL/L (ref 132–146)
SPECIFIC GRAVITY UA: >=1.03 (ref 1–1.03)
UROBILINOGEN, URINE: 0.2 E.U./DL
WBC # BLD: 7.3 E9/L (ref 4.5–11.5)

## 2021-03-10 PROCEDURE — 74176 CT ABD & PELVIS W/O CONTRAST: CPT

## 2021-03-10 PROCEDURE — 87088 URINE BACTERIA CULTURE: CPT

## 2021-03-10 PROCEDURE — 85025 COMPLETE CBC W/AUTO DIFF WBC: CPT

## 2021-03-10 PROCEDURE — 96372 THER/PROPH/DIAG INJ SC/IM: CPT

## 2021-03-10 PROCEDURE — 36415 COLL VENOUS BLD VENIPUNCTURE: CPT

## 2021-03-10 PROCEDURE — 81003 URINALYSIS AUTO W/O SCOPE: CPT

## 2021-03-10 PROCEDURE — 80048 BASIC METABOLIC PNL TOTAL CA: CPT

## 2021-03-10 PROCEDURE — 99284 EMERGENCY DEPT VISIT MOD MDM: CPT

## 2021-03-10 PROCEDURE — 6360000002 HC RX W HCPCS: Performed by: PHYSICIAN ASSISTANT

## 2021-03-10 RX ORDER — KETOROLAC TROMETHAMINE 30 MG/ML
30 INJECTION, SOLUTION INTRAMUSCULAR; INTRAVENOUS ONCE
Status: COMPLETED | OUTPATIENT
Start: 2021-03-10 | End: 2021-03-10

## 2021-03-10 RX ORDER — ONDANSETRON 4 MG/1
4 TABLET, ORALLY DISINTEGRATING ORAL EVERY 8 HOURS PRN
Qty: 10 TABLET | Refills: 0 | Status: SHIPPED | OUTPATIENT
Start: 2021-03-10 | End: 2021-06-30

## 2021-03-10 RX ORDER — HYDROCODONE BITARTRATE AND ACETAMINOPHEN 5; 325 MG/1; MG/1
1 TABLET ORAL EVERY 6 HOURS PRN
Qty: 12 TABLET | Refills: 0 | Status: SHIPPED | OUTPATIENT
Start: 2021-03-10 | End: 2021-03-13

## 2021-03-10 RX ADMIN — KETOROLAC TROMETHAMINE 30 MG: 30 INJECTION, SOLUTION INTRAMUSCULAR; INTRAVENOUS at 12:28

## 2021-03-10 ASSESSMENT — PAIN SCALES - GENERAL
PAINLEVEL_OUTOF10: 8
PAINLEVEL_OUTOF10: 8

## 2021-03-10 ASSESSMENT — PAIN DESCRIPTION - PAIN TYPE: TYPE: ACUTE PAIN

## 2021-03-10 NOTE — ED PROVIDER NOTES
[address] [place] [address]  Department of Emergency Medicine   ED  Encounter Note  Admit Date/RoomTime: 3/10/2021 11:19 AM  ED Room: 34/34    NAME: Marcelina Eisenmenger  : 1975  MRN: [id number]     Chief Complaint:  Back Pain (R worse with movement radiates around R hip.)    History of Present Illness       Marcelina Eisenmenger is a 50 y.o. old female who presents to the emergency department by private vehicle, for complaints of sudden onset of pain in the right flank with radiation to right groin which began 1 day(s) prior to arrival.  Since onset the symptoms have been persistent and moderate in severity. Associated sign's & symptoms: noything additional. The symptoms are aggravated by exertion, stress and nothing and relieved by nothing. ROS   Pertinent positives and negatives are stated within HPI, all other systems reviewed and are negative. Past Medical History:  has a past medical history of Anemia, Arthritis, Asthma, Depression, GERD without esophagitis, History of blood transfusion, History of gastric bypass, Hydronephrosis with urinary obstruction due to ureteral calculus, Hypertension, Iron deficiency, Localized osteoarthritis of left knee, Lumbar spondylosis, Lymphedema, Obesity, Panic attacks, Perforated marginal ulcer, PONV (postoperative nausea and vomiting), Prediabetes, Sleep apnea, obstructive, Vitamin D deficiency, and Zinc deficiency. Surgical History:  has a past surgical history that includes Appendectomy (); Viviane-en-Y Gastric Bypass (2017); pr office/outpt visit,procedure only (N/A, 10/29/2018); Stomach surgery; Lithotripsy (Right, 2019); Cholecystectomy, laparoscopic (N/A, 2019); Upper gastrointestinal endoscopy (N/A, 2019); Lithotripsy; Dilation and curettage of uterus (10/23/2019); Hysterectomy (2020);  Upper gastrointestinal endoscopy (N/A, 2020); laparoscopy (N/A, 2020); and Upper gastrointestinal endoscopy (N/A, 6/27/2020). Social History:  reports that she has been smoking cigarettes. She started smoking about 29 years ago. She has a 7.50 pack-year smoking history. She has never used smokeless tobacco. She reports current alcohol use of about 6.0 standard drinks of alcohol per week. She reports that she does not use drugs. Family History: family history includes Cancer in her maternal grandfather and paternal grandmother; Depression in her mother; Diabetes in her father and mother; Heart Attack (age of onset: 61) in her mother; Stroke in her father and paternal grandfather; Substance Abuse in her brother. Allergies: Pcn [penicillins] and Food    Physical Exam   Oxygen Saturation Interpretation: Normal.        ED Triage Vitals [03/10/21 1117]   BP Temp Temp Source Pulse Resp SpO2 Height Weight   99/64 97.8 °F (36.6 °C) Tympanic 75 14 99 % 5' 6\" (1.676 m) 179 lb (81.2 kg)         Constitutional:  Alert, development consistent with age. HEENT:  NC/NT. Airway patent. Neck:  Normal ROM. Supple. Respiratory:  Clear to auscultation and breath sounds equal.  CV:  Regular rate and rhythm, normal heart sounds, without pathological murmurs, ectopy, gallops, or rubs. GI:  normal appearing, non-distended with no visible hernias. Bowel sounds: normal bowel sounds. Tenderness: There is mild tenderness present - located in the right flank. .        Liver: non-palpable. Spleen:  non-palpable. /Back: CVA Tenderness: Yes, Right. Integument:  Normal turgor. Warm, dry, without visible rash, unless noted elsewhere. Lymphatics: No lymphangitis or adenopathy noted. Neurological:  Oriented. Motor functions intact.     Lab / Imaging Results   (All laboratory and radiology results have been personally reviewed by myself)  Labs:  Results for orders placed or performed during the hospital encounter of 03/10/21   CBC Auto Differential   Result Value Ref Range    WBC 7.3 4.5 - 11.5 E9/L    RBC 3.79 3.50 - 5.50 E12/L    Hemoglobin 9.0 (L) 11.5 - 15.5 g/dL    Hematocrit 29.9 (L) 34.0 - 48.0 %    MCV 78.9 (L) 80.0 - 99.9 fL    MCH 23.7 (L) 26.0 - 35.0 pg    MCHC 30.1 (L) 32.0 - 34.5 %    RDW 19.0 (H) 11.5 - 15.0 fL    Platelets 301 (H) 529 - 450 E9/L    MPV 8.4 7.0 - 12.0 fL    Neutrophils % 54.8 43.0 - 80.0 %    Immature Granulocytes % 0.3 0.0 - 5.0 %    Lymphocytes % 35.3 20.0 - 42.0 %    Monocytes % 6.5 2.0 - 12.0 %    Eosinophils % 2.1 0.0 - 6.0 %    Basophils % 1.0 0.0 - 2.0 %    Neutrophils Absolute 3.98 1.80 - 7.30 E9/L    Immature Granulocytes # 0.02 E9/L    Lymphocytes Absolute 2.56 1.50 - 4.00 E9/L    Monocytes Absolute 0.47 0.10 - 0.95 E9/L    Eosinophils Absolute 0.15 0.05 - 0.50 E9/L    Basophils Absolute 0.07 0.00 - 0.20 Z1/Z   Basic Metabolic Panel   Result Value Ref Range    Sodium 137 132 - 146 mmol/L    Potassium 4.5 3.5 - 5.0 mmol/L    Chloride 103 98 - 107 mmol/L    CO2 27 22 - 29 mmol/L    Anion Gap 7 7 - 16 mmol/L    Glucose 83 74 - 99 mg/dL    BUN 12 6 - 20 mg/dL    CREATININE 0.6 0.5 - 1.0 mg/dL    GFR Non-African American >60 >=60 mL/min/1.73    GFR African American >60     Calcium 8.6 8.6 - 10.2 mg/dL   Urinalysis   Result Value Ref Range    Color, UA Yellow Straw/Yellow    Clarity, UA Clear Clear    Glucose, Ur Negative Negative mg/dL    Bilirubin Urine Negative Negative    Ketones, Urine Negative Negative mg/dL    Specific Gravity, UA >=1.030 1.005 - 1.030    Blood, Urine Negative Negative    pH, UA 6.0 5.0 - 9.0    Protein, UA Negative Negative mg/dL    Urobilinogen, Urine 0.2 <2.0 E.U./dL    Nitrite, Urine Negative Negative    Leukocyte Esterase, Urine Negative Negative       Imaging: All Radiology results interpreted by Radiologist unless otherwise noted. CT ABDOMEN PELVIS WO CONTRAST Additional Contrast? None   Final Result   No indication for acute intraperitoneal retroperitoneal process in the   abdomen in the pelvis. See above comments.            ED Course / Medical Decision Making     Medications   ketorolac (TORADOL) injection 30 mg (30 mg Intramuscular Given 3/10/21 1226)        Re-examination:  3/10/21       Time: 1500  Patients condition has improved and is mildly symptomatic . Consult(s):   None    Procedure(s):   None. MDM:   Patient presented with kidney stone like pain, urinalysis was negative. CT scan unremarkable. She did receive some relief with pain medications. We will treat her like kidney stone versus musculoskeletal pain and refer her back to primary care. Plan of Care/Counseling:  I reviewed today's visit with the patient in addition to providing specific details for the plan of care and counseling regarding the diagnosis and prognosis. Questions are answered at this time and are agreeable with the plan. Assessment      1. Flank pain      Plan   Discharge home. Patient condition is good    New Medications     New Prescriptions    HYDROCODONE-ACETAMINOPHEN (NORCO) 5-325 MG PER TABLET    Take 1 tablet by mouth every 6 hours as needed for Pain for up to 3 days. ONDANSETRON (ZOFRAN ODT) 4 MG DISINTEGRATING TABLET    Take 1 tablet by mouth every 8 hours as needed for Nausea or Vomiting     Electronically signed by CATHI Ram   DD: 3/10/21  **This report was transcribed using voice recognition software. Every effort was made to ensure accuracy; however, inadvertent computerized transcription errors may be present.   END OF ED PROVIDER NOTE       CATHI Carreon  03/10/21 9263

## 2021-03-12 LAB — URINE CULTURE, ROUTINE: NORMAL

## 2021-03-19 DIAGNOSIS — J44.1 COPD EXACERBATION (HCC): ICD-10-CM

## 2021-03-19 RX ORDER — ALBUTEROL SULFATE 90 UG/1
AEROSOL, METERED RESPIRATORY (INHALATION)
Qty: 8.5 G | Refills: 1 | Status: SHIPPED
Start: 2021-03-19 | End: 2021-06-10

## 2021-03-19 NOTE — TELEPHONE ENCOUNTER
Last Appointment:  7/20/2020  Future Appointments   Date Time Provider Dylon Nicole   11/3/2021  2:00 PM Betina Moran MD Surg [facility]

## 2021-03-22 ENCOUNTER — TELEPHONE (OUTPATIENT)
Dept: ADMINISTRATIVE | Age: 46
End: 2021-03-22

## 2021-04-08 ENCOUNTER — OFFICE VISIT (OUTPATIENT)
Dept: PHYSICAL MEDICINE AND REHAB | Age: 46
End: 2021-04-08
Payer: MEDICARE

## 2021-04-08 VITALS
BODY MASS INDEX: 28.12 KG/M2 | HEART RATE: 73 BPM | WEIGHT: 175 LBS | TEMPERATURE: 97.5 F | SYSTOLIC BLOOD PRESSURE: 120 MMHG | HEIGHT: 66 IN | DIASTOLIC BLOOD PRESSURE: 78 MMHG

## 2021-04-08 DIAGNOSIS — M17.11 PRIMARY OSTEOARTHRITIS OF RIGHT KNEE: Primary | ICD-10-CM

## 2021-04-08 PROCEDURE — 20611 DRAIN/INJ JOINT/BURSA W/US: CPT | Performed by: PHYSICAL MEDICINE & REHABILITATION

## 2021-04-08 RX ORDER — TRIAMCINOLONE ACETONIDE 40 MG/ML
40 INJECTION, SUSPENSION INTRA-ARTICULAR; INTRAMUSCULAR ONCE
Status: COMPLETED | OUTPATIENT
Start: 2021-04-08 | End: 2021-04-08

## 2021-04-08 RX ORDER — LIDOCAINE HYDROCHLORIDE 10 MG/ML
7 INJECTION, SOLUTION INFILTRATION; PERINEURAL ONCE
Status: COMPLETED | OUTPATIENT
Start: 2021-04-08 | End: 2021-04-08

## 2021-04-08 RX ADMIN — TRIAMCINOLONE ACETONIDE 40 MG: 40 INJECTION, SUSPENSION INTRA-ARTICULAR; INTRAMUSCULAR at 10:28

## 2021-04-08 RX ADMIN — LIDOCAINE HYDROCHLORIDE 7 ML: 10 INJECTION, SOLUTION INFILTRATION; PERINEURAL at 10:27

## 2021-04-15 ENCOUNTER — TELEPHONE (OUTPATIENT)
Dept: PHYSICAL MEDICINE AND REHAB | Age: 46
End: 2021-04-15

## 2021-04-15 NOTE — TELEPHONE ENCOUNTER
Called patient to follow up on right knee injection and patient rated the effectiveness at 80%-90%. Statement Selected

## 2021-04-16 ENCOUNTER — CARE COORDINATION (OUTPATIENT)
Dept: CARE COORDINATION | Age: 46
End: 2021-04-16

## 2021-04-16 NOTE — CARE COORDINATION
Attempted to reach patient by telephone. Left HIPAA compliant message requesting a return call. Will attempt to reach patient again.

## 2021-04-27 ENCOUNTER — CARE COORDINATION (OUTPATIENT)
Dept: CARE COORDINATION | Age: 46
End: 2021-04-27

## 2021-05-04 ENCOUNTER — CARE COORDINATION (OUTPATIENT)
Dept: CARE COORDINATION | Age: 46
End: 2021-05-04

## 2021-05-04 NOTE — CARE COORDINATION
Attempted to reach patient by telephone. Left HIPAA compliant message requesting a return call. No further outreach scheduled at this time as [facility] is unable to reach Bernie.   Will place on temporary exclusion list.

## 2021-05-07 DIAGNOSIS — K28.9 MARGINAL ULCER: ICD-10-CM

## 2021-05-10 RX ORDER — OMEPRAZOLE 20 MG/1
CAPSULE, DELAYED RELEASE ORAL
Qty: 30 CAPSULE | Refills: 2 | Status: SHIPPED
Start: 2021-05-10 | End: 2021-08-03

## 2021-06-02 ENCOUNTER — OFFICE VISIT (OUTPATIENT)
Dept: FAMILY MEDICINE CLINIC | Age: 46
End: 2021-06-02
Payer: MEDICARE

## 2021-06-02 VITALS
BODY MASS INDEX: 28.12 KG/M2 | TEMPERATURE: 98 F | SYSTOLIC BLOOD PRESSURE: 100 MMHG | DIASTOLIC BLOOD PRESSURE: 71 MMHG | HEART RATE: 79 BPM | HEIGHT: 66 IN | OXYGEN SATURATION: 99 % | WEIGHT: 175 LBS

## 2021-06-02 DIAGNOSIS — G43.119 INTRACTABLE MIGRAINE WITH AURA WITHOUT STATUS MIGRAINOSUS: Primary | ICD-10-CM

## 2021-06-02 DIAGNOSIS — M17.11 PRIMARY OSTEOARTHRITIS OF RIGHT KNEE: ICD-10-CM

## 2021-06-02 DIAGNOSIS — J30.1 SEASONAL ALLERGIC RHINITIS DUE TO POLLEN: ICD-10-CM

## 2021-06-02 DIAGNOSIS — M17.12 LOCALIZED OSTEOARTHRITIS OF LEFT KNEE: ICD-10-CM

## 2021-06-02 PROCEDURE — 99213 OFFICE O/P EST LOW 20 MIN: CPT | Performed by: STUDENT IN AN ORGANIZED HEALTH CARE EDUCATION/TRAINING PROGRAM

## 2021-06-02 PROCEDURE — G8419 CALC BMI OUT NRM PARAM NOF/U: HCPCS | Performed by: FAMILY MEDICINE

## 2021-06-02 PROCEDURE — G8427 DOCREV CUR MEDS BY ELIG CLIN: HCPCS | Performed by: FAMILY MEDICINE

## 2021-06-02 PROCEDURE — 4004F PT TOBACCO SCREEN RCVD TLK: CPT | Performed by: FAMILY MEDICINE

## 2021-06-02 PROCEDURE — 99212 OFFICE O/P EST SF 10 MIN: CPT | Performed by: STUDENT IN AN ORGANIZED HEALTH CARE EDUCATION/TRAINING PROGRAM

## 2021-06-02 RX ORDER — SUMATRIPTAN 50 MG/1
50 TABLET, FILM COATED ORAL
Qty: 3 TABLET | Refills: 3 | Status: SHIPPED | OUTPATIENT
Start: 2021-06-02 | End: 2021-08-27 | Stop reason: SDUPTHER

## 2021-06-02 RX ORDER — FLUTICASONE PROPIONATE 50 MCG
2 SPRAY, SUSPENSION (ML) NASAL DAILY
Qty: 3 BOTTLE | Refills: 1 | Status: SHIPPED | OUTPATIENT
Start: 2021-06-02 | End: 2021-11-12

## 2021-06-02 NOTE — PROGRESS NOTES
Christian Cortes (:  1975) is a 50 y.o. female,Established patient, here for evaluation of the following chief complaint(s):  Nasal Congestion (1 week) and Other (Placard)         ASSESSMENT/PLAN:  1. Intractable migraine with aura without status migrainosus  -     SUMAtriptan (IMITREX) 50 MG tablet; Take 1 tablet by mouth once as needed for Migraine, Disp-3 tablet, R-3Normal  2. Seasonal allergic rhinitis due to pollen  -     fluticasone (FLONASE) 50 MCG/ACT nasal spray; 2 sprays by Each Nostril route daily, Disp-3 Bottle, R-1Normal  3. Primary osteoarthritis of right knee  -     Handicap Placard MISC; Starting 2021, Disp-1 each, R-0, Print  4. Localized osteoarthritis of left knee  -     Handicap Placard MISC; Starting 2021, Disp-1 each, R-0, Print    Discussed with patient her symptoms clinically appears to be allergic rhinitis and a trial of Flonase. Discussed with patient if Flonase does not help after consistent use will try to add antihistaminics. If symptoms does not resolve repeat evaluation for possible bacterial sinusitis discussed with patient. At present we will not try antibiotics patient agrees. Ordered Imitrex for as needed migraine headaches. Discussed to follow-up for migraine if symptoms have been more than once or twice a month. Handicap placard ordered. Recommended to follow-up with PCP in 1 month. No follow-ups on file. Subjective   SUBJECTIVE/OBJECTIVE:  Patient is a 61-year-old female for same-day appointment.   Patient has  a past medical history of Anemia, Arthritis, Asthma, Depression, GERD without esophagitis, History of blood transfusion, History of gastric bypass, Hydronephrosis with urinary obstruction due to ureteral calculus, Hypertension, Iron deficiency, Localized osteoarthritis of left knee, Lumbar spondylosis, Lymphedema, Obesity, Panic attacks, Perforated marginal ulcer, PONV (postoperative nausea and vomiting), Prediabetes, Sleep apnea, obstructive, Vitamin D deficiency, and Zinc deficiency. Patient reports she has been experiencing nasal congestion sinus pain going on for less than a week, she experiences similar episodes during the season in past she has been treated with antibiotic. She denies fever chills nausea and vomiting. Denies history of ear infections in recent past.  Patient expecting antibiotics    Patient requesting for handicap placard for her bilateral knee osteoarthritis pain. Patient denies any abdominal pain chest pain leg swelling. Patient has history of migraine, takes Tylenol which helps, episodes happens approximately once every 3 months patient requesting for as needed medication. She does not experience migraine headaches currently. Review of Systems   As per HPI    Objective   Physical Exam    Blood pressure 100/71, pulse 79, temperature 98 °F (36.7 °C), temperature source Temporal, height 5' 6\" (1.676 m), weight 175 lb (79.4 kg), last menstrual period 10/21/2019, SpO2 99 %, not currently breastfeeding. HEAD: Atraumatic, normocephalic  EYES: PERRL, EOM intact  EARS: bilateral TM's and external ear canals normal  NOSE: nasal mucosa, septum, turbinates normal bilaterally  MOUTH: mucous membranes moist and normal tonsils  NECK: supple, full range of motion, no mass, normal lymphadenopathy, no thyromegaly  CV:  RRR, no murmur  Resp: CTA    An electronic signature was used to authenticate this note.     --Joan Garner MD No

## 2021-06-10 DIAGNOSIS — J44.1 COPD EXACERBATION (HCC): ICD-10-CM

## 2021-06-10 RX ORDER — ALBUTEROL SULFATE 90 UG/1
AEROSOL, METERED RESPIRATORY (INHALATION)
Qty: 8.5 G | Refills: 1 | Status: SHIPPED
Start: 2021-06-10 | End: 2022-01-14 | Stop reason: SDUPTHER

## 2021-06-10 NOTE — TELEPHONE ENCOUNTER
Last Appointment:  7/20/2020  Future Appointments   Date Time Provider Dylon Nicole   6/18/2021  1:20 PM MD Merissa Pelaez [facility]   6/23/2021 10:00 AM [facility]   6/30/2021 10:00 AM [facility]   7/7/2021 10:00 AM [facility]   11/3/2021  2:00 PM Carlee Cuellar MD [facility]

## 2021-06-18 ENCOUNTER — OFFICE VISIT (OUTPATIENT)
Dept: FAMILY MEDICINE CLINIC | Age: 46
End: 2021-06-18
Payer: MEDICARE

## 2021-06-18 VITALS
HEART RATE: 72 BPM | TEMPERATURE: 97.5 F | HEIGHT: 66 IN | DIASTOLIC BLOOD PRESSURE: 70 MMHG | OXYGEN SATURATION: 98 % | WEIGHT: 174 LBS | SYSTOLIC BLOOD PRESSURE: 104 MMHG | BODY MASS INDEX: 27.97 KG/M2

## 2021-06-18 DIAGNOSIS — Z00.00 ROUTINE GENERAL MEDICAL EXAMINATION AT A HEALTH CARE FACILITY: Primary | ICD-10-CM

## 2021-06-18 PROCEDURE — 4004F PT TOBACCO SCREEN RCVD TLK: CPT | Performed by: STUDENT IN AN ORGANIZED HEALTH CARE EDUCATION/TRAINING PROGRAM

## 2021-06-18 PROCEDURE — 99212 OFFICE O/P EST SF 10 MIN: CPT | Performed by: STUDENT IN AN ORGANIZED HEALTH CARE EDUCATION/TRAINING PROGRAM

## 2021-06-18 PROCEDURE — G8427 DOCREV CUR MEDS BY ELIG CLIN: HCPCS | Performed by: STUDENT IN AN ORGANIZED HEALTH CARE EDUCATION/TRAINING PROGRAM

## 2021-06-18 PROCEDURE — G8419 CALC BMI OUT NRM PARAM NOF/U: HCPCS | Performed by: STUDENT IN AN ORGANIZED HEALTH CARE EDUCATION/TRAINING PROGRAM

## 2021-06-18 PROCEDURE — 99396 PREV VISIT EST AGE 40-64: CPT | Performed by: STUDENT IN AN ORGANIZED HEALTH CARE EDUCATION/TRAINING PROGRAM

## 2021-06-18 RX ORDER — ACETAMINOPHEN 500 MG
1000 TABLET ORAL EVERY 6 HOURS PRN
Qty: 120 TABLET | Refills: 2 | COMMUNITY
Start: 2021-06-18

## 2021-06-18 SDOH — ECONOMIC STABILITY: FOOD INSECURITY: WITHIN THE PAST 12 MONTHS, THE FOOD YOU BOUGHT JUST DIDN'T LAST AND YOU DIDN'T HAVE MONEY TO GET MORE.: SOMETIMES TRUE

## 2021-06-18 SDOH — ECONOMIC STABILITY: FOOD INSECURITY: WITHIN THE PAST 12 MONTHS, YOU WORRIED THAT YOUR FOOD WOULD RUN OUT BEFORE YOU GOT MONEY TO BUY MORE.: SOMETIMES TRUE

## 2021-06-18 ASSESSMENT — PATIENT HEALTH QUESTIONNAIRE - PHQ9
SUM OF ALL RESPONSES TO PHQ QUESTIONS 1-9: 2
SUM OF ALL RESPONSES TO PHQ9 QUESTIONS 1 & 2: 2
SUM OF ALL RESPONSES TO PHQ9 QUESTIONS 1 & 2: 2
1. LITTLE INTEREST OR PLEASURE IN DOING THINGS: 1
SUM OF ALL RESPONSES TO PHQ QUESTIONS 1-9: 2
2. FEELING DOWN, DEPRESSED OR HOPELESS: 1
1. LITTLE INTEREST OR PLEASURE IN DOING THINGS: 1
2. FEELING DOWN, DEPRESSED OR HOPELESS: 1
SUM OF ALL RESPONSES TO PHQ QUESTIONS 1-9: 2

## 2021-06-18 ASSESSMENT — LIFESTYLE VARIABLES
HOW OFTEN DO YOU HAVE A DRINK CONTAINING ALCOHOL: 0
HOW OFTEN DO YOU HAVE A DRINK CONTAINING ALCOHOL: NEVER
AUDIT-C TOTAL SCORE: INCOMPLETE
HOW OFTEN DO YOU HAVE A DRINK CONTAINING ALCOHOL: 0
AUDIT TOTAL SCORE: INCOMPLETE

## 2021-06-18 ASSESSMENT — SOCIAL DETERMINANTS OF HEALTH (SDOH): HOW HARD IS IT FOR YOU TO PAY FOR THE VERY BASICS LIKE FOOD, HOUSING, MEDICAL CARE, AND HEATING?: VERY HARD

## 2021-06-18 NOTE — PATIENT INSTRUCTIONS
Personalized Preventive Plan for Windy Mosquera - 6/18/2021  Medicare offers a range of preventive health benefits. Some of the tests and screenings are paid in full while other may be subject to a deductible, co-insurance, and/or copay. Some of these benefits include a comprehensive review of your medical history including lifestyle, illnesses that may run in your family, and various assessments and screenings as appropriate. After reviewing your medical record and screening and assessments performed today your provider may have ordered immunizations, labs, imaging, and/or referrals for you. A list of these orders (if applicable) as well as your Preventive Care list are included within your After Visit Summary for your review. Other Preventive Recommendations:    · A preventive eye exam performed by an eye specialist is recommended every 1-2 years to screen for glaucoma; cataracts, macular degeneration, and other eye disorders. · A preventive dental visit is recommended every 6 months. · Try to get at least 150 minutes of exercise per week or 10,000 steps per day on a pedometer . · Order or download the FREE \"Exercise & Physical Activity: Your Everyday Guide\" from [employer]. Call 5-254.527.2449 or search [employer] online. · You need 0592-2141 mg of calcium and 9484-1152 IU of vitamin D per day. It is possible to meet your calcium requirement with diet alone, but a vitamin D supplement is usually necessary to meet this goal.  · When exposed to the sun, use a sunscreen that protects against both UVA and UVB radiation with an SPF of 30 or greater. Reapply every 2 to 3 hours or after sweating, drying off with a towel, or swimming. · Always wear a seat belt when traveling in a car. Always wear a helmet when riding a bicycle or motorcycle.

## 2021-06-18 NOTE — PROGRESS NOTES
Keep headache journal    Screening colonoscopy: Referral to surgery for screening colonoscopy    Follow-up in 2 months for headache

## 2021-06-18 NOTE — PROGRESS NOTES
Medicare Annual Wellness Visit  Name: Wilfredo Piña Date: 2021   MRN: [id number] Sex: Female   Age: 50 y.o. Ethnicity: Non-/Non    : 1975 Race: Cheri Wilson is here for Medicare AW    Current concern: She does report migraine headache for many years. Last episode was 2 months ago. She described headache is sharp, throbbing associated with blurry vision and nausea. Headache last for 3 to 4 hours get better with rest in dark room. She is trying over-the-counter Tylenol 2 to 3 tablets with some relief. Never had any imaging before. Denies any extremity weakness or numbness. Patient was recently seen for headache and prescribed Imitrex for breakthrough migraine headache. Patient did refill but not using. Patient stay at home with her boyfriend. She is doing all daily activities by herself. He does not smoke less than 1 pack/day. Previously tried to quit and went for tobacco cessation class but not successful. Also tried nicotine patch for few months and got back to smoke.     Living will: not interested      Health maintenance: Family history of colon cancer and never had colonoscopy before. No blood in the stool. Patient had total hysterectomy because of dysfunctional uterine bleeding. No pelvic pain or vaginal bleeding    Screenings for behavioral, psychosocial and functional/safety risks, and cognitive dysfunction are all negative except as indicated below. These results, as well as other patient data from the [address] form, are documented in Flowsheets linked to this Encounter. Allergies   Allergen Reactions    Pcn [Penicillins] Anaphylaxis and Other (See Comments)     Patient unsure    Food Other (See Comments)     Berries - hives  Lactose Intolerance to milk-diarrhea  Soy-increases menses occurrences         Prior to Visit Medications    Medication Sig Taking?  Authorizing Provider   SUMAtriptan (IMITREX) 50 MG tablet Take 1 tablet by mouth once as needed for Migraine Yes Gerardo Solano MD   omeprazole (PRILOSEC) 20 MG delayed release capsule take 1 capsule by mouth once daily Yes Senthil Amaya MD   sucralfate (CARAFATE) 1 GM tablet take 1 tablet by mouth four times a day Yes Senthil Amaya MD   Multiple Vitamins-Minerals (MULTIVITAMIN ADULT PO) Take by mouth daily  Yes Historical Provider, MD   ascorbic acid (V-R VITAMIN C) 250 MG tablet Take 1 tablet by mouth daily Take with the iron supplement Yes Raymond Shannon MD   Cholecalciferol (VITAMIN D3) 51150 units CAPS Take one capsule every Mon-Wed-Fri for four weeks Yes Raymond Shannon MD   albuterol sulfate  (90 Base) MCG/ACT inhaler inhale 2 puffs by mouth and INTO THE LUNGS every 6 hours if needed for wheezing  Ann Marie Araiza MD   fluticasone (FLONASE) 50 MCG/ACT nasal spray 2 sprays by Each Nostril route daily  Gerardo Solano MD   Handicap Placard MISC by Does not apply route  Gerardo Solano MD   ondansetron (ZOFRAN ODT) 4 MG disintegrating tablet Take 1 tablet by mouth every 8 hours as needed for Nausea or Vomiting  CATHI Pinedo   ondansetron (ZOFRAN-ODT) 4 MG disintegrating tablet dissolve 1 tablet ON TONGUE every 8 hours if needed for nausea OR vomiting  Ann Marie Araiza MD   ammonium lactate (LAC-HYDRIN) 12 % lotion apply to affected area twice a day FOR 14 DAYS  Patient not taking: Reported on 6/2/2021  Historical Provider, MD   clobetasol (TEMOVATE) 0.05 % ointment Apply topically 2 times daily Apply topically 2 times daily. Patient not taking: Reported on 6/2/2021  JOHN Ko - CNP   dicyclomine (BENTYL) 10 MG capsule Take 2 capsules by mouth 4 times daily for 10 days  Keyshawn De La Rosa DO   buPROPion [facility]) 100 MG extended release tablet Take 1 tablet by mouth 2 times daily  Historical Provider, MD   hydrocortisone 2.5 % cream Apply topically 2 times daily.   Patient not taking: Reported on 6/2/2021  Jennifer Alexis MD   nicotine (NICODERM CQ) 21 MG/24HR Place 1 patch onto the skin every 24 hours  Sohail Bravo MD   sennosides-docusate sodium (SENOKOT-S) 8.6-50 MG tablet Take 1 tablet by mouth 2 times daily  Patient not taking: Reported on 6/2/2021  Cathy Saez MD   hydrOXYzine (VISTARIL) 50 MG capsule Take 1 capsule by mouth 3 times daily as needed for Anxiety  Fozia Evans MD   Handicap Placard MISC by Does not apply route Duration: 5 years  Suzanne Mann DO       Past Medical History:   Diagnosis Date    Anemia     Arthritis     Asthma     Depression     GERD without esophagitis     History of blood transfusion     History of gastric bypass     Hydronephrosis with urinary obstruction due to ureteral calculus     Hypertension     No meds following sustained weight loss after bariatric surgery.  Iron deficiency 8/1/2018    Localized osteoarthritis of left knee 1/20/2020    Lumbar spondylosis 1/26/2018    Lymphedema     Obesity     Panic attacks     Perforated marginal ulcer 10/29/2018    ~1 year after RnYGB    PONV (postoperative nausea and vomiting)     Prediabetes     BG has normalized following sustained weight loss after bariatric surgery.  Sleep apnea, obstructive     Off PAP therapy BG following sustained weight loss after bariatric surgery.  Vitamin D deficiency 7/27/2018    Zinc deficiency 8/1/2018       Past Surgical History:   Procedure Laterality Date    APPENDECTOMY  1996    CHOLECYSTECTOMY, LAPAROSCOPIC N/A 7/27/2019    CHOLECYSTECTOMY LAPAROSCOPIC performed by Lewis Sylvester MD at [address]  10/23/2019    HYSTERECTOMY  01/22/2020    [facility]    LAPAROSCOPY N/A 6/27/2020    DIAGNOSTIC  LAPAROSCOPY, REPAIR PERFORATED MARGINAL ULCER, UPPER ENDOSCOPY.  [facility] PATCH performed by Wesley Mendoza MD at [facility] LITHOTRIPSY Right 7/24/2019    CYSTOSCOPY RETROGRADE PYELOGRAM URETEROSCOPY J STENT LASER LITHOTRIPSY RIGHT performed by Subha Chambers DO at [facility] LITHOTRIPSY      OR OFFICE/OUTPT VISIT,PROCEDURE ONLY N/A 10/29/2018    DIAGNOSTIC LAPAROSCOPY REPAIR PERFORATED VISCUS performed by Isiah Kocher, MD at [address]  11/14/2017    STOMACH SURGERY      UPPER GASTROINTESTINAL ENDOSCOPY N/A 7/27/2019    EGD ESOPHAGOGASTRODUODENOSCOPY performed by Isiah Kocher, MD at [address] [facility] N/A 2/7/2020    EGD BIOPSY performed by Isiah Kocher, MD at [address] [facility] N/A 6/27/2020    EGD DIAGNOSTIC ONLY performed by Denise Banerjee MD at [address] [facility] History   Problem Relation Age of Onset    Heart Attack Mother 61        death from this    Diabetes Mother     Depression Mother     Diabetes Father     Stroke Father     Cancer Maternal Grandfather     Cancer Paternal Grandmother     Stroke Paternal Grandfather     Substance Abuse Brother        CareTeam (Including outside providers/suppliers regularly involved in providing care):   Patient Care Team:  Gunjan Healy MD as PCP - Navdeep Granados MD as PCP - [facility] Empaneled Provider  Lamine Bob MD as Consulting Physician (Pulmonary Disease)  Abiodun Llanes DO as Consulting Physician (Physical Medicine and Rehab)    Wt Readings from Last 3 Encounters:   06/18/21 174 lb (78.9 kg)   06/02/21 175 lb (79.4 kg)   04/08/21 175 lb (79.4 kg)     Vitals:    06/18/21 1335   BP: 104/70   Site: Left Upper Arm   Position: Sitting   Cuff Size: Medium Adult   Pulse: 72   Temp: 97.5 °F (36.4 °C)   TempSrc: Temporal   SpO2: 98%   Weight: 174 lb (78.9 kg)   Height: 5' 6\" (1.676 m)     Body mass index is 28.08 kg/m². Based upon direct observation of the patient, evaluation of cognition reveals recent and remote memory intact.     General Appearance: alert and oriented to person, place and time, well-developed and well-nourished, in no acute distress  Pulmonary/Chest: clear to auscultation bilaterally- no wheezes, rales or rhonchi, normal air movement, no respiratory distress  Cardiovascular: normal rate, normal S1 and S2, no gallops, intact distal pulses and no carotid bruits  Abdomen: soft, non-tender, non-distended, normal bowel sounds, no masses or organomegaly  Extremities: no cyanosis and no clubbing  Neurologic: gait and coordination normal and speech normal    Patient's complete Health Risk Assessment and screening values have been reviewed and are found in Flowsheets. The following problems were reviewed today and where indicated follow up appointments were made and/or referrals ordered. Positive Risk Factor Screenings with Interventions:     Fall Risk:  Timed Up and Go Test > 12 seconds? (Complete if either Fall Risk answers are Yes): no  2 or more falls in past year?: (!) yes  Fall with injury in past year?: no  Fall Risk Interventions:    · Home safety tips provided  · Home exercises provided to promote strength and balance       Substance History:  Social History     Tobacco History     Smoking Status  Current Every Day Smoker Smoking Start Date  10/29/1991 Smoking Frequency  0.5 packs/day for 15 years (7.5 pk yrs) Smoking Tobacco Type  Cigarettes    Smokeless Tobacco Use  Never Used          Alcohol History     Alcohol Use Status  Yes Drinks/Week  6 Standard drinks or equivalent per week Amount  6.0 standard drinks of alcohol/wk Comment  social          Drug Use     Drug Use Status  No          Sexual Activity     Sexually Active  Yes Partners  Male Birth Control/Protection  Surgical               Alcohol Screening:       A score of 8 or more is associated with harmful or hazardous drinking. A score of 13 or more in women, and 15 or more in men, is likely to indicate alcohol dependence.   Substance Abuse Interventions:  · Tobacco abuse:  tobacco cessation tips and resources provided, patient is not ready to work toward tobacco cessation at this time    [address] and ACP:  General  In general, how would you say your health is?: Fair  In the past 7 days, have you experienced any of the following? New or Increased Pain, New or Increased Fatigue, Loneliness, Social Isolation, Stress or Anger?: (!) New or Increased Pain, New or Increased Fatigue, Stress  Do you get the social and emotional support that you need?: Yes  Do you have a Living Will?: (!) No  Advance Directives     Power of  Living Will ACP-Advance Directive ACP-Power of     Not on File Not on File Not on File Not on File      General Health Risk Interventions:  · No Living Will: Patient declines ACP discussion/assistance    Health Habits/Nutrition:  Health Habits/Nutrition  Do you exercise for at least 20 minutes 2-3 times per week?: (!) No  Have you lost any weight without trying in the past 3 months?: No  Do you eat only one meal per day?: No  Have you seen the dentist within the past year?: Yes  Body mass index: (!) 28.08  Health Habits/Nutrition Interventions:  · Inadequate physical activity:  patient agrees to increase physical activity as follows:  atleast 30-40 minutes 4-5 times in week    Hearing/Vision:  No exam data present  Hearing/Vision  Do you or your family notice any trouble with your hearing that hasn't been managed with hearing aids?: No  Do you have difficulty driving, watching TV, or doing any of your daily activities because of your eyesight?: (!) Yes  Have you had an eye exam within the past year?: Yes  Hearing/Vision Interventions: not needed.    Normal hearing and vision       Safety:  Safety  Do you have working smoke detectors?: Yes  Have all throw rugs been removed or fastened?: (!) No  Do you have non-slip mats or surfaces in all bathtubs/showers?: Yes  Do all of your stairways have a railing or banister?: Yes  Are your doorways, halls and stairs free of clutter?: Yes  Do you always fasten your seatbelt when you are in a car?: Yes  Safety Interventions:  · Home safety tips provided        Personalized Preventive Plan   Current Health Maintenance Status  Immunization History   Administered Date(s) Administered    Influenza Vaccine, unspecified formulation 01/15/2018    Influenza, Quadv, IM, (6 mo and older Fluzone, Flulaval, Fluarix and 3 yrs and older Afluria) 01/20/2017    Influenza, Quadv, IM, PF (6 mo and older Fluzone, Flulaval, Fluarix, and 3 yrs and older Afluria) 11/01/2017    Pneumococcal Polysaccharide ([id number]) 01/20/2017        Health Maintenance   Topic Date Due    COVID-19 Vaccine (1) Never done    DTaP/Tdap/Td vaccine (1 - Tdap) Never done   [employer] Visit (AWV)  Never done    Flu vaccine (Season Ended) 09/01/2021    Cervical cancer screen  09/18/2022    Lipid screen  11/04/2025    Pneumococcal 0-64 years Vaccine (2 of 2) 08/04/2038    Hepatitis C screen  Completed    HIV screen  Completed    Hepatitis A vaccine  Aged Out    Hepatitis B vaccine  Aged Out    Hib vaccine  Aged Out    Meningococcal (ACWY) vaccine  Aged Out     Recommendations for [employer] Due: see orders and patient instructions/AVS.  . Recommended screening schedule for the next 5-10 years is provided to the patient in written form: see Patient Instructions/AVS.      Migraine headache: Has prescription for Imitrex. Given instruction how to use and when to use. Also advised to take extra strength Tylenol 1 g every 6 hours as needed.   Keep headache journal     Screening colonoscopy: Referral to surgery for screening colonoscopy     Follow-up in 2 months for headache

## 2021-06-23 ENCOUNTER — OFFICE VISIT (OUTPATIENT)
Dept: PHYSICAL MEDICINE AND REHAB | Age: 46
End: 2021-06-23
Payer: MEDICARE

## 2021-06-23 VITALS
SYSTOLIC BLOOD PRESSURE: 107 MMHG | BODY MASS INDEX: 28.45 KG/M2 | HEIGHT: 66 IN | TEMPERATURE: 97.1 F | WEIGHT: 177 LBS | HEART RATE: 72 BPM | DIASTOLIC BLOOD PRESSURE: 73 MMHG

## 2021-06-23 DIAGNOSIS — M17.12 PRIMARY LOCALIZED OSTEOARTHRITIS OF LEFT KNEE: ICD-10-CM

## 2021-06-23 DIAGNOSIS — M17.11 PRIMARY OSTEOARTHRITIS OF RIGHT KNEE: Primary | ICD-10-CM

## 2021-06-23 PROCEDURE — 20611 DRAIN/INJ JOINT/BURSA W/US: CPT | Performed by: PHYSICAL MEDICINE & REHABILITATION

## 2021-06-23 RX ORDER — HYALURONATE SODIUM 10 MG/ML
40 SYRINGE (ML) INTRAARTICULAR ONCE
Status: COMPLETED | OUTPATIENT
Start: 2021-06-23 | End: 2021-06-23

## 2021-06-23 RX ADMIN — Medication 40 MG: at 10:49

## 2021-06-23 NOTE — PROGRESS NOTES
Jarad Gorman D.O. [facility]  [address] [place]  Phone: [phone]  Fax: [phone]    6/23/2021    Chief Complaint   Patient presents with    Knee Pain     bilateral Knee Euflexxa #1       Last injection: 9/18/20  Taking anticoagulants/antiplatelets: No  Diabetic: No  Febrile/active infection: No    After explaining the indications, risks, benefits and alternatives of a bilateral knee joint injection, the patient agreed to proceed. A permit was scanned into the media. The patient was placed in the seated position. The skin on the lateral knee was prepared with betadine and alcohol. Ethyl Chloride vapocoolant spray was used for local anesthesia. Using an aseptic, no touch technique, a 20 gauge, 1.5\" needle a 10 cc syringe was directed to the knee joint using ultrasound guidance. After negative aspiration,  The syringe was changed with the needle left in place to a  2 cc syringe of Euflexxa  20mg/2cc and  the medication was injected. Adequate hemostasis was achieved and a bandage as applied. The procedure was repeated on the other side. The patient was monitored post injection clinically and left the office without incident. The procedure was repeated on the contralateral side. The patient tolerated the procedure well and was educated in post injection care. There was post injection reduction in pain. Ultrasound images are scanned to the media. Jarad Gorman D.O., P.T.   Board Certified Physical Medicine and Rehabilitation  Board Certified Electrodiagnostic Medicine    Administrations This Visit     sodium hyaluronate (EUFLEXXA, HYALGAN) injection 40 mg     Admin Date  06/23/2021  10:49 Action  Given Dose  40 mg Route  Intra-articular Site   Administered By  Leonor Cristobal MA    Ordering Provider: Gayathri Katz DO    NDC: [address]-8    Lot#: [id number]    : Jenni Fernando    Patient Supplied?: No

## 2021-06-24 DIAGNOSIS — M17.11 PRIMARY OSTEOARTHRITIS OF RIGHT KNEE: Primary | ICD-10-CM

## 2021-06-24 DIAGNOSIS — M17.12 PRIMARY LOCALIZED OSTEOARTHRITIS OF LEFT KNEE: ICD-10-CM

## 2021-06-24 RX ORDER — TRAMADOL HYDROCHLORIDE 50 MG/1
50 TABLET ORAL EVERY 6 HOURS PRN
Qty: 28 TABLET | Refills: 0 | Status: SHIPPED
Start: 2021-06-24 | End: 2021-11-12 | Stop reason: SDUPTHER

## 2021-06-24 NOTE — TELEPHONE ENCOUNTER
Called and spoke with the patient to inform her that her script was sent to her pharmacy. Patient aware and voiced understanding.

## 2021-06-24 NOTE — TELEPHONE ENCOUNTER
Patient called and stated that you had discussed sending a script in for Tramadol. I keyed up a refill for you. Please advise.

## 2021-06-30 ENCOUNTER — OFFICE VISIT (OUTPATIENT)
Dept: PHYSICAL MEDICINE AND REHAB | Age: 46
End: 2021-06-30
Payer: MEDICARE

## 2021-06-30 VITALS
BODY MASS INDEX: 28.28 KG/M2 | DIASTOLIC BLOOD PRESSURE: 60 MMHG | SYSTOLIC BLOOD PRESSURE: 104 MMHG | HEIGHT: 66 IN | TEMPERATURE: 97.7 F | HEART RATE: 68 BPM | WEIGHT: 176 LBS

## 2021-06-30 DIAGNOSIS — M17.12 PRIMARY LOCALIZED OSTEOARTHRITIS OF LEFT KNEE: ICD-10-CM

## 2021-06-30 DIAGNOSIS — M17.11 PRIMARY OSTEOARTHRITIS OF RIGHT KNEE: Primary | ICD-10-CM

## 2021-06-30 PROCEDURE — 20611 DRAIN/INJ JOINT/BURSA W/US: CPT | Performed by: PHYSICAL MEDICINE & REHABILITATION

## 2021-06-30 RX ORDER — HYALURONATE SODIUM 10 MG/ML
40 SYRINGE (ML) INTRAARTICULAR WEEKLY
Status: COMPLETED | OUTPATIENT
Start: 2021-06-30 | End: 2021-07-07

## 2021-06-30 RX ADMIN — Medication 40 MG: at 10:10

## 2021-07-07 ENCOUNTER — TELEPHONE (OUTPATIENT)
Dept: PHYSICAL MEDICINE AND REHAB | Age: 46
End: 2021-07-07

## 2021-07-07 ENCOUNTER — OFFICE VISIT (OUTPATIENT)
Dept: PHYSICAL MEDICINE AND REHAB | Age: 46
End: 2021-07-07
Payer: MEDICARE

## 2021-07-07 VITALS
DIASTOLIC BLOOD PRESSURE: 75 MMHG | TEMPERATURE: 97.2 F | BODY MASS INDEX: 28.91 KG/M2 | SYSTOLIC BLOOD PRESSURE: 111 MMHG | WEIGHT: 179.9 LBS | HEIGHT: 66 IN | HEART RATE: 79 BPM

## 2021-07-07 DIAGNOSIS — M17.11 PRIMARY OSTEOARTHRITIS OF RIGHT KNEE: Primary | ICD-10-CM

## 2021-07-07 DIAGNOSIS — M17.12 PRIMARY LOCALIZED OSTEOARTHRITIS OF LEFT KNEE: ICD-10-CM

## 2021-07-07 PROCEDURE — 20611 DRAIN/INJ JOINT/BURSA W/US: CPT | Performed by: PHYSICAL MEDICINE & REHABILITATION

## 2021-07-07 RX ORDER — HYALURONATE SODIUM 10 MG/ML
40 SYRINGE (ML) INTRAARTICULAR ONCE
Status: COMPLETED | OUTPATIENT
Start: 2021-07-07 | End: 2021-07-07

## 2021-07-07 RX ADMIN — Medication 40 MG: at 10:53

## 2021-07-07 NOTE — TELEPHONE ENCOUNTER
Pt here for 3rd injection on 7/7  States last week injection received 80 percent effectiveness, no side effects

## 2021-07-07 NOTE — PROGRESS NOTES
Action  Given Dose  40 mg Route  Intra-articular Site   Administered By  Kirsty Haley RN    Ordering Provider: Vince Daniel DO    NDC: [address]-0    : Rodriguez Guzman    Patient Supplied?: No

## 2021-07-14 ENCOUNTER — TELEPHONE (OUTPATIENT)
Dept: PHYSICAL MEDICINE AND REHAB | Age: 46
End: 2021-07-14

## 2021-07-14 NOTE — TELEPHONE ENCOUNTER
Called patient and states she received 70%effectiveness from injections states immediately after injection on left side got extreme pain and \"catching feeling\" lasted 24 hours and soreness went away after 24 hours. No other issues.

## 2021-07-15 ENCOUNTER — OFFICE VISIT (OUTPATIENT)
Dept: SURGERY | Age: 46
End: 2021-07-15
Payer: MEDICARE

## 2021-07-15 VITALS
HEART RATE: 81 BPM | SYSTOLIC BLOOD PRESSURE: 110 MMHG | RESPIRATION RATE: 16 BRPM | HEIGHT: 66 IN | BODY MASS INDEX: 29.01 KG/M2 | TEMPERATURE: 96.6 F | DIASTOLIC BLOOD PRESSURE: 68 MMHG | WEIGHT: 180.5 LBS

## 2021-07-15 DIAGNOSIS — Z12.11 ENCOUNTER FOR SCREENING COLONOSCOPY: Primary | ICD-10-CM

## 2021-07-15 PROCEDURE — 99213 OFFICE O/P EST LOW 20 MIN: CPT | Performed by: SURGERY

## 2021-07-15 PROCEDURE — 4004F PT TOBACCO SCREEN RCVD TLK: CPT | Performed by: SURGERY

## 2021-07-15 PROCEDURE — G8427 DOCREV CUR MEDS BY ELIG CLIN: HCPCS | Performed by: SURGERY

## 2021-07-15 PROCEDURE — G8419 CALC BMI OUT NRM PARAM NOF/U: HCPCS | Performed by: SURGERY

## 2021-07-15 NOTE — PROGRESS NOTES
[facility] Note    Assessment/Plan:      Diagnosis Orders   1. Encounter for screening colonoscopy      We will plan for colonoscopy         Return for Colonoscopy. Chief Complaint   Patient presents with    New Patient     here for a colonoscopy consult. ref by Dr. Emma Weston. PCP: Saw Ortega MD    HPI: Alexa Triana is a 50 y.o. female who presents in consultation for colonoscopy. She denies any issues. She has no problems with diarrhea or constipation. She has no melena or hematochezia. There is no significant abdominal pain or unintentional weight loss. She does states she will have some lower cramps when she has to have a bowel movement that improves after having a bowel movement. Her father has a history of colon cancer in 2019 but she says it was squamous cancer and they are not sure where that originated from. Past Medical History:   Diagnosis Date    Anemia     Arthritis     Asthma     Depression     GERD without esophagitis     History of blood transfusion     History of gastric bypass     Hydronephrosis with urinary obstruction due to ureteral calculus     Hypertension     No meds following sustained weight loss after bariatric surgery.  Iron deficiency 8/1/2018    Localized osteoarthritis of left knee 1/20/2020    Lumbar spondylosis 1/26/2018    Lymphedema     Obesity     Panic attacks     Perforated marginal ulcer 10/29/2018    ~1 year after RnYGB    PONV (postoperative nausea and vomiting)     Prediabetes     BG has normalized following sustained weight loss after bariatric surgery.  Sleep apnea, obstructive     Off PAP therapy BG following sustained weight loss after bariatric surgery.     Vitamin D deficiency 7/27/2018    Zinc deficiency 8/1/2018       Past Surgical History:   Procedure Laterality Date    APPENDECTOMY  1996    CHOLECYSTECTOMY, LAPAROSCOPIC N/A 7/27/2019    CHOLECYSTECTOMY LAPAROSCOPIC performed by Roya Stanley Kalyan Funes MD at [address]  10/23/2019    [place]Y  [place]/22/2020    [facility]    LAPAROSCOPY N/A 6/27/2020    DIAGNOSTIC  LAPAROSCOPY, REPAIR PERFORATED MARGINAL ULCER, UPPER ENDOSCOPY. [facility] PATCH performed by Yaz Amador MD at [facility] LITHOTRIPSY Right 7/24/2019    CYSTOSCOPY RETROGRADE PYELOGRAM URETEROSCOPY J STENT LASER LITHOTRIPSY RIGHT performed by Jose Chambers DO at [facility] LITHOTRIPSY      IN OFFICE/OUTPT VISIT,PROCEDURE ONLY N/A 10/29/2018    DIAGNOSTIC LAPAROSCOPY REPAIR PERFORATED VISCUS [facility]  11/14/2017    STOMACH SURGERY      UPPER GASTROINTESTINAL ENDOSCOPY N/A 7/27/2019    EGD ESOPHAGOGASTRODUODENOSCOPY performed by Pj Lees MD at [address] N/A 2/7/2020    EGD BIOPSY performed by Pj Lees MD at [address] [facility] 6/27/2020    EGD DIAGNOSTIC ONLY performed by Yaz Amador MD at [address]       Prior to Admission medications    Medication Sig Start Date End Date Taking? Authorizing Provider   acetaminophen (TYLENOL) 500 MG tablet Take 2 tablets by mouth every 6 hours as needed for Pain Maximum dose- 8 tablets/24 hours.  6/18/21  Yes Herb Villa MD   albuterol sulfate  (90 Base) MCG/ACT inhaler inhale 2 puffs by mouth and INTO THE LUNGS every 6 hours if needed for wheezing 6/10/21  Yes Herb Villa MD   fluticasone [facility]) 50 MCG/ACT nasal spray 2 sprays by Each Nostril route daily 6/2/21  Yes Jordyn Guido MD   SUMAtriptan (IMITREX) 50 MG tablet Take 1 tablet by mouth once as needed for Migraine 6/2/21 7/15/21 Yes Jordyn Guido MD   [address] [facility] by Does not apply route 6/2/21  Yes Jordyn Guido MD   omeprazole (PRILOSEC) 20 MG delayed release capsule take 1 capsule by mouth once daily 5/10/21  Yes Pj Lees MD   ondansetron (ZOFRAN-ODT) 4 MG disintegrating tablet dissolve 1 tablet ON TONGUE every 8 hours if needed for nausea OR vomiting 2/9/21  Yes Hector Talamantes MD   clobetasol (TEMOVATE) 0.05 % ointment Apply topically 2 times daily Apply topically 2 times daily.  1/4/21  Yes Fredy Green, APRN - CNP   dicyclomine (BENTYL) 10 MG capsule Take 2 capsules by mouth 4 times daily for 10 days 8/14/20 7/15/21 Yes Keyshawn De La Rosa,    buPROPion [facility]) 100 MG extended release tablet Take 1 tablet by mouth 2 times daily 7/14/20  Yes Historical Provider, MD   sucralfate (CARAFATE) 1 GM tablet take 1 tablet by mouth four times a day 7/20/20  Yes Lala Frederick MD   nicotine (NICODERM CQ) 21 MG/24HR Place 1 patch onto the skin every 24 hours 7/20/20 7/15/21 Yes Hector Talamantes MD   sennosides-docusate sodium (SENOKOT-S) 8.6-50 MG tablet Take 1 tablet by mouth 2 times daily 6/29/20  Yes Edgard Mcleod MD   Multiple Vitamins-Minerals (MULTIVITAMIN ADULT PO) Take by mouth daily    Yes Historical Provider, MD   hydrOXYzine (VISTARIL) 50 MG capsule Take 1 capsule by mouth 3 times daily as needed for Anxiety 10/29/19  Yes Buck Simental MD   ascorbic acid (V-R VITAMIN C) 250 MG tablet Take 1 tablet by mouth daily Take with the iron supplement 6/24/19  Yes Angus Calderon MD   Cholecalciferol (VITAMIN D3) 04783 units CAPS Take one capsule every Mon-Wed-Fri for four weeks 6/24/19  Yes Angus Calderon MD   Handicap Placard MISC by Does not apply route Duration: 5 years 7/19/16  Yes Elvie Joe DO       Allergies   Allergen Reactions    Pcn [Penicillins] Anaphylaxis and Other (See Comments)     Patient unsure    Food Other (See Comments)     Berries - hives  Lactose Intolerance to milk-diarrhea  Soy-increases menses occurrences         Social History     Socioeconomic History    Marital status: Single     Spouse name: None    Number of children: None    Years of education: None    Highest education level: None   Occupational History    Occupation: unemployed/disability   Tobacco Use    Smoking status: Current Every Day Smoker     Packs/day: 0.50     Years: 15.00     Pack years: 7.50     Types: Cigarettes     Start date: 10/29/1991    Smokeless tobacco: Never Used   Vaping Use    Vaping Use: Never used   Substance and Sexual Activity    Alcohol use: Yes     Alcohol/week: 6.0 standard drinks     Types: 6 Standard drinks or equivalent per week     Comment: social    Drug use: No    Sexual activity: Yes     Partners: Male     Birth control/protection: Surgical   Other Topics Concern    None   Social History Narrative    None     Social Determinants of Health     Financial Resource Strain: High Risk    Difficulty of Paying Living Expenses: Very hard   Food Insecurity: Food Insecurity Present    Worried About Running Out of Food in the Last Year: Sometimes true    Trae of Food in the Last Year: Sometimes true   Transportation Needs:     Lack of Transportation (Medical):  Lack of Transportation (Non-Medical):    Physical Activity:     Days of Exercise per Week:     Minutes of Exercise per Session:    Stress:     Feeling of Stress :    Social Connections:     Frequency of Communication with Friends and Family:     Frequency of Social Gatherings with Friends and Family:     Attends [employer] Services:     Active Member of Clubs or Organizations:     Attends Club or Organization Meetings:     Marital Status:    Intimate Partner Violence:     Fear of Current or Ex-Partner:     Emotionally Abused:     Physically Abused:     Sexually Abused:        Family History   Problem Relation Age of Onset    Heart Attack Mother 61        death from this    Diabetes Mother     Depression Mother     Diabetes Father     Stroke Father     Cancer Maternal Grandfather     Cancer Paternal Grandmother     Stroke Paternal Grandfather     Substance Abuse Brother        Review of Systems   All other systems reviewed and are negative.               Objective:  Vitals:    07/15/21 1416   BP: 110/68   Pulse: 81   Resp: 16   Temp: 96.6 °F (35.9 °C)   TempSrc: Temporal   Weight: 180 lb 8 oz (81.9 kg)   Height: 5' 6\" (1.676 m)          Physical Exam  HENT:      Head: Normocephalic and atraumatic. Eyes:      General:         Right eye: No discharge. Left eye: No discharge. Neck:      Trachea: No tracheal deviation. Cardiovascular:      Rate and Rhythm: Normal rate. Pulmonary:      Effort: Pulmonary effort is normal. No respiratory distress. Abdominal:      General: There is no distension. Palpations: Abdomen is soft. Tenderness: There is no abdominal tenderness. There is no guarding or rebound. Skin:     General: Skin is warm and dry. Neurological:      Mental Status: She is alert and oriented to person, place, and time. Nelly Horn MD  7/17/2021    NOTE: This report, in part or full,may have been transcribed using voice recognition software. Every effort was made to ensure accuracy; however, inadvertent computerized transcription errors may be present. Please excuse any transcriptional grammatical or spelling errors that may have escaped my editorial review.     CC: Benitez Patterson MD

## 2021-07-19 ENCOUNTER — TELEPHONE (OUTPATIENT)
Dept: SURGERY | Age: 46
End: 2021-07-19

## 2021-07-19 NOTE — TELEPHONE ENCOUNTER
Troy Garnger is scheduled for colonoscopy with Dr Saul Harris on -21 at [facility] at 8:15 am. Patient was told to arrive at 7:15 am. Patient needs to be NPO after midnight the night before procedure. All surgery instructions were explained to the patient and a surgery letter was also mailed out. MA informed patient that PAT will also be calling to review pre-op instructions and medications. Patient verbalized understanding.   Electronically signed by Bonifacio Oh MA on 7/19/2021 at 2:36 PM

## 2021-07-19 NOTE — TELEPHONE ENCOUNTER
Prior Authorization Form:      DEMOGRAPHICS:                     Patient Name:  Chepe Caruso  Patient :  1975            Insurance:  Payor: Ranjith Maldonado / Plan: Michelle Alfred / Product Type: *No Product type* /   Insurance ID Number:    Payor/Plan Subscr  Sex Relation Sub. Ins. ID Effective Group Num   1. [facility] 1975 Female Self [id number]                                   [address]   2.  [address] [facility] 1975 Female Self [id number]                                   [address]         DIAGNOSIS & PROCEDURE:                       Procedure/Operation: Colonoscopy           CPT Code: 94529    Diagnosis:  Screening    ICD10 Code: Z12.11    Location:  [facility]    Surgeon:  Dr Fadia Avilez INFORMATION:                          Date: 10-27-21    Time: 8:15 am              Anesthesia:  [facility]                                                       Status:  Outpatient        Special Comments:         Electronically signed by Lonny Painter MA on 2021 at 2:37 PM

## 2021-08-03 DIAGNOSIS — K28.9 MARGINAL ULCER: ICD-10-CM

## 2021-08-03 DIAGNOSIS — K27.5 PERFORATED ULCER (HCC): Primary | ICD-10-CM

## 2021-08-03 RX ORDER — DICYCLOMINE HYDROCHLORIDE 10 MG/1
20 CAPSULE ORAL 4 TIMES DAILY
Qty: 80 CAPSULE | Refills: 0 | Status: SHIPPED
Start: 2021-08-03 | End: 2021-08-27 | Stop reason: ALTCHOICE

## 2021-08-03 RX ORDER — OMEPRAZOLE 20 MG/1
CAPSULE, DELAYED RELEASE ORAL
Qty: 30 CAPSULE | Refills: 2 | Status: SHIPPED
Start: 2021-08-03 | End: 2021-10-22 | Stop reason: SDUPTHER

## 2021-08-03 RX ORDER — SUCRALFATE 1 G/1
0.5 TABLET ORAL 4 TIMES DAILY
Qty: 120 TABLET | Refills: 0 | Status: SHIPPED
Start: 2021-08-03 | End: 2021-10-12

## 2021-08-23 ENCOUNTER — HOSPITAL ENCOUNTER (OUTPATIENT)
Dept: PSYCHIATRY | Age: 46
Discharge: HOME | End: 2021-08-23
Payer: MEDICARE

## 2021-08-23 NOTE — FLOWSHEET NOTE
Called client for intake session. No answer. Left message asking client to call in to reschedule.      Electronically signed by Ryder Alfaro on 8/23/2021 at 10:18 AM

## 2021-08-27 ENCOUNTER — OFFICE VISIT (OUTPATIENT)
Dept: FAMILY MEDICINE CLINIC | Age: 46
End: 2021-08-27
Payer: MEDICARE

## 2021-08-27 VITALS
WEIGHT: 185 LBS | DIASTOLIC BLOOD PRESSURE: 71 MMHG | HEIGHT: 66 IN | TEMPERATURE: 97.8 F | SYSTOLIC BLOOD PRESSURE: 126 MMHG | RESPIRATION RATE: 16 BRPM | BODY MASS INDEX: 29.73 KG/M2 | HEART RATE: 71 BPM | OXYGEN SATURATION: 98 %

## 2021-08-27 DIAGNOSIS — D50.8 IRON DEFICIENCY ANEMIA SECONDARY TO INADEQUATE DIETARY IRON INTAKE: Primary | ICD-10-CM

## 2021-08-27 DIAGNOSIS — Z12.11 ENCOUNTER FOR SCREENING FOR MALIGNANT NEOPLASM OF COLON: ICD-10-CM

## 2021-08-27 DIAGNOSIS — I89.0 LYMPHEDEMA: ICD-10-CM

## 2021-08-27 DIAGNOSIS — G43.119 INTRACTABLE MIGRAINE WITH AURA WITHOUT STATUS MIGRAINOSUS: ICD-10-CM

## 2021-08-27 PROCEDURE — G8427 DOCREV CUR MEDS BY ELIG CLIN: HCPCS | Performed by: FAMILY MEDICINE

## 2021-08-27 PROCEDURE — 99212 OFFICE O/P EST SF 10 MIN: CPT | Performed by: FAMILY MEDICINE

## 2021-08-27 PROCEDURE — 99213 OFFICE O/P EST LOW 20 MIN: CPT | Performed by: FAMILY MEDICINE

## 2021-08-27 PROCEDURE — G8419 CALC BMI OUT NRM PARAM NOF/U: HCPCS | Performed by: FAMILY MEDICINE

## 2021-08-27 PROCEDURE — 4004F PT TOBACCO SCREEN RCVD TLK: CPT | Performed by: FAMILY MEDICINE

## 2021-08-27 RX ORDER — BUPROPION HYDROCHLORIDE 200 MG/1
200 TABLET, EXTENDED RELEASE ORAL 2 TIMES DAILY
COMMUNITY
Start: 2021-08-18

## 2021-08-27 RX ORDER — SUMATRIPTAN 50 MG/1
50 TABLET, FILM COATED ORAL
Qty: 7 TABLET | Refills: 3 | Status: SHIPPED
Start: 2021-08-27 | End: 2021-11-03

## 2021-08-27 RX ORDER — POLYETHYLENE GLYCOL 3350 17 G/17G
17 POWDER, FOR SOLUTION ORAL DAILY PRN
Qty: 527 G | Refills: 1 | Status: SHIPPED | OUTPATIENT
Start: 2021-08-27 | End: 2021-09-26

## 2021-08-27 RX ORDER — METHYLDOPA 500 MG
160 TABLET ORAL EVERY OTHER DAY
Qty: 30 TABLET | Refills: 3 | Status: SHIPPED
Start: 2021-08-27 | End: 2021-10-12 | Stop reason: SDUPTHER

## 2021-08-27 ASSESSMENT — LIFESTYLE VARIABLES: HOW OFTEN DO YOU HAVE A DRINK CONTAINING ALCOHOL: NEVER

## 2021-08-27 NOTE — PATIENT INSTRUCTIONS
Patient Education        Learning About Benefits From Quitting Smoking  How does quitting smoking make you healthier? If you're thinking about quitting smoking, you may have a few reasons to be smoke-free. Your health may be one of them. · When you quit smoking, you lower your risks for cancer, lung disease, heart attack, stroke, blood vessel disease, and blindness from macular degeneration. · When you're smoke-free, you get sick less often, and you heal faster. You are less likely to get colds, flu, bronchitis, and pneumonia. · As a nonsmoker, you may find that your mood is better and you are less stressed. When and how will you feel healthier? Quitting has real health benefits that start from day 1 of being smoke-free. And the longer you stay smoke-free, the healthier you get and the better you feel. The first hours  · After just 20 minutes, your blood pressure and heart rate go down. That means there's less stress on your heart and blood vessels. · Within 12 hours, the level of carbon monoxide in your blood drops back to normal. That makes room for more oxygen. With more oxygen in your body, you may notice that you have more energy than when you smoked. After 2 weeks  · Your lungs start to work better. · Your risk of heart attack starts to drop. After 1 month  · When your lungs are clear, you cough less and breathe deeper, so it's easier to be active. · Your sense of taste and smell return. That means you can enjoy food more than you have since you started smoking. Over the years  · Over the years, your risks of heart disease, heart attack, and stroke are lower. · After 10 years, your risk of dying from lung cancer is cut by about half. And your risk for many other types of cancer is lower too. How would quitting help others in your life? When you quit smoking, you improve the health of everyone who now breathes in your smoke.   · Their heart, lung, and cancer risks drop, much like yours.  · They are sick less. For babies and small children, living smoke-free means they're less likely to have ear infections, pneumonia, and bronchitis. · If you're a woman who is or will be pregnant someday, quitting smoking means a healthier . · Children who are close to you are less likely to become adult smokers. Where can you learn more? Go to https://[employer]Spot Influence. org and sign in to your [employer] account. Enter [phone] in the [facility] box to learn more about \"Learning About Benefits From Quitting Smoking. \"     If you do not have an account, please click on the \"Sign Up Now\" link. Current as of: 2021               Content Version: 12.9  © 3831-3437 Healthwise, Incorporated. Care instructions adapted under license by [facility] ([facility]). If you have questions about a medical condition or this instruction, always ask your healthcare professional. [place] [address] any warranty or liability for your use of this information.

## 2021-08-27 NOTE — PROGRESS NOTES
[address] [facility]  Department of Family Medicine  Family Medicine Residency Program      Patient:  Mojgan Dillon 48 y.o. female     Date of Service: 8/27/21      Chief complaint:   Chief Complaint   Patient presents with    Asthma    Gastroesophageal Reflux    Anxiety         History of Present Illness   The patient is a 48 y.o. female with a PMH of dayan-en-y bypass, lymphedema, anemia, anxiety who presents to the clinic for the following medical concerns:     Asthma: stable. Using inhaler PRN. Not wheezing recently.  Reflux: June 2020 had perforated ulcer. On carafate and prilosec 20mg. Is stable. Taking her omeprazole in the am and carafate in the afternoon. Takes prn carafate as well. Occasional nausea, but no bleeding.  anemia: had hysterectomy in 1/2020 for irregular periods and polyps. States she thinks her anemia is better. Hasn't had labs recently. Didn't want to take iron because it upset her stomach and made her constipated. Wasn't aware how low her iron stores were. Significant fatigue.  Anxiety: Stable. Takes hydroxyzine. No current issues.  Leg swelling: Has lymphedema, but has been having more swelling for the past few months. Hasn't been wearing compression stockings frequently. Not strictly adhering to low sodium diet. Had worked with [employer] before. Past Medical History:      Diagnosis Date    Anemia     Arthritis     Asthma     Depression     GERD without esophagitis     History of blood transfusion     History of gastric bypass     Hydronephrosis with urinary obstruction due to ureteral calculus     Hypertension     No meds following sustained weight loss after bariatric surgery.     Iron deficiency 8/1/2018    Localized osteoarthritis of left knee 1/20/2020    Lumbar spondylosis 1/26/2018    Lymphedema     Obesity     Panic attacks     Perforated marginal ulcer 10/29/2018    ~1 year after RnYGB    PONV (postoperative nausea and vomiting)     Prediabetes     BG has normalized following sustained weight loss after bariatric surgery.  Sleep apnea, obstructive     Off PAP therapy BG following sustained weight loss after bariatric surgery.  Vitamin D deficiency 7/27/2018    Zinc deficiency 8/1/2018       Past Surgical History:        Procedure Laterality Date    APPENDECTOMY  1996    CHOLECYSTECTOMY, LAPAROSCOPIC N/A 7/27/2019    CHOLECYSTECTOMY LAPAROSCOPIC performed by Cindy Preston MD at [address]  10/23/2019    [place]  [place]    LAPAROSCOPY N/A 6/27/2020    DIAGNOSTIC  LAPAROSCOPY, REPAIR PERFORATED MARGINAL ULCER, UPPER ENDOSCOPY. [facility] PATCH performed by Paulina Primrose, MD at [facility] LITHOTRIPSY Right 7/24/2019    CYSTOSCOPY RETROGRADE PYELOGRAM URETEROSCOPY J STENT LASER LITHOTRIPSY RIGHT performed by Kacey Chambers DO at [facility] LITHOTRIPSY      [place] OFFICE/OUTPT VISIT,PROCEDURE ONLY N/A 10/29/2018    DIAGNOSTIC LAPAROSCOPY REPAIR PERFORATED VISCUS performed by Cindy Preston MD at [address]  11/14/2017    STOMACH SURGERY      UPPER GASTROINTESTINAL ENDOSCOPY N/A 7/27/2019    EGD ESOPHAGOGASTRODUODENOSCOPY performed by Cindy Preston MD at [address] N/A 2/7/2020    EGD BIOPSY performed by Cindy Preston MD at [address] [place] N/A 6/27/2020    EGD DIAGNOSTIC ONLY performed by Paulina Primrose, MD at [address]       Allergies:    Pcn [penicillins] and Food    Social History:   Social History     Tobacco Use    Smoking status: Current Every Day Smoker     Packs/day: 0.50     Years: 15.00     Pack years: 7.50     Types: Cigarettes     Start date: 10/29/1991    Smokeless tobacco: Never Used   Substance Use Topics    Alcohol use:  Yes     Alcohol/week: 6.0 standard drinks     Types: 6 Standard drinks or equivalent per week     Comment: social        Family History: Problem Relation Age of Onset    Heart Attack Mother 61        death from this    Diabetes Mother     Depression Mother     Diabetes Father     Stroke Father     Cancer Maternal Grandfather     Cancer Paternal Grandmother     Stroke Paternal Grandfather     Substance Abuse Brother        Reviewof Systems:   Review of Systems   Respiratory: Negative for cough and shortness of breath. Cardiovascular: Positive for leg swelling. Negative for chest pain and palpitations. Gastrointestinal: Negative for abdominal pain, diarrhea and nausea. Musculoskeletal: Negative for arthralgias and myalgias. Neurological: Negative for weakness and numbness. Psychiatric/Behavioral: Negative for dysphoric mood. The patient is not nervous/anxious. Physical Exam   Vitals: /71   Pulse 71   Temp 97.8 °F (36.6 °C) (Temporal)   Resp 16   Ht 5' 6\" (1.676 m)   Wt 185 lb (83.9 kg)   LMP 10/21/2019 (Exact Date)   SpO2 98%   BMI 29.86 kg/m²        Physical Exam  Constitutional:       Appearance: Normal appearance. Eyes:      Extraocular Movements: Extraocular movements intact. Conjunctiva/sclera: Conjunctivae normal.   Cardiovascular:      Rate and Rhythm: Normal rate and regular rhythm. Heart sounds: No murmur heard. No friction rub. No gallop. Pulmonary:      Effort: Pulmonary effort is normal.      Breath sounds: Normal breath sounds. Abdominal:      General: Abdomen is flat. Palpations: Abdomen is soft. Musculoskeletal:         General: No swelling or tenderness. Cervical back: Normal range of motion and neck supple. Right lower leg: Edema present. Left lower leg: Edema present. Skin:     General: Skin is warm and dry. Neurological:      Mental Status: She is alert and oriented to person, place, and time. Mental status is at baseline. Psychiatric:         Mood and Affect: Mood normal.         Thought Content:  Thought content normal.          Assessment and Plan       1. Iron deficiency anemia secondary to inadequate dietary iron intake  Start iron supplements with vitamin C MWF  Encourage more PO iron intake  Repeat CBC in 6-8 weeks    2. Encounter for screening for malignant neoplasm of colon  Asymptomatic screening    3. Intractable migraine with aura without status migrainosus  Stable  - SUMAtriptan (IMITREX) 50 MG tablet; Take 1 tablet by mouth once as needed for Migraine  Dispense: 7 tablet; Refill: 3    4. Lymphedema  Advised compression stockings  Elevate legs  Decrease sodium intake <2000mg daily  Follow up in clinic, may consider sending back to [facility] if needed      Return to Office: Return in about 6 weeks (around 10/8/2021) for leg swelling. Medication List:    Current Outpatient Medications   Medication Sig Dispense Refill    buPROPion (WELLBUTRIN SR) 200 MG extended release tablet Take 1 tablet by mouth 2 times daily      SUMAtriptan (IMITREX) 50 MG tablet Take 1 tablet by mouth once as needed for Migraine 7 tablet 3    ferrous sulfate dried (SLOW RELEASE IRON) 160 (50 Fe) MG TBCR extended release tablet Take 160 mg by mouth every other day 30 tablet 3    polyethylene glycol (MIRALAX) 17 g packet Take 17 g by mouth daily as needed for Constipation 527 g 1    omeprazole (PRILOSEC) 20 MG delayed release capsule take 1 capsule by mouth once daily 30 capsule 2    sucralfate (CARAFATE) 1 GM tablet Take 0.5 tablets by mouth 4 times daily (Patient taking differently: Take 0.5 g by mouth daily ) 120 tablet 0    acetaminophen (TYLENOL) 500 MG tablet Take 2 tablets by mouth every 6 hours as needed for Pain Maximum dose- 8 tablets/24 hours.  120 tablet 2    albuterol sulfate  (90 Base) MCG/ACT inhaler inhale 2 puffs by mouth and INTO THE LUNGS every 6 hours if needed for wheezing 8.5 g 1    fluticasone (FLONASE) 50 MCG/ACT nasal spray 2 sprays by Each Nostril route daily 3 Bottle 1    Handicap Placard MISC by Does not apply route 1 each 0    ondansetron (ZOFRAN-ODT) 4 MG disintegrating tablet dissolve 1 tablet ON TONGUE every 8 hours if needed for nausea OR vomiting 24 tablet 0    sennosides-docusate sodium (SENOKOT-S) 8.6-50 MG tablet Take 1 tablet by mouth 2 times daily 30 tablet 5    Multiple Vitamins-Minerals (MULTIVITAMIN ADULT PO) Take by mouth daily       hydrOXYzine (VISTARIL) 50 MG capsule Take 1 capsule by mouth 3 times daily as needed for Anxiety 90 capsule 1    ascorbic acid (V-R VITAMIN C) 250 MG tablet Take 1 tablet by mouth daily Take with the iron supplement 30 tablet 3    Cholecalciferol (VITAMIN D3) 91715 units CAPS Take one capsule every Mon-Wed-Fri for four weeks (Patient not taking: Reported on 8/27/2021) 12 capsule 0     No current facility-administered medications for this visit.         Dexter Ramirez MD     Electronically signed by Dexter Ramirez MD on 8/29/2021 at 6:57 PM

## 2021-08-27 NOTE — PROGRESS NOTES
Attending Physician Statement    S:   Chief Complaint   Patient presents with    Asthma    Gastroesophageal Reflux    Anxiety      Previous bariatric surgery with GERD - has iron deficiency, but doesn't like to take iron   C/O ankle edema that is worsening. Improves with elevation. Has compression stockings, but doesn't wear them   Wants refills on sumatriptan - uses infrequently   Does have fatigue  O: Blood pressure 126/71, pulse 71, temperature 97.8 °F (36.6 °C), temperature source Temporal, resp. rate 16, height 5' 6\" (1.676 m), weight 185 lb (83.9 kg), last menstrual period 10/21/2019, SpO2 98 %, not currently breastfeeding. Exam:   Heart - RRR   Lungs - clear   Ext - some nonpitting edema  A: As above  P:  Restart her iron and vitamins   Rf meds   Encouraged to decrease sodium, elevate legs, compression stocking   Follow-up as ordered    I have discussed the case, including pertinent history and exam findings with the resident. I agree with the documented assessment and plan.

## 2021-08-29 PROBLEM — I89.0 LYMPHEDEMA: Status: ACTIVE | Noted: 2021-08-29

## 2021-08-29 ASSESSMENT — ENCOUNTER SYMPTOMS
NAUSEA: 0
ABDOMINAL PAIN: 0
DIARRHEA: 0
COUGH: 0
SHORTNESS OF BREATH: 0

## 2021-08-30 ENCOUNTER — HOSPITAL ENCOUNTER (OUTPATIENT)
Dept: PSYCHIATRY | Age: 46
Discharge: HOME | End: 2021-08-30
Payer: MEDICARE

## 2021-08-30 NOTE — FLOWSHEET NOTE
Clients assessment was completed last week. Called client to confirm why she was on the schedule again for today. Client stated that she was not sure but that now is not a good time for her to quit. She stated that she has a lot going on right now. Informed client that we could hold her assessment for 30 days and that if she was not ready to quit in the next 30 days to call us back when she is ready.      Electronically signed by Jessica Reynolds on 8/30/2021 at 11:08 AM

## 2021-10-12 ENCOUNTER — OFFICE VISIT (OUTPATIENT)
Dept: FAMILY MEDICINE CLINIC | Age: 46
End: 2021-10-12
Payer: MEDICARE

## 2021-10-12 VITALS
TEMPERATURE: 98.4 F | BODY MASS INDEX: 31.02 KG/M2 | DIASTOLIC BLOOD PRESSURE: 73 MMHG | HEIGHT: 66 IN | OXYGEN SATURATION: 98 % | HEART RATE: 73 BPM | WEIGHT: 193 LBS | SYSTOLIC BLOOD PRESSURE: 108 MMHG

## 2021-10-12 DIAGNOSIS — K21.9 GERD WITHOUT ESOPHAGITIS: Chronic | ICD-10-CM

## 2021-10-12 DIAGNOSIS — K27.5 PERFORATED ULCER (HCC): ICD-10-CM

## 2021-10-12 DIAGNOSIS — E61.1 IRON DEFICIENCY: Primary | ICD-10-CM

## 2021-10-12 DIAGNOSIS — I89.0 LYMPHEDEMA: ICD-10-CM

## 2021-10-12 PROCEDURE — 99212 OFFICE O/P EST SF 10 MIN: CPT | Performed by: STUDENT IN AN ORGANIZED HEALTH CARE EDUCATION/TRAINING PROGRAM

## 2021-10-12 PROCEDURE — 4004F PT TOBACCO SCREEN RCVD TLK: CPT | Performed by: STUDENT IN AN ORGANIZED HEALTH CARE EDUCATION/TRAINING PROGRAM

## 2021-10-12 PROCEDURE — G8427 DOCREV CUR MEDS BY ELIG CLIN: HCPCS | Performed by: STUDENT IN AN ORGANIZED HEALTH CARE EDUCATION/TRAINING PROGRAM

## 2021-10-12 PROCEDURE — G8417 CALC BMI ABV UP PARAM F/U: HCPCS | Performed by: STUDENT IN AN ORGANIZED HEALTH CARE EDUCATION/TRAINING PROGRAM

## 2021-10-12 PROCEDURE — G8484 FLU IMMUNIZE NO ADMIN: HCPCS | Performed by: STUDENT IN AN ORGANIZED HEALTH CARE EDUCATION/TRAINING PROGRAM

## 2021-10-12 PROCEDURE — 99213 OFFICE O/P EST LOW 20 MIN: CPT | Performed by: STUDENT IN AN ORGANIZED HEALTH CARE EDUCATION/TRAINING PROGRAM

## 2021-10-12 RX ORDER — METHYLDOPA 500 MG
160 TABLET ORAL DAILY
Qty: 30 TABLET | Refills: 5 | Status: ON HOLD
Start: 2021-10-12 | End: 2022-03-09 | Stop reason: ALTCHOICE

## 2021-10-12 RX ORDER — ONDANSETRON 4 MG/1
TABLET, ORALLY DISINTEGRATING ORAL
Qty: 24 TABLET | Refills: 0 | Status: SHIPPED
Start: 2021-10-12 | End: 2022-01-14 | Stop reason: SDUPTHER

## 2021-10-12 RX ORDER — SUCRALFATE 1 G/1
0.5 TABLET ORAL 2 TIMES DAILY
Qty: 90 TABLET | Refills: 0 | Status: SHIPPED
Start: 2021-10-12 | End: 2022-01-14 | Stop reason: SDUPTHER

## 2021-10-12 ASSESSMENT — ENCOUNTER SYMPTOMS
SHORTNESS OF BREATH: 0
PHOTOPHOBIA: 0
CONSTIPATION: 0
BLOOD IN STOOL: 0
NAUSEA: 1
VOMITING: 0
COUGH: 0
ABDOMINAL PAIN: 1

## 2021-10-12 NOTE — PROGRESS NOTES
[facility]  FAMILY MEDICINE RESIDENCY PROGRAM  DATE OF VISIT : 10/12/2021    Patient : Kendy Rodriguez   Age : 48 y.o.    : 1975   MRN : [id number]       Chief Complaint :   Chief Complaint   Patient presents with    Check-Up    Forms       HPI : Kendy Rodriguez is 48 y.o. female  PMH of dayan-en-y bypass, lymphedema, anemia, anxiety, marginal ulcer perforation status post  surgery, migraine headache who presented to the clinic today for follow-up on headache, leg swelling and iron deficiency anemia. Patient reports leg swelling is  improving. Patient is not using compression stocking. Swelling get improved with leg elevation. Denies any weakness, numbness, orthopnea or dyspnea on exertion    Iron deficiency anemia: Patient was started on extended release iron tablet a month ago. Patient reports feeling better since started on 1 tablet on last visit. No more fatigue, tired or little energy. Denies any hematemesis or melena. Patient is taking iron tablet 2 times daily along with vitamin C. Denies any constipation.     Migraine headache: Patient report her headache is improved. Decreased in frequency and severity. Patient states headache improved with Imitrex. So far she used 2 to 3 tablets of Imitrex for last 2 and half months. Denies any extremity weakness, numbness or blurry vision     Health maintenance: Patient does have family history of colon cancer. Patient states she is scheduled for colonoscopy on 10/27/2021    Past Medical History :  Past Medical History:   Diagnosis Date    Anemia     Arthritis     Asthma     Depression     GERD without esophagitis     History of blood transfusion     History of gastric bypass     Hydronephrosis with urinary obstruction due to ureteral calculus     Hypertension     No meds following sustained weight loss after bariatric surgery.     Iron deficiency 2018    Localized osteoarthritis of left knee 2020    Lumbar spondylosis 1/26/2018    Lymphedema     Obesity     Panic attacks     Perforated marginal ulcer 10/29/2018    ~1 year after RnYGB    PONV (postoperative nausea and vomiting)     Prediabetes     BG has normalized following sustained weight loss after bariatric surgery.  Sleep apnea, obstructive     Off PAP therapy BG following sustained weight loss after bariatric surgery.  Vitamin D deficiency 7/27/2018    Zinc deficiency 8/1/2018     Medication List :    Current Outpatient Medications   Medication Sig Dispense Refill    ondansetron (ZOFRAN-ODT) 4 MG disintegrating tablet dissolve 1 tablet ON TONGUE every 8 hours if needed for nausea OR vomiting 24 tablet 0    ferrous sulfate dried (SLOW RELEASE IRON) 160 (50 Fe) MG TBCR extended release tablet Take 160 mg by mouth daily 30 tablet 5    sucralfate (CARAFATE) 1 GM tablet Take 0.5 tablets by mouth 2 times daily 90 tablet 0    buPROPion (WELLBUTRIN SR) 200 MG extended release tablet Take 1 tablet by mouth 2 times daily      omeprazole (PRILOSEC) 20 MG delayed release capsule take 1 capsule by mouth once daily 30 capsule 2    acetaminophen (TYLENOL) 500 MG tablet Take 2 tablets by mouth every 6 hours as needed for Pain Maximum dose- 8 tablets/24 hours.  120 tablet 2    albuterol sulfate  (90 Base) MCG/ACT inhaler inhale 2 puffs by mouth and INTO THE LUNGS every 6 hours if needed for wheezing 8.5 g 1    sennosides-docusate sodium (SENOKOT-S) 8.6-50 MG tablet Take 1 tablet by mouth 2 times daily 30 tablet 5    Multiple Vitamins-Minerals (MULTIVITAMIN ADULT PO) Take by mouth daily       hydrOXYzine (VISTARIL) 50 MG capsule Take 1 capsule by mouth 3 times daily as needed for Anxiety 90 capsule 1    ascorbic acid (V-R VITAMIN C) 250 MG tablet Take 1 tablet by mouth daily Take with the iron supplement 30 tablet 3    SUMAtriptan (IMITREX) 50 MG tablet Take 1 tablet by mouth once as needed for Migraine (Patient not taking: Reported on 10/12/2021) 7 9  -Repeat CBC and iron profile  -Continue taking iron tablet once daily with vitamin C  - CBC WITH AUTO DIFFERENTIAL; Future  - RETICULOCYTES; Future  - IRON AND TIBC; Future  - FERRITIN; Future    2. Perforated ulcer ([address]  GERD  -Status post surgical repair in 2018  -Endorses mild epigastric pain  -Continue Prilosec and Carafate as prescribed  - sucralfate (CARAFATE) 1 GM tablet; Take 0.5 tablets by mouth 2 times daily  Dispense: 90 tablet; Refill: 0    3. Lymphedema  -Chronic bilateral leg swelling  -Patient reports swelling is improved  -Encouraged to use compression stocking and elevate leg    4.   Migraine headache: Stable    Health maintenance: Patient refused for Covid and flu vaccine.     Filled out prescription medication Clarification form as patient's request     Follow-up in 3 months    Kezia Howard MD

## 2021-10-12 NOTE — PROGRESS NOTES
Patient is a 49-year-old female with history of gastric ulcer status post perforation status post surgery, iron deficiency anemia, migraine who presented today for follow-up on headache and anemia. Iron deficiency anemia: Patient was recently started on extended release iron tablet a month ago. Patient reports feeling better since he started on medication. No more fatigue, tired or little energy. Denies any hematemesis or melena. Patient is taking iron tablet 2 times daily along with vitamin C. Denies any constipation. Migraine: Patient report her headache is improved. She report decrease in frequency and severity. Patient reports headache improved with Imitrex, so far see use 2 to 3 tablet of Imitrex for last 2 to 3 months. Denies any extremity weakness, numbness or blurry vision    Maintenance: Patient does have family history of colon cancer. Patient states he is scheduled for first colonoscopy in the next few weeks    Blood pressure 108/73, pulse 73, temperature 98.4 °F (36.9 °C), temperature source Temporal, height 5' 6\" (1.676 m), weight 193 lb (87.5 kg), last menstrual period 10/21/2019, SpO2 98 %, not currently breastfeeding. HEENT WNL     Heart regular    Lungs clear    abd non-tender      No edema    Pulses intact       Assessment and plan:  Iron deficiency anemia: Patient last CBC was 6 months ago with hemoglobin of 9. Repeat lab including CBC, reticulocyte and iron profile. Continue iron tablet as prescribed    Headache possible due to migraine: Improving. Gastric ulcer status post surgery due to perforation: Continue Prilosec and Carafate    Health maintenance: Patient refused for Covid and flu vaccine. Will fill prescription medication Clarification form as   patient request    Follow-up in 3 months    Attending Physician Statement  I have discussed the case, including pertinent history and exam findings with the resident. I agree with the documented assessment and plan.

## 2021-10-19 NOTE — PROGRESS NOTES
Patient agreed to COVID test on 10-22 at the  [facility] 7 am- 4:30 pm located at  [address] [place] [address]. Patient instructed to bring ID. Patient instructed to self isolate until day of surgery.

## 2021-10-21 NOTE — PROGRESS NOTES
[address] PRE-ADMISSION TESTING ENDOSCOPY INSTRUCTIONS- [facility]-phone number:[phone]    ENDOSCOPY INSTRUCTIONS:   [x] Bowel prep instructions reviewed. [x] Nothing by mouth after midnight, including gum, candy, mints, or water. Please follow your surgeons instructions if you are required to complete a bowel prep. Colonoscopy- no solid food-only clear liquids the day prior). [x] You may brush your teeth, gargle, but do NOT swallow water. [x] Do not wear makeup, lotions, powders, deodorant. Nail polish as directed by the nurse. [x] Arrange transportation with a responsible adult  to and from the hospital. If you do not have a responsible adult  to transport you, you will need to make arrangements with a medical transportation company (i.e. [employer]. A Uber/taxi/bus is not appropriate unless you are accompanied by a responsible adult ). Arrange for someone to be with you for the remainder of the day and for 24 hours after your procedure due to having had anesthesia. Who will be your  for transportation? Estelita Grace or Mackenzie_____   Who will be staying with you for 24 hrs after your procedure?__________________    PARKING INSTRUCTIONS:   [x] Arrival Time:____0800____________  · [x] Parking lot  \"I\" OR 1 is located on [facility] (the corner of [facility]). To enter, press the button and the gate will lift. A free token will be provided to exit the lot. One car per patient is allowed to park in this lot. All other cars are to park on [address] [place] [address] either in the parking garage or the handicap lot. [] To reach the [facility] lobby from [address] [place] [address], upon entering the hospital, take elevator B to the 3rd floor. EDUCATION INSTRUCTIONS:  [x] Bring a complete list of your medications, please write the last time you took the medicine, give this list to the nurse.   [x] Take the following medications the morning of surgery with 1-2 ounces of water: Tylenol & hydroxyzine as needed, albuterol, Wellbutrin, omperazole  [x] Stop herbal supplements and vitamins 5 days before your surgery. [] DO NOT take any diabetic medicine the morning of surgery. Follow instructions for insulin the day before surgery. [] If you are diabetic and your blood sugar is low or you feel symptomatic, you may drink 1-2 ounces of apple juice or take a glucose tablet. The morning of your procedure, you may call the pre-op area if you have concerns about your blood sugar [phone]. [x] Use your inhalers the morning of surgery. Bring your emergency inhaler with you day of surgery. [x] Follow physician instructions regarding any blood thinners you may be taking. WHAT TO EXPECT:  [x] The day of your procedure you will be greeted and checked in by the [facility] [employer].  In addition, you will be registered in the [facility] by a Patient Access Representative. Please bring your photo ID and insurance card. A nurse will greet you in accordance to the time you are needed in the pre-op area to prepare you for surgery. Please do not be discouraged if you are not greeted in the order you arrive as there are many variables that are involved in patient preparation. Your patience is greatly appreciated as you wait for your nurse. Please bring in items such as: books, magazines, newspapers, electronics, or any other items  to occupy your time in the waiting area. [x]  Delays may occur. Staff will make a sincere effort to keep you informed of delays. If any delays occur with your procedure, we apologize ahead of time for your inconvenience as we recognize the value of your time.

## 2021-10-21 NOTE — PROGRESS NOTES
Patient states she has lost the bowel prep instructions,  instructed for patient to call Dr. Templeton Phlegm office for instructions. Left message for Dr. Jerrica Samuel assistant regarding above.

## 2021-10-22 ENCOUNTER — HOSPITAL ENCOUNTER (OUTPATIENT)
Age: 46
Discharge: HOME | End: 2021-10-24
Payer: MEDICARE

## 2021-10-22 DIAGNOSIS — K28.9 MARGINAL ULCER: ICD-10-CM

## 2021-10-22 DIAGNOSIS — U07.1 COVID-19: ICD-10-CM

## 2021-10-22 PROCEDURE — U0003 INFECTIOUS AGENT DETECTION BY NUCLEIC ACID (DNA OR RNA); SEVERE ACUTE RESPIRATORY SYNDROME CORONAVIRUS 2 (SARS-COV-2) (CORONAVIRUS DISEASE [COVID-19]), AMPLIFIED PROBE TECHNIQUE, MAKING USE OF HIGH THROUGHPUT TECHNOLOGIES AS DESCRIBED BY CMS-2020-01-R: HCPCS

## 2021-10-22 PROCEDURE — U0005 INFEC AGEN DETEC AMPLI PROBE: HCPCS

## 2021-10-22 RX ORDER — OMEPRAZOLE 20 MG/1
CAPSULE, DELAYED RELEASE ORAL
Qty: 30 CAPSULE | Refills: 2 | Status: SHIPPED
Start: 2021-10-22 | End: 2022-01-28

## 2021-10-23 LAB — SARS-COV-2, PCR: NOT DETECTED

## 2021-10-26 NOTE — PROGRESS NOTES
Spoke with patient informed patient surgery scheduled for 10/27 time has been changed to 8:15 am arrive at 7:15 am.  Patient verbalized understanding.

## 2021-10-27 ENCOUNTER — HOSPITAL ENCOUNTER (OUTPATIENT)
Age: 46
Setting detail: OUTPATIENT SURGERY
Discharge: HOME | End: 2021-10-27
Attending: SURGERY | Admitting: SURGERY
Payer: MEDICARE

## 2021-10-27 ENCOUNTER — ANESTHESIA EVENT (OUTPATIENT)
Dept: ENDOSCOPY | Age: 46
End: 2021-10-27
Payer: MEDICARE

## 2021-10-27 ENCOUNTER — ANESTHESIA (OUTPATIENT)
Dept: ENDOSCOPY | Age: 46
End: 2021-10-27
Payer: MEDICARE

## 2021-10-27 VITALS
HEART RATE: 61 BPM | HEIGHT: 66 IN | OXYGEN SATURATION: 99 % | SYSTOLIC BLOOD PRESSURE: 115 MMHG | TEMPERATURE: 97 F | DIASTOLIC BLOOD PRESSURE: 75 MMHG | WEIGHT: 193 LBS | RESPIRATION RATE: 16 BRPM | BODY MASS INDEX: 31.02 KG/M2

## 2021-10-27 VITALS — DIASTOLIC BLOOD PRESSURE: 68 MMHG | OXYGEN SATURATION: 100 % | TEMPERATURE: 98.1 F | SYSTOLIC BLOOD PRESSURE: 116 MMHG

## 2021-10-27 DIAGNOSIS — U07.1 COVID-19: Primary | ICD-10-CM

## 2021-10-27 PROCEDURE — 6360000002 HC RX W HCPCS: Performed by: NURSE ANESTHETIST, CERTIFIED REGISTERED

## 2021-10-27 PROCEDURE — 45380 COLONOSCOPY AND BIOPSY: CPT | Performed by: SURGERY

## 2021-10-27 PROCEDURE — 3700000001 HC ADD 15 MINUTES (ANESTHESIA): Performed by: SURGERY

## 2021-10-27 PROCEDURE — 3609010600 HC COLONOSCOPY POLYPECTOMY SNARE/COLD BIOPSY: Performed by: SURGERY

## 2021-10-27 PROCEDURE — 88305 TISSUE EXAM BY PATHOLOGIST: CPT

## 2021-10-27 PROCEDURE — 7100000010 HC PHASE II RECOVERY - FIRST 15 MIN: Performed by: SURGERY

## 2021-10-27 PROCEDURE — 3700000000 HC ANESTHESIA ATTENDED CARE: Performed by: SURGERY

## 2021-10-27 PROCEDURE — 2709999900 HC NON-CHARGEABLE SUPPLY: Performed by: SURGERY

## 2021-10-27 PROCEDURE — 2580000003 HC RX 258: Performed by: NURSE ANESTHETIST, CERTIFIED REGISTERED

## 2021-10-27 PROCEDURE — 2500000003 HC RX 250 WO HCPCS: Performed by: NURSE ANESTHETIST, CERTIFIED REGISTERED

## 2021-10-27 PROCEDURE — 7100000011 HC PHASE II RECOVERY - ADDTL 15 MIN: Performed by: SURGERY

## 2021-10-27 RX ORDER — SODIUM CHLORIDE 9 MG/ML
INJECTION, SOLUTION INTRAVENOUS CONTINUOUS PRN
Status: DISCONTINUED | OUTPATIENT
Start: 2021-10-27 | End: 2021-10-27 | Stop reason: SDUPTHER

## 2021-10-27 RX ORDER — PROPOFOL 10 MG/ML
INJECTION, EMULSION INTRAVENOUS PRN
Status: DISCONTINUED | OUTPATIENT
Start: 2021-10-27 | End: 2021-10-27 | Stop reason: SDUPTHER

## 2021-10-27 RX ORDER — GLYCOPYRROLATE 1 MG/5 ML
SYRINGE (ML) INTRAVENOUS PRN
Status: DISCONTINUED | OUTPATIENT
Start: 2021-10-27 | End: 2021-10-27 | Stop reason: SDUPTHER

## 2021-10-27 RX ORDER — MIDAZOLAM HYDROCHLORIDE 1 MG/ML
INJECTION INTRAMUSCULAR; INTRAVENOUS PRN
Status: DISCONTINUED | OUTPATIENT
Start: 2021-10-27 | End: 2021-10-27 | Stop reason: SDUPTHER

## 2021-10-27 RX ADMIN — Medication 0.1 MG: at 08:54

## 2021-10-27 RX ADMIN — MIDAZOLAM 2 MG: 1 INJECTION INTRAMUSCULAR; INTRAVENOUS at 08:55

## 2021-10-27 RX ADMIN — PROPOFOL 320 MG: 10 INJECTION, EMULSION INTRAVENOUS at 08:55

## 2021-10-27 RX ADMIN — SODIUM CHLORIDE: 9 INJECTION, SOLUTION INTRAVENOUS at 08:54

## 2021-10-27 ASSESSMENT — PAIN SCALES - GENERAL
PAINLEVEL_OUTOF10: 0

## 2021-10-27 ASSESSMENT — LIFESTYLE VARIABLES: SMOKING_STATUS: 1

## 2021-10-27 ASSESSMENT — PAIN - FUNCTIONAL ASSESSMENT: PAIN_FUNCTIONAL_ASSESSMENT: 0-10

## 2021-10-27 NOTE — PROGRESS NOTES
Discharge instructions provided to patient and aunt Rick Abdi, written and verbal, patient verbalized understanding. Iv site removed. Transport requested.

## 2021-10-27 NOTE — H&P
[facility] Note     Assessment/Plan:        Diagnosis Orders   1. Encounter for screening colonoscopy        We will plan for colonoscopy            Return for Colonoscopy.             Chief Complaint   Patient presents with    New Patient       here for a colonoscopy consult. ref by Dr. Keyshawn Ramey  PCP: Maria Fernanda Fermin MD     HPI: Cathryn Leahy is a 50 y.o. female who presents in consultation for colonoscopy. She denies any issues. She has no problems with diarrhea or constipation. She has no melena or hematochezia. There is no significant abdominal pain or unintentional weight loss. She does states she will have some lower cramps when she has to have a bowel movement that improves after having a bowel movement. Her father has a history of colon cancer in 2019 but she says it was squamous cancer and they are not sure where that originated from.           Past Medical History        Past Medical History:   Diagnosis Date    Anemia      Arthritis      Asthma      Depression      GERD without esophagitis      History of blood transfusion      History of gastric bypass      Hydronephrosis with urinary obstruction due to ureteral calculus      Hypertension       No meds following sustained weight loss after bariatric surgery.  Iron deficiency 8/1/2018    Localized osteoarthritis of left knee 1/20/2020    Lumbar spondylosis 1/26/2018    Lymphedema      Obesity      Panic attacks      Perforated marginal ulcer 10/29/2018     ~1 year after RnYGB    PONV (postoperative nausea and vomiting)      Prediabetes       BG has normalized following sustained weight loss after bariatric surgery.  Sleep apnea, obstructive       Off PAP therapy BG following sustained weight loss after bariatric surgery.     Vitamin D deficiency 7/27/2018    Zinc deficiency 8/1/2018            Past Surgical History   Past Surgical History:   Procedure Laterality Date    APPENDECTOMY   1996    CHOLECYSTECTOMY, LAPAROSCOPIC N/A 7/27/2019     CHOLECYSTECTOMY LAPAROSCOPIC performed by Mariana Jones MD at [address]   10/23/2019    HYSTERECTOMY   01/22/2020     [place]    LAPAROSCOPY N/A 6/27/2020     DIAGNOSTIC  LAPAROSCOPY, REPAIR PERFORATED MARGINAL ULCER, UPPER ENDOSCOPY. [facility] PATCH performed by Pawel Henderson MD at [address] LITHOTRIPSY Right 7/24/2019     CYSTOSCOPY RETROGRADE PYELOGRAM URETEROSCOPY J STENT LASER LITHOTRIPSY RIGHT performed by Rosa Chambers DO at [address] LITHOTRIPSY        NV OFFICE/OUTPT VISIT,PROCEDURE ONLY N/A 10/29/2018     DIAGNOSTIC LAPAROSCOPY REPAIR PERFORATED VISCUS performed by Mariana Jones MD at [address] [facility]   11/14/2017    STOMACH SURGERY        UPPER GASTROINTESTINAL ENDOSCOPY N/A 7/27/2019     EGD ESOPHAGOGASTRODUODENOSCOPY performed by Mariana Jones MD at [address] 2/7/2020     EGD BIOPSY performed by Mariana Jones MD at [address] 6/27/2020     EGD DIAGNOSTIC ONLY performed by Pawel Henderson MD at [address] [facility] Medications           Prior to Admission medications    Medication Sig Start Date End Date Taking? Authorizing Provider   acetaminophen (TYLENOL) 500 MG tablet Take 2 tablets by mouth every 6 hours as needed for Pain Maximum dose- 8 tablets/24 hours.  6/18/21   Yes Ariella Cannon MD   albuterol sulfate  (90 Base) MCG/ACT inhaler inhale 2 puffs by mouth and INTO THE LUNGS every 6 hours if needed for wheezing 6/10/21   Yes Ariella Cannon MD   fluticasone (FLONASE) 50 MCG/ACT nasal spray 2 sprays by Each Nostril route daily 6/2/21   Yes Nawaf Wright MD   SUMAtriptan (IMITREX) 50 MG tablet Take 1 tablet by mouth once as needed for Migraine 6/2/21 7/15/21 Yes Nawaf Wright MD   Handicap Placard MISC by Does not apply route 6/2/21   Yes Nawaf Wright MD   omeprazole (PRILOSEC) 20 MG delayed release capsule take 1 capsule by mouth once daily 5/10/21   Yes Perez Sainz MD   ondansetron (ZOFRAN-ODT) 4 MG disintegrating tablet dissolve 1 tablet ON TONGUE every 8 hours if needed for nausea OR vomiting 2/9/21   Yes Anitra Heller MD   clobetasol (TEMOVATE) 0.05 % ointment Apply topically 2 times daily Apply topically 2 times daily.  1/4/21   Yes JOHN Zheng - CNP   dicyclomine (BENTYL) 10 MG capsule Take 2 capsules by mouth 4 times daily for 10 days 8/14/20 7/15/21 Yes Keyshawn De La Rosa DO   buPROPion (WELLBUTRIN SR) 100 MG extended release tablet Take 1 tablet by mouth 2 times daily 7/14/20   Yes Historical Provider, MD   sucralfate (CARAFATE) 1 GM tablet take 1 tablet by mouth four times a day 7/20/20   Yes Perez Sainz MD   nicotine (NICODERM CQ) 21 MG/24HR Place 1 patch onto the skin every 24 hours 7/20/20 7/15/21 Yes Anitra Heller MD   sennosides-docusate sodium (SENOKOT-S) 8.6-50 MG tablet Take 1 tablet by mouth 2 times daily 6/29/20   Yes Lico Edgar MD   Multiple Vitamins-Minerals (MULTIVITAMIN ADULT PO) Take by mouth daily      Yes Historical Provider, MD   hydrOXYzine (VISTARIL) 50 MG capsule Take 1 capsule by mouth 3 times daily as needed for Anxiety 10/29/19   Yes Polo Pang MD   ascorbic acid (V-R VITAMIN C) 250 MG tablet Take 1 tablet by mouth daily Take with the iron supplement 6/24/19   Yes Payam Cannon MD   Cholecalciferol (VITAMIN D3) 43487 units CAPS Take one capsule every Mon-Wed-Fri for four weeks 6/24/19   Yes Payam Cannon MD   Handicap Placard MISC by Does not apply route Duration: 5 years 7/19/16   Yes Nonda Td, DO                  Allergies   Allergen Reactions    Pcn [Penicillins] Anaphylaxis and Other (See Comments)       Patient unsure    Food Other (See Comments)       Berries - hives  Lactose Intolerance to milk-diarrhea  Soy-increases menses occurrences            Social History   Social History            Socioeconomic History    Marital Stroke Father      Cancer Maternal Grandfather      Cancer Paternal Grandmother      Stroke Paternal Grandfather      Substance Abuse Brother              Review of Systems   All other systems reviewed and are negative.                  Objective:  Vitals       Vitals:     07/15/21 1416   BP: 110/68   Pulse: 81   Resp: 16   Temp: 96.6 °F (35.9 °C)   TempSrc: Temporal   Weight: 180 lb 8 oz (81.9 kg)   Height: 5' 6\" (1.676 m)            Physical Exam  HENT:      Head: Normocephalic and atraumatic. Eyes:      General:         Right eye: No discharge. Left eye: No discharge. Neck:      Trachea: No tracheal deviation. Cardiovascular:      Rate and Rhythm: Normal rate. Pulmonary:      Effort: Pulmonary effort is normal. No respiratory distress. Abdominal:      General: There is no distension. Palpations: Abdomen is soft. Tenderness: There is no abdominal tenderness. There is no guarding or rebound. Skin:     General: Skin is warm and dry. Neurological:      Mental Status: She is alert and oriented to person, place, and time.                      Darío Koehler MD       NOTE: This report, in part or full,may have been transcribed using voice recognition software. Every effort was made to ensure accuracy; however, inadvertent computerized transcription errors may be present.  Please excuse any transcriptional grammatical or spelling errors that may have escaped my editorial review.     CC: Matti Garcia MD

## 2021-10-27 NOTE — ANESTHESIA POSTPROCEDURE EVALUATION
Department of Anesthesiology  Postprocedure Note    Patient: Kendy Rodriguez  MRN: [id number]  YOB: 1975  Date of evaluation: 10/27/2021  Time:  10:22 AM     Procedure Summary     Date: 10/27/21 Room / Location: [facility] / [facility]    Anesthesia Start:  Anesthesia Stop:     Procedure: COLORECTAL CANCER SCREENING, NOT HIGH RISK (N/A ) Diagnosis: (SCREENING)    Surgeons: Rosana Cruz MD Responsible Provider:     Anesthesia Type: MAC ASA Status: 3          Anesthesia Type: MAC    Marielena Phase I: Marielena Score: 10    Marielena Phase II: Marielena Score: 10    Last vitals: Reviewed and per EMR flowsheets.        Anesthesia Post Evaluation    Patient location during evaluation: PACU  Patient participation: complete - patient participated  Level of consciousness: awake  Pain score: 3  Airway patency: patent  Nausea & Vomiting: no nausea and no vomiting  Complications: no  Cardiovascular status: blood pressure returned to baseline  Respiratory status: acceptable  Hydration status: euvolemic

## 2021-10-27 NOTE — ANESTHESIA POSTPROCEDURE EVALUATION
Department of Anesthesiology  Postprocedure Note    Patient: Silvina Duarte  MRN: [id number]  YOB: 1975  Date of evaluation: 10/27/2021  Time:  9:27 AM     Procedure Summary     Date: 10/27/21 Room / Location: [facility]    Anesthesia Start: 8602 Anesthesia Stop: 3121    Procedure: COLONOSCOPY POLYPECTOMY SNARE/COLD BIOPSY (N/A ) Diagnosis: (SCREENING)    Surgeons: Joelle Herrera MD Responsible Provider: Fito Sena MD    Anesthesia Type: MAC ASA Status: 3          Anesthesia Type: MAC    Marielena Phase I: Marielena Score: 10    Marielena Phase II:      Last vitals: Reviewed and per EMR flowsheets.        Anesthesia Post Evaluation    Patient location during evaluation: PACU  Patient participation: complete - patient cannot participate  Level of consciousness: sleepy but conscious  Pain score: 0  Airway patency: patent  Nausea & Vomiting: no nausea and no vomiting  Complications: no  Cardiovascular status: blood pressure returned to baseline  Respiratory status: acceptable and nasal cannula  Hydration status: euvolemic

## 2021-10-27 NOTE — ANESTHESIA PRE PROCEDURE
Department of Anesthesiology  Preprocedure Note       Name:  Kendy Rodriguez   Age:  48 y.o.  :  1975                                          MRN:  [id number]         Date:  10/27/2021      Surgeon: Lyndsey Clifford):  Rosana Cruz MD    Procedure: COLORECTAL CANCER SCREENING, NOT HIGH RISK (N/A )    Medications prior to admission:   Prior to Admission medications    Medication Sig Start Date End Date Taking? Authorizing Provider   omeprazole (PRILOSEC) 20 MG delayed release capsule take 1 capsule by mouth once daily 10/22/21   Denzel Barrow MD   ondansetron (ZOFRAN-ODT) 4 MG disintegrating tablet dissolve 1 tablet ON TONGUE every 8 hours if needed for nausea OR vomiting 10/12/21   Patricia Cummins MD   ferrous sulfate dried (SLOW RELEASE IRON) 160 (50 Fe) MG TBCR extended release tablet Take 160 mg by mouth daily 10/12/21   Patricia Cummins MD   sucralfate (CARAFATE) 1 GM tablet Take 0.5 tablets by mouth 2 times daily  Patient taking differently: Take 1 g by mouth 2 times daily  10/12/21   Patricia Cummins MD   buPROPion [facility] SR) 200 MG extended release tablet Take 1 tablet by mouth 2 times daily 21   Historical Provider, MD   SUMAtriptan (IMITREX) 50 MG tablet Take 1 tablet by mouth once as needed for Migraine  Patient not taking: Reported on 10/12/2021 8/27/21 8/27/21  Tamika Chino MD   acetaminophen (TYLENOL) 500 MG tablet Take 2 tablets by mouth every 6 hours as needed for Pain Maximum dose- 8 tablets/24 hours.  21   Ann Marie Eagle MD   albuterol sulfate  (90 Base) MCG/ACT inhaler inhale 2 puffs by mouth and INTO THE LUNGS every 6 hours if needed for wheezing 6/10/21   Ann Marie Eagle MD   fluticasone [facility]) 50 MCG/ACT nasal spray 2 sprays by Each Nostril route daily  Patient not taking: Reported on 10/12/2021 6/2/21   Estephania Black MD   Handicap Placard MISC by Does not apply route 21   Estephania Black MD   sennosides-docusate sodium (SENOKOT-S) 8.6-50 MG tablet Take 1 tablet by mouth 2 times Hydronephrosis with urinary obstruction due to ureteral calculus     Hypertension     No meds following sustained weight loss after bariatric surgery.  Iron deficiency 8/1/2018    Localized osteoarthritis of left knee 1/20/2020    Lumbar spondylosis 1/26/2018    Lymphedema     Obesity     Panic attacks     Perforated marginal ulcer 10/29/2018    ~1 year after RnYGB    PONV (postoperative nausea and vomiting)     Prediabetes     BG has normalized following sustained weight loss after bariatric surgery.  Sleep apnea, obstructive     Off PAP therapy BG following sustained weight loss after bariatric surgery.  Vitamin D deficiency 7/27/2018    Zinc deficiency 8/1/2018       Past Surgical History:        Procedure Laterality Date    APPENDECTOMY  1996    CHOLECYSTECTOMY, LAPAROSCOPIC N/A 7/27/2019    CHOLECYSTECTOMY LAPAROSCOPIC performed by Fallon Goodwin MD at [address] [facility]  10/23/2019    HYSTERECTOMY  01/22/2020    [facility]    LAPAROSCOPY N/A 6/27/2020    DIAGNOSTIC  LAPAROSCOPY, REPAIR PERFORATED MARGINAL ULCER, UPPER ENDOSCOPY.  [facility] PATCH performed by Viktoria Keane MD at [facility] LITHOTRIPSY Right 7/24/2019    CYSTOSCOPY RETROGRADE PYELOGRAM URETEROSCOPY J STENT LASER LITHOTRIPSY RIGHT performed by Minerva Chambers DO at [facility] LITHOTRIPSY      [facility] OFFICE/OUTPT VISIT,PROCEDURE ONLY N/A 10/29/2018    DIAGNOSTIC LAPAROSCOPY REPAIR PERFORATED VISCUS performed by Fallon Goodwin MD at [address] [place] [address]  11/14/2017    STOMACH SURGERY      UPPER GASTROINTESTINAL ENDOSCOPY N/A 7/27/2019    EGD ESOPHAGOGASTRODUODENOSCOPY performed by Fallon Goodwin MD at [facility] N/A 2/7/2020    EGD BIOPSY performed by Fallon Goodwin MD at [place] [address] 6/27/2020    EGD DIAGNOSTIC ONLY performed by Viktoria Keane MD at [facility] History:    Social History Tobacco Use    Smoking status: Current Every Day Smoker     Packs/day: 0.50     Years: 15.00     Pack years: 7.50     Types: Cigarettes     Start date: 10/29/1991    Smokeless tobacco: Never Used   Substance Use Topics    Alcohol use: Yes     Alcohol/week: 6.0 standard drinks     Types: 6 Standard drinks or equivalent per week     Comment: social                                Ready to quit: Not Answered  Counseling given: Not Answered      Vital Signs (Current):   Vitals:    10/21/21 1225 10/27/21 0738   BP:  (!) 110/55   Pulse:  61   Resp:  16   Temp:  98.1 °F (36.7 °C)   TempSrc:  Temporal   SpO2:  98%   Weight: 193 lb (87.5 kg) 193 lb (87.5 kg)   Height: 5' 6\" (1.676 m) 5' 6\" (1.676 m)                                              BP Readings from Last 3 Encounters:   10/27/21 (!) 110/55   10/12/21 108/73   08/27/21 126/71       NPO Status: Time of last liquid consumption: 2230                        Time of last solid consumption: 0700                        Date of last liquid consumption: 10/26/21                        Date of last solid food consumption: 10/26/21    BMI:   Wt Readings from Last 3 Encounters:   10/27/21 193 lb (87.5 kg)   10/12/21 193 lb (87.5 kg)   08/27/21 185 lb (83.9 kg)     Body mass index is 31.15 kg/m².     CBC:   Lab Results   Component Value Date    WBC 7.3 03/10/2021    RBC 3.79 03/10/2021    HGB 9.0 03/10/2021    HCT 29.9 03/10/2021    MCV 78.9 03/10/2021    RDW 19.0 03/10/2021     03/10/2021       CMP:   Lab Results   Component Value Date     03/10/2021    K 4.5 03/10/2021    K 5.1 10/21/2020     03/10/2021    CO2 27 03/10/2021    BUN 12 03/10/2021    CREATININE 0.6 03/10/2021    GFRAA >60 03/10/2021    LABGLOM >60 03/10/2021    GLUCOSE 83 03/10/2021    GLUCOSE 94 04/02/2012    PROT 6.5 11/04/2020    CALCIUM 8.6 03/10/2021    BILITOT 0.2 11/04/2020    ALKPHOS 84 11/04/2020    AST 12 11/04/2020    ALT <5 11/04/2020       POC Tests:   No results for .                  ROS comment: Hx: Endometrial polyp  Vitamin D deficiency    Zinc deficiency  Abdominal:   (+) obese,           Vascular: negative vascular ROS. Other Findings:               Anesthesia Plan      MAC     ASA 3       Induction: intravenous. Anesthetic plan and risks discussed with patient. Plan discussed with CRNA.           Adin Blandon MD   10/27/2021

## 2021-10-27 NOTE — OP NOTE
Colonoscopy Op Note  PATIENT: Leala Dakins    DATE OF PROCEDURE: 10/27/2021    SURGEON: Anaid Jimenez MD    PREOPERATIVE DIAGNOSIS: Screening colonoscopy; family history of colon cancer    POSTOPERATIVE DIAGNOSIS: Same, colon polyp, minimal shallow diverticulosis    OPERATION: Procedure(s):  COLONOSCOPY POLYPECTOMY SNARE/COLD BIOPSY    ANESTHESIA: Local monitored anesthesia. ESTIMATED BLOOD LOSS: nil     COMPLICATIONS: None. SPECIMENS:   ID Type Source Tests Collected by Time Destination   A : Rectal Polyp Tissue Colon SURGICAL PATHOLOGY Anaid Jimenez MD 10/27/2021 0920        HISTORY: The patient is a 48y.o. year old female with history of above preop diagnosis. I recommended colonoscopy with possible biopsy or polypectomy and I explained the risk, benefits, expected outcome, and alternatives to the procedure. Risks included but are not limited to bleeding, infection, respiratory distress, hypotension, and perforation of the colon. The patient understands and is in agreement. PROCEDURE: The patient was given IV conscious sedation per anesthesia. The patient was given supplemental oxygen by nasal cannula. The colonoscope was inserted per rectum and advanced under direct vision to the cecum without difficulty, identified by appendiceal orifice and ileocecal valve. The prep was good so exam was adequate. FINDINGS:    MATTIE: normal    Terminal Ileum: normal    Colon: On insertion a few very shallow diverticula were noted. None noted on withdrawal.  In the sigmoid there is a diminutive polyp status post forcep polypectomy. Rectum/Anus: examined in normal and retroflexed positions -2 mm polyp status post forcep polypectomy. The colon was decompressed and the scope was removed. The withdraw time was approximately 10 minutes. The patient tolerated the procedure well. ASSESSMENT/PLAN:   1. Follow-up biopsy  2.  Colorectal Cancer Screening - recommend repeat colonoscopy in 5 years (may change pending biopsy results). Sooner if issues/concerns.     Viktoriya Garcia MD  10/27/21  9:22 AM

## 2021-11-01 ENCOUNTER — TELEPHONE (OUTPATIENT)
Dept: BARIATRICS/WEIGHT MGMT | Age: 46
End: 2021-11-01

## 2021-11-01 DIAGNOSIS — K91.2 MALNUTRITION FOLLOWING GASTROINTESTINAL SURGERY: Primary | ICD-10-CM

## 2021-11-01 NOTE — TELEPHONE ENCOUNTER
I called this patient regarding their labs being drawn for their upcoming appointment. The patient stated that they would get them drawn before their appt.

## 2021-11-03 ENCOUNTER — TELEPHONE (OUTPATIENT)
Dept: SURGERY | Age: 46
End: 2021-11-03

## 2021-11-03 ENCOUNTER — OFFICE VISIT (OUTPATIENT)
Dept: BARIATRICS/WEIGHT MGMT | Age: 46
End: 2021-11-03
Payer: MEDICARE

## 2021-11-03 ENCOUNTER — HOSPITAL ENCOUNTER (OUTPATIENT)
Age: 46
Discharge: HOME | End: 2021-11-03
Payer: MEDICARE

## 2021-11-03 VITALS
WEIGHT: 189 LBS | DIASTOLIC BLOOD PRESSURE: 57 MMHG | SYSTOLIC BLOOD PRESSURE: 135 MMHG | BODY MASS INDEX: 30.37 KG/M2 | TEMPERATURE: 97.6 F | HEIGHT: 66 IN | HEART RATE: 66 BPM | RESPIRATION RATE: 20 BRPM

## 2021-11-03 DIAGNOSIS — K91.2 MALNUTRITION FOLLOWING GASTROINTESTINAL SURGERY: ICD-10-CM

## 2021-11-03 DIAGNOSIS — E61.1 IRON DEFICIENCY: ICD-10-CM

## 2021-11-03 DIAGNOSIS — D50.8 OTHER IRON DEFICIENCY ANEMIA: ICD-10-CM

## 2021-11-03 DIAGNOSIS — K91.2 MALNUTRITION FOLLOWING GASTROINTESTINAL SURGERY: Primary | ICD-10-CM

## 2021-11-03 LAB
ALBUMIN SERPL-MCNC: 3.5 G/DL (ref 3.5–5.2)
ALP BLD-CCNC: 74 U/L (ref 35–104)
ALT SERPL-CCNC: 8 U/L (ref 0–32)
ANION GAP SERPL CALCULATED.3IONS-SCNC: 9 MMOL/L (ref 7–16)
ANISOCYTOSIS: ABNORMAL
AST SERPL-CCNC: 13 U/L (ref 0–31)
BASOPHILS ABSOLUTE: 0.07 E9/L (ref 0–0.2)
BASOPHILS RELATIVE PERCENT: 1.2 % (ref 0–2)
BILIRUB SERPL-MCNC: 0.2 MG/DL (ref 0–1.2)
BUN BLDV-MCNC: 16 MG/DL (ref 6–20)
CALCIUM SERPL-MCNC: 8.5 MG/DL (ref 8.6–10.2)
CHLORIDE BLD-SCNC: 101 MMOL/L (ref 98–107)
CHOLESTEROL, TOTAL: 170 MG/DL (ref 0–199)
CO2: 26 MMOL/L (ref 22–29)
CREAT SERPL-MCNC: 0.7 MG/DL (ref 0.5–1)
EOSINOPHILS ABSOLUTE: 0.09 E9/L (ref 0.05–0.5)
EOSINOPHILS RELATIVE PERCENT: 1.5 % (ref 0–6)
FERRITIN: 7 NG/ML
FOLATE: 16.4 NG/ML (ref 4.8–24.2)
GFR AFRICAN AMERICAN: >60
GFR NON-AFRICAN AMERICAN: >60 ML/MIN/1.73
GLUCOSE BLD-MCNC: 79 MG/DL (ref 74–99)
HCT VFR BLD CALC: 31.6 % (ref 34–48)
HEMOGLOBIN: 10.1 G/DL (ref 11.5–15.5)
HYPOCHROMIA: ABNORMAL
IMMATURE GRANULOCYTES #: 0.02 E9/L
IMMATURE GRANULOCYTES %: 0.3 % (ref 0–5)
IMMATURE RETIC FRACT: 19.2 % (ref 3–15.9)
IRON SATURATION: 10 % (ref 15–50)
IRON: 32 MCG/DL (ref 37–145)
LYMPHOCYTES ABSOLUTE: 1.74 E9/L (ref 1.5–4)
LYMPHOCYTES RELATIVE PERCENT: 29.3 % (ref 20–42)
MCH RBC QN AUTO: 27.6 PG (ref 26–35)
MCHC RBC AUTO-ENTMCNC: 32 % (ref 32–34.5)
MCV RBC AUTO: 86.3 FL (ref 80–99.9)
MONOCYTES ABSOLUTE: 0.36 E9/L (ref 0.1–0.95)
MONOCYTES RELATIVE PERCENT: 6.1 % (ref 2–12)
NEUTROPHILS ABSOLUTE: 3.66 E9/L (ref 1.8–7.3)
NEUTROPHILS RELATIVE PERCENT: 61.6 % (ref 43–80)
PDW BLD-RTO: 22.9 FL (ref 11.5–15)
PLATELET # BLD: 415 E9/L (ref 130–450)
PMV BLD AUTO: 8.7 FL (ref 7–12)
POTASSIUM SERPL-SCNC: 4.4 MMOL/L (ref 3.5–5)
PREALBUMIN: 20 MG/DL (ref 20–40)
RBC # BLD: 3.66 E12/L (ref 3.5–5.5)
RETIC HGB EQUIVALENT: 27 PG (ref 28.2–36.6)
RETICULOCYTE ABSOLUTE COUNT: 0.03 E12/L
RETICULOCYTE COUNT PCT: 0.8 % (ref 0.4–1.9)
SODIUM BLD-SCNC: 136 MMOL/L (ref 132–146)
TOTAL IRON BINDING CAPACITY: 330 MCG/DL (ref 250–450)
TOTAL PROTEIN: 6.2 G/DL (ref 6.4–8.3)
TRIGL SERPL-MCNC: 52 MG/DL (ref 0–149)
VITAMIN B-12: 317 PG/ML (ref 211–946)
WBC # BLD: 6 E9/L (ref 4.5–11.5)

## 2021-11-03 PROCEDURE — 36415 COLL VENOUS BLD VENIPUNCTURE: CPT

## 2021-11-03 PROCEDURE — G8484 FLU IMMUNIZE NO ADMIN: HCPCS | Performed by: SURGERY

## 2021-11-03 PROCEDURE — 85025 COMPLETE CBC W/AUTO DIFF WBC: CPT

## 2021-11-03 PROCEDURE — G8417 CALC BMI ABV UP PARAM F/U: HCPCS | Performed by: SURGERY

## 2021-11-03 PROCEDURE — 85045 AUTOMATED RETICULOCYTE COUNT: CPT

## 2021-11-03 PROCEDURE — 82525 ASSAY OF COPPER: CPT

## 2021-11-03 PROCEDURE — 84478 ASSAY OF TRIGLYCERIDES: CPT

## 2021-11-03 PROCEDURE — 82728 ASSAY OF FERRITIN: CPT

## 2021-11-03 PROCEDURE — 84425 ASSAY OF VITAMIN B-1: CPT

## 2021-11-03 PROCEDURE — G8427 DOCREV CUR MEDS BY ELIG CLIN: HCPCS | Performed by: SURGERY

## 2021-11-03 PROCEDURE — 4004F PT TOBACCO SCREEN RCVD TLK: CPT | Performed by: SURGERY

## 2021-11-03 PROCEDURE — 99211 OFF/OP EST MAY X REQ PHY/QHP: CPT

## 2021-11-03 PROCEDURE — 80053 COMPREHEN METABOLIC PANEL: CPT

## 2021-11-03 PROCEDURE — 83540 ASSAY OF IRON: CPT

## 2021-11-03 PROCEDURE — 84255 ASSAY OF SELENIUM: CPT

## 2021-11-03 PROCEDURE — 82746 ASSAY OF FOLIC ACID SERUM: CPT

## 2021-11-03 PROCEDURE — 83550 IRON BINDING TEST: CPT

## 2021-11-03 PROCEDURE — 82607 VITAMIN B-12: CPT

## 2021-11-03 PROCEDURE — 84630 ASSAY OF ZINC: CPT

## 2021-11-03 PROCEDURE — 99213 OFFICE O/P EST LOW 20 MIN: CPT | Performed by: SURGERY

## 2021-11-03 PROCEDURE — 82465 ASSAY BLD/SERUM CHOLESTEROL: CPT

## 2021-11-03 PROCEDURE — 84134 ASSAY OF PREALBUMIN: CPT

## 2021-11-03 NOTE — PROGRESS NOTES
Franki Cline  11/3/2021  [address]     Viviane-en- Y Gastric Bypass  4 Year Post-Operative Follow-up     Franki Cline is a 48 y.o. female who is 4 years post Laparoscopic Viviane-en-Y Gastric Bypass surgery. Reports wt gain, she is smoking and has occasional heartburn and intermittent n/v. She is not having swallowing difficulty, is compliant most of the time with the multivitamins and calcium + Vit D. She is meeting fluid recommendations of at least 64 ounces per day and is meeting protein recommendations. She  is not exercising: no regular exercise. Weight=189 lb (85.7 kg)  Today's weight represents a total weight loss of 282 pounds since surgery with 21 pounds regained since the lowest weight. Prior to Admission medications    Medication Sig Start Date End Date Taking? Authorizing Provider   omeprazole (PRILOSEC) 20 MG delayed release capsule take 1 capsule by mouth once daily 10/22/21  Yes Reinaldo Seals MD   ondansetron (ZOFRAN-ODT) 4 MG disintegrating tablet dissolve 1 tablet ON TONGUE every 8 hours if needed for nausea OR vomiting 10/12/21  Yes Juanjose Napier MD   ferrous sulfate dried (SLOW RELEASE IRON) 160 (50 Fe) MG TBCR extended release tablet Take 160 mg by mouth daily 10/12/21  Yes Juanjose Napier MD   sucralfate (CARAFATE) 1 GM tablet Take 0.5 tablets by mouth 2 times daily  Patient taking differently: Take 1 g by mouth 2 times daily  10/12/21  Yes Juanjose Napier MD   buPROPion [facility]) 200 MG extended release tablet Take 1 tablet by mouth 2 times daily 8/18/21  Yes Historical Provider, MD   acetaminophen (TYLENOL) 500 MG tablet Take 2 tablets by mouth every 6 hours as needed for Pain Maximum dose- 8 tablets/24 hours.  6/18/21  Yes Juanjose Napier MD   albuterol sulfate  (90 Base) MCG/ACT inhaler inhale 2 puffs by mouth and INTO THE LUNGS every 6 hours if needed for wheezing 6/10/21  Yes Juanjose Napier MD   fluticasone (FLONASE) 50 MCG/ACT nasal spray 2 sprays by Each Nostril route daily 6/2/21  Yes Estephania Black MD   Handicap Placard MISC by Does not apply route 6/2/21  Yes Estephania Black MD   sennosides-docusate sodium (SENOKOT-S) 8.6-50 MG tablet Take 1 tablet by mouth 2 times daily 6/29/20  Yes Sudhir Ha MD   Multiple Vitamins-Minerals (MULTIVITAMIN ADULT PO) Take by mouth daily    Yes Historical Provider, MD   hydrOXYzine (VISTARIL) 50 MG capsule Take 1 capsule by mouth 3 times daily as needed for Anxiety 10/29/19  Yes Lorena Johnson MD   ascorbic acid (V-R VITAMIN C) 250 MG tablet Take 1 tablet by mouth daily Take with the iron supplement 6/24/19  Yes Rosana Murphy MD          Physical exam:   BP (!) 135/57 (Site: Left Lower Arm, Position: Sitting, Cuff Size: Large Adult)   Pulse 66   Temp 97.6 °F (36.4 °C) (Temporal)   Resp 20   Ht 5' 6\" (1.676 m)   Wt 189 lb (85.7 kg)   LMP 10/21/2019 (Exact Date)   BMI 30.51 kg/m²    General appearance: alert, appears stated age and cooperative  Head: Normocephalic, without obvious abnormality, atraumatic  Neck: no adenopathy, no carotid bruit, no JVD, supple, symmetrical, trachea midline and thyroid not enlarged, symmetric, no tenderness/mass/nodules  Lungs: clear to auscultation bilaterally  Heart: regular rate and rhythm  Abdomen: soft, non-tender; bowel sounds normal; no masses,  no organomegaly  Extremities: extremities normal, atraumatic, no cyanosis or edema    Assessment: Post Viviane-en- Y Gastric Bypass. She does complain of GERD,  does not have sleep apnea,  does not have diabetes,  does not have hypertension off medical treatment. Cholesterol and triglycerides are normal. Malnutrition/hypoalbuminemia. Iron deficiency anemia    Plan: Will refer to heme onc for possible iron infusion . Continue to eat a high protein, low calorie diet, eat small portions very slowly and chew well before swallowing. Drink plenty of water and fluids. Make sure to use fiber to keep the bowels regular.  Try to exercise 7 days per week, maintain adequate variety and balance. Always notify the clinic if you have any medical problems. Follow up in 6 months.  REORDER several LAB panels TO FOLLOW NEXT VISIT      Physician Signature: Electronically signed by Dr. Golden Kim MD

## 2021-11-03 NOTE — PROGRESS NOTES
Patient is 2 yr LRYGB. Water intake is around 24 oz daily. Protein through foods. Vitamin intake is good. Bowel movements are good. Patient is concerned with weight gain. She has noticed that she has been able to eat more food recently which she believes is causing her to gain weight. She is worried about stretching.

## 2021-11-03 NOTE — TELEPHONE ENCOUNTER
Received a call from the patient. Notified the patient that per Dr. Enoc José, biopsy is benign. Repeat in 5 years. The patient verbalized understanding. Follow up appt is cancelled.   Electronically signed by Aristeo Metzger on 11/3/2021 at 2:51 PM

## 2021-11-03 NOTE — PATIENT INSTRUCTIONS
Please continue to take your vitamin and mineral supplements as instructed. If you received a blood work prescription today for laboratory monitoring due prior to your next routine follow-up visit, please have this blood work obtained 10 to 14 days prior to your next visit. It is important to fast for 12 hours prior to routine weight loss surgery blood work, EXCEPT for drinking water, to ensure accuracy of results. Please report nausea, vomiting, abdominal pain, or any other problems you experience to your surgeon. For problems related to weight loss surgery, it is best to go to [address] [place] [address] Emergency Department and have your surgeon paged.

## 2021-11-03 NOTE — TELEPHONE ENCOUNTER
MA left message on voicemail for patient to call the office regarding her biopsy results.   Electronically signed by Neida Ocampo MA on 11/3/2021 at 10:35 AM

## 2021-11-03 NOTE — TELEPHONE ENCOUNTER
----- Message from Anne Hernandez MD sent at 11/2/2021  1:04 PM EDT -----  Can let her know biopsy is benign. Repeat in 5 years.

## 2021-11-08 LAB
COPPER: 99.9 UG/DL (ref 80–155)
SELENIUM: 105.6 UG/L (ref 23–190)
ZINC: 50.5 UG/DL (ref 60–120)

## 2021-11-10 LAB — VITAMIN B1 WHOLE BLOOD: 121 NMOL/L (ref 70–180)

## 2021-11-12 ENCOUNTER — OFFICE VISIT (OUTPATIENT)
Dept: PHYSICAL MEDICINE AND REHAB | Age: 46
End: 2021-11-12
Payer: MEDICARE

## 2021-11-12 VITALS
WEIGHT: 194 LBS | BODY MASS INDEX: 31.18 KG/M2 | TEMPERATURE: 97.4 F | DIASTOLIC BLOOD PRESSURE: 64 MMHG | HEIGHT: 66 IN | HEART RATE: 76 BPM | SYSTOLIC BLOOD PRESSURE: 101 MMHG

## 2021-11-12 DIAGNOSIS — M17.11 PRIMARY OSTEOARTHRITIS OF RIGHT KNEE: ICD-10-CM

## 2021-11-12 DIAGNOSIS — M17.12 PRIMARY LOCALIZED OSTEOARTHRITIS OF LEFT KNEE: ICD-10-CM

## 2021-11-12 PROCEDURE — 99214 OFFICE O/P EST MOD 30 MIN: CPT | Performed by: PHYSICAL MEDICINE & REHABILITATION

## 2021-11-12 PROCEDURE — G8427 DOCREV CUR MEDS BY ELIG CLIN: HCPCS | Performed by: PHYSICAL MEDICINE & REHABILITATION

## 2021-11-12 PROCEDURE — 20611 DRAIN/INJ JOINT/BURSA W/US: CPT | Performed by: PHYSICAL MEDICINE & REHABILITATION

## 2021-11-12 PROCEDURE — G8417 CALC BMI ABV UP PARAM F/U: HCPCS | Performed by: PHYSICAL MEDICINE & REHABILITATION

## 2021-11-12 PROCEDURE — 4004F PT TOBACCO SCREEN RCVD TLK: CPT | Performed by: PHYSICAL MEDICINE & REHABILITATION

## 2021-11-12 PROCEDURE — G8484 FLU IMMUNIZE NO ADMIN: HCPCS | Performed by: PHYSICAL MEDICINE & REHABILITATION

## 2021-11-12 RX ORDER — LIDOCAINE HYDROCHLORIDE 10 MG/ML
7 INJECTION, SOLUTION INFILTRATION; PERINEURAL ONCE
Status: COMPLETED | OUTPATIENT
Start: 2021-11-12 | End: 2021-11-12

## 2021-11-12 RX ORDER — TRIAMCINOLONE ACETONIDE 40 MG/ML
40 INJECTION, SUSPENSION INTRA-ARTICULAR; INTRAMUSCULAR ONCE
Status: COMPLETED | OUTPATIENT
Start: 2021-11-12 | End: 2021-11-12

## 2021-11-12 RX ORDER — TRAMADOL HYDROCHLORIDE 50 MG/1
50 TABLET ORAL EVERY 6 HOURS PRN
Qty: 28 TABLET | Refills: 0 | Status: SHIPPED
Start: 2021-11-12 | End: 2022-03-02 | Stop reason: SDUPTHER

## 2021-11-12 RX ADMIN — LIDOCAINE HYDROCHLORIDE 7 ML: 10 INJECTION, SOLUTION INFILTRATION; PERINEURAL at 13:06

## 2021-11-12 RX ADMIN — TRIAMCINOLONE ACETONIDE 40 MG: 40 INJECTION, SUSPENSION INTRA-ARTICULAR; INTRAMUSCULAR at 13:07

## 2021-11-12 NOTE — PROGRESS NOTES
Todd Garrison D.O. [facility]  [address] [facility] [place]  Phone: [phone]  Fax: [phone]        11/12/21    Chief Complaint   Patient presents with    Knee Pain     4 month follow up       HPI:  Michael Marinelli is a 48y.o. year old woman seen today in follow up regarding bilateral knee pain     Interval history: Since the last visit the patient feels like her knee pain has started to increase. She completed Euflexxa 6/23/21 and gelt 60% relief with it, whereas CSI gives her 100% relief for about 3 months. She last had CSI right knee 4/8/21. Tramadol is helping her pain when it flares up. .  Today, the pain is rated Pain Score:   8 where 0 is no pain and 10 is pain as bad as it can be. The pain is located in the right greater than left knee,  Does not radiate, and is described as aching. This pain occurs intermittently. The symptoms have been better since onset. Symptoms are exacerbated by walking. Factors which relieve the pain include rest. Other associated symptoms include none. Otherwise, the pain assessment has changed since the last visit. Past Medical History:   Diagnosis Date    Anemia     Arthritis     Asthma     Depression     GERD without esophagitis     History of blood transfusion     History of gastric bypass     Hydronephrosis with urinary obstruction due to ureteral calculus     Hypertension     No meds following sustained weight loss after bariatric surgery.  Iron deficiency 8/1/2018    Localized osteoarthritis of left knee 1/20/2020    Lumbar spondylosis 1/26/2018    Lymphedema     Obesity     Panic attacks     Perforated marginal ulcer 10/29/2018    ~1 year after RnYGB    PONV (postoperative nausea and vomiting)     Prediabetes     BG has normalized following sustained weight loss after bariatric surgery.     Sleep apnea, obstructive     Off PAP therapy BG following sustained weight loss after bariatric surgery.  Vitamin D deficiency 7/27/2018    Zinc deficiency 8/1/2018       Past Surgical History:   Procedure Laterality Date    APPENDECTOMY  1996    CHOLECYSTECTOMY, LAPAROSCOPIC N/A 7/27/2019    CHOLECYSTECTOMY LAPAROSCOPIC performed by Christine Blandon MD at [facility] COLONOSCOPY N/A 10/27/2021    COLONOSCOPY POLYPECTOMY SNARE/COLD BIOPSY performed by [facility]lan Harris MD at [facility]  10/23/2019    HYSTERECTOMY  01/22/2020    [facility]    LAPAROSCOPY N/A 6/27/2020    DIAGNOSTIC  LAPAROSCOPY, REPAIR PERFORATED MARGINAL ULCER, UPPER ENDOSCOPY. [facility] PATCH performed by Meagan Vaz MD at [facility] LITHOTRIPSY Right 7/24/2019    CYSTOSCOPY RETROGRADE PYELOGRAM URETEROSCOPY J STENT LASER LITHOTRIPSY RIGHT performed by Tia Chambers DO at [facility] LITHOTRIPSY      FL OFFICE/OUTPT VISIT,PROCEDURE ONLY N/A 10/29/2018    DIAGNOSTIC LAPAROSCOPY REPAIR PERFORATED VISCUS performed by Christine Blandon MD at [facility]  11/14/2017    STOMACH SURGERY      UPPER GASTROINTESTINAL ENDOSCOPY N/A 7/27/2019    EGD ESOPHAGOGASTRODUODENOSCOPY performed by Christine Blandon MD at [address] N/A 2/7/2020    [facility]GD [facility]SY [facility] N/A 6/27/2020    EGD DIAGNOSTIC ONLY performed by Meagan Vaz MD at [place] [address] Use    Smoking status: Current Every Day Smoker     Packs/day: 0.50     Years: 15.00     Pack years: 7.50     Types: Cigarettes     Start date: 10/29/1991    Smokeless tobacco: Never Used   Vaping Use    Vaping Use: Never used   Substance Use Topics    Alcohol use:  Yes     Alcohol/week: 6.0 standard drinks     Types: 6 Standard drinks or equivalent per week     Comment: social    Drug use: No       Family History   Problem Relation Age of Onset    Heart Attack Mother 61        death from this    Diabetes Mother     Depression Mother     Diabetes Father     Stroke Father     Cancer Maternal Grandfather     Cancer Paternal Grandmother     Stroke Paternal Grandfather     Substance Abuse Brother        Current Outpatient Medications   Medication Sig Dispense Refill    traMADol (ULTRAM) 50 MG tablet Take 1 tablet by mouth every 6 hours as needed for Pain for up to 10 days. 28 tablet 0    omeprazole (PRILOSEC) 20 MG delayed release capsule take 1 capsule by mouth once daily 30 capsule 2    sucralfate (CARAFATE) 1 GM tablet Take 0.5 tablets by mouth 2 times daily (Patient taking differently: Take 1 g by mouth 2 times daily ) 90 tablet 0    buPROPion (WELLBUTRIN SR) 200 MG extended release tablet Take 1 tablet by mouth 2 times daily      ondansetron (ZOFRAN-ODT) 4 MG disintegrating tablet dissolve 1 tablet ON TONGUE every 8 hours if needed for nausea OR vomiting (Patient not taking: Reported on 11/12/2021) 24 tablet 0    ferrous sulfate dried (SLOW RELEASE IRON) 160 (50 Fe) MG TBCR extended release tablet Take 160 mg by mouth daily (Patient not taking: Reported on 11/12/2021) 30 tablet 5    acetaminophen (TYLENOL) 500 MG tablet Take 2 tablets by mouth every 6 hours as needed for Pain Maximum dose- 8 tablets/24 hours.  (Patient not taking: Reported on 11/12/2021) 120 tablet 2    albuterol sulfate  (90 Base) MCG/ACT inhaler inhale 2 puffs by mouth and INTO THE LUNGS every 6 hours if needed for wheezing (Patient not taking: Reported on 11/12/2021) 8.5 g 1    Handicap Placard MISC by Does not apply route 1 each 0    sennosides-docusate sodium (SENOKOT-S) 8.6-50 MG tablet Take 1 tablet by mouth 2 times daily (Patient not taking: Reported on 11/12/2021) 30 tablet 5    Multiple Vitamins-Minerals (MULTIVITAMIN ADULT PO) Take by mouth daily  (Patient not taking: Reported on 11/12/2021)      hydrOXYzine (VISTARIL) 50 MG capsule Take 1 capsule by mouth 3 times daily as needed for Anxiety (Patient not taking: Reported on 11/12/2021) 90 capsule 1    ascorbic acid (V-R VITAMIN C) 250 MG tablet Take 1 tablet by mouth daily Take with the iron supplement (Patient not taking: Reported on 11/12/2021) 30 tablet 3     No current facility-administered medications for this visit. Allergies   Allergen Reactions    Pcn [Penicillins] Anaphylaxis and Other (See Comments)     Patient unsure    Food Other (See Comments)     Berries - hives  Lactose Intolerance to milk-diarrhea  Soy-increases menses occurrences         Review of Systems:  No new weakness, paresthesia, incontinence of bowel or bladder, saddle anesthesia, falls or gait dysfunction. Otherwise, per HPI. Physical Exam:   Blood pressure 101/64, pulse 76, temperature 97.4 °F (36.3 °C), temperature source Tympanic, height 5' 6\" (1.676 m), weight 194 lb (88 kg), last menstrual period 10/21/2019, not currently breastfeeding. GENERAL: The patient is in no apparent distress. Body habitus is obese. MSK: There is no joint effusion, deformity, instability, swelling, erythema or warmth. AROM is full in the spine and extremities. Spinal curvatures are normal.    Tender right median knee joint. No appreciable effusion. Crepitant and painful knee AROM. NEURO: Gait is normal. No focal sensorimotor deficit. Reflexes 2+ and symmetric in lower extremities. Impression:   1. Primary osteoarthritis of right knee    2. Primary localized osteoarthritis of left knee        Plan:  R knee CSI today. Continue home exercises. Continue current medications. CATHI Coats Reining for more long term effect from triamcinolone. No sooner than 3 months from now for scheduling. Due for Euflexxa 1/23/22 if Raydell Reining denied.      Medications Discontinued During This Encounter   Medication Reason    fluticasone (FLONASE) 50 MCG/ACT nasal spray LIST CLEANUP    traMADol (ULTRAM) 50 MG tablet REORDER       The patient was educated about the diagnosis, prognosis, indications, risks and benefits of treatment. An opportunity to ask questions was given to the patient and questions were answered. The patient agreed to proceed with the recommended treatment as described above. Follow up for injection once approved     Thank you for allowing me to participate in the care of your patient. Hans Seth D.O., P.T. Board Certified Physical Medicine and Rehabilitation  Board Certified [address] [facility] [facility]. [facility]  [facility]. [address] [facility]  Phone: [phone]  Fax: [phone]    11/12/2021    Chief Complaint   Patient presents with    Knee Pain     4 month follow up       Last injection: 4/8/21  Taking anticoagulants/antiplatelets: No  Diabetic: No  Febrile/active infection: No    After explaining the indications, risks, benefits and alternatives of a right knee joint injection, the patient agreed to proceed. A permit was signed and scanned into the chart. The skin on the lateral knee was prepared with chloraprep. Using sterile technique, a 22 gauge, 1.5\" needle with 1 cc of Kenalog 40mg/cc and 8 cc of 1% lidocaine was directed to the knee joint using US guidance. After negative aspiration, the medication was injected. Adequate hemostasis was achieved and a bandage applied. The patient tolerated the procedure well and was educated in post injection care. The patient was clinically monitored after the injection and left the office without incident. There was post injection reduction in pain. Ultrasound images are scanned separately into the EMR. Hans Seth D.O., P.T.   Board Certified Physical Medicine and Rehabilitation  Board Certified Electrodiagnostic Medicine    Administrations This Visit     lidocaine 1 % injection 7 mL     Admin Date  11/12/2021  13:06 Action  Given Dose  7 mL Route  Other Site   Administered By  Luis Verma MA    Ordering Provider: Abdi Silver Marybeth Lyn DO    [address]: 7562-2011-77    Lot#: [id number]    : HOSPIRA    Patient Supplied?: No          triamcinolone acetonide (KENALOG-40) injection 40 mg     Admin Date  11/12/2021  13:07 Action  Given Dose  40 mg Route  Intra-artICUlar Site   Administered By  Jordyn Palacios MA    Ordering Provider: Anselmo Jacome DO    NDC: 6177-1370-30    Lot#: [id number]    : [employer] (PRIMARY CARE)    Patient Supplied?: No

## 2021-11-17 ENCOUNTER — TELEPHONE (OUTPATIENT)
Dept: FAMILY MEDICINE CLINIC | Age: 46
End: 2021-11-17

## 2021-11-17 RX ORDER — BENZONATATE 100 MG/1
100 CAPSULE ORAL 3 TIMES DAILY PRN
Qty: 30 CAPSULE | Refills: 0 | Status: SHIPPED | OUTPATIENT
Start: 2021-11-17 | End: 2021-11-24

## 2021-11-17 NOTE — TELEPHONE ENCOUNTER
The patient called requesting   Something be called in for her for a cough/cold. She states she has taste and smell, no fever. She reports she gets this type of chest cold every year. She would like something to help her with the congestion if possible.   She has no transportation to come in for an appointment

## 2021-11-17 NOTE — TELEPHONE ENCOUNTER
Will send prescription for cough and cold congestion. If symptoms get worse please schedule appointment in our clinic.      Loretta Dalal MD  Family Medicine

## 2021-11-18 ENCOUNTER — TELEPHONE (OUTPATIENT)
Dept: PHYSICAL MEDICINE AND REHAB | Age: 46
End: 2021-11-18

## 2021-11-18 NOTE — TELEPHONE ENCOUNTER
Called patient and message left for follow up phone call post right knee injection. Instructed to call office back.

## 2021-12-10 ENCOUNTER — HOSPITAL ENCOUNTER (OUTPATIENT)
Dept: INFUSION THERAPY | Age: 46
Discharge: HOME | End: 2021-12-10
Payer: MEDICARE

## 2021-12-10 ENCOUNTER — OFFICE VISIT (OUTPATIENT)
Dept: ONCOLOGY | Age: 46
End: 2021-12-10
Payer: MEDICARE

## 2021-12-10 VITALS
HEART RATE: 70 BPM | TEMPERATURE: 96.6 F | OXYGEN SATURATION: 99 % | HEIGHT: 66 IN | BODY MASS INDEX: 29.35 KG/M2 | SYSTOLIC BLOOD PRESSURE: 107 MMHG | DIASTOLIC BLOOD PRESSURE: 63 MMHG | WEIGHT: 182.6 LBS

## 2021-12-10 DIAGNOSIS — D50.8 OTHER IRON DEFICIENCY ANEMIA: Primary | ICD-10-CM

## 2021-12-10 DIAGNOSIS — D50.8 OTHER IRON DEFICIENCY ANEMIA: ICD-10-CM

## 2021-12-10 LAB
BASOPHILS ABSOLUTE: 0.07 E9/L (ref 0–0.2)
BASOPHILS RELATIVE PERCENT: 0.7 % (ref 0–2)
EOSINOPHILS ABSOLUTE: 0.09 E9/L (ref 0.05–0.5)
EOSINOPHILS RELATIVE PERCENT: 0.9 % (ref 0–6)
FERRITIN: 11 NG/ML
HCT VFR BLD CALC: 31.8 % (ref 34–48)
HEMOGLOBIN: 10.1 G/DL (ref 11.5–15.5)
IRON SATURATION: 6 % (ref 15–50)
IRON: 24 MCG/DL (ref 37–145)
LYMPHOCYTES ABSOLUTE: 2.2 E9/L (ref 1.5–4)
LYMPHOCYTES RELATIVE PERCENT: 21.4 % (ref 20–42)
MCH RBC QN AUTO: 26.9 PG (ref 26–35)
MCHC RBC AUTO-ENTMCNC: 31.8 % (ref 32–34.5)
MCV RBC AUTO: 84.8 FL (ref 80–99.9)
MONOCYTES ABSOLUTE: 0.6 E9/L (ref 0.1–0.95)
MONOCYTES RELATIVE PERCENT: 5.8 % (ref 2–12)
NEUTROPHILS ABSOLUTE: 7.27 E9/L (ref 1.8–7.3)
NEUTROPHILS RELATIVE PERCENT: 70.9 % (ref 43–80)
PDW BLD-RTO: 17.2 FL (ref 11.5–15)
PLATELET # BLD: 459 E9/L (ref 130–450)
PMV BLD AUTO: 8.1 FL (ref 7–12)
RBC # BLD: 3.75 E12/L (ref 3.5–5.5)
TOTAL IRON BINDING CAPACITY: 377 MCG/DL (ref 250–450)
WBC # BLD: 10.2 E9/L (ref 4.5–11.5)

## 2021-12-10 PROCEDURE — 99214 OFFICE O/P EST MOD 30 MIN: CPT

## 2021-12-10 PROCEDURE — 4004F PT TOBACCO SCREEN RCVD TLK: CPT | Performed by: INTERNAL MEDICINE

## 2021-12-10 PROCEDURE — 83550 IRON BINDING TEST: CPT

## 2021-12-10 PROCEDURE — 83540 ASSAY OF IRON: CPT

## 2021-12-10 PROCEDURE — 99205 OFFICE O/P NEW HI 60 MIN: CPT | Performed by: INTERNAL MEDICINE

## 2021-12-10 PROCEDURE — 36415 COLL VENOUS BLD VENIPUNCTURE: CPT

## 2021-12-10 PROCEDURE — 85025 COMPLETE CBC W/AUTO DIFF WBC: CPT

## 2021-12-10 PROCEDURE — 82728 ASSAY OF FERRITIN: CPT

## 2021-12-10 PROCEDURE — G8484 FLU IMMUNIZE NO ADMIN: HCPCS | Performed by: INTERNAL MEDICINE

## 2021-12-10 PROCEDURE — G8427 DOCREV CUR MEDS BY ELIG CLIN: HCPCS | Performed by: INTERNAL MEDICINE

## 2021-12-10 PROCEDURE — G8417 CALC BMI ABV UP PARAM F/U: HCPCS | Performed by: INTERNAL MEDICINE

## 2021-12-10 RX ORDER — ALBUTEROL SULFATE 90 UG/1
4 AEROSOL, METERED RESPIRATORY (INHALATION) PRN
Status: CANCELLED | OUTPATIENT
Start: 2021-12-13

## 2021-12-10 RX ORDER — SODIUM CHLORIDE 9 MG/ML
INJECTION, SOLUTION INTRAVENOUS CONTINUOUS
Status: CANCELLED | OUTPATIENT
Start: 2021-12-13

## 2021-12-10 RX ORDER — HEPARIN SODIUM (PORCINE) LOCK FLUSH IV SOLN 100 UNIT/ML 100 UNIT/ML
500 SOLUTION INTRAVENOUS PRN
Status: CANCELLED | OUTPATIENT
Start: 2021-12-13

## 2021-12-10 RX ORDER — SODIUM CHLORIDE 0.9 % (FLUSH) 0.9 %
5-40 SYRINGE (ML) INJECTION PRN
Status: CANCELLED | OUTPATIENT
Start: 2021-12-13

## 2021-12-10 RX ORDER — EPINEPHRINE 1 MG/ML
0.3 INJECTION, SOLUTION, CONCENTRATE INTRAVENOUS PRN
Status: CANCELLED | OUTPATIENT
Start: 2021-12-13

## 2021-12-10 RX ORDER — ACETAMINOPHEN 325 MG/1
650 TABLET ORAL
Status: CANCELLED | OUTPATIENT
Start: 2021-12-13

## 2021-12-10 RX ORDER — SODIUM CHLORIDE 9 MG/ML
25 INJECTION, SOLUTION INTRAVENOUS PRN
Status: CANCELLED | OUTPATIENT
Start: 2021-12-13

## 2021-12-10 RX ORDER — ONDANSETRON 2 MG/ML
8 INJECTION INTRAMUSCULAR; INTRAVENOUS
Status: CANCELLED | OUTPATIENT
Start: 2021-12-13

## 2021-12-10 RX ORDER — DIPHENHYDRAMINE HYDROCHLORIDE 50 MG/ML
50 INJECTION INTRAMUSCULAR; INTRAVENOUS
Status: CANCELLED | OUTPATIENT
Start: 2021-12-13

## 2021-12-10 SDOH — ECONOMIC STABILITY: HOUSING INSECURITY: PLEASE ASSESS YOUR PATIENT'S LEVEL OF DISTRESS CONCERNING HOUSING (SCALE FROM 1-10): 8

## 2021-12-10 NOTE — PROGRESS NOTES
Salomejb Armijo  1975 48 y.o. Referring Physician:     PCP: Julian Peterson MD    Vitals:    12/10/21 0840   BP: 107/63   Pulse: 70   Temp: 96.6 °F (35.9 °C)   SpO2: 99%        Wt Readings from Last 3 Encounters:   12/10/21 182 lb 9.6 oz (82.8 kg)   21 194 lb (88 kg)   21 189 lb (85.7 kg)        Body mass index is 29.47 kg/m². Chief Complaint: No chief complaint on file. LMP: 2020    Age at first Menses: 13    : 0    Para: 0          Current Outpatient Medications:     omeprazole (PRILOSEC) 20 MG delayed release capsule, take 1 capsule by mouth once daily, Disp: 30 capsule, Rfl: 2    ondansetron (ZOFRAN-ODT) 4 MG disintegrating tablet, dissolve 1 tablet ON TONGUE every 8 hours if needed for nausea OR vomiting, Disp: 24 tablet, Rfl: 0    ferrous sulfate dried (SLOW RELEASE IRON) 160 (50 Fe) MG TBCR extended release tablet, Take 160 mg by mouth daily, Disp: 30 tablet, Rfl: 5    sucralfate (CARAFATE) 1 GM tablet, Take 0.5 tablets by mouth 2 times daily (Patient taking differently: Take 1 g by mouth 2 times daily ), Disp: 90 tablet, Rfl: 0    buPROPion (WELLBUTRIN SR) 200 MG extended release tablet, Take 1 tablet by mouth 2 times daily, Disp: , Rfl:     acetaminophen (TYLENOL) 500 MG tablet, Take 2 tablets by mouth every 6 hours as needed for Pain Maximum dose- 8 tablets/24 hours. , Disp: 120 tablet, Rfl: 2    albuterol sulfate  (90 Base) MCG/ACT inhaler, inhale 2 puffs by mouth and INTO THE LUNGS every 6 hours if needed for wheezing, Disp: 8.5 g, Rfl: 1    Handicap Placard MISC, by Does not apply route, Disp: 1 each, Rfl: 0    sennosides-docusate sodium (SENOKOT-S) 8.6-50 MG tablet, Take 1 tablet by mouth 2 times daily, Disp: 30 tablet, Rfl: 5    Multiple Vitamins-Minerals (MULTIVITAMIN ADULT PO), Take by mouth daily , Disp: , Rfl:     hydrOXYzine (VISTARIL) 50 MG capsule, Take 1 capsule by mouth 3 times daily as needed for Anxiety, Disp: 90 capsule, Rfl: 1    ascorbic acid (V-R VITAMIN C) 250 MG tablet, Take 1 tablet by mouth daily Take with the iron supplement, Disp: 30 tablet, Rfl: 3       Past Medical History:   Diagnosis Date    Anemia     Arthritis     Asthma     Depression     GERD without esophagitis     History of blood transfusion     History of gastric bypass     Hydronephrosis with urinary obstruction due to ureteral calculus     Hypertension     No meds following sustained weight loss after bariatric surgery.  Iron deficiency 8/1/2018    Localized osteoarthritis of left knee 1/20/2020    Lumbar spondylosis 1/26/2018    Lymphedema     Obesity     Panic attacks     Perforated marginal ulcer 10/29/2018    ~1 year after RnYGB    PONV (postoperative nausea and vomiting)     Prediabetes     BG has normalized following sustained weight loss after bariatric surgery.  Sleep apnea, obstructive     Off PAP therapy BG following sustained weight loss after bariatric surgery.  Vitamin D deficiency 7/27/2018    Zinc deficiency 8/1/2018       Past Surgical History:   Procedure Laterality Date    APPENDECTOMY  1996    CHOLECYSTECTOMY, LAPAROSCOPIC N/A 7/27/2019    CHOLECYSTECTOMY LAPAROSCOPIC performed by Marilou Mendez MD at [place] [address] COLONOSCOPY N/A 10/27/2021    COLONOSCOPY POLYPECTOMY SNARE/COLD BIOPSY performed by Santos Wilson MD at [place] [address]  10/23/2019    [facility]ST[facility]  [facility]/22/2020    [facility]    LAPAROSCOPY N/A 6/27/2020    DIAGNOSTIC  LAPAROSCOPY, REPAIR PERFORATED MARGINAL ULCER, UPPER ENDOSCOPY.  [facility] PATCH performed by Antonio Cerrato MD at [place] [address] LITHOTRIPSY Right 7/24/2019    CYSTOSCOPY RETROGRADE PYELOGRAM URETEROSCOPY J STENT LASER LITHOTRIPSY RIGHT performed by Robert Chambers DO at [place] [address] LITHOTRIPSY      MT OFFICE/OUTPT VISIT,PROCEDURE ONLY N/A 10/29/2018    DIAGNOSTIC LAPAROSCOPY REPAIR PERFORATED VISCUS performed by Marilou Mendez MD at [place] [address] NATHANAEL-EN-Y GASTRIC BYPASS  11/14/2017    STOMACH SURGERY      UPPER GASTROINTESTINAL ENDOSCOPY N/A 7/27/2019    EGD ESOPHAGOGASTRODUODENOSCOPY [facility] Sharee [facility]uevara [facility] at [facility] N/A 2/7/2020    EGD BIOPSY performed by [facility]vara MD at [facility] N/A 6/27/2020    EGD DIAGNOSTIC ONLY performed by Ana Pandya MD at [facility] History   Problem Relation Age of Onset    Heart Attack Mother 61        death from this    Diabetes Mother     Depression Mother     Diabetes Father     Stroke Father     Cancer Maternal Grandfather     Cancer Paternal Grandmother     Stroke Paternal Grandfather     Substance Abuse Brother        Social History     Socioeconomic History    Marital status: Single     Spouse name: Not on file    Number of children: Not on file    Years of education: Not on file    Highest education level: Not on file   Occupational History    Occupation: unemployed/disability   Tobacco Use    Smoking status: Current Every Day Smoker     Packs/day: 0.50     Years: 15.00     Pack years: 7.50     Types: Cigarettes     Start date: 10/29/1991    Smokeless tobacco: Never Used   Vaping Use    Vaping Use: Never used   Substance and Sexual Activity    Alcohol use:  Yes     Alcohol/week: 6.0 standard drinks     Types: 6 Standard drinks or equivalent per week     Comment: social    Drug use: No    Sexual activity: Yes     Partners: Male     Birth control/protection: Surgical   Other Topics Concern    Not on file   Social History Narrative    Not on file     Social Determinants of Health     Financial Resource Strain: High Risk    Difficulty of Paying Living Expenses: Very hard   Food Insecurity: Food Insecurity Present    Worried About Running Out of Food in the Last Year: Sometimes true    Trae of Food in the Last Year: Sometimes true   Transportation Needs:     Lack of Transportation (Medical): Not on file    Lack of Transportation (Non-Medical): Not on file   Physical Activity:     Days of Exercise per Week: Not on file    Minutes of Exercise per Session: Not on file   Stress:     Feeling of Stress : Not on file   Social Connections:     Frequency of Communication with Friends and Family: Not on file    Frequency of Social Gatherings with Friends and Family: Not on file    Attends [employer] Services: Not on file    Active Member of [address] [place] [address] or Organizations: Not on file    Attends Club or Organization Meetings: Not on file    Marital Status: Not on file   Intimate Partner Violence:     Fear of Current or Ex-Partner: Not on file    Emotionally Abused: Not on file    Physically Abused: Not on file    Sexually Abused: Not on file   Housing Stability:     Unable to Pay for Housing in the Last Year: Not on file    Number of [place] in the Last Year: Not on file    Unstable Housing in the Last Year: Not on file           Occupation: disability  Retired:  NO          REVIEW OF SYSTEMS: <<For Level 5, 10 or more systems>>     Pacemaker/Defibulator/ICD:  No    Mediport: No           FALLS RISK SCREENING ASSESSMENT    Instructions:  Assess the patient and [place] the appropriate indicators that are present for fall risk identification. Total the numbers circled and assign a fall risk score from Table 2.  Reassess patient at a minimum every 12 weeks or with status change. Assessment   Date  12/10/2021     1. Mental Ability: confusion/cognitively impaired No - 0       2. Elimination Issues: incontinence, frequency No - 0       3. Ambulatory: use of assistive devices (walker, cane, off-loading devices), attached to equipment (IV pole, oxygen) No - 0     4. Sensory Limitations: dizziness, vertigo, impaired vision No - 0       5. Age Less than 65 years - 0       6. Medication: diuretics, strong analgesics, hypnotics, sedatives, antihypertensive agents   No - 0   7.   Falls:  recent history of falls within the last 3 months (not to include slipping or tripping)   No - 0   TOTAL 0    If score of 4 or greater was education given? No       TABLE 2   Risk Score Risk Level Plan of Care   0-3 Little or  No Risk 1. Provide assistance as indicated for ambulation activities  2. Reorient confused/cognitively impaired patient  3. Call-light/bell within patient's reach  4. Chair/bed in low position, stretcher/bed with siderails up except when performing patient care activities  5. Educate patient/family/caregiver on falls prevention  6.  Reassess in 12 weeks or with any noted change in patient condition which places them at a risk for a fall   4-6 Moderate Risk 1. Provide assistance as indicated for ambulation activities  2. Reorient confused/cognitively impaired patient  3. Call-light/bell within patient's reach  4. Chair/bed in low position, stretcher/bed with siderails up except when performing patient care activities  5. Educate patient/family/caregiver on falls prevention  6. Falls risk precaution (Yellow sticker Level II) placed on patient chart   7 or   Higher High Risk 1. Place patient in easily observable treatment room  2. Patient attended at all times by family member or staff  3. Provide assistance as indicated for ambulation activities  4. Reorient confused/cognitively impaired patient  5. Call-light/bell within patient's reach  6. Chair/bed in low position, stretcher/bed with siderails up except when performing patient care activities  7. Educate patient/family/caregiver on falls prevention  8.   Falls risk precaution (Yellow sticker Level III) placed on patient chart                     Chepe Moser RN

## 2021-12-10 NOTE — PROGRESS NOTES
[address] MED ONCOLOGY  [address] [facility] [address]  Dept: [phone]  Attending Consult Note      Reason for Visit:   Anemia. Referring Physician:  Janine Sinha MD    PCP:  Alison Mark MD    History of Present Illness:      Ms. Jonathan Siddiqi is a very pleasant 78-year-old lady, with a past medical history significant for obesity, she status post gastric bypass surgery, hypertension, GERD, depression, and AYALA, who was referred to the hematology office for evaluation and treatment of anemia. The patient had a colonoscopy done on 10/27/2021, had revealed colon polyps, and minimal shallow diverticulitis, last EGD was on 2/7/2021, which had revealed marginal ulcer. The patient CBCD from 11/3/2021 was remarkable for a hemoglobin of 10.1, hematocrit 31.6, MCV 86.3, white count 6, platelet count 393F, iron 32, TIBC 330, TSAT 10%, reticulocytes 0.8%. The patient has been on oral iron, which causes GI side effects. She is feeling tired, she denies any bleeding. Review of Systems;  CONSTITUTIONAL: No fever, chills. Good appetite, feeling tired. Pos for weight loss 325 lbs. ENMT: Eyes: No diplopia; Nose: No epistaxis. Mouth: No sore throat. RESPIRATORY: No hemoptysis, shortness of breath, cough. CARDIOVASCULAR: No chest pain, palpitations. GASTROINTESTINAL: Positive for nausea, no vomiting, abdominal pain, diarrhea/constipation. GENITOURINARY: No dysuria, urinary frequency, hematuria. NEURO: No syncope, presyncope, headache, slight dizziness and disorientation. Remainder:  ROS NEGATIVE    Past Medical History:      Diagnosis Date    Anemia     Arthritis     Asthma     Depression     GERD without esophagitis     History of blood transfusion     History of gastric bypass     Hydronephrosis with urinary obstruction due to ureteral calculus     Hypertension     No meds following sustained weight loss after bariatric surgery.     Iron deficiency 8/1/2018  Localized osteoarthritis of left knee 1/20/2020    Lumbar spondylosis 1/26/2018    Lymphedema     Obesity     Panic attacks     Perforated marginal ulcer 10/29/2018    ~1 year after RnYGB    PONV (postoperative nausea and vomiting)     Prediabetes     BG has normalized following sustained weight loss after bariatric surgery.  Sleep apnea, obstructive     Off PAP therapy BG following sustained weight loss after bariatric surgery.  Vitamin D deficiency 7/27/2018    Zinc deficiency 8/1/2018     Patient Active Problem List   Diagnosis    Tobacco abuse    Panic attacks    AYALA on CPAP    Morbid obesity ([address]    Mild intermittent asthma without complication    HTN (hypertension)    GERD without esophagitis    S/P gastric bypass    Primary osteoarthritis of right knee    Vitamin D deficiency    Zinc deficiency    Iron deficiency    Thrombocytosis    Microcytic anemia    Red blood cell antibody positive    Epigastric pain    Anxiety and depression    Localized osteoarthritis of left knee    Chronic pain syndrome    Chronic bilateral low back pain without sciatica    Lumbosacral spondylosis without myelopathy    Other dysphagia    Perforated abdominal viscus    Chronic abdominal pain    Abdominal pain of multiple sites    Lymphedema        Past Surgical History:      Procedure Laterality Date    APPENDECTOMY  1996    CHOLECYSTECTOMY, LAPAROSCOPIC N/A 7/27/2019    CHOLECYSTECTOMY LAPAROSCOPIC performed by Jimenez Bazan MD at [facility] COLONOSCOPY N/A 10/27/2021    COLONOSCOPY POLYPECTOMY SNARE/COLD BIOPSY performed by Lisa Magana MD at [place] [address]  10/23/2019    HYSTERECTOMY  01/22/2020    [facility]    LAPAROSCOPY N/A 6/27/2020    DIAGNOSTIC  LAPAROSCOPY, REPAIR PERFORATED MARGINAL ULCER, UPPER ENDOSCOPY.  [facility] PATCH performed by Milka Godfrey MD at [facility] LITHOTRIPSY Right 7/24/2019    CYSTOSCOPY RETROGRADE PYELOGRAM URETEROSCOPY J STENT LASER LITHOTRIPSY RIGHT performed by Leslie Chambers DO at [facility] LITHOTRIPSY      DE OFFICE/OUTPT VISIT,PROCEDURE ONLY N/A 10/29/2018    DIAGNOSTIC LAPAROSCOPY REPAIR PERFORATED VISCUS performed by Sonia Kilgore MD at [facility]  11/14/2017    STOMACH SURGERY      UPPER GASTROINTESTINAL ENDOSCOPY N/A 7/27/2019    EGD ESOPHAGOGASTRODUODENOSCOPY performed by Sonia Kilgore MD at [address] [place] [address] N/A 2/7/2020    EGD BIOPSY performed by Sonia Kilgore MD at [facility] N/A 6/27/2020    EGD DIAGNOSTIC ONLY performed by Daxa Stewart MD at [facility] OR       Family History:  Family History   Problem Relation Age of Onset    Heart Attack Mother 61        death from this    Diabetes Mother     Depression Mother     Diabetes Father     Stroke Father     Cancer Maternal Grandfather     Cancer Paternal Grandmother     Stroke Paternal Grandfather     Substance Abuse Brother     Heart Attack Brother        Medications:  Reviewed and reconciled. Social History:  Social History     Socioeconomic History    Marital status: Single     Spouse name: Not on file    Number of children: Not on file    Years of education: Not on file    Highest education level: Not on file   Occupational History    Occupation: unemployed/disability   Tobacco Use    Smoking status: Current Every Day Smoker     Packs/day: 0.50     Years: 15.00     Pack years: 7.50     Types: Cigarettes     Start date: 10/29/1991    Smokeless tobacco: Never Used   Vaping Use    Vaping Use: Never used   Substance and Sexual Activity    Alcohol use:  Yes     Alcohol/week: 6.0 standard drinks     Types: 6 Standard drinks or equivalent per week     Comment: social    Drug use: No    Sexual activity: Yes     Partners: Male     Birth control/protection: Surgical   Other Topics Concern    Not on file   Social History Narrative    Not on file     Social Determinants of Health     Financial Resource Strain: High Risk    Difficulty of Paying Living Expenses: Very hard   Food Insecurity: Food Insecurity Present    Worried About Running Out of Food in the Last Year: Sometimes true    Trae of Food in the Last Year: Sometimes true   Transportation Needs:     Lack of Transportation (Medical): Not on file    Lack of Transportation (Non-Medical): Not on file   Physical Activity:     Days of Exercise per Week: Not on file    Minutes of Exercise per Session: Not on file   Stress:     Feeling of Stress : Not on file   Social Connections:     Frequency of Communication with Friends and Family: Not on file    Frequency of Social Gatherings with Friends and Family: Not on file    Attends Mu-ism Services: Not on file    Active Member of [address] [place] [address] [employer] or Organizations: Not on file    Attends Club or Organization Meetings: Not on file    Marital Status: Not on file   Intimate Partner Violence:     Fear of Current or Ex-Partner: Not on file    Emotionally Abused: Not on file    Physically Abused: Not on file    Sexually Abused: Not on file   Housing Stability:     Unable to Pay for Housing in the Last Year: Not on file    Number of [place] in the Last Year: Not on file    Unstable Housing in the Last Year: Not on file       Allergies: Allergies   Allergen Reactions    Pcn [Penicillins] Anaphylaxis and Other (See Comments)     Patient unsure    Food Other (See Comments)     Berries - hives  Lactose Intolerance to milk-diarrhea  Soy-increases menses occurrences         Physical Exam:  /63   Pulse 70   Temp 96.6 °F (35.9 °C)   Ht 5' 6\" (1.676 m)   Wt 182 lb 9.6 oz (82.8 kg)   LMP 10/21/2019 (Exact Date)   SpO2 99%   BMI 29.47 kg/m²   GENERAL: Alert, oriented x 3, not in acute distress. HEENT: PERRLA; EOMI. Oropharynx clear. NECK: Supple. No palpable cervical or supraclavicular lymphadenopathy.    LUNGS: Good air entry bilaterally. No wheezing, crackles or rhonchi. CARDIOVASCULAR: Regular rate. No murmurs, rubs or gallops. ABDOMEN: Soft. Non-tender, non-distended. Positive bowel sounds. EXTREMITIES: Without clubbing, cyanosis, or edema. NEUROLOGIC: No focal deficits. ECOG PS 1    Impression/Plan:       Ms. Beto Jaquez is a very pleasant 18-year-old lady, with a past medical history significant for obesity, she status post gastric bypass surgery, hypertension, GERD, depression, and AYALA, who was referred to the hematology office for evaluation and treatment of anemia. The patient had a colonoscopy done on 10/27/2021, had revealed colon polyps, and minimal shallow diverticulitis, last EGD was on 2/7/2021, which had revealed marginal ulcer. The patient CBCD from 11/3/2021 was remarkable for a hemoglobin of 10.1, hematocrit 31.6, MCV 86.3, white count 6, platelet count 109E, iron 32, TIBC 330, TSAT 10%, reticulocytes 0.8%. The patient has been on oral iron, which causes GI side effects. 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 We will monitor her CBCD and iron studies, she will likely require parenteral iron infusion periodically in the future. Vitamin B12 is 317K, folate 16.4. RTC the second Feraheme dose. Thank you for allowing us to participate in the care of Ms. Crow.     Nik Montoya MD   HEMATOLOGY/MEDICAL ONCOLOGY  [address] [place] [address] ONCOLOGY  [address] [address]  Dept: [phone]

## 2021-12-13 ENCOUNTER — TELEPHONE (OUTPATIENT)
Dept: INFUSION THERAPY | Age: 46
End: 2021-12-13

## 2021-12-13 RX ORDER — ALBUTEROL SULFATE 90 UG/1
4 AEROSOL, METERED RESPIRATORY (INHALATION) PRN
Status: CANCELLED | OUTPATIENT
Start: 2021-12-17

## 2021-12-13 RX ORDER — SODIUM CHLORIDE 0.9 % (FLUSH) 0.9 %
5-40 SYRINGE (ML) INJECTION PRN
Status: CANCELLED | OUTPATIENT
Start: 2021-12-17

## 2021-12-13 RX ORDER — SODIUM CHLORIDE 9 MG/ML
25 INJECTION, SOLUTION INTRAVENOUS PRN
Status: CANCELLED | OUTPATIENT
Start: 2021-12-17

## 2021-12-13 RX ORDER — ACETAMINOPHEN 325 MG/1
650 TABLET ORAL
Status: CANCELLED | OUTPATIENT
Start: 2021-12-17

## 2021-12-13 RX ORDER — SODIUM CHLORIDE 9 MG/ML
INJECTION, SOLUTION INTRAVENOUS CONTINUOUS
Status: CANCELLED | OUTPATIENT
Start: 2021-12-17

## 2021-12-13 RX ORDER — DIPHENHYDRAMINE HYDROCHLORIDE 50 MG/ML
50 INJECTION INTRAMUSCULAR; INTRAVENOUS
Status: CANCELLED | OUTPATIENT
Start: 2021-12-17

## 2021-12-13 RX ORDER — MEPERIDINE HYDROCHLORIDE 25 MG/ML
12.5 INJECTION INTRAMUSCULAR; INTRAVENOUS; SUBCUTANEOUS PRN
Status: CANCELLED | OUTPATIENT
Start: 2021-12-17

## 2021-12-13 RX ORDER — ONDANSETRON 2 MG/ML
8 INJECTION INTRAMUSCULAR; INTRAVENOUS
Status: CANCELLED | OUTPATIENT
Start: 2021-12-17

## 2021-12-13 RX ORDER — HEPARIN SODIUM (PORCINE) LOCK FLUSH IV SOLN 100 UNIT/ML 100 UNIT/ML
500 SOLUTION INTRAVENOUS PRN
Status: CANCELLED | OUTPATIENT
Start: 2021-12-17

## 2021-12-13 RX ORDER — EPINEPHRINE 1 MG/ML
0.3 INJECTION, SOLUTION, CONCENTRATE INTRAVENOUS PRN
Status: CANCELLED | OUTPATIENT
Start: 2021-12-17

## 2021-12-13 NOTE — TELEPHONE ENCOUNTER
Called and notified Clydene Buerger, front office Dick Diaz was approved per insurance. Patient needs to be scheduled for 5 visits. Clydene Buerger verbalized understanding and will notify patient and schedule.

## 2021-12-20 ENCOUNTER — HOSPITAL ENCOUNTER (OUTPATIENT)
Dept: INFUSION THERAPY | Age: 46
End: 2021-12-20

## 2021-12-27 ENCOUNTER — HOSPITAL ENCOUNTER (OUTPATIENT)
Dept: INFUSION THERAPY | Age: 46
End: 2021-12-27

## 2021-12-30 ENCOUNTER — HOSPITAL ENCOUNTER (OUTPATIENT)
Dept: INFUSION THERAPY | Age: 46
End: 2021-12-30

## 2022-01-14 ENCOUNTER — OFFICE VISIT (OUTPATIENT)
Dept: FAMILY MEDICINE CLINIC | Age: 47
End: 2022-01-14
Payer: MEDICARE

## 2022-01-14 VITALS
HEART RATE: 66 BPM | SYSTOLIC BLOOD PRESSURE: 104 MMHG | WEIGHT: 185 LBS | DIASTOLIC BLOOD PRESSURE: 62 MMHG | OXYGEN SATURATION: 99 % | RESPIRATION RATE: 18 BRPM | BODY MASS INDEX: 29.73 KG/M2 | HEIGHT: 66 IN | TEMPERATURE: 98.1 F

## 2022-01-14 DIAGNOSIS — E61.1 IRON DEFICIENCY: Primary | Chronic | ICD-10-CM

## 2022-01-14 DIAGNOSIS — K27.5 PERFORATED ULCER (HCC): ICD-10-CM

## 2022-01-14 PROBLEM — I10 HTN (HYPERTENSION): Chronic | Status: RESOLVED | Noted: 2017-03-01 | Resolved: 2022-01-14

## 2022-01-14 PROCEDURE — 4004F PT TOBACCO SCREEN RCVD TLK: CPT | Performed by: STUDENT IN AN ORGANIZED HEALTH CARE EDUCATION/TRAINING PROGRAM

## 2022-01-14 PROCEDURE — 99213 OFFICE O/P EST LOW 20 MIN: CPT | Performed by: STUDENT IN AN ORGANIZED HEALTH CARE EDUCATION/TRAINING PROGRAM

## 2022-01-14 PROCEDURE — G8417 CALC BMI ABV UP PARAM F/U: HCPCS | Performed by: STUDENT IN AN ORGANIZED HEALTH CARE EDUCATION/TRAINING PROGRAM

## 2022-01-14 PROCEDURE — G8427 DOCREV CUR MEDS BY ELIG CLIN: HCPCS | Performed by: STUDENT IN AN ORGANIZED HEALTH CARE EDUCATION/TRAINING PROGRAM

## 2022-01-14 PROCEDURE — G8484 FLU IMMUNIZE NO ADMIN: HCPCS | Performed by: STUDENT IN AN ORGANIZED HEALTH CARE EDUCATION/TRAINING PROGRAM

## 2022-01-14 RX ORDER — SUCRALFATE 1 G/1
1 TABLET ORAL 2 TIMES DAILY
Qty: 60 TABLET | Refills: 2 | Status: ON HOLD
Start: 2022-01-14 | End: 2022-03-09 | Stop reason: ALTCHOICE

## 2022-01-14 RX ORDER — ONDANSETRON 4 MG/1
TABLET, ORALLY DISINTEGRATING ORAL
Qty: 24 TABLET | Refills: 0 | Status: ON HOLD
Start: 2022-01-14 | End: 2022-03-09

## 2022-01-14 RX ORDER — ALBUTEROL SULFATE 90 UG/1
AEROSOL, METERED RESPIRATORY (INHALATION)
Qty: 8.5 G | Refills: 1 | Status: ON HOLD
Start: 2022-01-14 | End: 2022-03-09

## 2022-01-14 RX ORDER — ALBUTEROL SULFATE 90 UG/1
AEROSOL, METERED RESPIRATORY (INHALATION)
COMMUNITY
End: 2022-01-28

## 2022-01-14 ASSESSMENT — ENCOUNTER SYMPTOMS
CONSTIPATION: 0
ABDOMINAL DISTENTION: 0
NAUSEA: 1
CHEST TIGHTNESS: 0
ABDOMINAL PAIN: 1
BLOOD IN STOOL: 0
VOMITING: 0
DIARRHEA: 0
SHORTNESS OF BREATH: 0

## 2022-01-14 ASSESSMENT — LIFESTYLE VARIABLES: HOW OFTEN DO YOU HAVE A DRINK CONTAINING ALCOHOL: NEVER

## 2022-01-14 NOTE — PROGRESS NOTES
Attending Physician Statement    S:   Chief Complaint   Patient presents with    Check-Up      Patient is a 48year old female here for follow up of anemia. Sees heme/onc. Was planned to have a iron infusion but missed this. Has some fatigue. Last labs were 12/10 . Smokes 1/2 ppd. Had previously been a \"chain smoker\". She has asthma with no issues. Uses infrequent albuterol. She had a perforated ulcer in 2018. Has intermittent heart burn and takes Carafate daily when she has abdominal discomfort. O: Blood pressure 104/62, pulse 66, temperature 98.1 °F (36.7 °C), temperature source Temporal, resp. rate 18, height 5' 6\" (1.676 m), weight 185 lb (83.9 kg), last menstrual period 10/21/2019, SpO2 99 %, not currently breastfeeding. Exam:   Heart - RRR   Lungs - clear     A: asthma, smoking, anemia   P:  Continue PO iron until she has iron infusion. Heme/onc follow up    Refused covid vaccine    Not ready to quit smoking   Follow-up as ordered    Attending Attestation   I have discussed the case, including pertinent history and exam findings with the resident. I agree with the documented assessment and plan.

## 2022-01-14 NOTE — PROGRESS NOTES
[facility] MEDICINE RESIDENCY PROGRAM  DATE OF VISIT : 2022    Patient : Carolee Bennett   Age : 48 y.o.    : 1975   MRN : [id number]       Chief Complaint :   Chief Complaint   Patient presents with   [place] Check-Up       HPI : Carolee Bennett is 48 y.o. female with history of gastric ulcer status post perforation status post surgery, iron deficiency anemia, migraine    who presented to the clinic today for Anemia follow up. Iron deficiency anemia: Patient report her symptoms are significantly improving since she started on iron supplementation. Still has mild fatigue and tired. Does have history of depression and anxiety and follow-up with psych care. Denies any melena or hematemesis. Patient does follow with heme-onc and plan to have iron infusion but patient missed multiple appointment due to her personal issues. Patient will contact and reschedule for iron infusion. Recent lab in December 10 showed H&H stable. Does have history of perforated ulcer status post surgery in 2018. Endorses some epigastric pain associated with nausea and currently taking Carafate and Zofran as needed for nausea. Tobacco abuse disorder: Currently smoking half pack per day was previously seen a smoker. Does use albuterol for asthma occasionally. Denies signs symptoms of asthma exacerbation. Past Medical History :  Past Medical History:   Diagnosis Date    Anemia     Arthritis     Asthma     Depression     GERD without esophagitis     History of blood transfusion     History of gastric bypass     Hydronephrosis with urinary obstruction due to ureteral calculus     Hypertension     No meds following sustained weight loss after bariatric surgery.     Iron deficiency 2018    Localized osteoarthritis of left knee 2020    Lumbar spondylosis 2018    Lymphedema     Obesity     Panic attacks     Perforated marginal ulcer 10/29/2018    ~1 year after RnYGB    PONV (postoperative nausea and vomiting)     Prediabetes     BG has normalized following sustained weight loss after bariatric surgery.  Sleep apnea, obstructive     Off PAP therapy BG following sustained weight loss after bariatric surgery.  Vitamin D deficiency 7/27/2018    Zinc deficiency 8/1/2018         Medication List :    Current Outpatient Medications   Medication Sig Dispense Refill    ondansetron (ZOFRAN-ODT) 4 MG disintegrating tablet dissolve 1 tablet ON TONGUE every 8 hours if needed for nausea OR vomiting 24 tablet 0    albuterol sulfate  (90 Base) MCG/ACT inhaler inhale 2 puffs by mouth and INTO THE LUNGS every 6 hours if needed for wheezing 8.5 g 1    sucralfate (CARAFATE) 1 GM tablet Take 1 tablet by mouth 2 times daily 60 tablet 2    omeprazole (PRILOSEC) 20 MG delayed release capsule take 1 capsule by mouth once daily 30 capsule 2    ferrous sulfate dried (SLOW RELEASE IRON) 160 (50 Fe) MG TBCR extended release tablet Take 160 mg by mouth daily 30 tablet 5    buPROPion (WELLBUTRIN SR) 200 MG extended release tablet Take 1 tablet by mouth 2 times daily      acetaminophen (TYLENOL) 500 MG tablet Take 2 tablets by mouth every 6 hours as needed for Pain Maximum dose- 8 tablets/24 hours. 120 tablet 2    sennosides-docusate sodium (SENOKOT-S) 8.6-50 MG tablet Take 1 tablet by mouth 2 times daily 30 tablet 5    Multiple Vitamins-Minerals (MULTIVITAMIN ADULT PO) Take by mouth daily       hydrOXYzine (VISTARIL) 50 MG capsule Take 1 capsule by mouth 3 times daily as needed for Anxiety 90 capsule 1    ascorbic acid (V-R VITAMIN C) 250 MG tablet Take 1 tablet by mouth daily Take with the iron supplement 30 tablet 3    albuterol sulfate  (90 Base) MCG/ACT inhaler       Handicap Placard MISC by Does not apply route 1 each 0     No current facility-administered medications for this visit. Review of Systems :  Review of Systems   Constitutional: Positive for fatigue. Perforated ulcer ([facility] [address]  Status post surgery in 2018  -Symptoms stable  -No melena or hematemesis at this time  - sucralfate (CARAFATE) 1 GM tablet; Take 1 tablet by mouth 2 times daily  Dispense: 60 tablet;  Refill: 2      Refused COVID-vaccine and not ready to quit to smokING        Katy Flowers MD

## 2022-01-18 ENCOUNTER — HOSPITAL ENCOUNTER (OUTPATIENT)
Dept: INFUSION THERAPY | Age: 47
End: 2022-01-18

## 2022-01-20 ENCOUNTER — TELEPHONE (OUTPATIENT)
Dept: BARIATRICS/WEIGHT MGMT | Age: 47
End: 2022-01-20

## 2022-01-20 DIAGNOSIS — K28.9 MARGINAL ULCER: Primary | ICD-10-CM

## 2022-01-20 RX ORDER — OMEPRAZOLE 20 MG/1
20 CAPSULE, DELAYED RELEASE ORAL DAILY
Qty: 90 CAPSULE | Refills: 0 | Status: ON HOLD
Start: 2022-01-20 | End: 2022-03-11 | Stop reason: HOSPADM

## 2022-01-28 ENCOUNTER — OFFICE VISIT (OUTPATIENT)
Dept: BARIATRICS/WEIGHT MGMT | Age: 47
End: 2022-01-28
Payer: MEDICARE

## 2022-01-28 VITALS
WEIGHT: 183.6 LBS | HEART RATE: 82 BPM | TEMPERATURE: 97.5 F | BODY MASS INDEX: 30.59 KG/M2 | HEIGHT: 65 IN | DIASTOLIC BLOOD PRESSURE: 63 MMHG | SYSTOLIC BLOOD PRESSURE: 100 MMHG

## 2022-01-28 DIAGNOSIS — E66.09 CLASS 1 OBESITY DUE TO EXCESS CALORIES WITHOUT SERIOUS COMORBIDITY WITH BODY MASS INDEX (BMI) OF 31.0 TO 31.9 IN ADULT: ICD-10-CM

## 2022-01-28 DIAGNOSIS — G89.29 CHRONIC PAIN OF RIGHT KNEE: Primary | ICD-10-CM

## 2022-01-28 DIAGNOSIS — M25.561 CHRONIC PAIN OF RIGHT KNEE: Primary | ICD-10-CM

## 2022-01-28 PROCEDURE — G8484 FLU IMMUNIZE NO ADMIN: HCPCS | Performed by: INTERNAL MEDICINE

## 2022-01-28 PROCEDURE — 99202 OFFICE O/P NEW SF 15 MIN: CPT

## 2022-01-28 PROCEDURE — G2212 PROLONG OUTPT/OFFICE VIS: HCPCS | Performed by: INTERNAL MEDICINE

## 2022-01-28 PROCEDURE — G8428 CUR MEDS NOT DOCUMENT: HCPCS | Performed by: INTERNAL MEDICINE

## 2022-01-28 PROCEDURE — 4004F PT TOBACCO SCREEN RCVD TLK: CPT | Performed by: INTERNAL MEDICINE

## 2022-01-28 PROCEDURE — 99205 OFFICE O/P NEW HI 60 MIN: CPT | Performed by: INTERNAL MEDICINE

## 2022-01-28 PROCEDURE — G8417 CALC BMI ABV UP PARAM F/U: HCPCS | Performed by: INTERNAL MEDICINE

## 2022-01-28 NOTE — PROGRESS NOTES
CC -   Knee pain, Obesity    BACKGROUND -   First visit: 1/28/22     Obesity (RYGB)  Began around 6 yrs old  Initial BMI 31.0, Wt 183.6 lbs, 5' 4.5\"  HS Grad wt 250 lbs  Lowest   Wt 250 lbs   Highest  wt 501 lbs  Pattern of wt gain: grad  Wt change past yr: +5 lbs  Most wt lost: 50 lbs (Low carb)  Other diets attempted: NS, [facility], Calorie counting, Fad diet, Xenecal, OTC meds, Dietician     Desire to lose weight: 10/10  Problem posed by appetite: 7/10    RYGB in 2017 by Dr. Santiago Matute  Pre-proc wt 480 lbs  Post-proc gina 160 lbs (at 1yrs)   Regain began at 1 yrs    Initial Diet:    Number of meals per day - 2-3    Number of snacks per day - 2    Meal volume - 7\" plate, sometimes seconds    Fast food/convenience store - 0-1x/week    Restaurants (not fast food) - 0-1x/week   Sweets - 7d/week (3-4 d one  cupcake - 170 parish, 3-4 d 2-3 Keebler fudgestripe cookies 70cal each )   Chips - 3-4d/week (1 oz chips)   Crackers/pretzels - 0d/week   Nuts - 0d/week   Peanut Butter - 0d/week   Popcorn - 0d/week   Dried fruit - 0d/week   Whole fruit - 0d/week   Breakfast cereal - 0d/week   Granola/Protein/Energy bar - 0d/week   Sugar sweetened beverages - 4 oz reg soda/wk, 3-4 cups coffee/d, each with 1 Tbsp of coffemate creamer (30 parish) and 1 Tbsp sugar   Protein - No supplements   Fiber - No supplements     Exercise:    Gym membership - none    Walking - none    Running - none    Resistance - none    Aerobic class - none    ______________________    [facility] [place] [facility] -  Past Medical History:   Diagnosis Date    Anemia     Arthritis     Asthma     Depression     GERD without esophagitis     History of blood transfusion     History of gastric bypass     Hydronephrosis with urinary obstruction due to ureteral calculus     Hypertension     No meds following sustained weight loss after bariatric surgery.     Iron deficiency 08/01/2018    Localized osteoarthritis of left knee 01/20/2020    Lumbar spondylosis 01/26/2018    Lymphedema  Obesity     Panic attacks     Perforated marginal ulcer 10/29/2018    ~1 year after RnYGB    PONV (postoperative nausea and vomiting)     Sleep apnea, obstructive     Off PAP therapy BG following sustained weight loss after bariatric surgery.  Vitamin D deficiency 07/27/2018    Zinc deficiency 08/01/2018     Current Outpatient Medications   Medication Sig Dispense Refill    omeprazole (PRILOSEC) 20 MG delayed release capsule Take 1 capsule by mouth Daily 90 capsule 0    ondansetron (ZOFRAN-ODT) 4 MG disintegrating tablet dissolve 1 tablet ON TONGUE every 8 hours if needed for nausea OR vomiting 24 tablet 0    albuterol sulfate  (90 Base) MCG/ACT inhaler inhale 2 puffs by mouth and INTO THE LUNGS every 6 hours if needed for wheezing 8.5 g 1    sucralfate (CARAFATE) 1 GM tablet Take 1 tablet by mouth 2 times daily 60 tablet 2    ferrous sulfate dried (SLOW RELEASE IRON) 160 (50 Fe) MG TBCR extended release tablet Take 160 mg by mouth daily 30 tablet 5    buPROPion (WELLBUTRIN SR) 200 MG extended release tablet Take 1 tablet by mouth 2 times daily      acetaminophen (TYLENOL) 500 MG tablet Take 2 tablets by mouth every 6 hours as needed for Pain Maximum dose- 8 tablets/24 hours. 120 tablet 2    sennosides-docusate sodium (SENOKOT-S) 8.6-50 MG tablet Take 1 tablet by mouth 2 times daily 30 tablet 5    Multiple Vitamins-Minerals (MULTIVITAMIN ADULT PO) Take by mouth daily       hydrOXYzine (VISTARIL) 50 MG capsule Take 1 capsule by mouth 3 times daily as needed for Anxiety 90 capsule 1    ascorbic acid (V-R VITAMIN C) 250 MG tablet Take 1 tablet by mouth daily Take with the iron supplement 30 tablet 3     No current facility-administered medications for this visit.        ROS -  Card - no CP  GI - +N, no V/C/D    PE -  Gen : /63 (Site: Left Upper Arm, Position: Sitting, Cuff Size: Large Adult)   Pulse 82   Temp 97.5 °F (36.4 °C) (Temporal)   Ht 5' 4.5\" (1.638 m)   Wt 183 lb 9.6 oz (83.3 kg)   LMP 10/21/2019 (Exact Date)   BMI 31.03 kg/m²    WN, WD, NAD  Heart:  RRR w/o MGR, no Carotid bruits, 4+++++ LE pitting edema b/l  Lung: Nml resp effort  Psych: Normal mood, CTA b/l   Full affect  Neuro: Moves all ext well  ______________________    HISTORY & ASSESSMENT/PLAN -     Problem 1 - B/L knee pain   · HPI - R severity 9/10      L severity 7/10                 Began early 40's         No sig injury; secondary to OA      Does not see an ortho   Assessment - Uncontrolled, excess wt is worsening her pain   Plan -   Wt reduction per the plan below      Problem 2 - Obesity   · HPI - See above Background for description   Weight  Date   183.6 lbs 1/28/21  DEN = 2025 parish/d = 14,175 parish/wk  Wt effect of HR foods = Sweets 1050 + SSB 2010 = 3060 parish/wk = 440 parish/d = 21% DEN = 44 lbs/yr  Given the high intake of SSB and Sweets, targeted elimination alone may be all that is needed to provide an adequate rate of wt loss  However, Dr. Raj Navarro (last saw him instead of Dr. Beverly Albright) instructed her to follow a \"pouch re-set\" diet; will therefore implement a VLCD  Will also add metformin to help with appetite suppression in general   Assessment - Uncontrolled    Plan -   Patient Instructions     Breakfast -     one high protein shake                            + 20 grams of fiber. Do this by either eating 12 tablespoons of the original, plain Fiber One cereal every day or 4 tablespoons of wheat dextrin powder (Benefiber or a generic brand) every day. Work up to this amount slowly by starting with only one-eighth to one-fourth of the target amount and then adding another one-eighth to one-fourth every one or two weeks until reaching the target.      Lunch -           one high protein shake       Dinner -          one frozen meal                           + one snack item     Shake options (<200 parish, >25 grams/protein) :  Nectar, Pure Protein, Premier, Boost Max, Ensure Max, BeneProtein and [employer] (which is lactose-free) are milk-based options; Nectar, Premier Protein Clear, IsoPure Protein Drink, and Protein 2 O are water-based options; (Premier Protein Clear, the water-based option, comes in a 20 oz bottle with 20 grams of protein and 90 calories. So you have to drink three each day which increases the cost.)  (Disclaimer: Dietary supplements rarely have their listed ingredients and the amount of each verified by a third party other. Sometimes they give verification for their claims to be GMO and gluten free and to be organic.  However, even such verifications as these may still be untrustworthy.)     Snack options (<100 parish, no sweets): fruit, low fat/high protein [place] yogurt, mozzarella cheese stick, nuts, salad with dressing, peanut butter, chips/crackers/pretzels     Frozen meal options (<350 parish):  Weight Watchers Smart Ones, [employer] Cuisine, Healthy Choice, Lucy's, Deya's     Food substitutes for the shakes:  4 oz of baked, grilled or broiled chicken, turkey or fish, 10 egg whites     Take all bariatric supplements     Walk 30 min every day    Begin taking Metformin 500 mg according to the following schedule:   Week 1 - Take one tablet every morning with breakfast   Week 2 - Take one tablet every morning with breakfast and one tablet every evening with dinner   Week 3 - Take two tablets every morning with breakfast and one tablet every evening with dinner   Week 4 - Take two tablets every morning with breakfast and two tablets every evening with dinner    Follow up   See me back in 4-6 weeks    Total time spent on encounter: 95 min    Andrew Galaviz MD  Endocrinology/Obesity  1/28/22

## 2022-01-28 NOTE — PATIENT INSTRUCTIONS
Breakfast -     one high protein shake                            + 20 grams of fiber. Do this by either eating 12 tablespoons of the original, plain Fiber One cereal every day or 4 tablespoons of wheat dextrin powder (Benefiber or a generic brand) every day. Work up to this amount slowly by starting with only one-eighth to one-fourth of the target amount and then adding another one-eighth to one-fourth every one or two weeks until reaching the target. Lunch -           one high protein shake       Dinner -          one frozen meal                           + one snack item     Shake options (<200 parish, >25 grams/protein) :  Nectar, Pure Protein, Premier, Boost Max, Ensure Max, BeneProtein and [employer] (which is lactose-free) are milk-based options; Nectar, Premier Protein Clear, IsoPure Protein Drink, and Protein 2 O are water-based options; (Premier Protein Clear, the water-based option, comes in a 20 oz bottle with 20 grams of protein and 90 calories. So you have to drink three each day which increases the cost.)  (Disclaimer: Dietary supplements rarely have their listed ingredients and the amount of each verified by a third party other. Sometimes they give verification for their claims to be GMO and gluten free and to be organic.  However, even such verifications as these may still be untrustworthy.)     Snack options (<100 parish, no sweets): fruit, low fat/high protein [place] yogurt, mozzarella cheese stick, nuts, salad with dressing, peanut butter, chips/crackers/pretzels     Frozen meal options (<350 parish):  Weight Watchers Smart Ones, [employer] Cuisine, Healthy Choice, Lucy's, Deya's     Food substitutes for the shakes:  4 oz of baked, grilled or broiled chicken, turkey or fish, 10 egg whites     Take all bariatric supplements     Walk 30 min every day    Begin taking Metformin 500 mg according to the following schedule:   Week 1 - Take one tablet every morning with breakfast   Week 2 - Take one tablet every morning with breakfast and one tablet every evening with dinner   Week 3 - Take two tablets every morning with breakfast and one tablet every evening with dinner   Week 4 - Take two tablets every morning with breakfast and two tablets every evening with dinner

## 2022-02-03 RX ORDER — SODIUM CHLORIDE 9 MG/ML
INJECTION, SOLUTION INTRAVENOUS CONTINUOUS
Status: CANCELLED | OUTPATIENT
Start: 2022-02-07

## 2022-02-03 RX ORDER — ONDANSETRON 2 MG/ML
8 INJECTION INTRAMUSCULAR; INTRAVENOUS
Status: CANCELLED | OUTPATIENT
Start: 2022-02-07

## 2022-02-03 RX ORDER — MEPERIDINE HYDROCHLORIDE 50 MG/ML
12.5 INJECTION INTRAMUSCULAR; INTRAVENOUS; SUBCUTANEOUS PRN
Status: CANCELLED | OUTPATIENT
Start: 2022-02-07

## 2022-02-03 RX ORDER — EPINEPHRINE 1 MG/ML
0.3 INJECTION, SOLUTION, CONCENTRATE INTRAVENOUS PRN
Status: CANCELLED | OUTPATIENT
Start: 2022-02-07

## 2022-02-03 RX ORDER — ACETAMINOPHEN 325 MG/1
650 TABLET ORAL
Status: CANCELLED | OUTPATIENT
Start: 2022-02-07

## 2022-02-03 RX ORDER — ALBUTEROL SULFATE 90 UG/1
4 AEROSOL, METERED RESPIRATORY (INHALATION) PRN
Status: CANCELLED | OUTPATIENT
Start: 2022-02-07

## 2022-02-03 RX ORDER — HEPARIN SODIUM (PORCINE) LOCK FLUSH IV SOLN 100 UNIT/ML 100 UNIT/ML
500 SOLUTION INTRAVENOUS PRN
Status: CANCELLED | OUTPATIENT
Start: 2022-02-07

## 2022-02-03 RX ORDER — DIPHENHYDRAMINE HYDROCHLORIDE 50 MG/ML
50 INJECTION INTRAMUSCULAR; INTRAVENOUS
Status: CANCELLED | OUTPATIENT
Start: 2022-02-07

## 2022-02-03 RX ORDER — SODIUM CHLORIDE 9 MG/ML
25 INJECTION, SOLUTION INTRAVENOUS PRN
Status: CANCELLED | OUTPATIENT
Start: 2022-02-07

## 2022-02-03 RX ORDER — SODIUM CHLORIDE 0.9 % (FLUSH) 0.9 %
5-40 SYRINGE (ML) INJECTION PRN
Status: CANCELLED | OUTPATIENT
Start: 2022-02-07

## 2022-02-07 ENCOUNTER — HOSPITAL ENCOUNTER (OUTPATIENT)
Dept: INFUSION THERAPY | Age: 47
Discharge: HOME | End: 2022-02-07
Payer: MEDICARE

## 2022-02-07 VITALS — DIASTOLIC BLOOD PRESSURE: 62 MMHG | SYSTOLIC BLOOD PRESSURE: 110 MMHG | TEMPERATURE: 97 F | HEART RATE: 61 BPM

## 2022-02-07 DIAGNOSIS — Z98.84 S/P GASTRIC BYPASS: ICD-10-CM

## 2022-02-07 DIAGNOSIS — D50.9 MICROCYTIC ANEMIA: Primary | ICD-10-CM

## 2022-02-07 DIAGNOSIS — E61.1 IRON DEFICIENCY: ICD-10-CM

## 2022-02-07 PROCEDURE — 96365 THER/PROPH/DIAG IV INF INIT: CPT

## 2022-02-07 PROCEDURE — 6360000002 HC RX W HCPCS: Performed by: NURSE PRACTITIONER

## 2022-02-07 PROCEDURE — 2580000003 HC RX 258: Performed by: NURSE PRACTITIONER

## 2022-02-07 RX ORDER — EPINEPHRINE 1 MG/ML
0.3 INJECTION, SOLUTION, CONCENTRATE INTRAVENOUS PRN
Status: CANCELLED | OUTPATIENT
Start: 2022-03-31

## 2022-02-07 RX ORDER — SODIUM CHLORIDE 9 MG/ML
INJECTION, SOLUTION INTRAVENOUS CONTINUOUS
Status: DISCONTINUED | OUTPATIENT
Start: 2022-02-07 | End: 2022-02-08 | Stop reason: HOSPADM

## 2022-02-07 RX ORDER — SODIUM CHLORIDE 0.9 % (FLUSH) 0.9 %
5-40 SYRINGE (ML) INJECTION PRN
Status: CANCELLED | OUTPATIENT
Start: 2022-03-31

## 2022-02-07 RX ORDER — SODIUM CHLORIDE 9 MG/ML
25 INJECTION, SOLUTION INTRAVENOUS PRN
Status: CANCELLED | OUTPATIENT
Start: 2022-03-31

## 2022-02-07 RX ORDER — SODIUM CHLORIDE 9 MG/ML
INJECTION, SOLUTION INTRAVENOUS CONTINUOUS
Status: CANCELLED | OUTPATIENT
Start: 2022-03-31

## 2022-02-07 RX ORDER — HEPARIN SODIUM (PORCINE) LOCK FLUSH IV SOLN 100 UNIT/ML 100 UNIT/ML
500 SOLUTION INTRAVENOUS PRN
Status: CANCELLED | OUTPATIENT
Start: 2022-03-31

## 2022-02-07 RX ORDER — ACETAMINOPHEN 325 MG/1
650 TABLET ORAL
Status: CANCELLED | OUTPATIENT
Start: 2022-03-31

## 2022-02-07 RX ORDER — DIPHENHYDRAMINE HYDROCHLORIDE 50 MG/ML
50 INJECTION INTRAMUSCULAR; INTRAVENOUS
Status: CANCELLED | OUTPATIENT
Start: 2022-03-31

## 2022-02-07 RX ORDER — SODIUM CHLORIDE 0.9 % (FLUSH) 0.9 %
5-40 SYRINGE (ML) INJECTION PRN
Status: DISCONTINUED | OUTPATIENT
Start: 2022-02-07 | End: 2022-02-08 | Stop reason: HOSPADM

## 2022-02-07 RX ORDER — ALBUTEROL SULFATE 90 UG/1
4 AEROSOL, METERED RESPIRATORY (INHALATION) PRN
Status: CANCELLED | OUTPATIENT
Start: 2022-03-31

## 2022-02-07 RX ORDER — ONDANSETRON 2 MG/ML
8 INJECTION INTRAMUSCULAR; INTRAVENOUS
Status: CANCELLED | OUTPATIENT
Start: 2022-03-31

## 2022-02-07 RX ORDER — MEPERIDINE HYDROCHLORIDE 25 MG/ML
12.5 INJECTION INTRAMUSCULAR; INTRAVENOUS; SUBCUTANEOUS PRN
Status: CANCELLED | OUTPATIENT
Start: 2022-03-31

## 2022-02-07 RX ADMIN — SODIUM CHLORIDE, PRESERVATIVE FREE 10 ML: 5 INJECTION INTRAVENOUS at 14:12

## 2022-02-07 RX ADMIN — IRON SUCROSE 200 MG: 20 INJECTION, SOLUTION INTRAVENOUS at 14:16

## 2022-02-07 RX ADMIN — SODIUM CHLORIDE: 9 INJECTION, SOLUTION INTRAVENOUS at 14:11

## 2022-02-08 ENCOUNTER — HOSPITAL ENCOUNTER (OUTPATIENT)
Dept: INFUSION THERAPY | Age: 47
End: 2022-02-08

## 2022-02-09 ENCOUNTER — HOSPITAL ENCOUNTER (OUTPATIENT)
Dept: INFUSION THERAPY | Age: 47
End: 2022-02-09

## 2022-02-10 ENCOUNTER — HOSPITAL ENCOUNTER (OUTPATIENT)
Dept: INFUSION THERAPY | Age: 47
End: 2022-02-10

## 2022-02-11 ENCOUNTER — OFFICE VISIT (OUTPATIENT)
Dept: ONCOLOGY | Age: 47
End: 2022-02-11
Payer: MEDICARE

## 2022-02-11 ENCOUNTER — HOSPITAL ENCOUNTER (OUTPATIENT)
Dept: INFUSION THERAPY | Age: 47
Discharge: HOME | End: 2022-02-11
Payer: MEDICARE

## 2022-02-11 VITALS — SYSTOLIC BLOOD PRESSURE: 109 MMHG | HEART RATE: 66 BPM | TEMPERATURE: 98.2 F | DIASTOLIC BLOOD PRESSURE: 58 MMHG

## 2022-02-11 VITALS
SYSTOLIC BLOOD PRESSURE: 118 MMHG | HEART RATE: 76 BPM | RESPIRATION RATE: 18 BRPM | BODY MASS INDEX: 31.07 KG/M2 | WEIGHT: 182 LBS | TEMPERATURE: 97.8 F | DIASTOLIC BLOOD PRESSURE: 57 MMHG | OXYGEN SATURATION: 99 % | HEIGHT: 64 IN

## 2022-02-11 DIAGNOSIS — I89.0 LYMPHEDEMA OF BOTH LOWER EXTREMITIES: Primary | ICD-10-CM

## 2022-02-11 DIAGNOSIS — D50.9 MICROCYTIC ANEMIA: Primary | ICD-10-CM

## 2022-02-11 DIAGNOSIS — D50.8 OTHER IRON DEFICIENCY ANEMIA: ICD-10-CM

## 2022-02-11 DIAGNOSIS — E61.1 IRON DEFICIENCY: ICD-10-CM

## 2022-02-11 DIAGNOSIS — Z98.84 S/P GASTRIC BYPASS: ICD-10-CM

## 2022-02-11 PROCEDURE — 6360000002 HC RX W HCPCS: Performed by: NURSE PRACTITIONER

## 2022-02-11 PROCEDURE — 96365 THER/PROPH/DIAG IV INF INIT: CPT

## 2022-02-11 PROCEDURE — G8427 DOCREV CUR MEDS BY ELIG CLIN: HCPCS | Performed by: INTERNAL MEDICINE

## 2022-02-11 PROCEDURE — G8417 CALC BMI ABV UP PARAM F/U: HCPCS | Performed by: INTERNAL MEDICINE

## 2022-02-11 PROCEDURE — 99214 OFFICE O/P EST MOD 30 MIN: CPT | Performed by: INTERNAL MEDICINE

## 2022-02-11 PROCEDURE — 2580000003 HC RX 258: Performed by: NURSE PRACTITIONER

## 2022-02-11 PROCEDURE — 4004F PT TOBACCO SCREEN RCVD TLK: CPT | Performed by: INTERNAL MEDICINE

## 2022-02-11 PROCEDURE — G8484 FLU IMMUNIZE NO ADMIN: HCPCS | Performed by: INTERNAL MEDICINE

## 2022-02-11 RX ORDER — DIPHENHYDRAMINE HYDROCHLORIDE 50 MG/ML
50 INJECTION INTRAMUSCULAR; INTRAVENOUS
Status: CANCELLED | OUTPATIENT
Start: 2022-02-11

## 2022-02-11 RX ORDER — MEPERIDINE HYDROCHLORIDE 25 MG/ML
12.5 INJECTION INTRAMUSCULAR; INTRAVENOUS; SUBCUTANEOUS PRN
Status: CANCELLED | OUTPATIENT
Start: 2022-02-14

## 2022-02-11 RX ORDER — SODIUM CHLORIDE 9 MG/ML
INJECTION, SOLUTION INTRAVENOUS CONTINUOUS
Status: CANCELLED | OUTPATIENT
Start: 2022-02-14

## 2022-02-11 RX ORDER — HEPARIN SODIUM (PORCINE) LOCK FLUSH IV SOLN 100 UNIT/ML 100 UNIT/ML
500 SOLUTION INTRAVENOUS PRN
Status: CANCELLED | OUTPATIENT
Start: 2022-02-14

## 2022-02-11 RX ORDER — ACETAMINOPHEN 325 MG/1
650 TABLET ORAL
Status: CANCELLED | OUTPATIENT
Start: 2022-02-14

## 2022-02-11 RX ORDER — ALBUTEROL SULFATE 90 UG/1
4 AEROSOL, METERED RESPIRATORY (INHALATION) PRN
Status: CANCELLED | OUTPATIENT
Start: 2022-02-11

## 2022-02-11 RX ORDER — ONDANSETRON 2 MG/ML
8 INJECTION INTRAMUSCULAR; INTRAVENOUS
Status: CANCELLED | OUTPATIENT
Start: 2022-02-14

## 2022-02-11 RX ORDER — SODIUM CHLORIDE 0.9 % (FLUSH) 0.9 %
5-40 SYRINGE (ML) INJECTION PRN
Status: DISCONTINUED | OUTPATIENT
Start: 2022-02-11 | End: 2022-02-12 | Stop reason: HOSPADM

## 2022-02-11 RX ORDER — EPINEPHRINE 1 MG/ML
0.3 INJECTION, SOLUTION, CONCENTRATE INTRAVENOUS PRN
Status: CANCELLED | OUTPATIENT
Start: 2022-02-11

## 2022-02-11 RX ORDER — MEPERIDINE HYDROCHLORIDE 25 MG/ML
12.5 INJECTION INTRAMUSCULAR; INTRAVENOUS; SUBCUTANEOUS PRN
Status: CANCELLED | OUTPATIENT
Start: 2022-02-11

## 2022-02-11 RX ORDER — ACETAMINOPHEN 325 MG/1
650 TABLET ORAL
Status: CANCELLED | OUTPATIENT
Start: 2022-02-11

## 2022-02-11 RX ORDER — ALBUTEROL SULFATE 90 UG/1
4 AEROSOL, METERED RESPIRATORY (INHALATION) PRN
Status: CANCELLED | OUTPATIENT
Start: 2022-02-14

## 2022-02-11 RX ORDER — SODIUM CHLORIDE 9 MG/ML
25 INJECTION, SOLUTION INTRAVENOUS PRN
Status: CANCELLED | OUTPATIENT
Start: 2022-02-11

## 2022-02-11 RX ORDER — EPINEPHRINE 1 MG/ML
0.3 INJECTION, SOLUTION, CONCENTRATE INTRAVENOUS PRN
Status: CANCELLED | OUTPATIENT
Start: 2022-02-14

## 2022-02-11 RX ORDER — SODIUM CHLORIDE 9 MG/ML
INJECTION, SOLUTION INTRAVENOUS CONTINUOUS
Status: DISCONTINUED | OUTPATIENT
Start: 2022-02-11 | End: 2022-02-12 | Stop reason: HOSPADM

## 2022-02-11 RX ORDER — HEPARIN SODIUM (PORCINE) LOCK FLUSH IV SOLN 100 UNIT/ML 100 UNIT/ML
500 SOLUTION INTRAVENOUS PRN
Status: CANCELLED | OUTPATIENT
Start: 2022-02-11

## 2022-02-11 RX ORDER — SODIUM CHLORIDE 9 MG/ML
25 INJECTION, SOLUTION INTRAVENOUS PRN
Status: CANCELLED | OUTPATIENT
Start: 2022-02-14

## 2022-02-11 RX ORDER — SODIUM CHLORIDE 9 MG/ML
INJECTION, SOLUTION INTRAVENOUS CONTINUOUS
Status: CANCELLED | OUTPATIENT
Start: 2022-02-11

## 2022-02-11 RX ORDER — ONDANSETRON 2 MG/ML
8 INJECTION INTRAMUSCULAR; INTRAVENOUS
Status: CANCELLED | OUTPATIENT
Start: 2022-02-11

## 2022-02-11 RX ORDER — SODIUM CHLORIDE 0.9 % (FLUSH) 0.9 %
5-40 SYRINGE (ML) INJECTION PRN
Status: CANCELLED | OUTPATIENT
Start: 2022-02-14

## 2022-02-11 RX ORDER — DIPHENHYDRAMINE HYDROCHLORIDE 50 MG/ML
50 INJECTION INTRAMUSCULAR; INTRAVENOUS
Status: CANCELLED | OUTPATIENT
Start: 2022-02-14

## 2022-02-11 RX ADMIN — IRON SUCROSE 200 MG: 20 INJECTION, SOLUTION INTRAVENOUS at 14:20

## 2022-02-11 RX ADMIN — SODIUM CHLORIDE, PRESERVATIVE FREE 10 ML: 5 INJECTION INTRAVENOUS at 14:21

## 2022-02-11 RX ADMIN — SODIUM CHLORIDE: 9 INJECTION, SOLUTION INTRAVENOUS at 14:20

## 2022-02-11 NOTE — PROGRESS NOTES
[address]  [address] [facility] MED ONCOLOGY  [address] [place] [address] [address]  Dept: 71 Johanne Mayen: [phone]  Attending Progress Note      Reason for Visit:   Anemia. Referring Physician:  Vanetta Severe, MD    PCP:  Michael Quinonez MD    History of Present Illness:      Ms. Diamante Talavera is a very pleasant 78-year-old lady, with a past medical history significant for obesity, she status post gastric bypass surgery, hypertension, GERD, depression, and AYALA, who was referred to the hematology office for evaluation and treatment of anemia. The patient had a colonoscopy done on 10/27/2021, had revealed colon polyps, and minimal shallow diverticulitis, last EGD was on 2/7/2021, which had revealed marginal ulcer. The patient CBCD from 11/3/2021 was remarkable for a hemoglobin of 10.1, hematocrit 31.6, MCV 86.3, white count 6, platelet count 187G, iron 32, TIBC 330, TSAT 10%, reticulocytes 0.8%. The patient has been on oral iron, which causes GI side effects. She is feeling tired, she denies any bleeding. She received the first of Venofer on 2/7/2022, no reported side effects. Review of Systems;  CONSTITUTIONAL: No fever, chills. Good appetite, feeling tired. Pos for weight loss 325 lbs. ENMT: Eyes: No diplopia; Nose: No epistaxis. Mouth: No sore throat. RESPIRATORY: No hemoptysis, shortness of breath, cough. CARDIOVASCULAR: No chest pain, palpitations. GASTROINTESTINAL: Positive for nausea, no vomiting, abdominal pain, diarrhea/constipation. GENITOURINARY: No dysuria, urinary frequency, hematuria. NEURO: No syncope, presyncope, headache, slight dizziness and disorientation.   Remainder:  ROS NEGATIVE    Past Medical History:      Diagnosis Date    Anemia     Arthritis     Asthma     Depression     GERD without esophagitis     History of blood transfusion     History of gastric bypass     Hydronephrosis with urinary obstruction due to ureteral calculus     PYELOGRAM URETEROSCOPY J STENT LASER LITHOTRIPSY RIGHT performed by Shasta Chambers DO at [facility] LITHOTRIPSY      [place] OFFICE/OUTPT VISIT,PROCEDURE ONLY N/A 10/29/2018    DIAGNOSTIC LAPAROSCOPY REPAIR PERFORATED VISCUS performed by Dalila Guerrero MD at [facility] [place]  11/14/2017    STOMACH SURGERY      UPPER GASTROINTESTINAL ENDOSCOPY N/A 7/27/2019    EGD ESOPHAGOGASTRODUODENOSCOPY performed by Dalila Guerrero MD at [facility] N/A 2/7/2020    EGD BIOPSY performed by Dalila Guerrero MD at [address] [place] [address] N/A 6/27/2020    EGD DIAGNOSTIC ONLY performed by Real Gallardo MD at [facility] OR       Family History:  Family History   Problem Relation Age of Onset    Heart Attack Mother 61        death from this    Diabetes Mother     Depression Mother     Diabetes Father     Stroke Father     Cancer Maternal Grandfather     Cancer Paternal Grandmother     Stroke Paternal Grandfather     Substance Abuse Brother     Heart Attack Brother        Medications:  Reviewed and reconciled. Social History:  Social History     Socioeconomic History    Marital status: Single     Spouse name: Not on file    Number of children: Not on file    Years of education: Not on file    Highest education level: Not on file   Occupational History    Occupation: unemployed/disability   Tobacco Use    Smoking status: Current Every Day Smoker     Packs/day: 0.50     Years: 15.00     Pack years: 7.50     Types: Cigarettes     Start date: 10/29/1991    Smokeless tobacco: Never Used   Vaping Use    Vaping Use: Never used   Substance and Sexual Activity    Alcohol use:  Yes     Alcohol/week: 6.0 standard drinks     Types: 6 Standard drinks or equivalent per week     Comment: social    Drug use: No    Sexual activity: Yes     Partners: Male     Birth control/protection: Surgical   Other Topics Concern    Not on file   Social History Narrative    Not on file     Social Determinants of Health     Financial Resource Strain: High Risk    Difficulty of Paying Living Expenses: Very hard   Food Insecurity: Food Insecurity Present    Worried About Running Out of Food in the Last Year: Sometimes true    Trae of Food in the Last Year: Sometimes true   Transportation Needs:     Lack of Transportation (Medical): Not on file    Lack of Transportation (Non-Medical): Not on file   Physical Activity:     Days of Exercise per Week: Not on file    Minutes of Exercise per Session: Not on file   Stress:     Feeling of Stress : Not on file   Social Connections:     Frequency of Communication with Friends and Family: Not on file    Frequency of Social Gatherings with Friends and Family: Not on file    Attends Zoroastrian Services: Not on file    Active Member of [address] [place] [address] [employer] or Organizations: Not on file    Attends Club or Organization Meetings: Not on file    Marital Status: Not on file   Intimate Partner Violence:     Fear of Current or Ex-Partner: Not on file    Emotionally Abused: Not on file    Physically Abused: Not on file    Sexually Abused: Not on file   Housing Stability:     Unable to Pay for Housing in the Last Year: Not on file    Number of [place] in the Last Year: Not on file    Unstable Housing in the Last Year: Not on file       Allergies: Allergies   Allergen Reactions    Pcn [Penicillins] Anaphylaxis and Other (See Comments)     Patient unsure    Food Other (See Comments)     Berries - hives  Lactose Intolerance to milk-diarrhea  Soy-increases menses occurrences         Physical Exam:  BP (!) 118/57 (Site: Right Upper Arm, Position: Sitting, Cuff Size: Medium Adult)   Pulse 76   Temp 97.8 °F (36.6 °C) (Infrared)   Resp 18   Ht 5' 4\" (1.626 m)   Wt 182 lb (82.6 kg)   LMP 10/21/2019 (Exact Date)   SpO2 99%   BMI 31.24 kg/m²   GENERAL: Alert, oriented x 3, not in acute distress. HEENT: PERRLA; EOMI. Oropharynx clear. NECK: Supple. No palpable cervical or supraclavicular lymphadenopathy. LUNGS: Good air entry bilaterally. No wheezing, crackles or rhonchi. CARDIOVASCULAR: Regular rate. No murmurs, rubs or gallops. ABDOMEN: Soft. Non-tender, non-distended. Positive bowel sounds. EXTREMITIES: Without clubbing, cyanosis, positive for lower leg lymphedema  NEUROLOGIC: No focal deficits. ECOG PS 1    Impression/Plan:       Ms. Sunshine Mendez is a very pleasant 47-year-old lady, with a past medical history significant for obesity, she status post gastric bypass surgery, hypertension, GERD, depression, and AYALA, who was referred to the hematology office for evaluation and treatment of anemia. The patient had a colonoscopy done on 10/27/2021, had revealed colon polyps, and minimal shallow diverticulitis, last EGD was on 2/7/2021, which had revealed marginal ulcer. The patient CBCD from 11/3/2021 was remarkable for a hemoglobin of 10.1, hematocrit 31.6, MCV 86.3, white count 6, platelet count 464J, iron 32, TIBC 330, TSAT 10%, reticulocytes 0.8%. The patient has been on oral iron, which causes GI side effects. 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 She will complete total of 5 doses, we will monitor her CBCD and iron studies, she will likely require parenteral iron infusion periodically in the future. Lower leg lymphedema, referral was placed to the lymphedema clinic. RTC in about 6 weeks. Thank you for allowing us to participate in the care of Ms. Crow.     Goldie Arboleda MD   HEMATOLOGY/MEDICAL [address] MED ONCOLOGY  [address] [address]  Dept: 71 Johanne Mayen: [phone]

## 2022-02-14 ENCOUNTER — HOSPITAL ENCOUNTER (OUTPATIENT)
Dept: INFUSION THERAPY | Age: 47
Discharge: HOME | End: 2022-02-14
Payer: MEDICARE

## 2022-02-14 VITALS — SYSTOLIC BLOOD PRESSURE: 106 MMHG | HEART RATE: 71 BPM | TEMPERATURE: 98 F | DIASTOLIC BLOOD PRESSURE: 61 MMHG

## 2022-02-14 DIAGNOSIS — D50.9 MICROCYTIC ANEMIA: Primary | ICD-10-CM

## 2022-02-14 DIAGNOSIS — E61.1 IRON DEFICIENCY: ICD-10-CM

## 2022-02-14 DIAGNOSIS — Z98.84 S/P GASTRIC BYPASS: ICD-10-CM

## 2022-02-14 PROCEDURE — 6360000002 HC RX W HCPCS: Performed by: NURSE PRACTITIONER

## 2022-02-14 PROCEDURE — 2580000003 HC RX 258: Performed by: NURSE PRACTITIONER

## 2022-02-14 PROCEDURE — 96365 THER/PROPH/DIAG IV INF INIT: CPT

## 2022-02-14 RX ORDER — MEPERIDINE HYDROCHLORIDE 25 MG/ML
12.5 INJECTION INTRAMUSCULAR; INTRAVENOUS; SUBCUTANEOUS PRN
Status: CANCELLED | OUTPATIENT
Start: 2022-02-17

## 2022-02-14 RX ORDER — ONDANSETRON 2 MG/ML
8 INJECTION INTRAMUSCULAR; INTRAVENOUS
Status: CANCELLED | OUTPATIENT
Start: 2022-02-17

## 2022-02-14 RX ORDER — ALBUTEROL SULFATE 90 UG/1
4 AEROSOL, METERED RESPIRATORY (INHALATION) PRN
Status: CANCELLED | OUTPATIENT
Start: 2022-02-17

## 2022-02-14 RX ORDER — SODIUM CHLORIDE 0.9 % (FLUSH) 0.9 %
5-40 SYRINGE (ML) INJECTION PRN
Status: DISCONTINUED | OUTPATIENT
Start: 2022-02-14 | End: 2022-02-15 | Stop reason: HOSPADM

## 2022-02-14 RX ORDER — SODIUM CHLORIDE 0.9 % (FLUSH) 0.9 %
5-40 SYRINGE (ML) INJECTION PRN
Status: CANCELLED | OUTPATIENT
Start: 2022-02-17

## 2022-02-14 RX ORDER — SODIUM CHLORIDE 9 MG/ML
INJECTION, SOLUTION INTRAVENOUS CONTINUOUS
Status: DISCONTINUED | OUTPATIENT
Start: 2022-02-14 | End: 2022-02-15 | Stop reason: HOSPADM

## 2022-02-14 RX ORDER — ACETAMINOPHEN 325 MG/1
650 TABLET ORAL
Status: CANCELLED | OUTPATIENT
Start: 2022-02-17

## 2022-02-14 RX ORDER — SODIUM CHLORIDE 9 MG/ML
25 INJECTION, SOLUTION INTRAVENOUS PRN
Status: CANCELLED | OUTPATIENT
Start: 2022-02-17

## 2022-02-14 RX ORDER — DIPHENHYDRAMINE HYDROCHLORIDE 50 MG/ML
50 INJECTION INTRAMUSCULAR; INTRAVENOUS
Status: CANCELLED | OUTPATIENT
Start: 2022-02-17

## 2022-02-14 RX ORDER — SODIUM CHLORIDE 9 MG/ML
INJECTION, SOLUTION INTRAVENOUS CONTINUOUS
Status: CANCELLED | OUTPATIENT
Start: 2022-02-17

## 2022-02-14 RX ORDER — EPINEPHRINE 1 MG/ML
0.3 INJECTION, SOLUTION, CONCENTRATE INTRAVENOUS PRN
Status: CANCELLED | OUTPATIENT
Start: 2022-02-17

## 2022-02-14 RX ORDER — HEPARIN SODIUM (PORCINE) LOCK FLUSH IV SOLN 100 UNIT/ML 100 UNIT/ML
500 SOLUTION INTRAVENOUS PRN
Status: CANCELLED | OUTPATIENT
Start: 2022-02-17

## 2022-02-14 RX ADMIN — IRON SUCROSE 200 MG: 20 INJECTION, SOLUTION INTRAVENOUS at 14:08

## 2022-02-14 RX ADMIN — SODIUM CHLORIDE: 9 INJECTION, SOLUTION INTRAVENOUS at 14:08

## 2022-02-21 ENCOUNTER — HOSPITAL ENCOUNTER (OUTPATIENT)
Dept: INFUSION THERAPY | Age: 47
Discharge: HOME | End: 2022-02-21
Payer: MEDICARE

## 2022-02-21 VITALS
SYSTOLIC BLOOD PRESSURE: 94 MMHG | DIASTOLIC BLOOD PRESSURE: 58 MMHG | OXYGEN SATURATION: 100 % | RESPIRATION RATE: 16 BRPM | HEART RATE: 74 BPM | TEMPERATURE: 97.5 F

## 2022-02-21 DIAGNOSIS — Z98.84 S/P GASTRIC BYPASS: ICD-10-CM

## 2022-02-21 DIAGNOSIS — E61.1 IRON DEFICIENCY: ICD-10-CM

## 2022-02-21 DIAGNOSIS — D50.9 MICROCYTIC ANEMIA: Primary | ICD-10-CM

## 2022-02-21 PROCEDURE — 2580000003 HC RX 258: Performed by: NURSE PRACTITIONER

## 2022-02-21 PROCEDURE — 6360000002 HC RX W HCPCS: Performed by: NURSE PRACTITIONER

## 2022-02-21 PROCEDURE — 96365 THER/PROPH/DIAG IV INF INIT: CPT

## 2022-02-21 RX ORDER — HEPARIN SODIUM (PORCINE) LOCK FLUSH IV SOLN 100 UNIT/ML 100 UNIT/ML
500 SOLUTION INTRAVENOUS PRN
Status: CANCELLED | OUTPATIENT
Start: 2022-02-23

## 2022-02-21 RX ORDER — ACETAMINOPHEN 325 MG/1
650 TABLET ORAL
Status: CANCELLED | OUTPATIENT
Start: 2022-02-23

## 2022-02-21 RX ORDER — SODIUM CHLORIDE 9 MG/ML
INJECTION, SOLUTION INTRAVENOUS CONTINUOUS
Status: DISCONTINUED | OUTPATIENT
Start: 2022-02-21 | End: 2022-02-22 | Stop reason: HOSPADM

## 2022-02-21 RX ORDER — SODIUM CHLORIDE 0.9 % (FLUSH) 0.9 %
5-40 SYRINGE (ML) INJECTION PRN
Status: CANCELLED | OUTPATIENT
Start: 2022-02-23

## 2022-02-21 RX ORDER — DIPHENHYDRAMINE HYDROCHLORIDE 50 MG/ML
50 INJECTION INTRAMUSCULAR; INTRAVENOUS
Status: CANCELLED | OUTPATIENT
Start: 2022-02-23

## 2022-02-21 RX ORDER — SODIUM CHLORIDE 9 MG/ML
25 INJECTION, SOLUTION INTRAVENOUS PRN
Status: CANCELLED | OUTPATIENT
Start: 2022-02-23

## 2022-02-21 RX ORDER — SODIUM CHLORIDE 9 MG/ML
INJECTION, SOLUTION INTRAVENOUS CONTINUOUS
Status: CANCELLED | OUTPATIENT
Start: 2022-02-23

## 2022-02-21 RX ORDER — ALBUTEROL SULFATE 90 UG/1
4 AEROSOL, METERED RESPIRATORY (INHALATION) PRN
Status: CANCELLED | OUTPATIENT
Start: 2022-02-23

## 2022-02-21 RX ORDER — SODIUM CHLORIDE 0.9 % (FLUSH) 0.9 %
5-40 SYRINGE (ML) INJECTION PRN
Status: DISCONTINUED | OUTPATIENT
Start: 2022-02-21 | End: 2022-02-22 | Stop reason: HOSPADM

## 2022-02-21 RX ORDER — EPINEPHRINE 1 MG/ML
0.3 INJECTION, SOLUTION, CONCENTRATE INTRAVENOUS PRN
Status: CANCELLED | OUTPATIENT
Start: 2022-02-23

## 2022-02-21 RX ORDER — MEPERIDINE HYDROCHLORIDE 25 MG/ML
12.5 INJECTION INTRAMUSCULAR; INTRAVENOUS; SUBCUTANEOUS PRN
Status: CANCELLED | OUTPATIENT
Start: 2022-02-23

## 2022-02-21 RX ORDER — ONDANSETRON 2 MG/ML
8 INJECTION INTRAMUSCULAR; INTRAVENOUS
Status: CANCELLED | OUTPATIENT
Start: 2022-02-23

## 2022-02-21 RX ADMIN — IRON SUCROSE 200 MG: 20 INJECTION, SOLUTION INTRAVENOUS at 13:59

## 2022-02-21 RX ADMIN — SODIUM CHLORIDE: 9 INJECTION, SOLUTION INTRAVENOUS at 13:59

## 2022-02-28 ENCOUNTER — HOSPITAL ENCOUNTER (OUTPATIENT)
Dept: INFUSION THERAPY | Age: 47
Discharge: HOME | End: 2022-02-28
Payer: MEDICARE

## 2022-02-28 ENCOUNTER — TELEPHONE (OUTPATIENT)
Dept: PHYSICAL MEDICINE AND REHAB | Age: 47
End: 2022-02-28

## 2022-02-28 VITALS
TEMPERATURE: 97.7 F | HEART RATE: 74 BPM | SYSTOLIC BLOOD PRESSURE: 99 MMHG | DIASTOLIC BLOOD PRESSURE: 64 MMHG | RESPIRATION RATE: 16 BRPM

## 2022-02-28 DIAGNOSIS — E61.1 IRON DEFICIENCY: ICD-10-CM

## 2022-02-28 DIAGNOSIS — Z98.84 S/P GASTRIC BYPASS: ICD-10-CM

## 2022-02-28 DIAGNOSIS — D50.9 MICROCYTIC ANEMIA: Primary | ICD-10-CM

## 2022-02-28 PROCEDURE — 96374 THER/PROPH/DIAG INJ IV PUSH: CPT

## 2022-02-28 PROCEDURE — 96367 TX/PROPH/DG ADDL SEQ IV INF: CPT

## 2022-02-28 PROCEDURE — 96375 TX/PRO/DX INJ NEW DRUG ADDON: CPT

## 2022-02-28 PROCEDURE — 6360000002 HC RX W HCPCS: Performed by: NURSE PRACTITIONER

## 2022-02-28 RX ORDER — MEPERIDINE HYDROCHLORIDE 25 MG/ML
12.5 INJECTION INTRAMUSCULAR; INTRAVENOUS; SUBCUTANEOUS PRN
OUTPATIENT
Start: 2022-03-01

## 2022-02-28 RX ORDER — SODIUM CHLORIDE 9 MG/ML
INJECTION, SOLUTION INTRAVENOUS CONTINUOUS
OUTPATIENT
Start: 2022-03-01

## 2022-02-28 RX ORDER — EPINEPHRINE 1 MG/ML
0.3 INJECTION, SOLUTION, CONCENTRATE INTRAVENOUS PRN
OUTPATIENT
Start: 2022-03-01

## 2022-02-28 RX ORDER — ALBUTEROL SULFATE 90 UG/1
4 AEROSOL, METERED RESPIRATORY (INHALATION) PRN
OUTPATIENT
Start: 2022-03-01

## 2022-02-28 RX ORDER — HEPARIN SODIUM (PORCINE) LOCK FLUSH IV SOLN 100 UNIT/ML 100 UNIT/ML
500 SOLUTION INTRAVENOUS PRN
OUTPATIENT
Start: 2022-03-01

## 2022-02-28 RX ORDER — ONDANSETRON 2 MG/ML
8 INJECTION INTRAMUSCULAR; INTRAVENOUS
OUTPATIENT
Start: 2022-03-01

## 2022-02-28 RX ORDER — SODIUM CHLORIDE 9 MG/ML
25 INJECTION, SOLUTION INTRAVENOUS PRN
Status: DISCONTINUED | OUTPATIENT
Start: 2022-02-28 | End: 2022-03-01 | Stop reason: HOSPADM

## 2022-02-28 RX ORDER — ACETAMINOPHEN 325 MG/1
650 TABLET ORAL
OUTPATIENT
Start: 2022-03-01

## 2022-02-28 RX ORDER — SODIUM CHLORIDE 9 MG/ML
25 INJECTION, SOLUTION INTRAVENOUS PRN
OUTPATIENT
Start: 2022-03-01

## 2022-02-28 RX ORDER — DIPHENHYDRAMINE HYDROCHLORIDE 50 MG/ML
50 INJECTION INTRAMUSCULAR; INTRAVENOUS
OUTPATIENT
Start: 2022-03-01

## 2022-02-28 RX ADMIN — IRON SUCROSE 50 MG: 20 INJECTION, SOLUTION INTRAVENOUS at 14:36

## 2022-02-28 NOTE — TELEPHONE ENCOUNTER
Pt called in to schedule an appt for an injection in her knee. She has a doctor's appt in an hour, if she doesn't answer the phone its okay to leave a detailed msg. [place] Jayne can be reached at [phone].  Thank you

## 2022-02-28 NOTE — TELEPHONE ENCOUNTER
Called and spoke with the patient and informed her that I am having problems getting the zilreta in and she stated that she will just get the regular steroid injections. Patient is scheduled.

## 2022-03-02 ENCOUNTER — OFFICE VISIT (OUTPATIENT)
Dept: PHYSICAL MEDICINE AND REHAB | Age: 47
End: 2022-03-02
Payer: MEDICARE

## 2022-03-02 VITALS
WEIGHT: 178 LBS | DIASTOLIC BLOOD PRESSURE: 74 MMHG | TEMPERATURE: 97.2 F | SYSTOLIC BLOOD PRESSURE: 105 MMHG | HEART RATE: 83 BPM | HEIGHT: 65 IN | BODY MASS INDEX: 29.66 KG/M2

## 2022-03-02 DIAGNOSIS — M17.11 PRIMARY OSTEOARTHRITIS OF RIGHT KNEE: ICD-10-CM

## 2022-03-02 DIAGNOSIS — M17.12 PRIMARY LOCALIZED OSTEOARTHRITIS OF LEFT KNEE: ICD-10-CM

## 2022-03-02 PROCEDURE — 20611 DRAIN/INJ JOINT/BURSA W/US: CPT | Performed by: PHYSICAL MEDICINE & REHABILITATION

## 2022-03-02 RX ORDER — TRIAMCINOLONE ACETONIDE 40 MG/ML
40 INJECTION, SUSPENSION INTRA-ARTICULAR; INTRAMUSCULAR ONCE
Status: COMPLETED | OUTPATIENT
Start: 2022-03-02 | End: 2022-03-02

## 2022-03-02 RX ORDER — LIDOCAINE HYDROCHLORIDE 10 MG/ML
8 INJECTION, SOLUTION INFILTRATION; PERINEURAL ONCE
Status: COMPLETED | OUTPATIENT
Start: 2022-03-02 | End: 2022-03-02

## 2022-03-02 RX ORDER — TRAMADOL HYDROCHLORIDE 50 MG/1
50 TABLET ORAL EVERY 6 HOURS PRN
Qty: 28 TABLET | Refills: 0 | Status: SHIPPED
Start: 2022-03-02 | End: 2022-06-06 | Stop reason: SDUPTHER

## 2022-03-02 RX ADMIN — TRIAMCINOLONE ACETONIDE 40 MG: 40 INJECTION, SUSPENSION INTRA-ARTICULAR; INTRAMUSCULAR at 11:27

## 2022-03-02 RX ADMIN — LIDOCAINE HYDROCHLORIDE 8 ML: 10 INJECTION, SOLUTION INFILTRATION; PERINEURAL at 11:26

## 2022-03-02 NOTE — PROGRESS NOTES
Rocio Cartagena D.O. [facility]  [address] [facility] [place]  Phone: [phone]  Fax: [phone]    3/2/2022    Chief Complaint   Patient presents with    Knee Pain     RIGHT KNEE INJECTION       Last injection: 11/12/21  Taking anticoagulants/antiplatelets: No  Diabetic: No  Febrile/active infection: No    Ambulatory Procedure Time Out  Correct Patient: Yes  Correct Procedure: Yes  Correct Site/Side: Yes  Correct Site(s) Marked: Yes  Informed Consent Signed: Yes  Allergies Verified: Yes  Staff Present & Credential[de-identified] Zelalem Nuno CMA    After explaining the indications, risks, benefits and alternatives of a right knee joint injection, the patient agreed to proceed. A permit was signed and scanned into the chart. The skin on the lateral knee was prepared with chloraprep. Using sterile technique, a 22 gauge, 1.5\" needle with 1 cc of Kenalog 40mg/cc and 8 cc of 1% lidocaine was directed to the knee joint using US guidance. After negative aspiration, the medication was injected. Adequate hemostasis was achieved and a bandage applied. The patient tolerated the procedure well and was educated in post injection care. The patient was clinically monitored after the injection and left the office without incident. There was post injection reduction in pain. Ultrasound images are scanned separately into the EMR. Controlled Substance Monitoring:    Acute and Chronic Pain Monitoring:   RX Monitoring 3/2/2022   Periodic Controlled Substance Monitoring No signs of potential drug abuse or diversion identified. Orders Placed This Encounter   Medications    traMADol (ULTRAM) 50 MG tablet     Sig: Take 1 tablet by mouth every 6 hours as needed for Pain for up to 10 days.      Dispense:  28 tablet     Refill:  0     Reduce doses taken as pain becomes manageable    triamcinolone acetonide (KENALOG-40) injection 40 mg    lidocaine 1 % injection 8 mL Sandra Nelson D.O., P.T. Board Certified Physical Medicine and Rehabilitation  Board Certified Electrodiagnostic Medicine    Administrations This Visit     lidocaine 1 % injection 8 mL     Admin Date  03/02/2022  11:26 Action  Given Dose  8 mL Route  Other Site   Administered By  Geoffrey Farmer MA    Ordering Provider: Jana Becerra DO    NDC: 8306-4540-44    Lot#: [id number]    : HOSPIRA    Patient Supplied?: No          triamcinolone acetonide (KENALOG-40) injection 40 mg     Admin Date  03/02/2022  11:27 Action  Given Dose  40 mg Route  Intra-artICUlar Site   Administered By  Geoffrey Farmer MA    Ordering Provider: Jana Becerra DO    NDC: 2726-6338-73    Lot#:  [id number]    : [facility] (PRIMARY CARE)    Patient Supplied?: No

## 2022-03-09 ENCOUNTER — APPOINTMENT (OUTPATIENT)
Dept: MRI IMAGING | Age: 47
DRG: 392 | End: 2022-03-09
Payer: MEDICARE

## 2022-03-09 ENCOUNTER — HOSPITAL ENCOUNTER (INPATIENT)
Age: 47
LOS: 2 days | Discharge: HOME | DRG: 392 | End: 2022-03-11
Attending: EMERGENCY MEDICINE | Admitting: INTERNAL MEDICINE
Payer: MEDICARE

## 2022-03-09 ENCOUNTER — APPOINTMENT (OUTPATIENT)
Dept: CT IMAGING | Age: 47
DRG: 392 | End: 2022-03-09
Payer: MEDICARE

## 2022-03-09 ENCOUNTER — APPOINTMENT (OUTPATIENT)
Dept: GENERAL RADIOLOGY | Age: 47
DRG: 392 | End: 2022-03-09
Payer: MEDICARE

## 2022-03-09 DIAGNOSIS — R79.89 ELEVATED LFTS: ICD-10-CM

## 2022-03-09 DIAGNOSIS — R10.84 GENERALIZED ABDOMINAL PAIN: Primary | ICD-10-CM

## 2022-03-09 PROBLEM — R10.9 ABDOMINAL PAIN: Status: ACTIVE | Noted: 2022-03-09

## 2022-03-09 LAB
ACETAMINOPHEN LEVEL: <5 MCG/ML (ref 10–30)
ALBUMIN SERPL-MCNC: 3.5 G/DL (ref 3.5–5.2)
ALP BLD-CCNC: 175 U/L (ref 35–104)
ALT SERPL-CCNC: 131 U/L (ref 0–32)
ANION GAP SERPL CALCULATED.3IONS-SCNC: 7 MMOL/L (ref 7–16)
ANISOCYTOSIS: ABNORMAL
APTT: 26.3 SEC (ref 24.5–35.1)
AST SERPL-CCNC: 428 U/L (ref 0–31)
BACTERIA: ABNORMAL /HPF
BASOPHILS ABSOLUTE: 0.02 E9/L (ref 0–0.2)
BASOPHILS RELATIVE PERCENT: 0.1 % (ref 0–2)
BILIRUB SERPL-MCNC: 0.7 MG/DL (ref 0–1.2)
BILIRUBIN URINE: NEGATIVE
BLOOD, URINE: NEGATIVE
BUN BLDV-MCNC: 14 MG/DL (ref 6–20)
BURR CELLS: ABNORMAL
CALCIUM SERPL-MCNC: 8.2 MG/DL (ref 8.6–10.2)
CHLORIDE BLD-SCNC: 101 MMOL/L (ref 98–107)
CLARITY: CLEAR
CO2: 25 MMOL/L (ref 22–29)
COLOR: YELLOW
CREAT SERPL-MCNC: 0.7 MG/DL (ref 0.5–1)
CRYSTALS, UA: ABNORMAL /HPF
EOSINOPHILS ABSOLUTE: 0.05 E9/L (ref 0.05–0.5)
EOSINOPHILS RELATIVE PERCENT: 0.3 % (ref 0–6)
EPITHELIAL CELLS, UA: ABNORMAL /HPF
GFR AFRICAN AMERICAN: >60
GFR NON-AFRICAN AMERICAN: >60 ML/MIN/1.73
GLUCOSE BLD-MCNC: 118 MG/DL (ref 74–99)
GLUCOSE URINE: NEGATIVE MG/DL
HCT VFR BLD CALC: 42 % (ref 34–48)
HEMOGLOBIN: 13.5 G/DL (ref 11.5–15.5)
IMMATURE GRANULOCYTES #: 0.07 E9/L
IMMATURE GRANULOCYTES %: 0.5 % (ref 0–5)
INR BLD: 0.9
KETONES, URINE: NEGATIVE MG/DL
LEUKOCYTE ESTERASE, URINE: NEGATIVE
LIPASE: 16 U/L (ref 13–60)
LYMPHOCYTES ABSOLUTE: 0.39 E9/L (ref 1.5–4)
LYMPHOCYTES RELATIVE PERCENT: 2.6 % (ref 20–42)
MCH RBC QN AUTO: 29.9 PG (ref 26–35)
MCHC RBC AUTO-ENTMCNC: 32.1 % (ref 32–34.5)
MCV RBC AUTO: 92.9 FL (ref 80–99.9)
MONOCYTES ABSOLUTE: 0.55 E9/L (ref 0.1–0.95)
MONOCYTES RELATIVE PERCENT: 3.7 % (ref 2–12)
NEUTROPHILS ABSOLUTE: 13.9 E9/L (ref 1.8–7.3)
NEUTROPHILS RELATIVE PERCENT: 92.8 % (ref 43–80)
NITRITE, URINE: NEGATIVE
PDW BLD-RTO: 23.7 FL (ref 11.5–15)
PH UA: 6 (ref 5–9)
PLATELET # BLD: 402 E9/L (ref 130–450)
PMV BLD AUTO: 8.4 FL (ref 7–12)
POIKILOCYTES: ABNORMAL
POLYCHROMASIA: ABNORMAL
POTASSIUM REFLEX MAGNESIUM: 4.3 MMOL/L (ref 3.5–5)
PROTEIN UA: NEGATIVE MG/DL
PROTHROMBIN TIME: 10.2 SEC (ref 9.3–12.4)
RBC # BLD: 4.52 E12/L (ref 3.5–5.5)
RBC UA: ABNORMAL /HPF (ref 0–2)
SODIUM BLD-SCNC: 133 MMOL/L (ref 132–146)
SPECIFIC GRAVITY UA: 1.01 (ref 1–1.03)
TOTAL PROTEIN: 5.9 G/DL (ref 6.4–8.3)
TROPONIN, HIGH SENSITIVITY: <6 NG/L (ref 0–9)
UROBILINOGEN, URINE: 2 E.U./DL
WBC # BLD: 15 E9/L (ref 4.5–11.5)
WBC UA: ABNORMAL /HPF (ref 0–5)

## 2022-03-09 PROCEDURE — 6360000004 HC RX CONTRAST MEDICATION: Performed by: RADIOLOGY

## 2022-03-09 PROCEDURE — 96374 THER/PROPH/DIAG INJ IV PUSH: CPT

## 2022-03-09 PROCEDURE — A9579 GAD-BASE MR CONTRAST NOS,1ML: HCPCS | Performed by: RADIOLOGY

## 2022-03-09 PROCEDURE — 6370000000 HC RX 637 (ALT 250 FOR IP): Performed by: EMERGENCY MEDICINE

## 2022-03-09 PROCEDURE — 74177 CT ABD & PELVIS W/CONTRAST: CPT

## 2022-03-09 PROCEDURE — G0378 HOSPITAL OBSERVATION PER HR: HCPCS

## 2022-03-09 PROCEDURE — 84484 ASSAY OF TROPONIN QUANT: CPT

## 2022-03-09 PROCEDURE — 96376 TX/PRO/DX INJ SAME DRUG ADON: CPT

## 2022-03-09 PROCEDURE — 99285 EMERGENCY DEPT VISIT HI MDM: CPT

## 2022-03-09 PROCEDURE — 96375 TX/PRO/DX INJ NEW DRUG ADDON: CPT

## 2022-03-09 PROCEDURE — 93005 ELECTROCARDIOGRAM TRACING: CPT | Performed by: EMERGENCY MEDICINE

## 2022-03-09 PROCEDURE — 6360000002 HC RX W HCPCS: Performed by: EMERGENCY MEDICINE

## 2022-03-09 PROCEDURE — 81001 URINALYSIS AUTO W/SCOPE: CPT

## 2022-03-09 PROCEDURE — 71045 X-RAY EXAM CHEST 1 VIEW: CPT

## 2022-03-09 PROCEDURE — 1200000000 HC SEMI PRIVATE

## 2022-03-09 PROCEDURE — 74183 MRI ABD W/O CNTR FLWD CNTR: CPT

## 2022-03-09 PROCEDURE — C9113 INJ PANTOPRAZOLE SODIUM, VIA: HCPCS | Performed by: EMERGENCY MEDICINE

## 2022-03-09 PROCEDURE — 80053 COMPREHEN METABOLIC PANEL: CPT

## 2022-03-09 PROCEDURE — 85610 PROTHROMBIN TIME: CPT

## 2022-03-09 PROCEDURE — 99223 1ST HOSP IP/OBS HIGH 75: CPT | Performed by: INTERNAL MEDICINE

## 2022-03-09 PROCEDURE — 6360000002 HC RX W HCPCS: Performed by: NURSE PRACTITIONER

## 2022-03-09 PROCEDURE — 80143 DRUG ASSAY ACETAMINOPHEN: CPT

## 2022-03-09 PROCEDURE — 80074 ACUTE HEPATITIS PANEL: CPT

## 2022-03-09 PROCEDURE — 6360000002 HC RX W HCPCS

## 2022-03-09 PROCEDURE — 6370000000 HC RX 637 (ALT 250 FOR IP): Performed by: INTERNAL MEDICINE

## 2022-03-09 PROCEDURE — 85025 COMPLETE CBC W/AUTO DIFF WBC: CPT

## 2022-03-09 PROCEDURE — 6360000002 HC RX W HCPCS: Performed by: INTERNAL MEDICINE

## 2022-03-09 PROCEDURE — 2580000003 HC RX 258: Performed by: INTERNAL MEDICINE

## 2022-03-09 PROCEDURE — 83690 ASSAY OF LIPASE: CPT

## 2022-03-09 PROCEDURE — 85730 THROMBOPLASTIN TIME PARTIAL: CPT

## 2022-03-09 PROCEDURE — 99222 1ST HOSP IP/OBS MODERATE 55: CPT | Performed by: SURGERY

## 2022-03-09 RX ORDER — PANTOPRAZOLE SODIUM 40 MG/10ML
40 INJECTION, POWDER, LYOPHILIZED, FOR SOLUTION INTRAVENOUS ONCE
Status: COMPLETED | OUTPATIENT
Start: 2022-03-09 | End: 2022-03-09

## 2022-03-09 RX ORDER — ONDANSETRON 4 MG/1
4 TABLET, ORALLY DISINTEGRATING ORAL EVERY 8 HOURS PRN
Status: DISCONTINUED | OUTPATIENT
Start: 2022-03-09 | End: 2022-03-11 | Stop reason: HOSPADM

## 2022-03-09 RX ORDER — SUCRALFATE 1 G/1
1 TABLET ORAL 2 TIMES DAILY
Status: DISCONTINUED | OUTPATIENT
Start: 2022-03-09 | End: 2022-03-11 | Stop reason: HOSPADM

## 2022-03-09 RX ORDER — ACETAMINOPHEN 650 MG/1
650 SUPPOSITORY RECTAL EVERY 6 HOURS PRN
Status: DISCONTINUED | OUTPATIENT
Start: 2022-03-09 | End: 2022-03-11 | Stop reason: HOSPADM

## 2022-03-09 RX ORDER — FERROUS SULFATE 325(65) MG
160 TABLET ORAL DAILY
Status: DISCONTINUED | OUTPATIENT
Start: 2022-03-09 | End: 2022-03-11 | Stop reason: HOSPADM

## 2022-03-09 RX ORDER — ONDANSETRON 2 MG/ML
4 INJECTION INTRAMUSCULAR; INTRAVENOUS ONCE
Status: COMPLETED | OUTPATIENT
Start: 2022-03-09 | End: 2022-03-09

## 2022-03-09 RX ORDER — FENTANYL CITRATE 50 UG/ML
50 INJECTION, SOLUTION INTRAMUSCULAR; INTRAVENOUS ONCE
Status: COMPLETED | OUTPATIENT
Start: 2022-03-09 | End: 2022-03-09

## 2022-03-09 RX ORDER — HYDROXYZINE PAMOATE 25 MG/1
50 CAPSULE ORAL 3 TIMES DAILY PRN
Status: DISCONTINUED | OUTPATIENT
Start: 2022-03-09 | End: 2022-03-11 | Stop reason: HOSPADM

## 2022-03-09 RX ORDER — POLYETHYLENE GLYCOL 3350 17 G/17G
17 POWDER, FOR SOLUTION ORAL DAILY PRN
Status: DISCONTINUED | OUTPATIENT
Start: 2022-03-09 | End: 2022-03-11 | Stop reason: HOSPADM

## 2022-03-09 RX ORDER — FENTANYL CITRATE 50 UG/ML
25 INJECTION, SOLUTION INTRAMUSCULAR; INTRAVENOUS ONCE
Status: COMPLETED | OUTPATIENT
Start: 2022-03-09 | End: 2022-03-09

## 2022-03-09 RX ORDER — METOCLOPRAMIDE HYDROCHLORIDE 5 MG/ML
10 INJECTION INTRAMUSCULAR; INTRAVENOUS ONCE
Status: COMPLETED | OUTPATIENT
Start: 2022-03-09 | End: 2022-03-09

## 2022-03-09 RX ORDER — ALBUTEROL SULFATE 90 UG/1
1 AEROSOL, METERED RESPIRATORY (INHALATION) EVERY 4 HOURS PRN
Status: DISCONTINUED | OUTPATIENT
Start: 2022-03-09 | End: 2022-03-09 | Stop reason: CLARIF

## 2022-03-09 RX ORDER — ACETAMINOPHEN 325 MG/1
650 TABLET ORAL EVERY 6 HOURS PRN
Status: DISCONTINUED | OUTPATIENT
Start: 2022-03-09 | End: 2022-03-11 | Stop reason: HOSPADM

## 2022-03-09 RX ORDER — ONDANSETRON 4 MG/1
4 TABLET, ORALLY DISINTEGRATING ORAL EVERY 8 HOURS PRN
COMMUNITY
End: 2022-07-08 | Stop reason: SDUPTHER

## 2022-03-09 RX ORDER — ONDANSETRON 2 MG/ML
4 INJECTION INTRAMUSCULAR; INTRAVENOUS EVERY 6 HOURS PRN
Status: DISCONTINUED | OUTPATIENT
Start: 2022-03-09 | End: 2022-03-11 | Stop reason: HOSPADM

## 2022-03-09 RX ORDER — SODIUM CHLORIDE 0.9 % (FLUSH) 0.9 %
5-40 SYRINGE (ML) INJECTION PRN
Status: DISCONTINUED | OUTPATIENT
Start: 2022-03-09 | End: 2022-03-11 | Stop reason: HOSPADM

## 2022-03-09 RX ORDER — ALBUTEROL SULFATE 2.5 MG/3ML
2.5 SOLUTION RESPIRATORY (INHALATION) EVERY 6 HOURS PRN
Status: DISCONTINUED | OUTPATIENT
Start: 2022-03-09 | End: 2022-03-11 | Stop reason: HOSPADM

## 2022-03-09 RX ORDER — SODIUM CHLORIDE 9 MG/ML
25 INJECTION, SOLUTION INTRAVENOUS PRN
Status: DISCONTINUED | OUTPATIENT
Start: 2022-03-09 | End: 2022-03-11 | Stop reason: HOSPADM

## 2022-03-09 RX ORDER — MORPHINE SULFATE 2 MG/ML
2 INJECTION, SOLUTION INTRAMUSCULAR; INTRAVENOUS EVERY 4 HOURS PRN
Status: DISCONTINUED | OUTPATIENT
Start: 2022-03-09 | End: 2022-03-11 | Stop reason: HOSPADM

## 2022-03-09 RX ORDER — SODIUM CHLORIDE 0.9 % (FLUSH) 0.9 %
5-40 SYRINGE (ML) INJECTION EVERY 12 HOURS SCHEDULED
Status: DISCONTINUED | OUTPATIENT
Start: 2022-03-09 | End: 2022-03-11 | Stop reason: HOSPADM

## 2022-03-09 RX ORDER — DIPHENHYDRAMINE HYDROCHLORIDE 50 MG/ML
25 INJECTION INTRAMUSCULAR; INTRAVENOUS ONCE
Status: COMPLETED | OUTPATIENT
Start: 2022-03-09 | End: 2022-03-09

## 2022-03-09 RX ORDER — SENNA AND DOCUSATE SODIUM 50; 8.6 MG/1; MG/1
1 TABLET, FILM COATED ORAL 2 TIMES DAILY PRN
COMMUNITY

## 2022-03-09 RX ORDER — PANTOPRAZOLE SODIUM 40 MG/1
40 TABLET, DELAYED RELEASE ORAL
Status: DISCONTINUED | OUTPATIENT
Start: 2022-03-10 | End: 2022-03-11 | Stop reason: HOSPADM

## 2022-03-09 RX ORDER — MULTIVIT WITH MINERALS/LUTEIN
250 TABLET ORAL EVERY OTHER DAY
COMMUNITY

## 2022-03-09 RX ORDER — ALBUTEROL SULFATE 90 UG/1
2 AEROSOL, METERED RESPIRATORY (INHALATION) EVERY 6 HOURS PRN
COMMUNITY
End: 2022-07-08 | Stop reason: SDUPTHER

## 2022-03-09 RX ORDER — SUCRALFATE 1 G/1
1 TABLET ORAL
COMMUNITY
End: 2022-07-08 | Stop reason: SDUPTHER

## 2022-03-09 RX ORDER — KETOROLAC TROMETHAMINE 30 MG/ML
30 INJECTION, SOLUTION INTRAMUSCULAR; INTRAVENOUS EVERY 6 HOURS PRN
Status: DISCONTINUED | OUTPATIENT
Start: 2022-03-09 | End: 2022-03-11 | Stop reason: HOSPADM

## 2022-03-09 RX ORDER — BUPROPION HYDROCHLORIDE 100 MG/1
200 TABLET, EXTENDED RELEASE ORAL 2 TIMES DAILY
Status: DISCONTINUED | OUTPATIENT
Start: 2022-03-09 | End: 2022-03-11 | Stop reason: HOSPADM

## 2022-03-09 RX ADMIN — FENTANYL CITRATE 50 MCG: 50 INJECTION INTRAMUSCULAR; INTRAVENOUS at 05:16

## 2022-03-09 RX ADMIN — FENTANYL CITRATE 25 MCG: 50 INJECTION INTRAMUSCULAR; INTRAVENOUS at 09:33

## 2022-03-09 RX ADMIN — ONDANSETRON 4 MG: 2 INJECTION INTRAMUSCULAR; INTRAVENOUS at 05:15

## 2022-03-09 RX ADMIN — GADOTERIDOL 16 ML: 279.3 INJECTION, SOLUTION INTRAVENOUS at 11:58

## 2022-03-09 RX ADMIN — DIPHENHYDRAMINE HYDROCHLORIDE 25 MG: 50 INJECTION, SOLUTION INTRAMUSCULAR; INTRAVENOUS at 09:32

## 2022-03-09 RX ADMIN — METOCLOPRAMIDE HYDROCHLORIDE 10 MG: 5 INJECTION INTRAMUSCULAR; INTRAVENOUS at 09:33

## 2022-03-09 RX ADMIN — ACETAMINOPHEN 650 MG: 325 TABLET ORAL at 19:18

## 2022-03-09 RX ADMIN — IOHEXOL 50 ML: 240 INJECTION, SOLUTION INTRATHECAL; INTRAVASCULAR; INTRAVENOUS; ORAL at 06:11

## 2022-03-09 RX ADMIN — BUPROPION HYDROCHLORIDE 200 MG: 100 TABLET, EXTENDED RELEASE ORAL at 19:17

## 2022-03-09 RX ADMIN — KETOROLAC TROMETHAMINE 30 MG: 30 INJECTION, SOLUTION INTRAMUSCULAR; INTRAVENOUS at 21:42

## 2022-03-09 RX ADMIN — MORPHINE SULFATE 2 MG: 2 INJECTION, SOLUTION INTRAMUSCULAR; INTRAVENOUS at 23:09

## 2022-03-09 RX ADMIN — PANTOPRAZOLE SODIUM 40 MG: 40 INJECTION, POWDER, FOR SOLUTION INTRAVENOUS at 13:42

## 2022-03-09 RX ADMIN — HYDROXYZINE PAMOATE 50 MG: 25 CAPSULE ORAL at 23:09

## 2022-03-09 RX ADMIN — ONDANSETRON 4 MG: 2 INJECTION INTRAMUSCULAR; INTRAVENOUS at 15:48

## 2022-03-09 RX ADMIN — IOPAMIDOL 75 ML: 755 INJECTION, SOLUTION INTRAVENOUS at 06:11

## 2022-03-09 RX ADMIN — ONDANSETRON 4 MG: 2 INJECTION INTRAMUSCULAR; INTRAVENOUS at 21:42

## 2022-03-09 RX ADMIN — SODIUM CHLORIDE, PRESERVATIVE FREE 10 ML: 5 INJECTION INTRAVENOUS at 21:43

## 2022-03-09 RX ADMIN — LIDOCAINE HYDROCHLORIDE: 20 SOLUTION ORAL; TOPICAL at 05:15

## 2022-03-09 RX ADMIN — KETOROLAC TROMETHAMINE 30 MG: 30 INJECTION, SOLUTION INTRAMUSCULAR; INTRAVENOUS at 15:48

## 2022-03-09 RX ADMIN — SUCRALFATE 1 G: 1 TABLET ORAL at 19:18

## 2022-03-09 RX ADMIN — SODIUM CHLORIDE, PRESERVATIVE FREE 10 ML: 5 INJECTION INTRAVENOUS at 15:48

## 2022-03-09 ASSESSMENT — PAIN SCALES - GENERAL
PAINLEVEL_OUTOF10: 8
PAINLEVEL_OUTOF10: 4
PAINLEVEL_OUTOF10: 10
PAINLEVEL_OUTOF10: 9
PAINLEVEL_OUTOF10: 2
PAINLEVEL_OUTOF10: 5
PAINLEVEL_OUTOF10: 8
PAINLEVEL_OUTOF10: 2

## 2022-03-09 ASSESSMENT — PAIN - FUNCTIONAL ASSESSMENT
PAIN_FUNCTIONAL_ASSESSMENT: 0-10
PAIN_FUNCTIONAL_ASSESSMENT: PREVENTS OR INTERFERES SOME ACTIVE ACTIVITIES AND ADLS

## 2022-03-09 ASSESSMENT — PAIN DESCRIPTION - DESCRIPTORS
DESCRIPTORS: ACHING;CRAMPING;SPASM
DESCRIPTORS: ACHING
DESCRIPTORS: ACHING

## 2022-03-09 ASSESSMENT — PAIN DESCRIPTION - PAIN TYPE
TYPE: ACUTE PAIN

## 2022-03-09 ASSESSMENT — PAIN DESCRIPTION - LOCATION
LOCATION: ABDOMEN

## 2022-03-09 ASSESSMENT — ENCOUNTER SYMPTOMS
NAUSEA: 1
CONSTIPATION: 0
DIARRHEA: 0
ABDOMINAL PAIN: 1
COUGH: 0
VOMITING: 0
SHORTNESS OF BREATH: 0
ABDOMINAL DISTENTION: 1

## 2022-03-09 ASSESSMENT — PAIN DESCRIPTION - ORIENTATION: ORIENTATION: RIGHT;LEFT;UPPER

## 2022-03-09 ASSESSMENT — PAIN DESCRIPTION - PROGRESSION
CLINICAL_PROGRESSION: NOT CHANGED
CLINICAL_PROGRESSION: NOT CHANGED

## 2022-03-09 ASSESSMENT — PAIN DESCRIPTION - FREQUENCY
FREQUENCY: INTERMITTENT

## 2022-03-09 NOTE — ED NOTES
Signed out to me by Dr. Stu Posey. Patient with persistent abdominal pain. Has a leukocytosis and new transaminitis. Surgery was consulted that previously has taken care of patient they recommended GI consultation. GI came down and evaluated patient in the ER. Hepatitis panel was done MRCP  ordered. They are planning on doing a endoscopy in the hospital patient is to be admitted per GI. I discussed with the hospitalist who is willing to admit. Patient stable for general medical floor. Patient was given PPI. 1. Generalized abdominal pain    2.  Elevated LFTs           Birdie Angelucci, DO  03/09/22 1256

## 2022-03-09 NOTE — ED NOTES
Radiology Procedure Waiver   Name: Kapil Navarrete  : 1975  MRN: [id number]    Date:  3/9/22    Time: 4:56 AM EST    Benefits of immediately proceeding with Radiology exam(s) without pre-testing outweigh the risks or are not indicated as specified below and therefore the following is/are being waived:    [] Pregnancy test   [] Patients LMP on-time and regular.   [] Patient had Tubal Ligation or has other Contraception Device. [] Patient  is Menopausal or Premenarcheal.    [x] Patient had Full or Partial Hysterectomy. [] Protocol for Iodine allergy    [] MRI Questionnaire     [] BUN/Creatinine   [] Patient age w/no hx of renal dysfunction. [] Patient on Dialysis. [] Recent Normal Labs.   Electronically signed by Anat Fletcher MD on 3/9/22 at 4:56 AM EST               Anat Fletcher MD  22 [id number]

## 2022-03-09 NOTE — PROGRESS NOTES
Admission database completed to best of this RN's ability. Pharmacy tech to review medication list. Care plan and education initiated. Pt states that she has a cane and walker at home that she only uses as needed when \"spasms\" are acting up. Denies any HHC prior to admission.

## 2022-03-09 NOTE — PROGRESS NOTES
Surgery:    Full consult to follow. Abdominal pain began last evening upper abdomen radiating to the back. Exam with mild epigastric tenderness. CT negative aside from some mild bile duct dilation and some stool burden. Status post Viviane-en-Y gastric bypass and lap chidi. Bilirubin normal.  AST greater than ALT, alk phos elevated. We will get an MRCP. Bowel regimen. We will check a hepatitis panel and also ask GI to see for further work-up, will  Defer any scope needs to GI as well at this time.  There is nothing acutely surgical      Jr Joshua MD  3/9/22  8:21 AM

## 2022-03-09 NOTE — H&P
[facility] History and Physical      CHIEF COMPLAINT: Epigastric pain for 1 day. History of Present Illness: This is 73-year-old  female with past medical history of gastric bypass surgery in 2017, history of perforated marginal ulcer, diverticulosis, colonic polyps, cholecystectomy and iron deficiency anemia came from home due to epigastric pain for 1 day. History taken from the patient at the bedside, she developed pain yesterday which is progressively worsening associated with nausea and vomiting. She did not vomit any blood or any history of passing blood. She did not give any history of chest pain, shortness of breath or any headache. Patient is not vaccinated against COVID-19 virus but she did not have any Covid virus pneumonia so far. She had a colonoscopy done in 2020 showed colon polyps and biopsy showed it was benign. During my examination she was not in any acute discomfort. She mentioned she had a beer last Saturday after read many months. Vitals in ER shows blood pressure 95/57, pulse 82, respirations 16 and temperature 98.2 °F.  She does not require any oxygen to maintain saturation. Labs show sodium 133, potassium 4.3 bicarb 25, creatinine 1.7 BUN 14. Her alk phosphatase 175, , , bilirubin 0.7. Her WBC count shows 15,000 hemoglobin 13.5 and platelet count 116,113. CT abdominal pelvis done did not show acute abnormality but shows stool throughout the colon that means constipation. Informant(s) for H&P: Patient and EMR.     REVIEW OF SYSTEMS:  A comprehensive review of systems was negative except for: what is in the HPI      PMH:  Past Medical History:   Diagnosis Date    Anemia     Arthritis     Asthma     Depression     GERD without esophagitis     History of blood transfusion     History of gastric bypass     Hydronephrosis with urinary obstruction due to ureteral calculus     Hypertension     No meds following sustained weight loss after bariatric surgery.  Iron deficiency 08/01/2018    Localized osteoarthritis of left knee 01/20/2020    Lumbar spondylosis 01/26/2018    Lymphedema     Obesity     Panic attacks     Perforated marginal ulcer 10/29/2018    ~1 year after RnYGB    PONV (postoperative nausea and vomiting)     Sleep apnea, obstructive     Off PAP therapy BG following sustained weight loss after bariatric surgery.  Vitamin D deficiency 07/27/2018    Zinc deficiency 08/01/2018       Surgical History:  Past Surgical History:   Procedure Laterality Date    APPENDECTOMY  1996    CHOLECYSTECTOMY, LAPAROSCOPIC N/A 7/27/2019    CHOLECYSTECTOMY LAPAROSCOPIC performed by Senthil Amaya MD at [facility] 22 COLONOSCOPY N/A 10/27/2021    COLONOSCOPY POLYPECTOMY SNARE/COLD BIOPSY performed by Marge Bautista MD at [place] [id number]  10/23/2019    [facility]    LAPAROSCOPY N/A 6/27/2020    DIAGNOSTIC  LAPAROSCOPY, REPAIR PERFORATED MARGINAL ULCER, UPPER ENDOSCOPY.  [facility] PATCH performed by Delia Anaya MD at [facility] 22 LITHOTRIPSY Right 7/24/2019    CYSTOSCOPY RETROGRADE PYELOGRAM URETEROSCOPY J STENT LASER LITHOTRIPSY RIGHT performed by Martin Chambers DO at [facility] 22 LITHOTRIPSY      [place] OFFICE/OUTPT VISIT,PROCEDURE ONLY N/A 10/29/2018    DIAGNOSTIC LAPAROSCOPY REPAIR PERFORATED VISCUS performed by Senthil Amaya MD at [facility] [place]  11/14/2017    STOMACH SURGERY      UPPER GASTROINTESTINAL ENDOSCOPY N/A 7/27/2019    EGD ESOPHAGOGASTRODUODENOSCOPY performed by Senthil Amaya MD at [place] [id number] N/A 2/7/2020    EGD BIOPSY performed by Senthil Amaya MD at [facility] 6/27/2020    EGD DIAGNOSTIC ONLY performed by Delia Anaya MD at [address] [place] [address]       Medications Prior to Admission:    Prior to Admission medications    Medication Sig Start Date End Date Taking? Authorizing Provider   traMADol (ULTRAM) 50 MG tablet Take 1 tablet by mouth every 6 hours as needed for Pain for up to 10 days. 3/2/22 3/12/22  Shalom El DO   omeprazole (PRILOSEC) 20 MG delayed release capsule Take 1 capsule by mouth Daily 1/20/22 1/20/23  Lindsey Chris MD   ondansetron (ZOFRAN-ODT) 4 MG disintegrating tablet dissolve 1 tablet ON TONGUE every 8 hours if needed for nausea OR vomiting 1/14/22   Rama Dewey Aschoff, MD   albuterol sulfate  (90 Base) MCG/ACT inhaler inhale 2 puffs by mouth and INTO THE LUNGS every 6 hours if needed for wheezing 1/14/22   Katy Flowers MD   sucralfate (CARAFATE) 1 GM tablet Take 1 tablet by mouth 2 times daily 1/14/22   Katy Flowers MD   ferrous sulfate dried (SLOW RELEASE IRON) 160 (50 Fe) MG TBCR extended release tablet Take 160 mg by mouth daily 10/12/21   Katy Flowers MD   buPROPion [facility] SR) 200 MG extended release tablet Take 1 tablet by mouth 2 times daily 8/18/21   Historical Provider, MD   acetaminophen (TYLENOL) 500 MG tablet Take 2 tablets by mouth every 6 hours as needed for Pain Maximum dose- 8 tablets/24 hours. 6/18/21   Rama Dewey Aschoff, MD   sennosides-docusate sodium (SENOKOT-S) 8.6-50 MG tablet Take 1 tablet by mouth 2 times daily 6/29/20   Bety Mcdonald MD   Multiple Vitamins-Minerals (MULTIVITAMIN ADULT PO) Take by mouth daily     Historical Provider, MD   hydrOXYzine (VISTARIL) 50 MG capsule Take 1 capsule by mouth 3 times daily as needed for Anxiety 10/29/19   Zoila Cabezas MD   ascorbic acid (V-R VITAMIN C) 250 MG tablet Take 1 tablet by mouth daily Take with the iron supplement 6/24/19   Donita Infante MD       Allergies:    Pcn [penicillins] and Food    Social History:    reports that she has been smoking cigarettes. She started smoking about 30 years ago. She has a 7.50 pack-year smoking history. She has never used smokeless tobacco. She reports current alcohol use of about 6.0 standard drinks of alcohol per week.  She reports that she does not use drugs. Family History:   family history includes Cancer in her maternal grandfather and paternal grandmother; Depression in her mother; Diabetes in her father and mother; Heart Attack in her brother; Heart Attack (age of onset: 61) in her mother; Stroke in her father and paternal grandfather; Substance Abuse in her brother. PHYSICAL EXAM:  Vitals:  BP (!) 95/57   Pulse 82   Temp 98.2 °F (36.8 °C)   Resp 16   Ht 5' 5\" (1.651 m)   Wt 175 lb (79.4 kg)   LMP 10/21/2019 (Exact Date)   SpO2 98%   BMI 29.12 kg/m²     General Appearance: alert and oriented to person, place and time and in no acute distress  Skin: warm and dry  Head: normocephalic and atraumatic  Eyes: pupils equal, round, and reactive to light, extraocular eye movements intact, conjunctivae normal  Neck: neck supple and non tender without mass   Pulmonary/Chest: clear to auscultation bilaterally- no wheezes, rales or rhonchi, normal air movement, no respiratory distress  Cardiovascular: normal rate, normal S1 and S2 and no carotid bruits  Abdomen: soft, tenderness in epigastric area. , non-distended, normal bowel sounds, no masses or organomegaly  Extremities: no cyanosis, no clubbing and no edema  Neurologic: no cranial nerve deficit and speech normal        LABS:  Recent Labs     03/09/22  0509      K 4.3      CO2 25   BUN 14   CREATININE 0.7   GLUCOSE 118*   CALCIUM 8.2*       Recent Labs     03/09/22  0509   WBC 15.0*   RBC 4.52   HGB 13.5   HCT 42.0   MCV 92.9   MCH 29.9   MCHC 32.1   RDW 23.7*      MPV 8.4       No results for input(s): POCGLU in the last 72 hours. Radiology:   MRI ABDOMEN W WO CONTRAST MRCP   Final Result   1. Patient is status post cholecystectomy. 2.  Mild intrahepatic biliary duct dilatation and minimal CBD dilatation   again seen, when compared to the prior CT study performed earlier in the day.    This can be seen in patients status post cholecystectomy. 3.  Left renal cortical cyst again noted. 4.  Rule out small bowel ileus, as described above. CT ABDOMEN PELVIS W IV CONTRAST Additional Contrast? Oral   Final Result   No acute abdominopelvic abnormality. Moderate stool throughout the colon and   rectum, which could support clinical diagnosis of constipation. Additional findings as above. XR CHEST PORTABLE   Final Result   No acute process. ASSESSMENT:      Principal Problem:    Abdominal pain  Resolved Problems:    * No resolved hospital problems. *      PLAN:    1. Epigastric pain: GI on the case patient will have EGD tomorrow keep n.p.o., Protonix IV, IV fluid. 2.  Anxiety: Continue bupropion. 3.  Transaminitis: Her AST is higher more than ALT, patient denies any overt drinking of alcohol. Follow-up liver function test tomorrow. Code Status: Full  DVT prophylaxis: Lovenox subcu      NOTE: This report was transcribed using voice recognition software. Every effort was made to ensure accuracy; however, inadvertent computerized transcription errors may be present.   Electronically signed by Jose Angel Encinas MD on 3/9/2022 at 1:56 PM

## 2022-03-09 NOTE — CONSULTS
Gastroenterology Consult Note   LUCIEN Lopez-JHONATAN with Shaina Hernandez M.D. Consult Note        Date of Service: 3/9/2022  Reason for Consult: elevated LFT's  Requesting Physician: Dr. Sasha Dobson:  Abdominal pain    History Obtained From:  Patient, EMR     HISTORY OF PRESENT ILLNESS:       Javier Taylor is a 48 y.o. female with significant past medical history of perforated marginal ulcer, diverticulosis, colon polyps, cholecystectomy, Viviane-en-Y, iron deficiency anemia and anxiety admitted via ED for abdominal pain. Pt reports yesterday she had bad anxiety attack taking Vistaril and also abdominal pain radiating to epigastric and back described as pressure. Some nausea denies vomiting. Patient states she had similar pain when she had her perforated ulcer. Bowel movements are daily no melena or hematochezia. Denies fever, chills or unintentional weight loss. History of Viviane-en-Y. She reports she was on doxycycline prescribed by PCP for ingrown hair which she reports she has 2 more days. Denies alcohol use. No complaints of heartburn or dysphagia. 10/27/2021 colonoscopy with Dr. Santiago Bryson polyp, diverticulosis. 6/27/2020 diagnostic laparoscopy with repair of perforated marginal ulcer with omental flap and Kaylyn Ashok patch with Dr. Francisco Cespedes marginal ulcer with peritonitis. EGD 2/7/2020 with Dr. Jocelyn Rojas ulcer smaller but still present. EGD 7/27/2019 EGD with Dr. Pallavi Barrientos -marginal ulcer. EGD 10/6/2017 with Dr. Pauly Miller diffuse superficial ulcerations. Patient with family history of colon cancer, father. Admission labs alk phos 175, , , WBC 15, glucose 118, calcium 8.2. CT abdomen pelvis with IV contrast-No acute abdominopelvic abnormality. Moderate stool throughout the colon and rectum, which could support clinical diagnosis of constipation. Additional findings as above. MRI/MRCP-1. Patient is status post cholecystectomy.  2.  Mild intrahepatic biliary duct dilatation and minimal CBD dilatation again seen, when compared to the prior CT study performed earlier in the day. This can be seen in patients status post cholecystectomy. 3.  Left renal cortical cyst again noted. 4.  Rule out small bowel ileus, as described above. Consultation for elevated LFT's. Currently, pt reports epigastric pressure radiating to back no nausea or vomiting. Labs today as above    Past Medical History:        Diagnosis Date    Anemia     Arthritis     Asthma     Depression     GERD without esophagitis     History of blood transfusion     History of gastric bypass     Hydronephrosis with urinary obstruction due to ureteral calculus     Hypertension     No meds following sustained weight loss after bariatric surgery.  Iron deficiency 08/01/2018    Localized osteoarthritis of left knee 01/20/2020    Lumbar spondylosis 01/26/2018    Lymphedema     Obesity     Panic attacks     Perforated marginal ulcer 10/29/2018    ~1 year after RnYGB    PONV (postoperative nausea and vomiting)     Sleep apnea, obstructive     Off PAP therapy BG following sustained weight loss after bariatric surgery.  Vitamin D deficiency 07/27/2018    Zinc deficiency 08/01/2018     Past Surgical History:        Procedure Laterality Date    APPENDECTOMY  1996    CHOLECYSTECTOMY, LAPAROSCOPIC N/A 7/27/2019    CHOLECYSTECTOMY LAPAROSCOPIC performed by Michael Schmidt MD at [facility] COLONOSCOPY N/A 10/27/2021    COLONOSCOPY POLYPECTOMY SNARE/COLD BIOPSY performed by Chico Saldivar MD at [place] [address]  10/23/2019    HYSTERECTOMY  01/22/2020    [facility]    LAPAROSCOPY N/A 6/27/2020    DIAGNOSTIC  LAPAROSCOPY, REPAIR PERFORATED MARGINAL ULCER, UPPER ENDOSCOPY.  [facility] PATCH performed by Consuelo Barnett MD at [facility] LITHOTRIPSY Right 7/24/2019    CYSTOSCOPY RETROGRADE PYELOGRAM URETEROSCOPY J STENT LASER LITHOTRIPSY RIGHT performed by Vladimir ALMARAZ Memo, DO at [facility] LITHOTRIPSY      TN OFFICE/OUTPT VISIT,PROCEDURE ONLY N/A 10/29/2018    DIAGNOSTIC LAPAROSCOPY REPAIR PERFORATED VISCUS performed by Mary Ann MD at [address] [facility]  2017    STOMACH SURGERY      UPPER GASTROINTESTINAL ENDOSCOPY N/A 2019    EGD ESOPHAGOGASTRODUODENOSCOPY performed by Mary Ann MD at [address] [facility] 2020    EGD BIOPSY performed by Mary Ann MD at [facility] 2020    EGD DIAGNOSTIC ONLY performed by Meg Tillman MD at [address] [place] [address]     Current Medications:    Current Facility-Administered Medications: pantoprazole (PROTONIX) injection 40 mg, 40 mg, IntraVENous, Once  [START ON 3/10/2022] pantoprazole (PROTONIX) tablet 40 mg, 40 mg, Oral, QAM AC    Allergies:  Pcn [penicillins] and Food    Social History:    Tobacco:  Pt reports current use half pack per day  Alcohol:  Pt reports occasional 2-3 times yearly, cuts of wine or beer  Illicit Drugs: Pt reports no history of current use    Family History: Mother-, heart disease  Father-, colon cancer    REVIEW OF SYSTEMS:    Aside from what was mentioned in the PMH and HPI, essentially unremarkable, all others negative.     PHYSICAL EXAM:      Vitals:    /65   Pulse 76   Temp 98.7 °F (37.1 °C) (Oral)   Resp 16   Ht 5' 5\" (1.651 m)   Wt 175 lb (79.4 kg)   LMP 10/21/2019 (Exact Date)   SpO2 99%   BMI 29.12 kg/m²       CONSTITUTIONAL:  awake, alert, fatigued cooperative, no apparent distress, and appears stated age  EYES:  pupils equal, round and reactive to light, sclera anicteric and conjunctiva normal  ENT:  normocephalic, oral pharynx with dry mucous membranes  NECK:  supple   HEMATOLOGIC/LYMPHATICS:  no cervical lymphadenopathy and no supraclavicular lymphadenopathy  LUNGS:  No increased work of breathing, good air exchange, clear to auscultation bilaterally.   CARDIOVASCULAR:  Normal apical impulse, regular rate and rhythm, no murmur noted; 2+ pulses; trace edema  ABDOMEN:  normal bowel sounds, soft, non-distended, non-tender, no masses palpated, no hepatosplenomegally  MUSCULOSKELETAL:  full range of motion noted  motor strength is 5 out of 5 all extremities bilaterally  NEUROLOGIC:  Mental Status Exam:  Level of Alertness:   awake  Orientation:   person, place, time  Motor Exam:  Motor exam is symmetrical 5 out of 5 all extremities bilaterally  SKIN:  normal skin color, texture, turgor    DATA:    CBC with Differential:    Lab Results   Component Value Date    WBC 15.0 03/09/2022    RBC 4.52 03/09/2022    HGB 13.5 03/09/2022    HCT 42.0 03/09/2022     03/09/2022    MCV 92.9 03/09/2022    MCH 29.9 03/09/2022    MCHC 32.1 03/09/2022    RDW 23.7 03/09/2022    SEGSPCT 55 04/24/2011    LYMPHOPCT 2.6 03/09/2022    MONOPCT 3.7 03/09/2022    MYELOPCT 0.9 07/11/2019    BASOPCT 0.1 03/09/2022    MONOSABS 0.55 03/09/2022    LYMPHSABS 0.39 03/09/2022    EOSABS 0.05 03/09/2022    BASOSABS 0.02 03/09/2022     CMP:    Lab Results   Component Value Date     03/09/2022    K 4.3 03/09/2022     03/09/2022    CO2 25 03/09/2022    BUN 14 03/09/2022    CREATININE 0.7 03/09/2022    GFRAA >60 03/09/2022    LABGLOM >60 03/09/2022    GLUCOSE 118 03/09/2022    GLUCOSE 94 04/02/2012    PROT 5.9 03/09/2022    LABALBU 3.5 03/09/2022    LABALBU 3.9 04/02/2012    CALCIUM 8.2 03/09/2022    BILITOT 0.7 03/09/2022    ALKPHOS 175 03/09/2022     03/09/2022     03/09/2022     Hepatic Function Panel:    Lab Results   Component Value Date    ALKPHOS 175 03/09/2022     03/09/2022     03/09/2022    PROT 5.9 03/09/2022    BILITOT 0.7 03/09/2022    BILIDIR <0.2 06/23/2019    IBILI see below 06/23/2019    LABALBU 3.5 03/09/2022    LABALBU 3.9 04/02/2012     PT/INR:    Lab Results   Component Value Date    PROTIME 10.2 03/09/2022    INR 0.9 03/09/2022 PTT:    Lab Results   Component Value Date    APTT 26.3 03/09/2022   [APTT}  Last 3 Troponin:    Lab Results   Component Value Date    TROPONINI <0.01 06/27/2020    TROPONINI <0.01 06/08/2020    TROPONINI <0.01 12/05/2019     TSH:    Lab Results   Component Value Date    TSH 2.220 06/08/2020     VITAMIN B12:   Lab Results   Component Value Date    DASAQDJP86 544 11/03/2021     FOLATE:    Lab Results   Component Value Date    FOLATE 16.4 11/03/2021     IRON:    Lab Results   Component Value Date    IRON 24 12/10/2021     Iron Saturation:    Lab Results   Component Value Date    LABIRON 6 12/10/2021     TIBC:    Lab Results   Component Value Date    TIBC 377 12/10/2021     FERRITIN:    Lab Results   Component Value Date    FERRITIN 11 12/10/2021     HIV:  No results found for: HIV  VIRIDIANA:  No results found for: ANATITER, VIRIDIANA  No components found for: CHLPL    Lab Results   Component Value Date    TRIG 52 11/03/2021    TRIG 58 11/04/2020    TRIG 62 11/06/2019       Lab Results   Component Value Date    HDL 51.1 04/02/2012    HDL 42.8 04/26/2011       Lab Results   Component Value Date    LDLCALC 93 04/02/2012    [address] 04/26/2011     No results found for: LABVLDL     CT ABDOMEN PELVIS W IV CONTRAST Additional Contrast? Oral    Result Date: 3/9/2022  EXAMINATION: CT OF THE ABDOMEN AND PELVIS WITH CONTRAST 3/9/2022 6:13 am TECHNIQUE: CT of the abdomen and pelvis was performed with the administration of intravenous contrast. Multiplanar reformatted images are provided for review. Dose modulation, iterative reconstruction, and/or weight based adjustment of the mA/kV was utilized to reduce the radiation dose to as low as reasonably achievable.  COMPARISON: 03/10/2021 HISTORY: ORDERING SYSTEM PROVIDED HISTORY: abd pain/hx gastric bypass TECHNOLOGIST PROVIDED HISTORY: Additional Contrast?->Oral Reason for exam:->abd pain/hx gastric bypass Decision Support Exception - unselect if not a suspected or confirmed emergency medical condition->Emergency Medical Condition (MA) FINDINGS: Lower Chest:  Visualized portion of the lower chest demonstrates no acute abnormality. Organs: The liver, spleen, pancreas, adrenals, and right kidney are unremarkable. There is a small exophytic cyst arising from the inferior left renal pole. The gallbladder is surgically absent. There is mild intra and extrahepatic biliary ductal dilatation. GI/Bowel: Status post prior gastric bypass. There is no evidence of bowel obstruction. No evidence of abnormal bowel wall thickening or distension. There is a moderate amount of stool throughout the colon and rectum. Postsurgical changes in the region of the cecum, likely reflecting prior appendectomy. Pelvis: The urinary bladder is partially filled. The uterus is absent. Peritoneum/Retroperitoneum: No evidence of ascites or free air. Reactive appearing mesenteric lymph nodes noted. Aorta is normal in caliber without acute abnormality. Bones/Soft Tissues:  No acute abnormality of the visualized osseous structures. Anterior compression deformities of T12 and L1, unchanged. No acute abdominopelvic abnormality. Moderate stool throughout the colon and rectum, which could support clinical diagnosis of constipation. Additional findings as above. XR CHEST PORTABLE    Result Date: 3/9/2022  EXAMINATION: ONE XRAY VIEW OF THE CHEST 3/9/2022 5:59 am COMPARISON: 06/27/2020 HISTORY: ORDERING SYSTEM PROVIDED HISTORY: EPIGASTRIC PAIN TECHNOLOGIST PROVIDED HISTORY: Reason for exam:->EPIGASTRIC PAIN FINDINGS: The lungs are without acute focal process. There is no effusion or pneumothorax. The cardiomediastinal silhouette is without acute process. The osseous structures are without acute process. No acute process.      MRI ABDOMEN W WO CONTRAST MRCP    Result Date: 3/9/2022  EXAMINATION: MRI OF THE ABDOMEN WITH AND WITHOUT CONTRAST AND MRCP 3/9/2022 12:04 pm TECHNIQUE: Multiplanar multisequence MRI of the abdomen was performed with and without the administration of intravenous contrast.  After initial T2 axial and coronal images, thick slab, thin slab and 3D coronal MRCP sequences were obtained without the administration of intravenous contrast.  MIP images are provided for review. COMPARISON: Correlation is made with CT scan of the abdomen and pelvis performed earlier in the day. HISTORY: ORDERING SYSTEM PROVIDED HISTORY: elevated LFTS TECHNOLOGIST PROVIDED HISTORY: Reason for exam:->elevated LFTS FINDINGS: The lung bases are clear. The liver, spleen, pancreas, adrenal glands, and right kidney are unremarkable. An exophytic, small lower pole left renal cortical cyst is again identified. It measures 2.1 x 1.1 cm in size. Gallbladder: The patient is again status post cholecystectomy. Bile Ducts: Mild intrahepatic biliary duct dilatation is again identified. This can be seen in patients status post cholecystectomy. The CBD is again minimally dilated up to 0.6 cm and can be seen in patients status post cholecystectomy. Pancreatic Duct: Normal in caliber. Other:  The patient is again status post gastric bypass surgery. There is mild distension of multiple loops of visualized small bowel. Rule out ileus. A significant amount of retained stool is noted within the visualized colon. No retroperitoneal lymphadenopathy is identified. No free fluid is seen in the abdomen. No abdominal soft tissue mass is appreciated. The abdominal aorta is normal in caliber. The visualized osseous structures are unremarkable for the patient's age. 1.  Patient is status post cholecystectomy. 2.  Mild intrahepatic biliary duct dilatation and minimal CBD dilatation again seen, when compared to the prior CT study performed earlier in the day. This can be seen in patients status post cholecystectomy. 3.  Left renal cortical cyst again noted. 4.  Rule out small bowel ileus, as described above. NIH.LIVER. TOX  Doxycycline  Hepatotoxicity  Doxycycline has been associated with rare instances of hepatic injury, generally arising within 1 to 2 weeks of starting therapy, sometimes with a history of previous administration of the agent without injury. The pattern of injury ranges from hepatocellular to cholestatic and is probably most commonly mixed. The onset is often abrupt and can be accompanied by signs of hypersensitivity, such as fever, rash and eosinophilia (DRESS syndrome). Recovery is usually rapid and usually complete within 4 to 6 weeks. However, instances of severe and prolonged cholestatic liver injury have been reported with oral doxycycline. The autoimmune-like hepatitis that has been described with minocycline has not been linked to doxycycline, despite similarities in chemical structure and similar indications and uses, perhaps because it is used less frequency in a low dose, long term regimen. High dose intravenous doxycycline can cause acute fatty liver typical of that caused by intravenous tetracycline, particularly in susceptible patients such as pregnant women. This type of injury is, however, quite rare. Nevertheless, for these reasons, the duration and dose of parenteral doxycycline therapy should be minimized. Likelihood score: B (highly likely but rare cause of clinically apparent liver injury). IMPRESSION:  · Elevated LFTs; likely secondary to medication see NIH.liver. tox above, will r/o other possible etiologies  · Abdominal pain  · History of perforated marginal ulcer  · Diverticulosis  · History of colon polyp  · Family history of colon cancer     RECOMMENDATIONS:    · Ok for clear liquid diet  · Hepatitis panel as ordered  · Acetaminophen level ordered  · EGD Friday   · Protonix 40 MG daily   · Medicate for pain and nausea per PCP  · IVF per PCP  · Monitor CBC, CMP daily  · Supportive care  · Continue to monitor    Note: This report was completed utilizing computer voice recognition software. Every effort has been made to ensure accuracy, however; inadvertent computerized transcription errors may be present. Thank you very much for your consultation. We will follow closely with you. Discussed with Dr. Jerome Gale developed by Dr. Matt Townsend, JOHN-DEBRA-JHONATAN 3/9/2022 1:08 PM for Dr. Alexa Diaz. I HAD A FACE TO FACE ENCOUNTER WITH THE PATIENT. AGREE WITH THE EXAM, ASSESSMENT, AND PLAN AS OUTLINED ABOVE. ADDITION AND CORRECTIONS WERE DONE AS DEEMED APPROPRIATE. MY EXAM AND PLAN INCLUDE: ELEVATED LFT'S IS MULTIFACTORIAL MOSTLY SECONDARY TO DOXYCYCLINE. ABDOMINAL PAIN WITH SIGNIFICANT HISTORY OF ULCER/PERFERATION. EXTENSIVE SURGICAL HISTORY CAN RESULTS IN ADHESIONS CAN FACTOR INTO HER PAIN AS WELL. WILL CHECK HER EGD. NO INDICATION OF BILIARY DISEASE AT THIS TIME. WILL PLACE ON PPI AND FOLLOW.

## 2022-03-09 NOTE — CONSULTS
GENERAL SURGERY  CONSULT NOTE  3/9/2022    Physician Consulted: Dr. Eliezer Khan  Reason for Consult: abdominal pain  Referring Physician: Dr. Maximo Maloney is a 48 y.o. female who presents to the general surgery service for evaluation of abdominal pain. The patient reports cramping epigastric pain which began last night. She reports nausea. No fevers, chills, vomiting, or changes to her bowel habits. She has not found anything which makes the pain better or worse. She has history of multiple abdominal surgeries including appendectomy, cholecystectomy, Viviane-en-Y, and laparoscopic repair of perforated marginal ulcer x2. The patient states that this pain feels differently than her perforated ulcers. Last colonoscopy was in 2021 with Dr. Eliezer Khan, which showed a single polyp. Last EGD was in 2020 with Dr. Kwesi Fermin, which showed marginal ucer. The patient smokes cigarettes and drinks alcohol socially. Past Medical History:   Diagnosis Date    Anemia     Arthritis     Asthma     Depression     GERD without esophagitis     History of blood transfusion     History of gastric bypass     Hydronephrosis with urinary obstruction due to ureteral calculus     Hypertension     No meds following sustained weight loss after bariatric surgery.  Iron deficiency 08/01/2018    Localized osteoarthritis of left knee 01/20/2020    Lumbar spondylosis 01/26/2018    Lymphedema     Obesity     Panic attacks     Perforated marginal ulcer 10/29/2018    ~1 year after RnYGB    PONV (postoperative nausea and vomiting)     Sleep apnea, obstructive     Off PAP therapy BG following sustained weight loss after bariatric surgery.     Vitamin D deficiency 07/27/2018    Zinc deficiency 08/01/2018       Past Surgical History:   Procedure Laterality Date    APPENDECTOMY  1996    CHOLECYSTECTOMY, LAPAROSCOPIC N/A 7/27/2019    CHOLECYSTECTOMY LAPAROSCOPIC performed by Deniz Zuniga MD at [facility] COLONOSCOPY N/A 10/27/2021    COLONOSCOPY POLYPECTOMY SNARE/COLD BIOPSY performed by Veronica Jones MD at [address]  10/23/2019    HYSTERECTOMY  01/22/2020    [place]    LAPAROSCOPY N/A 6/27/2020    DIAGNOSTIC  LAPAROSCOPY, REPAIR PERFORATED MARGINAL ULCER, UPPER ENDOSCOPY. [facility] PATCH performed by Danitza Garner MD at [facility] [place] LITHOTRIPSY Right 7/24/2019    CYSTOSCOPY RETROGRADE PYELOGRAM URETEROSCOPY J STENT LASER LITHOTRIPSY RIGHT performed by Mely Chambers DO at [facility] [place] LITHOTRIPSY      NE OFFICE/OUTPT VISIT,PROCEDURE ONLY N/A 10/29/2018    DIAGNOSTIC LAPAROSCOPY REPAIR PERFORATED VISCUS performed by Anjali Olivier MD at [address]  11/14/2017    STOMACH SURGERY      UPPER GASTROINTESTINAL ENDOSCOPY N/A 7/27/2019    EGD ESOPHAGOGASTRODUODENOSCOPY performed by Anjali Olivier MD at [facility] N/A 2/7/2020    EGD BIOPSY performed by Anjali Olivier MD at [address] 6/27/2020    EGD DIAGNOSTIC ONLY performed by Danitza Garner MD at [address]       Medications Prior to Admission:    Prior to Admission medications    Medication Sig Start Date End Date Taking? Authorizing Provider   traMADol (ULTRAM) 50 MG tablet Take 1 tablet by mouth every 6 hours as needed for Pain for up to 10 days.  3/2/22 3/12/22  Rashmi Armstrong DO   omeprazole (PRILOSEC) 20 MG delayed release capsule Take 1 capsule by mouth Daily 1/20/22 1/20/23  Anjali Olivier MD   ondansetron (ZOFRAN-ODT) 4 MG disintegrating tablet dissolve 1 tablet ON TONGUE every 8 hours if needed for nausea OR vomiting 1/14/22   Ann Marie Eugene MD   albuterol sulfate  (90 Base) MCG/ACT inhaler inhale 2 puffs by mouth and INTO THE LUNGS every 6 hours if needed for wheezing 1/14/22   Ann Marie Eugene MD   sucralfate (CARAFATE) 1 GM tablet Take 1 tablet by mouth 2 times daily 1/14/22   Wallace Lopez MD   ferrous sulfate dried (SLOW RELEASE IRON) 160 (50 Fe) MG TBCR extended release tablet Take 160 mg by mouth daily 10/12/21   Suki Lester MD   buPROPion [facility]) 200 MG extended release tablet Take 1 tablet by mouth 2 times daily 8/18/21   Historical Provider, MD   acetaminophen (TYLENOL) 500 MG tablet Take 2 tablets by mouth every 6 hours as needed for Pain Maximum dose- 8 tablets/24 hours. 6/18/21   Suki Lester MD   sennosides-docusate sodium (SENOKOT-S) 8.6-50 MG tablet Take 1 tablet by mouth 2 times daily 6/29/20   Harshad Daley MD   Multiple Vitamins-Minerals (MULTIVITAMIN ADULT PO) Take by mouth daily     Historical Provider, MD   hydrOXYzine (VISTARIL) 50 MG capsule Take 1 capsule by mouth 3 times daily as needed for Anxiety 10/29/19   Efraín Oscar MD   ascorbic acid (V-R VITAMIN C) 250 MG tablet Take 1 tablet by mouth daily Take with the iron supplement 6/24/19   Ely Jones MD       Allergies   Allergen Reactions    Pcn [Penicillins] Anaphylaxis and Other (See Comments)     Patient unsure    Food Other (See Comments)     Berries - hives  Lactose Intolerance to milk-diarrhea  Soy-increases menses occurrences         Family History   Problem Relation Age of Onset    Heart Attack Mother 61        death from this    Diabetes Mother     Depression Mother     Diabetes Father     Stroke Father     Cancer Maternal Grandfather     Cancer Paternal Grandmother     Stroke Paternal Grandfather     Substance Abuse Brother     Heart Attack Brother        Social History     Tobacco Use    Smoking status: Current Every Day Smoker     Packs/day: 0.50     Years: 15.00     Pack years: 7.50     Types: Cigarettes     Start date: 10/29/1991    Smokeless tobacco: Never Used   Vaping Use    Vaping Use: Never used   Substance Use Topics    Alcohol use:  Yes     Alcohol/week: 6.0 standard drinks     Types: 6 Standard drinks or equivalent per week     Comment: social    Drug use: No         Review of Systems Constitutional: Positive for appetite change. Negative for chills and fever. Respiratory: Negative for cough and shortness of breath. Cardiovascular: Negative for chest pain and palpitations. Gastrointestinal: Positive for abdominal distention, abdominal pain and nausea. Negative for constipation, diarrhea and vomiting. Genitourinary: Negative for difficulty urinating and dysuria. Neurological: Negative for syncope and numbness. Psychiatric/Behavioral: Negative for agitation and behavioral problems. PHYSICAL EXAM:    Vitals:    03/09/22 0930   BP:    Pulse:    Resp:    Temp: 98.7 °F (37.1 °C)   SpO2:        General Appearance:  awake, alert, oriented, in no acute distress  Skin:  Skin color, texture, turgor normal. No rashes or lesions. Head:  NCAT. No scleral icterus or conjunctival pallor  Lungs/Chest:  Normal expansion. No chest wall tenderness  Cardiovascular:  Warm throughout. No chest pain  Abdomen:  Soft, mild epigastric tenderness, mildly distended. No guarding. No palpable masses. Well healed surgical scars present  Extremities: Extremities warm to touch with no edema. LABS:    CBC  Recent Labs     03/09/22  0509   WBC 15.0*   HGB 13.5   HCT 42.0        BMP  Recent Labs     03/09/22  0509      K 4.3      CO2 25   BUN 14   CREATININE 0.7   CALCIUM 8.2*     Liver Function  Recent Labs     03/09/22  0509   LIPASE 16   BILITOT 0.7   *   *   ALKPHOS 175*   PROT 5.9*   LABALBU 3.5     No results for input(s): LACTATE in the last 72 hours. Recent Labs     03/09/22  0509   INR 0.9       RADIOLOGY    CT ABDOMEN PELVIS W IV CONTRAST Additional Contrast? Oral    Result Date: 3/9/2022  EXAMINATION: CT OF THE ABDOMEN AND PELVIS WITH CONTRAST 3/9/2022 6:13 am TECHNIQUE: CT of the abdomen and pelvis was performed with the administration of intravenous contrast. Multiplanar reformatted images are provided for review.  Dose modulation, iterative reconstruction, and/or weight based adjustment of the mA/kV was utilized to reduce the radiation dose to as low as reasonably achievable. COMPARISON: 03/10/2021 HISTORY: ORDERING SYSTEM PROVIDED HISTORY: abd pain/hx gastric bypass TECHNOLOGIST PROVIDED HISTORY: Additional Contrast?->Oral Reason for exam:->abd pain/hx gastric bypass Decision Support Exception - unselect if not a suspected or confirmed emergency medical condition->Emergency Medical Condition (MA) FINDINGS: Lower Chest:  Visualized portion of the lower chest demonstrates no acute abnormality. Organs: The liver, spleen, pancreas, adrenals, and right kidney are unremarkable. There is a small exophytic cyst arising from the inferior left renal pole. The gallbladder is surgically absent. There is mild intra and extrahepatic biliary ductal dilatation. GI/Bowel: Status post prior gastric bypass. There is no evidence of bowel obstruction. No evidence of abnormal bowel wall thickening or distension. There is a moderate amount of stool throughout the colon and rectum. Postsurgical changes in the region of the cecum, likely reflecting prior appendectomy. Pelvis: The urinary bladder is partially filled. The uterus is absent. Peritoneum/Retroperitoneum: No evidence of ascites or free air. Reactive appearing mesenteric lymph nodes noted. Aorta is normal in caliber without acute abnormality. Bones/Soft Tissues:  No acute abnormality of the visualized osseous structures. Anterior compression deformities of T12 and L1, unchanged. No acute abdominopelvic abnormality. Moderate stool throughout the colon and rectum, which could support clinical diagnosis of constipation. Additional findings as above.      XR CHEST PORTABLE    Result Date: 3/9/2022  EXAMINATION: ONE XRAY VIEW OF THE CHEST 3/9/2022 5:59 am COMPARISON: 06/27/2020 HISTORY: ORDERING SYSTEM PROVIDED HISTORY: EPIGASTRIC PAIN TECHNOLOGIST PROVIDED HISTORY: Reason for exam:->EPIGASTRIC PAIN FINDINGS: The lungs are without acute focal process. There is no effusion or pneumothorax. The cardiomediastinal silhouette is without acute process. The osseous structures are without acute process. No acute process. MRI ABDOMEN W WO CONTRAST MRCP    Result Date: 3/9/2022  EXAMINATION: MRI OF THE ABDOMEN WITH AND WITHOUT CONTRAST AND MRCP 3/9/2022 12:04 pm TECHNIQUE: Multiplanar multisequence MRI of the abdomen was performed with and without the administration of intravenous contrast.  After initial T2 axial and coronal images, thick slab, thin slab and 3D coronal MRCP sequences were obtained without the administration of intravenous contrast.  MIP images are provided for review. COMPARISON: Correlation is made with CT scan of the abdomen and pelvis performed earlier in the day. HISTORY: ORDERING SYSTEM PROVIDED HISTORY: elevated LFTS TECHNOLOGIST PROVIDED HISTORY: Reason for exam:->elevated LFTS FINDINGS: The lung bases are clear. The liver, spleen, pancreas, adrenal glands, and right kidney are unremarkable. An exophytic, small lower pole left renal cortical cyst is again identified. It measures 2.1 x 1.1 cm in size. Gallbladder: The patient is again status post cholecystectomy. Bile Ducts: Mild intrahepatic biliary duct dilatation is again identified. This can be seen in patients status post cholecystectomy. The CBD is again minimally dilated up to 0.6 cm and can be seen in patients status post cholecystectomy. Pancreatic Duct: Normal in caliber. Other:  The patient is again status post gastric bypass surgery. There is mild distension of multiple loops of visualized small bowel. Rule out ileus. A significant amount of retained stool is noted within the visualized colon. No retroperitoneal lymphadenopathy is identified. No free fluid is seen in the abdomen. No abdominal soft tissue mass is appreciated. The abdominal aorta is normal in caliber. The visualized osseous structures are unremarkable for the patient's age. 1.  Patient is status post cholecystectomy. 2.  Mild intrahepatic biliary duct dilatation and minimal CBD dilatation again seen, when compared to the prior CT study performed earlier in the day. This can be seen in patients status post cholecystectomy. 3.  Left renal cortical cyst again noted. 4.  Rule out small bowel ileus, as described above. I have personally reviewed all relevant labs and imaging. ASSESSMENT:  48 y.o. female with epigastric pain and elevated LFT's. Abdomen soft.  History or Viviane-en-Y and repair of perforated marginal ulcer x2    PLAN:  - gastroenterology service planning for EGD 3/11  - bowel regimen  - hepatitis panel  - okay for clear liquid diet  - IVF  - pain, nausea control prn    Discussed with Dr. Vanessa Keating    Electronically signed by Bety Gonzalez DO on 3/9/22 at 1:37 PM EST

## 2022-03-09 NOTE — ED PROVIDER NOTES
HPI:  3/9/22,   Time: 4:54 AM EST Betty Bloch is a 48 y.o. female presenting to the ED for upper abd pain, beginning 4 hrs ago. The complaint has been persistent, moderate in severity, and worsened by nothing. Nothing makes better, denies hx same, feels crampy. Hx pud in past, gastric bypass. No n/v/d/cough/congestion/urinary sx/skin rash. No sob. Bib private vehicle    Review of Systems:   Pertinent positives and negatives are stated within HPI, all other systems reviewed and are negative.          --------------------------------------------- PAST HISTORY ---------------------------------------------  Past Medical History:  has a past medical history of Anemia, Arthritis, Asthma, Depression, GERD without esophagitis, History of blood transfusion, History of gastric bypass, Hydronephrosis with urinary obstruction due to ureteral calculus, Hypertension, Iron deficiency, Localized osteoarthritis of left knee, Lumbar spondylosis, Lymphedema, Obesity, Panic attacks, Perforated marginal ulcer, PONV (postoperative nausea and vomiting), Sleep apnea, obstructive, Vitamin D deficiency, and Zinc deficiency. Past Surgical History:  has a past surgical history that includes Appendectomy (1996); Viviane-en-Y Gastric Bypass (11/14/2017); pr office/outpt visit,procedure only (N/A, 10/29/2018); Stomach surgery; Lithotripsy (Right, 7/24/2019); Cholecystectomy, laparoscopic (N/A, 7/27/2019); Upper gastrointestinal endoscopy (N/A, 7/27/2019); Lithotripsy; Dilation and curettage of uterus (10/23/2019); Hysterectomy (01/22/2020); Upper gastrointestinal endoscopy (N/A, 2/7/2020); laparoscopy (N/A, 6/27/2020); Upper gastrointestinal endoscopy (N/A, 6/27/2020); and Colonoscopy (N/A, 10/27/2021). Social History:  reports that she has been smoking cigarettes. She started smoking about 30 years ago. She has a 7.50 pack-year smoking history.  She has never used smokeless tobacco. She reports current alcohol use of about 6.0 standard drinks of alcohol per week. She reports that she does not use drugs. Family History: family history includes Cancer in her maternal grandfather and paternal grandmother; Depression in her mother; Diabetes in her father and mother; Heart Attack in her brother; Heart Attack (age of onset: 61) in her mother; Stroke in her father and paternal grandfather; Substance Abuse in her brother. The patients home medications have been reviewed. Allergies: Pcn [penicillins], Food, Ibuprofen, Lactose intolerance (gi), and Soybean-containing drug products        ---------------------------------------------------PHYSICAL EXAM--------------------------------------    Constitutional/General: Alert and oriented x3, well appearing, non toxic in NAD  Head: Normocephalic and atraumatic  Eyes: PERRL, EOMI, conjunctive normal, sclera non icteric  Mouth: Oropharynx clear, handling secretions,   Neck: Supple, full ROM,   Respiratory:  Not in respiratory distress  Cardiovascular:   2+ distal pulses  Chest: No chest wall tenderness  GI:  Abdomen Soft, mild upper abd ttp, Non distended. +BS. No organomegaly, no palpable masses,  No rebound, guarding, or rigidity. Musculoskeletal: Moves all extremities x 4. Warm and well perfused, no clubbing, cyanosis, or edema. Capillary refill <3 seconds  Integument: skin warm and dry. No rashes. Lymphatic: no lymphadenopathy noted  Neurologic: GCS 15, no focal deficits, symmetric strength 5/5 in the upper and lower extremities bilaterally  Psychiatric: Normal Affect    -------------------------------------------------- RESULTS -------------------------------------------------  I have personally reviewed all laboratory and imaging results for this patient. Results are listed below.      LABS:  Results for orders placed or performed during the hospital encounter of 03/09/22   COVID-19, Rapid    Specimen: Nasopharyngeal Swab; Nasal   Result Value Ref Range    SARS-CoV-2, NAAT Not Yellow Straw/Yellow    Clarity, UA Clear Clear    Glucose, Ur Negative Negative mg/dL    Bilirubin Urine Negative Negative    Ketones, Urine Negative Negative mg/dL    Specific Gravity, UA 1.010 1.005 - 1.030    Blood, Urine Negative Negative    pH, UA 6.0 5.0 - 9.0    Protein, UA Negative Negative mg/dL    Urobilinogen, Urine 2.0 (A) <2.0 E.U./dL    Nitrite, Urine Negative Negative    Leukocyte Esterase, Urine Negative Negative    WBC, UA 0-1 0 - 5 /HPF    RBC, UA 0-1 0 - 2 /HPF    Epithelial Cells, UA RARE /HPF    Bacteria, UA NONE SEEN None Seen /HPF    Crystals, UA Rare (A) None Seen /HPF   Hepatitis Panel, Acute   Result Value Ref Range    Hep A IgM Non-Reactive Non-Reactive    Hep B Core Ab, IgM Non-Reactive Non-Reactive    Hep B S Ag Interp Non-Reactive Non-Reactive    Hep C Ab Interp Non-Reactive Non-Reactive   Acetaminophen Level   Result Value Ref Range    Acetaminophen Level <5.0 (L) 10.0 - 30.0 mcg/mL   CBC with Auto Differential   Result Value Ref Range    WBC 4.4 (L) 4.5 - 11.5 E9/L    RBC 3.68 3.50 - 5.50 E12/L    Hemoglobin 11.1 (L) 11.5 - 15.5 g/dL    Hematocrit 33.9 (L) 34.0 - 48.0 %    MCV 92.1 80.0 - 99.9 fL    MCH 30.2 26.0 - 35.0 pg    MCHC 32.7 32.0 - 34.5 %    RDW 23.6 (H) 11.5 - 15.0 fL    Platelets 723 181 - 101 E9/L    MPV 8.6 7.0 - 12.0 fL    Neutrophils % 51.7 43.0 - 80.0 %    Immature Granulocytes % 1.4 0.0 - 5.0 %    Lymphocytes % 30.9 20.0 - 42.0 %    Monocytes % 10.6 2.0 - 12.0 %    Eosinophils % 4.7 0.0 - 6.0 %    Basophils % 0.7 0.0 - 2.0 %    Neutrophils Absolute 2.30 1.80 - 7.30 E9/L    Immature Granulocytes # 0.06 E9/L    Lymphocytes Absolute 1.37 (L) 1.50 - 4.00 E9/L    Monocytes Absolute 0.47 0.10 - 0.95 E9/L    Eosinophils Absolute 0.21 0.05 - 0.50 E9/L    Basophils Absolute 0.03 0.00 - 0.20 E9/L    Anisocytosis 2+    Comprehensive Metabolic Panel w/ Reflex to MG   Result Value Ref Range    Potassium reflex Magnesium 4.1 3.5 - 5.0 mmol/L   Lipase   Result Value Ref Range Lipase 12 (L) 13 - 60 U/L   Mononucleosis screen   Result Value Ref Range    Mono Test Negative Negative   CBC with Auto Differential   Result Value Ref Range    WBC 4.6 4.5 - 11.5 E9/L    RBC 3.83 3.50 - 5.50 E12/L    Hemoglobin 11.3 (L) 11.5 - 15.5 g/dL    Hematocrit 36.4 34.0 - 48.0 %    MCV 95.0 80.0 - 99.9 fL    MCH 29.5 26.0 - 35.0 pg    MCHC 31.0 (L) 32.0 - 34.5 %    RDW 23.9 (H) 11.5 - 15.0 fL    Platelets 912 517 - 714 E9/L    MPV 8.7 7.0 - 12.0 fL    Neutrophils % 48.0 43.0 - 80.0 %    Lymphocytes % 41.0 20.0 - 42.0 %    Monocytes % 5.0 2.0 - 12.0 %    Eosinophils % 4.0 0.0 - 6.0 %    Basophils % 0.0 0.0 - 2.0 %    Neutrophils Absolute 2.21 1.80 - 7.30 E9/L    Lymphocytes Absolute 1.98 1.50 - 4.00 E9/L    Monocytes Absolute 0.23 0.10 - 0.95 E9/L    Eosinophils Absolute 0.18 0.05 - 0.50 E9/L    Basophils Absolute 0.00 0.00 - 0.20 E9/L    Atypical Lymphocytes Relative 2.0 0.0 - 4.0 %    Anisocytosis 3+     Polychromasia 1+     Poikilocytes 1+     Ovalocytes 1+    Comprehensive Metabolic Panel   Result Value Ref Range    Sodium 135 132 - 146 mmol/L    Potassium 4.1 3.5 - 5.0 mmol/L    Chloride 103 98 - 107 mmol/L    CO2 25 22 - 29 mmol/L    Anion Gap 7 7 - 16 mmol/L    Glucose 80 74 - 99 mg/dL    BUN 7 6 - 20 mg/dL    CREATININE 0.6 0.5 - 1.0 mg/dL    GFR Non-African American >60 >=60 mL/min/1.73    GFR African American >60     Calcium 8.2 (L) 8.6 - 10.2 mg/dL    Total Protein 5.1 (L) 6.4 - 8.3 g/dL    Albumin 3.1 (L) 3.5 - 5.2 g/dL    Total Bilirubin 0.2 0.0 - 1.2 mg/dL    Alkaline Phosphatase 255 (H) 35 - 104 U/L     (H) 0 - 32 U/L     (H) 0 - 31 U/L   Protime-INR   Result Value Ref Range    Protime 11.4 9.3 - 12.4 sec    INR 1.0    EKG 12 Lead   Result Value Ref Range    Ventricular Rate 67 BPM    Atrial Rate 67 BPM    P-R Interval 144 ms    QRS Duration 94 ms    Q-T Interval 416 ms    QTc Calculation (Bazett) 439 ms    P Axis 63 degrees    R Axis 61 degrees    T Axis 77 degrees RADIOLOGY:  Interpreted by Radiologist.  MRI ABDOMEN W WO CONTRAST MRCP   Final Result   1. Patient is status post cholecystectomy. 2.  Mild intrahepatic biliary duct dilatation and minimal CBD dilatation   again seen, when compared to the prior CT study performed earlier in the day. This can be seen in patients status post cholecystectomy. 3.  Left renal cortical cyst again noted. 4.  Rule out small bowel ileus, as described above. CT ABDOMEN PELVIS W IV CONTRAST Additional Contrast? Oral   Final Result   No acute abdominopelvic abnormality. Moderate stool throughout the colon and   rectum, which could support clinical diagnosis of constipation. Additional findings as above. XR CHEST PORTABLE   Final Result   No acute process. EKG:  This EKG is signed and interpreted by the EP. Time: 506  Rate: 70  Rhythm: Sinus  Interpretation: non-specific EKG  Comparison: None      ------------------------- NURSING NOTES AND VITALS REVIEWED ---------------------------   The nursing notes within the ED encounter and vital signs as below have been reviewed by myself. BP (!) 96/54   Pulse 52   Temp 97.9 °F (36.6 °C) (Oral)   Resp 15   Ht 5' 5\" (1.651 m)   Wt 184 lb (83.5 kg)   LMP 10/21/2019 (Exact Date)   SpO2 100%   BMI 30.62 kg/m²   Oxygen Saturation Interpretation: Normal    The patients available past medical records and past encounters were reviewed.         ------------------------------ ED COURSE/MEDICAL DECISION MAKING----------------------  Medications   pantoprazole (PROTONIX) tablet 40 mg (40 mg Oral Not Given 3/11/22 8483)   buPROPion [facility] SR) extended release tablet 200 mg (200 mg Oral Not Given 3/11/22 9638)   ferrous sulfate (IRON 325) tablet 162.5 mg (162.5 mg Oral Not Given 3/11/22 1524)   hydrOXYzine (VISTARIL) capsule 50 mg (50 mg Oral Given 3/9/22 8647)   sucralfate (CARAFATE) tablet 1 g (0 g Oral Held 3/11/22 0900)   sodium chloride flush 0.9 % injection 5-40 mL (10 mLs IntraVENous Given 3/11/22 0800)   sodium chloride flush 0.9 % injection 5-40 mL (10 mLs IntraVENous Given 3/9/22 1548)   0.9 % sodium chloride infusion (has no administration in time range)   enoxaparin (LOVENOX) injection 40 mg (0 mg SubCUTAneous Held 3/10/22 1436)   ondansetron (ZOFRAN-ODT) disintegrating tablet 4 mg ( Oral See Alternative 3/9/22 2142)     Or   ondansetron (ZOFRAN) injection 4 mg (4 mg IntraVENous Given 3/9/22 2142)   polyethylene glycol (GLYCOLAX) packet 17 g (has no administration in time range)   acetaminophen (TYLENOL) tablet 650 mg (650 mg Oral Given 3/9/22 1918)     Or   acetaminophen (TYLENOL) suppository 650 mg ( Rectal See Alternative 3/9/22 1918)   ketorolac (TORADOL) injection 30 mg (30 mg IntraVENous Given 3/9/22 2142)   albuterol (PROVENTIL) nebulizer solution 2.5 mg (has no administration in time range)   morphine (PF) injection 2 mg (2 mg IntraVENous Given 3/9/22 2309)   senna (SENOKOT) tablet 8.6 mg (8.6 mg Oral Given 3/10/22 2057)   docusate sodium (COLACE) capsule 100 mg (100 mg Oral Not Given 3/11/22 0759)   aluminum & magnesium hydroxide-simethicone (MAALOX) 30 mL, lidocaine viscous hcl (XYLOCAINE) 5 mL (GI COCKTAIL) ( Oral Not Given 3/11/22 0758)   aluminum & magnesium hydroxide-simethicone (MAALOX) 30 mL, lidocaine viscous hcl (XYLOCAINE) 5 mL (GI COCKTAIL) ( Oral Given 3/9/22 0515)   fentaNYL (SUBLIMAZE) injection 50 mcg (50 mcg IntraVENous Given 3/9/22 0516)   ondansetron (ZOFRAN) injection 4 mg (4 mg IntraVENous Given 3/9/22 0515)   iopamidol (ISOVUE-370) 76 % injection 75 mL (75 mLs IntraVENous Given 3/9/22 0611)   iohexol (OMNIPAQUE 240) injection 50 mL (50 mLs Oral Given 3/9/22 0611)   fentaNYL (SUBLIMAZE) injection 25 mcg (25 mcg IntraVENous Given 3/9/22 0933)   metoclopramide (REGLAN) injection 10 mg (10 mg IntraVENous Given 3/9/22 0933)   diphenhydrAMINE (BENADRYL) injection 25 mg (25 mg IntraVENous Given 3/9/22 0932) gadoteridol (PROHANCE) injection 16 mL (16 mLs IntraVENous Given 3/9/22 6758)   pantoprazole (PROTONIX) injection 40 mg (40 mg IntraVENous Given 3/9/22 1342)         ED COURSE:       Medical Decision Making:    Pt sign out pending ct for further eval      This patient's ED course included: a personal history and physicial examination    This patient has remained hemodynamically stable during their ED course. Counseling: The emergency provider has spoken with the patient and discussed todays results, in addition to providing specific details for the plan of care and counseling regarding the diagnosis and prognosis. Questions are answered at this time and they are agreeable with the plan.       --------------------------------- IMPRESSION AND DISPOSITION ---------------------------------    IMPRESSION  1. Generalized abdominal pain    2. Elevated LFTs        DISPOSITION  Disposition: Other Disposition: sign out  Patient condition is fair    NOTE: This report was transcribed using voice recognition software.  Every effort was made to ensure accuracy; however, inadvertent computerized transcription errors may be present        Ana Vivar MD  03/11/22 6737

## 2022-03-10 ENCOUNTER — TELEPHONE (OUTPATIENT)
Dept: PHYSICAL MEDICINE AND REHAB | Age: 47
End: 2022-03-10

## 2022-03-10 ENCOUNTER — TELEPHONE (OUTPATIENT)
Dept: ADMINISTRATIVE | Age: 47
End: 2022-03-10

## 2022-03-10 ENCOUNTER — ANESTHESIA EVENT (OUTPATIENT)
Dept: ENDOSCOPY | Age: 47
DRG: 392 | End: 2022-03-10
Payer: MEDICARE

## 2022-03-10 LAB
ANISOCYTOSIS: ABNORMAL
BASOPHILS ABSOLUTE: 0.03 E9/L (ref 0–0.2)
BASOPHILS RELATIVE PERCENT: 0.7 % (ref 0–2)
EKG ATRIAL RATE: 67 BPM
EKG P AXIS: 63 DEGREES
EKG P-R INTERVAL: 144 MS
EKG Q-T INTERVAL: 416 MS
EKG QRS DURATION: 94 MS
EKG QTC CALCULATION (BAZETT): 439 MS
EKG R AXIS: 61 DEGREES
EKG T AXIS: 77 DEGREES
EKG VENTRICULAR RATE: 67 BPM
EOSINOPHILS ABSOLUTE: 0.21 E9/L (ref 0.05–0.5)
EOSINOPHILS RELATIVE PERCENT: 4.7 % (ref 0–6)
HAV IGM SER IA-ACNC: NORMAL
HCT VFR BLD CALC: 33.9 % (ref 34–48)
HEMOGLOBIN: 11.1 G/DL (ref 11.5–15.5)
HEPATITIS B CORE IGM ANTIBODY: NORMAL
HEPATITIS B SURFACE ANTIGEN INTERPRETATION: NORMAL
HEPATITIS C ANTIBODY INTERPRETATION: NORMAL
IMMATURE GRANULOCYTES #: 0.06 E9/L
IMMATURE GRANULOCYTES %: 1.4 % (ref 0–5)
LIPASE: 12 U/L (ref 13–60)
LYMPHOCYTES ABSOLUTE: 1.37 E9/L (ref 1.5–4)
LYMPHOCYTES RELATIVE PERCENT: 30.9 % (ref 20–42)
MCH RBC QN AUTO: 30.2 PG (ref 26–35)
MCHC RBC AUTO-ENTMCNC: 32.7 % (ref 32–34.5)
MCV RBC AUTO: 92.1 FL (ref 80–99.9)
MONO TEST: NEGATIVE
MONOCYTES ABSOLUTE: 0.47 E9/L (ref 0.1–0.95)
MONOCYTES RELATIVE PERCENT: 10.6 % (ref 2–12)
NEUTROPHILS ABSOLUTE: 2.3 E9/L (ref 1.8–7.3)
NEUTROPHILS RELATIVE PERCENT: 51.7 % (ref 43–80)
PDW BLD-RTO: 23.6 FL (ref 11.5–15)
PLATELET # BLD: 298 E9/L (ref 130–450)
PMV BLD AUTO: 8.6 FL (ref 7–12)
POTASSIUM REFLEX MAGNESIUM: 4.1 MMOL/L (ref 3.5–5)
RBC # BLD: 3.68 E12/L (ref 3.5–5.5)
SARS-COV-2, NAAT: NOT DETECTED
WBC # BLD: 4.4 E9/L (ref 4.5–11.5)

## 2022-03-10 PROCEDURE — 99232 SBSQ HOSP IP/OBS MODERATE 35: CPT | Performed by: INTERNAL MEDICINE

## 2022-03-10 PROCEDURE — 36415 COLL VENOUS BLD VENIPUNCTURE: CPT

## 2022-03-10 PROCEDURE — 99232 SBSQ HOSP IP/OBS MODERATE 35: CPT | Performed by: SURGERY

## 2022-03-10 PROCEDURE — 6370000000 HC RX 637 (ALT 250 FOR IP): Performed by: SURGERY

## 2022-03-10 PROCEDURE — 6370000000 HC RX 637 (ALT 250 FOR IP): Performed by: INTERNAL MEDICINE

## 2022-03-10 PROCEDURE — G0378 HOSPITAL OBSERVATION PER HR: HCPCS

## 2022-03-10 PROCEDURE — 6370000000 HC RX 637 (ALT 250 FOR IP): Performed by: NURSE PRACTITIONER

## 2022-03-10 PROCEDURE — 86308 HETEROPHILE ANTIBODY SCREEN: CPT

## 2022-03-10 PROCEDURE — 2580000003 HC RX 258: Performed by: INTERNAL MEDICINE

## 2022-03-10 PROCEDURE — 85025 COMPLETE CBC W/AUTO DIFF WBC: CPT

## 2022-03-10 PROCEDURE — 80053 COMPREHEN METABOLIC PANEL: CPT

## 2022-03-10 PROCEDURE — 1200000000 HC SEMI PRIVATE

## 2022-03-10 PROCEDURE — 93010 ELECTROCARDIOGRAM REPORT: CPT | Performed by: INTERNAL MEDICINE

## 2022-03-10 PROCEDURE — 87635 SARS-COV-2 COVID-19 AMP PRB: CPT

## 2022-03-10 PROCEDURE — 83690 ASSAY OF LIPASE: CPT

## 2022-03-10 RX ORDER — DOCUSATE SODIUM 100 MG/1
100 CAPSULE, LIQUID FILLED ORAL 2 TIMES DAILY
Status: DISCONTINUED | OUTPATIENT
Start: 2022-03-10 | End: 2022-03-11 | Stop reason: HOSPADM

## 2022-03-10 RX ORDER — SENNA PLUS 8.6 MG/1
1 TABLET ORAL NIGHTLY
Status: DISCONTINUED | OUTPATIENT
Start: 2022-03-10 | End: 2022-03-11 | Stop reason: HOSPADM

## 2022-03-10 RX ADMIN — LIDOCAINE HYDROCHLORIDE: 20 SOLUTION ORAL; TOPICAL at 16:37

## 2022-03-10 RX ADMIN — LIDOCAINE HYDROCHLORIDE: 20 SOLUTION ORAL; TOPICAL at 20:57

## 2022-03-10 RX ADMIN — BUPROPION HYDROCHLORIDE 200 MG: 100 TABLET, EXTENDED RELEASE ORAL at 20:57

## 2022-03-10 RX ADMIN — BUPROPION HYDROCHLORIDE 200 MG: 100 TABLET, EXTENDED RELEASE ORAL at 09:00

## 2022-03-10 RX ADMIN — DOCUSATE SODIUM 100 MG: 100 CAPSULE, LIQUID FILLED ORAL at 20:57

## 2022-03-10 RX ADMIN — SODIUM CHLORIDE, PRESERVATIVE FREE 10 ML: 5 INJECTION INTRAVENOUS at 21:04

## 2022-03-10 RX ADMIN — DOCUSATE SODIUM 100 MG: 100 CAPSULE, LIQUID FILLED ORAL at 11:56

## 2022-03-10 RX ADMIN — SENNOSIDES 8.6 MG: 8.6 TABLET, COATED ORAL at 20:57

## 2022-03-10 RX ADMIN — SODIUM CHLORIDE, PRESERVATIVE FREE 10 ML: 5 INJECTION INTRAVENOUS at 09:01

## 2022-03-10 RX ADMIN — PANTOPRAZOLE SODIUM 40 MG: 40 TABLET, DELAYED RELEASE ORAL at 06:29

## 2022-03-10 ASSESSMENT — LIFESTYLE VARIABLES: SMOKING_STATUS: 1

## 2022-03-10 NOTE — PROGRESS NOTES
[facility] Progress Note    Admitting Date and Time: 3/9/2022  4:43 AM  Admit Dx: Epigastric pain [R10.13]  Abdominal pain [R10.9]  Generalized abdominal pain [R10.84]  Elevated LFTs [R79.89]    Subjective:  Patient is being followed for Epigastric pain [R10.13]  Abdominal pain [R10.9]  Generalized abdominal pain [R10.84]  Elevated LFTs [R79.89]     Patient seen and examined 3/10/2022. Denies epigastric pain. Plan is for EGD 3/11/2022. Denies nausea vomiting. Tolerating clears.      senna  1 tablet Oral Nightly    docusate sodium  100 mg Oral BID    GI cocktail   Oral TID    pantoprazole  40 mg Oral QAM AC    buPROPion  200 mg Oral BID    ferrous sulfate  162.5 mg Oral Daily    sucralfate  1 g Oral BID    sodium chloride flush  5-40 mL IntraVENous 2 times per day    enoxaparin  40 mg SubCUTAneous Q24H     hydrOXYzine, 50 mg, TID PRN  sodium chloride flush, 5-40 mL, PRN  sodium chloride, 25 mL, PRN  ondansetron, 4 mg, Q8H PRN   Or  ondansetron, 4 mg, Q6H PRN  polyethylene glycol, 17 g, Daily PRN  acetaminophen, 650 mg, Q6H PRN   Or  acetaminophen, 650 mg, Q6H PRN  ketorolac, 30 mg, Q6H PRN  albuterol, 2.5 mg, Q6H PRN  morphine, 2 mg, Q4H PRN         Objective:    BP (!) 90/53   Pulse 63   Temp 96.6 °F (35.9 °C) (Axillary)   Resp 14   Ht 5' 5\" (1.651 m)   Wt 183 lb (83 kg)   LMP 10/21/2019 (Exact Date)   SpO2 100%   BMI 30.45 kg/m²     General Appearance: alert and oriented to person, place and time and in no acute distress  Pulmonary/Chest: clear to auscultation bilaterally-  Cardiovascular: normal rate, normal S1 and S2   Abdomen: soft, non-tender, non-distended, normal bowel sounds  Extremities: no cyanosis, no clubbing and no edema  Neurologic: no cranial nerve deficit and speech normal    Recent Labs     03/09/22  0509 03/10/22  0239     --    K 4.3 4.1     --    CO2 25  --    BUN 14  --    CREATININE 0.7  --    GLUCOSE 118*  --    CALCIUM 8.2*  -- Recent Labs     03/09/22  0509 03/10/22  0239   WBC 15.0* 4.4*   RBC 4.52 3.68   HGB 13.5 11.1*   HCT 42.0 33.9*   MCV 92.9 92.1   MCH 29.9 30.2   MCHC 32.1 32.7   RDW 23.7* 23.6*    298   MPV 8.4 8.6       Assessment:    Principal Problem:  Abdominal pain  Active Problems:  Elevated LFTs    Plan:  1. General surgery following. Plans for EGD 3/11/2022. Continue iron supplementation, PPI, Carafate. Maintenance IV hydration. NOTE: This report was transcribed using voice recognition software. Every effort was made to ensure accuracy; however, inadvertent computerized transcription errors may be present.   Electronically signed by Thor Teixeira MD on 3/10/2022 at 12:28 PM

## 2022-03-10 NOTE — PROGRESS NOTES
GENERAL SURGERY  DAILY PROGRESS NOTE  3/10/2022    Subjective:  No new complaints or overnight events. Patient reports continued abdominal soreness, which has slightly improved since yesterday. She is passing gas and moving her bowels. Tolerating clear liquids. Gastroenterology service is planning for EGD tommorow    Objective:  BP (!) 92/58   Pulse 64   Temp 97.6 °F (36.4 °C) (Oral)   Resp 14   Ht 5' 5\" (1.651 m)   Wt 183 lb (83 kg)   LMP 10/21/2019 (Exact Date)   SpO2 98%   BMI 30.45 kg/m²     GENERAL:  No acute distress. Alert and interactive. Oriented x3. LUNGS:  Symmetric chest rise, no audible wheezes  CARDIOVASC:  Warm throughout, no chest pain. ABDOMEN:  Soft, non distended, mild epigastric tender. No guarding / rigidity / rebound. EXTREMITIES: Moves all extremities. No edema. I have personally reviewed all relevant labs and imaging. Assessment/Plan:  48 y.o. female with elevated LFTs        Electronically signed by Gutierrez Azul DO on 3/10/2022 at 5:40 AM     Attending Physician Statement:    Chief Complaint:   Chief Complaint   Patient presents with    Abdominal Pain     started 2 hours ago       I have examined the patient and performed the key aspects of physical exam, reviewed the record (including all pertinent and new radiology images and laboratory findings), and discussed the case with the surgical team.  I agree with the assessment and plan with the following additions, corrections, and changes. 14pt review of symptoms completed and negative except as mentioned. Feels about the same. Continued epigastric ttp. MRCP unrevealing for cause of transaminitis. Will order COVID and mono screen, add GI cocktail, but otherwise will defer further to GI. Cont bowel regimen. Will be available if any surgical needs arise. Urvashi Barakat MD  03/10/22  12:00 PM    NOTE: This report, in part or full, may have been transcribed using voice recognition software.  Every effort was made to ensure accuracy; however, inadvertent computerized transcription errors may be present. Please excuse any transcriptional grammatical or spelling errors that may have escaped my editorial review.

## 2022-03-10 NOTE — CARE COORDINATION
Introduced my self and provided explanation of CM role to patient. Patient is awake, alert, and aware of current diagnosis and treatment plan including EGD 3/11/2022. Patient voices she resides at home alone adding that she completes her adl's with independence. She does not use any type of assistive device or adaptive equipment. She plans on a return to home and voices she has a support system of friends and family if needed. She did drive to facility and her car is still here. Patient is established with a pcp and denies any issue with retail pharmaceutical coverage. She denies any post discharge needs at this time. Explained ELOS of 24 hours; patient voiced understanding and agreement. Will follow along with  and assist with discharge planning as necessary. Allyson Klein.  Ariel, MSN, RN  [facility] Case Management  [phone]

## 2022-03-10 NOTE — TELEPHONE ENCOUNTER
Called and left message for follow up phone call post right knee injection, will await call back from patient.

## 2022-03-10 NOTE — PROGRESS NOTES
PROGRESS NOTE        Patient Presents with/Seen in Consultation For      *elevated LFT's  CHIEF COMPLAINT:  Abdominal pain  Subjective:     Patient seen Joana Jaquez in bed reports continued upper abdominal pain radiating across entire abdomen 7/10. No nausea or vomiting, tolerating clears. BM last evening no melena or hematochezia. Review of Systems  Aside from what was mentioned in the PMH and HPI, essentially unremarkable, all others negative. Objective:     BP (!) 90/53   Pulse 63   Temp 96.6 °F (35.9 °C) (Axillary)   Resp 14   Ht 5' 5\" (1.651 m)   Wt 183 lb (83 kg)   LMP 10/21/2019 (Exact Date)   SpO2 100%   BMI 30.45 kg/m²     General appearance: alert, awake, laying in bed, and cooperative  Eyes: conjunctiva pale, sclera anicteric. PERRL.   Lungs: clear to auscultation bilaterally  Heart: regular rate and rhythm, no murmur, 2+ pulses; trace edema  Abdomen: soft, tender to palpation without guarding or rebound; bowel sounds normal; no masses,  no organomegaly  Extremities: extremities trace edema  Pulses: 2+ and symmetric  Skin: Skin color, texture, turgor normal.   Neurologic: Grossly normal    senna (SENOKOT) tablet 8.6 mg, Nightly  docusate sodium (COLACE) capsule 100 mg, BID  pantoprazole (PROTONIX) tablet 40 mg, QAM AC  buPROPion (WELLBUTRIN SR) extended release tablet 200 mg, BID  ferrous sulfate (IRON 325) tablet 162.5 mg, Daily  hydrOXYzine (VISTARIL) capsule 50 mg, TID PRN  sucralfate (CARAFATE) tablet 1 g, BID  sodium chloride flush 0.9 % injection 5-40 mL, 2 times per day  sodium chloride flush 0.9 % injection 5-40 mL, PRN  0.9 % sodium chloride infusion, PRN  enoxaparin (LOVENOX) injection 40 mg, Q24H  ondansetron (ZOFRAN-ODT) disintegrating tablet 4 mg, Q8H PRN   Or  ondansetron (ZOFRAN) injection 4 mg, Q6H PRN  polyethylene glycol (GLYCOLAX) packet 17 g, Daily PRN  acetaminophen (TYLENOL) tablet 650 mg, Q6H PRN   Or  acetaminophen (TYLENOL) suppository 650 mg, Q6H PRN  ketorolac (TORADOL) injection 30 mg, Q6H PRN  albuterol (PROVENTIL) nebulizer solution 2.5 mg, Q6H PRN  morphine (PF) injection 2 mg, Q4H PRN         Data Review  CBC:   Lab Results   Component Value Date    WBC 4.4 03/10/2022    RBC 3.68 03/10/2022    HGB 11.1 03/10/2022    HCT 33.9 03/10/2022    MCV 92.1 03/10/2022    MCH 30.2 03/10/2022    MCHC 32.7 03/10/2022    RDW 23.6 03/10/2022     03/10/2022    MPV 8.6 03/10/2022     CMP:    Lab Results   Component Value Date     03/10/2022    K 4.1 03/10/2022    K 4.1 03/10/2022     03/10/2022    CO2 24 03/10/2022    BUN 14 03/10/2022    CREATININE 0.7 03/10/2022    GFRAA >60 03/10/2022    LABGLOM >60 03/10/2022    GLUCOSE 170 03/10/2022    GLUCOSE 94 04/02/2012    PROT 5.2 03/10/2022    LABALBU 3.2 03/10/2022    LABALBU 3.9 04/02/2012    CALCIUM 7.9 03/10/2022    BILITOT 1.2 03/10/2022    ALKPHOS 300 03/10/2022     03/10/2022     03/10/2022     Hepatic Function Panel:    Lab Results   Component Value Date    ALKPHOS 300 03/10/2022     03/10/2022     03/10/2022    PROT 5.2 03/10/2022    BILITOT 1.2 03/10/2022    BILIDIR <0.2 06/23/2019    IBILI see below 06/23/2019    LABALBU 3.2 03/10/2022    LABALBU 3.9 04/02/2012     No components found for: CHLPL  Lab Results   Component Value Date    TRIG 52 11/03/2021    TRIG 58 11/04/2020    TRIG 62 11/06/2019     Lab Results   Component Value Date    HDL 51.1 04/02/2012    HDL 42.8 04/26/2011     Lab Results   Component Value Date    LDLCALC 93 04/02/2012    LDLCALC 78 04/26/2011     No results found for: LABVLDL   PT/INR:    Lab Results   Component Value Date    PROTIME 10.2 03/09/2022    INR 0.9 03/09/2022     IRON:    Lab Results   Component Value Date    IRON 24 12/10/2021     Iron Saturation:  No components found for: PERCENTFE  FERRITIN:    Lab Results   Component Value Date    FERRITIN 11 12/10/2021         Assessment:     Active Problems:  ? Elevated LFTs; likely secondary to medication see NIH.liver. tox above, will r/o other possible etiologies  ? Abdominal pain  ? History of perforated marginal ulcer  ? Diverticulosis  ? History of colon polyp  ? Family history of colon cancer    Plan:   ? Hepatitis panel pending   ? EGD tomorrow with Dr. Lio Ramirez. Procedure details for EGD discussed in detail. Complications including but not limited to, perforation, bleeding and infection were discussed in great detail. Risks, benefits, and alternatives explained. Pt has understood the information and has agreed to proceed. See orders  ? NPO after midnight   ? Protonix 40 MG daily   ? Clear diet   ? Medicate for pain and nausea per PCP  ? IVF per PCP  ? Monitor CBC, CMP daily  ? Lipase added on   ? INR tomorrow am  ? Supportive care  ? Continue to monitor      Note: This report was completed utilizing computer voice recognition software. Every effort has been made to ensure accuracy, however; inadvertent computerized transcription errors may be present.      Discussed with Dr. Murtaza uAgustine per Dr. Lissette Mitchell APRN-NP-C 3/10/2022  9:45 AM For Dr. Lio Ramirez

## 2022-03-10 NOTE — PATIENT CARE CONFERENCE
P Quality Flow/Interdisciplinary Rounds Progress Note        Quality Flow Rounds held on March 10, 2022    Disciplines Attending:  Bedside Nurse, ,  and Nursing Unit [address] was admitted on 3/9/2022  4:43 AM    Anticipated Discharge Date:  Expected Discharge Date: 03/25/22    Disposition:    Perico Score:  Perico Scale Score: 22    Readmission Risk              Risk of Unplanned Readmission:  9           Discussed patient goal for the day, patient clinical progression, and barriers to discharge.   The following Goal(s) of the Day/Commitment(s) have been identified:  Diagnostics - Report Results      Polo Ortiz RN  March 10, 2022

## 2022-03-11 ENCOUNTER — ANESTHESIA (OUTPATIENT)
Dept: ENDOSCOPY | Age: 47
DRG: 392 | End: 2022-03-11
Payer: MEDICARE

## 2022-03-11 VITALS
SYSTOLIC BLOOD PRESSURE: 113 MMHG | HEART RATE: 51 BPM | RESPIRATION RATE: 16 BRPM | BODY MASS INDEX: 30.66 KG/M2 | TEMPERATURE: 98 F | HEIGHT: 65 IN | WEIGHT: 184 LBS | OXYGEN SATURATION: 100 % | DIASTOLIC BLOOD PRESSURE: 64 MMHG

## 2022-03-11 VITALS
OXYGEN SATURATION: 100 % | RESPIRATION RATE: 14 BRPM | SYSTOLIC BLOOD PRESSURE: 90 MMHG | DIASTOLIC BLOOD PRESSURE: 51 MMHG

## 2022-03-11 LAB
ALBUMIN SERPL-MCNC: 3.1 G/DL (ref 3.5–5.2)
ALP BLD-CCNC: 255 U/L (ref 35–104)
ALT SERPL-CCNC: 318 U/L (ref 0–32)
ANION GAP SERPL CALCULATED.3IONS-SCNC: 7 MMOL/L (ref 7–16)
ANISOCYTOSIS: ABNORMAL
AST SERPL-CCNC: 181 U/L (ref 0–31)
ATYPICAL LYMPHOCYTE RELATIVE PERCENT: 2 % (ref 0–4)
BASOPHILS ABSOLUTE: 0 E9/L (ref 0–0.2)
BASOPHILS RELATIVE PERCENT: 0 % (ref 0–2)
BILIRUB SERPL-MCNC: 0.2 MG/DL (ref 0–1.2)
BUN BLDV-MCNC: 7 MG/DL (ref 6–20)
CALCIUM SERPL-MCNC: 8.2 MG/DL (ref 8.6–10.2)
CHLORIDE BLD-SCNC: 103 MMOL/L (ref 98–107)
CO2: 25 MMOL/L (ref 22–29)
CREAT SERPL-MCNC: 0.6 MG/DL (ref 0.5–1)
EOSINOPHILS ABSOLUTE: 0.18 E9/L (ref 0.05–0.5)
EOSINOPHILS RELATIVE PERCENT: 4 % (ref 0–6)
GFR AFRICAN AMERICAN: >60
GFR NON-AFRICAN AMERICAN: >60 ML/MIN/1.73
GLUCOSE BLD-MCNC: 80 MG/DL (ref 74–99)
HCT VFR BLD CALC: 36.4 % (ref 34–48)
HEMOGLOBIN: 11.3 G/DL (ref 11.5–15.5)
INR BLD: 1
LYMPHOCYTES ABSOLUTE: 1.98 E9/L (ref 1.5–4)
LYMPHOCYTES RELATIVE PERCENT: 41 % (ref 20–42)
MCH RBC QN AUTO: 29.5 PG (ref 26–35)
MCHC RBC AUTO-ENTMCNC: 31 % (ref 32–34.5)
MCV RBC AUTO: 95 FL (ref 80–99.9)
MONOCYTES ABSOLUTE: 0.23 E9/L (ref 0.1–0.95)
MONOCYTES RELATIVE PERCENT: 5 % (ref 2–12)
NEUTROPHILS ABSOLUTE: 2.21 E9/L (ref 1.8–7.3)
NEUTROPHILS RELATIVE PERCENT: 48 % (ref 43–80)
OVALOCYTES: ABNORMAL
PDW BLD-RTO: 23.9 FL (ref 11.5–15)
PLATELET # BLD: 329 E9/L (ref 130–450)
PMV BLD AUTO: 8.7 FL (ref 7–12)
POIKILOCYTES: ABNORMAL
POLYCHROMASIA: ABNORMAL
POTASSIUM SERPL-SCNC: 4.1 MMOL/L (ref 3.5–5)
PROTHROMBIN TIME: 11.4 SEC (ref 9.3–12.4)
RBC # BLD: 3.83 E12/L (ref 3.5–5.5)
SODIUM BLD-SCNC: 135 MMOL/L (ref 132–146)
TOTAL PROTEIN: 5.1 G/DL (ref 6.4–8.3)
WBC # BLD: 4.6 E9/L (ref 4.5–11.5)

## 2022-03-11 PROCEDURE — 6360000002 HC RX W HCPCS: Performed by: NURSE ANESTHETIST, CERTIFIED REGISTERED

## 2022-03-11 PROCEDURE — 2580000003 HC RX 258: Performed by: INTERNAL MEDICINE

## 2022-03-11 PROCEDURE — 2709999900 HC NON-CHARGEABLE SUPPLY: Performed by: INTERNAL MEDICINE

## 2022-03-11 PROCEDURE — 0DB68ZX EXCISION OF STOMACH, VIA NATURAL OR ARTIFICIAL OPENING ENDOSCOPIC, DIAGNOSTIC: ICD-10-PCS | Performed by: INTERNAL MEDICINE

## 2022-03-11 PROCEDURE — 7100000010 HC PHASE II RECOVERY - FIRST 15 MIN: Performed by: INTERNAL MEDICINE

## 2022-03-11 PROCEDURE — 80053 COMPREHEN METABOLIC PANEL: CPT

## 2022-03-11 PROCEDURE — 85025 COMPLETE CBC W/AUTO DIFF WBC: CPT

## 2022-03-11 PROCEDURE — 3609012400 HC EGD TRANSORAL BIOPSY SINGLE/MULTIPLE: Performed by: INTERNAL MEDICINE

## 2022-03-11 PROCEDURE — 7100000011 HC PHASE II RECOVERY - ADDTL 15 MIN: Performed by: INTERNAL MEDICINE

## 2022-03-11 PROCEDURE — 99239 HOSP IP/OBS DSCHRG MGMT >30: CPT | Performed by: INTERNAL MEDICINE

## 2022-03-11 PROCEDURE — 85610 PROTHROMBIN TIME: CPT

## 2022-03-11 PROCEDURE — 88305 TISSUE EXAM BY PATHOLOGIST: CPT

## 2022-03-11 PROCEDURE — 87081 CULTURE SCREEN ONLY: CPT

## 2022-03-11 PROCEDURE — 0DB98ZX EXCISION OF DUODENUM, VIA NATURAL OR ARTIFICIAL OPENING ENDOSCOPIC, DIAGNOSTIC: ICD-10-PCS | Performed by: INTERNAL MEDICINE

## 2022-03-11 PROCEDURE — 36415 COLL VENOUS BLD VENIPUNCTURE: CPT

## 2022-03-11 PROCEDURE — G0378 HOSPITAL OBSERVATION PER HR: HCPCS

## 2022-03-11 PROCEDURE — 3700000000 HC ANESTHESIA ATTENDED CARE: Performed by: INTERNAL MEDICINE

## 2022-03-11 PROCEDURE — 99232 SBSQ HOSP IP/OBS MODERATE 35: CPT | Performed by: INTERNAL MEDICINE

## 2022-03-11 PROCEDURE — 3700000001 HC ADD 15 MINUTES (ANESTHESIA): Performed by: INTERNAL MEDICINE

## 2022-03-11 RX ORDER — PANTOPRAZOLE SODIUM 40 MG/1
40 TABLET, DELAYED RELEASE ORAL
Qty: 30 TABLET | Refills: 0 | Status: SHIPPED | OUTPATIENT
Start: 2022-03-12 | End: 2022-07-08 | Stop reason: ALTCHOICE

## 2022-03-11 RX ORDER — LIDOCAINE HYDROCHLORIDE 20 MG/ML
INJECTION, SOLUTION INTRAVENOUS PRN
Status: DISCONTINUED | OUTPATIENT
Start: 2022-03-11 | End: 2022-03-11 | Stop reason: SDUPTHER

## 2022-03-11 RX ORDER — PROPOFOL 10 MG/ML
INJECTION, EMULSION INTRAVENOUS PRN
Status: DISCONTINUED | OUTPATIENT
Start: 2022-03-11 | End: 2022-03-11 | Stop reason: SDUPTHER

## 2022-03-11 RX ADMIN — PROPOFOL 225 MG: 10 INJECTION, EMULSION INTRAVENOUS at 14:30

## 2022-03-11 RX ADMIN — SODIUM CHLORIDE, PRESERVATIVE FREE 10 ML: 5 INJECTION INTRAVENOUS at 08:00

## 2022-03-11 RX ADMIN — LIDOCAINE HYDROCHLORIDE 50 MG: 20 INJECTION, SOLUTION INTRAVENOUS at 14:30

## 2022-03-11 ASSESSMENT — PAIN SCALES - GENERAL
PAINLEVEL_OUTOF10: 0

## 2022-03-11 NOTE — DISCHARGE SUMMARY
[address] Physician Discharge Summary       No follow-up provider specified. Activity level: As tolerated     Dispo: Home    Condition on discharge: Stable     Patient ID:  Dilia Lazaro  [id number]  47 y.o.  1975    Admit date: 3/9/2022    Discharge date and time:  3/11/2022  5:44 PM    Admission Diagnoses: Principal Problem:    Abdominal pain  Active Problems:    Elevated LFTs  Resolved Problems:    * No resolved hospital problems. *      Discharge Diagnoses: Principal Problem:  Abdominal pain secondary to gastritis    Consults:  IP CONSULT TO GENERAL SURGERY  IP CONSULT TO GI  IP CONSULT TO IV TEAM    Procedures: EGD    Hospital Course: This is a pleasant 49-year-old female who presented to [facility], [place] chief complaint abdominal pain. Pain was in the epigastric region therefore a gastroenterology consultation was obtained. Patient did undergo EGD 3/11/2022. Postprocedure diagnosis gastritis. Biopsy obtained. Patient will follow up with gastroenterology as outpatient for results of biopsy. Patient will be maintained on PPI and sucralfate. Patient has resolved abdominal pain, tolerating p.o. intake and is in no distress. Patient has requested to go home which appears appropriate. For further information please refer to entire medical record. It was a pleasure to take care of this patient. Discharge Exam:    Documented on progress note    I/O last 3 completed shifts: In: 130 [P.O.:120; I.V.:10]  Out: -   No intake/output data recorded.       LABS:  Recent Labs     03/09/22  0509 03/10/22  0239 03/11/22  0412     --  135   K 4.3 4.1 4.1     --  103   CO2 25  --  25   BUN 14  --  7   CREATININE 0.7  --  0.6   GLUCOSE 118*  --  80   CALCIUM 8.2*  --  8.2*       Recent Labs     03/09/22  0509 03/10/22  0239 03/11/22  0412   WBC 15.0* 4.4* 4.6   RBC 4.52 3.68 3.83   HGB 13.5 11.1* 11.3*   HCT 42.0 33.9* 36.4   MCV 92.9 92.1 95.0   MCH 29.9 30.2 29.5   MCHC 32.1 32.7 31.0*   RDW 23.7* 23.6* 23.9*    298 329   MPV 8.4 8.6 8.7       No results for input(s): POCGLU in the last 72 hours. Imaging:  CT ABDOMEN PELVIS W IV CONTRAST Additional Contrast? Oral    Result Date: 3/9/2022  EXAMINATION: CT OF THE ABDOMEN AND PELVIS WITH CONTRAST 3/9/2022 6:13 am TECHNIQUE: CT of the abdomen and pelvis was performed with the administration of intravenous contrast. Multiplanar reformatted images are provided for review. Dose modulation, iterative reconstruction, and/or weight based adjustment of the mA/kV was utilized to reduce the radiation dose to as low as reasonably achievable. COMPARISON: 03/10/2021 HISTORY: ORDERING SYSTEM PROVIDED HISTORY: abd pain/hx gastric bypass TECHNOLOGIST PROVIDED HISTORY: Additional Contrast?->Oral Reason for exam:->abd pain/hx gastric bypass Decision Support Exception - unselect if not a suspected or confirmed emergency medical condition->Emergency Medical Condition (MA) FINDINGS: Lower Chest:  Visualized portion of the lower chest demonstrates no acute abnormality. Organs: The liver, spleen, pancreas, adrenals, and right kidney are unremarkable. There is a small exophytic cyst arising from the inferior left renal pole. The gallbladder is surgically absent. There is mild intra and extrahepatic biliary ductal dilatation. GI/Bowel: Status post prior gastric bypass. There is no evidence of bowel obstruction. No evidence of abnormal bowel wall thickening or distension. There is a moderate amount of stool throughout the colon and rectum. Postsurgical changes in the region of the cecum, likely reflecting prior appendectomy. Pelvis: The urinary bladder is partially filled. The uterus is absent. Peritoneum/Retroperitoneum: No evidence of ascites or free air. Reactive appearing mesenteric lymph nodes noted. Aorta is normal in caliber without acute abnormality.  Bones/Soft Tissues:  No acute abnormality of the visualized osseous structures. Anterior compression deformities of T12 and L1, unchanged. No acute abdominopelvic abnormality. Moderate stool throughout the colon and rectum, which could support clinical diagnosis of constipation. Additional findings as above. XR CHEST PORTABLE    Result Date: 3/9/2022  EXAMINATION: ONE XRAY VIEW OF THE CHEST 3/9/2022 5:59 am COMPARISON: 06/27/2020 HISTORY: ORDERING SYSTEM PROVIDED HISTORY: EPIGASTRIC PAIN TECHNOLOGIST PROVIDED HISTORY: Reason for exam:->EPIGASTRIC PAIN FINDINGS: The lungs are without acute focal process. There is no effusion or pneumothorax. The cardiomediastinal silhouette is without acute process. The osseous structures are without acute process. No acute process. MRI ABDOMEN W WO CONTRAST MRCP    Result Date: 3/9/2022  EXAMINATION: MRI OF THE ABDOMEN WITH AND WITHOUT CONTRAST AND MRCP 3/9/2022 12:04 pm TECHNIQUE: Multiplanar multisequence MRI of the abdomen was performed with and without the administration of intravenous contrast.  After initial T2 axial and coronal images, thick slab, thin slab and 3D coronal MRCP sequences were obtained without the administration of intravenous contrast.  MIP images are provided for review. COMPARISON: Correlation is made with CT scan of the abdomen and pelvis performed earlier in the day. HISTORY: ORDERING SYSTEM PROVIDED HISTORY: elevated LFTS TECHNOLOGIST PROVIDED HISTORY: Reason for exam:->elevated LFTS FINDINGS: The lung bases are clear. The liver, spleen, pancreas, adrenal glands, and right kidney are unremarkable. An exophytic, small lower pole left renal cortical cyst is again identified. It measures 2.1 x 1.1 cm in size. Gallbladder: The patient is again status post cholecystectomy. Bile Ducts: Mild intrahepatic biliary duct dilatation is again identified. This can be seen in patients status post cholecystectomy.   The CBD is again minimally dilated up to 0.6 cm and can be seen in patients status post cholecystectomy. Pancreatic Duct: Normal in caliber. Other:  The patient is again status post gastric bypass surgery. There is mild distension of multiple loops of visualized small bowel. Rule out ileus. A significant amount of retained stool is noted within the visualized colon. No retroperitoneal lymphadenopathy is identified. No free fluid is seen in the abdomen. No abdominal soft tissue mass is appreciated. The abdominal aorta is normal in caliber. The visualized osseous structures are unremarkable for the patient's age. 1.  Patient is status post cholecystectomy. 2.  Mild intrahepatic biliary duct dilatation and minimal CBD dilatation again seen, when compared to the prior CT study performed earlier in the day. This can be seen in patients status post cholecystectomy. 3.  Left renal cortical cyst again noted. 4.  Rule out small bowel ileus, as described above. Patient Instructions:      Medication List      START taking these medications    pantoprazole 40 MG tablet  Commonly known as: PROTONIX  Take 1 tablet by mouth every morning (before breakfast)  Start taking on: March 12, 2022        CHANGE how you take these medications    ondansetron 4 MG disintegrating tablet  Commonly known as: ZOFRAN-ODT  What changed: Another medication with the same name was removed. Continue taking this medication, and follow the directions you see here. Ventolin  (90 Base) MCG/ACT inhaler  Generic drug: albuterol sulfate HFA  What changed: Another medication with the same name was removed. Continue taking this medication, and follow the directions you see here. CONTINUE taking these medications    acetaminophen 500 MG tablet  Commonly known as: TYLENOL  Take 2 tablets by mouth every 6 hours as needed for Pain Maximum dose- 8 tablets/24 hours.      buPROPion 200 MG extended release tablet  Commonly known as: WELLBUTRIN SR     hydrOXYzine 50 MG capsule  Commonly known as: VISTARIL  Take 1 capsule by mouth 3 times daily as needed for Anxiety     Iron Slow Release 142 (45 Fe) MG extended release tablet  Generic drug: ferrous sulfate     MVI (BARIATRIC ADVANTAGE MULTI-FORMULA) CHEW TAB     sennosides-docusate sodium 8.6-50 MG tablet  Commonly known as: SENOKOT-S     sucralfate 1 GM tablet  Commonly known as: CARAFATE     traMADol 50 MG tablet  Commonly known as: Ultram  Take 1 tablet by mouth every 6 hours as needed for Pain for up to 10 days.      vitamin C 250 MG tablet        STOP taking these medications    omeprazole 20 MG delayed release capsule  Commonly known as: PRILOSEC           Where to Get Your Medications      These medications were sent to [address] [place] [address], [address] [place] [id number]    Phone: [phone]   · pantoprazole 40 MG tablet           Note that more than 30 minutes was spent in preparing discharge papers, discussing discharge with patient, medication review, etc.    Signed:  Electronically signed by Luane Goltz, MD on 3/11/2022 at 5:44 PM

## 2022-03-11 NOTE — BRIEF OP NOTE
Brief Postoperative Note    Mae Vivar  YOB: 1975  [id number]    Procedure: EGD with biopsy    Anesthesia: [facility]    Surgeon:  Nelson Leyva MD    Findings:       Esophagus:  GERD      Stomach:  Gastritis bx done to rule out H. Pylori.  S/P GASTRIC BYPASS      Duodenum:  Normal    Bx. done to rule out sprue       Complications: None      Estimated blood loss: none      Zully Doe MD

## 2022-03-11 NOTE — PROGRESS NOTES
[facility] Progress Note    Admitting Date and Time: 3/9/2022  4:43 AM  Admit Dx: Epigastric pain [R10.13]  Abdominal pain [R10.9]  Generalized abdominal pain [R10.84]  Elevated LFTs [R79.89]    Subjective:  Patient is being followed for Epigastric pain [R10.13]  Abdominal pain [R10.9]  Generalized abdominal pain [R10.84]  Elevated LFTs [R79.89]     Patient seen and examined 3/11/2022. No acute events overnight. Plan for EGD. Denies abdominal pain. Denies nausea or vomiting.      senna  1 tablet Oral Nightly    docusate sodium  100 mg Oral BID    GI cocktail   Oral TID    pantoprazole  40 mg Oral QAM AC    buPROPion  200 mg Oral BID    ferrous sulfate  162.5 mg Oral Daily    sucralfate  1 g Oral BID    sodium chloride flush  5-40 mL IntraVENous 2 times per day    enoxaparin  40 mg SubCUTAneous Q24H     hydrOXYzine, 50 mg, TID PRN  sodium chloride flush, 5-40 mL, PRN  sodium chloride, 25 mL, PRN  ondansetron, 4 mg, Q8H PRN   Or  ondansetron, 4 mg, Q6H PRN  polyethylene glycol, 17 g, Daily PRN  acetaminophen, 650 mg, Q6H PRN   Or  acetaminophen, 650 mg, Q6H PRN  ketorolac, 30 mg, Q6H PRN  albuterol, 2.5 mg, Q6H PRN  morphine, 2 mg, Q4H PRN         Objective:    BP (!) 96/54   Pulse 52   Temp 97.9 °F (36.6 °C) (Oral)   Resp 15   Ht 5' 5\" (1.651 m)   Wt 184 lb (83.5 kg)   LMP 10/21/2019 (Exact Date)   SpO2 100%   BMI 30.62 kg/m²     General Appearance: alert and oriented to person, place and time and in no acute distress  Pulmonary/Chest: clear to auscultation bilaterally-  Cardiovascular: normal rate, normal S1 and S2   Abdomen: soft, non-tender, non-distended, normal bowel sounds  Extremities: no cyanosis, no clubbing and no edema  Neurologic: no cranial nerve deficit and speech normal    Recent Labs     03/09/22  0509 03/10/22  0239 03/11/22  0412     --  135   K 4.3 4.1 4.1     --  103   CO2 25  --  25   BUN 14  --  7   CREATININE 0.7  --  0.6   GLUCOSE 118*  -- 80   CALCIUM 8.2*  --  8.2*       Recent Labs     03/09/22  0509 03/10/22  0239 03/11/22  0412   WBC 15.0* 4.4* 4.6   RBC 4.52 3.68 3.83   HGB 13.5 11.1* 11.3*   HCT 42.0 33.9* 36.4   MCV 92.9 92.1 95.0   MCH 29.9 30.2 29.5   MCHC 32.1 32.7 31.0*   RDW 23.7* 23.6* 23.9*    298 329   MPV 8.4 8.6 8.7       Assessment:    Principal Problem:  Abdominal pain  Active Problems:  Elevated LFTs    Plan:  1. Plan for EGD. Hemoglobin has trended down 2 g. Continue PPI, Carafate, IV hydration. Gastroenterology following. Continue iron supplementation. Disposition; follow-up post EGD. NOTE: This report was transcribed using voice recognition software. Every effort was made to ensure accuracy; however, inadvertent computerized transcription errors may be present.   Electronically signed by Isaura Griffith MD on 3/11/2022 at 1:07 PM

## 2022-03-11 NOTE — PATIENT CARE CONFERENCE
P Quality Flow/Interdisciplinary Rounds Progress Note        Quality Flow Rounds held on March 11, 2022    Disciplines Attending:  Bedside Nurse, ,  and Nursing Unit [address] was admitted on 3/9/2022  4:43 AM    Anticipated Discharge Date:  Expected Discharge Date: 03/25/22    Disposition:    Perico Score:  Perico Scale Score: 22    Readmission Risk              Risk of Unplanned Readmission:  10           Discussed patient goal for the day, patient clinical progression, and barriers to discharge.   The following Goal(s) of the Day/Commitment(s) have been identified:  Diagnostics - Report Results- EGD      Etienne Bishop RN  March 11, 2022

## 2022-03-11 NOTE — PROGRESS NOTES
Patient reports hysterectomy 2020, no urine pregnancy needed for pre-op.  Electronically signed by Glenys Vasquez RN on 3/11/2022 at 11:35 AM

## 2022-03-11 NOTE — ANESTHESIA PRE PROCEDURE
Department of Anesthesiology  Preprocedure Note       Name:  Shalom Malhotra   Age:  48 y.o.  :  1975                                          MRN:  [id number]         Date:  3/10/2022      Surgeon: Lorie Syed):  Manuel Mcgee MD    Procedure: EGD ESOPHAGOGASTRODUODENOSCOPY (N/A )    Medications prior to admission:   Prior to Admission medications    Medication Sig Start Date End Date Taking? Authorizing Provider   albuterol sulfate HFA (VENTOLIN HFA) 108 (90 Base) MCG/ACT inhaler Inhale 2 puffs into the lungs every 6 hours as needed for Wheezing    Historical Provider, MD   Ascorbic Acid (VITAMIN C) 250 MG tablet Take 250 mg by mouth every other day    Historical Provider, MD   ferrous sulfate (IRON SLOW RELEASE) 142 (45 Fe) MG extended release tablet Take 142 mg by mouth every other day     Historical Provider, MD   Multiple Vitamin (MVI, BARIATRIC ADVANTAGE MULTI-FORMULA, CHEW TAB) Take 1 tablet by mouth every other day    Historical Provider, MD   ondansetron (ZOFRAN-ODT) 4 MG disintegrating tablet Place 4 mg under the tongue every 8 hours as needed for Nausea or Vomiting    Historical Provider, MD   sennosides-docusate sodium (SENOKOT-S) 8.6-50 MG tablet Take 1 tablet by mouth 2 times daily as needed for Constipation    Historical Provider, MD   sucralfate (CARAFATE) 1 GM tablet Take 1 g by mouth Daily with lunch    Historical Provider, MD   traMADol (ULTRAM) 50 MG tablet Take 1 tablet by mouth every 6 hours as needed for Pain for up to 10 days. 3/2/22 3/12/22  Alin Huston DO   omeprazole (PRILOSEC) 20 MG delayed release capsule Take 1 capsule by mouth Daily 22  Claudia Cesar MD   buPROPion [facility] SR) 200 MG extended release tablet Take 200 mg by mouth 2 times daily  21   Historical Provider, MD   acetaminophen (TYLENOL) 500 MG tablet Take 2 tablets by mouth every 6 hours as needed for Pain Maximum dose- 8 tablets/24 hours.  21   Ann Marie Heard MD   hydrOXYzine (VISTARIL) 50 MG capsule Take 1 capsule by mouth 3 times daily as needed for Anxiety 10/29/19   Wendy Wang MD       Current medications:    No current facility-administered medications for this visit. No current outpatient medications on file.      Facility-Administered Medications Ordered in Other Visits   Medication Dose Route Frequency Provider Last Rate Last Admin    senna (SENOKOT) tablet 8.6 mg  1 tablet Oral Nightly Shannan Young MD   8.6 mg at 03/10/22 2057    docusate sodium (COLACE) capsule 100 mg  100 mg Oral BID Shannan Young MD   100 mg at 03/10/22 2057    aluminum & magnesium hydroxide-simethicone (MAALOX) 30 mL, lidocaine viscous hcl (XYLOCAINE) 5 mL (GI COCKTAIL)   Oral TID Nathalie Nails MD   Given at 03/10/22 2057    pantoprazole (PROTONIX) tablet 40 mg  40 mg Oral QAM AC JOHN Sterling - CNP   40 mg at 03/10/22 2944    buPROPion [facility]) extended release tablet 200 mg  200 mg Oral BID Niraj Hall MD   200 mg at 03/10/22 2057    ferrous sulfate (IRON 325) tablet 162.5 mg  162.5 mg Oral Daily Niraj Hall MD        hydrOXYzine (VISTARIL) capsule 50 mg  50 mg Oral TID PRN Niraj Hall MD   50 mg at 03/09/22 2309    sucralfate (CARAFATE) tablet 1 g  1 g Oral BID Niraj Hall MD   1 g at 03/09/22 1918    sodium chloride flush 0.9 % injection 5-40 mL  5-40 mL IntraVENous 2 times per day Niraj Hall MD   10 mL at 03/10/22 2104    sodium chloride flush 0.9 % injection 5-40 mL  5-40 mL IntraVENous PRN Niraj Hall MD   10 mL at 03/09/22 1548    0.9 % sodium chloride infusion  25 mL IntraVENous PRN Niraj Hall MD        enoxaparin (LOVENOX) injection 40 mg  40 mg SubCUTAneous Q24H Niraj Hall MD        ondansetron (ZOFRAN-ODT) disintegrating tablet 4 mg  4 mg Oral Q8H PRN Niraj Hall MD        Or    ondansetron (ZOFRAN) injection 4 mg  4 mg IntraVENous Q6H PRN Niraj Hall MD   4 mg at 03/09/22 6802    polyethylene glycol (GLYCOLAX) packet 17 g  17 g Oral Daily PRN Kisha Kinney MD        acetaminophen (TYLENOL) tablet 650 mg  650 mg Oral Q6H PRN Niraj Hall MD   650 mg at 03/09/22 1918    Or    acetaminophen (TYLENOL) suppository 650 mg  650 mg Rectal Q6H PRN Niraj Hall MD        ketorolac (TORADOL) injection 30 mg  30 mg IntraVENous Q6H PRN Niraj Hall MD   30 mg at 03/09/22 2142    albuterol (PROVENTIL) nebulizer solution 2.5 mg  2.5 mg Nebulization Q6H PRN Niraj Hall MD        morphine (PF) injection 2 mg  2 mg IntraVENous Q4H PRN JOHN Moody - CNP   2 mg at 03/09/22 2309       Allergies: Allergies   Allergen Reactions    Pcn [Penicillins] Anaphylaxis     CHILDHOOD ALLERGY    Food Hives     ALLERGY IS TO BERRIES    Ibuprofen Other (See Comments)     PATIENT HAS BARIATRIC SX    Lactose Intolerance (Gi) Diarrhea    Soybean-Containing Drug Products Other (See Comments)     INCREASES MENSES OCCURENCES        Problem List:    Patient Active Problem List   Diagnosis Code    Obesity E66.9    Tobacco abuse Z72.0    Panic attacks F41.0    AYALA on CPAP G47.33, Z99.89    Morbid obesity ([address] E66.01    Mild intermittent asthma without complication Q34.14    GERD without esophagitis K21.9    S/P gastric bypass Z98.84    Primary osteoarthritis of right knee M17.11    Vitamin D deficiency E55.9    Zinc deficiency E60    Iron deficiency E61.1    Thrombocytosis D75.839    Microcytic anemia D50.9    Red blood cell antibody positive R76.8    Epigastric pain R10.13    Anxiety and depression F41.9, F32. A    Localized osteoarthritis of left knee M17.12    Chronic pain syndrome G89.4    Chronic bilateral low back pain without sciatica M54.50, G89.29    Lumbosacral spondylosis without myelopathy M47.817    Other dysphagia R13.19    Perforated abdominal viscus R19.8    Chronic abdominal pain R10.9, G89.29    Abdominal pain of multiple sites R10.9    Lymphedema I89.0    Abdominal pain R10.9    Elevated LFTs R79.89       Past Medical History:        Diagnosis Date    Anemia     Arthritis     Asthma     Depression     GERD without esophagitis     History of blood transfusion     History of gastric bypass     Hydronephrosis with urinary obstruction due to ureteral calculus     Hypertension     No meds following sustained weight loss after bariatric surgery.  Iron deficiency 08/01/2018    Localized osteoarthritis of left knee 01/20/2020    Lumbar spondylosis 01/26/2018    Lymphedema     Obesity     Panic attacks     Perforated marginal ulcer 10/29/2018    ~1 year after RnYGB    PONV (postoperative nausea and vomiting)     Sleep apnea, obstructive     Off PAP therapy BG following sustained weight loss after bariatric surgery.  Vitamin D deficiency 07/27/2018    Zinc deficiency 08/01/2018       Past Surgical History:        Procedure Laterality Date    APPENDECTOMY  1996    CHOLECYSTECTOMY, LAPAROSCOPIC N/A 7/27/2019    CHOLECYSTECTOMY LAPAROSCOPIC performed by Dalila Guerrero MD at [facility] COLONOSCOPY N/A 10/27/2021    COLONOSCOPY POLYPECTOMY SNARE/COLD BIOPSY performed by Mar Pastor MD at [place] [address]  10/23/2019    HYSTERECTOMY  01/22/2020    [facility]    LAPAROSCOPY N/A 6/27/2020    DIAGNOSTIC  LAPAROSCOPY, REPAIR PERFORATED MARGINAL ULCER, UPPER ENDOSCOPY.  [facility] PATCH performed by Real Gallardo MD at [facility] LITHOTRIPSY Right 7/24/2019    CYSTOSCOPY RETROGRADE PYELOGRAM URETEROSCOPY J STENT LASER LITHOTRIPSY RIGHT performed by Shasta Chambers DO at [facility] LITHOTRIPSY      HI OFFICE/OUTPT VISIT,PROCEDURE ONLY N/A 10/29/2018    DIAGNOSTIC LAPAROSCOPY REPAIR PERFORATED VISCUS performed by Dalila Guerrero MD at [address] [place] [address]  11/14/2017    STOMACH SURGERY      UPPER GASTROINTESTINAL ENDOSCOPY N/A 7/27/2019    EGD ESOPHAGOGASTRODUODENOSCOPY performed by Dalila Guerrero MD at [place] [address] N/A 2/7/2020 EGD BIOPSY performed by Birgit Lowery MD at [address] [facility] N/A 6/27/2020    EGD DIAGNOSTIC ONLY performed by Luciana Pardo MD at [address] [place] [address] History:    Social History     Tobacco Use    Smoking status: Current Every Day Smoker     Packs/day: 0.50     Years: 15.00     Pack years: 7.50     Types: Cigarettes     Start date: 10/29/1991    Smokeless tobacco: Never Used   Substance Use Topics    Alcohol use: Yes     Alcohol/week: 6.0 standard drinks     Types: 6 Standard drinks or equivalent per week     Comment: social                                Ready to quit: Not Answered  Counseling given: Not Answered      Vital Signs (Current): There were no vitals filed for this visit. BP Readings from Last 3 Encounters:   03/10/22 (!) 90/50   03/02/22 105/74   02/28/22 99/64       NPO Status:                                                                                 BMI:   Wt Readings from Last 3 Encounters:   03/10/22 183 lb (83 kg)   03/02/22 178 lb (80.7 kg)   02/24/22 172 lb 9.6 oz (78.3 kg)     There is no height or weight on file to calculate BMI.    CBC:   Lab Results   Component Value Date    WBC 4.4 03/10/2022    RBC 3.68 03/10/2022    HGB 11.1 03/10/2022    HCT 33.9 03/10/2022    MCV 92.1 03/10/2022    RDW 23.6 03/10/2022     03/10/2022       CMP:   Lab Results   Component Value Date     03/09/2022    K 4.1 03/10/2022     03/09/2022    CO2 25 03/09/2022    BUN 14 03/09/2022    CREATININE 0.7 03/09/2022    GFRAA >60 03/09/2022    LABGLOM >60 03/09/2022    GLUCOSE 118 03/09/2022    GLUCOSE 94 04/02/2012    PROT 5.9 03/09/2022    CALCIUM 8.2 03/09/2022    BILITOT 0.7 03/09/2022    ALKPHOS 175 03/09/2022     03/09/2022     03/09/2022       POC Tests:   No results for input(s): POCGLU, POCNA, POCK, POCCL, POCBUN, POCHEMO, POCHCT in the last 72 hours.     Coags:   Lab Results   Component Value Date    PROTIME 10.2 03/09/2022    INR 0.9 03/09/2022    APTT 26.3 03/09/2022       HCG (If Applicable):   Lab Results   Component Value Date    PREGTESTUR NEGATIVE 07/27/2019        ABGs: No results found for: PHART, PO2ART, OMY3SWV, ZQD0DQX, BEART, D7EOZHLG     Type & Screen (If Applicable):  No results found for: LABABO, LABRH     EKG 12/05/2019    Sinus bradycardia  Otherwise normal ECG  When compared with ECG of 06-NOV-2019 07:01,  No significant change was found  Confirmed by Jasmyn Morrison ([phone]) on 12/6/2019 6:47:14 AM      Anesthesia Evaluation  Patient summary reviewed and Nursing notes reviewed   history of anesthetic complications: PONV. Airway: Mallampati: II  TM distance: >3 FB   Neck ROM: full  Mouth opening: > = 3 FB Dental:    (+) caps      Pulmonary: breath sounds clear to auscultation  (+) sleep apnea (Off PAP therapy BG following sustained weight loss after bariatric surgery):  asthma: allergic asthma, current smoker          Patient did not smoke on day of surgery. ROS comment: Hx: uses inhaler and nebulizers PRN, off CPAP since gastric bypass surgery and weight loss   Cardiovascular:    (+) hypertension:,       ECG reviewed  Rhythm: regular  Rate: normal  Echocardiogram reviewed         Beta Blocker:  Not on Beta Blocker         Neuro/Psych:   (+) psychiatric history: stable with treatmentdepression/anxiety              ROS comment: Hx: Mechanical low back pain  Lumbar spondylosis  Chronic pain syndrome  Chronic bilateral low back pain without sciatica  Myalgia  Lumbosacral spondylosis without myelopathy     GI/Hepatic/Renal:   (+) GERD: poorly controlled, PUD, renal disease (Hx: kidney stone removed 07/2019): kidney stones,          ROS comment: Hx: S/P gastric bypass.    Endo/Other:    (+) blood dyscrasia (HGB 10.4): anemia, arthritis: OA., .                  ROS comment: Hx: Endometrial polyp  Vitamin D deficiency    Zinc deficiency  Abdominal:             Vascular: negative vascular ROS. Other Findings:               Anesthesia Plan      MAC     ASA 3       Induction: intravenous. Anesthetic plan and risks discussed with patient. Emelyn Mansfield MD   3/10/2022        (Final assessment and plan per day of surgery team.)    DOS STAFF ADDENDUM:    Patient seen and examined, physical exam updated as needed, chart reviewed. NPO status confirmed. Anesthetic options and risks discussed with patient/legal guardian. Patient/legal guardian verbalized understanding and agrees to proceed.      Harry Oconnor MD  Staff Anesthesiologist  March 11, 2022  1:48 PM

## 2022-03-11 NOTE — ANESTHESIA POSTPROCEDURE EVALUATION
Department of Anesthesiology  Postprocedure Note    Patient: Terrance Schwartz  MRN: [id number]  [place]: 1975  Date of evaluation: 3/11/2022  Time:  2:44 PM     Procedure Summary     Date: 03/11/22 Room / Location: [address] [place] [address]    Anesthesia Start: 1406 Anesthesia Stop: 0720    Procedure: EGD BIOPSY (N/A ) Diagnosis: (/)    Surgeons: Michelle Lewis MD Responsible Provider: Nestor Michael MD    Anesthesia Type: MAC ASA Status: 3          Anesthesia Type: MAC    Marielena Phase I:      Marielena Phase II:      Last vitals: Reviewed and per EMR flowsheets.        Anesthesia Post Evaluation    Patient location during evaluation: PACU  Patient participation: complete - patient participated  Level of consciousness: awake and alert  Airway patency: patent  Nausea & Vomiting: no nausea and no vomiting  Complications: no  Cardiovascular status: blood pressure returned to baseline  Respiratory status: acceptable and room air  Hydration status: euvolemic

## 2022-03-12 NOTE — OP NOTE
[address] [address]                                OPERATIVE REPORT    PATIENT NAME: Hair Mccarthy                  :        1975  MED REC NO:   [id number]                            ROOM:       0515  ACCOUNT NO:   [de-identified]                           ADMIT DATE: 2022  PROVIDER:     Michelle Marmolejo MD    DATE OF PROCEDURE:  2022    PROCEDURE PERFORMED:  Upper endoscopy with biopsy. PREOPERATIVE DIAGNOSIS:  Evaluation for epigastric pain, heartburn, and  nausea. POSTOPERATIVE DIAGNOSES:  Grade B LA classification GERD. The patient  is status post gastric bypass with gastritis in the gastric pouch. Biopsies were done to rule out H. pylori infection. Duodenum biopsied  to rule out sprue. ANESTHESIA:  LMAC. Estimated blood loss:  N/A    DESCRIPTION OF PROCEDURE:  With the patient in her left lateral  decubitus position, the Olympus forward-viewing upper endoscope was  introduced into the esophagus. Evaluation of the esophagus showed grade  B LA classification GERD. The scope was then advanced into a small  gastric pouch status post gastric bypass. Gastritis was noted and  biopsied to rule out H. pylori infection. Small bowel loops were then  entered, appeared unremarkable, biopsied to rule out sprue.         Aries Farah MD    D: 2022 15:00:54       T: 2022 15:04:31     [email or web address]  Job#: [id number]     Doc#: [id number]    CC:  Romayne Baumgarten, Emerick Fordyce, MD

## 2022-03-13 LAB — CLOTEST: NORMAL

## 2022-03-14 ENCOUNTER — CARE COORDINATION (OUTPATIENT)
Dept: CASE MANAGEMENT | Age: 47
End: 2022-03-14

## 2022-03-14 NOTE — CARE COORDINATION
[facility] Transitions Initial Follow Up Call    Call within 2 business days of discharge: Yes    Patient: Terrance Schwartz Patient : 1975   MRN: [id number]  Reason for Admission: Abdominal Pain  Discharge Date: 3/11/22 RARS: Readmission Risk Score: 12.8 ( )      Last Discharge [facility]       Complaint Diagnosis Description Type Department Provider    3/9/22 Abdominal Pain Generalized abdominal pain . .. ED to Hosp-Admission (Discharged) (ADMITTED) Serge Lyons MD; [place]. .. Attempted to reach patient by phone for initial post hospital discharge follow up. HIPAA compliant message left on patient's voicemail; CTN contact information provided.      Follow Up  Future Appointments   Date Time Provider Dylon Nicole   3/16/2022  7:30 AM Saba Rosa MD Surg Weight [facility]   3/16/2022  3:30 PM Magali Wheeler DO [place] PMR [facility]   3/25/2022  1:00 PM Valeria Buckley MD MED ONC [facility]   3/25/2022  1:00 PM [facility] MED ONC FAST TRACK 3 [facility] Med Onc [facility]   2022  1:30 PM Teresa Her MD Surg Weight Celia Hagan RN

## 2022-03-15 ENCOUNTER — CARE COORDINATION (OUTPATIENT)
Dept: CASE MANAGEMENT | Age: 47
End: 2022-03-15

## 2022-03-15 NOTE — CARE COORDINATION
[facility] Transitions Initial Follow Up Call    Call within 2 business days of discharge: Yes    Patient: Nestor Souza Patient : 1975   MRN: [id number]  Reason for Admission: Abdominal Pain  Discharge Date: 3/11/22 RARS: Readmission Risk Score: 12.8 ( )      Last Discharge [facility]       Complaint Diagnosis Description Type Department Provider    3/9/22 Abdominal Pain Generalized abdominal pain . .. ED to Hosp-Admission (Discharged) (ADMITTED) Manju Cordoba MD; [place]. .. Second attempt to reach the patient for initial Care Transition call post hospital discharge. HIPAA compliant message left on patient's voicemail; CTN contact information provided. Episode to be resolved and CTN to sign off if return call is not received from the patient.      Follow Up  Future Appointments   Date Time Provider Dylon Nicole   3/16/2022  7:30 AM Agustín Kyle MD Surg Weight [facility]   3/16/2022  3:30 PM Marcha Curling, DO [place] PMR [facility]   3/25/2022  1:00 PM Tete Archibald MD MED ONC [facility]   3/25/2022  1:00 PM [facility] MED ONC FAST TRACK 3 [facility] Med Onc [facility]   2022  1:30 PM Oli Waldrop MD Surg Weight Hattie Frey RN

## 2022-03-16 ENCOUNTER — OFFICE VISIT (OUTPATIENT)
Dept: PHYSICAL MEDICINE AND REHAB | Age: 47
End: 2022-03-16
Payer: MEDICARE

## 2022-03-16 VITALS
TEMPERATURE: 97.1 F | SYSTOLIC BLOOD PRESSURE: 94 MMHG | HEIGHT: 65 IN | HEART RATE: 83 BPM | BODY MASS INDEX: 28.82 KG/M2 | DIASTOLIC BLOOD PRESSURE: 69 MMHG | WEIGHT: 173 LBS

## 2022-03-16 DIAGNOSIS — M17.12 PRIMARY OSTEOARTHRITIS OF LEFT KNEE: Primary | ICD-10-CM

## 2022-03-16 PROCEDURE — 20611 DRAIN/INJ JOINT/BURSA W/US: CPT | Performed by: PHYSICAL MEDICINE & REHABILITATION

## 2022-03-16 RX ORDER — LIDOCAINE HYDROCHLORIDE 10 MG/ML
7 INJECTION, SOLUTION INFILTRATION; PERINEURAL ONCE
Status: COMPLETED | OUTPATIENT
Start: 2022-03-16 | End: 2022-03-16

## 2022-03-16 RX ORDER — TRIAMCINOLONE ACETONIDE 40 MG/ML
40 INJECTION, SUSPENSION INTRA-ARTICULAR; INTRAMUSCULAR ONCE
Status: COMPLETED | OUTPATIENT
Start: 2022-03-16 | End: 2022-03-16

## 2022-03-16 RX ADMIN — LIDOCAINE HYDROCHLORIDE 7 ML: 10 INJECTION, SOLUTION INFILTRATION; PERINEURAL at 16:11

## 2022-03-16 RX ADMIN — TRIAMCINOLONE ACETONIDE 40 MG: 40 INJECTION, SUSPENSION INTRA-ARTICULAR; INTRAMUSCULAR at 16:11

## 2022-03-16 NOTE — PROGRESS NOTES
David Hart D.O. [facility]  [address] [place]  Phone: [phone]  Fax: [phone]    3/16/2022    Chief Complaint   Patient presents with    Knee Pain     Left knee steroid injetcion       Last injection: n/a  Taking anticoagulants/antiplatelets: No  Diabetic: No  Febrile/active infection: No    Ambulatory Procedure Time Out  Correct Patient: Yes  Correct Procedure: Yes  Correct Site/Side: Yes  Correct Site(s) Marked: Yes  Informed Consent Signed: Yes  Allergies Verified: Yes  Staff Present & Credential[de-identified] Dhaval Members CHAVA, Dr. Lowe Cons     After explaining the indications, risks, benefits and alternatives of a left knee joint injection, the patient agreed to proceed. A permit was signed and scanned into the chart. The skin on the lateral knee was prepared with chloraprep. Using sterile technique, a 22 gauge, 1.5\" needle with 1 cc of Kenalog 40mg/cc and 8 cc of 1% lidocaine was directed to the knee joint using US guidance. After negative aspiration, the medication was injected. Adequate hemostasis was achieved and a bandage applied. The patient tolerated the procedure well and was educated in post injection care. The patient was clinically monitored after the injection and left the office without incident. There was post injection reduction in pain. Ultrasound images are scanned separately into the EMR. Controlled Substance Monitoring:    Acute and Chronic Pain Monitoring:   RX Monitoring 3/2/2022   Periodic Controlled Substance Monitoring No signs of potential drug abuse or diversion identified. Orders Placed This Encounter   Medications    triamcinolone acetonide (KENALOG-40) injection 40 mg    lidocaine 1 % injection 7 mL         David Hart D.O., P.T.   Board Certified Physical Medicine and Rehabilitation  Board Certified Electrodiagnostic Medicine    Administrations This Visit     lidocaine 1 % injection 7 mL     Admin Date  03/16/2022  16:11 Action  Given Dose  7 mL Route  Other Site   Administered By  Hunter Oropeza LPN    Ordering Provider: Cindy Ramos DO NDC: 4102-3883-99    Lot#: [id number]    : HOSPIRA    Patient Supplied?: No          triamcinolone acetonide (KENALOG-40) injection 40 mg     Admin Date  03/16/2022  16:11 Action  Given Dose  40 mg Route  Intra-artICUlar Site   Administered By  Hunter Oropeza LPN    Ordering Provider: Cindy Ramos DO    NDC: 4047-5033-93    Lot#:  [id number]    : B-M SQUIBB [facility]. (PRIMARY CARE)    Patient Supplied?: No

## 2022-03-24 ENCOUNTER — TELEPHONE (OUTPATIENT)
Dept: PHYSICAL MEDICINE AND REHAB | Age: 47
End: 2022-03-24

## 2022-03-24 DIAGNOSIS — D50.8 OTHER IRON DEFICIENCY ANEMIA: Primary | ICD-10-CM

## 2022-03-25 ENCOUNTER — OFFICE VISIT (OUTPATIENT)
Dept: ONCOLOGY | Age: 47
End: 2022-03-25
Payer: MEDICARE

## 2022-03-25 ENCOUNTER — HOSPITAL ENCOUNTER (OUTPATIENT)
Age: 47
Discharge: HOME | End: 2022-03-25
Payer: MEDICARE

## 2022-03-25 ENCOUNTER — HOSPITAL ENCOUNTER (OUTPATIENT)
Dept: INFUSION THERAPY | Age: 47
Discharge: HOME | End: 2022-03-25
Payer: MEDICARE

## 2022-03-25 VITALS
OXYGEN SATURATION: 98 % | WEIGHT: 166 LBS | DIASTOLIC BLOOD PRESSURE: 75 MMHG | BODY MASS INDEX: 27.66 KG/M2 | TEMPERATURE: 97.5 F | HEIGHT: 65 IN | HEART RATE: 98 BPM | SYSTOLIC BLOOD PRESSURE: 108 MMHG | RESPIRATION RATE: 16 BRPM

## 2022-03-25 DIAGNOSIS — D50.8 OTHER IRON DEFICIENCY ANEMIA: ICD-10-CM

## 2022-03-25 DIAGNOSIS — D50.8 OTHER IRON DEFICIENCY ANEMIA: Primary | ICD-10-CM

## 2022-03-25 LAB
ANISOCYTOSIS: ABNORMAL
BASOPHILS ABSOLUTE: 0.09 E9/L (ref 0–0.2)
BASOPHILS RELATIVE PERCENT: 0.7 % (ref 0–2)
BURR CELLS: ABNORMAL
EOSINOPHILS ABSOLUTE: 0.07 E9/L (ref 0.05–0.5)
EOSINOPHILS RELATIVE PERCENT: 0.5 % (ref 0–6)
FERRITIN: 97 NG/ML
HCT VFR BLD CALC: 48 % (ref 34–48)
HEMOGLOBIN: 15.4 G/DL (ref 11.5–15.5)
IMMATURE GRANULOCYTES #: 0.06 E9/L
IMMATURE GRANULOCYTES %: 0.5 % (ref 0–5)
IRON SATURATION: 16 % (ref 15–50)
IRON: 49 MCG/DL (ref 37–145)
LYMPHOCYTES ABSOLUTE: 2.38 E9/L (ref 1.5–4)
LYMPHOCYTES RELATIVE PERCENT: 18.3 % (ref 20–42)
MCH RBC QN AUTO: 30.7 PG (ref 26–35)
MCHC RBC AUTO-ENTMCNC: 32.1 % (ref 32–34.5)
MCV RBC AUTO: 95.6 FL (ref 80–99.9)
MONOCYTES ABSOLUTE: 0.75 E9/L (ref 0.1–0.95)
MONOCYTES RELATIVE PERCENT: 5.8 % (ref 2–12)
NEUTROPHILS ABSOLUTE: 9.68 E9/L (ref 1.8–7.3)
NEUTROPHILS RELATIVE PERCENT: 74.2 % (ref 43–80)
PDW BLD-RTO: 21 FL (ref 11.5–15)
PLATELET # BLD: 418 E9/L (ref 130–450)
PMV BLD AUTO: 8.4 FL (ref 7–12)
POIKILOCYTES: ABNORMAL
POLYCHROMASIA: ABNORMAL
RBC # BLD: 5.02 E12/L (ref 3.5–5.5)
TOTAL IRON BINDING CAPACITY: 306 MCG/DL (ref 250–450)
WBC # BLD: 13 E9/L (ref 4.5–11.5)

## 2022-03-25 PROCEDURE — 83550 IRON BINDING TEST: CPT

## 2022-03-25 PROCEDURE — 99213 OFFICE O/P EST LOW 20 MIN: CPT | Performed by: INTERNAL MEDICINE

## 2022-03-25 PROCEDURE — 36415 COLL VENOUS BLD VENIPUNCTURE: CPT

## 2022-03-25 PROCEDURE — 83540 ASSAY OF IRON: CPT

## 2022-03-25 PROCEDURE — 82728 ASSAY OF FERRITIN: CPT

## 2022-03-25 PROCEDURE — 85025 COMPLETE CBC W/AUTO DIFF WBC: CPT

## 2022-03-25 PROCEDURE — 99212 OFFICE O/P EST SF 10 MIN: CPT

## 2022-03-25 NOTE — PROGRESS NOTES
[address]  [address] [facility] MED ONCOLOGY  [facility] [address]  Dept: 71 Johanne Mayen: [phone]  Attending Progress Note      Reason for Visit:   Anemia. Referring Physician:  Alyssa Fraga MD    PCP:  Derek Rodas MD    History of Present Illness:      Ms. Sunshine Mendez is a very pleasant 49-year-old lady, with a past medical history significant for obesity, she status post gastric bypass surgery, hypertension, GERD, depression, and AYALA, who was referred to the hematology office for evaluation and treatment of anemia. The patient had a colonoscopy done on 10/27/2021, had revealed colon polyps, and minimal shallow diverticulitis, last EGD was on 2/7/2021, which had revealed marginal ulcer. The patient CBCD from 11/3/2021 was remarkable for a hemoglobin of 10.1, hematocrit 31.6, MCV 86.3, white count 6, platelet count 874D, iron 32, TIBC 330, TSAT 10%, reticulocytes 0.8%. 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 She denies any bleeding at this time, she is taking the oral iron about every other day. Review of Systems;  CONSTITUTIONAL: No fever, chills. Good appetite, feeling tired. Pos for weight loss after the surgery. ENMT: Eyes: No diplopia; Nose: No epistaxis. Mouth: No sore throat. RESPIRATORY: No hemoptysis, shortness of breath, cough. CARDIOVASCULAR: No chest pain, palpitations. GASTROINTESTINAL: Positive for nausea, no vomiting, abdominal pain, diarrhea/constipation. GENITOURINARY: No dysuria, urinary frequency, hematuria. NEURO: No syncope, presyncope, headache, slight dizziness and disorientation.   Remainder:  ROS NEGATIVE    Past Medical History:      Diagnosis Date    Anemia     Arthritis     Asthma     Depression     GERD without esophagitis LAPAROSCOPY, REPAIR PERFORATED MARGINAL ULCER, UPPER ENDOSCOPY. [facility] PATCH performed by Rob Ramirez MD at [address] LITHOTRIPSY Right 7/24/2019    CYSTOSCOPY RETROGRADE PYELOGRAM URETEROSCOPY J STENT LASER LITHOTRIPSY RIGHT performed by Charito Chambers DO at [address] LITHOTRIPSY      [place] OFFICE/OUTPT VISIT,PROCEDURE ONLY N/A 10/29/2018    DIAGNOSTIC LAPAROSCOPY REPAIR PERFORATED VISCUS performed by Elizabeth Guido MD at [address]  11/14/2017    STOMACH SURGERY      UPPER GASTROINTESTINAL ENDOSCOPY N/A 7/27/2019    EGD ESOPHAGOGASTRODUODENOSCOPY performed by Elizabeth Guido MD at [address] N/A 2/7/2020    EGD BIOPSY performed by Elizabeth Guido MD at [address] N/A 6/27/2020    EGD DIAGNOSTIC ONLY performed by Rob Ramirez MD at [address] N/A 3/11/2022    EGD BIOPSY performed by Aron Grimes MD at [facility] ENDOSCOPY       Family History:  Family History   Problem Relation Age of Onset    Heart Attack Mother 61        death from this    Diabetes Mother     Depression Mother     Diabetes Father     Stroke Father     Cancer Maternal Grandfather     Cancer Paternal Grandmother     Stroke Paternal Grandfather     Substance Abuse Brother     Heart Attack Brother        Medications:  Reviewed and reconciled.     Social History:  Social History     Socioeconomic History    Marital status: Single     Spouse name: Not on file    Number of children: Not on file    Years of education: Not on file    Highest education level: Not on file   Occupational History    Occupation: unemployed/disability   Tobacco Use    Smoking status: Current Every Day Smoker     Packs/day: 0.50     Years: 15.00     Pack years: 7.50     Types: Cigarettes     Start date: 10/29/1991    Smokeless tobacco: Never Used   Vaping Use    Vaping Use: Never used   Substance and Sexual Activity  Alcohol use: Yes     Alcohol/week: 6.0 standard drinks     Types: 6 Standard drinks or equivalent per week     Comment: social    Drug use: No    Sexual activity: Yes     Partners: Male     Birth control/protection: Surgical   Other Topics Concern    Not on file   Social History Narrative    Not on file     Social Determinants of Health     Financial Resource Strain: High Risk    Difficulty of Paying Living Expenses: Very hard   Food Insecurity: Food Insecurity Present    Worried About Running Out of Food in the Last Year: Sometimes true    Trae of Food in the Last Year: Sometimes true   Transportation Needs:     Lack of Transportation (Medical): Not on file    Lack of Transportation (Non-Medical): Not on file   Physical Activity:     Days of Exercise per Week: Not on file    Minutes of Exercise per Session: Not on file   Stress:     Feeling of Stress : Not on file   Social Connections:     Frequency of Communication with Friends and Family: Not on file    Frequency of Social Gatherings with Friends and Family: Not on file    Attends Religion Services: Not on file    Active Member of [address] [place] [address] or Organizations: Not on file    Attends Club or Organization Meetings: Not on file    Marital Status: Not on file   Intimate Partner Violence:     Fear of Current or Ex-Partner: Not on file    Emotionally Abused: Not on file    Physically Abused: Not on file    Sexually Abused: Not on file   Housing Stability:     Unable to Pay for Housing in the Last Year: Not on file    Number of [place] in the Last Year: Not on file    Unstable Housing in the Last Year: Not on file       Allergies:   Allergies   Allergen Reactions    Pcn [Penicillins] Anaphylaxis     CHILDHOOD ALLERGY    Food Hives     ALLERGY IS TO BERRIES    Ibuprofen Other (See Comments)     PATIENT HAS BARIATRIC SX       Physical Exam:  /75 (Site: Left Upper Arm, Position: Sitting, Cuff Size: Medium Adult)   Pulse 98   Temp 97.5 °F (36.4 °C) (Infrared)   Resp 16   Ht 5' 5\" (1.651 m)   Wt 166 lb (75.3 kg)   LMP 10/21/2019 (Exact Date)   SpO2 98%   BMI 27.62 kg/m²   GENERAL: Alert, oriented x 3, not in acute distress. HEENT: PERRLA; EOMI. Oropharynx clear. NECK: Supple. No palpable cervical or supraclavicular lymphadenopathy. LUNGS: Good air entry bilaterally. No wheezing, crackles or rhonchi. CARDIOVASCULAR: Regular rate. No murmurs, rubs or gallops. ABDOMEN: Soft. Non-tender, non-distended. Positive bowel sounds. EXTREMITIES: Without clubbing, cyanosis, positive for lower leg lymphedema  NEUROLOGIC: No focal deficits. ECOG PS 1    Impression/Plan:       Ms. Amie Hodgson is a very pleasant 53-year-old lady, with a past medical history significant for obesity, she status post gastric bypass surgery, hypertension, GERD, depression, and AYALA, who was referred to the hematology office for evaluation and treatment of anemia. The patient had a colonoscopy done on 10/27/2021, had revealed colon polyps, and minimal shallow diverticulitis, last EGD was on 2/7/2021, which had revealed marginal ulcer. The patient CBCD from 11/3/2021 was remarkable for a hemoglobin of 10.1, hematocrit 31.6, MCV 86.3, white count 6, platelet count 796C, iron 32, TIBC 330, TSAT 10%, reticulocytes 0.8%. The patient has been on oral iron, which causes GI side effects. 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 She denies any bleeding at this time, she is taking the oral iron about every other day.   Labs reviewed, hemoglobin is 15.4, hematocrit 48, had significantly improved, white count is 13, normal platelet count, iron panel had normalized, no indication for parenteral infusion at this time, but she will probably require parenteral iron infusion periodically in the future. The patient will stop taking the oral iron. We will monitor her CBCD and iron studies. RTC in about 3 months    Thank you for allowing us to participate in the care of Ms. Crow.     Rick Mark MD   HEMATOLOGY/MEDICAL [address] [address] [address] MED ONCOLOGY  [address] [place] [id number]  [facility]pt: [facility] [phone]

## 2022-04-04 ENCOUNTER — TELEPHONE (OUTPATIENT)
Dept: FAMILY MEDICINE CLINIC | Age: 47
End: 2022-04-04

## 2022-04-04 NOTE — TELEPHONE ENCOUNTER
----- Message from Dorys Gonzalez sent at 4/4/2022  1:51 PM EDT -----  Subject: Message to Provider    QUESTIONS  Information for Provider? patient is requesting to have a written approval   letter to receive services with [facility]. pt is scheduled   for next week and would like the letter to be faxed over as soon as   possible   ---------------------------------------------------------------------------  --------------  [address]  What is the best way for the office to contact you? OK to leave message on   voicemail  Preferred Call Back Phone Number? [phone]  ---------------------------------------------------------------------------  --------------  SCRIPT ANSWERS  Relationship to Patient?  Self

## 2022-04-05 DIAGNOSIS — I89.0 LYMPHEDEMA: Primary | ICD-10-CM

## 2022-05-01 DIAGNOSIS — K28.9 MARGINAL ULCER: ICD-10-CM

## 2022-05-02 RX ORDER — OMEPRAZOLE 20 MG/1
CAPSULE, DELAYED RELEASE ORAL
Qty: 90 CAPSULE | Refills: 0 | Status: SHIPPED
Start: 2022-05-02 | End: 2022-07-08 | Stop reason: SDUPTHER

## 2022-06-06 ENCOUNTER — OFFICE VISIT (OUTPATIENT)
Dept: PHYSICAL MEDICINE AND REHAB | Age: 47
End: 2022-06-06
Payer: MEDICARE

## 2022-06-06 VITALS
TEMPERATURE: 97.9 F | HEIGHT: 65 IN | SYSTOLIC BLOOD PRESSURE: 99 MMHG | BODY MASS INDEX: 29.66 KG/M2 | WEIGHT: 178 LBS | DIASTOLIC BLOOD PRESSURE: 69 MMHG | HEART RATE: 84 BPM

## 2022-06-06 DIAGNOSIS — M17.12 PRIMARY LOCALIZED OSTEOARTHRITIS OF LEFT KNEE: ICD-10-CM

## 2022-06-06 DIAGNOSIS — M17.11 PRIMARY OSTEOARTHRITIS OF RIGHT KNEE: ICD-10-CM

## 2022-06-06 PROCEDURE — 20611 DRAIN/INJ JOINT/BURSA W/US: CPT | Performed by: PHYSICAL MEDICINE & REHABILITATION

## 2022-06-06 RX ORDER — LIDOCAINE HYDROCHLORIDE 10 MG/ML
8 INJECTION, SOLUTION INFILTRATION; PERINEURAL ONCE
Status: COMPLETED | OUTPATIENT
Start: 2022-06-06 | End: 2022-06-06

## 2022-06-06 RX ORDER — TRAMADOL HYDROCHLORIDE 50 MG/1
50 TABLET ORAL EVERY 6 HOURS PRN
Qty: 28 TABLET | Refills: 0 | Status: SHIPPED
Start: 2022-06-06 | End: 2022-09-28 | Stop reason: SDUPTHER

## 2022-06-06 RX ORDER — TRIAMCINOLONE ACETONIDE 40 MG/ML
40 INJECTION, SUSPENSION INTRA-ARTICULAR; INTRAMUSCULAR ONCE
Status: COMPLETED | OUTPATIENT
Start: 2022-06-06 | End: 2022-06-06

## 2022-06-06 RX ADMIN — LIDOCAINE HYDROCHLORIDE 8 ML: 10 INJECTION, SOLUTION INFILTRATION; PERINEURAL at 10:53

## 2022-06-06 RX ADMIN — TRIAMCINOLONE ACETONIDE 40 MG: 40 INJECTION, SUSPENSION INTRA-ARTICULAR; INTRAMUSCULAR at 10:54

## 2022-06-06 NOTE — PROGRESS NOTES
Annalise Hung D.O. [facility]  [address] [facility]  Phone: [phone]  Fax: [phone]    6/6/2022    Chief Complaint   Patient presents with    Knee Pain     right knee injection       Last injection: 3/2/22  Taking anticoagulants/antiplatelets: No  Diabetic: No  Febrile/active infection: No    Ambulatory Procedure Time Out  Correct Patient: Yes  Correct Procedure: Yes  Correct Site/Side: Yes  Correct Site(s) Marked: Yes  Informed Consent Signed: Yes  Allergies Verified: Yes  Staff Present & Credential[de-identified] Brent FELDER/    After explaining the indications, risks, benefits and alternatives of a right knee joint injection, the patient agreed to proceed. A permit was signed and scanned into the chart. The skin on the lateral knee was prepared with chloraprep. Using sterile technique, a 22 gauge, 1.5\" needle with 1 cc of Kenalog 40mg/cc and 8 cc of 1% lidocaine was directed to the knee joint using US guidance. After negative aspiration, the medication was injected. Adequate hemostasis was achieved and a bandage applied. The patient tolerated the procedure well and was educated in post injection care. The patient was clinically monitored after the injection and left the office without incident. There was post injection reduction in pain. Ultrasound images are scanned separately into the EMR. Annalise Hung D.O., P.T.   Board Certified Physical Medicine and Rehabilitation  Board Certified Electrodiagnostic Medicine    Administrations This Visit     lidocaine 1 % injection 8 mL     Admin Date  06/06/2022  10:53 Action  Given Dose  8 mL Route  Other Site   Administered By  Jacob Raymundo RN    Ordering Provider: Geneva Beltre DO    NDC: 8622-3816-15    Lot#: [id number]    : HOSPIRA    Patient Supplied?: No          triamcinolone acetonide (KENALOG-40) injection 40 mg     Admin Date  06/06/2022  10:54

## 2022-06-14 ENCOUNTER — TELEPHONE (OUTPATIENT)
Dept: PHYSICAL MEDICINE AND REHAB | Age: 47
End: 2022-06-14

## 2022-06-14 NOTE — TELEPHONE ENCOUNTER
Called patient for follow up phone call post right knee injection states she received 100%effectiveness with no side efects

## 2022-06-16 ENCOUNTER — OFFICE VISIT (OUTPATIENT)
Dept: PHYSICAL MEDICINE AND REHAB | Age: 47
End: 2022-06-16
Payer: MEDICARE

## 2022-06-16 VITALS
TEMPERATURE: 97 F | HEIGHT: 65 IN | DIASTOLIC BLOOD PRESSURE: 67 MMHG | WEIGHT: 179 LBS | HEART RATE: 84 BPM | SYSTOLIC BLOOD PRESSURE: 98 MMHG | BODY MASS INDEX: 29.82 KG/M2

## 2022-06-16 DIAGNOSIS — M17.12 PRIMARY LOCALIZED OSTEOARTHRITIS OF LEFT KNEE: Primary | ICD-10-CM

## 2022-06-16 PROCEDURE — 20611 DRAIN/INJ JOINT/BURSA W/US: CPT | Performed by: PHYSICAL MEDICINE & REHABILITATION

## 2022-06-16 RX ORDER — TRIAMCINOLONE ACETONIDE 40 MG/ML
40 INJECTION, SUSPENSION INTRA-ARTICULAR; INTRAMUSCULAR ONCE
Status: COMPLETED | OUTPATIENT
Start: 2022-06-16 | End: 2022-06-16

## 2022-06-16 RX ORDER — LIDOCAINE HYDROCHLORIDE 10 MG/ML
8 INJECTION, SOLUTION INFILTRATION; PERINEURAL ONCE
Status: COMPLETED | OUTPATIENT
Start: 2022-06-16 | End: 2022-06-16

## 2022-06-16 RX ORDER — LORAZEPAM 0.5 MG/1
TABLET ORAL
COMMUNITY
Start: 2022-04-05

## 2022-06-16 RX ADMIN — TRIAMCINOLONE ACETONIDE 40 MG: 40 INJECTION, SUSPENSION INTRA-ARTICULAR; INTRAMUSCULAR at 12:50

## 2022-06-16 RX ADMIN — LIDOCAINE HYDROCHLORIDE 8 ML: 10 INJECTION, SOLUTION INFILTRATION; PERINEURAL at 12:49

## 2022-06-16 NOTE — PROGRESS NOTES
Aubrey Gongora D.O. [facility]  [address] [facility]  Phone: [phone]  Fax: [phone]    6/16/2022    Chief Complaint   Patient presents with    Knee Pain     Left knee steroid injection       Last injection: 3/16/22  Taking anticoagulants/antiplatelets: No  Diabetic: No  Febrile/active infection: No    Ambulatory Procedure Time Out  Correct Patient: Yes  Correct Procedure: Yes  Correct Site/Side: Yes  Correct Site(s) Marked: Yes  Informed Consent Signed: Yes  Allergies Verified: Yes  Staff Present & Credential[de-identified] Fanny Mcclendon LPN, Dr Maki Blum DO    After explaining the indications, risks, benefits and alternatives of a left  knee joint injection, the patient agreed to proceed. A permit was signed and scanned into the chart. The skin on the lateral knee was prepared with chloraprep. Using sterile technique, a 22 gauge, 1.5\" needle with 1 cc of Kenalog 40mg/cc and 8 cc of 1% lidocaine was directed to the knee joint using US guidance. After negative aspiration, the medication was injected. Adequate hemostasis was achieved and a bandage applied. The patient tolerated the procedure well and was educated in post injection care. The patient was clinically monitored after the injection and left the office without incident. There was post injection reduction in pain. Ultrasound images are scanned separately into the EMR. Aubrey Gongora D.O., P.T.   Board Certified Physical Medicine and Rehabilitation  Board Certified Electrodiagnostic Medicine    Administrations This Visit     lidocaine 1 % injection 8 mL     Admin Date  06/16/2022  12:49 Action  Given Dose  8 mL Route  Other Site   Administered By  Angie Jerome MA    Ordering Provider: Lashon Gonsales DO    [facility]: 93621-094-88    Lot#: [id number]    : [employer] [facility]    Patient Supplied?: No          triamcinolone acetonide (KENALOG-40) injection 40 mg     Admin Date  06/16/2022  12:50 Action  Given Dose  40 mg Route  Intra-artICUlar Site   Administered By  Nusrat Ariza MA    Ordering Provider: Lv Abbott DO    NDC: 1207-6521-57    Lot#: [id number]    : [employer] [place]    Patient Supplied?: No

## 2022-06-24 ENCOUNTER — HOSPITAL ENCOUNTER (OUTPATIENT)
Dept: INFUSION THERAPY | Age: 47
End: 2022-06-24

## 2022-06-29 ENCOUNTER — OFFICE VISIT (OUTPATIENT)
Dept: PRIMARY CARE CLINIC | Age: 47
End: 2022-06-29
Payer: MEDICARE

## 2022-06-29 VITALS
HEIGHT: 65 IN | TEMPERATURE: 97.2 F | BODY MASS INDEX: 29.66 KG/M2 | HEART RATE: 73 BPM | SYSTOLIC BLOOD PRESSURE: 107 MMHG | RESPIRATION RATE: 18 BRPM | OXYGEN SATURATION: 98 % | DIASTOLIC BLOOD PRESSURE: 72 MMHG | WEIGHT: 178 LBS

## 2022-06-29 DIAGNOSIS — U07.1 COVID: Primary | ICD-10-CM

## 2022-06-29 LAB
Lab: ABNORMAL
PERFORMING INSTRUMENT: ABNORMAL
QC PASS/FAIL: ABNORMAL
SARS-COV-2, POC: DETECTED

## 2022-06-29 PROCEDURE — 99213 OFFICE O/P EST LOW 20 MIN: CPT | Performed by: FAMILY MEDICINE

## 2022-06-29 PROCEDURE — 87426 SARSCOV CORONAVIRUS AG IA: CPT | Performed by: FAMILY MEDICINE

## 2022-06-29 PROCEDURE — G8427 DOCREV CUR MEDS BY ELIG CLIN: HCPCS | Performed by: FAMILY MEDICINE

## 2022-06-29 PROCEDURE — G8417 CALC BMI ABV UP PARAM F/U: HCPCS | Performed by: FAMILY MEDICINE

## 2022-06-29 PROCEDURE — 4004F PT TOBACCO SCREEN RCVD TLK: CPT | Performed by: FAMILY MEDICINE

## 2022-06-29 NOTE — PROGRESS NOTES
gastrointestinal endoscopy (N/A, 3/11/2022). Social History:  reports that she has been smoking cigarettes. She started smoking about 30 years ago. She has a 7.50 pack-year smoking history. She has never used smokeless tobacco. She reports current alcohol use of about 6.0 standard drinks of alcohol per week. She reports that she does not use drugs. Family History: family history includes Cancer in her maternal grandfather and paternal grandmother; Depression in her mother; Diabetes in her father and mother; Heart Attack in her brother; Heart Attack (age of onset: 61) in her mother; Stroke in her father and paternal grandfather; Substance Abuse in her brother. Allergies: Pcn [penicillins], Food, and Ibuprofen    Physical Exam   Vital Signs:  /72   Pulse 73   Temp 97.2 °F (36.2 °C) (Temporal)   Resp 18   Ht 5' 5\" (1.651 m)   Wt 178 lb (80.7 kg)   LMP 10/21/2019 (Exact Date)   SpO2 98%   BMI 29.62 kg/m²    Oxygen Saturation Interpretation: Normal.    Constitutional:  Alert, development consistent with age. NAD. Head:  NC/NT. Airway patent. Ears: TMs clear bilaterally. Canals without exudate or swelling bilaterally. Mouth: Posterior pharynx with mild erythema and clear postnasal drip. no tonsillar hypertrophy or exudate. Neck:  Normal ROM. Supple. no anterior cervical adenopathy noted. Lungs: CTAB without wheezes, rales, or rhonchi. CV:  Regular rate and rhythm, normal heart sounds, without pathological murmurs, ectopy, gallops, or rubs. Skin:  Normal turgor. Warm, dry, without visible rash. Lymphatic: No lymphangitis or adenopathy noted. Neurological:  Oriented. Motor functions intact.     Lab / Imaging Results   (All laboratory and radiology results have been personally reviewed by myself)  Labs:  Results for orders placed or performed in visit on 06/29/22   POCT COVID-19, Antigen   Result Value Ref Range    SARS-COV-2, POC Detected (A) Not Detected    Lot Number [id number]     QC Pass/Fail pass     Performing Instrument BD Veritor        Imaging: All Radiology results interpreted by Radiologist unless otherwise noted. No results found. Medical Decision Making   Pt non-toxic, in no apparent distress and stable at time of discharge. Assessment/Plan   Melissa Faith was seen today for concern for covid-19, congestion, headache and generalized body aches. Diagnoses and all orders for this visit:    COVID  -     POCT COVID-19, Antigen      Advised self-quarantine at home in the interim. Increase fluids and rest. Symptomatic relief discussed including Tylenol prn pain/fever. Schedule f/u with PCP in 7-10 days if symptoms persist. ED sooner if symptoms worsen or change. ED immediately with high or refractory fever, progressive SOB, dyspnea, CP, calf pain/swelling, shaking chills, vomiting, abdominal pain, lethargy, flank pain, or decreased urinary output. Pt verbalizes understanding and is in agreement with plan of care. All questions answered. Gracy Cross MD      *NOTE: This report was transcribed using voice recognition software. Every effort was made to ensure accuracy; however, inadvertent computerized transcription errors may be present.

## 2022-07-05 ENCOUNTER — TELEPHONE (OUTPATIENT)
Dept: FAMILY MEDICINE CLINIC | Age: 47
End: 2022-07-05

## 2022-07-05 NOTE — TELEPHONE ENCOUNTER
Patient called and stated she was tested positive on 6/29 for Covid. Patient stated she is feeling better than what she did, she is still coughing and bringing up dark yellow phlegm, she stated she is still feverish off and on. Patient stated she feels like it is bronchitis now. Patient wanted to see if an antibiotic could be called in to her pharmacy. Please advise.   Thanks, Cortney FELDER

## 2022-07-08 ENCOUNTER — OFFICE VISIT (OUTPATIENT)
Dept: FAMILY MEDICINE CLINIC | Age: 47
End: 2022-07-08
Payer: MEDICARE

## 2022-07-08 VITALS
TEMPERATURE: 97.9 F | BODY MASS INDEX: 29.16 KG/M2 | SYSTOLIC BLOOD PRESSURE: 90 MMHG | RESPIRATION RATE: 16 BRPM | DIASTOLIC BLOOD PRESSURE: 58 MMHG | WEIGHT: 175 LBS | HEART RATE: 86 BPM | HEIGHT: 65 IN | OXYGEN SATURATION: 98 %

## 2022-07-08 DIAGNOSIS — R05.9 COUGH: Primary | ICD-10-CM

## 2022-07-08 DIAGNOSIS — K28.9 MARGINAL ULCER: ICD-10-CM

## 2022-07-08 DIAGNOSIS — J45.909 UNCOMPLICATED ASTHMA, UNSPECIFIED ASTHMA SEVERITY, UNSPECIFIED WHETHER PERSISTENT: ICD-10-CM

## 2022-07-08 DIAGNOSIS — R22.41 LOWER LEG MASS, RIGHT: ICD-10-CM

## 2022-07-08 DIAGNOSIS — Z98.84 S/P GASTRIC BYPASS: ICD-10-CM

## 2022-07-08 DIAGNOSIS — U07.1 COVID-19: ICD-10-CM

## 2022-07-08 PROCEDURE — 4004F PT TOBACCO SCREEN RCVD TLK: CPT | Performed by: STUDENT IN AN ORGANIZED HEALTH CARE EDUCATION/TRAINING PROGRAM

## 2022-07-08 PROCEDURE — G8427 DOCREV CUR MEDS BY ELIG CLIN: HCPCS | Performed by: STUDENT IN AN ORGANIZED HEALTH CARE EDUCATION/TRAINING PROGRAM

## 2022-07-08 PROCEDURE — 99212 OFFICE O/P EST SF 10 MIN: CPT | Performed by: STUDENT IN AN ORGANIZED HEALTH CARE EDUCATION/TRAINING PROGRAM

## 2022-07-08 PROCEDURE — G8417 CALC BMI ABV UP PARAM F/U: HCPCS | Performed by: STUDENT IN AN ORGANIZED HEALTH CARE EDUCATION/TRAINING PROGRAM

## 2022-07-08 PROCEDURE — 99213 OFFICE O/P EST LOW 20 MIN: CPT | Performed by: STUDENT IN AN ORGANIZED HEALTH CARE EDUCATION/TRAINING PROGRAM

## 2022-07-08 RX ORDER — ALBUTEROL SULFATE 90 UG/1
2 AEROSOL, METERED RESPIRATORY (INHALATION) EVERY 6 HOURS PRN
Qty: 18 G | Refills: 2 | Status: SHIPPED | OUTPATIENT
Start: 2022-07-08

## 2022-07-08 RX ORDER — BENZONATATE 100 MG/1
100 CAPSULE ORAL 3 TIMES DAILY PRN
Qty: 30 CAPSULE | Refills: 0 | Status: SHIPPED | OUTPATIENT
Start: 2022-07-08 | End: 2022-07-15

## 2022-07-08 RX ORDER — OMEPRAZOLE 20 MG/1
CAPSULE, DELAYED RELEASE ORAL
Qty: 90 CAPSULE | Refills: 1 | Status: SHIPPED | OUTPATIENT
Start: 2022-07-08

## 2022-07-08 RX ORDER — SUCRALFATE 1 G/1
1 TABLET ORAL
Qty: 120 TABLET | Refills: 1 | Status: SHIPPED | OUTPATIENT
Start: 2022-07-08

## 2022-07-08 RX ORDER — ONDANSETRON 4 MG/1
4 TABLET, ORALLY DISINTEGRATING ORAL EVERY 8 HOURS PRN
Qty: 30 TABLET | Refills: 1 | Status: SHIPPED | OUTPATIENT
Start: 2022-07-08

## 2022-07-08 NOTE — PROGRESS NOTES
S: 48 y.o. female here for covid f/u. Dx'd 10 days ago. Patient is presenting to the clinic after testing positive for COVID 10 days ago. Patient says started having symptoms 9 days ago. Her symptoms are limited to cough and mild rhinorrhea. Her cough is productive with yellowish sputum. She said starting a few days ago the cough as began to be clear. She denies any fever, chills, nausea, vomiting, diarrhea, abdominal pain, shortness of breath, chest pain, dysuria. She endorses having headache rated 5 out of 10. She is also coming to discuss a small knot in her right leg. A few months ago she noticed she has a bruise at her shin. The bruise for the most part is gone away but a dime size round, mobile small mass is present in the same location. Has a history of lymphedema and varicose veins. O: VS: BP (!) 90/58   Pulse 86   Temp 97.9 °F (36.6 °C) (Temporal)   Resp 16   Ht 5' 5\" (1.651 m)   Wt 175 lb (79.4 kg)   LMP 10/21/2019 (Exact Date)   SpO2 98%   BMI 29.12 kg/m²    General: NAD, alert and interacting appropriately. CV:  RRR, no gallops, rubs, or murmurs    Resp: CTAB   Abd:  Soft, nontender   Ext:   Lower extremity edema present, nonpitting. Small dime size hard mass is present on right shin. Nontender. Impression: [id number], without complications  Fibrotic mass  Plan:   Supportive care, plenty of fluid, call or go to emergency room if feels short of breath or having chest pain. Continue to monitor mass in the leg  PNA shot recommended      Attending Physician Statement  I have discussed the case, including pertinent history and exam findings with the resident. I agree with the documented assessment and plan.

## 2022-07-08 NOTE — PROGRESS NOTES
CC: Sandra Day is a 48 y.o. yo female is here for evaluation evaluation for the following acute & chronic medical concerns: Cough (productive clear to yellow mucus) and Mass (right lower leg)      HPI:    Cough: Patient complains of headache , dull, 5/10, and productive cough with sputum described as white and yellow. Symptoms began 10 days ago. The cough is without wheezing, dyspnea or hemoptysis, productive of green/yellow sputum and is aggravated by nothing Associated symptoms include:None. Patient does not have new pets. Patient does have a history of asthma. Patient does have a history of environmental allergens. Patient has recent travel. Patient does have a history of smoking. Patient  has no previous Chest X-ray. Patient does not have had a PPD done. Patient tested positive for COVID using a home test kit on 6/29/22. She has not been vaccinated for COVID. Patient has followed self-isolation and other cautionary recommendations. Right lower leg mass: Patient says large bruise appeared on her right shin approximately 1 month ago. Patient does not know what caused the bruise. It was erythematous and painless. Over the last month her bruise gradually disappeared. Where the bruise was previously formed now there is a small, dime size, heart, mobile mass is formed. The mass is painless, but patient wanted to have it examined. Medication refill:  Patient has a longstanding history of GERD, status post gastric bypass, discussed medications and the need to continue them at the current time. ROS negative unless otherwise noted      Vitals:  Blood pressure (!) 90/58, pulse 86, temperature 97.9 °F (36.6 °C), temperature source Temporal, resp. rate 16, height 5' 5\" (1.651 m), weight 175 lb (79.4 kg), last menstrual period 10/21/2019, SpO2 98 %, not currently breastfeeding.   Wt Readings from Last 3 Encounters:   07/08/22 175 lb (79.4 kg)   06/29/22 178 lb (80.7 kg)   06/16/22 179 lb (81.2 kg) release tablet; Take 142 mg by mouth every other day  -     omeprazole (PRILOSEC) 20 MG delayed release capsule; take 1 capsule by mouth once daily    Uncomplicated asthma, unspecified asthma severity, unspecified whether persistent  -     albuterol sulfate HFA (VENTOLIN HFA) 108 (90 Base) MCG/ACT inhaler; Inhale 2 puffs into the lungs every 6 hours as needed for Wheezing      RTO: Return if symptoms worsen or fail to improve.       This case was discussed with attending physician: Dr. Elena Suárez    An electronic signature was used to authenticate this note.  ---- Juliana Knox MD on 7/10/2022 at 9:51 AM

## 2022-07-10 PROBLEM — J45.909 UNCOMPLICATED ASTHMA: Status: ACTIVE | Noted: 2022-07-10

## 2022-07-10 PROBLEM — U07.1 COVID-19: Status: ACTIVE | Noted: 2022-07-10

## 2022-07-15 ENCOUNTER — OFFICE VISIT (OUTPATIENT)
Dept: ONCOLOGY | Age: 47
End: 2022-07-15
Payer: MEDICARE

## 2022-07-15 ENCOUNTER — HOSPITAL ENCOUNTER (OUTPATIENT)
Dept: INFUSION THERAPY | Age: 47
Discharge: HOME | End: 2022-07-15
Payer: MEDICARE

## 2022-07-15 VITALS
BODY MASS INDEX: 30.26 KG/M2 | SYSTOLIC BLOOD PRESSURE: 103 MMHG | HEIGHT: 65 IN | TEMPERATURE: 98 F | DIASTOLIC BLOOD PRESSURE: 57 MMHG | WEIGHT: 181.6 LBS | OXYGEN SATURATION: 99 % | HEART RATE: 82 BPM

## 2022-07-15 DIAGNOSIS — D50.8 OTHER IRON DEFICIENCY ANEMIA: ICD-10-CM

## 2022-07-15 DIAGNOSIS — D50.8 OTHER IRON DEFICIENCY ANEMIA: Primary | ICD-10-CM

## 2022-07-15 LAB
BASOPHILS ABSOLUTE: 0.08 E9/L (ref 0–0.2)
BASOPHILS RELATIVE PERCENT: 0.8 % (ref 0–2)
EOSINOPHILS ABSOLUTE: 0.16 E9/L (ref 0.05–0.5)
EOSINOPHILS RELATIVE PERCENT: 1.7 % (ref 0–6)
FERRITIN: 87 NG/ML
HCT VFR BLD CALC: 40.6 % (ref 34–48)
HEMOGLOBIN: 13.2 G/DL (ref 11.5–15.5)
IMMATURE GRANULOCYTES #: 0.03 E9/L
IMMATURE GRANULOCYTES %: 0.3 % (ref 0–5)
IRON SATURATION: 56 % (ref 15–50)
IRON: 136 MCG/DL (ref 37–145)
LYMPHOCYTES ABSOLUTE: 2.63 E9/L (ref 1.5–4)
LYMPHOCYTES RELATIVE PERCENT: 27.3 % (ref 20–42)
MCH RBC QN AUTO: 32.4 PG (ref 26–35)
MCHC RBC AUTO-ENTMCNC: 32.5 % (ref 32–34.5)
MCV RBC AUTO: 99.8 FL (ref 80–99.9)
MONOCYTES ABSOLUTE: 0.4 E9/L (ref 0.1–0.95)
MONOCYTES RELATIVE PERCENT: 4.1 % (ref 2–12)
NEUTROPHILS ABSOLUTE: 6.34 E9/L (ref 1.8–7.3)
NEUTROPHILS RELATIVE PERCENT: 65.8 % (ref 43–80)
PDW BLD-RTO: 13.3 FL (ref 11.5–15)
PLATELET # BLD: 402 E9/L (ref 130–450)
PMV BLD AUTO: 8.6 FL (ref 7–12)
RBC # BLD: 4.07 E12/L (ref 3.5–5.5)
TOTAL IRON BINDING CAPACITY: 244 MCG/DL (ref 250–450)
WBC # BLD: 9.6 E9/L (ref 4.5–11.5)

## 2022-07-15 PROCEDURE — G8427 DOCREV CUR MEDS BY ELIG CLIN: HCPCS | Performed by: INTERNAL MEDICINE

## 2022-07-15 PROCEDURE — 99213 OFFICE O/P EST LOW 20 MIN: CPT | Performed by: INTERNAL MEDICINE

## 2022-07-15 PROCEDURE — 36415 COLL VENOUS BLD VENIPUNCTURE: CPT

## 2022-07-15 PROCEDURE — 85025 COMPLETE CBC W/AUTO DIFF WBC: CPT

## 2022-07-15 PROCEDURE — 83540 ASSAY OF IRON: CPT

## 2022-07-15 PROCEDURE — 99212 OFFICE O/P EST SF 10 MIN: CPT

## 2022-07-15 PROCEDURE — G8417 CALC BMI ABV UP PARAM F/U: HCPCS | Performed by: INTERNAL MEDICINE

## 2022-07-15 PROCEDURE — 83550 IRON BINDING TEST: CPT

## 2022-07-15 PROCEDURE — 82728 ASSAY OF FERRITIN: CPT

## 2022-07-15 PROCEDURE — 4004F PT TOBACCO SCREEN RCVD TLK: CPT | Performed by: INTERNAL MEDICINE

## 2022-07-15 NOTE — PROGRESS NOTES
[address]  [address] [facility] MED ONCOLOGY  [address] [facility] [address]  Dept: 71 Johanne Mayen: [phone]  Attending Progress Note      Reason for Visit:   Anemia. Referring Physician:  Mariana Jones MD    PCP:  Kanwal Hale MD    History of Present Illness:      Ms. Mitzy Valdez is a very pleasant 40-year-old lady, with a past medical history significant for obesity, she status post gastric bypass surgery, hypertension, GERD, depression, and AYALA, who was referred to the hematology office for evaluation and treatment of anemia. The patient had a colonoscopy done on 10/27/2021, had revealed colon polyps, and minimal shallow diverticulitis, last EGD was on 2/7/2021, which had revealed marginal ulcer. The patient CBCD from 11/3/2021 was remarkable for a hemoglobin of 10.1, hematocrit 31.6, MCV 86.3, white count 6, platelet count 721F, iron 32, TIBC 330, TSAT 10%, reticulocytes 0.8%. Patient received Venofer in February 2022, did well with infusions, she was admitted to the hospital, she had on 3/9/2022 and upper endoscopy done, revealing GERD, gastritis in the gastric pouch, she was started on Protonix. She denies any bleeding at this time, she is taking the oral iron about once a week. Feeling tired. Review of Systems;  CONSTITUTIONAL: No fever, chills. Good appetite, feeling tired. Pos for weight loss after the surgery. ENMT: Eyes: No diplopia; Nose: No epistaxis. Mouth: No sore throat. RESPIRATORY: No hemoptysis, shortness of breath, cough. CARDIOVASCULAR: No chest pain, palpitations. GASTROINTESTINAL: Positive for nausea, no vomiting, abdominal pain, diarrhea/constipation. GENITOURINARY: No dysuria, urinary frequency, hematuria. NEURO: No syncope, presyncope, headache, slight dizziness and disorientation.   Remainder:  ROS NEGATIVE    Past Medical History:      Diagnosis Date    Anemia     Arthritis     Asthma     Depression     GERD without esophagitis     History of blood transfusion     History of gastric bypass     Hydronephrosis with urinary obstruction due to ureteral calculus     Hypertension     No meds following sustained weight loss after bariatric surgery. Iron deficiency 08/01/2018    Localized osteoarthritis of left knee 01/20/2020    Lumbar spondylosis 01/26/2018    Lymphedema     Obesity     Panic attacks     Perforated marginal ulcer 10/29/2018    ~1 year after RnYGB    PONV (postoperative nausea and vomiting)     Sleep apnea, obstructive     Off PAP therapy BG following sustained weight loss after bariatric surgery.     Vitamin D deficiency 07/27/2018    Zinc deficiency 08/01/2018     Patient Active Problem List   Diagnosis    Obesity    Tobacco abuse    Panic attacks    AYALA on CPAP    Morbid obesity (HCC)    GERD without esophagitis    S/P gastric bypass    Primary osteoarthritis of right knee    Vitamin D deficiency    Zinc deficiency    Iron deficiency    Thrombocytosis    Microcytic anemia    Red blood cell antibody positive    Epigastric pain    Anxiety and depression    Localized osteoarthritis of left knee    Chronic pain syndrome    Chronic bilateral low back pain without sciatica    Lumbosacral spondylosis without myelopathy    Other dysphagia    Perforated abdominal viscus    Chronic abdominal pain    Abdominal pain of multiple sites    Lymphedema    Abdominal pain    Elevated LFTs    Uncomplicated asthma    [id number]        Past Surgical History:      Procedure Laterality Date    APPENDECTOMY  1996    CHOLECYSTECTOMY, LAPAROSCOPIC N/A 7/27/2019    CHOLECYSTECTOMY LAPAROSCOPIC performed by Marilou Mendez MD at [address] [place] [address] N/A 10/27/2021    COLONOSCOPY POLYPECTOMY SNARE/COLD BIOPSY performed by Santos Wilson MD at [facility]  10/23/2019    HYSTERECTOMY (CERVIX STATUS UNKNOWN)  01/22/2020    [facility]    LAPAROSCOPY N/A 6/27/2020    DIAGNOSTIC  LAPAROSCOPY, REPAIR PERFORATED MARGINAL ULCER, UPPER ENDOSCOPY. [facility] PATCH performed by Erwin Dangelo MD at [address]    LITHOTRIPSY Right 7/24/2019    CYSTOSCOPY RETROGRADE PYELOGRAM URETEROSCOPY J STENT LASER LITHOTRIPSY RIGHT performed by Dameon Chambres DO at [facility] OFFICE/OUTPT VISIT,PROCEDURE ONLY N/A 10/29/2018    DIAGNOSTIC LAPAROSCOPY REPAIR PERFORATED VISCUS [facility]  11/14/2017    STOMACH SURGERY      UPPER GASTROINTESTINAL ENDOSCOPY N/A 7/27/2019    EGD ESOPHAGOGASTRODUODENOSCOPY performed by Josemanuel Cruz MD at [address] [place] [address] N/A 2/7/2020    EGD BIOPSY performed by Josemanuel Cruz MD at [facility] N/A 6/27/2020    EGD DIAGNOSTIC ONLY performed by Erwin Dangelo MD at [address] [place] [address] N/A 3/11/2022    EGD BIOPSY performed by Mickie Jacinto MD at [facility] ENDOSCOPY       Family History:  Family History   Problem Relation Age of Onset    Heart Attack Mother 61        death from this    Diabetes Mother     Depression Mother     Diabetes Father     Stroke Father     Cancer Maternal Grandfather     Cancer Paternal Grandmother     Stroke Paternal Grandfather     Substance Abuse Brother     Heart Attack Brother        Medications:  Reviewed and reconciled. Social History:  Social History     Socioeconomic History    Marital status: Single     Spouse name: Not on file    Number of children: Not on file    Years of education: Not on file    Highest education level: Not on file   Occupational History    Occupation: unemployed/disability   Tobacco Use    Smoking status: Every Day     Packs/day: 0.50     Years: 15.00     Pack years: 7.50     Types: Cigarettes     Start date: 10/29/1991    Smokeless tobacco: Never   Vaping Use    Vaping Use: Never used   Substance and Sexual Activity    Alcohol use:  Yes     Alcohol/week: 6.0 standard drinks Types: 6 Standard drinks or equivalent per week     Comment: social    Drug use: No    Sexual activity: Yes     Partners: Male     Birth control/protection: Surgical   Other Topics Concern    Not on file   Social History Narrative    Not on file     Social Determinants of Health     Financial Resource Strain: Not on file   Food Insecurity: Not on file   Transportation Needs: Not on file   Physical Activity: Not on file   Stress: Not on file   Social Connections: Not on file   Intimate Partner Violence: Not on file   Housing Stability: Not on file       Allergies: Allergies   Allergen Reactions    Pcn [Penicillins] Anaphylaxis     CHILDHOOD ALLERGY    Food Hives     ALLERGY IS TO BERRIES    Ibuprofen Other (See Comments)     PATIENT HAS BARIATRIC SX       Physical Exam:  BP (!) 103/57   Pulse 82   Temp 98 °F (36.7 °C)   Ht 5' 5\" (1.651 m)   Wt 181 lb 9.6 oz (82.4 kg)   LMP 10/21/2019 (Exact Date)   SpO2 99%   BMI 30.22 kg/m²   GENERAL: Alert, oriented x 3, not in acute distress. HEENT: PERRLA; EOMI. Oropharynx clear. NECK: Supple. No palpable cervical or supraclavicular lymphadenopathy. LUNGS: Good air entry bilaterally. No wheezing, crackles or rhonchi. CARDIOVASCULAR: Regular rate. No murmurs, rubs or gallops. ABDOMEN: Soft. Non-tender, non-distended. Positive bowel sounds. EXTREMITIES: Without clubbing, cyanosis, positive for lower leg lymphedema  NEUROLOGIC: No focal deficits. ECOG PS 1    Impression/Plan:       Ms. Sameera Singer is a very pleasant 53-year-old lady, with a past medical history significant for obesity, she status post gastric bypass surgery, hypertension, GERD, depression, and AYALA, who was referred to the hematology office for evaluation and treatment of anemia. The patient had a colonoscopy done on 10/27/2021, had revealed colon polyps, and minimal shallow diverticulitis, last EGD was on 2/7/2021, which had revealed marginal ulcer.   The patient CBCD from 11/3/2021 was remarkable for a hemoglobin of 10.1, hematocrit 31.6, MCV 86.3, white count 6, platelet count 538X, iron 32, TIBC 330, TSAT 10%, reticulocytes 0.8%. The patient has been on oral iron, which causes GI side effects. 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 She denies any bleeding at this time, she is taking the oral iron once a week. Labs reviewed, the patient is not anemic at this time, and is not iron deficient, no indication for parenteral infusion at this time, but she will probably require parenteral iron infusion periodically in the future. We will continue to monitor her CBCD and iron studies. RTC in about 3 months    Thank you for allowing us to participate in the care of Ms. Crow.     Isaac Spear MD   HEMATOLOGY/MEDICAL [facility]  [address] MED ONCOLOGY  [address] [place] [id number]  Dept: 71 Johanne Mayen: [phone]

## 2022-09-28 ENCOUNTER — OFFICE VISIT (OUTPATIENT)
Dept: PHYSICAL MEDICINE AND REHAB | Age: 47
End: 2022-09-28
Payer: MEDICARE

## 2022-09-28 VITALS
TEMPERATURE: 97.5 F | DIASTOLIC BLOOD PRESSURE: 77 MMHG | SYSTOLIC BLOOD PRESSURE: 111 MMHG | WEIGHT: 185 LBS | BODY MASS INDEX: 30.82 KG/M2 | HEART RATE: 81 BPM | HEIGHT: 65 IN

## 2022-09-28 DIAGNOSIS — M17.12 PRIMARY LOCALIZED OSTEOARTHRITIS OF LEFT KNEE: ICD-10-CM

## 2022-09-28 DIAGNOSIS — M17.11 PRIMARY OSTEOARTHRITIS OF RIGHT KNEE: ICD-10-CM

## 2022-09-28 PROCEDURE — 20611 DRAIN/INJ JOINT/BURSA W/US: CPT | Performed by: PHYSICAL MEDICINE & REHABILITATION

## 2022-09-28 RX ORDER — TRAMADOL HYDROCHLORIDE 50 MG/1
50 TABLET ORAL EVERY 6 HOURS PRN
Qty: 28 TABLET | Refills: 0 | Status: SHIPPED | OUTPATIENT
Start: 2022-09-28 | End: 2022-10-08

## 2022-09-28 RX ORDER — TOPIRAMATE 25 MG/1
TABLET ORAL
COMMUNITY
Start: 2022-08-29

## 2022-09-28 RX ORDER — TRIAMCINOLONE ACETONIDE 40 MG/ML
40 INJECTION, SUSPENSION INTRA-ARTICULAR; INTRAMUSCULAR ONCE
Status: COMPLETED | OUTPATIENT
Start: 2022-09-28 | End: 2022-09-28

## 2022-09-28 RX ORDER — LIDOCAINE HYDROCHLORIDE 10 MG/ML
8 INJECTION, SOLUTION INFILTRATION; PERINEURAL ONCE
Status: COMPLETED | OUTPATIENT
Start: 2022-09-28 | End: 2022-09-28

## 2022-09-28 RX ADMIN — TRIAMCINOLONE ACETONIDE 40 MG: 40 INJECTION, SUSPENSION INTRA-ARTICULAR; INTRAMUSCULAR at 14:03

## 2022-09-28 RX ADMIN — LIDOCAINE HYDROCHLORIDE 8 ML: 10 INJECTION, SOLUTION INFILTRATION; PERINEURAL at 14:03

## 2022-09-28 NOTE — PROGRESS NOTES
Cherri Lima D.O. Section Physical Medicine and Rehabilitation  [address] [facility]  Phone: [phone]  Fax: [phone]    9/28/2022    Chief Complaint   Patient presents with    Knee Pain     Right knee steroid injection       Last injection:6/6/22  Taking anticoagulants/antiplatelets: No  Diabetic: No  Febrile/active infection: No    Ambulatory Procedure Time Out  Correct Patient: Yes  Correct Procedure: Yes  Correct Site/Side: Yes  Correct Site(s) Marked: Yes  Informed Consent Signed: Yes  Allergies Verified: Yes  Staff Present & Credential[de-identified] Johnny Jones LPN, Dr. Demi Lara DO    After explaining the indications, risks, benefits and alternatives of a right knee joint injection, the patient agreed to proceed. A permit was signed and scanned into the chart. The skin on the lateral knee was prepared with chloraprep. Using sterile technique, a 22 gauge, 1.5\" needle with 1 cc of Kenalog 40mg/cc and 8 cc of 1% lidocaine was directed to the knee joint using US guidance. After negative aspiration, the medication was injected. Adequate hemostasis was achieved and a bandage applied. The patient tolerated the procedure well and was educated in post injection care. The patient was clinically monitored after the injection and left the office without incident. There was post injection reduction in pain. Ultrasound images are scanned separately into the EMR. Cherri Lima D.O., P.T.   Board Certified Physical Medicine and Rehabilitation  Board Certified Electrodiagnostic Medicine    Administrations This Visit       lidocaine 1 % injection 8 mL       Admin Date  09/28/2022  14:03 Action  Given Dose  8 mL Route  Other Site   Administered By  Johnanna Shone, MA    Ordering Provider: Jakob Triana DO    [facility]: [id number]    Lot#: [id number]    : [facility]    Patient Supplied?: No              triamcinolone acetonide (KENALOG-40) injection 40 mg       Admin Date  09/28/2022  14:03 Action  Given Dose  40 mg Route  Intra-artICUlar Site   Administered By  Azul Duggan MA    Ordering Provider: Palmer Jacome DO    NDC: 5693-8118-10    Lot#: [id number]    : [employer] (PRIMARY CARE)    Patient Supplied?: No

## 2022-10-05 ENCOUNTER — OFFICE VISIT (OUTPATIENT)
Dept: PHYSICAL MEDICINE AND REHAB | Age: 47
End: 2022-10-05
Payer: MEDICARE

## 2022-10-05 VITALS
DIASTOLIC BLOOD PRESSURE: 79 MMHG | HEART RATE: 76 BPM | BODY MASS INDEX: 30.16 KG/M2 | TEMPERATURE: 97.5 F | HEIGHT: 65 IN | WEIGHT: 181 LBS | SYSTOLIC BLOOD PRESSURE: 119 MMHG

## 2022-10-05 DIAGNOSIS — M17.12 OSTEOARTHRITIS OF LEFT KNEE, UNSPECIFIED OSTEOARTHRITIS TYPE: Primary | ICD-10-CM

## 2022-10-05 PROCEDURE — 20611 DRAIN/INJ JOINT/BURSA W/US: CPT | Performed by: PHYSICAL MEDICINE & REHABILITATION

## 2022-10-05 RX ORDER — TRIAMCINOLONE ACETONIDE 40 MG/ML
40 INJECTION, SUSPENSION INTRA-ARTICULAR; INTRAMUSCULAR ONCE
Status: COMPLETED | OUTPATIENT
Start: 2022-10-05 | End: 2022-10-05

## 2022-10-05 RX ORDER — SERTRALINE HYDROCHLORIDE 25 MG/1
TABLET, FILM COATED ORAL
COMMUNITY
Start: 2022-10-03

## 2022-10-05 RX ORDER — LIDOCAINE HYDROCHLORIDE 10 MG/ML
8 INJECTION, SOLUTION INFILTRATION; PERINEURAL ONCE
Status: COMPLETED | OUTPATIENT
Start: 2022-10-05 | End: 2022-10-05

## 2022-10-05 RX ADMIN — TRIAMCINOLONE ACETONIDE 40 MG: 40 INJECTION, SUSPENSION INTRA-ARTICULAR; INTRAMUSCULAR at 14:33

## 2022-10-05 RX ADMIN — LIDOCAINE HYDROCHLORIDE 8 ML: 10 INJECTION, SOLUTION INFILTRATION; PERINEURAL at 14:32

## 2022-10-05 NOTE — PROGRESS NOTES
Sylvester Rich D.O. [place] Physical Medicine and Rehabilitation  [address] [facility]  Phone: [phone]  Fax: [phone]    10/5/2022    Chief Complaint   Patient presents with    Knee Pain     Left knee steroid injection       Last injection:6/16/22  Taking anticoagulants/antiplatelets: No  Diabetic: No  Febrile/active infection: No    Ambulatory Procedure Time Out  Correct Patient: Yes  Correct Procedure: Yes  Correct Site/Side: Yes  Correct Site(s) Marked: Yes  Informed Consent Signed: Yes  Allergies Verified: Yes  Staff Present & Credential[de-identified] Makayla Gage LPN, Dr. Destiny Duggan DO    After explaining the indications, risks, benefits and alternatives of a left knee joint injection, the patient agreed to proceed. A permit was signed and scanned into the chart. The skin on the lateral knee was prepared with chloraprep. Using sterile technique, a 22 gauge, 1.5\" needle with 1 cc of Kenalog 40mg/cc and 8 cc of 1% lidocaine was directed to the knee joint using US guidance. After negative aspiration, the medication was injected. Adequate hemostasis was achieved and a bandage applied. The patient tolerated the procedure well and was educated in post injection care. The patient was clinically monitored after the injection and left the office without incident. There was post injection reduction in pain. Ultrasound images are scanned separately into the EMR. Sylvester Rich D.O., P.T.   Board Certified Physical Medicine and Rehabilitation  Board Certified Electrodiagnostic Medicine    Administrations This Visit       lidocaine 1 % injection 8 mL       Admin Date  10/05/2022  14:32 Action  Given Dose  8 mL Route  Other Site   Administered By  Masood Martínez RN    Ordering Provider: Joseph Velásquez DO    [address]: [id number]    Lot#: [id number]    : [id number] [facility]    Patient Supplied?: No              triamcinolone acetonide (KENALOG-40) injection 40 mg       Admin Date  10/05/2022  14:33 Action  Given Dose  40 mg Route  Intra-artICUlar Site   Administered By  Jovanna Zayas RN    Ordering Provider: Nelson Samuel DO    NDC: 3422-8907-21    Lot#: [id number]    : [email or web address] [place] (PRIMARY CARE)    Patient Supplied?: No

## 2022-10-12 ENCOUNTER — TELEPHONE (OUTPATIENT)
Dept: PHYSICAL MEDICINE AND REHAB | Age: 47
End: 2022-10-12

## 2022-10-12 NOTE — TELEPHONE ENCOUNTER
Called patient for follow up on injections both knees, patient states she received 90%effectiveness from injection with no side effects.

## 2022-10-21 ENCOUNTER — HOSPITAL ENCOUNTER (OUTPATIENT)
Dept: INFUSION THERAPY | Age: 47
End: 2022-10-21

## 2022-11-10 DIAGNOSIS — K91.2 MALNUTRITION FOLLOWING GASTROINTESTINAL SURGERY: Primary | ICD-10-CM

## 2022-11-11 ENCOUNTER — HOSPITAL ENCOUNTER (OUTPATIENT)
Dept: GENERAL RADIOLOGY | Age: 47
Discharge: HOME | End: 2022-11-13
Payer: MEDICARE

## 2022-11-11 ENCOUNTER — HOSPITAL ENCOUNTER (OUTPATIENT)
Age: 47
Discharge: HOME | End: 2022-11-13
Payer: MEDICARE

## 2022-11-11 ENCOUNTER — OFFICE VISIT (OUTPATIENT)
Dept: FAMILY MEDICINE CLINIC | Age: 47
End: 2022-11-11
Payer: MEDICARE

## 2022-11-11 VITALS
BODY MASS INDEX: 30.7 KG/M2 | HEART RATE: 72 BPM | WEIGHT: 191 LBS | SYSTOLIC BLOOD PRESSURE: 104 MMHG | HEIGHT: 66 IN | OXYGEN SATURATION: 94 % | DIASTOLIC BLOOD PRESSURE: 72 MMHG | RESPIRATION RATE: 18 BRPM | TEMPERATURE: 97.7 F

## 2022-11-11 DIAGNOSIS — G89.29 CHRONIC BILATERAL LOW BACK PAIN WITHOUT SCIATICA: ICD-10-CM

## 2022-11-11 DIAGNOSIS — M54.50 CHRONIC BILATERAL LOW BACK PAIN WITHOUT SCIATICA: ICD-10-CM

## 2022-11-11 DIAGNOSIS — M54.50 CHRONIC BILATERAL LOW BACK PAIN WITHOUT SCIATICA: Primary | ICD-10-CM

## 2022-11-11 DIAGNOSIS — G89.29 CHRONIC BILATERAL LOW BACK PAIN WITHOUT SCIATICA: Primary | ICD-10-CM

## 2022-11-11 PROCEDURE — 99213 OFFICE O/P EST LOW 20 MIN: CPT | Performed by: STUDENT IN AN ORGANIZED HEALTH CARE EDUCATION/TRAINING PROGRAM

## 2022-11-11 PROCEDURE — G8427 DOCREV CUR MEDS BY ELIG CLIN: HCPCS | Performed by: STUDENT IN AN ORGANIZED HEALTH CARE EDUCATION/TRAINING PROGRAM

## 2022-11-11 PROCEDURE — G8417 CALC BMI ABV UP PARAM F/U: HCPCS | Performed by: STUDENT IN AN ORGANIZED HEALTH CARE EDUCATION/TRAINING PROGRAM

## 2022-11-11 PROCEDURE — 73521 X-RAY EXAM HIPS BI 2 VIEWS: CPT

## 2022-11-11 PROCEDURE — G8484 FLU IMMUNIZE NO ADMIN: HCPCS | Performed by: STUDENT IN AN ORGANIZED HEALTH CARE EDUCATION/TRAINING PROGRAM

## 2022-11-11 PROCEDURE — 4004F PT TOBACCO SCREEN RCVD TLK: CPT | Performed by: STUDENT IN AN ORGANIZED HEALTH CARE EDUCATION/TRAINING PROGRAM

## 2022-11-11 PROCEDURE — 99212 OFFICE O/P EST SF 10 MIN: CPT | Performed by: STUDENT IN AN ORGANIZED HEALTH CARE EDUCATION/TRAINING PROGRAM

## 2022-11-11 PROCEDURE — 72100 X-RAY EXAM L-S SPINE 2/3 VWS: CPT

## 2022-11-11 RX ORDER — CLOBETASOL PROPIONATE 0.5 MG/G
OINTMENT TOPICAL
Qty: 45 G | Refills: 0 | Status: CANCELLED | OUTPATIENT
Start: 2022-11-11

## 2022-11-11 RX ORDER — ALBUTEROL SULFATE 90 UG/1
2 AEROSOL, METERED RESPIRATORY (INHALATION) EVERY 6 HOURS PRN
Qty: 18 G | Refills: 2 | Status: CANCELLED | OUTPATIENT
Start: 2022-11-11

## 2022-11-11 RX ORDER — CLOTRIMAZOLE AND BETAMETHASONE DIPROPIONATE 10; .64 MG/G; MG/G
CREAM TOPICAL
Qty: 45 G | Refills: 0 | Status: CANCELLED | OUTPATIENT
Start: 2022-11-11

## 2022-11-11 RX ORDER — SUCRALFATE 1 G/1
1 TABLET ORAL
Qty: 120 TABLET | Refills: 1 | Status: CANCELLED | OUTPATIENT
Start: 2022-11-11

## 2022-11-11 RX ORDER — CYCLOBENZAPRINE HCL 5 MG
5 TABLET ORAL 2 TIMES DAILY PRN
Qty: 30 TABLET | Refills: 0 | Status: SHIPPED | OUTPATIENT
Start: 2022-11-11 | End: 2022-11-21

## 2022-11-11 RX ORDER — ACETAMINOPHEN 500 MG
1000 TABLET ORAL EVERY 6 HOURS PRN
Qty: 120 TABLET | Refills: 2 | Status: SHIPPED | OUTPATIENT
Start: 2022-11-11

## 2022-11-11 RX ORDER — ONDANSETRON 4 MG/1
4 TABLET, ORALLY DISINTEGRATING ORAL EVERY 8 HOURS PRN
Qty: 30 TABLET | Refills: 1 | Status: CANCELLED | OUTPATIENT
Start: 2022-11-11

## 2022-11-11 SDOH — ECONOMIC STABILITY: TRANSPORTATION INSECURITY
IN THE PAST 12 MONTHS, HAS LACK OF TRANSPORTATION KEPT YOU FROM MEETINGS, WORK, OR FROM GETTING THINGS NEEDED FOR DAILY LIVING?: NO

## 2022-11-11 SDOH — ECONOMIC STABILITY: TRANSPORTATION INSECURITY
IN THE PAST 12 MONTHS, HAS THE LACK OF TRANSPORTATION KEPT YOU FROM MEDICAL APPOINTMENTS OR FROM GETTING MEDICATIONS?: NO

## 2022-11-11 SDOH — ECONOMIC STABILITY: FOOD INSECURITY: WITHIN THE PAST 12 MONTHS, YOU WORRIED THAT YOUR FOOD WOULD RUN OUT BEFORE YOU GOT MONEY TO BUY MORE.: SOMETIMES TRUE

## 2022-11-11 SDOH — ECONOMIC STABILITY: FOOD INSECURITY: WITHIN THE PAST 12 MONTHS, THE FOOD YOU BOUGHT JUST DIDN'T LAST AND YOU DIDN'T HAVE MONEY TO GET MORE.: SOMETIMES TRUE

## 2022-11-11 ASSESSMENT — SOCIAL DETERMINANTS OF HEALTH (SDOH): HOW HARD IS IT FOR YOU TO PAY FOR THE VERY BASICS LIKE FOOD, HOUSING, MEDICAL CARE, AND HEATING?: NOT VERY HARD

## 2022-11-11 NOTE — PROGRESS NOTES
S: 50 y.o. female with lower back pain since Monday, right side, radiates down leg. Prolonged standing/walking worsens. No urinary/stool changes. No saddle anesthesia. Lateral aspect, numbness/tingling. Had T12 fx, had jumped out of a window. Pain is new for her. Radiates to bilateral anterior thighs. Cannot take NSAIDs. RYGB. Has tramadol from another prescriber. Helps. O: VS: /72 (Site: Right Upper Arm, Position: Sitting, Cuff Size: Medium Adult)   Pulse 72   Temp 97.7 °F (36.5 °C) (Temporal)   Resp 18   Ht 5' 6\" (1.676 m)   Wt 191 lb (86.6 kg)   LMP 10/21/2019 (Exact Date)   SpO2 94%   BMI 30.83 kg/m²    General: NAD, appropriate affect and grooming; unable to be examined on table or lie supine    CV:  RRR, no gallops, rubs, or murmurs   Resp: CTAB   Abd:  Soft, nontender   Ext:  Lymphedema bilaterally, 3+ edema. Tender lateral aspect of hips. Knee ROM intact. Back:  light palpation from cervix to lumbar with exquisite tenderness, muscles, midline. Distal motor and sensation intact. Impression: Acute low back pain, atraumatic, radiates to LE bilaterally. Plan: Unable to use NSAIDs. XR LS spine, hips. Gentle stretching, eventual PT. Muscle relaxer with caution for drowsiness. Tylenol as directed. Local ice/heat. Topical therapies. Follow up 2 weeks or sooner prn. Attending Physician Statement  I have discussed the case, including pertinent history and exam findings with the resident. I agree with the documented assessment and plan.

## 2022-11-11 NOTE — PROGRESS NOTES
[address] [facility]  Department of Family Medicine  Family Medicine Residency Program  DATE OF VISIT : 2022      Patient:  Galina Mcdonald  Age: 50 y.o.       : 1975      Chief complaint:   Chief Complaint   Patient presents with    Back Pain         History of Present Illness     Galina Mcdnoald is a 50 y.o. female who presented to the clinic today for back pain    Back pain  -Reports overall chronic back pain since injury in  but noticed a sudden change since Monday. She denies any trauma to her back. -Reports pain wrapping around her left hip, going down leg to lateral thigh. Describes pain as overall shooting pain  -Reports that in  she had jumped out 2nd floor window and broke 2 lower vertebra  -Currently on chronic has taken tramadol prescribed by [facility]   - unable to take NSAID's due to gastric bypass. Hx of stomach ulcers/bleeding  - reports PT has help out in the past but is for short periods   - standing and walking makes it worse  - heating pad makes it better  - no red flag signs such as fecal or urinary incontinence, saddle numbness, or rectal pain. .        Medication List:    Current Outpatient Medications   Medication Sig Dispense Refill    acetaminophen (TYLENOL) 500 MG tablet Take 2 tablets by mouth every 6 hours as needed for Pain Maximum dose- 8 tablets/24 hours.  120 tablet 2    cyclobenzaprine (FLEXERIL) 5 MG tablet Take 1 tablet by mouth 2 times daily as needed for Muscle spasms 30 tablet 0    hydrOXYzine pamoate (VISTARIL) 100 MG capsule       clobetasol (TEMOVATE) 0.05 % ointment Apply twice a day for 2 weeks and then daily for 2 weeks 45 g 0    clotrimazole-betamethasone (LOTRISONE) 1-0.05 % cream Apply topically 2 times a day for 10 days 45 g 0    albuterol sulfate HFA (VENTOLIN HFA) 108 (90 Base) MCG/ACT inhaler Inhale 2 puffs into the lungs every 6 hours as needed for Wheezing 18 g 2    ondansetron (ZOFRAN-ODT) 4 MG disintegrating tablet Place 1 tablet under the tongue every 8 hours as needed for Nausea or Vomiting 30 tablet 1    sucralfate (CARAFATE) 1 GM tablet Take 1 tablet by mouth Daily with lunch 120 tablet 1    ferrous sulfate (IRON SLOW RELEASE) 142 (45 Fe) MG extended release tablet Take 142 mg by mouth every other day 30 tablet 2    omeprazole (PRILOSEC) 20 MG delayed release capsule take 1 capsule by mouth once daily 90 capsule 1    LORazepam (ATIVAN) 0.5 MG tablet take 1 tablet by mouth once daily if needed for anxiety for 1 month      Ascorbic Acid (VITAMIN C) 250 MG tablet Take 250 mg by mouth every other day      Multiple Vitamin (MVI, BARIATRIC ADVANTAGE MULTI-FORMULA, CHEW TAB) Take 1 tablet by mouth every other day      buPROPion (WELLBUTRIN SR) 200 MG extended release tablet Take 200 mg by mouth 2 times daily       fluconazole (DIFLUCAN) 150 MG tablet Take one tablet every 3 days (Patient not taking: Reported on 11/11/2022) 2 tablet 0    sertraline (ZOLOFT) 25 MG tablet take 1 tablet by mouth once daily (Patient not taking: Reported on 11/11/2022)      topiramate (TOPAMAX) 25 MG tablet take 1 tablet by mouth at bedtime (Patient not taking: Reported on 11/11/2022)      sennosides-docusate sodium (SENOKOT-S) 8.6-50 MG tablet Take 1 tablet by mouth 2 times daily as needed for Constipation (Patient not taking: Reported on 11/11/2022)       No current facility-administered medications for this visit. ROS   Reviewed as above, otherwise negative       Physical Exam   Vitals:   Vitals:    11/11/22 1043   BP: 104/72   Pulse: 72   Resp: 18   Temp: 97.7 °F (36.5 °C)   SpO2: 94%       Physical Exam  Cardiovascular:      Rate and Rhythm: Normal rate and regular rhythm. Pulmonary:      Effort: Pulmonary effort is normal.      Breath sounds: Normal breath sounds. Abdominal:      General: Bowel sounds are normal.      Palpations: Abdomen is soft. Musculoskeletal:      Cervical back: Tenderness and bony tenderness present.       Thoracic back: Tenderness and bony tenderness present. Lumbar back: Tenderness and bony tenderness present. Negative right straight leg raise test and negative left straight leg raise test.      Right hip: Tenderness and bony tenderness present. Comments: Limited MSK portion of physical exam due to overall pain. Strength 5 out of 5 on hip flexion but does reproduce pain at hip site. Full range of motion bilateral knees. Tender to palpation of spinous and paraspinous muscles throughout cervical, thoracic and lumbar region. Feet:      Comments: Bilateral lymphedema 3+        Assessment and Plan     1. Chronic bilateral low back pain without sciatica  -Hold off on prescribing any NSAIDs at this time due to history of gastric bypass and gastric ulcer/bleeding.  -We will obtain x-rays of lumbar spine and hip to rule out any acute fractures/injuries. Patient will be started on muscle relaxer for a short course. Drowsiness may occur.  -Patient may use Tylenol for pain relief. Can also consider stretching.  -Refuse PT at this time. Reports will consider it for a later time. - XR LUMBAR SPINE (2-3 VIEWS); Future  - XR HIP BILATERAL W AP PELVIS (2 VIEWS); Future  - cyclobenzaprine (FLEXERIL) 5 MG tablet; Take 1 tablet by mouth 2 times daily as needed for Muscle spasms  Dispense: 30 tablet;  Refill: 0      RTO 2 to 4-week follow-up for right hip pain  Case discussed with Dr. Clayton Arauz MD

## 2022-11-16 ENCOUNTER — OFFICE VISIT (OUTPATIENT)
Dept: BARIATRICS/WEIGHT MGMT | Age: 47
End: 2022-11-16
Payer: MEDICARE

## 2022-11-16 VITALS
SYSTOLIC BLOOD PRESSURE: 166 MMHG | BODY MASS INDEX: 30.7 KG/M2 | HEIGHT: 66 IN | WEIGHT: 191 LBS | RESPIRATION RATE: 20 BRPM | DIASTOLIC BLOOD PRESSURE: 78 MMHG | TEMPERATURE: 97.7 F | HEART RATE: 72 BPM

## 2022-11-16 DIAGNOSIS — K91.2 MALNUTRITION FOLLOWING GASTROINTESTINAL SURGERY: Primary | ICD-10-CM

## 2022-11-16 PROCEDURE — G8417 CALC BMI ABV UP PARAM F/U: HCPCS | Performed by: SURGERY

## 2022-11-16 PROCEDURE — G8427 DOCREV CUR MEDS BY ELIG CLIN: HCPCS | Performed by: SURGERY

## 2022-11-16 PROCEDURE — 4004F PT TOBACCO SCREEN RCVD TLK: CPT | Performed by: SURGERY

## 2022-11-16 PROCEDURE — 99214 OFFICE O/P EST MOD 30 MIN: CPT | Performed by: SURGERY

## 2022-11-16 PROCEDURE — 99211 OFF/OP EST MAY X REQ PHY/QHP: CPT

## 2022-11-16 PROCEDURE — G8484 FLU IMMUNIZE NO ADMIN: HCPCS | Performed by: SURGERY

## 2022-11-16 NOTE — PATIENT INSTRUCTIONS
Please continue to take your vitamin and mineral supplements as instructed. If you received a blood work prescription today for laboratory monitoring due prior to your next routine follow-up visit, please have this blood work obtained 10 to 14 days prior to your next visit. It is important to fast for 12 hours prior to routine weight loss surgery blood work, EXCEPT for drinking water, to ensure accuracy of results. Please report nausea, vomiting, abdominal pain, or any other problems you experience to your surgeon. For problems related to weight loss surgery, it is best to go to [address] [place] [address] Emergency Department and have your surgeon paged.

## 2022-11-16 NOTE — PROGRESS NOTES
Nikki Alberts  11/16/2022  [address]     Viviane-en- Y Gastric Bypass  5 Year Post-Operative Follow-up     Nikik Alberts is a 50 y.o. female who is 5 years post Laparoscopic Viviane-en-Y Gastric Bypass surgery. Reports smoking still with complex perforated ulcer hx and is having bloating abdominal pain intermittently . She is not having swallowing difficulty, is compliant most of the time with the multivitamins and calcium + Vit D. She is meeting fluid recommendations of at least 64 ounces per day and is meeting protein recommendations. She  is not exercising: no regular exercise. Weight=191 lb (86.6 kg)  Today's weight represents a total weight loss of 280 pounds since surgery with 23. pounds regained since the lowest weight. Prior to Admission medications    Medication Sig Start Date End Date Taking? Authorizing Provider   acetaminophen (TYLENOL) 500 MG tablet Take 2 tablets by mouth every 6 hours as needed for Pain Maximum dose- 8 tablets/24 hours.  11/11/22  Yes Melani Schroeder MD   cyclobenzaprine (FLEXERIL) 5 MG tablet Take 1 tablet by mouth 2 times daily as needed for Muscle spasms 11/11/22 11/21/22 Yes Melani Schroeder MD   hydrOXYzine pamoate (VISTARIL) 100 MG capsule  10/4/22  Yes Historical Provider, MD   clobetasol (TEMOVATE) 0.05 % ointment Apply twice a day for 2 weeks and then daily for 2 weeks 10/20/22  Yes Berry Lunsford DO   clotrimazole-betamethasone (LOTRISONE) 1-0.05 % cream Apply topically 2 times a day for 10 days 10/20/22  Yes Berry Lunsford DO   fluconazole (DIFLUCAN) 150 MG tablet Take one tablet every 3 days 10/20/22  Yes Berry Lunsford DO   sertraline (ZOLOFT) 25 MG tablet  10/3/22  Yes Historical Provider, MD   topiramate (TOPAMAX) 25 MG tablet  8/29/22  Yes Historical Provider, MD   albuterol sulfate HFA (VENTOLIN HFA) 108 (90 Base) MCG/ACT inhaler Inhale 2 puffs into the lungs every 6 hours as needed for Wheezing 7/8/22  Yes Juan C Simon MD   ondansetron (ZOFRAN-ODT) 4 MG disintegrating tablet Place 1 tablet under the tongue every 8 hours as needed for Nausea or Vomiting 7/8/22  Yes Alvarez Mcneill MD   sucralfate (CARAFATE) 1 GM tablet Take 1 tablet by mouth Daily with lunch 7/8/22  Yes Alvarez Mcneill MD   ferrous sulfate (IRON SLOW RELEASE) 142 (45 Fe) MG extended release tablet Take 142 mg by mouth every other day 7/8/22  Yes Alvarez Mcneill MD   omeprazole (PRILOSEC) 20 MG delayed release capsule take 1 capsule by mouth once daily 7/8/22  Yes Alvarez Mcneill MD   LORazepam (ATIVAN) 0.5 MG tablet take 1 tablet by mouth once daily if needed for anxiety for 1 month 4/5/22  Yes Historical Provider, MD   Ascorbic Acid (VITAMIN C) 250 MG tablet Take 250 mg by mouth every other day   Yes Historical Provider, MD   Multiple Vitamin (MVI, BARIATRIC ADVANTAGE MULTI-FORMULA, CHEW TAB) Take 1 tablet by mouth every other day   Yes Historical Provider, MD   sennosides-docusate sodium (SENOKOT-S) 8.6-50 MG tablet Take 1 tablet by mouth 2 times daily as needed for Constipation   Yes Historical Provider, MD   buPROPion (WELLBUTRIN SR) 200 MG extended release tablet Take 200 mg by mouth 2 times daily  8/18/21  Yes Historical Provider, MD          Physical exam:   BP (!) 166/78 (Site: Right Lower Arm, Position: Sitting, Cuff Size: Medium Adult)   Pulse 72   Temp 97.7 °F (36.5 °C) (Temporal)   Resp 20   Ht 5' 6\" (1.676 m)   Wt 191 lb (86.6 kg)   LMP 10/21/2019 (Exact Date)   BMI 30.83 kg/m²    General appearance: alert, appears stated age and cooperative  Head: Normocephalic, without obvious abnormality, atraumatic  Neck: no adenopathy, no carotid bruit, no JVD, supple, symmetrical, trachea midline and thyroid not enlarged, symmetric, no tenderness/mass/nodules  Lungs: clear to auscultation bilaterally  Heart: regular rate and rhythm  Abdomen: soft, non-tender; bowel sounds normal; no masses,  no organomegaly  Extremities: extremities normal, atraumatic, no cyanosis or edema    Assessment: Post Viviane-en- Y Gastric Bypass. She does not complain of GERD,  does not have sleep apnea,  does not have diabetes,  does have hypertension off medical treatment. Malnutrition/hypoalbuminemia, intermittent abdominal pain and cramping possible internal hernia    Plan:  If pain continues she will call in for diag lap with possible internal hernia repair. Continue to eat a high protein, low calorie diet, eat small portions very slowly and chew well before swallowing. Drink plenty of water and fluids. Make sure to use fiber to keep the bowels regular. Try to exercise 7 days per week, maintain adequate variety and balance. Always notify the clinic if you have any medical problems. Follow up in 6 months.  REORDER several LAB panels TO FOLLOW NEXT VISIT      Physician Signature: Electronically signed by Dr. Walker Kasper MD

## 2022-11-16 NOTE — PROGRESS NOTES
Patient is 3 yr LRYGB. Water intake is lacking. Protein through mostly foods - some shakes. Vitamin intake is good. Bowel movements - some constipation. Patient states that her abdomen hurts when she is on her feet for prolonged periods of time.

## 2022-11-22 ENCOUNTER — TELEPHONE (OUTPATIENT)
Dept: FAMILY MEDICINE CLINIC | Age: 47
End: 2022-11-22

## 2022-11-22 DIAGNOSIS — K21.9 GERD WITHOUT ESOPHAGITIS: Primary | ICD-10-CM

## 2022-11-22 NOTE — TELEPHONE ENCOUNTER
[facility]for[facility] Coddington called and Carafate is on back order for weeks. The pharmacy states that they do have a liquid for if you could please send in a new script for the liquid or for an equivalent medication.     Please advise    Thank you

## 2022-11-23 RX ORDER — SUCRALFATE ORAL 1 G/10ML
1 SUSPENSION ORAL 4 TIMES DAILY
Qty: 1200 ML | Refills: 2 | Status: SHIPPED | OUTPATIENT
Start: 2022-11-23 | End: 2023-02-21

## 2022-11-23 NOTE — TELEPHONE ENCOUNTER
Liquid equivalent of Carafate sent to pharmacy. As Carafate pills are currently on backorder. Please advise patient to only take either the liquid or pill form of Carafate but never at the same time.     Thank you

## 2023-01-01 ENCOUNTER — HOSPITAL ENCOUNTER (EMERGENCY)
Age: 48
Discharge: HOME | End: 2023-01-01
Payer: MEDICARE

## 2023-01-01 ENCOUNTER — APPOINTMENT (OUTPATIENT)
Dept: CT IMAGING | Age: 48
End: 2023-01-01
Payer: MEDICARE

## 2023-01-01 VITALS
HEIGHT: 66 IN | BODY MASS INDEX: 29.57 KG/M2 | WEIGHT: 184 LBS | TEMPERATURE: 98.1 F | RESPIRATION RATE: 16 BRPM | OXYGEN SATURATION: 99 % | SYSTOLIC BLOOD PRESSURE: 110 MMHG | HEART RATE: 74 BPM | DIASTOLIC BLOOD PRESSURE: 74 MMHG

## 2023-01-01 DIAGNOSIS — R10.84 GENERALIZED ABDOMINAL PAIN: Primary | ICD-10-CM

## 2023-01-01 DIAGNOSIS — K59.00 CONSTIPATION, UNSPECIFIED CONSTIPATION TYPE: ICD-10-CM

## 2023-01-01 DIAGNOSIS — R31.9 HEMATURIA, UNSPECIFIED TYPE: ICD-10-CM

## 2023-01-01 LAB
ALBUMIN SERPL-MCNC: 3.4 G/DL (ref 3.5–5.2)
ALP BLD-CCNC: 85 U/L (ref 35–104)
ALT SERPL-CCNC: 8 U/L (ref 0–32)
ANION GAP SERPL CALCULATED.3IONS-SCNC: 9 MMOL/L (ref 7–16)
AST SERPL-CCNC: 13 U/L (ref 0–31)
BACTERIA: ABNORMAL /HPF
BASOPHILS ABSOLUTE: 0.08 E9/L (ref 0–0.2)
BASOPHILS RELATIVE PERCENT: 0.8 % (ref 0–2)
BILIRUB SERPL-MCNC: 0.3 MG/DL (ref 0–1.2)
BILIRUBIN URINE: NEGATIVE
BLOOD, URINE: ABNORMAL
BUN BLDV-MCNC: 16 MG/DL (ref 6–20)
CALCIUM SERPL-MCNC: 8.4 MG/DL (ref 8.6–10.2)
CHLORIDE BLD-SCNC: 104 MMOL/L (ref 98–107)
CLARITY: CLEAR
CO2: 25 MMOL/L (ref 22–29)
COLOR: YELLOW
CREAT SERPL-MCNC: 0.9 MG/DL (ref 0.5–1)
EOSINOPHILS ABSOLUTE: 0.12 E9/L (ref 0.05–0.5)
EOSINOPHILS RELATIVE PERCENT: 1.3 % (ref 0–6)
GFR SERPL CREATININE-BSD FRML MDRD: >60 ML/MIN/1.73
GLUCOSE BLD-MCNC: 87 MG/DL (ref 74–99)
GLUCOSE URINE: NEGATIVE MG/DL
HCT VFR BLD CALC: 41.7 % (ref 34–48)
HEMOGLOBIN: 13.9 G/DL (ref 11.5–15.5)
IMMATURE GRANULOCYTES #: 0.03 E9/L
IMMATURE GRANULOCYTES %: 0.3 % (ref 0–5)
KETONES, URINE: NEGATIVE MG/DL
LACTIC ACID: 0.6 MMOL/L (ref 0.5–2.2)
LEUKOCYTE ESTERASE, URINE: NEGATIVE
LIPASE: 15 U/L (ref 13–60)
LYMPHOCYTES ABSOLUTE: 2.46 E9/L (ref 1.5–4)
LYMPHOCYTES RELATIVE PERCENT: 26 % (ref 20–42)
MCH RBC QN AUTO: 33 PG (ref 26–35)
MCHC RBC AUTO-ENTMCNC: 33.3 % (ref 32–34.5)
MCV RBC AUTO: 99 FL (ref 80–99.9)
MONOCYTES ABSOLUTE: 0.62 E9/L (ref 0.1–0.95)
MONOCYTES RELATIVE PERCENT: 6.6 % (ref 2–12)
NEUTROPHILS ABSOLUTE: 6.14 E9/L (ref 1.8–7.3)
NEUTROPHILS RELATIVE PERCENT: 65 % (ref 43–80)
NITRITE, URINE: NEGATIVE
PDW BLD-RTO: 13.4 FL (ref 11.5–15)
PH UA: 5.5 (ref 5–9)
PLATELET # BLD: 385 E9/L (ref 130–450)
PMV BLD AUTO: 8.1 FL (ref 7–12)
POTASSIUM SERPL-SCNC: 4.5 MMOL/L (ref 3.5–5)
PROTEIN UA: NEGATIVE MG/DL
RBC # BLD: 4.21 E12/L (ref 3.5–5.5)
RBC UA: ABNORMAL /HPF (ref 0–2)
SODIUM BLD-SCNC: 138 MMOL/L (ref 132–146)
SPECIFIC GRAVITY UA: >=1.03 (ref 1–1.03)
TOTAL PROTEIN: 5.9 G/DL (ref 6.4–8.3)
UROBILINOGEN, URINE: 0.2 E.U./DL
WBC # BLD: 9.5 E9/L (ref 4.5–11.5)
WBC UA: ABNORMAL /HPF (ref 0–5)

## 2023-01-01 PROCEDURE — 83605 ASSAY OF LACTIC ACID: CPT

## 2023-01-01 PROCEDURE — 74177 CT ABD & PELVIS W/CONTRAST: CPT

## 2023-01-01 PROCEDURE — 96374 THER/PROPH/DIAG INJ IV PUSH: CPT

## 2023-01-01 PROCEDURE — 6360000004 HC RX CONTRAST MEDICATION: Performed by: RADIOLOGY

## 2023-01-01 PROCEDURE — 81001 URINALYSIS AUTO W/SCOPE: CPT

## 2023-01-01 PROCEDURE — 2580000003 HC RX 258: Performed by: RADIOLOGY

## 2023-01-01 PROCEDURE — 6360000002 HC RX W HCPCS: Performed by: PHYSICIAN ASSISTANT

## 2023-01-01 PROCEDURE — 99285 EMERGENCY DEPT VISIT HI MDM: CPT

## 2023-01-01 PROCEDURE — 80053 COMPREHEN METABOLIC PANEL: CPT

## 2023-01-01 PROCEDURE — 87088 URINE BACTERIA CULTURE: CPT

## 2023-01-01 PROCEDURE — 85025 COMPLETE CBC W/AUTO DIFF WBC: CPT

## 2023-01-01 PROCEDURE — 83690 ASSAY OF LIPASE: CPT

## 2023-01-01 PROCEDURE — 2580000003 HC RX 258: Performed by: PHYSICIAN ASSISTANT

## 2023-01-01 RX ORDER — SODIUM CHLORIDE 0.9 % (FLUSH) 0.9 %
10 SYRINGE (ML) INJECTION PRN
Status: COMPLETED | OUTPATIENT
Start: 2023-01-01 | End: 2023-01-01

## 2023-01-01 RX ORDER — POLYETHYLENE GLYCOL 3350 17 G/17G
17 POWDER, FOR SOLUTION ORAL DAILY
Qty: 510 G | Refills: 0 | Status: SHIPPED | OUTPATIENT
Start: 2023-01-01 | End: 2023-01-31

## 2023-01-01 RX ORDER — HYDROCODONE BITARTRATE AND ACETAMINOPHEN 5; 325 MG/1; MG/1
1 TABLET ORAL EVERY 8 HOURS PRN
Qty: 9 TABLET | Refills: 0 | Status: SHIPPED | OUTPATIENT
Start: 2023-01-01 | End: 2023-01-04

## 2023-01-01 RX ORDER — KETOROLAC TROMETHAMINE 30 MG/ML
15 INJECTION, SOLUTION INTRAMUSCULAR; INTRAVENOUS ONCE
Status: COMPLETED | OUTPATIENT
Start: 2023-01-01 | End: 2023-01-01

## 2023-01-01 RX ORDER — 0.9 % SODIUM CHLORIDE 0.9 %
1000 INTRAVENOUS SOLUTION INTRAVENOUS ONCE
Status: COMPLETED | OUTPATIENT
Start: 2023-01-01 | End: 2023-01-01

## 2023-01-01 RX ADMIN — IOPAMIDOL 75 ML: 755 INJECTION, SOLUTION INTRAVENOUS at 12:05

## 2023-01-01 RX ADMIN — SODIUM CHLORIDE 1000 ML: 9 INJECTION, SOLUTION INTRAVENOUS at 10:08

## 2023-01-01 RX ADMIN — SODIUM CHLORIDE, PRESERVATIVE FREE 10 ML: 5 INJECTION INTRAVENOUS at 12:03

## 2023-01-01 RX ADMIN — KETOROLAC TROMETHAMINE 15 MG: 30 INJECTION, SOLUTION INTRAMUSCULAR; INTRAVENOUS at 10:09

## 2023-01-01 ASSESSMENT — PAIN DESCRIPTION - DIRECTION: RADIATING_TOWARDS: RLQ OF ABDOMEN

## 2023-01-01 ASSESSMENT — PAIN - FUNCTIONAL ASSESSMENT: PAIN_FUNCTIONAL_ASSESSMENT: 0-10

## 2023-01-01 ASSESSMENT — PAIN DESCRIPTION - FREQUENCY: FREQUENCY: CONTINUOUS

## 2023-01-01 ASSESSMENT — PAIN SCALES - GENERAL
PAINLEVEL_OUTOF10: 8
PAINLEVEL_OUTOF10: 8

## 2023-01-01 ASSESSMENT — PAIN DESCRIPTION - LOCATION: LOCATION: FLANK

## 2023-01-01 ASSESSMENT — PAIN DESCRIPTION - ORIENTATION: ORIENTATION: RIGHT

## 2023-01-01 NOTE — ED PROVIDER NOTES
[facility]  Department of Emergency Medicine   ED  Encounter Note  Admit Date/RoomTime: 2023  9:24 AM  ED Room:     NAME: Janis Shelton  : 1975  MRN: [id number]     Chief Complaint:  Flank Pain (Rt flank radiating to RLQ of abdomen)    History of Present Illness       Janis Shelton is a 50 y.o. old female who presents to the emergency department by private vehicle, for intermittent right flank pain rating to her right lower quadrant the past few days. Patient states it is associated with urinary urgency. Patient states her symptoms are mild in severity and describes as aching. Patient denies anything making it better or worse. Patient has a history of kidney stones. Denies fever/chills, headache, vision change, dizziness, chest pain, dyspnea, NVD, dysuria, hematuria, numbness/weakness. ROS   Pertinent positives and negatives are stated within HPI, all other systems reviewed and are negative. Past Medical History:  has a past medical history of Anemia, Arthritis, Asthma, Depression, GERD without esophagitis, History of blood transfusion, History of gastric bypass, Hydronephrosis with urinary obstruction due to ureteral calculus, Hypertension, Iron deficiency, Localized osteoarthritis of left knee, Lumbar spondylosis, Lymphedema, Obesity, Panic attacks, Perforated marginal ulcer, PONV (postoperative nausea and vomiting), Sleep apnea, obstructive, Vitamin D deficiency, and Zinc deficiency. Surgical History has a past surgical history that includes Appendectomy (); Viviane-en-Y Gastric Bypass (2017); pr office/outpt visit,procedure only (N/A, 10/29/2018); Stomach surgery; Lithotripsy (Right, 2019); Cholecystectomy, laparoscopic (N/A, 2019); Upper gastrointestinal endoscopy (N/A, 2019); Lithotripsy; Dilation and curettage of uterus (10/23/2019); Hysterectomy (2020);  Upper gastrointestinal endoscopy (N/A, 2020); laparoscopy (N/A, 6/27/2020); Upper gastrointestinal endoscopy (N/A, 6/27/2020); Colonoscopy (N/A, 10/27/2021); and Upper gastrointestinal endoscopy (N/A, 3/11/2022). Social History:  reports that she has been smoking cigarettes. She started smoking about 31 years ago. She has a 7.50 pack-year smoking history. She has never used smokeless tobacco. She reports current alcohol use of about 6.0 standard drinks per week. She reports that she does not use drugs. Family History: family history includes Cancer in her maternal grandfather and paternal grandmother; Depression in her mother; Diabetes in her father and mother; Heart Attack in her brother; Heart Attack (age of onset: 61) in her mother; Stroke in her father and paternal grandfather; Substance Abuse in her brother. Allergies: Pcn [penicillins], Food, and Ibuprofen    Physical Exam   Oxygen Saturation Interpretation: Normal on room air analysis. ED Triage Vitals [01/01/23 0854]   BP Temp Temp Source Heart Rate Resp SpO2 Height Weight   98/72 98.1 °F (36.7 °C) Oral 79 14 99 % 5' 6\" (1.676 m) 184 lb (83.5 kg)        Physical Exam  General Appearance/Constitutional:  Alert, development consistent with age. HEENT:  NC/NT. PERRLA. Airway patent. Neck:  Supple. No lymphadenopathy. Respiratory:  No retractions. Lungs Clear to auscultation and breath sounds equal.  CV:  Regular rate and rhythm. GI:  normal appearing, non-distended with no visible hernias. Bowel sounds: normal bowel sounds. Tenderness: There is mild tenderness present - located in the RLQ and in the right flank. .        Liver: non-tender. Spleen:  non-tender. Back: CVA Tenderness: No CVA tenderness. : /Pelvic examination deferred / declined. Integument:  Normal turgor. Warm, dry, without visible rash, unless noted elsewhere. Lymphatics: No edema, cap.refill <3sec. Neurological:  Orientation age-appropriate.   Motor functions intact.     Lab / Imaging Results   (All laboratory and radiology results have been personally reviewed by myself)  Labs:  Results for orders placed or performed during the hospital encounter of 01/01/23   Urinalysis with Microscopic   Result Value Ref Range    Color, UA Yellow Straw/Yellow    Clarity, UA Clear Clear    Glucose, Ur Negative Negative mg/dL    Bilirubin Urine Negative Negative    Ketones, Urine Negative Negative mg/dL    Specific Gravity, UA >=1.030 1.005 - 1.030    Blood, Urine MODERATE (A) Negative    pH, UA 5.5 5.0 - 9.0    Protein, UA Negative Negative mg/dL    Urobilinogen, Urine 0.2 <2.0 E.U./dL    Nitrite, Urine Negative Negative    Leukocyte Esterase, Urine Negative Negative    WBC, UA NONE 0 - 5 /HPF    RBC, UA 5-10 (A) 0 - 2 /HPF    Bacteria, UA NONE SEEN None Seen /HPF   CBC with Auto Differential   Result Value Ref Range    WBC 9.5 4.5 - 11.5 E9/L    RBC 4.21 3.50 - 5.50 E12/L    Hemoglobin 13.9 11.5 - 15.5 g/dL    Hematocrit 41.7 34.0 - 48.0 %    MCV 99.0 80.0 - 99.9 fL    MCH 33.0 26.0 - 35.0 pg    MCHC 33.3 32.0 - 34.5 %    RDW 13.4 11.5 - 15.0 fL    Platelets 924 216 - 435 E9/L    MPV 8.1 7.0 - 12.0 fL    Neutrophils % 65.0 43.0 - 80.0 %    Immature Granulocytes % 0.3 0.0 - 5.0 %    Lymphocytes % 26.0 20.0 - 42.0 %    Monocytes % 6.6 2.0 - 12.0 %    Eosinophils % 1.3 0.0 - 6.0 %    Basophils % 0.8 0.0 - 2.0 %    Neutrophils Absolute 6.14 1.80 - 7.30 E9/L    Immature Granulocytes # 0.03 E9/L    Lymphocytes Absolute 2.46 1.50 - 4.00 E9/L    Monocytes Absolute 0.62 0.10 - 0.95 E9/L    Eosinophils Absolute 0.12 0.05 - 0.50 E9/L    Basophils Absolute 0.08 0.00 - 0.20 E9/L   Comprehensive Metabolic Panel   Result Value Ref Range    Sodium 138 132 - 146 mmol/L    Potassium 4.5 3.5 - 5.0 mmol/L    Chloride 104 98 - 107 mmol/L    CO2 25 22 - 29 mmol/L    Anion Gap 9 7 - 16 mmol/L    Glucose 87 74 - 99 mg/dL    BUN 16 6 - 20 mg/dL    Creatinine 0.9 0.5 - 1.0 mg/dL    Est, Glom Filt Rate >60 >=60 mL/min/1.73    Calcium 8.4 (L) 8.6 - 10.2 mg/dL    Total Protein 5.9 (L) 6.4 - 8.3 g/dL    Albumin 3.4 (L) 3.5 - 5.2 g/dL    Total Bilirubin 0.3 0.0 - 1.2 mg/dL    Alkaline Phosphatase 85 35 - 104 U/L    ALT 8 0 - 32 U/L    AST 13 0 - 31 U/L   Lipase   Result Value Ref Range    Lipase 15 13 - 60 U/L   Lactic Acid   Result Value Ref Range    Lactic Acid 0.6 0.5 - 2.2 mmol/L     Imaging: All Radiology results interpreted by Radiologist unless otherwise noted. CT ABDOMEN PELVIS W IV CONTRAST Additional Contrast? None   Final Result   No acute process in the abdomen or pelvis. ED Course / Medical Decision Making     Medications   ketorolac (TORADOL) injection 15 mg (15 mg IntraVENous Given 1/1/23 1009)   0.9 % sodium chloride bolus (0 mLs IntraVENous Stopped 1/1/23 1418)   iopamidol (ISOVUE-370) 76 % injection 75 mL (75 mLs IntraVENous Given 1/1/23 1205)   sodium chloride flush 0.9 % injection 10 mL (10 mLs IntraVENous Given 1/1/23 1203)     ED Course as of 01/01/23 1757   Sun Jan 01, 2023   1339 Patient feeling better [KL]      ED Course User Index  [KL] Alan Lamas PA-C         Consultations:             None    Procedures:   none    MDM: Patient presenting with right flank and right lower quadrant pain associated with urinary urgency. Patient is in no acute distress, afebrile, nontoxic appearance. Patient's urine showing moderate blood but no bacteria. Patient's labs are unremarkable. Patient's CT showed no acute finding but does show moderate stool throughout the colon. Differential included kidney stone, UTI, colitis. Patient feeling better after IV Toradol. Patient will be sent with Aleah Levy. Patient to follow-up with PCP. Recommend patient return the ED with new or worsening symptoms.     Plan of Care/Counseling:  Alan Lamas PA-C reviewed today's visit with the patient in addition to providing specific details for the plan of care and counseling regarding the diagnosis and prognosis. Questions are answered at this time and are agreeable with the plan. Assessment      1. Generalized abdominal pain    2. Hematuria, unspecified type    3. Constipation, unspecified constipation type      This patient's ED course included: a personal history and physicial examination and multiple bedside re-evaluations  This patient has remained hemodynamically stable during their ED course. Plan   Discharged home  Patient condition is stable. New Medications     Discharge Medication List as of 1/1/2023  1:45 PM        START taking these medications    Details   polyethylene glycol (GLYCOLAX) 17 GM/SCOOP powder Take 17 g by mouth daily, Disp-510 g, R-0Normal      HYDROcodone-acetaminophen (NORCO) 5-325 MG per tablet Take 1 tablet by mouth every 8 hours as needed for Pain for up to 3 days. Intended supply: 3 days. Take lowest dose possible to manage pain Max Daily Amount: 3 tablets, Disp-9 tablet, R-0Normal           Electronically signed by Evon Gonzalez PA-C   DD: 1/1/23  **This report was transcribed using voice recognition software. Every effort was made to ensure accuracy; however, inadvertent computerized transcription errors may be present.   END OF PROVIDER NOTE      Evon Gonzalez PA-C  01/01/23 3722

## 2023-01-01 NOTE — ED NOTES
Radiology Procedure Waiver   Name: Yoshi Mccauley  : 1975  MRN: [id number]    Date:  23    Time: 11:44 AM EST    Benefits of immediately proceeding with Radiology exam(s) without pre-testing outweigh the risks or are not indicated as specified below and therefore the following is/are being waived:    [x] Pregnancy test   [] Patients LMP on-time and regular.   [] Patient had Tubal Ligation or has other Contraception Device. [] Patient  is Menopausal or Premenarcheal.    [x] Patient had Full or Partial Hysterectomy. [] Protocol for Iodine allergy    [] MRI Questionnaire     [] BUN/Creatinine   [] Patient age w/no hx of renal dysfunction. [] Patient on Dialysis. [] Recent Normal Labs.   Electronically signed by Debbie Nielson PA-C on 23 at 11:44 AM EST              Debbie Nielson PA-C  23 [phone]

## 2023-01-01 NOTE — ED NOTES
Department of Emergency Medicine  FIRST PROVIDER TRIAGE NOTE             Independent [facility]           1/1/23  8:54 AM EST    Date of Encounter: 1/1/23   MRN: [id number]      HPI: Catrachita Contreras is a 50 y.o. female who presents to the ED for Flank Pain (Rt flank radiating to RLQ of abdomen)     Pt presenting with right flank pain radiating to RLQ    ROS: Negative for cp or sob. PE: Gen Appearance/Constitutional: alert  HEENT: NC/NT. PERRLA,  Airway patent. Initial Plan of Care: All treatment areas with department are currently occupied. Plan to order/Initiate the following while awaiting opening in ED: labs.   Initiate Treatment-Testing, Proceed toTreatment Area When Bed Available for ED Attending/MLP to Continue Care    Electronically signed by Harman Hernandez PA-C   DD: 1/1/23      Harman Hernandez PA-C  01/01/23 [id number]

## 2023-01-03 ENCOUNTER — OFFICE VISIT (OUTPATIENT)
Dept: PHYSICAL MEDICINE AND REHAB | Age: 48
End: 2023-01-03
Payer: MEDICARE

## 2023-01-03 VITALS
HEIGHT: 66 IN | SYSTOLIC BLOOD PRESSURE: 117 MMHG | DIASTOLIC BLOOD PRESSURE: 75 MMHG | WEIGHT: 190 LBS | BODY MASS INDEX: 30.53 KG/M2 | TEMPERATURE: 97.2 F | HEART RATE: 79 BPM

## 2023-01-03 DIAGNOSIS — M17.11 PRIMARY OSTEOARTHRITIS OF RIGHT KNEE: ICD-10-CM

## 2023-01-03 DIAGNOSIS — M17.12 PRIMARY LOCALIZED OSTEOARTHRITIS OF LEFT KNEE: ICD-10-CM

## 2023-01-03 DIAGNOSIS — M79.609 PAIN IN EXTREMITY, UNSPECIFIED EXTREMITY: Primary | ICD-10-CM

## 2023-01-03 LAB — URINE CULTURE, ROUTINE: NORMAL

## 2023-01-03 PROCEDURE — 4004F PT TOBACCO SCREEN RCVD TLK: CPT | Performed by: PHYSICAL MEDICINE & REHABILITATION

## 2023-01-03 PROCEDURE — G8484 FLU IMMUNIZE NO ADMIN: HCPCS | Performed by: PHYSICAL MEDICINE & REHABILITATION

## 2023-01-03 PROCEDURE — 20611 DRAIN/INJ JOINT/BURSA W/US: CPT | Performed by: PHYSICAL MEDICINE & REHABILITATION

## 2023-01-03 PROCEDURE — G8427 DOCREV CUR MEDS BY ELIG CLIN: HCPCS | Performed by: PHYSICAL MEDICINE & REHABILITATION

## 2023-01-03 PROCEDURE — 99213 OFFICE O/P EST LOW 20 MIN: CPT | Performed by: PHYSICAL MEDICINE & REHABILITATION

## 2023-01-03 PROCEDURE — G8417 CALC BMI ABV UP PARAM F/U: HCPCS | Performed by: PHYSICAL MEDICINE & REHABILITATION

## 2023-01-03 RX ORDER — LIDOCAINE HYDROCHLORIDE 10 MG/ML
8 INJECTION, SOLUTION INFILTRATION; PERINEURAL ONCE
Status: COMPLETED | OUTPATIENT
Start: 2023-01-03 | End: 2023-01-03

## 2023-01-03 RX ORDER — TRIAMCINOLONE ACETONIDE 40 MG/ML
40 INJECTION, SUSPENSION INTRA-ARTICULAR; INTRAMUSCULAR ONCE
Status: COMPLETED | OUTPATIENT
Start: 2023-01-03 | End: 2023-01-03

## 2023-01-03 RX ORDER — TRAMADOL HYDROCHLORIDE 50 MG/1
50 TABLET ORAL EVERY 6 HOURS PRN
Qty: 28 TABLET | Refills: 0 | Status: SHIPPED | OUTPATIENT
Start: 2023-01-03 | End: 2023-01-13

## 2023-01-03 RX ADMIN — LIDOCAINE HYDROCHLORIDE 8 ML: 10 INJECTION, SOLUTION INFILTRATION; PERINEURAL at 14:14

## 2023-01-03 RX ADMIN — TRIAMCINOLONE ACETONIDE 40 MG: 40 INJECTION, SUSPENSION INTRA-ARTICULAR; INTRAMUSCULAR at 14:15

## 2023-01-03 NOTE — PROGRESS NOTES
Trent Seip, D.O. [facility]  [address] [facility] [place]  Phone: [phone]  Fax: [phone]        1/3/23    Chief Complaint   Patient presents with    Knee Pain     Right knee injection        HPI:  Coleman Holter is a 50y.o. year old woman seen today in follow up regarding bilateral knee pain     Interval history: Since the last visit the patient feels like her knee pain has started to increase. 9/28/22 CSI gives her 100% relief for about 3 months. Tramadol is helping her pain when it flares up. No medication side effects. Today, the pain is rated Pain Score:   8 where 0 is no pain and 10 is pain as bad as it can be. The pain is located in the right greater than left knee,  does not radiate, and is described as aching. This pain occurs intermittently. The symptoms have been better since onset. Symptoms are exacerbated by walking. Factors which relieve the pain include rest. Other associated symptoms include none. Otherwise, the pain assessment has changed since the last visit. Past Medical History:   Diagnosis Date    Anemia     Arthritis     Asthma     Depression     GERD without esophagitis     History of blood transfusion     History of gastric bypass     Hydronephrosis with urinary obstruction due to ureteral calculus     Hypertension     No meds following sustained weight loss after bariatric surgery. Iron deficiency 08/01/2018    Localized osteoarthritis of left knee 01/20/2020    Lumbar spondylosis 01/26/2018    Lymphedema     Obesity     Panic attacks     Perforated marginal ulcer 10/29/2018    ~1 year after RnYGB    PONV (postoperative nausea and vomiting)     Sleep apnea, obstructive     Off PAP therapy BG following sustained weight loss after bariatric surgery.     Vitamin D deficiency 07/27/2018    Zinc deficiency 08/01/2018       Past Surgical History:   Procedure Laterality Date    APPENDECTOMY  1996    CHOLECYSTECTOMY, LAPAROSCOPIC N/A 7/27/2019    CHOLECYSTECTOMY LAPAROSCOPIC performed by Denzel Barrow MD at [address] [facility] N/A 10/27/2021    COLONOSCOPY POLYPECTOMY SNARE/COLD BIOPSY performed by Rosana Cruz MD at [place] [address]  10/23/2019    HYSTERECTOMY (CERVIX STATUS UNKNOWN)  01/22/2020    [facility]    LAPAROSCOPY N/A 6/27/2020    DIAGNOSTIC  LAPAROSCOPY, REPAIR PERFORATED MARGINAL ULCER, UPPER ENDOSCOPY. [facility] PATCH performed by Rosa Barber MD at [address]    LITHOTRIPSY Right 7/24/2019    CYSTOSCOPY RETROGRADE PYELOGRAM URETEROSCOPY J STENT LASER LITHOTRIPSY RIGHT performed by Beatrice Chambers DO at [facility] 52 OFFICE/OUTPT VISIT,PROCEDURE ONLY N/A 10/29/2018    DIAGNOSTIC LAPAROSCOPY REPAIR PERFORATED VISCUS performed by Denzel Barrow MD at [address] [facility]  11/14/2017    STOMACH SURGERY      UPPER GASTROINTESTINAL ENDOSCOPY N/A 7/27/2019    EGD ESOPHAGOGASTRODUODENOSCOPY performed by Denzel Barrow MD at [address] [place] [address] N/A 2/7/2020    EGD BIOPSY performed by Denzel Barrow MD at [address] [place] [address] 6/27/2020    EGD DIAGNOSTIC ONLY performed by Rosa Barber MD at [address] [place] [address] N/A 3/11/2022    EGD BIOPSY performed by Collins Estrada MD at [address] [place] [address] History     Tobacco Use    Smoking status: Every Day     Packs/day: 0.50     Years: 15.00     Pack years: 7.50     Types: Cigarettes     Start date: 10/29/1991    Smokeless tobacco: Never   Vaping Use    Vaping Use: Never used   Substance Use Topics    Alcohol use:  Yes     Alcohol/week: 6.0 standard drinks     Types: 6 Standard drinks or equivalent per week     Comment: social    Drug use: No       Family History   Problem Relation Age of Onset    Heart Attack Mother 61        death from this    Diabetes Mother     Depression Mother     Diabetes Father     Stroke Father Cancer Maternal Grandfather     Cancer Paternal Grandmother     Stroke Paternal Grandfather     Substance Abuse Brother     Heart Attack Brother        Current Outpatient Medications   Medication Sig Dispense Refill    traMADol (ULTRAM) 50 MG tablet Take 1 tablet by mouth every 6 hours as needed for Pain for up to 10 days. 28 tablet 0    polyethylene glycol (GLYCOLAX) 17 GM/SCOOP powder Take 17 g by mouth daily 510 g 0    HYDROcodone-acetaminophen (NORCO) 5-325 MG per tablet Take 1 tablet by mouth every 8 hours as needed for Pain for up to 3 days. Intended supply: 3 days. Take lowest dose possible to manage pain Max Daily Amount: 3 tablets 9 tablet 0    clobetasol (TEMOVATE) 0.05 % ointment Apply topically 2 times daily. 1 g 0    sucralfate (CARAFATE) 1 GM/10ML suspension Take 10 mLs by mouth 4 times daily 1200 mL 2    acetaminophen (TYLENOL) 500 MG tablet Take 2 tablets by mouth every 6 hours as needed for Pain Maximum dose- 8 tablets/24 hours.  120 tablet 2    hydrOXYzine pamoate (VISTARIL) 100 MG capsule       clobetasol (TEMOVATE) 0.05 % ointment Apply twice a day for 2 weeks and then daily for 2 weeks 45 g 0    clotrimazole-betamethasone (LOTRISONE) 1-0.05 % cream Apply topically 2 times a day for 10 days 45 g 0    fluconazole (DIFLUCAN) 150 MG tablet Take one tablet every 3 days 2 tablet 0    sertraline (ZOLOFT) 25 MG tablet       topiramate (TOPAMAX) 25 MG tablet       albuterol sulfate HFA (VENTOLIN HFA) 108 (90 Base) MCG/ACT inhaler Inhale 2 puffs into the lungs every 6 hours as needed for Wheezing 18 g 2    ondansetron (ZOFRAN-ODT) 4 MG disintegrating tablet Place 1 tablet under the tongue every 8 hours as needed for Nausea or Vomiting 30 tablet 1    sucralfate (CARAFATE) 1 GM tablet Take 1 tablet by mouth Daily with lunch 120 tablet 1    ferrous sulfate (IRON SLOW RELEASE) 142 (45 Fe) MG extended release tablet Take 142 mg by mouth every other day 30 tablet 2    omeprazole (PRILOSEC) 20 MG delayed release capsule take 1 capsule by mouth once daily 90 capsule 1    LORazepam (ATIVAN) 0.5 MG tablet take 1 tablet by mouth once daily if needed for anxiety for 1 month      Ascorbic Acid (VITAMIN C) 250 MG tablet Take 250 mg by mouth every other day      Multiple Vitamin (MVI, BARIATRIC ADVANTAGE MULTI-FORMULA, CHEW TAB) Take 1 tablet by mouth every other day      sennosides-docusate sodium (SENOKOT-S) 8.6-50 MG tablet Take 1 tablet by mouth 2 times daily as needed for Constipation      buPROPion (WELLBUTRIN SR) 200 MG extended release tablet Take 200 mg by mouth 2 times daily        No current facility-administered medications for this visit. Allergies   Allergen Reactions    Pcn [Penicillins] Anaphylaxis     CHILDHOOD ALLERGY    Food Hives     ALLERGY IS TO BERRIES    Ibuprofen Other (See Comments)     PATIENT HAS BARIATRIC SX     Review of Systems:  No new weakness, paresthesia, incontinence of bowel or bladder, saddle anesthesia, falls or gait dysfunction. Otherwise, per HPI. Physical Exam:   Blood pressure 117/75, pulse 79, temperature 97.2 °F (36.2 °C), temperature source Temporal, height 5' 6\" (1.676 m), weight 190 lb (86.2 kg), last menstrual period 10/21/2019, not currently breastfeeding. GENERAL: The patient is in no apparent distress. Body habitus is obese. MSK: There is no joint effusion, deformity, instability, swelling, erythema or warmth. AROM is full in the spine and extremities. Spinal curvatures are normal.    Tender right median knee joint. No appreciable effusion. Crepitant and painful knee AROM. NEURO: Gait is normal. No focal sensorimotor deficit. Reflexes 2+ and symmetric in lower extremities. Impression:   1. Pain in extremity, unspecified extremity    2. Primary osteoarthritis of right knee    3. Primary localized osteoarthritis of left knee        Plan:  R knee CSI today. Continue home exercises. Continue current medications.    Medications Discontinued During This Encounter   Medication Reason    traMADol (ULTRAM) 50 MG tablet REORDER   Controlled Substance Monitoring:    Acute and Chronic Pain Monitoring:   RX Monitoring 1/3/2023   Periodic Controlled Substance Monitoring No signs of potential drug abuse or diversion identified. The patient was educated about the diagnosis, prognosis, indications, risks and benefits of treatment. An opportunity to ask questions was given to the patient and questions were answered. The patient agreed to proceed with the recommended treatment as described above. Follow up 3 months prn    Thank you for allowing me to participate in the care of your patient. Efrain Gonzalez D.O., P.T. Board Certified Physical Medicine and Rehabilitation  Board Certified [address] [facility] [address] [facility] [address], [address] [facility]. [place] [facility]  [facility]2 [facility]. [facility]  Phone: [phone]  Fax: [phone]    1/3/2023    Chief Complaint   Patient presents with    Knee Pain     Right knee injection        Last injection: 4/8/21  Taking anticoagulants/antiplatelets: No  Diabetic: No  Febrile/active infection: No    After explaining the indications, risks, benefits and alternatives of a right knee joint injection, the patient agreed to proceed. A permit was signed and scanned into the chart. The skin on the lateral knee was prepared with chloraprep. Using sterile technique, a 22 gauge, 1.5\" needle with 1 cc of Kenalog 40mg/cc and 8 cc of 1% lidocaine was directed to the knee joint using US guidance. After negative aspiration, the medication was injected. Adequate hemostasis was achieved and a bandage applied. The patient tolerated the procedure well and was educated in post injection care. The patient was clinically monitored after the injection and left the office without incident. There was post injection reduction in pain. Ultrasound images are scanned separately into the EMR.        Anne-Marie Simons Mauro Whipple PCLEVE.   Board Certified Physical Medicine and Rehabilitation  Board Certified Electrodiagnostic Medicine    Administrations This Visit       lidocaine 1 % injection 8 mL       Admin Date  01/03/2023  14:14 Action  Given Dose  8 mL Route  Other Site   Administered By  Delisa Lee RN    Ordering Provider: Kraig Cockayne, DO    NDC: 7512-2027-02    Lot#: [id number]    CALVXGILJFLT: [address]    Patient Supplied?: No              triamcinolone acetonide (KENALOG-40) injection 40 mg       Admin Date  01/03/2023  14:15 Action  Given Dose  40 mg Route  Intra-artICUlar Site   Administered By  Delisa Lee RN    Ordering Provider: Kraig Cockayne, DO    NDC: 9877-4243-89    Lot#: [id number]    : [employer] (PRIMARY CARE)    Patient Supplied?: No

## 2023-01-06 DIAGNOSIS — D50.8 OTHER IRON DEFICIENCY ANEMIA: Primary | ICD-10-CM

## 2023-01-10 ENCOUNTER — OFFICE VISIT (OUTPATIENT)
Dept: PHYSICAL MEDICINE AND REHAB | Age: 48
End: 2023-01-10
Payer: MEDICARE

## 2023-01-10 VITALS
WEIGHT: 192 LBS | TEMPERATURE: 97.3 F | DIASTOLIC BLOOD PRESSURE: 74 MMHG | HEART RATE: 75 BPM | BODY MASS INDEX: 30.86 KG/M2 | HEIGHT: 66 IN | SYSTOLIC BLOOD PRESSURE: 112 MMHG

## 2023-01-10 DIAGNOSIS — M79.609 PAIN IN EXTREMITY, UNSPECIFIED EXTREMITY: Primary | ICD-10-CM

## 2023-01-10 PROCEDURE — 20611 DRAIN/INJ JOINT/BURSA W/US: CPT | Performed by: PHYSICAL MEDICINE & REHABILITATION

## 2023-01-10 RX ORDER — LIDOCAINE HYDROCHLORIDE 10 MG/ML
7 INJECTION, SOLUTION INFILTRATION; PERINEURAL ONCE
Status: COMPLETED | OUTPATIENT
Start: 2023-01-10 | End: 2023-01-10

## 2023-01-10 RX ORDER — TRIAMCINOLONE ACETONIDE 40 MG/ML
40 INJECTION, SUSPENSION INTRA-ARTICULAR; INTRAMUSCULAR ONCE
Status: COMPLETED | OUTPATIENT
Start: 2023-01-10 | End: 2023-01-10

## 2023-01-10 RX ADMIN — TRIAMCINOLONE ACETONIDE 40 MG: 40 INJECTION, SUSPENSION INTRA-ARTICULAR; INTRAMUSCULAR at 13:59

## 2023-01-10 RX ADMIN — LIDOCAINE HYDROCHLORIDE 7 ML: 10 INJECTION, SOLUTION INFILTRATION; PERINEURAL at 13:59

## 2023-01-10 NOTE — PROGRESS NOTES
Federico Santos D.O. [facility]  [address] [facility] [place]  Phone: [phone]  Fax: [phone]        1/10/23    Chief Complaint   Patient presents with    Knee Pain     Left knee steroid injection       HPI:  Skye Miguel is a 50y.o. year old woman seen today in follow up regarding bilateral knee pain     Interval history: Since the last visit the patient feels like her knee pain has started to increase. 9/28/22 CSI gives her 100% relief for about 3 months. Tramadol is helping her pain when it flares up. No medication side effects. Today, the pain is rated Pain Score:   8 where 0 is no pain and 10 is pain as bad as it can be. The pain is located in the right greater than left knee,  does not radiate, and is described as aching. This pain occurs intermittently. The symptoms have been better since onset. Symptoms are exacerbated by walking. Factors which relieve the pain include rest. Other associated symptoms include none. Otherwise, the pain assessment has changed since the last visit. Past Medical History:   Diagnosis Date    Anemia     Arthritis     Asthma     Depression     GERD without esophagitis     History of blood transfusion     History of gastric bypass     Hydronephrosis with urinary obstruction due to ureteral calculus     Hypertension     No meds following sustained weight loss after bariatric surgery. Iron deficiency 08/01/2018    Localized osteoarthritis of left knee 01/20/2020    Lumbar spondylosis 01/26/2018    Lymphedema     Obesity     Panic attacks     Perforated marginal ulcer 10/29/2018    ~1 year after RnYGB    PONV (postoperative nausea and vomiting)     Sleep apnea, obstructive     Off PAP therapy BG following sustained weight loss after bariatric surgery.     Vitamin D deficiency 07/27/2018    Zinc deficiency 08/01/2018       Past Surgical History:   Procedure Laterality Date    APPENDECTOMY  1996 CHOLECYSTECTOMY, LAPAROSCOPIC N/A 7/27/2019    CHOLECYSTECTOMY LAPAROSCOPIC performed by Mary Vega MD at [address] [facility] N/A 10/27/2021    COLONOSCOPY POLYPECTOMY SNARE/COLD BIOPSY performed by Vani Schmidt MD at [facility]  10/23/2019    HYSTERECTOMY (CERVIX STATUS UNKNOWN)  01/22/2020    [facility]    LAPAROSCOPY N/A 6/27/2020    DIAGNOSTIC  LAPAROSCOPY, REPAIR PERFORATED MARGINAL ULCER, UPPER ENDOSCOPY. [facility] PATCH performed by Jose Bedoya MD at [address]    LITHOTRIPSY Right 7/24/2019    CYSTOSCOPY RETROGRADE PYELOGRAM URETEROSCOPY J STENT LASER LITHOTRIPSY RIGHT performed by Johann Chambers DO at [facility] OFFICE/OUTPT VISIT,PROCEDURE ONLY N/A 10/29/2018    DIAGNOSTIC LAPAROSCOPY REPAIR PERFORATED VISCUS performed by Mary Vega MD at [address] [facility]  11/14/2017    STOMACH SURGERY      UPPER GASTROINTESTINAL ENDOSCOPY N/A 7/27/2019    EGD ESOPHAGOGASTRODUODENOSCOPY performed by Mary Vega MD at [facility]. [address] N/A 2/7/2020    EGD BIOPSY performed by Mary Vega MD at [facility] 6/27/2020    EGD DIAGNOSTIC ONLY performed by Jose Bedoya MD at [facility]. [address] N/A 3/11/2022    EGD BIOPSY performed by Damari Campos MD at [address] [place] [address] 97 History     Tobacco Use    Smoking status: Every Day     Packs/day: 0.50     Years: 15.00     Pack years: 7.50     Types: Cigarettes     Start date: 10/29/1991    Smokeless tobacco: Never   Vaping Use    Vaping Use: Never used   Substance Use Topics    Alcohol use:  Yes     Alcohol/week: 6.0 standard drinks     Types: 6 Standard drinks or equivalent per week     Comment: social    Drug use: No       Family History   Problem Relation Age of Onset    Heart Attack Mother 61        death from this    Diabetes Mother     Depression Mother     Diabetes Father Stroke Father     Cancer Maternal Grandfather     Cancer Paternal Grandmother     Stroke Paternal Grandfather     Substance Abuse Brother     Heart Attack Brother        Current Outpatient Medications   Medication Sig Dispense Refill    traMADol (ULTRAM) 50 MG tablet Take 1 tablet by mouth every 6 hours as needed for Pain for up to 10 days. 28 tablet 0    polyethylene glycol (GLYCOLAX) 17 GM/SCOOP powder Take 17 g by mouth daily 510 g 0    clobetasol (TEMOVATE) 0.05 % ointment Apply topically 2 times daily. 1 g 0    sucralfate (CARAFATE) 1 GM/10ML suspension Take 10 mLs by mouth 4 times daily 1200 mL 2    acetaminophen (TYLENOL) 500 MG tablet Take 2 tablets by mouth every 6 hours as needed for Pain Maximum dose- 8 tablets/24 hours.  120 tablet 2    hydrOXYzine pamoate (VISTARIL) 100 MG capsule       clobetasol (TEMOVATE) 0.05 % ointment Apply twice a day for 2 weeks and then daily for 2 weeks 45 g 0    clotrimazole-betamethasone (LOTRISONE) 1-0.05 % cream Apply topically 2 times a day for 10 days 45 g 0    fluconazole (DIFLUCAN) 150 MG tablet Take one tablet every 3 days 2 tablet 0    sertraline (ZOLOFT) 25 MG tablet       topiramate (TOPAMAX) 25 MG tablet       albuterol sulfate HFA (VENTOLIN HFA) 108 (90 Base) MCG/ACT inhaler Inhale 2 puffs into the lungs every 6 hours as needed for Wheezing 18 g 2    ondansetron (ZOFRAN-ODT) 4 MG disintegrating tablet Place 1 tablet under the tongue every 8 hours as needed for Nausea or Vomiting 30 tablet 1    sucralfate (CARAFATE) 1 GM tablet Take 1 tablet by mouth Daily with lunch 120 tablet 1    ferrous sulfate (IRON SLOW RELEASE) 142 (45 Fe) MG extended release tablet Take 142 mg by mouth every other day 30 tablet 2    omeprazole (PRILOSEC) 20 MG delayed release capsule take 1 capsule by mouth once daily 90 capsule 1    LORazepam (ATIVAN) 0.5 MG tablet take 1 tablet by mouth once daily if needed for anxiety for 1 month      Ascorbic Acid (VITAMIN C) 250 MG tablet Take 250 mg by mouth every other day      Multiple Vitamin (MVI, BARIATRIC ADVANTAGE MULTI-FORMULA, CHEW TAB) Take 1 tablet by mouth every other day      sennosides-docusate sodium (SENOKOT-S) 8.6-50 MG tablet Take 1 tablet by mouth 2 times daily as needed for Constipation      buPROPion (WELLBUTRIN SR) 200 MG extended release tablet Take 200 mg by mouth 2 times daily        Current Facility-Administered Medications   Medication Dose Route Frequency Provider Last Rate Last Admin    triamcinolone acetonide (KENALOG-40) injection 40 mg  40 mg Intra-artICUlar Once Elvie Joe, DO         Allergies   Allergen Reactions    Pcn [Penicillins] Anaphylaxis     CHILDHOOD ALLERGY    Food Hives     ALLERGY IS TO BERRIES    Ibuprofen Other (See Comments)     PATIENT HAS BARIATRIC SX     Review of Systems:  No new weakness, paresthesia, incontinence of bowel or bladder, saddle anesthesia, falls or gait dysfunction. Otherwise, per HPI. Physical Exam:   Blood pressure 112/74, pulse 75, temperature 97.3 °F (36.3 °C), temperature source Temporal, height 5' 6\" (1.676 m), weight 192 lb (87.1 kg), last menstrual period 10/21/2019, not currently breastfeeding. GENERAL: The patient is in no apparent distress. Body habitus is obese. MSK: There is no joint effusion, deformity, instability, swelling, erythema or warmth. AROM is full in the spine and extremities. Spinal curvatures are normal.    Tender right median knee joint. No appreciable effusion. Crepitant and painful knee AROM. NEURO: Gait is normal. No focal sensorimotor deficit. Reflexes 2+ and symmetric in lower extremities. Impression:   1. Pain in extremity, unspecified extremity        Plan:  R knee CSI today. Continue home exercises. Continue current medications. There are no discontinued medications.   Controlled Substance Monitoring:    Acute and Chronic Pain Monitoring:   RX Monitoring 1/3/2023   Periodic Controlled Substance Monitoring No signs of potential drug abuse or diversion identified. The patient was educated about the diagnosis, prognosis, indications, risks and benefits of treatment. An opportunity to ask questions was given to the patient and questions were answered. The patient agreed to proceed with the recommended treatment as described above. Follow up 3 months prn    Thank you for allowing me to participate in the care of your patient. Aury Smallwood D.O., P.T. Board Certified Physical Medicine and Rehabilitation  Board Certified [address] [place] [address]. [facility], [facility] [facility]. [place] [facility]  [facility]2 [facility]. [facility]5 [facility]  Phone: [phone]  Fax: [phone]    1/10/2023    Chief Complaint   Patient presents with    Knee Pain     Left knee steroid injection       Last injection: 4/8/21  Taking anticoagulants/antiplatelets: No  Diabetic: No  Febrile/active infection: No    After explaining the indications, risks, benefits and alternatives of a left knee joint injection, the patient agreed to proceed. A permit was signed and scanned into the chart. The skin on the lateral knee was prepared with chloraprep. Using sterile technique, a 22 gauge, 1.5\" needle with 1 cc of Kenalog 40mg/cc and 8 cc of 1% lidocaine was directed to the knee joint using US guidance. After negative aspiration, the medication was injected. Adequate hemostasis was achieved and a bandage applied. The patient tolerated the procedure well and was educated in post injection care. The patient was clinically monitored after the injection and left the office without incident. There was post injection reduction in pain. Ultrasound images are scanned separately into the EMR. Aury Smallwood D.O., P.T.   Board Certified Physical Medicine and Rehabilitation  Board Certified Electrodiagnostic Medicine    Administrations This Visit       lidocaine 1 % injection 7 mL       Admin Date  01/10/2023  13:59 Action  Given Dose  7 mL Route  Other Site   Administered By  [facility] [place]    Ordering Provider: Ronnie Dose, DO    NDC: 0213-5012-94    Lot#: [id number]    : Eva Chance    Patient Supplied?: No              triamcinolone acetonide (KENALOG-40) injection 40 mg       Admin Date  01/10/2023  13:59 Action  Given Dose  40 mg Route  Intra-artICUlar Site   Administered By  [facility] [place]    Ordering Provider: Ronnie Torres DO    NDC: 9658-0022-13    Lot#: [id number]    : [employer] [facility] (PRIMARY CARE)    Patient Supplied?: No

## 2023-01-17 ENCOUNTER — TELEPHONE (OUTPATIENT)
Dept: PHYSICAL MEDICINE AND REHAB | Age: 48
End: 2023-01-17

## 2023-01-17 NOTE — TELEPHONE ENCOUNTER
Called patient for follow up phone call post left knee injection, states she received 80%effectiveness without side effects from steroid

## 2023-01-26 DIAGNOSIS — K28.9 MARGINAL ULCER: ICD-10-CM

## 2023-01-26 DIAGNOSIS — Z98.84 S/P GASTRIC BYPASS: ICD-10-CM

## 2023-01-26 RX ORDER — OMEPRAZOLE 20 MG/1
CAPSULE, DELAYED RELEASE ORAL
Qty: 90 CAPSULE | Refills: 1 | Status: SHIPPED | OUTPATIENT
Start: 2023-01-26

## 2023-01-26 NOTE — TELEPHONE ENCOUNTER
Last Appointment:  7/8/2022  Future Appointments  4/3/2023   1:30 PM    Chriss Vaughan DO      [facility] PM[facility]         [facility]  4/11/2023  1:30 PM    Chriss Vaughan DO      [place] [facility]         [facility]  11/14/2023 1:00 PM    JOHN Heredia - * Surg Weight         [facility]

## 2023-02-08 ENCOUNTER — APPOINTMENT (OUTPATIENT)
Dept: CT IMAGING | Age: 48
End: 2023-02-08
Payer: MEDICARE

## 2023-02-08 ENCOUNTER — HOSPITAL ENCOUNTER (EMERGENCY)
Age: 48
Discharge: HOME | End: 2023-02-08
Attending: EMERGENCY MEDICINE
Payer: MEDICARE

## 2023-02-08 ENCOUNTER — APPOINTMENT (OUTPATIENT)
Dept: GENERAL RADIOLOGY | Age: 48
End: 2023-02-08
Payer: MEDICARE

## 2023-02-08 VITALS
BODY MASS INDEX: 30.53 KG/M2 | DIASTOLIC BLOOD PRESSURE: 79 MMHG | TEMPERATURE: 97.2 F | WEIGHT: 190 LBS | SYSTOLIC BLOOD PRESSURE: 119 MMHG | HEART RATE: 73 BPM | HEIGHT: 66 IN | OXYGEN SATURATION: 100 % | RESPIRATION RATE: 18 BRPM

## 2023-02-08 DIAGNOSIS — K29.00 ACUTE GASTRITIS WITHOUT HEMORRHAGE, UNSPECIFIED GASTRITIS TYPE: Primary | ICD-10-CM

## 2023-02-08 LAB
ALBUMIN SERPL-MCNC: 3.5 G/DL (ref 3.5–5.2)
ALP BLD-CCNC: 79 U/L (ref 35–104)
ALT SERPL-CCNC: 14 U/L (ref 0–32)
ANION GAP SERPL CALCULATED.3IONS-SCNC: 8 MMOL/L (ref 7–16)
AST SERPL-CCNC: 23 U/L (ref 0–31)
BASOPHILS ABSOLUTE: 0.06 E9/L (ref 0–0.2)
BASOPHILS RELATIVE PERCENT: 0.9 % (ref 0–2)
BILIRUB SERPL-MCNC: 0.4 MG/DL (ref 0–1.2)
BILIRUBIN URINE: NEGATIVE
BLOOD, URINE: NEGATIVE
BUN BLDV-MCNC: 13 MG/DL (ref 6–20)
CALCIUM SERPL-MCNC: 8.5 MG/DL (ref 8.6–10.2)
CHLORIDE BLD-SCNC: 102 MMOL/L (ref 98–107)
CLARITY: CLEAR
CO2: 26 MMOL/L (ref 22–29)
COLOR: YELLOW
CREAT SERPL-MCNC: 0.7 MG/DL (ref 0.5–1)
EKG ATRIAL RATE: 60 BPM
EKG P AXIS: 46 DEGREES
EKG P-R INTERVAL: 148 MS
EKG Q-T INTERVAL: 432 MS
EKG QRS DURATION: 82 MS
EKG QTC CALCULATION (BAZETT): 432 MS
EKG R AXIS: 37 DEGREES
EKG T AXIS: 66 DEGREES
EKG VENTRICULAR RATE: 60 BPM
EOSINOPHILS ABSOLUTE: 0.27 E9/L (ref 0.05–0.5)
EOSINOPHILS RELATIVE PERCENT: 3.9 % (ref 0–6)
GFR SERPL CREATININE-BSD FRML MDRD: >60 ML/MIN/1.73
GLUCOSE BLD-MCNC: 82 MG/DL (ref 74–99)
GLUCOSE URINE: NEGATIVE MG/DL
HCT VFR BLD CALC: 40.5 % (ref 34–48)
HEMOGLOBIN: 13.7 G/DL (ref 11.5–15.5)
IMMATURE GRANULOCYTES #: 0.01 E9/L
IMMATURE GRANULOCYTES %: 0.1 % (ref 0–5)
KETONES, URINE: NEGATIVE MG/DL
LACTIC ACID: 0.7 MMOL/L (ref 0.5–2.2)
LEUKOCYTE ESTERASE, URINE: NEGATIVE
LIPASE: 16 U/L (ref 13–60)
LYMPHOCYTES ABSOLUTE: 1.92 E9/L (ref 1.5–4)
LYMPHOCYTES RELATIVE PERCENT: 28.1 % (ref 20–42)
MAGNESIUM: 2 MG/DL (ref 1.6–2.6)
MCH RBC QN AUTO: 33 PG (ref 26–35)
MCHC RBC AUTO-ENTMCNC: 33.8 % (ref 32–34.5)
MCV RBC AUTO: 97.6 FL (ref 80–99.9)
MONOCYTES ABSOLUTE: 0.48 E9/L (ref 0.1–0.95)
MONOCYTES RELATIVE PERCENT: 7 % (ref 2–12)
NEUTROPHILS ABSOLUTE: 4.1 E9/L (ref 1.8–7.3)
NEUTROPHILS RELATIVE PERCENT: 60 % (ref 43–80)
NITRITE, URINE: NEGATIVE
PDW BLD-RTO: 13.4 FL (ref 11.5–15)
PH UA: 7.5 (ref 5–9)
PLATELET # BLD: 345 E9/L (ref 130–450)
PMV BLD AUTO: 8.1 FL (ref 7–12)
POTASSIUM SERPL-SCNC: 4.2 MMOL/L (ref 3.5–5)
PROTEIN UA: NEGATIVE MG/DL
RBC # BLD: 4.15 E12/L (ref 3.5–5.5)
SODIUM BLD-SCNC: 136 MMOL/L (ref 132–146)
SPECIFIC GRAVITY UA: 1.01 (ref 1–1.03)
TOTAL PROTEIN: 6.1 G/DL (ref 6.4–8.3)
TROPONIN, HIGH SENSITIVITY: 9 NG/L (ref 0–9)
UROBILINOGEN, URINE: 0.2 E.U./DL
WBC # BLD: 6.8 E9/L (ref 4.5–11.5)

## 2023-02-08 PROCEDURE — 93005 ELECTROCARDIOGRAM TRACING: CPT

## 2023-02-08 PROCEDURE — 6360000002 HC RX W HCPCS

## 2023-02-08 PROCEDURE — 99285 EMERGENCY DEPT VISIT HI MDM: CPT

## 2023-02-08 PROCEDURE — 81003 URINALYSIS AUTO W/O SCOPE: CPT

## 2023-02-08 PROCEDURE — C9113 INJ PANTOPRAZOLE SODIUM, VIA: HCPCS

## 2023-02-08 PROCEDURE — 96374 THER/PROPH/DIAG INJ IV PUSH: CPT

## 2023-02-08 PROCEDURE — 83735 ASSAY OF MAGNESIUM: CPT

## 2023-02-08 PROCEDURE — 6370000000 HC RX 637 (ALT 250 FOR IP)

## 2023-02-08 PROCEDURE — 84484 ASSAY OF TROPONIN QUANT: CPT

## 2023-02-08 PROCEDURE — 74177 CT ABD & PELVIS W/CONTRAST: CPT

## 2023-02-08 PROCEDURE — 83605 ASSAY OF LACTIC ACID: CPT

## 2023-02-08 PROCEDURE — 93010 ELECTROCARDIOGRAM REPORT: CPT | Performed by: INTERNAL MEDICINE

## 2023-02-08 PROCEDURE — 80053 COMPREHEN METABOLIC PANEL: CPT

## 2023-02-08 PROCEDURE — 71045 X-RAY EXAM CHEST 1 VIEW: CPT

## 2023-02-08 PROCEDURE — 6360000004 HC RX CONTRAST MEDICATION: Performed by: RADIOLOGY

## 2023-02-08 PROCEDURE — 85025 COMPLETE CBC W/AUTO DIFF WBC: CPT

## 2023-02-08 PROCEDURE — 83690 ASSAY OF LIPASE: CPT

## 2023-02-08 PROCEDURE — 2580000003 HC RX 258

## 2023-02-08 RX ORDER — PANTOPRAZOLE SODIUM 40 MG/10ML
40 INJECTION, POWDER, LYOPHILIZED, FOR SOLUTION INTRAVENOUS ONCE
Status: COMPLETED | OUTPATIENT
Start: 2023-02-08 | End: 2023-02-08

## 2023-02-08 RX ORDER — 0.9 % SODIUM CHLORIDE 0.9 %
1000 INTRAVENOUS SOLUTION INTRAVENOUS ONCE
Status: COMPLETED | OUTPATIENT
Start: 2023-02-08 | End: 2023-02-08

## 2023-02-08 RX ORDER — FENTANYL CITRATE 50 UG/ML
50 INJECTION, SOLUTION INTRAMUSCULAR; INTRAVENOUS ONCE
Status: DISCONTINUED | OUTPATIENT
Start: 2023-02-08 | End: 2023-02-08

## 2023-02-08 RX ORDER — HYDROCODONE BITARTRATE AND ACETAMINOPHEN 5; 325 MG/1; MG/1
1-2 TABLET ORAL EVERY 6 HOURS PRN
Qty: 10 TABLET | Refills: 0 | Status: SHIPPED | OUTPATIENT
Start: 2023-02-08 | End: 2023-02-11

## 2023-02-08 RX ADMIN — SODIUM CHLORIDE 1000 ML: 9 INJECTION, SOLUTION INTRAVENOUS at 09:58

## 2023-02-08 RX ADMIN — ALUMINUM HYDROXIDE, MAGNESIUM HYDROXIDE, AND SIMETHICONE: 200; 200; 20 SUSPENSION ORAL at 09:57

## 2023-02-08 RX ADMIN — PANTOPRAZOLE SODIUM 40 MG: 40 INJECTION, POWDER, FOR SOLUTION INTRAVENOUS at 10:00

## 2023-02-08 RX ADMIN — IOPAMIDOL 65 ML: 755 INJECTION, SOLUTION INTRAVENOUS at 10:43

## 2023-02-08 ASSESSMENT — ENCOUNTER SYMPTOMS
CONSTIPATION: 0
VOMITING: 0
SHORTNESS OF BREATH: 0
ABDOMINAL DISTENTION: 1
COUGH: 0
NAUSEA: 0
EYES NEGATIVE: 1
DIARRHEA: 0
ABDOMINAL PAIN: 1
BLOOD IN STOOL: 0

## 2023-02-08 ASSESSMENT — PAIN DESCRIPTION - LOCATION: LOCATION: ABDOMEN

## 2023-02-08 ASSESSMENT — PAIN DESCRIPTION - PAIN TYPE: TYPE: ACUTE PAIN

## 2023-02-08 ASSESSMENT — PAIN - FUNCTIONAL ASSESSMENT: PAIN_FUNCTIONAL_ASSESSMENT: 0-10

## 2023-02-08 ASSESSMENT — PAIN DESCRIPTION - ORIENTATION: ORIENTATION: LEFT

## 2023-02-08 ASSESSMENT — PAIN DESCRIPTION - DESCRIPTORS: DESCRIPTORS: ACHING;SORE

## 2023-02-08 ASSESSMENT — PAIN SCALES - GENERAL: PAINLEVEL_OUTOF10: 9

## 2023-02-08 ASSESSMENT — PAIN DESCRIPTION - FREQUENCY: FREQUENCY: CONTINUOUS

## 2023-02-08 NOTE — DISCHARGE INSTRUCTIONS
Double your omeprazole for the next 5 days. Refrain from consuming anything that is acidic or spicy. Call your surgeon today to secure follow-up.

## 2023-02-08 NOTE — ED PROVIDER NOTES
[facility]  Department of Emergency Medicine     Written by: Justice Birch MD  Patient Name: Lit Barrios  Attending Provider: No att. providers found  Admit Date: 2023  8:50 AM  MRN: [id number]                   : 1975        Chief Complaint   Patient presents with    Abdominal Pain     left side abd pain, collarbone pain-worse with a deep breath, gastric bypass in 2017    - Chief complaint    Patient is a 47-year-old female with a past medical history of gastric bypass surgery in  and perforating gastric ulcers in  and . She presents to the ED today for chief complaint of left chest and abdominal pain. This pain began on Monday after eating a nut roll. The pain is worse with inspiration. She denies any history of blood clots. She denies nausea, vomiting, constipation, and diarrhea. She states that she is having normal bowel movements. She tried pain medication and laxatives but they did not provide any relief. Review of Systems   Constitutional:  Negative for chills, fatigue and fever. HENT: Negative. Eyes: Negative. Respiratory:  Negative for cough and shortness of breath. Cardiovascular:  Positive for chest pain. Gastrointestinal:  Positive for abdominal distention and abdominal pain. Negative for blood in stool, constipation, diarrhea, nausea and vomiting. Genitourinary:  Negative for dysuria, flank pain, frequency and urgency. Physical Exam  Vitals reviewed. Constitutional:       General: She is not in acute distress. Appearance: Normal appearance. She is not ill-appearing. HENT:      Head: Normocephalic and atraumatic. Nose: Nose normal. No congestion. Mouth/Throat:      Mouth: Mucous membranes are moist.      Pharynx: No posterior oropharyngeal erythema. Eyes:      General: No scleral icterus. Extraocular Movements: Extraocular movements intact.       Pupils: Pupils are equal, round, and reactive to light. Cardiovascular:      Rate and Rhythm: Normal rate and regular rhythm. Pulses: Normal pulses. Heart sounds: Normal heart sounds. Pulmonary:      Effort: Pulmonary effort is normal. No respiratory distress. Breath sounds: No wheezing. Chest:      Chest wall: Tenderness present. Abdominal:      General: Bowel sounds are normal. There is distension. Palpations: Abdomen is soft. There is no mass. Tenderness: There is abdominal tenderness in the left lower quadrant. Musculoskeletal:         General: No swelling, tenderness or deformity. Normal range of motion. Cervical back: Normal range of motion. No rigidity or tenderness. Skin:     Capillary Refill: Capillary refill takes less than 2 seconds. Coloration: Skin is not jaundiced or pale. Findings: No erythema. Neurological:      General: No focal deficit present. Mental Status: She is alert and oriented to person, place, and time. Mental status is at baseline. Psychiatric:         Behavior: Behavior normal.        Procedures       MDM  Number of Diagnoses or Management Options  Acute gastritis without hemorrhage, unspecified gastritis type  Diagnosis management comments: Patient is a 70-year-old female who presents to the ED today for chief complaint of left chest and abdominal pain. The patient here in the ED is not hypoxic and hemodynamically stable, and does not appear to be in acute distress. They are calm, alert, oriented, and pleasant to speak with. The patient has clear lungs to auscultation, no abnormal heart murmurs are heard. On abdominal palpation, the patient has tenderness to the left upper and lower quadrant. There are no signs of jaundice or scleral icterus. No visible hernias on exam. Chest x-ray is ordered to evaluate for any possible signs of pneumonia, pleural effusions, cardiomegaly, pneumothorax, atelectasis, rib or sternal abnormalities including fractures.  A CT of the abdomen was ordered to evaluate for signs of gall bladder inflammation, constipation, signs of appendicitis, small bowel obstruction, bowel ischemia, pneumoperitoneum, organomegaly or any other pathology which may be the cause of patient's symptoms. Remainder of MDM will be in the ED course below. ED Course as of 02/08/23 1209   Wed Feb 08, 2023   0905 ATTENDING PROVIDER ATTESTATION:     I have personally performed and/or participated in the history, exam, medical decision making, and procedures and agree with all pertinent clinical information. I have also reviewed and agree with the past medical, family and social history unless otherwise noted. I have discussed this patient in detail with the resident, and provided the instruction and education regarding epigastric and left upper quadrant abdominal pain radiating to the chest in a patient who is nearly 6 years post gastric bypass surgery. We will assess for GI cause of this pain and do a cardiac evaluation and treat her symptoms. I agree with the EKG findings as dictated by the resident. [RM]   1022 According to chart review, patient had endoscopy done by Dr. Paras Andrade on October 27, 2021 which was normal. [AH]   1022 Social determinants: Patient dependent living, does not require placement at this time. [II]   4135 Acute/chronic illness: Patient had gastric bypass surgery in 2017, limited diet. [AH]   1124 Reassessed at 11:20 AM.  States she had moderate relief with a GI cocktail. She is still having some discomfort. She agrees that this likely started after eating and not roll and she is experiencing gastritis as a result. She will be given analgesia for the next couple days. She was advised to monitor her diet and double her omeprazole for the next few days.   Advised to contact her surgeon today to secure follow-up appointment within the next few days [RM]      ED Course User Index  [AH] Roshni Hart MD  [RM] Fouzia Lyn DO Chart review: present in the ED course    Social determinants: present in the ED course    Acute or chronic illness(es): present in the ED course    --------------------------------------------- PAST HISTORY ---------------------------------------------  Past Medical History:  has a past medical history of Anemia, Arthritis, Asthma, Depression, GERD without esophagitis, History of blood transfusion, History of gastric bypass, Hydronephrosis with urinary obstruction due to ureteral calculus, Hypertension, Iron deficiency, Localized osteoarthritis of left knee, Lumbar spondylosis, Lymphedema, Obesity, Panic attacks, Perforated marginal ulcer, PONV (postoperative nausea and vomiting), Sleep apnea, obstructive, Vitamin D deficiency, and Zinc deficiency. Past Surgical History:  has a past surgical history that includes Appendectomy (1996); Viviane-en-Y Gastric Bypass (11/14/2017); pr office/outpt visit,procedure only (N/A, 10/29/2018); Stomach surgery; Lithotripsy (Right, 7/24/2019); Cholecystectomy, laparoscopic (N/A, 7/27/2019); Upper gastrointestinal endoscopy (N/A, 7/27/2019); Lithotripsy; Dilation and curettage of uterus (10/23/2019); Hysterectomy (01/22/2020); Upper gastrointestinal endoscopy (N/A, 2/7/2020); laparoscopy (N/A, 6/27/2020); Upper gastrointestinal endoscopy (N/A, 6/27/2020); Colonoscopy (N/A, 10/27/2021); and Upper gastrointestinal endoscopy (N/A, 3/11/2022). Social History:  reports that she has been smoking cigarettes. She started smoking about 31 years ago. She has a 7.50 pack-year smoking history. She has never used smokeless tobacco. She reports current alcohol use of about 6.0 standard drinks per week. She reports that she does not use drugs.     Family History: family history includes Cancer in her maternal grandfather and paternal grandmother; Depression in her mother; Diabetes in her father and mother; Heart Attack in her brother; Heart Attack (age of onset: 61) in her mother; Stroke in her father and paternal grandfather; Substance Abuse in her brother. The patients home medications have been reviewed.     Allergies: Pcn [penicillins], Food, and Ibuprofen    -------------------------------------------------- RESULTS -------------------------------------------------  Labs personally reviewed and interpreted by me:  Results for orders placed or performed during the hospital encounter of 02/08/23   CBC with Auto Differential   Result Value Ref Range    WBC 6.8 4.5 - 11.5 E9/L    RBC 4.15 3.50 - 5.50 E12/L    Hemoglobin 13.7 11.5 - 15.5 g/dL    Hematocrit 40.5 34.0 - 48.0 %    MCV 97.6 80.0 - 99.9 fL    MCH 33.0 26.0 - 35.0 pg    MCHC 33.8 32.0 - 34.5 %    RDW 13.4 11.5 - 15.0 fL    Platelets 561 823 - 068 E9/L    MPV 8.1 7.0 - 12.0 fL    Neutrophils % 60.0 43.0 - 80.0 %    Immature Granulocytes % 0.1 0.0 - 5.0 %    Lymphocytes % 28.1 20.0 - 42.0 %    Monocytes % 7.0 2.0 - 12.0 %    Eosinophils % 3.9 0.0 - 6.0 %    Basophils % 0.9 0.0 - 2.0 %    Neutrophils Absolute 4.10 1.80 - 7.30 E9/L    Immature Granulocytes # 0.01 E9/L    Lymphocytes Absolute 1.92 1.50 - 4.00 E9/L    Monocytes Absolute 0.48 0.10 - 0.95 E9/L    Eosinophils Absolute 0.27 0.05 - 0.50 E9/L    Basophils Absolute 0.06 0.00 - 0.20 E9/L   Urinalysis   Result Value Ref Range    Color, UA Yellow Straw/Yellow    Clarity, UA Clear Clear    Glucose, Ur Negative Negative mg/dL    Bilirubin Urine Negative Negative    Ketones, Urine Negative Negative mg/dL    Specific Gravity, UA 1.010 1.005 - 1.030    Blood, Urine Negative Negative    pH, UA 7.5 5.0 - 9.0    Protein, UA Negative Negative mg/dL    Urobilinogen, Urine 0.2 <2.0 E.U./dL    Nitrite, Urine Negative Negative    Leukocyte Esterase, Urine Negative Negative   Troponin   Result Value Ref Range    Troponin, High Sensitivity 9 0 - 9 ng/L   Lipase   Result Value Ref Range    Lipase 16 13 - 60 U/L   Lactic Acid   Result Value Ref Range    Lactic Acid 0.7 0.5 - 2.2 mmol/L   Magnesium Result Value Ref Range    Magnesium 2.0 1.6 - 2.6 mg/dL   CMP   Result Value Ref Range    Sodium 136 132 - 146 mmol/L    Potassium 4.2 3.5 - 5.0 mmol/L    Chloride 102 98 - 107 mmol/L    CO2 26 22 - 29 mmol/L    Anion Gap 8 7 - 16 mmol/L    Glucose 82 74 - 99 mg/dL    BUN 13 6 - 20 mg/dL    Creatinine 0.7 0.5 - 1.0 mg/dL    Est, Glom Filt Rate >60 >=60 mL/min/1.73    Calcium 8.5 (L) 8.6 - 10.2 mg/dL    Total Protein 6.1 (L) 6.4 - 8.3 g/dL    Albumin 3.5 3.5 - 5.2 g/dL    Total Bilirubin 0.4 0.0 - 1.2 mg/dL    Alkaline Phosphatase 79 35 - 104 U/L    ALT 14 0 - 32 U/L    AST 23 0 - 31 U/L   EKG 12 Lead   Result Value Ref Range    Ventricular Rate 60 BPM    Atrial Rate 60 BPM    P-R Interval 148 ms    QRS Duration 82 ms    Q-T Interval 432 ms    QTc Calculation (Bazett) 432 ms    P Axis 46 degrees    R Axis 37 degrees    T Axis 66 degrees       Radiology personally reviewed and interpreted by me:  CT ABDOMEN PELVIS W IV CONTRAST Additional Contrast? None   Final Result   1. No acute abnormality. 2. Left nonobstructive nephrolithiasis. 3. Possible distal right ureteral calculi without obstruction. XR CHEST PORTABLE   Final Result   No acute cardiopulmonary disease. Medications:  Medications   0.9 % sodium chloride bolus (1,000 mLs IntraVENous New Bag 2/8/23 0958)   aluminum & magnesium hydroxide-simethicone (MAALOX) 30 mL, lidocaine viscous hcl (XYLOCAINE) 5 mL (GI COCKTAIL) ( Oral Given 2/8/23 0957)   pantoprazole (PROTONIX) injection 40 mg (40 mg IntraVENous Given 2/8/23 1000)   iopamidol (ISOVUE-370) 76 % injection 65 mL (65 mLs IntraVENous Given 2/8/23 1043)       ------------------------- NURSING NOTES AND VITALS REVIEWED ---------------------------  Date / Time Roomed:  2/8/2023  8:50 AM  ED Bed Assignment:  DARREL/DARREL    The nursing notes within the ED encounter and vital signs as below have been reviewed.    /79   Pulse 73   Temp 97.2 °F (36.2 °C)   Resp 18   Ht 5' 6\" (1.676 m) Wt 190 lb (86.2 kg)   LMP 10/21/2019 (Exact Date)   SpO2 100%   BMI 30.67 kg/m²     ------------------------------------------ PROGRESS NOTES ------------------------------------------  I have spoken with the patient and discussed todays results, in addition to providing specific details for the plan of care and counseling regarding the diagnosis and prognosis. Their questions are answered at this time and they are agreeable with the plan. I discussed at length with them reasons for immediate return here for re evaluation. They will followup with primary care by calling their office tomorrow. --------------------------------- ADDITIONAL PROVIDER NOTES ---------------------------------  At this time the patient is without objective evidence of an acute process requiring hospitalization or inpatient management. They have remained hemodynamically stable throughout their entire ED visit and are stable for discharge with outpatient follow-up. The plan has been discussed in detail and they are aware of the specific conditions for emergent return, as well as the importance of follow-up. New Prescriptions    HYDROCODONE-ACETAMINOPHEN (NORCO) 5-325 MG PER TABLET    Take 1-2 tablets by mouth every 6 hours as needed for Pain for up to 3 days. Intended supply: 3 days. Take lowest dose possible to manage pain Max Daily Amount: 8 tablets       Diagnosis:  1. Acute gastritis without hemorrhage, unspecified gastritis type        Disposition:  Patient's disposition: Discharge to home  Patient's condition is stable. Patient was seen and evaluated by myself and my attending No att. providers found. Assessment and Plan discussed with attending provider, please see attestation for final plan of care.      MD Higinio Nick MD  Resident  02/08/23 2538

## 2023-02-10 ENCOUNTER — OFFICE VISIT (OUTPATIENT)
Dept: BARIATRICS/WEIGHT MGMT | Age: 48
End: 2023-02-10

## 2023-02-10 ENCOUNTER — TELEPHONE (OUTPATIENT)
Dept: BARIATRICS/WEIGHT MGMT | Age: 48
End: 2023-02-10

## 2023-02-10 VITALS
SYSTOLIC BLOOD PRESSURE: 142 MMHG | DIASTOLIC BLOOD PRESSURE: 73 MMHG | HEART RATE: 77 BPM | RESPIRATION RATE: 20 BRPM | HEIGHT: 66 IN | WEIGHT: 191 LBS | BODY MASS INDEX: 30.7 KG/M2 | TEMPERATURE: 97.7 F

## 2023-02-10 DIAGNOSIS — R10.32 LEFT LOWER QUADRANT ABDOMINAL PAIN: ICD-10-CM

## 2023-02-10 DIAGNOSIS — R10.12 LEFT UPPER QUADRANT ABDOMINAL PAIN: Primary | ICD-10-CM

## 2023-02-10 NOTE — PATIENT INSTRUCTIONS
Please continue to take your vitamin and mineral supplements as instructed. Beano - take 1-2 tabs prior to meal times. May use gas x as needed. Probiotcs - daily     If you received a blood work prescription today for laboratory monitoring due prior to your next routine follow-up visit, please have this blood work obtained 10 to 14 days prior to your next visit. It is important to fast for 12 hours prior to routine weight loss surgery blood work, EXCEPT for drinking water, to ensure accuracy of results. Please report nausea, vomiting, abdominal pain, or any other problems you experience to your surgeon. For problems related to weight loss surgery, it is best to go to [address] [place] [address] Emergency Department and have your surgeon paged.

## 2023-02-10 NOTE — PROGRESS NOTES
Chief Complaint:       Follow-Up from Hospital      History of Present Illness   Source of history provided by:  patientGayathri Alvarado is a 50 y.o. old female who presents to walk-in for complaints of abdominal pain that has been going on for 5 days now. Two days ago she went to the ED for this pain. She reports that the pain started to the left side of her chest and her left side of her abdomen after eating a nut roll. She initially thought she was constipated so she took miralax. Had a bowel movement and still had the pain. She then used a heating pad and took pain pills to get the pain to go away. Reports that the pain feels like a olga horse. She also has a lot of belching. When she left the ED the pain had subsided for about 24 hours, but has since returned. She reports that the pain is constantly dull, however it will occur soon after she eats. She reports that she also feels lower abdominal bloating as well and has a lot of gas. She reports bowel movements are normal for her, no constipation/diarrhea. She has been on omeprazole and carafate daily since her LRYGB 5 years ago. Denies fever/chills. Per Dr. Sakina Weldon in 11/2022 if she continued to have pain he would schedule her for diagnostic lap with possible internal hernia repair. ROS    Unless otherwise stated in this report or unable to obtain because of the patient's clinical or mental status as evidenced by the medical record, this patients's positive and negative responses for Review of Systems, constitutional, psych, eyes, ENT, cardiovascular, respiratory, gastrointestinal, neurological, genitourinary, musculoskeletal, integument systems and systems related to the presenting problem are either stated in the preceding or were not pertinent or were negative for the symptoms and/or complaints related to the medical problem.     Past Medical History:  has a past medical history of Anemia, Arthritis, Asthma, Depression, GERD without esophagitis, History of blood transfusion, History of gastric bypass, Hydronephrosis with urinary obstruction due to ureteral calculus, Hypertension, Iron deficiency, Localized osteoarthritis of left knee, Lumbar spondylosis, Lymphedema, Obesity, Panic attacks, Perforated marginal ulcer, PONV (postoperative nausea and vomiting), Sleep apnea, obstructive, Vitamin D deficiency, and Zinc deficiency. Past Surgical History:  has a past surgical history that includes Appendectomy (1996); Viviane-en-Y Gastric Bypass (11/14/2017); pr office/outpt visit,procedure only (N/A, 10/29/2018); Stomach surgery; Lithotripsy (Right, 7/24/2019); Cholecystectomy, laparoscopic (N/A, 7/27/2019); Upper gastrointestinal endoscopy (N/A, 7/27/2019); Lithotripsy; Dilation and curettage of uterus (10/23/2019); Hysterectomy (01/22/2020); Upper gastrointestinal endoscopy (N/A, 2/7/2020); laparoscopy (N/A, 6/27/2020); Upper gastrointestinal endoscopy (N/A, 6/27/2020); Colonoscopy (N/A, 10/27/2021); and Upper gastrointestinal endoscopy (N/A, 3/11/2022). Social History:  reports that she has been smoking cigarettes. She started smoking about 31 years ago. She has a 7.50 pack-year smoking history. She has never used smokeless tobacco. She reports current alcohol use of about 6.0 standard drinks per week. She reports that she does not use drugs. Family History: family history includes Cancer in her maternal grandfather and paternal grandmother; Depression in her mother; Diabetes in her father and mother; Heart Attack in her brother; Heart Attack (age of onset: 61) in her mother; Stroke in her father and paternal grandfather; Substance Abuse in her brother.    Allergies: Pcn [penicillins], Food, and Ibuprofen    Physical Exam   Vital Signs:  BP (!) 142/73 (Site: Left Lower Arm, Position: Sitting, Cuff Size: Large Adult)   Pulse 77   Temp 97.7 °F (36.5 °C) (Temporal)   Resp 20   Ht 5' 6\" (1.676 m)   Wt 191 lb (86.6 kg)   LMP 10/21/2019 (Exact Date)   BMI 30.83 kg/m²    Oxygen Saturation Interpretation: Normal.    General Appearance/Constitutional:  Alert, development consistent with age, NAD. HEENT:  NC/NT. PERRLA. Airway patent. No erythema to posterior pharynx. Neck:  Supple. No lymphadenopathy. Lungs:  Clear to auscultation and breath sounds equal.  Heart:  Regular rate and rhythm. Abdomen:  General Appearance: No obvious trauma or bruising. Bowel sounds: BS x 4       Distension:  mild distension        Tenderness: tenderness to left upper and lower abdominal region, tenderness to left clavicular region       Liver/Spleen: Non-tender and no hepatosplenomegaly. Pulsatile Mass: None noted. Hernia:  No hernias with Valsalva. Back: CVA Tenderness: No bilateral tenderness or bruising. Skin:  Normal turgor. Warm, dry, without visible rash, unless noted elsewhere. Neurological:  Orientation age-appropriate. Motor functions intact. Lab / Imaging Results   (All laboratory and radiology results have been personally reviewed by myself)  Labs:  No results found for this visit on 02/10/23. Imaging: All Radiology results interpreted by Radiologist unless otherwise noted. CT ABDOMEN PELVIS W IV CONTRAST Additional Contrast? None    Result Date: 2/8/2023  EXAMINATION: CT OF THE ABDOMEN AND PELVIS WITH CONTRAST 2/8/2023 10:49 am TECHNIQUE: CT of the abdomen and pelvis was performed with the administration of intravenous contrast. Multiplanar reformatted images are provided for review. Automated exposure control, iterative reconstruction, and/or weight based adjustment of the mA/kV was utilized to reduce the radiation dose to as low as reasonably achievable.  COMPARISON: CT abdomen and pelvis 112 HISTORY: ORDERING SYSTEM PROVIDED HISTORY: left sided abdominal pain TECHNOLOGIST PROVIDED HISTORY: Additional Contrast?->None Reason for exam:->left sided abdominal pain Decision Support Exception - unselect if not a suspected or confirmed emergency medical condition->Emergency Medical Condition (MA) FINDINGS: There is mild hypoattenuation of the liver which is partially due to early phase of contrast enhancement. However, this may also represent an element of fatty change. The spleen, pancreas, and adrenal glands are unremarkable. There is a low-density lesion from the posterior lower pole left kidney which measures approximately 1.9 cm in size and is slightly more dense than simple cyst, stable. This is slightly decreased in size compared to 07/07/2020. There is left nonobstructive nephrolithiasis. There is duplex left renal collecting system. The right kidney is grossly unremarkable in appearance. There are 2 adjacent calcifications in the right hemipelvis which may be within the distal ureter just proximal to the ureterovesicular junction. The distal most calcification is stable in location compared to 01/01/2023 while there has been distal migration of the more proximal calcification. These measure approximately 5 and 4 mm in size. There is no associated hydronephrosis. The patient is status post cholecystectomy without evidence of biliary ductal dilatation. There is no evidence of bowel obstruction or pneumoperitoneum. There is trace free fluid within the pelvis. There is evidence of prior hysterectomy. The urinary bladder is underdistended but grossly unremarkable. The patient is status post gastric bypass surgery. There is arteriosclerosis without abdominal aortic aneurysm. There is atelectasis and/or scarring within the dependent lung bases. There are degenerative changes within the spine. There are stable wedge deformities of the T12 and L1 vertebral bodies. 1. No acute abnormality. 2. Left nonobstructive nephrolithiasis. 3. Possible distal right ureteral calculi without obstruction. XR CHEST PORTABLE    Result Date: 2/8/2023  EXAMINATION: ONE XRAY VIEW OF THE CHEST 2/8/2023 9:11 am COMPARISON: 03/09/2022. HISTORY: ORDERING SYSTEM PROVIDED HISTORY: left sided chest pain TECHNOLOGIST PROVIDED HISTORY: Reason for exam:->left sided chest pain FINDINGS: The cardiac silhouette is normal in size. The pulmonary vasculature is within normal limits. No pulmonary consolidation or collapse is identified. No pneumothorax or pleural effusion is seen. No acute cardiopulmonary disease. Assessment / Plan   Impression(s):  Lizbeth Steve was seen today for follow-up from hospital.    Diagnoses and all orders for this visit:    Left upper quadrant abdominal pain    Left lower quadrant abdominal pain      Will schedule for diagnostic lap with possible internal hernia repair. ED sooner if symptoms worsen or change. ED immediately with the development of fever, shaking chills, body aches, worsening pain, lethargy, CP, or SOB. Pt is in agreement with this care plan. All questions answered. Will follow up as needed. Return if symptoms worsen or fail to improve. Electronically signed by JOHN Malone CNP   DD: 2/10/23    **This report was transcribed using voice recognition software. Every effort was made to ensure accuracy; however, inadvertent computerized transcription errors may be present.

## 2023-02-10 NOTE — TELEPHONE ENCOUNTER
Prior Authorization Form  DEMOGRAPHICS:    Patient Name:  Kiara Brown  Patient :  1975            Insurance:  Payor: Mike Beaulieu / Plan: Alfredito Holt COMPLETE / Product Type: *No Product type* /   Insurance ID Number:    Payer/Plan Subscr  Sex Relation Sub. Ins. ID Effective Group Num   1. [address] [facility] 1975 Female Self [id number]                                   [address] [address]   2.  [address] [facility] 1975 Female Self [id number]                                   [address]         DIAGNOSIS & PROCEDURE:    Procedure/Operation: diagnotic lap, possible internal hernia repair           CPT Code: 83784    Diagnosis:  abdominal pain    ICD10 Code: r10.9    Location:  [address] [place] [address] Darío Blair    Surgeon:  Dr. Arti Ortega INFORMATION:    Date: 23    Time: unknown              Anesthesia:  General                                                       Status:  Outpatient        Special Comments:  none         Electronically signed by Burke Smart RN on 2/10/2023 at 9:36 AM

## 2023-02-16 ENCOUNTER — HOSPITAL ENCOUNTER (EMERGENCY)
Age: 48
Discharge: HOME | End: 2023-02-16
Attending: STUDENT IN AN ORGANIZED HEALTH CARE EDUCATION/TRAINING PROGRAM
Payer: MEDICARE

## 2023-02-16 ENCOUNTER — APPOINTMENT (OUTPATIENT)
Dept: GENERAL RADIOLOGY | Age: 48
End: 2023-02-16
Payer: MEDICARE

## 2023-02-16 VITALS
BODY MASS INDEX: 28.88 KG/M2 | HEIGHT: 67 IN | DIASTOLIC BLOOD PRESSURE: 85 MMHG | SYSTOLIC BLOOD PRESSURE: 127 MMHG | WEIGHT: 184 LBS | TEMPERATURE: 98.5 F | OXYGEN SATURATION: 100 % | HEART RATE: 62 BPM | RESPIRATION RATE: 13 BRPM

## 2023-02-16 DIAGNOSIS — R07.9 CHEST PAIN, UNSPECIFIED TYPE: Primary | ICD-10-CM

## 2023-02-16 LAB
ALBUMIN SERPL-MCNC: 3.5 G/DL (ref 3.5–5.2)
ALP BLD-CCNC: 88 U/L (ref 35–104)
ALT SERPL-CCNC: 10 U/L (ref 0–32)
ANION GAP SERPL CALCULATED.3IONS-SCNC: 8 MMOL/L (ref 7–16)
AST SERPL-CCNC: 18 U/L (ref 0–31)
BASOPHILS ABSOLUTE: 0.1 E9/L (ref 0–0.2)
BASOPHILS RELATIVE PERCENT: 1.3 % (ref 0–2)
BILIRUB SERPL-MCNC: 0.3 MG/DL (ref 0–1.2)
BUN BLDV-MCNC: 14 MG/DL (ref 6–20)
CALCIUM SERPL-MCNC: 8.7 MG/DL (ref 8.6–10.2)
CHLORIDE BLD-SCNC: 103 MMOL/L (ref 98–107)
CO2: 26 MMOL/L (ref 22–29)
CREAT SERPL-MCNC: 0.7 MG/DL (ref 0.5–1)
D DIMER: <200 NG/ML DDU
EKG ATRIAL RATE: 67 BPM
EKG P AXIS: 73 DEGREES
EKG P-R INTERVAL: 156 MS
EKG Q-T INTERVAL: 388 MS
EKG QRS DURATION: 68 MS
EKG QTC CALCULATION (BAZETT): 409 MS
EKG R AXIS: 50 DEGREES
EKG T AXIS: 78 DEGREES
EKG VENTRICULAR RATE: 67 BPM
EOSINOPHILS ABSOLUTE: 0.31 E9/L (ref 0.05–0.5)
EOSINOPHILS RELATIVE PERCENT: 4 % (ref 0–6)
GFR SERPL CREATININE-BSD FRML MDRD: >60 ML/MIN/1.73
GLUCOSE BLD-MCNC: 90 MG/DL (ref 74–99)
HCT VFR BLD CALC: 42.4 % (ref 34–48)
HEMOGLOBIN: 13.8 G/DL (ref 11.5–15.5)
IMMATURE GRANULOCYTES #: 0.03 E9/L
IMMATURE GRANULOCYTES %: 0.4 % (ref 0–5)
LACTIC ACID: 1 MMOL/L (ref 0.5–2.2)
LIPASE: 18 U/L (ref 13–60)
LYMPHOCYTES ABSOLUTE: 2.07 E9/L (ref 1.5–4)
LYMPHOCYTES RELATIVE PERCENT: 26.7 % (ref 20–42)
MCH RBC QN AUTO: 32 PG (ref 26–35)
MCHC RBC AUTO-ENTMCNC: 32.5 % (ref 32–34.5)
MCV RBC AUTO: 98.4 FL (ref 80–99.9)
MONOCYTES ABSOLUTE: 0.48 E9/L (ref 0.1–0.95)
MONOCYTES RELATIVE PERCENT: 6.2 % (ref 2–12)
NEUTROPHILS ABSOLUTE: 4.75 E9/L (ref 1.8–7.3)
NEUTROPHILS RELATIVE PERCENT: 61.4 % (ref 43–80)
PDW BLD-RTO: 13.6 FL (ref 11.5–15)
PLATELET # BLD: 428 E9/L (ref 130–450)
PMV BLD AUTO: 8.5 FL (ref 7–12)
POTASSIUM REFLEX MAGNESIUM: 4.3 MMOL/L (ref 3.5–5)
RBC # BLD: 4.31 E12/L (ref 3.5–5.5)
SODIUM BLD-SCNC: 137 MMOL/L (ref 132–146)
TOTAL PROTEIN: 6.5 G/DL (ref 6.4–8.3)
TROPONIN, HIGH SENSITIVITY: <6 NG/L (ref 0–9)
TROPONIN, HIGH SENSITIVITY: <6 NG/L (ref 0–9)
WBC # BLD: 7.7 E9/L (ref 4.5–11.5)

## 2023-02-16 PROCEDURE — 83605 ASSAY OF LACTIC ACID: CPT

## 2023-02-16 PROCEDURE — 83690 ASSAY OF LIPASE: CPT

## 2023-02-16 PROCEDURE — 93010 ELECTROCARDIOGRAM REPORT: CPT | Performed by: INTERNAL MEDICINE

## 2023-02-16 PROCEDURE — 71045 X-RAY EXAM CHEST 1 VIEW: CPT

## 2023-02-16 PROCEDURE — 93005 ELECTROCARDIOGRAM TRACING: CPT | Performed by: STUDENT IN AN ORGANIZED HEALTH CARE EDUCATION/TRAINING PROGRAM

## 2023-02-16 PROCEDURE — 2500000003 HC RX 250 WO HCPCS: Performed by: STUDENT IN AN ORGANIZED HEALTH CARE EDUCATION/TRAINING PROGRAM

## 2023-02-16 PROCEDURE — 80053 COMPREHEN METABOLIC PANEL: CPT

## 2023-02-16 PROCEDURE — 85025 COMPLETE CBC W/AUTO DIFF WBC: CPT

## 2023-02-16 PROCEDURE — 99285 EMERGENCY DEPT VISIT HI MDM: CPT

## 2023-02-16 PROCEDURE — 85378 FIBRIN DEGRADE SEMIQUANT: CPT

## 2023-02-16 PROCEDURE — A4216 STERILE WATER/SALINE, 10 ML: HCPCS | Performed by: STUDENT IN AN ORGANIZED HEALTH CARE EDUCATION/TRAINING PROGRAM

## 2023-02-16 PROCEDURE — 84484 ASSAY OF TROPONIN QUANT: CPT

## 2023-02-16 PROCEDURE — 96375 TX/PRO/DX INJ NEW DRUG ADDON: CPT

## 2023-02-16 PROCEDURE — 6370000000 HC RX 637 (ALT 250 FOR IP): Performed by: STUDENT IN AN ORGANIZED HEALTH CARE EDUCATION/TRAINING PROGRAM

## 2023-02-16 PROCEDURE — 6360000002 HC RX W HCPCS: Performed by: STUDENT IN AN ORGANIZED HEALTH CARE EDUCATION/TRAINING PROGRAM

## 2023-02-16 PROCEDURE — 96374 THER/PROPH/DIAG INJ IV PUSH: CPT

## 2023-02-16 PROCEDURE — 2580000003 HC RX 258: Performed by: STUDENT IN AN ORGANIZED HEALTH CARE EDUCATION/TRAINING PROGRAM

## 2023-02-16 RX ORDER — DIPHENHYDRAMINE HYDROCHLORIDE 50 MG/ML
25 INJECTION INTRAMUSCULAR; INTRAVENOUS ONCE
Status: COMPLETED | OUTPATIENT
Start: 2023-02-16 | End: 2023-02-16

## 2023-02-16 RX ORDER — 0.9 % SODIUM CHLORIDE 0.9 %
1000 INTRAVENOUS SOLUTION INTRAVENOUS ONCE
Status: COMPLETED | OUTPATIENT
Start: 2023-02-16 | End: 2023-02-16

## 2023-02-16 RX ORDER — METOCLOPRAMIDE HYDROCHLORIDE 5 MG/ML
10 INJECTION INTRAMUSCULAR; INTRAVENOUS ONCE
Status: COMPLETED | OUTPATIENT
Start: 2023-02-16 | End: 2023-02-16

## 2023-02-16 RX ADMIN — DIPHENHYDRAMINE HYDROCHLORIDE 25 MG: 50 INJECTION, SOLUTION INTRAMUSCULAR; INTRAVENOUS at 13:35

## 2023-02-16 RX ADMIN — SODIUM CHLORIDE 1000 ML: 9 INJECTION, SOLUTION INTRAVENOUS at 13:36

## 2023-02-16 RX ADMIN — Medication 20 MG: at 11:15

## 2023-02-16 RX ADMIN — METOCLOPRAMIDE 10 MG: 5 INJECTION, SOLUTION INTRAMUSCULAR; INTRAVENOUS at 13:33

## 2023-02-16 RX ADMIN — LIDOCAINE HYDROCHLORIDE: 20 SOLUTION ORAL; TOPICAL at 11:16

## 2023-02-16 ASSESSMENT — PAIN - FUNCTIONAL ASSESSMENT: PAIN_FUNCTIONAL_ASSESSMENT: 0-10

## 2023-02-16 ASSESSMENT — PAIN SCALES - GENERAL: PAINLEVEL_OUTOF10: 10

## 2023-02-16 ASSESSMENT — PAIN DESCRIPTION - DESCRIPTORS: DESCRIPTORS: BURNING

## 2023-02-16 ASSESSMENT — PAIN DESCRIPTION - LOCATION: LOCATION: CHEST

## 2023-02-16 NOTE — ED PROVIDER NOTES
[address]        Pt Name: Coleman Holter  MRN: [id number]  [place] 1975  Date of evaluation: 2/16/2023  Provider: Alverto Diaz DO  PCP: Angely Mack MD  Note Started: 10:44 AM EST 2/16/23    CHIEF COMPLAINT       Chief Complaint   Patient presents with    Chest Pain     Mid epigastric pain that radiates to the back, started this AM, nausea, sob         HISTORY OF PRESENT ILLNESS: 1 or more Elements   History From: patient    Limitations to history : None    Coleman Holter is a 50 y.o. female with a history of GERD, gastric bypass, obesity, lymphedema who presents to the emergency department complaining of chest pain. The patient symptoms are sudden in onset just prior to arrival, persistent, moderate severity, nothing makes it better or worse. She describes the pain as heaviness in her epigastric region that radiates into her back. She states that she also is nauseated and feels somewhat short of breath. Patient states that she was driving to see her doctor for her lymphedema when she was driving her car is when her symptoms had started. Patient states that she has not had symptoms like this in the past.  She denies any lightheadedness, dizziness, syncope, vomiting, diarrhea, recent hospitalization, recent illness, or other acute symptoms or concerns. She does have a significant history of multiple scopes in the past along with Viviane-en-Y bypass and has had an appendectomy and cholecystectomy. She is scheduled to have a laparoscopic internal hernia repair by Dr. Thor Herrera on February 21. Nursing Notes were all reviewed and agreed with or any disagreements were addressed in the HPI. REVIEW OF SYSTEMS :      Review of Systems    Positives and Pertinent negatives as per HPI.      SURGICAL HISTORY     Past Surgical History:   Procedure Laterality Date    APPENDECTOMY  1996    CHOLECYSTECTOMY, LAPAROSCOPIC N/A 7/27/2019    CHOLECYSTECTOMY LAPAROSCOPIC performed by Janine Sinha MD at [address] [facility] N/A 10/27/2021    COLONOSCOPY POLYPECTOMY SNARE/COLD BIOPSY performed by Prasanna Majano MD at [place] [address]  10/23/2019    HYSTERECTOMY (CERVIX STATUS UNKNOWN)  01/22/2020    [facility]    LAPAROSCOPY N/A 6/27/2020    DIAGNOSTIC  LAPAROSCOPY, REPAIR PERFORATED MARGINAL ULCER, UPPER ENDOSCOPY. [facility] PATCH performed by Mariah Ramey MD at [address] [place]    LITHOTRIPSY Right 7/24/2019    CYSTOSCOPY RETROGRADE PYELOGRAM URETEROSCOPY J STENT LASER LITHOTRIPSY RIGHT performed by Amberly Chambers DO at [facility] OFFICE/OUTPT VISIT,PROCEDURE ONLY N/A 10/29/2018    DIAGNOSTIC LAPAROSCOPY REPAIR PERFORATED VISCUS performed by Janine Sinha MD at [address] [facility]  11/14/2017    STOMACH SURGERY      UPPER GASTROINTESTINAL ENDOSCOPY N/A 7/27/2019    EGD ESOPHAGOGASTRODUODENOSCOPY performed by Janine Sinha MD at [address] [place] [address] N/A 2/7/2020    EGD BIOPSY performed by Janine Sinha MD at [facility] 6/27/2020    EGD DIAGNOSTIC ONLY performed by Mariah Ramey MD at [address] [place] [address] N/A 3/11/2022    EGD BIOPSY performed by Mely Reese MD at [facility]. [address]       Discharge Medication List as of 2/16/2023  2:25 PM        CONTINUE these medications which have NOT CHANGED    Details   omeprazole (PRILOSEC) 20 MG delayed release capsule take 1 capsule by mouth once daily, Disp-90 capsule, R-1Normal      !! clobetasol (TEMOVATE) 0.05 % ointment Apply topically 2 times daily. , Disp-1 g, R-0, Normal      sucralfate (CARAFATE) 1 GM/10ML suspension Take 10 mLs by mouth 4 times daily, Disp-1200 mL, R-2Normal      acetaminophen (TYLENOL) 500 MG tablet Take 2 tablets by mouth every 6 hours as needed for Pain Maximum dose- 8 tablets/24 hours. , Disp-120 tablet, R-2Normal      hydrOXYzine pamoate (VISTARIL) 100 MG capsule Historical Med      !! clobetasol (TEMOVATE) 0.05 % ointment Apply twice a day for 2 weeks and then daily for 2 weeks, Disp-45 g, R-0, Normal      clotrimazole-betamethasone (LOTRISONE) 1-0.05 % cream Apply topically 2 times a day for 10 days, Disp-45 g, R-0, Normal      fluconazole (DIFLUCAN) 150 MG tablet Take one tablet every 3 days, Disp-2 tablet, R-0Normal      sertraline (ZOLOFT) 25 MG tablet Historical Med      topiramate (TOPAMAX) 25 MG tablet Historical Med      albuterol sulfate HFA (VENTOLIN HFA) 108 (90 Base) MCG/ACT inhaler Inhale 2 puffs into the lungs every 6 hours as needed for Wheezing, Disp-18 g, R-2Normal      ondansetron (ZOFRAN-ODT) 4 MG disintegrating tablet Place 1 tablet under the tongue every 8 hours as needed for Nausea or Vomiting, Disp-30 tablet, R-1Normal      sucralfate (CARAFATE) 1 GM tablet Take 1 tablet by mouth Daily with lunch, Disp-120 tablet, R-1Normal      ferrous sulfate (IRON SLOW RELEASE) 142 (45 Fe) MG extended release tablet Take 142 mg by mouth every other day, Disp-30 tablet, R-2Normal      LORazepam (ATIVAN) 0.5 MG tablet take 1 tablet by mouth once daily if needed for anxiety for 1 monthHistorical Med      Ascorbic Acid (VITAMIN C) 250 MG tablet Take 250 mg by mouth every other dayHistorical Med      Multiple Vitamin (MVI, BARIATRIC ADVANTAGE MULTI-FORMULA, CHEW TAB) Take 1 tablet by mouth every other dayHistorical Med      sennosides-docusate sodium (SENOKOT-S) 8.6-50 MG tablet Take 1 tablet by mouth 2 times daily as needed for ConstipationHistorical Med      buPROPion (WELLBUTRIN SR) 200 MG extended release tablet Take 200 mg by mouth 2 times daily Historical Med       !! - Potential duplicate medications found. Please discuss with provider.           ALLERGIES     Pcn [penicillins], Food, and Ibuprofen    FAMILYHISTORY       Family History   Problem Relation Age of Onset Heart Attack Mother 61        death from this    Diabetes Mother     Depression Mother     Diabetes Father     Stroke Father     Cancer Maternal Grandfather     Cancer Paternal Grandmother     Stroke Paternal Grandfather     Substance Abuse Brother     Heart Attack Brother         SOCIAL HISTORY       Social History     Tobacco Use    Smoking status: Every Day     Packs/day: 0.50     Years: 15.00     Pack years: 7.50     Types: Cigarettes     Start date: 10/29/1991    Smokeless tobacco: Never   Vaping Use    Vaping Use: Never used   Substance Use Topics    Alcohol use: Yes     Alcohol/week: 6.0 standard drinks     Types: 6 Standard drinks or equivalent per week     Comment: social    Drug use: No       SCREENINGS        Kathryn Coma Scale  Eye Opening: Spontaneous  Best Verbal Response: Oriented  Best Motor Response: Obeys commands  [place] Coma Scale Score: 15                CIWA Assessment  BP: 127/85  Heart Rate: 62           PHYSICAL EXAM  1 or more Elements     ED Triage Vitals   BP Temp Temp src Pulse Resp SpO2 Height Weight   -- -- -- -- -- -- -- --       Physical Exam      Constitutional/General: Alert and oriented x3, obese female sitting up in bed in no acute distress  Head: Normocephalic and atraumatic  Eyes: PERRL, EOMI, conjunctiva normal, sclera non icteric  ENT:  Oropharynx clear, handling secretions, no trismus, no asymmetry of the posterior oropharynx or uvular edema  Neck: Supple, full ROM, no stridor, no meningeal signs  Respiratory: Lungs clear to auscultation bilaterally, no wheezes, rales, or rhonchi. Not in respiratory distress  Cardiovascular:  Regular rate. Regular rhythm. No murmurs, no gallops, no rubs. 2+ distal pulses. Equal extremity pulses. Chest: No chest wall tenderness  GI: Abdomen is soft with mild diffuse tenderness palpation. No rigidity rebound tenderness or guarding  Musculoskeletal: Moves all extremities x 4. Warm and well perfused, no clubbing, no cyanosis, no edema. Capillary refill <3 seconds  Integument: skin warm and dry. No rashes. Neurologic: GCS 15, no focal deficits, symmetric strength 5/5 in the upper and lower extremities bilaterally  Psychiatric: Normal Affect      DIAGNOSTIC RESULTS   LABS:    Labs Reviewed   CBC WITH AUTO DIFFERENTIAL   COMPREHENSIVE METABOLIC PANEL W/ REFLEX TO MG FOR LOW K   TROPONIN   D-DIMER, QUANTITATIVE   LIPASE   LACTIC ACID   TROPONIN       As interpreted by me, the above displayed labs are abnormal. All other labs obtained during this visit were within normal range or not returned as of this dictation. RADIOLOGY:   Non-plain film images such as CT, Ultrasound and MRI are read by the radiologist. Plain radiographic images are visualized and preliminarily interpreted by the ED Provider with the below findings:    Chest x-ray did not show acute abnormalities    Interpretation per the Radiologist below, if available at the time of this note:    XR CHEST PORTABLE   Final Result   Nonacute chest with minimal atelectasis in the left base. Dilated bowel loops. No results found. No results found. PROCEDURES   Unless otherwise noted below, none     Procedures    CRITICAL CARE TIME (.cct)   0    PAST MEDICAL HISTORY/Chronic Conditions Affecting Care      has a past medical history of Anemia, Arthritis, Asthma, Depression, GERD without esophagitis, History of blood transfusion, History of gastric bypass, Hydronephrosis with urinary obstruction due to ureteral calculus, Hypertension, Iron deficiency (08/01/2018), Localized osteoarthritis of left knee (01/20/2020), Lumbar spondylosis (01/26/2018), Lymphedema, Obesity, Panic attacks, Perforated marginal ulcer (10/29/2018), PONV (postoperative nausea and vomiting), Sleep apnea, obstructive, Vitamin D deficiency (07/27/2018), and Zinc deficiency (08/01/2018).      EMERGENCY DEPARTMENT COURSE    Vitals:    Vitals:    02/16/23 1348 02/16/23 1403 02/16/23 1418 02/16/23 1433   BP: 123/89  127/85   Pulse: 67 65 65 62   Resp: 13 14 13 13   Temp:       SpO2: 100% 100% 100% 100%   Weight:       Height:           Patient was given the following medications:  Medications   famotidine (PEPCID) 20 mg in sodium chloride (PF) 0.9 % 10 mL injection (20 mg IntraVENous Given 2/16/23 1115)   aluminum & magnesium hydroxide-simethicone (MAALOX) 30 mL, lidocaine viscous hcl (XYLOCAINE) 5 mL (GI COCKTAIL) ( Oral Given 2/16/23 1116)   metoclopramide (REGLAN) injection 10 mg (10 mg IntraVENous Given 2/16/23 1333)   diphenhydrAMINE (BENADRYL) injection 25 mg (25 mg IntraVENous Given 2/16/23 1335)   0.9 % sodium chloride bolus (0 mLs IntraVENous Stopped 2/16/23 1450)       ED Course as of 02/19/23 2149   Thu Feb 16, 2023   1047 EKG: This EKG is signed and interpreted by me. Rate: 67  Rhythm: Sinus  Interpretation: Normal sinus rhythm, normal axis, low voltage QRS, no acute ST elevations or depressions, intervals are normal, QTc is 409  Comparison: no previous EKG available    [KG]   1216 Patient is feeling better upon reassessment. She states that her pain has improved but she still having a little bit of aching in her abdomen but it feels much better than it did when she first got here and she does not have any chest pain at this time. [KG]   1425 Patient feels better upon reassessment. She is resting comfortably and in no acute distress. She is completely asymptomatic at this time and states that she like to go home and follow-up as an outpatient. [KG]      ED Course User Index  [KG] Alessandra Holguin,           Medical Decision Making/Differential Diagnosis:    CC/HPI Summary, Social Determinants of health, Records Reviewed, DDx, testing done/not done, ED Course, Reassessment, disposition considerations/shared decision making with patient, consults, disposition:        The patient is a 47-year-old female presents the emergency department complaining of chest pain.   She is hemodynamically stable, nontoxic, and in no acute distress. No known social determinants of health. Prior records were reviewed and she was last seen in the emergency department on February 8 for acute gastritis. She did have a CT scan performed at that time that did not show any acute abnormalities. She was discharged home from that visit. Differential diagnosis includes but is not limited to gastritis versus ulcer versus ACS versus PE versus pancreatitis. EKG does not show evidence of acute ischemia and appears similar to prior EKG. patient was treated with IV Pepcid and GI cocktail. Patient was also treated with IV Reglan and Benadryl and was feeling much better upon reassessment. Labs are reassuring. CBC and CMP did not show acute abnormalities. Delta troponin is negative and less than 6 both times. D-dimer is negative. Chest x-ray does not show any acute abnormalities. Patient just had a CT scan performed on February 8 that did not show any acute abnormal findings. Patient felt much better upon reassessment after treatment with medications. She was discharged home with strict return precautions. She was in stable condition upon discharge      CONSULTS: (Who and What was discussed)  None        I am the Primary Clinician of Record. FINAL IMPRESSION      1. Chest pain, unspecified type          DISPOSITION/PLAN     DISPOSITION Decision To Discharge 02/16/2023 02:24:47 PM      PATIENT REFERRED TO:  Dylon Cortes MD  [facility] [address] [place] [address]  [phone]    Schedule an appointment as soon as possible for a visit       DISCHARGE MEDICATIONS:  Discharge Medication List as of 2/16/2023  2:25 PM          DISCONTINUED MEDICATIONS:  Discharge Medication List as of 2/16/2023  2:25 PM                 (Please note that portions of this note were completed with a voice recognition program.  Efforts were made to edit the dictations but occasionally words are mis-transcribed.)    Marianela Huffman DO (electronically signed)           Chiquis Patiño,   02/19/23 1909

## 2023-02-16 NOTE — ED NOTES
Ambulatory to BR. States pain 2/10, \"I feel sooo much better\". Abd. Pain, and nausea have resolved. Will continue to observe. MD aware.      Missy David RN  02/16/23 1401

## 2023-02-16 NOTE — ED NOTES
Patient resting in bed. Remains c/o nausea and lower mid abd. Pain 6/10, \"pressure\" in nature. Patient reports being anxious and afraid, verbal/nonverbal comfort offered. Labs obtained and sent. Medicated per orders. Resting, call bell in reach, will continue to observe.      Angelo Rivera RN  02/16/23 9331

## 2023-02-20 ENCOUNTER — ANESTHESIA EVENT (OUTPATIENT)
Dept: OPERATING ROOM | Age: 48
End: 2023-02-20
Payer: MEDICARE

## 2023-02-20 NOTE — PROGRESS NOTES
[address] [facility]                                                                                                                    PRE OP INSTRUCTIONS FOR  Corine Wen        Date: 2/20/2023    Date of surgery: 2/21/23   Arrival Time: Hospital will call you between 5pm and 7pm tonight with your final arrival time for surgery    Do not eat or drink anything after midnight prior to surgery. This includes no water, chewing gum, mints or ice chips. Take the following medications with a small sip of water on the morning of Surgery: Anxiety med prn, Omeprazole, Wellbutrin     Diabetics may take evening dose of insulin but none after midnight. If you feel symptomatic or low blood sugar morning of surgery drink 1-2 ounces of apple juice only. Aspirin, Ibuprofen, Advil, Naproxen, Vitamin E and other Anti-inflammatory products should be stopped  before surgery  as directed by your physician. Take Tylenol only unless instructed otherwise by your surgeon. Check with your Doctor regarding stopping Plavix, Coumadin, Lovenox, Eliquis, Effient, or other blood thinners. Do not smoke,use illicit drugs and do not drink any alcoholic beverages 24 hours prior to surgery. You may brush your teeth the morning of surgery. DO NOT SWALLOW WATER    You MUST make arrangements for a responsible adult to take you home after your surgery. You will not be allowed to leave alone or drive yourself home. It is strongly suggested someone stay with you the first 24 hrs. Your surgery will be cancelled if you do not have a ride home. PEDIATRIC PATIENTS ONLY:  A parent/legal guardian must accompany a child scheduled for surgery and plan to stay at the hospital until the child is discharged. Please do not bring other children with you.     Please wear simple, loose fitting clothing to the hospital.  Harman Doyle not bring valuables (money, credit cards, checkbooks, etc.) Do not wear any makeup (including no eye makeup) or nail polish on your fingers or toes. DO NOT wear any jewelry or piercings on day of surgery. All body piercing jewelry must be removed. Shower the night before surgery with _x__Antibacterial soap /DIRK WIPES________    TOTAL JOINT REPLACEMENT/HYSTERECTOMY PATIENTS ONLY---Remember to bring Blood Bank bracelet to the hospital on the day of surgery. If you have a Living Will and Durable Power of  for Healthcare, please bring in a copy. If appropriate bring crutches, inspirex, WALKER, CANE etc... Notify your Surgeon if you develop any illness between now and surgery time, cough, cold, fever, sore throat, nausea, vomiting, etc.  Please notify your surgeon if you experience dizziness, shortness of breath or blurred vision between now & the time of your surgery. If you have ___dentures, they will be removed before going to the OR; we will provide you a container. If you wear ___contact lenses or ___glasses, they will be removed; please bring a case for them. To provide excellent care visitors will be limited to 2 in the room at any given time. Please bring picture ID and insurance card. Sleep apnea patients need to bring CPAP AND SETTINGS to hospital on day of surgery. During flu season no children under the age of 15 are permitted in the hospital for the safety of all patients. Other                   Please call AMBULATORY CARE if you have any further questions.    [address]     [address]

## 2023-02-21 ENCOUNTER — HOSPITAL ENCOUNTER (OUTPATIENT)
Age: 48
Setting detail: OUTPATIENT SURGERY
Discharge: HOME | End: 2023-02-21
Attending: SURGERY | Admitting: SURGERY
Payer: MEDICARE

## 2023-02-21 ENCOUNTER — ANESTHESIA (OUTPATIENT)
Dept: OPERATING ROOM | Age: 48
End: 2023-02-21
Payer: MEDICARE

## 2023-02-21 VITALS
DIASTOLIC BLOOD PRESSURE: 60 MMHG | TEMPERATURE: 97.3 F | SYSTOLIC BLOOD PRESSURE: 106 MMHG | OXYGEN SATURATION: 98 % | HEART RATE: 74 BPM | WEIGHT: 195 LBS | BODY MASS INDEX: 32.49 KG/M2 | HEIGHT: 65 IN | RESPIRATION RATE: 18 BRPM

## 2023-02-21 DIAGNOSIS — R10.30 LOWER ABDOMINAL PAIN: Primary | ICD-10-CM

## 2023-02-21 PROCEDURE — 7100000010 HC PHASE II RECOVERY - FIRST 15 MIN: Performed by: SURGERY

## 2023-02-21 PROCEDURE — 2500000003 HC RX 250 WO HCPCS: Performed by: NURSE ANESTHETIST, CERTIFIED REGISTERED

## 2023-02-21 PROCEDURE — 7100000000 HC PACU RECOVERY - FIRST 15 MIN: Performed by: SURGERY

## 2023-02-21 PROCEDURE — 3600000014 HC SURGERY LEVEL 4 ADDTL 15MIN: Performed by: SURGERY

## 2023-02-21 PROCEDURE — 2580000003 HC RX 258: Performed by: NURSE ANESTHETIST, CERTIFIED REGISTERED

## 2023-02-21 PROCEDURE — 7100000011 HC PHASE II RECOVERY - ADDTL 15 MIN: Performed by: SURGERY

## 2023-02-21 PROCEDURE — 6360000002 HC RX W HCPCS

## 2023-02-21 PROCEDURE — 2580000003 HC RX 258: Performed by: ANESTHESIOLOGY

## 2023-02-21 PROCEDURE — 7100000001 HC PACU RECOVERY - ADDTL 15 MIN: Performed by: SURGERY

## 2023-02-21 PROCEDURE — 6370000000 HC RX 637 (ALT 250 FOR IP): Performed by: NURSE ANESTHETIST, CERTIFIED REGISTERED

## 2023-02-21 PROCEDURE — 2709999900 HC NON-CHARGEABLE SUPPLY: Performed by: SURGERY

## 2023-02-21 PROCEDURE — 2720000010 HC SURG SUPPLY STERILE: Performed by: SURGERY

## 2023-02-21 PROCEDURE — 3700000001 HC ADD 15 MINUTES (ANESTHESIA): Performed by: SURGERY

## 2023-02-21 PROCEDURE — 3600000004 HC SURGERY LEVEL 4 BASE: Performed by: SURGERY

## 2023-02-21 PROCEDURE — 2500000003 HC RX 250 WO HCPCS: Performed by: SURGERY

## 2023-02-21 PROCEDURE — 6360000002 HC RX W HCPCS: Performed by: NURSE ANESTHETIST, CERTIFIED REGISTERED

## 2023-02-21 PROCEDURE — 3700000000 HC ANESTHESIA ATTENDED CARE: Performed by: SURGERY

## 2023-02-21 PROCEDURE — 6360000002 HC RX W HCPCS: Performed by: SURGERY

## 2023-02-21 PROCEDURE — 2580000003 HC RX 258: Performed by: SURGERY

## 2023-02-21 RX ORDER — SCOLOPAMINE TRANSDERMAL SYSTEM 1 MG/1
PATCH, EXTENDED RELEASE TRANSDERMAL
Status: COMPLETED
Start: 2023-02-21 | End: 2023-02-21

## 2023-02-21 RX ORDER — MORPHINE SULFATE 2 MG/ML
INJECTION, SOLUTION INTRAMUSCULAR; INTRAVENOUS
Status: COMPLETED
Start: 2023-02-21 | End: 2023-02-21

## 2023-02-21 RX ORDER — ONDANSETRON 2 MG/ML
INJECTION INTRAMUSCULAR; INTRAVENOUS PRN
Status: DISCONTINUED | OUTPATIENT
Start: 2023-02-21 | End: 2023-02-21 | Stop reason: SDUPTHER

## 2023-02-21 RX ORDER — PROPOFOL 10 MG/ML
INJECTION, EMULSION INTRAVENOUS PRN
Status: DISCONTINUED | OUTPATIENT
Start: 2023-02-21 | End: 2023-02-21 | Stop reason: SDUPTHER

## 2023-02-21 RX ORDER — LABETALOL HYDROCHLORIDE 5 MG/ML
10 INJECTION, SOLUTION INTRAVENOUS
Status: DISCONTINUED | OUTPATIENT
Start: 2023-02-21 | End: 2023-02-21 | Stop reason: HOSPADM

## 2023-02-21 RX ORDER — LIDOCAINE HYDROCHLORIDE 20 MG/ML
INJECTION, SOLUTION INTRAVENOUS PRN
Status: DISCONTINUED | OUTPATIENT
Start: 2023-02-21 | End: 2023-02-21 | Stop reason: SDUPTHER

## 2023-02-21 RX ORDER — ONDANSETRON 2 MG/ML
4 INJECTION INTRAMUSCULAR; INTRAVENOUS
Status: COMPLETED | OUTPATIENT
Start: 2023-02-21 | End: 2023-02-21

## 2023-02-21 RX ORDER — IPRATROPIUM BROMIDE AND ALBUTEROL SULFATE 2.5; .5 MG/3ML; MG/3ML
1 SOLUTION RESPIRATORY (INHALATION)
Status: DISCONTINUED | OUTPATIENT
Start: 2023-02-21 | End: 2023-02-21 | Stop reason: HOSPADM

## 2023-02-21 RX ORDER — METHOCARBAMOL 100 MG/ML
INJECTION, SOLUTION INTRAMUSCULAR; INTRAVENOUS
Status: COMPLETED
Start: 2023-02-21 | End: 2023-02-21

## 2023-02-21 RX ORDER — HYDROCODONE BITARTRATE AND ACETAMINOPHEN 5; 325 MG/1; MG/1
1 TABLET ORAL EVERY 6 HOURS PRN
Qty: 10 TABLET | Refills: 0 | Status: SHIPPED | OUTPATIENT
Start: 2023-02-21 | End: 2023-02-24

## 2023-02-21 RX ORDER — MIDAZOLAM HYDROCHLORIDE 1 MG/ML
2 INJECTION INTRAMUSCULAR; INTRAVENOUS
Status: DISCONTINUED | OUTPATIENT
Start: 2023-02-21 | End: 2023-02-21 | Stop reason: HOSPADM

## 2023-02-21 RX ORDER — MEPERIDINE HYDROCHLORIDE 25 MG/ML
12.5 INJECTION INTRAMUSCULAR; INTRAVENOUS; SUBCUTANEOUS EVERY 5 MIN PRN
Status: DISCONTINUED | OUTPATIENT
Start: 2023-02-21 | End: 2023-02-21 | Stop reason: HOSPADM

## 2023-02-21 RX ORDER — ROCURONIUM BROMIDE 10 MG/ML
INJECTION, SOLUTION INTRAVENOUS PRN
Status: DISCONTINUED | OUTPATIENT
Start: 2023-02-21 | End: 2023-02-21 | Stop reason: SDUPTHER

## 2023-02-21 RX ORDER — MIDAZOLAM HYDROCHLORIDE 1 MG/ML
INJECTION INTRAMUSCULAR; INTRAVENOUS PRN
Status: DISCONTINUED | OUTPATIENT
Start: 2023-02-21 | End: 2023-02-21 | Stop reason: SDUPTHER

## 2023-02-21 RX ORDER — GLYCOPYRROLATE 0.2 MG/ML
INJECTION INTRAMUSCULAR; INTRAVENOUS PRN
Status: DISCONTINUED | OUTPATIENT
Start: 2023-02-21 | End: 2023-02-21 | Stop reason: SDUPTHER

## 2023-02-21 RX ORDER — PROCHLORPERAZINE EDISYLATE 5 MG/ML
5 INJECTION INTRAMUSCULAR; INTRAVENOUS
Status: COMPLETED | OUTPATIENT
Start: 2023-02-21 | End: 2023-02-21

## 2023-02-21 RX ORDER — PROCHLORPERAZINE EDISYLATE 5 MG/ML
INJECTION INTRAMUSCULAR; INTRAVENOUS
Status: COMPLETED
Start: 2023-02-21 | End: 2023-02-21

## 2023-02-21 RX ORDER — SODIUM CHLORIDE, SODIUM LACTATE, POTASSIUM CHLORIDE, CALCIUM CHLORIDE 600; 310; 30; 20 MG/100ML; MG/100ML; MG/100ML; MG/100ML
INJECTION, SOLUTION INTRAVENOUS CONTINUOUS
Status: DISCONTINUED | OUTPATIENT
Start: 2023-02-21 | End: 2023-02-21 | Stop reason: HOSPADM

## 2023-02-21 RX ORDER — ONDANSETRON 2 MG/ML
INJECTION INTRAMUSCULAR; INTRAVENOUS
Status: COMPLETED
Start: 2023-02-21 | End: 2023-02-21

## 2023-02-21 RX ORDER — SODIUM CHLORIDE 0.9 % (FLUSH) 0.9 %
5-40 SYRINGE (ML) INJECTION PRN
Status: DISCONTINUED | OUTPATIENT
Start: 2023-02-21 | End: 2023-02-21 | Stop reason: HOSPADM

## 2023-02-21 RX ORDER — DIPHENHYDRAMINE HYDROCHLORIDE 50 MG/ML
12.5 INJECTION INTRAMUSCULAR; INTRAVENOUS
Status: DISCONTINUED | OUTPATIENT
Start: 2023-02-21 | End: 2023-02-21 | Stop reason: HOSPADM

## 2023-02-21 RX ORDER — SODIUM CHLORIDE, SODIUM LACTATE, POTASSIUM CHLORIDE, CALCIUM CHLORIDE 600; 310; 30; 20 MG/100ML; MG/100ML; MG/100ML; MG/100ML
INJECTION, SOLUTION INTRAVENOUS CONTINUOUS PRN
Status: DISCONTINUED | OUTPATIENT
Start: 2023-02-21 | End: 2023-02-21 | Stop reason: SDUPTHER

## 2023-02-21 RX ORDER — BUPIVACAINE HYDROCHLORIDE AND EPINEPHRINE 2.5; 5 MG/ML; UG/ML
INJECTION, SOLUTION EPIDURAL; INFILTRATION; INTRACAUDAL; PERINEURAL PRN
Status: DISCONTINUED | OUTPATIENT
Start: 2023-02-21 | End: 2023-02-21 | Stop reason: ALTCHOICE

## 2023-02-21 RX ORDER — FENTANYL CITRATE 50 UG/ML
INJECTION, SOLUTION INTRAMUSCULAR; INTRAVENOUS PRN
Status: DISCONTINUED | OUTPATIENT
Start: 2023-02-21 | End: 2023-02-21 | Stop reason: SDUPTHER

## 2023-02-21 RX ORDER — DEXAMETHASONE SODIUM PHOSPHATE 10 MG/ML
INJECTION, SOLUTION INTRAMUSCULAR; INTRAVENOUS PRN
Status: DISCONTINUED | OUTPATIENT
Start: 2023-02-21 | End: 2023-02-21 | Stop reason: SDUPTHER

## 2023-02-21 RX ORDER — NEOSTIGMINE METHYLSULFATE 1 MG/ML
INJECTION, SOLUTION INTRAVENOUS PRN
Status: DISCONTINUED | OUTPATIENT
Start: 2023-02-21 | End: 2023-02-21 | Stop reason: SDUPTHER

## 2023-02-21 RX ORDER — FENTANYL CITRATE 50 UG/ML
25 INJECTION, SOLUTION INTRAMUSCULAR; INTRAVENOUS EVERY 5 MIN PRN
Status: DISCONTINUED | OUTPATIENT
Start: 2023-02-21 | End: 2023-02-21 | Stop reason: HOSPADM

## 2023-02-21 RX ORDER — METHOCARBAMOL 100 MG/ML
1000 INJECTION, SOLUTION INTRAMUSCULAR; INTRAVENOUS ONCE
Status: COMPLETED | OUTPATIENT
Start: 2023-02-21 | End: 2023-02-21

## 2023-02-21 RX ORDER — KETOROLAC TROMETHAMINE 30 MG/ML
INJECTION, SOLUTION INTRAMUSCULAR; INTRAVENOUS
Status: COMPLETED
Start: 2023-02-21 | End: 2023-02-21

## 2023-02-21 RX ORDER — KETOROLAC TROMETHAMINE 30 MG/ML
30 INJECTION, SOLUTION INTRAMUSCULAR; INTRAVENOUS ONCE
Status: COMPLETED | OUTPATIENT
Start: 2023-02-21 | End: 2023-02-21

## 2023-02-21 RX ORDER — SCOLOPAMINE TRANSDERMAL SYSTEM 1 MG/1
PATCH, EXTENDED RELEASE TRANSDERMAL PRN
Status: DISCONTINUED | OUTPATIENT
Start: 2023-02-21 | End: 2023-02-21 | Stop reason: SDUPTHER

## 2023-02-21 RX ORDER — SODIUM CHLORIDE 0.9 % (FLUSH) 0.9 %
5-40 SYRINGE (ML) INJECTION EVERY 12 HOURS SCHEDULED
Status: DISCONTINUED | OUTPATIENT
Start: 2023-02-21 | End: 2023-02-21 | Stop reason: HOSPADM

## 2023-02-21 RX ORDER — HYDRALAZINE HYDROCHLORIDE 20 MG/ML
10 INJECTION INTRAMUSCULAR; INTRAVENOUS
Status: DISCONTINUED | OUTPATIENT
Start: 2023-02-21 | End: 2023-02-21 | Stop reason: HOSPADM

## 2023-02-21 RX ORDER — METHOCARBAMOL 500 MG/1
500 TABLET, FILM COATED ORAL 4 TIMES DAILY
Qty: 40 TABLET | Refills: 0 | Status: SHIPPED | OUTPATIENT
Start: 2023-02-21 | End: 2023-03-03

## 2023-02-21 RX ORDER — SODIUM CHLORIDE 9 MG/ML
INJECTION, SOLUTION INTRAVENOUS PRN
Status: DISCONTINUED | OUTPATIENT
Start: 2023-02-21 | End: 2023-02-21 | Stop reason: HOSPADM

## 2023-02-21 RX ORDER — MORPHINE SULFATE 2 MG/ML
2 INJECTION, SOLUTION INTRAMUSCULAR; INTRAVENOUS EVERY 5 MIN PRN
Status: COMPLETED | OUTPATIENT
Start: 2023-02-21 | End: 2023-02-21

## 2023-02-21 RX ADMIN — MORPHINE SULFATE 2 MG: 2 INJECTION, SOLUTION INTRAMUSCULAR; INTRAVENOUS at 14:13

## 2023-02-21 RX ADMIN — ONDANSETRON 4 MG: 2 INJECTION INTRAMUSCULAR; INTRAVENOUS at 13:25

## 2023-02-21 RX ADMIN — ONDANSETRON 4 MG: 2 INJECTION INTRAMUSCULAR; INTRAVENOUS at 13:01

## 2023-02-21 RX ADMIN — Medication 3 MG: at 13:01

## 2023-02-21 RX ADMIN — SCOPALAMINE 1 PATCH: 1 PATCH, EXTENDED RELEASE TRANSDERMAL at 13:11

## 2023-02-21 RX ADMIN — PROPOFOL 200 MG: 10 INJECTION, EMULSION INTRAVENOUS at 12:27

## 2023-02-21 RX ADMIN — GLYCOPYRROLATE 0.6 MG: 0.2 INJECTION INTRAMUSCULAR; INTRAVENOUS at 13:01

## 2023-02-21 RX ADMIN — SODIUM CHLORIDE, POTASSIUM CHLORIDE, SODIUM LACTATE AND CALCIUM CHLORIDE: 600; 310; 30; 20 INJECTION, SOLUTION INTRAVENOUS at 08:41

## 2023-02-21 RX ADMIN — MORPHINE SULFATE 2 MG: 2 INJECTION, SOLUTION INTRAMUSCULAR; INTRAVENOUS at 13:29

## 2023-02-21 RX ADMIN — KETOROLAC TROMETHAMINE 30 MG: 30 INJECTION, SOLUTION INTRAMUSCULAR at 13:39

## 2023-02-21 RX ADMIN — MIDAZOLAM 2 MG: 1 INJECTION INTRAMUSCULAR; INTRAVENOUS at 12:21

## 2023-02-21 RX ADMIN — LIDOCAINE HYDROCHLORIDE 100 MG: 20 INJECTION, SOLUTION INTRAVENOUS at 12:27

## 2023-02-21 RX ADMIN — MORPHINE SULFATE 2 MG: 2 INJECTION, SOLUTION INTRAMUSCULAR; INTRAVENOUS at 13:48

## 2023-02-21 RX ADMIN — DEXAMETHASONE SODIUM PHOSPHATE 10 MG: 10 INJECTION INTRAMUSCULAR; INTRAVENOUS at 12:34

## 2023-02-21 RX ADMIN — PROCHLORPERAZINE EDISYLATE 5 MG: 5 INJECTION INTRAMUSCULAR; INTRAVENOUS at 13:53

## 2023-02-21 RX ADMIN — MORPHINE SULFATE 2 MG: 2 INJECTION, SOLUTION INTRAMUSCULAR; INTRAVENOUS at 13:20

## 2023-02-21 RX ADMIN — METHOCARBAMOL 1000 MG: 100 INJECTION INTRAMUSCULAR; INTRAVENOUS at 13:41

## 2023-02-21 RX ADMIN — FENTANYL CITRATE 100 MCG: 50 INJECTION, SOLUTION INTRAMUSCULAR; INTRAVENOUS at 12:27

## 2023-02-21 RX ADMIN — SODIUM CHLORIDE, POTASSIUM CHLORIDE, SODIUM LACTATE AND CALCIUM CHLORIDE: 600; 310; 30; 20 INJECTION, SOLUTION INTRAVENOUS at 12:19

## 2023-02-21 RX ADMIN — GLYCOPYRROLATE 0.2 MG: 0.2 INJECTION INTRAMUSCULAR; INTRAVENOUS at 12:27

## 2023-02-21 RX ADMIN — METHOCARBAMOL 1000 MG: 100 INJECTION, SOLUTION INTRAMUSCULAR; INTRAVENOUS at 13:41

## 2023-02-21 RX ADMIN — CEFAZOLIN 2000 MG: 2 INJECTION, POWDER, FOR SOLUTION INTRAMUSCULAR; INTRAVENOUS at 12:31

## 2023-02-21 RX ADMIN — ROCURONIUM BROMIDE 30 MG: 10 INJECTION, SOLUTION INTRAVENOUS at 12:27

## 2023-02-21 RX ADMIN — KETOROLAC TROMETHAMINE 30 MG: 30 INJECTION, SOLUTION INTRAMUSCULAR; INTRAVENOUS at 13:39

## 2023-02-21 ASSESSMENT — PAIN SCALES - GENERAL
PAINLEVEL_OUTOF10: 8
PAINLEVEL_OUTOF10: 4
PAINLEVEL_OUTOF10: 5

## 2023-02-21 ASSESSMENT — PAIN DESCRIPTION - LOCATION
LOCATION: ABDOMEN

## 2023-02-21 ASSESSMENT — PAIN DESCRIPTION - ONSET
ONSET: ON-GOING
ONSET: ON-GOING
ONSET: PROGRESSIVE
ONSET: ON-GOING
ONSET: ON-GOING
ONSET: PROGRESSIVE

## 2023-02-21 ASSESSMENT — PAIN DESCRIPTION - FREQUENCY
FREQUENCY: CONTINUOUS

## 2023-02-21 ASSESSMENT — PAIN DESCRIPTION - ORIENTATION
ORIENTATION: MID;UPPER
ORIENTATION: MID
ORIENTATION: MID;UPPER
ORIENTATION: MID

## 2023-02-21 ASSESSMENT — PAIN DESCRIPTION - DESCRIPTORS
DESCRIPTORS: ACHING;DISCOMFORT

## 2023-02-21 ASSESSMENT — PAIN DESCRIPTION - PAIN TYPE
TYPE: ACUTE PAIN;SURGICAL PAIN
TYPE: ACUTE PAIN
TYPE: ACUTE PAIN;SURGICAL PAIN
TYPE: ACUTE PAIN;SURGICAL PAIN
TYPE: SURGICAL PAIN

## 2023-02-21 ASSESSMENT — LIFESTYLE VARIABLES: SMOKING_STATUS: 1

## 2023-02-21 ASSESSMENT — PAIN - FUNCTIONAL ASSESSMENT: PAIN_FUNCTIONAL_ASSESSMENT: 0-10

## 2023-02-21 NOTE — DISCHARGE INSTRUCTIONS
Patient Discharge Instructions  Discharge Date:  2/21/2023    Discharged To: Home    RESUME ACTIVITY:      BATHING:  May shower 24hrs after surgery, may bathe 3 days after surgery    DRIVING: No driving while on pain medications ok to drive on ibuprofen    RETURN TO WORK: day after surgery, no limitations    WALKING:  Yes    SEXUAL ACTIVITY: Yes    STAIRS:  Yes    LIFTING: no limitations    DIET: common adult    BOWELS: constipation is a side effect of your pain meds, take a daily laxative (miralax, dulcolax, etc.) as needed to keep your bowels moving as they normally do, do not go 2-3 days without having a bowel movement. SPECIAL INSTRUCTIONS:     Call the office  if you have a fever > 100 F, or if your incision becomes red, tender, or drains more than a small amount of clear fluid.     Call  for follow up appointment with Dr. Janae Aaron in:  as needed

## 2023-02-21 NOTE — ANESTHESIA POSTPROCEDURE EVALUATION
Department of Anesthesiology  Postprocedure Note    Patient: Marie Ames  MRN: [id number]  YOB: 1975  Date of evaluation: 2/21/2023      Procedure Summary     Date: 02/21/23 Room / Location: [address] [place] [address] 03 / [address] [facility]    Anesthesia Start: 5636 Anesthesia Stop: 1316    Procedure: LAPAROSCOPY DIAGNOSTIC, POSSIBLE INTERNAL HERNIA REPAIR WITH EGD (Abdomen) Diagnosis:       Abdominal pain      (Abdominal pain [R10.9])    Surgeons: Manuela Vidal MD Responsible Provider: Ko Jiang MD    Anesthesia Type: General ASA Status: 2          Anesthesia Type: General    Marielena Phase I: Marielena Score: 9    Marielena Phase II: Marielena Score: 10      Anesthesia Post Evaluation    Patient location during evaluation: PACU  Patient participation: complete - patient participated  Level of consciousness: awake  Airway patency: patent  Nausea & Vomiting: no nausea and no vomiting  Complications: no  Cardiovascular status: hemodynamically stable  Respiratory status: acceptable  Hydration status: euvolemic

## 2023-02-21 NOTE — H&P
Bariatric Surgery History and Physical      [facility]       Date: 2/21/2023      Surgeon: Jannette Tuttle M.D. Chief Complaint: internal hernia     HPI: [de-identified] female who presents for evaluation of abdominal pain s/p rygb and has pain in mid abdomen that radiates to bilateral lateral aspects of abdomen.  Timing is intermittent, radiation to back, alleviated by nothing and started this past weekend and severity is 2-8/10        Past Medical History        Past Medical History:   Diagnosis Date    Anastomotic ulcer      History of COVID-19 July 2021    History of migraine      HTN (hypertension) 2014     none since gastric bypass    Menometrorrhagia 5/31/2018    Morbid obesity ([address]      Severe dysmenorrhea 7/24/2020    Short of breath on exertion              Past Surgical History         Past Surgical History:   Procedure Laterality Date    CHOLECYSTECTOMY   1994    DILATION AND CURETTAGE OF UTERUS N/A 12/7/2018     HYSTEROSCOPY DILATATION AND CURETTAGE WITH NOVASURE ABLATION performed by Bruno Byrd MD at [address] [facility] N/A 7/24/2020     DILATATION AND CURETTAGE HYSTEROSCOPY performed by Bruno Byrd MD at [id number] [place] [address], DIAGNOSTIC   1/6/15    HYSTERECTOMY (CERVIX STATUS UNKNOWN) Bilateral 9/11/2020     HYSTERECTOMY ABDOMINAL LAPAROSCOPIC ROBOTIC XI BILATERAL SALPINGECTOMIES (CPT 88596) performed by Bruno Byrd MD at [address] [place] [address] N/A 7/19/2019     DIAGNOSTIC LAPAROSCOPY WITH CLOSURE OF INTERNAL HERNIA performed by Sheila Edwards MD at [place] [address] N/A 6/1/2018     DILATATION AND CURETTAGE HYSTEROSCOPY performed by Bruno Byrd MD at [facility]   9/27/2015            Current Facility-Administered Medications          Current Outpatient Medications   Medication Sig Dispense Refill    estradiol (ESTRACE) 1 MG tablet Take 1 mg by mouth daily        IRON PO Take by mouth daily        calcium carbonate (OSCAL) 500 MG TABS tablet Take 500 mg by mouth daily        Cholecalciferol (VITAMIN D3) 5000 UNITS TABS Take by mouth every other day        MULTIPLE VITAMINS-CALCIUM PO Take 2 tablets by mouth 2 times daily          No current facility-administered medications for this visit. Allergies   Allergen Reactions    Other Other (See Comments)       NO BLIND NG TUBE - BARIATRIC PATIENT         The patient has a family history that is negative for severe cardiovascular or respiratory issues, negative for reaction to anesthesia. Social History               Socioeconomic History    Marital status:        Spouse name: Not on file    Number of children: Not on file    Years of education: Not on file    Highest education level: Not on file   Occupational History    Not on file   Tobacco Use    Smoking status: Never Smoker    Smokeless tobacco: Never Used   Vaping Use    Vaping Use: Never used   Substance and Sexual Activity    Alcohol use: No    Drug use: No    Sexual activity: Yes       Partners: Male   Other Topics Concern    Not on file   Social History Narrative    Not on file      Social Determinants of Health          Financial Resource Strain:     Difficulty of Paying Living Expenses: Not on file   Food Insecurity:     Worried About [employer] [employer] in the Last Year: Not on file    Ran Out of Food in the Last Year: Not on file   Transportation Needs:     Lack of Transportation (Medical): Not on file    Lack of Transportation (Non-Medical):  Not on file   Physical Activity:     Days of Exercise per Week: Not on file    Minutes of Exercise per Session: Not on file   Stress:     Feeling of Stress : Not on file   Social Connections:     Frequency of Communication with Friends and Family: Not on file    Frequency of Social Gatherings with Friends and Family: Not on file    Attends Jain Services: Not on file    Active Member of Clubs or Organizations: Not on file    Attends Club or Organization Meetings: Not on file    Marital Status: Not on file   Intimate Partner Violence:     Fear of Current or Ex-Partner: Not on file    Emotionally Abused: Not on file    Physically Abused: Not on file    Sexually Abused: Not on file   Housing Stability:     Unable to Pay for Housing in the Last Year: Not on file    Number of Pal in the Last Year: Not on file    Unstable Housing in the Last Year: Not on file                  Review of Systems  Review of Systems -  General ROS: negative for - chills, fatigue or malaise  ENT ROS: negative for - hearing change, nasal congestion or nasal discharge  Allergy and Immunology ROS: negative for - hives, itchy/watery eyes or nasal congestion  Hematological and Lymphatic ROS: negative for - blood clots, blood transfusions, bruising or fatigue  Endocrine ROS: negative for - malaise/lethargy, mood swings, palpitations or polydipsia/polyuria  Respiratory ROS: negative for - sputum changes, stridor, tachypnea or wheezing  Cardiovascular ROS: negative for - irregular heartbeat, loss of consciousness, murmur or orthopnea  Gastrointestinal ROS: negative for - constipation, diarrhea, gas/bloating, heartburn or hematemesis  Genito-Urinary ROS: negative for -  genital discharge, genital ulcers or hematuria  Musculoskeletal ROS: negative for - gait disturbance, muscle pain or muscular weakness     Physical exam:   BP 98/61   Pulse 67   Temp 97.8 °F (36.6 °C) (Infrared)   Resp 16   Ht 5' 5\" (1.651 m)   Wt 195 lb (88.5 kg)   LMP 10/21/2019 (Exact Date)   SpO2 98%   BMI 32.45 kg/m²     General appearance:  NAD  Head: NCAT, PERRLA, EOMI, red conjunctiva  Neck: supple, no masses  Lungs: CTAB, equal chest rise bilateral  Heart: Reg rate  Abdomen: soft, nontender, nondistended  Skin; no lesions  Gu: no cva tenderness  Extremities: extremities normal, atraumatic, no cyanosis or edema     Assessment:  50 y.o. female with abdominal pain likely internal hernia     Plan:  Will plan for diag lap with reduction and closure of internal hernia and EGD  Discussed the risk, benefits and alternatives of surgery including wound infections, bleeding, scar and hernia formation and the risks of general anesthetic including MI, CVA, sudden death or reactions to anesthetic medications. The patient understands the risks and alternatives and the possibility of converting to an open procedure. All questions were answered to the patient's satisfaction and they freely signed the consent.           Justino Callejas MD

## 2023-02-21 NOTE — ANESTHESIA PRE PROCEDURE
Department of Anesthesiology  Preprocedure Note       Name:  Dorys aRusch   Age:  50 y.o.  :  1975                                          MRN:  [id number]         Date:  2023      Surgeon: Su Vital):  Stacia Cortes MD    Procedure: LAPAROSCOPY DIAGNOSTIC, POSSIBLE INTERNAL HERNIA REPAIR    Medications prior to admission:   Prior to Admission medications    Medication Sig Start Date End Date Taking? Authorizing Provider   omeprazole (PRILOSEC) 20 MG delayed release capsule take 1 capsule by mouth once daily 23   Kiera Farmer MD   clobetasol (TEMOVATE) 0.05 % ointment Apply topically 2 times daily. 22   Elda Hernandez DO   sucralfate (CARAFATE) 1 GM/10ML suspension Take 10 mLs by mouth 4 times daily  Patient taking differently: Take 1 g by mouth Daily 22  Kiera Farmer MD   acetaminophen (TYLENOL) 500 MG tablet Take 2 tablets by mouth every 6 hours as needed for Pain Maximum dose- 8 tablets/24 hours.  22   Kiera Farmer MD   hydrOXYzine pamoate (VISTARIL) 100 MG capsule Take 100 mg by mouth 2 times daily as needed 10/4/22   Historical Provider, MD   albuterol sulfate HFA (VENTOLIN HFA) 108 (90 Base) MCG/ACT inhaler Inhale 2 puffs into the lungs every 6 hours as needed for Wheezing 22   Nalini Iglesias MD   ondansetron (ZOFRAN-ODT) 4 MG disintegrating tablet Place 1 tablet under the tongue every 8 hours as needed for Nausea or Vomiting 22   Nalini Iglesias MD   LORazepam (ATIVAN) 0.5 MG tablet take 1 tablet by mouth once daily if needed for anxiety for 1 month 22   Historical Provider, MD   Multiple Vitamin (MVI, BARIATRIC ADVANTAGE MULTI-FORMULA, CHEW TAB) Take 1 tablet by mouth every other day    Historical Provider, MD   sennosides-docusate sodium (SENOKOT-S) 8.6-50 MG tablet Take 1 tablet by mouth 2 times daily as needed for Constipation    Historical Provider, MD   buPROPion (WELLBUTRIN SR) 200 MG extended release tablet Take 200 mg by mouth 2 times daily  8/18/21   Historical Provider, MD       Current medications:    Current Facility-Administered Medications   Medication Dose Route Frequency Provider Last Rate Last Admin    lactated ringers IV soln infusion   IntraVENous Continuous Gm Pulliam MD 10 mL/hr at 02/21/23 0841 New Bag at 02/21/23 0841    sodium chloride flush 0.9 % injection 5-40 mL  5-40 mL IntraVENous 2 times per day Mary Vega MD        sodium chloride flush 0.9 % injection 5-40 mL  5-40 mL IntraVENous PRN Mary Vega MD        0.9 % sodium chloride infusion   IntraVENous PRN Mary Vega MD        ceFAZolin (ANCEF) 2,000 mg in sterile water 20 mL IV syringe  2,000 mg IntraVENous On Call to [facility], MD           Allergies: Allergies   Allergen Reactions    Pcn [Penicillins] Anaphylaxis     CHILDHOOD ALLERGY    Food Hives     ALLERGY IS TO BERRIES    Ibuprofen Other (See Comments)     PATIENT HAS BARIATRIC SX       Problem List:    Patient Active Problem List   Diagnosis Code    Obesity E66.9    Tobacco abuse Z72.0    Panic attacks F41.0    AYALA on CPAP G47.33, Z99.89    Morbid obesity ([address] E66.01    GERD without esophagitis K21.9    S/P gastric bypass Z98.84    Primary osteoarthritis of right knee M17.11    Vitamin D deficiency E55.9    Zinc deficiency E60    Iron deficiency E61.1    Thrombocytosis D75.839    Microcytic anemia D50.9    Red blood cell antibody positive R76.8    Epigastric pain R10.13    Anxiety and depression F41.9, F32. A    Localized osteoarthritis of left knee M17.12    Chronic pain syndrome G89.4    Chronic bilateral low back pain without sciatica M54.50, G89.29    Lumbosacral spondylosis without myelopathy M47.817    Other dysphagia R13.19    Perforated abdominal viscus R19.8    Chronic abdominal pain R10.9, G89.29    Abdominal pain of multiple sites R10.9    Lymphedema I89.0    Abdominal pain R10.9    Elevated LFTs P47.89    Uncomplicated asthma C70.042  COVID-19 U07.1       Past Medical History:        Diagnosis Date    Anemia     Arthritis     Asthma     Depression     GERD without esophagitis     History of blood transfusion     History of gastric bypass     Hydronephrosis with urinary obstruction due to ureteral calculus     Hypertension     No meds following sustained weight loss after bariatric surgery.  Iron deficiency 08/01/2018    Localized osteoarthritis of left knee 01/20/2020    Lumbar spondylosis 01/26/2018    Lymphedema     Obesity     Panic attacks     Perforated marginal ulcer 10/29/2018    ~1 year after RnYGB    PONV (postoperative nausea and vomiting)     Sleep apnea, obstructive     Off PAP therapy BG following sustained weight loss after bariatric surgery.  Vitamin D deficiency 07/27/2018    Zinc deficiency 08/01/2018       Past Surgical History:        Procedure Laterality Date    APPENDECTOMY  1996    CHOLECYSTECTOMY, LAPAROSCOPIC N/A 7/27/2019    CHOLECYSTECTOMY LAPAROSCOPIC performed by Alexy Knight MD at [facility] COLONOSCOPY N/A 10/27/2021    COLONOSCOPY POLYPECTOMY SNARE/COLD BIOPSY performed by Rosa Maria Garcia MD at [place] [address]  10/23/2019    HYSTERECTOMY (CERVIX STATUS UNKNOWN)  01/22/2020    [facility]    LAPAROSCOPY N/A 6/27/2020    DIAGNOSTIC  LAPAROSCOPY, REPAIR PERFORATED MARGINAL ULCER, UPPER ENDOSCOPY.  [facility] PATCH performed by Manish Byrd MD at [facility] LITHOTRIPSY Right 7/24/2019    CYSTOSCOPY RETROGRADE PYELOGRAM URETEROSCOPY J STENT LASER LITHOTRIPSY RIGHT performed by Deisy Chambers DO at [facility] LITHOTRIPSY      ND OFFICE/OUTPT VISIT,PROCEDURE ONLY N/A 10/29/2018    DIAGNOSTIC LAPAROSCOPY REPAIR PERFORATED VISCUS performed by Alexy Knight MD at [address] [place] [address]  11/14/2017    STOMACH SURGERY      UPPER GASTROINTESTINAL ENDOSCOPY N/A 7/27/2019    EGD ESOPHAGOGASTRODUODENOSCOPY performed by [facility]y [facility] MD at [facility]    UPPER GASTROINTESTINAL ENDOSCOPY N/A 2/7/2020    EGD BIOPSY performed by Breezy Joyce MD at [address] 6/27/2020    EGD DIAGNOSTIC ONLY performed by Lavera Baumgarten, MD at [facility] N/A 3/11/2022    EGD BIOPSY performed by Dayanna Rubio MD at [facility] History:    Social History     Tobacco Use    Smoking status: Every Day     Packs/day: 0.50     Years: 15.00     Pack years: 7.50     Types: Cigarettes     Start date: 10/29/1991    Smokeless tobacco: Never    Tobacco comments:     3/4 pack per day   Substance Use Topics    Alcohol use: Yes     Alcohol/week: 6.0 standard drinks     Types: 6 Standard drinks or equivalent per week     Comment: social                                Ready to quit: Not Answered  Counseling given: Not Answered  Tobacco comments: 3/4 pack per day      Vital Signs (Current):   Vitals:    02/20/23 0943 02/21/23 0833   BP:  98/61   Pulse:  67   Resp:  16   Temp:  97.8 °F (36.6 °C)   TempSrc:  Infrared   SpO2:  98%   Weight: 195 lb (88.5 kg)    Height: 5' 5\" (1.651 m)                                               BP Readings from Last 3 Encounters:   02/21/23 98/61   02/16/23 127/85   02/10/23 (!) 142/73       NPO Status: Time of last liquid consumption: 2300                        Time of last solid consumption: 2230                        Date of last liquid consumption: 02/20/23                        Date of last solid food consumption: 02/20/23    BMI:   Wt Readings from Last 3 Encounters:   02/20/23 195 lb (88.5 kg)   02/16/23 184 lb (83.5 kg)   02/10/23 191 lb (86.6 kg)     Body mass index is 32.45 kg/m².     CBC:   Lab Results   Component Value Date/Time    WBC 7.7 02/16/2023 11:09 AM    RBC 4.31 02/16/2023 11:09 AM    HGB 13.8 02/16/2023 11:09 AM    HCT 42.4 02/16/2023 11:09 AM    MCV 98.4 02/16/2023 11:09 AM    RDW 13.6 02/16/2023 11:09 AM     02/16/2023 11:09 AM       CMP:   Lab Results   Component Value Date/Time     02/16/2023 11:09 AM    K 4.3 02/16/2023 11:09 AM     02/16/2023 11:09 AM    CO2 26 02/16/2023 11:09 AM    BUN 14 02/16/2023 11:09 AM    CREATININE 0.7 02/16/2023 11:09 AM    GFRAA >60 03/11/2022 04:12 AM    LABGLOM >60 02/16/2023 11:09 AM    GLUCOSE 90 02/16/2023 11:09 AM    GLUCOSE 94 04/02/2012 08:21 AM    PROT 6.5 02/16/2023 11:09 AM    CALCIUM 8.7 02/16/2023 11:09 AM    BILITOT 0.3 02/16/2023 11:09 AM    ALKPHOS 88 02/16/2023 11:09 AM    AST 18 02/16/2023 11:09 AM    ALT 10 02/16/2023 11:09 AM       POC Tests:   No results for input(s): POCGLU, POCNA, POCK, POCCL, POCBUN, POCHEMO, POCHCT in the last 72 hours. Coags:   Lab Results   Component Value Date/Time    PROTIME 11.4 03/11/2022 04:12 AM    INR 1.0 03/11/2022 04:12 AM    APTT 26.3 03/09/2022 05:09 AM       HCG (If Applicable):   Lab Results   Component Value Date    PREGTESTUR NEGATIVE 07/27/2019        ABGs: No results found for: PHART, PO2ART, CHF7IUC, CJB3MGW, BEART, E8WMXCGH     Type & Screen (If Applicable):  No results found for: LABABO, [address]     EKG 12/05/2019    Sinus bradycardia  Otherwise normal ECG  When compared with ECG of 06-NOV-2019 07:01,  No significant change was found  Confirmed by Thomas Ibrahim ([phone]) on 12/6/2019 6:47:14 AM      Anesthesia Evaluation  Patient summary reviewed and Nursing notes reviewed   history of anesthetic complications: PONV. Airway: Mallampati: II  TM distance: >3 FB   Neck ROM: full  Mouth opening: > = 3 FB   Dental:    (+) caps      Pulmonary: breath sounds clear to auscultation  (+) sleep apnea (Off PAP therapy BG following sustained weight loss after bariatric surgery):  asthma: allergic asthma, current smoker          Patient did not smoke on day of surgery.                 ROS comment: Hx: uses inhaler and nebulizers PRN, off CPAP since gastric bypass surgery and weight loss   Cardiovascular:    (+) hypertension:,       ECG reviewed  Rhythm: regular  Rate: normal  Echocardiogram reviewed         Beta Blocker:  Not on Beta Blocker         Neuro/Psych:   (+) psychiatric history: stable with treatmentdepression/anxiety              ROS comment: Hx: Mechanical low back pain  Lumbar spondylosis  Chronic pain syndrome  Chronic bilateral low back pain without sciatica  Myalgia  Lumbosacral spondylosis without myelopathy     GI/Hepatic/Renal:   (+) GERD: poorly controlled, PUD, renal disease (Hx: kidney stone removed 07/2019): kidney stones,          ROS comment: Hx: S/P gastric bypass. Endo/Other:    (+) : arthritis: OA., .    (-) blood dyscrasia, no electrolyte abnormalities                ROS comment: Hx: Endometrial polyp  Vitamin D deficiency    Zinc deficiency  Abdominal:             Vascular: negative vascular ROS. Other Findings:             Anesthesia Plan      general     ASA 2       Induction: intravenous. MIPS: Postoperative opioids intended and Prophylactic antiemetics administered. Anesthetic plan and risks discussed with patient. Plan discussed with CRNA. DOS STAFF ADDENDUM:    Pt seen and examined, chart reviewed (including anesthesia, drug and allergy history). Anesthetic plan, risks, benefits, alternatives, and personnel involved discussed with patient. Patient verbalized an understanding and agrees to proceed. Plan discussed with care team members and agreed upon.     Sherlyn Humphrey MD  Staff Anesthesiologist  11:25 AM    Sherlyn Humphrey MD   2/21/2023      JOHN Pisano - CRNA

## 2023-02-21 NOTE — OP NOTE
DATE OF PROCEDURE: 2/21/2023      PREOPERATIVE DIAGNOSIS:  Internal hernia. POSTOPERATIVE DIAGNOSIS:  Internal hernia. PROCEDURE PERFORMED:  Diagnostic laparoscopy with closure of Wallace  space and reduction of internal hernia. Esophagogastroduodenoscopy. SURGEON:  Douglas Chand MD     ASSISTANT:  none     ANESTHESIA:  General.     COMPLICATIONS:  None. FLUIDS:  Crystalloid. BLOOD LOSS:  Minimal.     DISPOSITION:  To be discharged home. INDICATIONS:  This is a previous RYGB with the aforementioned  diagnosis. I explained risks, benefits, potential outcomes, alternate  treatment to the aforementioned procedure and they agreed to proceed  understanding those risks and potential outcomes. SPECIMEN:  None. DESCRIPTION OF PROCEDURE:  The patient was brought to the operative  suite and placed under general anesthesia, bilateral PCDs placed, given  preoperative antibiotics within 30 minutes of incision and the patient  was then prepped and draped in a normal sterile condition. Once this  was done and local anesthetic was infiltrated supraumbilically, 5 mm  incision was then made. A Veress needle was passed in the peritoneum. CO2 was used to insufflate the abdomen at pressure of 15 mmHg. At this  time, the Veress needle was removed and 5 mm trocars put in place. Two additional 5 mm trocars were placed in the right lateral abdomen. Once this was done, were able to see Sherral Coy space and there was   bowel through this. The bowel was reduced with graspers and  Wallace's space closed with a running V-Loc suture. Once this was  done, all limbs of the bowel were ran without any other appreciable  abnormalities or hernia defects. The remainder of the abdomen appeared  without any pathology. Trocars and pneumoperitoneum were evacuated.  The EGD   Scope was then advanced down esophagus into pouch which showed  no pathology  And the anastomosis showed no pathology and there was no hiatal hernia. The sites were closed with 4-0 Monocryl and surgical glue. The patient  was woken up in stable condition.            Jose Dela Cruz MD

## 2023-02-23 DIAGNOSIS — K21.9 GERD WITHOUT ESOPHAGITIS: ICD-10-CM

## 2023-02-24 RX ORDER — SUCRALFATE ORAL 1 G/10ML
SUSPENSION ORAL
Refills: 2 | OUTPATIENT
Start: 2023-02-24

## 2023-02-27 ENCOUNTER — CARE COORDINATION (OUTPATIENT)
Dept: CARE COORDINATION | Age: 48
End: 2023-02-27

## 2023-02-27 NOTE — CARE COORDINATION
Ambulatory Care Management Note    Date/Time:  2/27/2023 10:08 AM    This patient was received as a referral from  Eligible list for assigned Alan Boudreaux RN . Ambulatory Care Manager outreached to patient today to offer care management services. Patient  did not answer. ACM left a voice message with contact information requesting a return call. Follow up call scheduled at this time. Patient has Ambulatory Care Manager's contact number for for any questions or concerns. PLAN  -If no return call, continue to attempt to contact patient.

## 2023-02-28 ENCOUNTER — CARE COORDINATION (OUTPATIENT)
Dept: CARE COORDINATION | Age: 48
End: 2023-02-28

## 2023-02-28 NOTE — CARE COORDINATION
Ambulatory Care Management Note    Date/Time:  2/28/2023 12:11 PM    This patient was received as a referral from  ED Eligible . Ambulatory Care Manager outreached to patient today to offer care management services for assigned Torito Reyez RN (2nd attempt)  Patient  did not answer. ACM left a detailed message with contact information requesting a return call. Follow up call scheduled at this time. Patient has Ambulatory Care Manager's contact number for for any questions or concerns. PLAN  -If no return send intro/trying to reach letter per the mail (patient does not review information sent per My Chart) and inform assigned ACM to continue to attempt to contact patient.

## 2023-03-01 ENCOUNTER — OFFICE VISIT (OUTPATIENT)
Dept: BARIATRICS/WEIGHT MGMT | Age: 48
End: 2023-03-01

## 2023-03-01 VITALS
DIASTOLIC BLOOD PRESSURE: 72 MMHG | HEIGHT: 66 IN | RESPIRATION RATE: 20 BRPM | BODY MASS INDEX: 31.34 KG/M2 | WEIGHT: 195 LBS | SYSTOLIC BLOOD PRESSURE: 117 MMHG | HEART RATE: 82 BPM | TEMPERATURE: 98.1 F

## 2023-03-01 DIAGNOSIS — K45.8 INTERNAL HERNIA: Primary | ICD-10-CM

## 2023-03-01 PROCEDURE — 99024 POSTOP FOLLOW-UP VISIT: CPT | Performed by: SURGERY

## 2023-03-01 NOTE — PROGRESS NOTES
Surgery Progress Note            Chief complaint:   Patient Active Problem List   Diagnosis    Obesity    Tobacco abuse    Panic attacks    AYALA on CPAP    Morbid obesity (HCC)    GERD without esophagitis    S/P gastric bypass    Primary osteoarthritis of right knee    Vitamin D deficiency    Zinc deficiency    Iron deficiency    Thrombocytosis    Microcytic anemia    Red blood cell antibody positive    Epigastric pain    Anxiety and depression    Localized osteoarthritis of left knee    Chronic pain syndrome    Chronic bilateral low back pain without sciatica    Lumbosacral spondylosis without myelopathy    Other dysphagia    Perforated abdominal viscus    Chronic abdominal pain    Abdominal pain of multiple sites    Lymphedema    Abdominal pain    Elevated LFTs    Uncomplicated asthma    [id number]       S: doing well    O:   Vitals:    03/01/23 1300   BP: 117/72   Pulse: 82   Resp: 20   Temp: 98.1 °F (36.7 °C)     No intake or output data in the 24 hours ending 03/01/23 1314        Labs:  No results for input(s): WBC, HGB, HCT in the last 72 hours. Invalid input(s): PLR  Lab Results   Component Value Date    CREATININE 0.7 02/16/2023    BUN 14 02/16/2023     02/16/2023    K 4.3 02/16/2023     02/16/2023    CO2 26 02/16/2023     No results for input(s): LIPASE, AMYLASE in the last 72 hours. Physical exam:   /72 (Site: Left Lower Arm, Position: Sitting, Cuff Size: Large Adult)   Pulse 82   Temp 98.1 °F (36.7 °C) (Temporal)   Resp 20   Ht 5' 6\" (1.676 m)   Wt 195 lb (88.5 kg)   LMP 10/21/2019 (Exact Date)   BMI 31.47 kg/m²   General appearance: NAD  Head: NCAT  Neck: supple, no masses  Lungs: equal chest rise bilateral  Heart: S1S2 present  Abdomen: soft, nontender, nondistended  Skin; no new lesions, incisions clean and intact  Gu: no cva tenderness  Extremities: extremities normal, atraumatic, no cyanosis or edema    A:  Post op lap internal hernia repair    P: follow up as needed. Orquidea Hood MD, MD  3/1/2023

## 2023-03-01 NOTE — PATIENT INSTRUCTIONS
Please continue to take your vitamin and mineral supplements as instructed. If you received a blood work prescription today for laboratory monitoring due prior to your next routine follow-up visit, please have this blood work obtained 10 to 14 days prior to your next visit. It is important to fast for 12 hours prior to routine weight loss surgery blood work, EXCEPT for drinking water, to ensure accuracy of results. Please report nausea, vomiting, abdominal pain, or any other problems you experience to your surgeon. For problems related to weight loss surgery, it is best to go to [address] [place] [address] Emergency Department and have your surgeon paged.

## 2023-03-08 ENCOUNTER — OFFICE VISIT (OUTPATIENT)
Dept: PRIMARY CARE CLINIC | Age: 48
End: 2023-03-08
Payer: MEDICARE

## 2023-03-08 VITALS
TEMPERATURE: 97.6 F | DIASTOLIC BLOOD PRESSURE: 75 MMHG | OXYGEN SATURATION: 98 % | BODY MASS INDEX: 31.34 KG/M2 | WEIGHT: 195 LBS | RESPIRATION RATE: 18 BRPM | HEIGHT: 66 IN | HEART RATE: 74 BPM | SYSTOLIC BLOOD PRESSURE: 127 MMHG

## 2023-03-08 DIAGNOSIS — J01.40 ACUTE NON-RECURRENT PANSINUSITIS: Primary | ICD-10-CM

## 2023-03-08 PROCEDURE — G8427 DOCREV CUR MEDS BY ELIG CLIN: HCPCS | Performed by: NURSE PRACTITIONER

## 2023-03-08 PROCEDURE — 4004F PT TOBACCO SCREEN RCVD TLK: CPT | Performed by: NURSE PRACTITIONER

## 2023-03-08 PROCEDURE — G8417 CALC BMI ABV UP PARAM F/U: HCPCS | Performed by: NURSE PRACTITIONER

## 2023-03-08 PROCEDURE — 99213 OFFICE O/P EST LOW 20 MIN: CPT | Performed by: NURSE PRACTITIONER

## 2023-03-08 PROCEDURE — G8484 FLU IMMUNIZE NO ADMIN: HCPCS | Performed by: NURSE PRACTITIONER

## 2023-03-08 RX ORDER — FLUTICASONE PROPIONATE 50 MCG
1 SPRAY, SUSPENSION (ML) NASAL 2 TIMES DAILY
Qty: 16 G | Refills: 0 | Status: SHIPPED | OUTPATIENT
Start: 2023-03-08

## 2023-03-08 RX ORDER — DOXYCYCLINE HYCLATE 100 MG/1
100 CAPSULE ORAL 2 TIMES DAILY
Qty: 20 CAPSULE | Refills: 0 | Status: SHIPPED | OUTPATIENT
Start: 2023-03-08 | End: 2023-03-18

## 2023-03-08 NOTE — PROGRESS NOTES
Chief Complaint:   Sinusitis (Face pressure, congestion- ongoing for 3-4 days )    History of Present Illness   Source of history provided by:  patient. Sandra Day is a 50 y.o. old female who presents to walk-in for evaluation of sinus pressure, nasal congestion, discolored nasal drainage, bilateral ear pressure and sore throat x 4 days. Has been taking sinus meds OTC without relief. Denies any fever, chills, wheezing, CP, SOB, or GI symptoms. Reports hx of asthma, has not needed inhaler recently. No known sick contacts. Review of Systems   Unless otherwise stated in this report or unable to obtain because of the patient's clinical or mental status as evidenced by the medical record, this patients's positive and negative responses for Review of Systems, constitutional, psych, eyes, ENT, cardiovascular, respiratory, gastrointestinal, neurological, genitourinary, musculoskeletal, integument systems and systems related to the presenting problem are either stated in the preceding or were negative for the symptoms and/or complaints related to the medical problem. Past Medical History:  has a past medical history of Anemia, Arthritis, Asthma, Depression, GERD without esophagitis, History of blood transfusion, History of gastric bypass, Hydronephrosis with urinary obstruction due to ureteral calculus, Hypertension, Iron deficiency, Localized osteoarthritis of left knee, Lumbar spondylosis, Lymphedema, Obesity, Panic attacks, Perforated marginal ulcer, PONV (postoperative nausea and vomiting), Sleep apnea, obstructive, Vitamin D deficiency, and Zinc deficiency. Past Surgical History:  has a past surgical history that includes Appendectomy (1996); Viviane-en-Y Gastric Bypass (11/14/2017); pr office/outpt visit,procedure only (N/A, 10/29/2018); Stomach surgery; Lithotripsy (Right, 7/24/2019); Cholecystectomy, laparoscopic (N/A, 7/27/2019); Upper gastrointestinal endoscopy (N/A, 7/27/2019);  Lithotripsy; Dilation and curettage of uterus (10/23/2019); Hysterectomy (01/22/2020); Upper gastrointestinal endoscopy (N/A, 2/7/2020); laparoscopy (N/A, 6/27/2020); Upper gastrointestinal endoscopy (N/A, 6/27/2020); Colonoscopy (N/A, 10/27/2021); Upper gastrointestinal endoscopy (N/A, 3/11/2022); and laparoscopy (N/A, 2/21/2023). Social History:  reports that she has been smoking cigarettes. She started smoking about 31 years ago. She has a 7.50 pack-year smoking history. She has never used smokeless tobacco. She reports current alcohol use of about 6.0 standard drinks per week. She reports that she does not use drugs. Family History: family history includes Cancer in her maternal grandfather and paternal grandmother; Depression in her mother; Diabetes in her father and mother; Heart Attack in her brother; Heart Attack (age of onset: 61) in her mother; Stroke in her father and paternal grandfather; Substance Abuse in her brother. Allergies: Pcn [penicillins], Food, and Ibuprofen    Physical Exam   Vital Signs:  /75   Pulse 74   Temp 97.6 °F (36.4 °C) (Oral)   Resp 18   Ht 5' 6\" (1.676 m)   Wt 195 lb (88.5 kg)   LMP 10/21/2019 (Exact Date)   SpO2 98%   BMI 31.47 kg/m²    Oxygen Saturation Interpretation: Normal.    Constitutional:  Alert, development consistent with age. Head: There is moderate TTP over the frontal and maxillary sinuses. Ears: Bilateral pinna normal. TMs clear without erythema or perforation bilaterally. Canals normal bilaterally without swelling or exudate  Nose:  There is mild congestion of the nasal mucosa. There is mild injection to middle turbinates bilaterally. Throat: There is mild posterior pharyngeal erythema with mild post nasal drip present. No exudate or tonsillar hypertrophy noted. Neck:  Supple. There is no anterior cervical adenopathy.   Lungs: CTAB without wheezes, rales, or rhonchi  Heart:  Regular rate and rhythm, normal heart sounds, without pathological murmurs, ectopy, gallops, or rubs. Skin:  Normal turgor. Warm, dry, without visible rash. Neurological:  Alert and oriented. Motor functions intact. Responds to verbal commands. Test Results Section   (All laboratory and radiology results have been personally reviewed by myself)  Labs:  No results found for this visit on 03/08/23. Assessment / Plan   Impression(s):  Triston Luevano was seen today for sinusitis. Diagnoses and all orders for this visit:    Acute non-recurrent pansinusitis  -     doxycycline hyclate (VIBRAMYCIN) 100 MG capsule; Take 1 capsule by mouth 2 times daily for 10 days  -     fluticasone (FLONASE) 50 MCG/ACT nasal spray; 1 spray by Each Nostril route in the morning and at bedtime    Script written for Doxycycline and Flonase, side effects discussed. Increase fluids and rest. Symptomatic relief discussed. F/u PCP in 5-7 days if symptoms persist. ED sooner if symptoms worsen or change. Red flag symptoms discussed. Patient verbalized understanding and is in agreement with this care plan. All questions answered. Return if symptoms worsen or fail to improve. Electronically signed by JOHN Soto CNP   DD: 3/8/23    **This report was transcribed using voice recognition software. Every effort was made to ensure accuracy; however, inadvertent computerized transcription errors may be present.

## 2023-03-23 ENCOUNTER — OFFICE VISIT (OUTPATIENT)
Dept: FAMILY MEDICINE CLINIC | Age: 48
End: 2023-03-23

## 2023-03-23 VITALS
DIASTOLIC BLOOD PRESSURE: 62 MMHG | WEIGHT: 202 LBS | SYSTOLIC BLOOD PRESSURE: 116 MMHG | HEIGHT: 66 IN | BODY MASS INDEX: 32.47 KG/M2 | OXYGEN SATURATION: 97 % | RESPIRATION RATE: 16 BRPM | HEART RATE: 77 BPM | TEMPERATURE: 97.2 F

## 2023-03-23 DIAGNOSIS — J44.1 COPD EXACERBATION (HCC): ICD-10-CM

## 2023-03-23 DIAGNOSIS — E55.9 VITAMIN D DEFICIENCY: ICD-10-CM

## 2023-03-23 DIAGNOSIS — Z00.00 ENCOUNTER FOR MEDICAL EXAMINATION TO ESTABLISH CARE: ICD-10-CM

## 2023-03-23 DIAGNOSIS — Z59.9 FINANCIAL DIFFICULTIES: ICD-10-CM

## 2023-03-23 DIAGNOSIS — R53.82 CHRONIC FATIGUE: ICD-10-CM

## 2023-03-23 DIAGNOSIS — R73.9 HYPERGLYCEMIA: ICD-10-CM

## 2023-03-23 DIAGNOSIS — D50.9 MICROCYTIC ANEMIA: ICD-10-CM

## 2023-03-23 DIAGNOSIS — Z28.21 VACCINATION NOT CARRIED OUT BECAUSE OF PATIENT REFUSAL: ICD-10-CM

## 2023-03-23 DIAGNOSIS — F17.210 CIGARETTE SMOKER: ICD-10-CM

## 2023-03-23 DIAGNOSIS — R79.89 ELEVATED LFTS: ICD-10-CM

## 2023-03-23 DIAGNOSIS — Z00.00 ROUTINE PHYSICAL EXAMINATION: Primary | ICD-10-CM

## 2023-03-23 PROBLEM — R10.9 ABDOMINAL PAIN: Status: RESOLVED | Noted: 2022-03-09 | Resolved: 2023-03-23

## 2023-03-23 PROBLEM — R10.9 ABDOMINAL PAIN OF MULTIPLE SITES: Status: RESOLVED | Noted: 2020-10-21 | Resolved: 2023-03-23

## 2023-03-23 PROBLEM — U07.1 COVID-19: Status: RESOLVED | Noted: 2022-07-10 | Resolved: 2023-03-23

## 2023-03-23 LAB
ALBUMIN SERPL-MCNC: 3.4 G/DL (ref 3.5–5.2)
ALP SERPL-CCNC: 90 U/L (ref 35–104)
ALT SERPL-CCNC: 10 U/L (ref 0–32)
ANION GAP SERPL CALCULATED.3IONS-SCNC: 13 MMOL/L (ref 7–16)
AST SERPL-CCNC: 20 U/L (ref 0–31)
BASOPHILS # BLD: 0.11 E9/L (ref 0–0.2)
BASOPHILS NFR BLD: 1 % (ref 0–2)
BILIRUB SERPL-MCNC: 0.4 MG/DL (ref 0–1.2)
BUN SERPL-MCNC: 13 MG/DL (ref 6–20)
CALCIUM SERPL-MCNC: 8.3 MG/DL (ref 8.6–10.2)
CHLORIDE SERPL-SCNC: 106 MMOL/L (ref 98–107)
CHOLESTEROL, TOTAL: 182 MG/DL (ref 0–199)
CO2 SERPL-SCNC: 21 MMOL/L (ref 22–29)
CREAT SERPL-MCNC: 0.6 MG/DL (ref 0.5–1)
EOSINOPHIL # BLD: 0.39 E9/L (ref 0.05–0.5)
EOSINOPHIL NFR BLD: 3.5 % (ref 0–6)
ERYTHROCYTE [DISTWIDTH] IN BLOOD BY AUTOMATED COUNT: 13.5 FL (ref 11.5–15)
GLUCOSE SERPL-MCNC: 86 MG/DL (ref 74–99)
HBA1C MFR BLD: 5.2 % (ref 4–5.6)
HCT VFR BLD AUTO: 43.7 % (ref 34–48)
HDLC SERPL-MCNC: 88 MG/DL
HGB BLD-MCNC: 13.7 G/DL (ref 11.5–15.5)
IMM GRANULOCYTES # BLD: 0.05 E9/L
IMM GRANULOCYTES NFR BLD: 0.4 % (ref 0–5)
LDLC SERPL CALC-MCNC: 79 MG/DL (ref 0–99)
LYMPHOCYTES # BLD: 2.11 E9/L (ref 1.5–4)
LYMPHOCYTES NFR BLD: 18.9 % (ref 20–42)
MCH RBC QN AUTO: 31.9 PG (ref 26–35)
MCHC RBC AUTO-ENTMCNC: 31.4 % (ref 32–34.5)
MCV RBC AUTO: 101.6 FL (ref 80–99.9)
MONOCYTES # BLD: 0.62 E9/L (ref 0.1–0.95)
MONOCYTES NFR BLD: 5.5 % (ref 2–12)
NEUTROPHILS # BLD: 7.91 E9/L (ref 1.8–7.3)
NEUTS SEG NFR BLD: 70.7 % (ref 43–80)
PLATELET # BLD AUTO: 448 E9/L (ref 130–450)
PMV BLD AUTO: 8.7 FL (ref 7–12)
POTASSIUM SERPL-SCNC: 4.9 MMOL/L (ref 3.5–5)
PROT SERPL-MCNC: 6.2 G/DL (ref 6.4–8.3)
RBC # BLD AUTO: 4.3 E12/L (ref 3.5–5.5)
SODIUM SERPL-SCNC: 140 MMOL/L (ref 132–146)
TRIGL SERPL-MCNC: 75 MG/DL (ref 0–149)
TSH SERPL-MCNC: 1.45 UIU/ML (ref 0.27–4.2)
VITAMIN D 25-HYDROXY: 11 NG/ML (ref 30–100)
VLDLC SERPL CALC-MCNC: 15 MG/DL
WBC # BLD: 11.2 E9/L (ref 4.5–11.5)

## 2023-03-23 RX ORDER — TRAMADOL HYDROCHLORIDE 50 MG/1
50 TABLET ORAL PRN
COMMUNITY

## 2023-03-23 SDOH — ECONOMIC STABILITY: FOOD INSECURITY: WITHIN THE PAST 12 MONTHS, THE FOOD YOU BOUGHT JUST DIDN'T LAST AND YOU DIDN'T HAVE MONEY TO GET MORE.: NEVER TRUE

## 2023-03-23 SDOH — ECONOMIC STABILITY: HOUSING INSECURITY
IN THE LAST 12 MONTHS, WAS THERE A TIME WHEN YOU DID NOT HAVE A STEADY PLACE TO SLEEP OR SLEPT IN A SHELTER (INCLUDING NOW)?: NO

## 2023-03-23 SDOH — ECONOMIC STABILITY: INCOME INSECURITY: HOW HARD IS IT FOR YOU TO PAY FOR THE VERY BASICS LIKE FOOD, HOUSING, MEDICAL CARE, AND HEATING?: NOT HARD AT ALL

## 2023-03-23 SDOH — ECONOMIC STABILITY: FOOD INSECURITY: WITHIN THE PAST 12 MONTHS, YOU WORRIED THAT YOUR FOOD WOULD RUN OUT BEFORE YOU GOT MONEY TO BUY MORE.: NEVER TRUE

## 2023-03-23 SDOH — ECONOMIC STABILITY - INCOME SECURITY: PROBLEM RELATED TO HOUSING AND ECONOMIC CIRCUMSTANCES, UNSPECIFIED: Z59.9

## 2023-03-23 ASSESSMENT — PATIENT HEALTH QUESTIONNAIRE - PHQ9
10. IF YOU CHECKED OFF ANY PROBLEMS, HOW DIFFICULT HAVE THESE PROBLEMS MADE IT FOR YOU TO DO YOUR WORK, TAKE CARE OF THINGS AT HOME, OR GET ALONG WITH OTHER PEOPLE: 1
9. THOUGHTS THAT YOU WOULD BE BETTER OFF DEAD, OR OF HURTING YOURSELF: 0
5. POOR APPETITE OR OVEREATING: 1
7. TROUBLE CONCENTRATING ON THINGS, SUCH AS READING THE NEWSPAPER OR WATCHING TELEVISION: 1
3. TROUBLE FALLING OR STAYING ASLEEP: 1
1. LITTLE INTEREST OR PLEASURE IN DOING THINGS: 1
SUM OF ALL RESPONSES TO PHQ9 QUESTIONS 1 & 2: 2
4. FEELING TIRED OR HAVING LITTLE ENERGY: 1
8. MOVING OR SPEAKING SO SLOWLY THAT OTHER PEOPLE COULD HAVE NOTICED. OR THE OPPOSITE, BEING SO FIGETY OR RESTLESS THAT YOU HAVE BEEN MOVING AROUND A LOT MORE THAN USUAL: 0
2. FEELING DOWN, DEPRESSED OR HOPELESS: 1
SUM OF ALL RESPONSES TO PHQ QUESTIONS 1-9: 8
6. FEELING BAD ABOUT YOURSELF - OR THAT YOU ARE A FAILURE OR HAVE LET YOURSELF OR YOUR FAMILY DOWN: 2
SUM OF ALL RESPONSES TO PHQ QUESTIONS 1-9: 8

## 2023-03-23 ASSESSMENT — ENCOUNTER SYMPTOMS
SINUS PAIN: 0
COUGH: 0
EYE REDNESS: 0
VOMITING: 0
NAUSEA: 1
RHINORRHEA: 0
EYE PAIN: 0
SINUS PRESSURE: 0
ABDOMINAL PAIN: 0
CONSTIPATION: 0
BACK PAIN: 1
DIARRHEA: 0
SHORTNESS OF BREATH: 0
SORE THROAT: 0

## 2023-03-23 ASSESSMENT — LIFESTYLE VARIABLES
HOW OFTEN DO YOU HAVE A DRINK CONTAINING ALCOHOL: MONTHLY OR LESS
HOW MANY STANDARD DRINKS CONTAINING ALCOHOL DO YOU HAVE ON A TYPICAL DAY: 1 OR 2

## 2023-03-23 NOTE — PROGRESS NOTES
pain and redness. Respiratory:  Negative for cough and shortness of breath. Cardiovascular:  Positive for leg swelling (2/2 lymphedema). Negative for chest pain. Gastrointestinal:  Positive for nausea (intermittently). Negative for abdominal pain, constipation, diarrhea and vomiting. Genitourinary:  Negative for difficulty urinating and dysuria. Musculoskeletal:  Positive for arthralgias and back pain. Skin:  Negative for rash. Neurological:  Negative for dizziness, light-headedness and numbness. Psychiatric/Behavioral:  Positive for dysphoric mood and sleep disturbance. Negative for suicidal ideas. The patient is nervous/anxious. Prior to Visit Medications    Medication Sig Taking? Authorizing Provider   traMADol (ULTRAM) 50 MG tablet Take 50 mg by mouth as needed for Pain. Yes Historical Provider, MD   fluticasone (FLONASE) 50 MCG/ACT nasal spray 1 spray by Each Nostril route in the morning and at bedtime Yes JOHN De Leon - CNP   omeprazole (PRILOSEC) 20 MG delayed release capsule take 1 capsule by mouth once daily Yes Ly Lopez MD   clobetasol (TEMOVATE) 0.05 % ointment Apply topically 2 times daily. Yes Trell Richardson DO   sucralfate (CARAFATE) 1 GM/10ML suspension Take 10 mLs by mouth 4 times daily  Patient taking differently: Take 1 g by mouth Daily Yes Ly Lopez MD   acetaminophen (TYLENOL) 500 MG tablet Take 2 tablets by mouth every 6 hours as needed for Pain Maximum dose- 8 tablets/24 hours.  Yes Ly Lopez MD   hydrOXYzine pamoate (VISTARIL) 100 MG capsule Take 100 mg by mouth 2 times daily as needed Yes Historical Provider, MD   albuterol sulfate HFA (VENTOLIN HFA) 108 (90 Base) MCG/ACT inhaler Inhale 2 puffs into the lungs every 6 hours as needed for Wheezing Yes Darrius Rehman MD   ondansetron (ZOFRAN-ODT) 4 MG disintegrating tablet Place 1 tablet under the tongue every 8 hours as needed for Nausea or Vomiting Yes Darrius Rehman MD   LORazepam (ATIVAN) 0.5

## 2023-03-24 ENCOUNTER — CARE COORDINATION (OUTPATIENT)
Dept: CARE COORDINATION | Age: 48
End: 2023-03-24

## 2023-03-24 DIAGNOSIS — E55.9 VITAMIN D DEFICIENCY: Primary | ICD-10-CM

## 2023-03-24 RX ORDER — ERGOCALCIFEROL 1.25 MG/1
50000 CAPSULE ORAL WEEKLY
Qty: 12 CAPSULE | Refills: 1 | Status: SHIPPED | OUTPATIENT
Start: 2023-03-24

## 2023-03-24 NOTE — CARE COORDINATION
Initial Contact Social Work Note - Ambulatory  3/24/2023      Date of referral: 3/23/2023  Referral received from: PCP  Reason for referral: financial difficulty    Previous SW referral: Yes  If yes, brief summary of outcome: N/A    Two Identifiers Verified: Yes    Insurance Provider: [facility] Medicare Dual/[employer]    Support System:   brother, aunt     Status:  No    Community Providers: SNAP/Food [place] and Local Behavioral Provider    ADL Assistance Needed: N/A    Housing/Living Concerns or Home Modification Needs: No    Transportation Concern: No    Medication Cost Concern: No    Medication Adherence Concern: No    Financial Concern(s): Pt is connected with HEAP/PIPP    Income (only if applicable): SSDI    Ability to Read/Write:Yes    Advance Care Plan:  N/A    Other: N/A    Identified Needs: Additional food resources    Social Work Plan:  [facility] encouraged pt to call [place] [phone] for food lobo/pantries  Next Steps:SWCC to f/u    Method of Communication With Provider (if appropriate): Chart Routing       Goals Addressed    None     SWCC made call to pt to initiate SW referral, unable to reach, left message requesting call back. Received call back from pt, introduced self/ role and reviewed referral. Pt reports financial issues. Pt is connected with [employer] programs for utility assistance. Pt receives food assistance. Pt does not have rent d/t owning home. [facility] reviewed [address] and pt text [phone] while on phone and got list of local food lobo. No other questions or needs reported. SWCC to f/u in a few weeks.     Roberta CHAIDEZ, [place]   Care Coordinator  [phone]

## 2023-04-03 ENCOUNTER — OFFICE VISIT (OUTPATIENT)
Dept: PHYSICAL MEDICINE AND REHAB | Age: 48
End: 2023-04-03
Payer: MEDICARE

## 2023-04-03 VITALS
SYSTOLIC BLOOD PRESSURE: 114 MMHG | BODY MASS INDEX: 33.75 KG/M2 | DIASTOLIC BLOOD PRESSURE: 76 MMHG | WEIGHT: 210 LBS | HEIGHT: 66 IN | TEMPERATURE: 97.3 F | HEART RATE: 77 BPM

## 2023-04-03 DIAGNOSIS — M17.11 PRIMARY OSTEOARTHRITIS OF RIGHT KNEE: ICD-10-CM

## 2023-04-03 DIAGNOSIS — M79.609 PAIN IN EXTREMITY, UNSPECIFIED EXTREMITY: Primary | ICD-10-CM

## 2023-04-03 DIAGNOSIS — M17.12 PRIMARY LOCALIZED OSTEOARTHRITIS OF LEFT KNEE: ICD-10-CM

## 2023-04-03 PROCEDURE — G8427 DOCREV CUR MEDS BY ELIG CLIN: HCPCS | Performed by: PHYSICAL MEDICINE & REHABILITATION

## 2023-04-03 PROCEDURE — 4004F PT TOBACCO SCREEN RCVD TLK: CPT | Performed by: PHYSICAL MEDICINE & REHABILITATION

## 2023-04-03 PROCEDURE — G8417 CALC BMI ABV UP PARAM F/U: HCPCS | Performed by: PHYSICAL MEDICINE & REHABILITATION

## 2023-04-03 PROCEDURE — 20611 DRAIN/INJ JOINT/BURSA W/US: CPT | Performed by: PHYSICAL MEDICINE & REHABILITATION

## 2023-04-03 PROCEDURE — 99214 OFFICE O/P EST MOD 30 MIN: CPT | Performed by: PHYSICAL MEDICINE & REHABILITATION

## 2023-04-03 RX ORDER — TRIAMCINOLONE ACETONIDE 40 MG/ML
40 INJECTION, SUSPENSION INTRA-ARTICULAR; INTRAMUSCULAR ONCE
Status: COMPLETED | OUTPATIENT
Start: 2023-04-03 | End: 2023-04-03

## 2023-04-03 RX ORDER — LIDOCAINE HYDROCHLORIDE 10 MG/ML
8 INJECTION, SOLUTION INFILTRATION; PERINEURAL ONCE
Status: COMPLETED | OUTPATIENT
Start: 2023-04-03 | End: 2023-04-03

## 2023-04-03 RX ORDER — TRAMADOL HYDROCHLORIDE 50 MG/1
50 TABLET ORAL EVERY 6 HOURS PRN
Qty: 28 TABLET | Refills: 0 | Status: SHIPPED | OUTPATIENT
Start: 2023-04-03 | End: 2023-04-13

## 2023-04-03 RX ADMIN — TRIAMCINOLONE ACETONIDE 40 MG: 40 INJECTION, SUSPENSION INTRA-ARTICULAR; INTRAMUSCULAR at 14:35

## 2023-04-03 RX ADMIN — LIDOCAINE HYDROCHLORIDE 8 ML: 10 INJECTION, SOLUTION INFILTRATION; PERINEURAL at 14:34

## 2023-04-03 NOTE — PROGRESS NOTES
joint using US guidance. After negative aspiration, the medication was injected. Adequate hemostasis was achieved and a bandage applied. The patient tolerated the procedure well and was educated in post injection care. The patient was clinically monitored after the injection and left the office without incident. There was post injection reduction in pain. Ultrasound images are scanned separately into the EMR. Ute Ayala D.O., P.T.   Board Certified Physical Medicine and Rehabilitation  Board Certified Electrodiagnostic Medicine    Administrations This Visit       lidocaine 1 % injection 8 mL       Admin Date  04/03/2023  14:34 Action  Given Dose  8 mL Route  Other Site   Administered By  Momo Sebastian MA    Ordering Provider: Suellen Eisenmenger, DO    NDC: 2964-8662-07    Lot#: [id number]    CHING Chance    Patient Supplied?: No              triamcinolone acetonide (KENALOG-40) injection 40 mg       Admin Date  04/03/2023  14:35 Action  Given Dose  40 mg Route  Intra-artICUlar Site   Administered By  Momo Sebastian MA    Ordering Provider: Suellen Eisenmenger, DO    NDC: 4195-8234-56    Lot#: [id number]    : [employer] (PRIMARY CARE)    Patient Supplied?: No

## 2023-04-19 ENCOUNTER — CARE COORDINATION (OUTPATIENT)
Dept: CARE COORDINATION | Age: 48
End: 2023-04-19

## 2023-04-19 NOTE — CARE COORDINATION
[facility] made f/u call to pt, unable to reach, left message requesting call back to this [facility].     Jerome CHAIDEZ, [place]   Care Coordinator  [phone]

## 2023-05-03 ENCOUNTER — CARE COORDINATION (OUTPATIENT)
Dept: CARE COORDINATION | Age: 48
End: 2023-05-03

## 2023-05-08 ENCOUNTER — TELEPHONE (OUTPATIENT)
Dept: PHYSICAL MEDICINE AND REHAB | Age: 48
End: 2023-05-08

## 2023-05-23 ENCOUNTER — OFFICE VISIT (OUTPATIENT)
Dept: PHYSICAL MEDICINE AND REHAB | Age: 48
End: 2023-05-23

## 2023-05-23 VITALS
TEMPERATURE: 96.7 F | SYSTOLIC BLOOD PRESSURE: 109 MMHG | WEIGHT: 209 LBS | DIASTOLIC BLOOD PRESSURE: 70 MMHG | HEIGHT: 65 IN | HEART RATE: 87 BPM | BODY MASS INDEX: 34.82 KG/M2

## 2023-05-23 DIAGNOSIS — M17.0 PRIMARY OSTEOARTHRITIS OF BOTH KNEES: ICD-10-CM

## 2023-05-23 DIAGNOSIS — M79.609 PAIN IN EXTREMITY, UNSPECIFIED EXTREMITY: Primary | ICD-10-CM

## 2023-05-23 NOTE — PROGRESS NOTES
Nic Fuentes D.O. [facility]  [address] [place]  Phone: [phone]  Fax: [phone]    5/23/2023    Chief Complaint   Patient presents with    Knee Pain     Bilateral knee synvisc #1 injections       Last injection: n/a  Taking anticoagulants/antiplatelets: No  Diabetic: No  Febrile/active infection: No    Ambulatory Procedure Time Out  Correct Patient: Yes  Correct Procedure: Yes  Correct Site/Side: Yes  Correct Site(s) Marked: Yes  Informed Consent Signed: Yes  Allergies Verified: Yes  Staff Present & Credential[de-identified] Lawrence FELDER/    After explaining the indications, risks, benefits and alternatives of a bilateral knee joint injection, the patient agreed to proceed. The patient was placed in the supine position with slight knee flexion using a pillow. Ethyl chloride vapocoolant spray was applied. The skin on the lateral  knee was prepared with Chloraprep. Using ultrasound guidance and a no touch, aseptic technique, a 20 gauge, 1.5\" needle with 10 cc syringe was directed into  the knee joint. After  negative aspiration, the syringe was changed with needle left in place and 2 cc of Synvisc 16mg/2cc was injected into the knee joint. The procedure was repeated contralaterally. The patient tolerated the procedure well and was educated in post injection care. Adequate hemostasis was achieved and a bandage applied to the injection sites. There was post injection reduction in pain. The patient was monitored clinically and left the office without incident. US images are uploaded separately into the electronic medical record. Nic Fuentes D.O., P.T.   Board Certified Physical Medicine and Rehabilitation  Board Certified Electrodiagnostic Medicine    Administrations This Visit       Hylan injection 32 mg       Admin Date  05/23/2023  14:58 Action  Given Dose  32 mg Route  Intra-artICUlar Site   Administered By  Liz Hanley

## 2023-05-25 ENCOUNTER — TELEPHONE (OUTPATIENT)
Dept: PHYSICAL MEDICINE AND REHAB | Age: 48
End: 2023-05-25

## 2023-05-25 NOTE — TELEPHONE ENCOUNTER
----- Message from Michael Junior DO sent at 5/24/2023  5:36 PM EDT -----  Reviewed test abnormal, inform patient that it showed severe knee arthritis.

## 2023-05-25 NOTE — TELEPHONE ENCOUNTER
Called and lvm for patient to return our call, we need to inform her of provider message Reviewed test abnormal, inform patient that it showed severe knee arthritis. Will wait for patient to return our call.

## 2023-05-30 ENCOUNTER — OFFICE VISIT (OUTPATIENT)
Dept: PHYSICAL MEDICINE AND REHAB | Age: 48
End: 2023-05-30
Payer: MEDICARE

## 2023-05-30 VITALS
HEIGHT: 66 IN | TEMPERATURE: 96.4 F | HEART RATE: 80 BPM | WEIGHT: 214 LBS | DIASTOLIC BLOOD PRESSURE: 70 MMHG | BODY MASS INDEX: 34.39 KG/M2 | SYSTOLIC BLOOD PRESSURE: 111 MMHG

## 2023-05-30 DIAGNOSIS — M79.609 PAIN IN EXTREMITY, UNSPECIFIED EXTREMITY: Primary | ICD-10-CM

## 2023-05-30 PROCEDURE — 20611 DRAIN/INJ JOINT/BURSA W/US: CPT | Performed by: PHYSICAL MEDICINE & REHABILITATION

## 2023-05-30 NOTE — PROGRESS NOTES
Jose Luis Jacobs D.O. [facility]  [address] [place]  Phone: [phone]  Fax: [phone]    5/30/2023    Chief Complaint   Patient presents with    Knee Pain     Bilateral knee Synvisc #2 injection        Last injection: n/a  Taking anticoagulants/antiplatelets: No  Diabetic: No  Febrile/active infection: No    Ambulatory Procedure Time Out  Correct Patient: Yes  Correct Procedure: Yes  Correct Site/Side: Yes  Correct Site(s) Marked: Yes  Informed Consent Signed: Yes  Allergies Verified: Yes  Staff Present & Credential[de-identified] Solomon Miller, [address] [place] [address],     After explaining the indications, risks, benefits and alternatives of a bilateral knee joint injection, the patient agreed to proceed. The patient was placed in the supine position with slight knee flexion using a pillow. Ethyl chloride vapocoolant spray was applied. The skin on the lateral  knee was prepared with Chloraprep. Using ultrasound guidance and a no touch, aseptic technique, a 20 gauge, 1.5\" needle with 10 cc syringe was directed into  the knee joint. After  negative aspiration, the syringe was changed with needle left in place and 2 cc of Synvisc 16mg/2cc was injected into the knee joint. The procedure was repeated contralaterally. The patient tolerated the procedure well and was educated in post injection care. Adequate hemostasis was achieved and a bandage applied to the injection sites. There was post injection reduction in pain. The patient was monitored clinically and left the office without incident. US images are uploaded separately into the electronic medical record. Jose Luis Jacobs D.O., P.T.   Board Certified Physical Medicine and Rehabilitation  Board Certified Electrodiagnostic Medicine    Administrations This Visit       Hylan injection 32 mg       Admin Date  05/30/2023  14:09 Action  Given Dose  32 mg Route  Intra-artICUlar Site   Administered By  [place]

## 2023-06-02 ENCOUNTER — OFFICE VISIT (OUTPATIENT)
Dept: PRIMARY CARE CLINIC | Age: 48
End: 2023-06-02
Payer: MEDICARE

## 2023-06-02 VITALS
DIASTOLIC BLOOD PRESSURE: 80 MMHG | BODY MASS INDEX: 32.95 KG/M2 | SYSTOLIC BLOOD PRESSURE: 120 MMHG | HEART RATE: 74 BPM | HEIGHT: 66 IN | TEMPERATURE: 97.6 F | RESPIRATION RATE: 16 BRPM | WEIGHT: 205 LBS | OXYGEN SATURATION: 98 %

## 2023-06-02 DIAGNOSIS — J01.40 ACUTE NON-RECURRENT PANSINUSITIS: Primary | ICD-10-CM

## 2023-06-02 PROCEDURE — G8417 CALC BMI ABV UP PARAM F/U: HCPCS

## 2023-06-02 PROCEDURE — 99213 OFFICE O/P EST LOW 20 MIN: CPT

## 2023-06-02 PROCEDURE — G8427 DOCREV CUR MEDS BY ELIG CLIN: HCPCS

## 2023-06-02 PROCEDURE — 4004F PT TOBACCO SCREEN RCVD TLK: CPT

## 2023-06-02 RX ORDER — BENZONATATE 200 MG/1
200 CAPSULE ORAL 3 TIMES DAILY PRN
Qty: 21 CAPSULE | Refills: 0 | Status: SHIPPED | OUTPATIENT
Start: 2023-06-02 | End: 2023-06-09

## 2023-06-02 RX ORDER — AZITHROMYCIN 250 MG/1
250 TABLET, FILM COATED ORAL SEE ADMIN INSTRUCTIONS
Qty: 6 TABLET | Refills: 0 | Status: SHIPPED | OUTPATIENT
Start: 2023-06-02 | End: 2023-06-07

## 2023-06-02 RX ORDER — GUAIFENESIN 600 MG/1
600 TABLET, EXTENDED RELEASE ORAL 2 TIMES DAILY
Qty: 30 TABLET | Refills: 0 | Status: SHIPPED | OUTPATIENT
Start: 2023-06-02 | End: 2023-06-17

## 2023-06-02 NOTE — PROGRESS NOTES
(MUCINEX) 600 MG extended release tablet; Take 1 tablet by mouth 2 times daily for 15 days        Disposition:  Disposition: Discharge to home    Script for Azithromycin, Tessalon, and Mucinex prescribed, side effects discussed. Follow-up with PCP in 7 to 10 days. Red flag symptoms were discussed with the patient including high or refractory fever, progressive SOB, dyspnea, CP, calf pain/swelling, shaking chills, vomiting, abdominal pain, lethargy, flank pain, or decreased urinary output. If any of these occur, they should go immediately to the emergency department for further evaluation. All questions were answered. The patient relies understanding and was agreeable to the above plan. Mehdi Dennison, APRN - CNP    *NOTE: This report was transcribed using voice recognition software. Every effort was made to ensure accuracy; however, inadvertent computerized transcription errors may be present.

## 2023-06-06 ENCOUNTER — OFFICE VISIT (OUTPATIENT)
Dept: PHYSICAL MEDICINE AND REHAB | Age: 48
End: 2023-06-06

## 2023-06-06 VITALS
SYSTOLIC BLOOD PRESSURE: 100 MMHG | HEART RATE: 73 BPM | BODY MASS INDEX: 33.59 KG/M2 | HEIGHT: 66 IN | WEIGHT: 209 LBS | DIASTOLIC BLOOD PRESSURE: 70 MMHG | TEMPERATURE: 97.2 F

## 2023-06-06 DIAGNOSIS — M79.609 PAIN IN EXTREMITY, UNSPECIFIED EXTREMITY: Primary | ICD-10-CM

## 2023-06-06 NOTE — PROGRESS NOTES
Sabrina Cooper D.O. [facility]  [address] [facility] [place]  Phone: [phone]  Fax: [phone]    6/6/2023    Chief Complaint   Patient presents with    Knee Pain     Bilateral knee Synvisc Injection #3       Last injection: n/a  Taking anticoagulants/antiplatelets: No  Diabetic: No  Febrile/active infection: No    Ambulatory Procedure Time Out  Correct Patient: Yes  Correct Procedure: Yes  Correct Site/Side: Yes  Correct Site(s) Marked: Yes  Informed Consent Signed: Yes  Allergies Verified: Yes  Staff Present & Credential[de-identified] Edison Duenas LPN, Dr. Darwin Ng DO    After explaining the indications, risks, benefits and alternatives of a bilateral knee joint injection, the patient agreed to proceed. The patient was placed in the supine position with slight knee flexion using a pillow. Ethyl chloride vapocoolant spray was applied. The skin on the lateral  knee was prepared with Chloraprep. Using ultrasound guidance and a no touch, aseptic technique, a 20 gauge, 1.5\" needle with 10 cc syringe was directed into  the knee joint. After  negative aspiration, the syringe was changed with needle left in place and 2 cc of Synvisc 16mg/2cc was injected into the knee joint. The procedure was repeated contralaterally. The patient tolerated the procedure well and was educated in post injection care. Adequate hemostasis was achieved and a bandage applied to the injection sites. There was post injection reduction in pain. The patient was monitored clinically and left the office without incident. US images are uploaded separately into the electronic medical record. Sabrina Cooper D.O., P.T.   Board Certified Physical Medicine and Rehabilitation  Board Certified Electrodiagnostic Medicine    Administrations This Visit       Hylan injection 32 mg       Admin Date  06/06/2023  15:08 Action  Given Dose  32 mg Route  Intra-artICUlar Site   Administered By  Emil Ortega RN

## 2023-06-14 DIAGNOSIS — M17.12 PRIMARY LOCALIZED OSTEOARTHRITIS OF LEFT KNEE: ICD-10-CM

## 2023-06-14 DIAGNOSIS — M79.609 PAIN IN EXTREMITY, UNSPECIFIED EXTREMITY: ICD-10-CM

## 2023-06-14 DIAGNOSIS — M17.11 PRIMARY OSTEOARTHRITIS OF RIGHT KNEE: ICD-10-CM

## 2023-07-14 ENCOUNTER — OFFICE VISIT (OUTPATIENT)
Dept: PHYSICAL MEDICINE AND REHAB | Age: 48
End: 2023-07-14

## 2023-07-14 VITALS
DIASTOLIC BLOOD PRESSURE: 67 MMHG | HEIGHT: 66 IN | HEART RATE: 76 BPM | TEMPERATURE: 97.2 F | BODY MASS INDEX: 36.32 KG/M2 | SYSTOLIC BLOOD PRESSURE: 101 MMHG | WEIGHT: 226 LBS

## 2023-07-14 DIAGNOSIS — M17.11 PRIMARY OSTEOARTHRITIS OF RIGHT KNEE: ICD-10-CM

## 2023-07-14 DIAGNOSIS — M79.609 PAIN IN EXTREMITY, UNSPECIFIED EXTREMITY: Primary | ICD-10-CM

## 2023-07-14 DIAGNOSIS — E66.01 SEVERE OBESITY (BMI 35.0-39.9) WITH COMORBIDITY (HCC): ICD-10-CM

## 2023-07-14 RX ORDER — TRIAMCINOLONE ACETONIDE 40 MG/ML
40 INJECTION, SUSPENSION INTRA-ARTICULAR; INTRAMUSCULAR ONCE
Status: DISCONTINUED | OUTPATIENT
Start: 2023-07-14 | End: 2023-07-14

## 2023-07-14 RX ORDER — LIDOCAINE HYDROCHLORIDE 10 MG/ML
8 INJECTION, SOLUTION INFILTRATION; PERINEURAL ONCE
Status: COMPLETED | OUTPATIENT
Start: 2023-07-14 | End: 2023-07-14

## 2023-07-14 RX ORDER — TRIAMCINOLONE ACETONIDE 40 MG/ML
40 INJECTION, SUSPENSION INTRA-ARTICULAR; INTRAMUSCULAR ONCE
Status: COMPLETED | OUTPATIENT
Start: 2023-07-14 | End: 2023-07-14

## 2023-07-14 RX ORDER — LIDOCAINE HYDROCHLORIDE 10 MG/ML
20 INJECTION, SOLUTION INFILTRATION; PERINEURAL ONCE
Status: DISCONTINUED | OUTPATIENT
Start: 2023-07-14 | End: 2023-07-14

## 2023-07-14 RX ORDER — TRAMADOL HYDROCHLORIDE 50 MG/1
100 TABLET ORAL 2 TIMES DAILY PRN
Qty: 120 TABLET | Refills: 2 | Status: SHIPPED | OUTPATIENT
Start: 2023-07-14 | End: 2023-10-12

## 2023-07-14 RX ADMIN — LIDOCAINE HYDROCHLORIDE 8 ML: 10 INJECTION, SOLUTION INFILTRATION; PERINEURAL at 15:11

## 2023-07-14 RX ADMIN — TRIAMCINOLONE ACETONIDE 40 MG: 40 INJECTION, SUSPENSION INTRA-ARTICULAR; INTRAMUSCULAR at 15:11

## 2023-07-21 ENCOUNTER — OFFICE VISIT (OUTPATIENT)
Dept: PHYSICAL MEDICINE AND REHAB | Age: 48
End: 2023-07-21

## 2023-07-21 VITALS
HEART RATE: 61 BPM | WEIGHT: 218.5 LBS | HEIGHT: 66 IN | TEMPERATURE: 97.2 F | BODY MASS INDEX: 35.12 KG/M2 | SYSTOLIC BLOOD PRESSURE: 133 MMHG | DIASTOLIC BLOOD PRESSURE: 73 MMHG

## 2023-07-21 DIAGNOSIS — M17.12 PRIMARY LOCALIZED OSTEOARTHRITIS OF LEFT KNEE: ICD-10-CM

## 2023-07-21 DIAGNOSIS — M79.609 PAIN IN EXTREMITY, UNSPECIFIED EXTREMITY: Primary | ICD-10-CM

## 2023-07-21 RX ORDER — TRIAMCINOLONE ACETONIDE 40 MG/ML
40 INJECTION, SUSPENSION INTRA-ARTICULAR; INTRAMUSCULAR ONCE
Status: COMPLETED | OUTPATIENT
Start: 2023-07-21 | End: 2023-07-24

## 2023-07-21 RX ORDER — LIDOCAINE HYDROCHLORIDE 10 MG/ML
7 INJECTION, SOLUTION INFILTRATION; PERINEURAL ONCE
Status: COMPLETED | OUTPATIENT
Start: 2023-07-21 | End: 2023-07-24

## 2023-07-21 NOTE — PROGRESS NOTES
Josepha Severe, D.O. [facility]  [address], [address] [place] [address]  Phone: [phone]  Fax: [phone]    7/28/2023    Chief Complaint   Patient presents with    Knee Pain     Left knee injection       Last injection:4/11/23  Taking anticoagulants/antiplatelets: No  Diabetic: No  Febrile/active infection: No    Ambulatory Procedure Time Out  Correct Patient: Yes  Correct Procedure: Yes  Correct Site/Side: Yes  Correct Site(s) Marked: Yes  Informed Consent Signed: Yes  Allergies Verified: Yes  Staff Present & Credential[de-identified] Violeta FELDER/    After explaining the indications, risks, benefits and alternatives of a left knee joint injection, the patient agreed to proceed. A permit was signed and scanned into the chart. The skin on the lateral knee was prepared with chloraprep. Using sterile technique, a 22 gauge, 1.5\" needle with 1 cc of Kenalog 40mg/cc and 8 cc of 1% lidocaine was directed to the knee joint using US guidance. After negative aspiration, the medication was injected. Adequate hemostasis was achieved and a bandage applied. The patient tolerated the procedure well and was educated in post injection care. The patient was clinically monitored after the injection and left the office without incident. There was post injection reduction in pain. Ultrasound images are scanned separately into the EMR. Josepha Severe, D.O., P.T.   Board Certified Physical Medicine and Rehabilitation  Board Certified Electrodiagnostic Medicine    Administrations This Visit       lidocaine 1 % injection 7 mL       Admin Date  07/24/2023  08:28 Action  Given Dose  7 mL Route  Other Site   Administered By  Linda Weiss MA    Ordering Provider: Araseli Hollis DO    [address]: [id number]    Lot#: [id number]    [id number]: [facility]    Patient Supplied?: No              triamcinolone acetonide (KENALOG-40) injection 40 mg       Admin

## 2023-07-24 RX ADMIN — TRIAMCINOLONE ACETONIDE 40 MG: 40 INJECTION, SUSPENSION INTRA-ARTICULAR; INTRAMUSCULAR at 08:28

## 2023-07-24 RX ADMIN — LIDOCAINE HYDROCHLORIDE 7 ML: 10 INJECTION, SOLUTION INFILTRATION; PERINEURAL at 08:28

## 2023-07-30 DIAGNOSIS — K28.9 MARGINAL ULCER: ICD-10-CM

## 2023-07-30 DIAGNOSIS — Z98.84 S/P GASTRIC BYPASS: ICD-10-CM

## 2023-07-31 DIAGNOSIS — Z98.84 S/P GASTRIC BYPASS: ICD-10-CM

## 2023-07-31 DIAGNOSIS — K28.9 MARGINAL ULCER: ICD-10-CM

## 2023-07-31 RX ORDER — ONDANSETRON 4 MG/1
4 TABLET, ORALLY DISINTEGRATING ORAL EVERY 8 HOURS PRN
Qty: 30 TABLET | Refills: 1 | Status: SHIPPED | OUTPATIENT
Start: 2023-07-31

## 2023-07-31 RX ORDER — OMEPRAZOLE 20 MG/1
CAPSULE, DELAYED RELEASE ORAL
Qty: 90 CAPSULE | Refills: 2 | Status: SHIPPED | OUTPATIENT
Start: 2023-07-31

## 2023-07-31 RX ORDER — OMEPRAZOLE 20 MG/1
20 CAPSULE, DELAYED RELEASE ORAL DAILY
Qty: 90 CAPSULE | Refills: 2 | OUTPATIENT
Start: 2023-07-31

## 2023-08-02 ENCOUNTER — TELEPHONE (OUTPATIENT)
Dept: PHYSICAL MEDICINE AND REHAB | Age: 48
End: 2023-08-02

## 2023-08-02 NOTE — TELEPHONE ENCOUNTER
Called patient for follow up phone call post left knee injection and she received 80%effectiveness with no side effects of steroid

## 2023-09-04 DIAGNOSIS — E55.9 VITAMIN D DEFICIENCY: ICD-10-CM

## 2023-09-05 RX ORDER — ERGOCALCIFEROL 1.25 MG/1
CAPSULE ORAL
Qty: 12 CAPSULE | Refills: 1 | Status: SHIPPED | OUTPATIENT
Start: 2023-09-05

## 2023-09-07 ENCOUNTER — OFFICE VISIT (OUTPATIENT)
Dept: FAMILY MEDICINE CLINIC | Age: 48
End: 2023-09-07
Payer: MEDICARE

## 2023-09-07 VITALS
DIASTOLIC BLOOD PRESSURE: 68 MMHG | TEMPERATURE: 97.2 F | RESPIRATION RATE: 18 BRPM | HEIGHT: 66 IN | WEIGHT: 223 LBS | HEART RATE: 72 BPM | BODY MASS INDEX: 35.84 KG/M2 | OXYGEN SATURATION: 98 % | SYSTOLIC BLOOD PRESSURE: 118 MMHG

## 2023-09-07 DIAGNOSIS — Z98.84 S/P GASTRIC BYPASS: Primary | ICD-10-CM

## 2023-09-07 DIAGNOSIS — Z28.21 VACCINATION NOT CARRIED OUT BECAUSE OF PATIENT REFUSAL: ICD-10-CM

## 2023-09-07 DIAGNOSIS — L98.9 GENERALIZED SKIN LESIONS: ICD-10-CM

## 2023-09-07 PROCEDURE — 99214 OFFICE O/P EST MOD 30 MIN: CPT | Performed by: STUDENT IN AN ORGANIZED HEALTH CARE EDUCATION/TRAINING PROGRAM

## 2023-09-07 PROCEDURE — G8417 CALC BMI ABV UP PARAM F/U: HCPCS | Performed by: STUDENT IN AN ORGANIZED HEALTH CARE EDUCATION/TRAINING PROGRAM

## 2023-09-07 PROCEDURE — 3017F COLORECTAL CA SCREEN DOC REV: CPT | Performed by: STUDENT IN AN ORGANIZED HEALTH CARE EDUCATION/TRAINING PROGRAM

## 2023-09-07 PROCEDURE — 4004F PT TOBACCO SCREEN RCVD TLK: CPT | Performed by: STUDENT IN AN ORGANIZED HEALTH CARE EDUCATION/TRAINING PROGRAM

## 2023-09-07 PROCEDURE — G8427 DOCREV CUR MEDS BY ELIG CLIN: HCPCS | Performed by: STUDENT IN AN ORGANIZED HEALTH CARE EDUCATION/TRAINING PROGRAM

## 2023-09-07 RX ORDER — ONDANSETRON 4 MG/1
4 TABLET, ORALLY DISINTEGRATING ORAL EVERY 8 HOURS PRN
Qty: 90 TABLET | Refills: 3 | Status: SHIPPED | OUTPATIENT
Start: 2023-09-07

## 2023-09-07 ASSESSMENT — PATIENT HEALTH QUESTIONNAIRE - PHQ9
5. POOR APPETITE OR OVEREATING: 2
8. MOVING OR SPEAKING SO SLOWLY THAT OTHER PEOPLE COULD HAVE NOTICED. OR THE OPPOSITE, BEING SO FIGETY OR RESTLESS THAT YOU HAVE BEEN MOVING AROUND A LOT MORE THAN USUAL: 0
SUM OF ALL RESPONSES TO PHQ QUESTIONS 1-9: 12
6. FEELING BAD ABOUT YOURSELF - OR THAT YOU ARE A FAILURE OR HAVE LET YOURSELF OR YOUR FAMILY DOWN: 3
4. FEELING TIRED OR HAVING LITTLE ENERGY: 2
SUM OF ALL RESPONSES TO PHQ9 QUESTIONS 1 & 2: 2
1. LITTLE INTEREST OR PLEASURE IN DOING THINGS: 1
10. IF YOU CHECKED OFF ANY PROBLEMS, HOW DIFFICULT HAVE THESE PROBLEMS MADE IT FOR YOU TO DO YOUR WORK, TAKE CARE OF THINGS AT HOME, OR GET ALONG WITH OTHER PEOPLE: 1
3. TROUBLE FALLING OR STAYING ASLEEP: 2
9. THOUGHTS THAT YOU WOULD BE BETTER OFF DEAD, OR OF HURTING YOURSELF: 0
SUM OF ALL RESPONSES TO PHQ QUESTIONS 1-9: 12
SUM OF ALL RESPONSES TO PHQ QUESTIONS 1-9: 12
2. FEELING DOWN, DEPRESSED OR HOPELESS: 1
7. TROUBLE CONCENTRATING ON THINGS, SUCH AS READING THE NEWSPAPER OR WATCHING TELEVISION: 1
SUM OF ALL RESPONSES TO PHQ QUESTIONS 1-9: 12

## 2023-09-07 ASSESSMENT — ENCOUNTER SYMPTOMS
COLOR CHANGE: 1
EYE PAIN: 0
ABDOMINAL PAIN: 0
CONSTIPATION: 0
COUGH: 0
EYE REDNESS: 0
SORE THROAT: 0
RHINORRHEA: 0
DIARRHEA: 0
VOMITING: 0
SINUS PRESSURE: 0
NAUSEA: 1
SINUS PAIN: 0
SHORTNESS OF BREATH: 0
BACK PAIN: 1

## 2023-09-07 NOTE — PROGRESS NOTES
9/7/2023    HPI  Chief Complaint   Patient presents with    Eczema    Referral - General     Dermatology     Depression     Positive depression screen - sees psych       Ray Longo is a 46 y.o. female who  has a past medical history of Anemia, Arthritis, Asthma, COVID-19, Depression, GERD without esophagitis, History of blood transfusion, History of gastric bypass, Hydronephrosis with urinary obstruction due to ureteral calculus, Hypertension, Iron deficiency, Localized osteoarthritis of left knee, Lumbar spondylosis, Lymphedema, Obesity, Panic attacks, Perforated marginal ulcer, PONV (postoperative nausea and vomiting), Sleep apnea, obstructive, Vitamin D deficiency, and Zinc deficiency. Patient is here today for multiple lesions that she is found on her skin. She states that they have been there for a few months now. She has noticed them on her right shoulder the right side of her back and her right leg. She has multiple ones on her right leg. They are itchy at times. She states that yesterday the one on her right shoulder was all white and she went swimming. And when she got out of the pool it was more of a reddish-pink. She tried triple antibiotic cream which helped yesterday. Review of Systems   Constitutional:  Positive for fatigue. Negative for chills and fever. HENT:  Negative for congestion, ear pain, postnasal drip, rhinorrhea, sinus pressure, sinus pain and sore throat. Eyes:  Negative for pain and redness. Respiratory:  Negative for cough and shortness of breath. Cardiovascular:  Positive for leg swelling (2/2 lymphedema). Negative for chest pain. Gastrointestinal:  Positive for nausea (intermittently). Negative for abdominal pain, constipation, diarrhea and vomiting. Genitourinary:  Negative for difficulty urinating and dysuria. Musculoskeletal:  Positive for arthralgias and back pain. Skin:  Positive for color change. Negative for rash.    Neurological:

## 2023-09-08 DIAGNOSIS — D50.8 OTHER IRON DEFICIENCY ANEMIA: Primary | ICD-10-CM

## 2023-09-15 ENCOUNTER — HOSPITAL ENCOUNTER (OUTPATIENT)
Dept: INFUSION THERAPY | Age: 48
Discharge: HOME | End: 2023-09-15
Payer: MEDICARE

## 2023-09-15 ENCOUNTER — OFFICE VISIT (OUTPATIENT)
Dept: ONCOLOGY | Age: 48
End: 2023-09-15
Payer: MEDICARE

## 2023-09-15 VITALS
DIASTOLIC BLOOD PRESSURE: 85 MMHG | WEIGHT: 221.7 LBS | HEART RATE: 67 BPM | OXYGEN SATURATION: 96 % | BODY MASS INDEX: 35.63 KG/M2 | HEIGHT: 66 IN | TEMPERATURE: 97.9 F | SYSTOLIC BLOOD PRESSURE: 143 MMHG

## 2023-09-15 DIAGNOSIS — D50.8 OTHER IRON DEFICIENCY ANEMIA: ICD-10-CM

## 2023-09-15 DIAGNOSIS — D50.8 OTHER IRON DEFICIENCY ANEMIA: Primary | ICD-10-CM

## 2023-09-15 LAB
ALBUMIN SERPL-MCNC: 3.8 G/DL (ref 3.5–5.2)
ALP SERPL-CCNC: 100 U/L (ref 35–104)
ALT SERPL-CCNC: 9 U/L (ref 0–32)
ANION GAP SERPL CALCULATED.3IONS-SCNC: 9 MMOL/L (ref 7–16)
AST SERPL-CCNC: 15 U/L (ref 0–31)
BASOPHILS # BLD: 0.07 K/UL (ref 0–0.2)
BASOPHILS NFR BLD: 1 % (ref 0–2)
BILIRUB SERPL-MCNC: 0.4 MG/DL (ref 0–1.2)
BUN SERPL-MCNC: 12 MG/DL (ref 6–20)
CALCIUM SERPL-MCNC: 8.5 MG/DL (ref 8.6–10.2)
CHLORIDE SERPL-SCNC: 102 MMOL/L (ref 98–107)
CO2 SERPL-SCNC: 26 MMOL/L (ref 22–29)
CREAT SERPL-MCNC: 0.8 MG/DL (ref 0.5–1)
EOSINOPHIL # BLD: 0.18 K/UL (ref 0.05–0.5)
EOSINOPHILS RELATIVE PERCENT: 2 % (ref 0–6)
ERYTHROCYTE [DISTWIDTH] IN BLOOD BY AUTOMATED COUNT: 14.3 % (ref 11.5–15)
FERRITIN SERPL-MCNC: 22 NG/ML
GFR SERPL CREATININE-BSD FRML MDRD: >60 ML/MIN/1.73M2
GLUCOSE SERPL-MCNC: 84 MG/DL (ref 74–99)
HCT VFR BLD AUTO: 41.9 % (ref 34–48)
HGB BLD-MCNC: 13.5 G/DL (ref 11.5–15.5)
IMM GRANULOCYTES # BLD AUTO: <0.03 K/UL (ref 0–0.58)
IMM GRANULOCYTES NFR BLD: 0 % (ref 0–5)
IRON SATN MFR SERPL: 48 % (ref 15–50)
IRON SERPL-MCNC: 186 UG/DL (ref 37–145)
LYMPHOCYTES NFR BLD: 2.09 K/UL (ref 1.5–4)
LYMPHOCYTES RELATIVE PERCENT: 24 % (ref 20–42)
MCH RBC QN AUTO: 30.8 PG (ref 26–35)
MCHC RBC AUTO-ENTMCNC: 32.2 G/DL (ref 32–34.5)
MCV RBC AUTO: 95.7 FL (ref 80–99.9)
MONOCYTES NFR BLD: 0.53 K/UL (ref 0.1–0.95)
MONOCYTES NFR BLD: 6 % (ref 2–12)
NEUTROPHILS NFR BLD: 66 % (ref 43–80)
NEUTS SEG NFR BLD: 5.71 K/UL (ref 1.8–7.3)
PLATELET # BLD AUTO: 422 K/UL (ref 130–450)
PMV BLD AUTO: 8.3 FL (ref 7–12)
POTASSIUM SERPL-SCNC: 4.5 MMOL/L (ref 3.5–5)
PROT SERPL-MCNC: 6.6 G/DL (ref 6.4–8.3)
RBC # BLD AUTO: 4.38 M/UL (ref 3.5–5.5)
SODIUM SERPL-SCNC: 137 MMOL/L (ref 132–146)
TIBC SERPL-MCNC: 384 UG/DL (ref 250–450)
WBC OTHER # BLD: 8.6 K/UL (ref 4.5–11.5)

## 2023-09-15 PROCEDURE — 36415 COLL VENOUS BLD VENIPUNCTURE: CPT

## 2023-09-15 PROCEDURE — 3017F COLORECTAL CA SCREEN DOC REV: CPT | Performed by: INTERNAL MEDICINE

## 2023-09-15 PROCEDURE — 99213 OFFICE O/P EST LOW 20 MIN: CPT | Performed by: INTERNAL MEDICINE

## 2023-09-15 PROCEDURE — 82728 ASSAY OF FERRITIN: CPT

## 2023-09-15 PROCEDURE — 83550 IRON BINDING TEST: CPT

## 2023-09-15 PROCEDURE — 83540 ASSAY OF IRON: CPT

## 2023-09-15 PROCEDURE — G8427 DOCREV CUR MEDS BY ELIG CLIN: HCPCS | Performed by: INTERNAL MEDICINE

## 2023-09-15 PROCEDURE — 4004F PT TOBACCO SCREEN RCVD TLK: CPT | Performed by: INTERNAL MEDICINE

## 2023-09-15 PROCEDURE — 85025 COMPLETE CBC W/AUTO DIFF WBC: CPT

## 2023-09-15 PROCEDURE — 80053 COMPREHEN METABOLIC PANEL: CPT

## 2023-09-15 PROCEDURE — G8417 CALC BMI ABV UP PARAM F/U: HCPCS | Performed by: INTERNAL MEDICINE

## 2023-09-15 NOTE — PROGRESS NOTES
Patient provided with discharge instructions, received printed AVS.  All questions answered. Patient understands follow up plan of care.
History of gastric bypass     Hydronephrosis with urinary obstruction due to ureteral calculus     Hypertension     No meds following sustained weight loss after bariatric surgery. Iron deficiency 08/01/2018    Localized osteoarthritis of left knee 01/20/2020    Lumbar spondylosis 01/26/2018    Lymphedema     Obesity     Panic attacks     Perforated marginal ulcer 10/29/2018    ~1 year after RnYGB    PONV (postoperative nausea and vomiting)     Sleep apnea, obstructive     Off PAP therapy BG following sustained weight loss after bariatric surgery. Vitamin D deficiency 07/27/2018    Zinc deficiency 08/01/2018     Patient Active Problem List   Diagnosis    Tobacco abuse    Panic attacks    AYALA on CPAP    Morbid obesity (HCC)    GERD without esophagitis    S/P gastric bypass    Primary osteoarthritis of right knee    Vitamin D deficiency    Zinc deficiency    Iron deficiency    Thrombocytosis    Microcytic anemia    Red blood cell antibody positive    Anxiety and depression    Localized osteoarthritis of left knee    Chronic pain syndrome    Chronic bilateral low back pain without sciatica    Lumbosacral spondylosis without myelopathy    Other dysphagia    Perforated abdominal viscus    Chronic abdominal pain    Lymphedema    Elevated LFTs    Uncomplicated asthma    COPD exacerbation ([address])        Past Surgical History:      Procedure Laterality Date    APPENDECTOMY  1996    CHOLECYSTECTOMY, LAPAROSCOPIC N/A 07/27/2019    CHOLECYSTECTOMY LAPAROSCOPIC performed by Jennie Rivera MD at [address] [facility] N/A 10/27/2021    COLONOSCOPY POLYPECTOMY SNARE/COLD BIOPSY performed by Tommy Metcalf MD at [facility] [facility]  10/23/2019    HERNIA REPAIR      HYSTERECTOMY (CERVIX STATUS UNKNOWN)  01/22/2020    [facility]    LAPAROSCOPY N/A 06/27/2020    DIAGNOSTIC  LAPAROSCOPY, REPAIR PERFORATED MARGINAL ULCER, UPPER ENDOSCOPY.  [facility] PATCH performed by Jaret Reyes MD at [address]

## 2023-10-16 ENCOUNTER — TELEPHONE (OUTPATIENT)
Dept: FAMILY MEDICINE CLINIC | Age: 48
End: 2023-10-16

## 2023-10-16 ENCOUNTER — OFFICE VISIT (OUTPATIENT)
Dept: FAMILY MEDICINE CLINIC | Age: 48
End: 2023-10-16
Payer: MEDICARE

## 2023-10-16 VITALS
WEIGHT: 226 LBS | RESPIRATION RATE: 18 BRPM | HEIGHT: 66 IN | SYSTOLIC BLOOD PRESSURE: 122 MMHG | DIASTOLIC BLOOD PRESSURE: 70 MMHG | HEART RATE: 96 BPM | TEMPERATURE: 97.6 F | BODY MASS INDEX: 36.32 KG/M2 | OXYGEN SATURATION: 97 %

## 2023-10-16 DIAGNOSIS — M25.512 ACUTE PAIN OF LEFT SHOULDER: Primary | ICD-10-CM

## 2023-10-16 PROCEDURE — G8427 DOCREV CUR MEDS BY ELIG CLIN: HCPCS | Performed by: PHYSICIAN ASSISTANT

## 2023-10-16 PROCEDURE — 4004F PT TOBACCO SCREEN RCVD TLK: CPT | Performed by: PHYSICIAN ASSISTANT

## 2023-10-16 PROCEDURE — 99204 OFFICE O/P NEW MOD 45 MIN: CPT | Performed by: PHYSICIAN ASSISTANT

## 2023-10-16 PROCEDURE — G8417 CALC BMI ABV UP PARAM F/U: HCPCS | Performed by: PHYSICIAN ASSISTANT

## 2023-10-16 PROCEDURE — 3017F COLORECTAL CA SCREEN DOC REV: CPT | Performed by: PHYSICIAN ASSISTANT

## 2023-10-16 PROCEDURE — G8484 FLU IMMUNIZE NO ADMIN: HCPCS | Performed by: PHYSICIAN ASSISTANT

## 2023-10-16 RX ORDER — TIZANIDINE 2 MG/1
2 TABLET ORAL 4 TIMES DAILY PRN
Qty: 12 TABLET | Refills: 0 | Status: SHIPPED | OUTPATIENT
Start: 2023-10-16

## 2023-10-16 NOTE — TELEPHONE ENCOUNTER
Pt called in stating she has had left shoulder pain x 1 day. Pt stated she is having left shoulder/arm pain with movement and touch. Pt stated it is sharp in nature. Pt denies SOB,chest pain, and palpitation pt stated she will go to walk-in clinic for evaluation.  Please advise

## 2023-10-16 NOTE — PROGRESS NOTES
patient had the same response with flexion and extension. The patient had no pain to the clavicular area or to the scapula. No pain to the mid humerus or to the left elbow. The pulses intact at radius 2+. No cyanosis or mottling. No edema of the left upper extremity. Neurological: A&O x4, normal speech      Testing:           Medical Decision Making:     Vital signs reviewed    Past medical history reviewed. Allergies reviewed. Medications reviewed. Patient on arrival does not appear to be in any apparent distress or discomfort. The patient has been seen and evaluated. The patient does not appear to be toxic or lethargic. The patient was sent for x-rays of the left shoulder. X-ray showed degenerative changes, no evidence of acute fracture. We will treat the patient with a sling here and muscle relaxants for home. The patient had referral placed to orthopedics. The patient is to return to express care or go directly to the emergency department should any of the signs or symptoms worsen. The patient is to followup with primary care physician in 2-3 days for repeat evaluation. The patient has no other questions or concerns at this time the patient will be discharged home. Clinical Impression:   Geetha Beckwith was seen today for shoulder pain. Diagnoses and all orders for this visit:    Acute pain of left shoulder  -     XR SHOULDER LEFT (MIN 2 VIEWS); Future  -     Matt Velarde MD, Sports Medicine, [facility]    Other orders  -     tiZANidine (ZANAFLEX) 2 MG tablet; Take 1 tablet by mouth 4 times daily as needed (shoulder pain)        The patient is to call for any concerns or return if any of the signs or symptoms worsen. The patient is to follow-up with PCP in the next 2-3 days for repeat evaluation repeat assessment or go directly to the emergency department.      SIGNATURE: Araceli Simmonds III, PA-C

## 2023-11-14 ENCOUNTER — OFFICE VISIT (OUTPATIENT)
Dept: BARIATRICS/WEIGHT MGMT | Age: 48
End: 2023-11-14
Payer: MEDICARE

## 2023-11-14 VITALS
BODY MASS INDEX: 36.48 KG/M2 | SYSTOLIC BLOOD PRESSURE: 124 MMHG | HEART RATE: 78 BPM | RESPIRATION RATE: 20 BRPM | TEMPERATURE: 96.9 F | HEIGHT: 66 IN | DIASTOLIC BLOOD PRESSURE: 66 MMHG | WEIGHT: 227 LBS

## 2023-11-14 DIAGNOSIS — E66.09 CLASS 1 OBESITY DUE TO EXCESS CALORIES WITHOUT SERIOUS COMORBIDITY WITH BODY MASS INDEX (BMI) OF 31.0 TO 31.9 IN ADULT: ICD-10-CM

## 2023-11-14 DIAGNOSIS — K91.2 MALNUTRITION FOLLOWING GASTROINTESTINAL SURGERY: Primary | ICD-10-CM

## 2023-11-14 PROCEDURE — 4004F PT TOBACCO SCREEN RCVD TLK: CPT | Performed by: NURSE PRACTITIONER

## 2023-11-14 PROCEDURE — 3017F COLORECTAL CA SCREEN DOC REV: CPT | Performed by: NURSE PRACTITIONER

## 2023-11-14 PROCEDURE — G8427 DOCREV CUR MEDS BY ELIG CLIN: HCPCS | Performed by: NURSE PRACTITIONER

## 2023-11-14 PROCEDURE — G8417 CALC BMI ABV UP PARAM F/U: HCPCS | Performed by: NURSE PRACTITIONER

## 2023-11-14 PROCEDURE — 99214 OFFICE O/P EST MOD 30 MIN: CPT | Performed by: NURSE PRACTITIONER

## 2023-11-14 PROCEDURE — 99211 OFF/OP EST MAY X REQ PHY/QHP: CPT

## 2023-11-14 PROCEDURE — G8484 FLU IMMUNIZE NO ADMIN: HCPCS | Performed by: NURSE PRACTITIONER

## 2023-11-14 RX ORDER — PHENTERMINE HYDROCHLORIDE 37.5 MG/1
37.5 TABLET ORAL
Qty: 30 TABLET | Refills: 0 | Status: SHIPPED | OUTPATIENT
Start: 2023-11-14 | End: 2023-12-14

## 2023-11-14 NOTE — PATIENT INSTRUCTIONS
Take 1/2 tab daily for 5-7 days, then increase to 1 full tab as needed    Patient instructed on proper administration of medication. Patient was educated on negative side effects such as increased heart rate, elevated blood pressure, dry mouth, insomnia, constipation and nervousness. Patient verbalized understanding and will stop this medication right away if they experience any of these symptoms. Please continue to take your vitamin and mineral supplements as instructed. If you received a blood work prescription today for laboratory monitoring due prior to your next routine follow-up visit, please have this blood work obtained 10 to 14 days prior to your next visit. It is important to fast for 12 hours prior to routine weight loss surgery blood work, EXCEPT for drinking water, to ensure accuracy of results. Please report nausea, vomiting, abdominal pain, or any other problems you experience to your surgeon. For problems related to weight loss surgery, it is best to go to [facility] Emergency Department and have your surgeon paged.     Last Surgical Weight Loss:      11/14/2023     1:02 PM   Surgical Weight Loss Tracker   Consult Date 7/20/2016   Initial Height 5' 6\"   Initial Weight 460 lb   Initial BMI 74.24   Ideal Body Weight 159 lb   Pre-Surgical Height 5' 6\"   Pre-Surgical Weight 471 lb   Pre Surgery BMI 76.01   Weight to Lose 312 lb   Date 11/14/2023   Height 5' 6\"   Weight 227 lb   BMI 36.63   Weight Change -244 lb   Total Weight Change -244 lb   % EBWL 78%

## 2023-11-14 NOTE — PROGRESS NOTES
comments:     3/4 pack per day   Vaping Use    Vaping Use: Never used   Substance and Sexual Activity    Alcohol use: Not Currently     Alcohol/week: 6.0 standard drinks of alcohol     Types: 6 Standard drinks or equivalent per week     Comment: social    Drug use: No    Sexual activity: Yes     Partners: Male     Birth control/protection: Surgical   Other Topics Concern    Not on file   Social History Narrative    Not on file     Social Determinants of Health     Financial Resource Strain: Low Risk  (3/23/2023)    Overall Financial Resource Strain (CARDIA)     Difficulty of Paying Living Expenses: Not hard at all   Food Insecurity: No Food Insecurity (3/23/2023)    Hunger Vital Sign     Worried About Running Out of Food in the Last Year: Never true     Ran Out of Food in the Last Year: Never true   Transportation Needs: Unknown (3/23/2023)    PRAPARE - Transportation     Lack of Transportation (Medical): Not on file     Lack of Transportation (Non-Medical): No   Physical Activity: Not on file   Stress: Not on file   Social Connections: Not on file   Intimate Partner Violence: Not on file   Housing Stability: Unknown (3/23/2023)    Housing Stability Vital Sign     Unable to Pay for Housing in the Last Year: Not on file     Number of Places Lived in the Last Year: Not on file     Unstable Housing in the Last Year: No       A complete 10 system review was performed and are otherwise negative unless mentioned in the above HPI. Specific negatives are listed below but may not include all those reviewed.     General ROS: negative obtundation, AMS  ENT ROS: negative rhinorrhea, epistaxis  Allergy and Immunology ROS: negative itchy/watery eyes or nasal congestion  Hematological and Lymphatic ROS: negative spontaneous bleeding or bruising  Endocrine ROS: negative  lethargy, mood swings, palpitations or polydipsia/polyuria  Respiratory ROS: negative sputum changes, stridor, tachypnea or wheezing  Cardiovascular ROS: negative

## 2023-12-05 ENCOUNTER — OFFICE VISIT (OUTPATIENT)
Dept: PHYSICAL MEDICINE AND REHAB | Age: 48
End: 2023-12-05
Payer: MEDICARE

## 2023-12-05 ENCOUNTER — OFFICE VISIT (OUTPATIENT)
Dept: PRIMARY CARE CLINIC | Age: 48
End: 2023-12-05
Payer: MEDICARE

## 2023-12-05 VITALS
OXYGEN SATURATION: 95 % | RESPIRATION RATE: 16 BRPM | BODY MASS INDEX: 36.48 KG/M2 | TEMPERATURE: 97.8 F | SYSTOLIC BLOOD PRESSURE: 109 MMHG | HEART RATE: 71 BPM | DIASTOLIC BLOOD PRESSURE: 81 MMHG | WEIGHT: 227 LBS | HEIGHT: 66 IN

## 2023-12-05 VITALS
SYSTOLIC BLOOD PRESSURE: 131 MMHG | BODY MASS INDEX: 36.64 KG/M2 | DIASTOLIC BLOOD PRESSURE: 82 MMHG | HEART RATE: 75 BPM | HEIGHT: 66 IN

## 2023-12-05 DIAGNOSIS — J01.00 ACUTE NON-RECURRENT MAXILLARY SINUSITIS: Primary | ICD-10-CM

## 2023-12-05 DIAGNOSIS — M17.0 PRIMARY OSTEOARTHRITIS OF BOTH KNEES: Primary | ICD-10-CM

## 2023-12-05 DIAGNOSIS — M79.609 PAIN IN EXTREMITY, UNSPECIFIED EXTREMITY: ICD-10-CM

## 2023-12-05 DIAGNOSIS — E66.01 SEVERE OBESITY (BMI 35.0-39.9) WITH COMORBIDITY (HCC): ICD-10-CM

## 2023-12-05 PROCEDURE — 99214 OFFICE O/P EST MOD 30 MIN: CPT | Performed by: PHYSICAL MEDICINE & REHABILITATION

## 2023-12-05 PROCEDURE — G8417 CALC BMI ABV UP PARAM F/U: HCPCS | Performed by: PHYSICAL MEDICINE & REHABILITATION

## 2023-12-05 PROCEDURE — 3017F COLORECTAL CA SCREEN DOC REV: CPT | Performed by: PHYSICAL MEDICINE & REHABILITATION

## 2023-12-05 PROCEDURE — G8484 FLU IMMUNIZE NO ADMIN: HCPCS | Performed by: PHYSICAL MEDICINE & REHABILITATION

## 2023-12-05 PROCEDURE — G8427 DOCREV CUR MEDS BY ELIG CLIN: HCPCS | Performed by: NURSE PRACTITIONER

## 2023-12-05 PROCEDURE — G8427 DOCREV CUR MEDS BY ELIG CLIN: HCPCS | Performed by: PHYSICAL MEDICINE & REHABILITATION

## 2023-12-05 PROCEDURE — G8417 CALC BMI ABV UP PARAM F/U: HCPCS | Performed by: NURSE PRACTITIONER

## 2023-12-05 PROCEDURE — 20611 DRAIN/INJ JOINT/BURSA W/US: CPT | Performed by: PHYSICAL MEDICINE & REHABILITATION

## 2023-12-05 PROCEDURE — 3017F COLORECTAL CA SCREEN DOC REV: CPT | Performed by: NURSE PRACTITIONER

## 2023-12-05 PROCEDURE — 4004F PT TOBACCO SCREEN RCVD TLK: CPT | Performed by: PHYSICAL MEDICINE & REHABILITATION

## 2023-12-05 PROCEDURE — 4004F PT TOBACCO SCREEN RCVD TLK: CPT | Performed by: NURSE PRACTITIONER

## 2023-12-05 PROCEDURE — 99213 OFFICE O/P EST LOW 20 MIN: CPT | Performed by: NURSE PRACTITIONER

## 2023-12-05 PROCEDURE — G8484 FLU IMMUNIZE NO ADMIN: HCPCS | Performed by: NURSE PRACTITIONER

## 2023-12-05 RX ORDER — LIDOCAINE HYDROCHLORIDE 10 MG/ML
8 INJECTION, SOLUTION EPIDURAL; INFILTRATION; INTRACAUDAL; PERINEURAL ONCE
Status: COMPLETED | OUTPATIENT
Start: 2023-12-05 | End: 2023-12-05

## 2023-12-05 RX ORDER — TRAMADOL HYDROCHLORIDE 50 MG/1
50 TABLET ORAL PRN
COMMUNITY

## 2023-12-05 RX ORDER — TRIAMCINOLONE ACETONIDE 40 MG/ML
40 INJECTION, SUSPENSION INTRA-ARTICULAR; INTRAMUSCULAR ONCE
Status: COMPLETED | OUTPATIENT
Start: 2023-12-05 | End: 2023-12-05

## 2023-12-05 RX ORDER — DOXYCYCLINE HYCLATE 100 MG/1
100 CAPSULE ORAL 2 TIMES DAILY
Qty: 20 CAPSULE | Refills: 0 | Status: SHIPPED | OUTPATIENT
Start: 2023-12-05 | End: 2023-12-15

## 2023-12-05 RX ADMIN — TRIAMCINOLONE ACETONIDE 40 MG: 40 INJECTION, SUSPENSION INTRA-ARTICULAR; INTRAMUSCULAR at 14:04

## 2023-12-05 RX ADMIN — LIDOCAINE HYDROCHLORIDE 8 ML: 10 INJECTION, SOLUTION EPIDURAL; INFILTRATION; INTRACAUDAL; PERINEURAL at 14:04

## 2023-12-05 NOTE — PROGRESS NOTES
habits. Weight loss is recommended. The patient was educated about the diagnosis, prognosis, indications, risks and benefits of treatment. An opportunity to ask questions was given to the patient and questions were answered. The patient agreed to proceed with the recommended treatment as described above. Follow up 3 months prn    Thank you for allowing me to participate in the care of your patient. Tonny Jensen D.O., P.T. Board Certified Physical Medicine and Rehabilitation  Board Certified [address], [address] [facility]  [facility]. [address], [address] [place] [address]  Phone: [phone]  Fax: [phone]    12/5/2023    Chief Complaint   Patient presents with    Knee Pain     F/U knee pain possible injections ( [facility] )       Last injection:7/14/23  Taking anticoagulants/antiplatelets: No  Diabetic: No  Febrile/active infection: No    After explaining the indications, risks, benefits and alternatives of a right knee joint injection, the patient agreed to proceed. A permit was signed and scanned into the chart. The skin on the lateral knee was prepared with chloraprep. Using sterile technique, a 22 gauge, 1.5\" needle with 1 cc of Kenalog 40mg/cc and 8 cc of 1% lidocaine was directed to the knee joint using US guidance. After negative aspiration, the medication was injected. Adequate hemostasis was achieved and a bandage applied. The patient tolerated the procedure well and was educated in post injection care. The patient was clinically monitored after the injection and left the office without incident. There was post injection reduction in pain. Ultrasound images are scanned separately into the EMR. Tonny Jensen D.O., P.T.   Board Certified Physical Medicine and Rehabilitation  Board Certified Electrodiagnostic Medicine    Administrations This Visit       lidocaine PF 1 % injection 8 mL       Admin Date  12/05/2023  14:04

## 2023-12-07 ENCOUNTER — HOSPITAL ENCOUNTER (OUTPATIENT)
Age: 48
Discharge: HOME | End: 2023-12-07
Payer: MEDICARE

## 2023-12-07 DIAGNOSIS — K91.2 MALNUTRITION FOLLOWING GASTROINTESTINAL SURGERY: ICD-10-CM

## 2023-12-07 LAB
25(OH)D3 SERPL-MCNC: 49.5 NG/ML (ref 30–100)
FOLATE SERPL-MCNC: >20 NG/ML (ref 4.8–24.2)
PREALB SERPL-MCNC: 21 MG/DL (ref 20–40)
VIT B12 SERPL-MCNC: 774 PG/ML (ref 211–946)

## 2023-12-07 PROCEDURE — 84590 ASSAY OF VITAMIN A: CPT

## 2023-12-07 PROCEDURE — 84134 ASSAY OF PREALBUMIN: CPT

## 2023-12-07 PROCEDURE — 36415 COLL VENOUS BLD VENIPUNCTURE: CPT

## 2023-12-07 PROCEDURE — 82607 VITAMIN B-12: CPT

## 2023-12-07 PROCEDURE — 82306 VITAMIN D 25 HYDROXY: CPT

## 2023-12-07 PROCEDURE — 82525 ASSAY OF COPPER: CPT

## 2023-12-07 PROCEDURE — 82746 ASSAY OF FOLIC ACID SERUM: CPT

## 2023-12-07 PROCEDURE — 84425 ASSAY OF VITAMIN B-1: CPT

## 2023-12-07 PROCEDURE — 84630 ASSAY OF ZINC: CPT

## 2023-12-10 LAB
COPPER SERPL-MCNC: 131.7 UG/DL (ref 80–155)
ZINC SERPL-MCNC: 62.6 UG/DL (ref 60–120)

## 2023-12-11 LAB
RETINYL PALMITATE: <0.02 MG/L (ref 0–0.1)
VITAMIN A LEVEL: 0.56 MG/L (ref 0.3–1.2)
VITAMIN A, INTERP: NORMAL

## 2023-12-12 ENCOUNTER — TELEPHONE (OUTPATIENT)
Dept: PHYSICAL MEDICINE AND REHAB | Age: 48
End: 2023-12-12

## 2023-12-12 LAB — VIT B1 PYROPHOSHATE BLD-SCNC: 145 NMOL/L (ref 70–180)

## 2023-12-12 NOTE — TELEPHONE ENCOUNTER
Called patient for follow up phone call post right knee injection states she received 80%effectiveness with no side effects from steroid

## 2023-12-13 ENCOUNTER — OFFICE VISIT (OUTPATIENT)
Dept: PHYSICAL MEDICINE AND REHAB | Age: 48
End: 2023-12-13
Payer: MEDICARE

## 2023-12-13 VITALS
HEART RATE: 87 BPM | SYSTOLIC BLOOD PRESSURE: 113 MMHG | TEMPERATURE: 97.2 F | DIASTOLIC BLOOD PRESSURE: 82 MMHG | BODY MASS INDEX: 33.43 KG/M2 | HEIGHT: 66 IN | WEIGHT: 208 LBS

## 2023-12-13 DIAGNOSIS — M17.0 PRIMARY OSTEOARTHRITIS OF BOTH KNEES: ICD-10-CM

## 2023-12-13 DIAGNOSIS — M79.609 PAIN IN EXTREMITY, UNSPECIFIED EXTREMITY: Primary | ICD-10-CM

## 2023-12-13 PROCEDURE — 20611 DRAIN/INJ JOINT/BURSA W/US: CPT | Performed by: PHYSICAL MEDICINE & REHABILITATION

## 2023-12-13 RX ORDER — LIDOCAINE HYDROCHLORIDE 10 MG/ML
8 INJECTION, SOLUTION EPIDURAL; INFILTRATION; INTRACAUDAL; PERINEURAL ONCE
Status: COMPLETED | OUTPATIENT
Start: 2023-12-13 | End: 2023-12-13

## 2023-12-13 RX ORDER — TRIAMCINOLONE ACETONIDE 40 MG/ML
40 INJECTION, SUSPENSION INTRA-ARTICULAR; INTRAMUSCULAR ONCE
Status: COMPLETED | OUTPATIENT
Start: 2023-12-13 | End: 2023-12-13

## 2023-12-13 RX ADMIN — TRIAMCINOLONE ACETONIDE 40 MG: 40 INJECTION, SUSPENSION INTRA-ARTICULAR; INTRAMUSCULAR at 15:53

## 2023-12-13 RX ADMIN — LIDOCAINE HYDROCHLORIDE 8 ML: 10 INJECTION, SOLUTION EPIDURAL; INFILTRATION; INTRACAUDAL; PERINEURAL at 15:53

## 2023-12-13 NOTE — PROGRESS NOTES
Tonny Jensen D.O. [place] [facility]  [address] [facility]. [address], [address] [place] [address]  Phone: [phone]  Fax: [phone]    12/13/2023    Chief Complaint   Patient presents with    Knee Pain     Lt knee injection       Last injection: 7/21/23  Taking anticoagulants/antiplatelets: No  Diabetic: No  Febrile/active infection: No    Ambulatory Procedure Time Out  Correct Patient: Yes  Correct Procedure: Yes  Correct Site/Side: Yes  Correct Site(s) Marked: Yes  Informed Consent Signed: Yes  Allergies Verified: Yes  Staff Present & Credential[de-identified] Blas Farah LPN/ Dr. Tonny Jensen    Diagnosis:  1. Pain in extremity, unspecified extremity    2. Primary osteoarthritis of both knees        After explaining the indications, risks, benefits and alternatives of a left knee joint injection, the patient agreed to proceed. A permit was signed and scanned into the chart. The skin on the lateral knee was prepared with chloraprep. Using sterile technique, a 22 gauge, 1.5\" needle with 1 cc of Kenalog 40mg/cc and 8 cc of 1% lidocaine was directed to the knee joint using US guidance. After negative aspiration, the medication was injected. Adequate hemostasis was achieved and a bandage applied. The patient tolerated the procedure well and was educated in post injection care. The patient was clinically monitored after the injection and left the office without incident. There was post injection reduction in pain. Ultrasound images are scanned separately into the EMR. Tonny Jensen D.O., P.T.   Board Certified Physical Medicine and Rehabilitation  Board Certified Electrodiagnostic Medicine    Administrations This Visit       lidocaine PF 1 % injection 8 mL       Admin Date  12/13/2023  15:53 Action  Given Dose  8 mL Route  IntraDERmal Site  Other Administered By  Blas Farah LPN    Ordering Provider: Silvia Villar DO    [address]: [id number]    Lot#: [id number]    : Charmaine Villanueva

## 2023-12-14 ENCOUNTER — OFFICE VISIT (OUTPATIENT)
Dept: BARIATRICS/WEIGHT MGMT | Age: 48
End: 2023-12-14
Payer: MEDICARE

## 2023-12-14 VITALS
WEIGHT: 205 LBS | HEART RATE: 82 BPM | SYSTOLIC BLOOD PRESSURE: 131 MMHG | BODY MASS INDEX: 32.95 KG/M2 | TEMPERATURE: 97.2 F | RESPIRATION RATE: 20 BRPM | DIASTOLIC BLOOD PRESSURE: 76 MMHG | HEIGHT: 66 IN

## 2023-12-14 DIAGNOSIS — K91.2 MALNUTRITION FOLLOWING GASTROINTESTINAL SURGERY: Primary | ICD-10-CM

## 2023-12-14 DIAGNOSIS — E66.09 CLASS 1 OBESITY DUE TO EXCESS CALORIES WITHOUT SERIOUS COMORBIDITY WITH BODY MASS INDEX (BMI) OF 31.0 TO 31.9 IN ADULT: ICD-10-CM

## 2023-12-14 PROCEDURE — 4004F PT TOBACCO SCREEN RCVD TLK: CPT | Performed by: NURSE PRACTITIONER

## 2023-12-14 PROCEDURE — 99213 OFFICE O/P EST LOW 20 MIN: CPT | Performed by: NURSE PRACTITIONER

## 2023-12-14 PROCEDURE — G8484 FLU IMMUNIZE NO ADMIN: HCPCS | Performed by: NURSE PRACTITIONER

## 2023-12-14 PROCEDURE — G8427 DOCREV CUR MEDS BY ELIG CLIN: HCPCS | Performed by: NURSE PRACTITIONER

## 2023-12-14 PROCEDURE — 3017F COLORECTAL CA SCREEN DOC REV: CPT | Performed by: NURSE PRACTITIONER

## 2023-12-14 PROCEDURE — 99211 OFF/OP EST MAY X REQ PHY/QHP: CPT

## 2023-12-14 PROCEDURE — G8417 CALC BMI ABV UP PARAM F/U: HCPCS | Performed by: NURSE PRACTITIONER

## 2023-12-14 RX ORDER — PHENTERMINE HYDROCHLORIDE 37.5 MG/1
37.5 TABLET ORAL
Qty: 30 TABLET | Refills: 0 | Status: SHIPPED | OUTPATIENT
Start: 2023-12-14 | End: 2024-01-13

## 2023-12-14 NOTE — PROGRESS NOTES
Chief Complaint   Patient presents with    Weight Management       HPI:  Patient presents today for follow up of Adipex. She denies any negative side effects from the medication other than mild dry mouth. She reports that she does have good appetite suppression with this. 11/14/2023 - 227 lb  12/14/2023 - 205 lb    She has been doing keto along with this. She has not yet started an exercise program. She has been busy with holiday projects. Last Surgical Weight Loss:      11/14/2023     1:02 PM 12/14/2023     1:20 PM   Surgical Weight Loss Tracker   Consult Date 7/20/2016    Initial Height 5' 6\"    Initial Weight 460 lb    Initial BMI 74.24    Ideal Body Weight 159 lb    Pre-Surgical Height 5' 6\"    Pre-Surgical Weight 471 lb    Pre Surgery BMI 76.01    Weight to Lose 312 lb    Date 11/14/2023 12/14/2023   Height 5' 6\" 5' 6\"   Weight 227 lb 205 lb   BMI 36.63 33.08   Weight Change -244 lb -22 lb   Total Weight Change -244 lb -266 lb   % EBWL 78% 85%           Prior to Visit Medications    Medication Sig Taking? Authorizing Provider   traMADol (ULTRAM) 50 MG tablet Take 1 tablet by mouth as needed for Pain. Yes Provider, MD Leslee   phentermine (ADIPEX-P) 37.5 MG tablet Take 1 tablet by mouth every morning (before breakfast) for 30 days.  Max Daily Amount: 37.5 mg Yes Fernando Oropeza APRN - CNP   tiZANidine (ZANAFLEX) 2 MG tablet Take 1 tablet by mouth 4 times daily as needed (shoulder pain) Yes Carin Rodrigez III, PA   ondansetron (ZOFRAN-ODT) 4 MG disintegrating tablet Place 1 tablet under the tongue every 8 hours as needed for Nausea or Vomiting Yes Juanis Ruiz DO   vitamin D (ERGOCALCIFEROL) 1.25 MG (54769 UT) CAPS capsule take 1 capsule by mouth every week Yes Juanis Ruiz DO   omeprazole (PRILOSEC) 20 MG delayed release capsule take 1 capsule by mouth once daily Yes Juanis Ruiz DO   fluticasone (FLONASE) 50 MCG/ACT nasal spray 1 spray by Each Nostril route in the morning and at No

## 2023-12-14 NOTE — PATIENT INSTRUCTIONS
Please continue to take your vitamin and mineral supplements as instructed. If you received a blood work prescription today for laboratory monitoring due prior to your next routine follow-up visit, please have this blood work obtained 10 to 14 days prior to your next visit. It is important to fast for 12 hours prior to routine weight loss surgery blood work, EXCEPT for drinking water, to ensure accuracy of results. Please report nausea, vomiting, abdominal pain, or any other problems you experience to your surgeon. For problems related to weight loss surgery, it is best to go to [facility] Emergency Department and have your surgeon paged.     Last Surgical Weight Loss:      11/14/2023     1:02 PM 12/14/2023     1:20 PM   Surgical Weight Loss Tracker   Consult Date 7/20/2016    Initial Height 5' 6\"    Initial Weight 460 lb    Initial BMI 74.24    Ideal Body Weight 159 lb    Pre-Surgical Height 5' 6\"    Pre-Surgical Weight 471 lb    Pre Surgery BMI 76.01    Weight to Lose 312 lb    Date 11/14/2023 12/14/2023   Height 5' 6\" 5' 6\"   Weight 227 lb 205 lb   BMI 36.63 33.08   Weight Change -244 lb -22 lb   Total Weight Change -244 lb -266 lb   % EBWL 78% 85%

## 2024-01-11 ENCOUNTER — OFFICE VISIT (OUTPATIENT)
Dept: BARIATRICS/WEIGHT MGMT | Age: 49
End: 2024-01-11
Payer: MEDICARE

## 2024-01-11 VITALS
HEIGHT: 66 IN | RESPIRATION RATE: 20 BRPM | TEMPERATURE: 97.9 F | HEART RATE: 79 BPM | DIASTOLIC BLOOD PRESSURE: 79 MMHG | SYSTOLIC BLOOD PRESSURE: 135 MMHG | BODY MASS INDEX: 31.18 KG/M2 | WEIGHT: 194 LBS

## 2024-01-11 DIAGNOSIS — E66.09 CLASS 1 OBESITY DUE TO EXCESS CALORIES WITHOUT SERIOUS COMORBIDITY WITH BODY MASS INDEX (BMI) OF 31.0 TO 31.9 IN ADULT: Primary | ICD-10-CM

## 2024-01-11 DIAGNOSIS — K91.2 MALNUTRITION FOLLOWING GASTROINTESTINAL SURGERY: ICD-10-CM

## 2024-01-11 PROCEDURE — G8417 CALC BMI ABV UP PARAM F/U: HCPCS | Performed by: NURSE PRACTITIONER

## 2024-01-11 PROCEDURE — 3017F COLORECTAL CA SCREEN DOC REV: CPT | Performed by: NURSE PRACTITIONER

## 2024-01-11 PROCEDURE — 4004F PT TOBACCO SCREEN RCVD TLK: CPT | Performed by: NURSE PRACTITIONER

## 2024-01-11 PROCEDURE — G8484 FLU IMMUNIZE NO ADMIN: HCPCS | Performed by: NURSE PRACTITIONER

## 2024-01-11 PROCEDURE — 99213 OFFICE O/P EST LOW 20 MIN: CPT | Performed by: NURSE PRACTITIONER

## 2024-01-11 PROCEDURE — G8427 DOCREV CUR MEDS BY ELIG CLIN: HCPCS | Performed by: NURSE PRACTITIONER

## 2024-01-11 PROCEDURE — 99211 OFF/OP EST MAY X REQ PHY/QHP: CPT

## 2024-01-11 RX ORDER — PHENTERMINE HYDROCHLORIDE 37.5 MG/1
37.5 TABLET ORAL
Qty: 30 TABLET | Refills: 0 | Status: SHIPPED | OUTPATIENT
Start: 2024-01-11 | End: 2024-02-10

## 2024-01-11 ASSESSMENT — ENCOUNTER SYMPTOMS
VOMITING: 0
WHEEZING: 0
NAUSEA: 0
CONSTIPATION: 0
SHORTNESS OF BREATH: 0
COUGH: 0
DIARRHEA: 0

## 2024-01-11 NOTE — PATIENT INSTRUCTIONS
Please continue to take your vitamin and mineral supplements as instructed.      If you received a blood work prescription today for laboratory monitoring due prior to your next routine follow-up visit, please have this blood work obtained 10 to 14 days prior to your next visit.  It is important to fast for 12 hours prior to routine weight loss surgery blood work, EXCEPT for drinking water, to ensure accuracy of results.    Please report nausea, vomiting, abdominal pain, or any other problems you experience to your surgeon.  For problems related to weight loss surgery, it is best to go to [facility] Emergency Department and have your surgeon paged.    Last Surgical Weight Loss:      11/14/2023     1:02 PM 12/14/2023     1:20 PM 1/11/2024    10:35 AM   Surgical Weight Loss Tracker   Consult Date 7/20/2016     Initial Height 5' 6\"     Initial Weight 460 lb     Initial BMI 74.24     Ideal Body Weight 159 lb     Pre-Surgical Height 5' 6\"     Pre-Surgical Weight 471 lb     Pre Surgery BMI 76.01     Weight to Lose 312 lb     Date 11/14/2023 12/14/2023 1/11/2024   Height 5' 6\" 5' 6\" 5' 6\"   Weight 227 lb 205 lb 194 lb   BMI 36.63 33.08 31.31   Weight Change -244 lb -22 lb -11 lb   Total Weight Change -244 lb -266 lb -277 lb   % EBWL 78% 85% 89%

## 2024-01-11 NOTE — PROGRESS NOTES
Chief Complaint   Patient presents with    Weight Management       HPI:  Patient presents today for follow up of phentermine. She denies any negative side effects from the medication. She has been following a keto diet since November.     11/14/2023 - 227 lb  12/14/2023 - 205 lb  1/11/2024 - 194 lb    Last Surgical Weight Loss:      11/14/2023     1:02 PM 12/14/2023     1:20 PM 1/11/2024    10:35 AM   Surgical Weight Loss Tracker   Consult Date 7/20/2016     Initial Height 5' 6\"     Initial Weight 460 lb     Initial BMI 74.24     Ideal Body Weight 159 lb     Pre-Surgical Height 5' 6\"     Pre-Surgical Weight 471 lb     Pre Surgery BMI 76.01     Weight to Lose 312 lb     Date 11/14/2023 12/14/2023 1/11/2024   Height 5' 6\" 5' 6\" 5' 6\"   Weight 227 lb 205 lb 194 lb   BMI 36.63 33.08 31.31   Weight Change -244 lb -22 lb -11 lb   Total Weight Change -244 lb -266 lb -277 lb   % EBWL 78% 85% 89%       Prior to Visit Medications    Medication Sig Taking? Authorizing Provider   phentermine (ADIPEX-P) 37.5 MG tablet Take 1 tablet by mouth every morning (before breakfast) for 30 days. Max Daily Amount: 37.5 mg Yes Nasima Trujillo APRN - CNP   traMADol (ULTRAM) 50 MG tablet Take 1 tablet by mouth as needed for Pain. Yes Provider, MD Leslee   tiZANidine (ZANAFLEX) 2 MG tablet Take 1 tablet by mouth 4 times daily as needed (shoulder pain) Yes Arian Arroyo III, PA   ondansetron (ZOFRAN-ODT) 4 MG disintegrating tablet Place 1 tablet under the tongue every 8 hours as needed for Nausea or Vomiting Yes Juanis Ruiz DO   vitamin D (ERGOCALCIFEROL) 1.25 MG (44514 UT) CAPS capsule take 1 capsule by mouth every week Yes Juanis Ruiz DO   omeprazole (PRILOSEC) 20 MG delayed release capsule take 1 capsule by mouth once daily Yes Juanis Ruiz DO   fluticasone (FLONASE) 50 MCG/ACT nasal spray 1 spray by Each Nostril route in the morning and at bedtime Yes Ambrose Naik APRN - CNP   clobetasol (TEMOVATE) 0.05 %

## 2024-02-08 ENCOUNTER — OFFICE VISIT (OUTPATIENT)
Dept: BARIATRICS/WEIGHT MGMT | Age: 49
End: 2024-02-08
Payer: MEDICARE

## 2024-02-08 VITALS
HEART RATE: 99 BPM | WEIGHT: 193 LBS | DIASTOLIC BLOOD PRESSURE: 62 MMHG | SYSTOLIC BLOOD PRESSURE: 110 MMHG | BODY MASS INDEX: 31.02 KG/M2 | TEMPERATURE: 98.6 F | HEIGHT: 66 IN

## 2024-02-08 DIAGNOSIS — M25.562 CHRONIC PAIN OF BOTH KNEES: ICD-10-CM

## 2024-02-08 DIAGNOSIS — E66.09 CLASS 1 OBESITY DUE TO EXCESS CALORIES WITHOUT SERIOUS COMORBIDITY WITH BODY MASS INDEX (BMI) OF 31.0 TO 31.9 IN ADULT: Primary | ICD-10-CM

## 2024-02-08 DIAGNOSIS — M25.561 CHRONIC PAIN OF BOTH KNEES: ICD-10-CM

## 2024-02-08 DIAGNOSIS — G89.29 CHRONIC PAIN OF BOTH KNEES: ICD-10-CM

## 2024-02-08 PROCEDURE — G8484 FLU IMMUNIZE NO ADMIN: HCPCS | Performed by: NURSE PRACTITIONER

## 2024-02-08 PROCEDURE — 4004F PT TOBACCO SCREEN RCVD TLK: CPT | Performed by: NURSE PRACTITIONER

## 2024-02-08 PROCEDURE — 99213 OFFICE O/P EST LOW 20 MIN: CPT | Performed by: NURSE PRACTITIONER

## 2024-02-08 PROCEDURE — G8417 CALC BMI ABV UP PARAM F/U: HCPCS | Performed by: NURSE PRACTITIONER

## 2024-02-08 PROCEDURE — 3017F COLORECTAL CA SCREEN DOC REV: CPT | Performed by: NURSE PRACTITIONER

## 2024-02-08 PROCEDURE — G8427 DOCREV CUR MEDS BY ELIG CLIN: HCPCS | Performed by: NURSE PRACTITIONER

## 2024-02-08 RX ORDER — PHENTERMINE HYDROCHLORIDE 37.5 MG/1
37.5 TABLET ORAL
Qty: 30 TABLET | Refills: 0 | Status: SHIPPED | OUTPATIENT
Start: 2024-02-08 | End: 2024-03-09

## 2024-02-08 ASSESSMENT — ENCOUNTER SYMPTOMS
COUGH: 0
NAUSEA: 0
SHORTNESS OF BREATH: 0
DIARRHEA: 0
CONSTIPATION: 0
WHEEZING: 0
VOMITING: 0

## 2024-02-08 NOTE — PATIENT INSTRUCTIONS
Please continue to take your vitamin and mineral supplements as instructed.      If you received a blood work prescription today for laboratory monitoring due prior to your next routine follow-up visit, please have this blood work obtained 10 to 14 days prior to your next visit.  It is important to fast for 12 hours prior to routine weight loss surgery blood work, EXCEPT for drinking water, to ensure accuracy of results.    Please report nausea, vomiting, abdominal pain, or any other problems you experience to your surgeon.  For problems related to weight loss surgery, it is best to go to [facility] Emergency Department and have your surgeon paged.    Last Surgical Weight Loss:      11/14/2023     1:02 PM 12/14/2023     1:20 PM 1/11/2024    10:35 AM   Surgical Weight Loss Tracker   Consult Date 7/20/2016     Initial Height 5' 6\"     Initial Weight 460 lb     Initial BMI 74.24     Ideal Body Weight 159 lb     Pre-Surgical Height 5' 6\"     Pre-Surgical Weight 471 lb     Pre Surgery BMI 76.01     Weight to Lose 312 lb     Date 11/14/2023 12/14/2023 1/11/2024   Height 5' 6\" 5' 6\" 5' 6\"   Weight 227 lb 205 lb 194 lb   BMI 36.63 33.08 31.31   Weight Change -244 lb -22 lb -11 lb   Total Weight Change -244 lb -266 lb -277 lb   % EBWL 78% 85% 89%

## 2024-02-08 NOTE — PROGRESS NOTES
Chief Complaint   Patient presents with    Medication Check     Phentermine 1 mo       HPI:  Patient presents today for follow up of phentermine. She reports that she is still on keto. No increased physical activity. She denies negative side effects from the medication. She has a history of a broken back, makes it hard for her to exercise. She also has bilateral knee pain and gets steroids injections as well.     11/14/2023 - 227 lb  12/14/2023 - 205 lb  1/11/2024 - 194 lb  2/8/2024 - 194 lb      Last Surgical Weight Loss:      11/14/2023     1:02 PM 12/14/2023     1:20 PM 1/11/2024    10:35 AM 2/8/2024     1:18 PM   Surgical Weight Loss Tracker   Consult Date 7/20/2016      Initial Height 5' 6\"      Initial Weight 460 lb      Initial BMI 74.24      Ideal Body Weight 159 lb      Pre-Surgical Height 5' 6\"      Pre-Surgical Weight 471 lb      Pre Surgery BMI 76.01      Weight to Lose 312 lb      Date 11/14/2023 12/14/2023 1/11/2024 2/8/2024   Height 5' 6\" 5' 6\" 5' 6\" 5' 5.984\"   Weight 227 lb 205 lb 194 lb 194 lb   BMI 36.63 33.08 31.31 31.32   Weight Change -244 lb -22 lb -11 lb 0 oz   Total Weight Change -244 lb -266 lb -277 lb -277 lb   % EBWL 78% 85% 89% 89%       Prior to Visit Medications    Medication Sig Taking? Authorizing Provider   phentermine (ADIPEX-P) 37.5 MG tablet Take 1 tablet by mouth every morning (before breakfast) for 30 days. Max Daily Amount: 37.5 mg Yes Nasima Trujillo APRN - CNP   phentermine (ADIPEX-P) 37.5 MG tablet Take 1 tablet by mouth every morning (before breakfast) for 30 days. Max Daily Amount: 37.5 mg Yes Nasima Trujillo APRN - CNP   traMADol (ULTRAM) 50 MG tablet Take 1 tablet by mouth as needed for Pain. Yes Provider, MD Leslee   tiZANidine (ZANAFLEX) 2 MG tablet Take 1 tablet by mouth 4 times daily as needed (shoulder pain) Yes Arian Arroyo III, PA   ondansetron (ZOFRAN-ODT) 4 MG disintegrating tablet Place 1 tablet under the tongue every 8 hours as needed for

## 2024-03-04 NOTE — PROGRESS NOTES
Elvie Joe D.O.  [facility]  [address] [facility] [place] [address]  Phone: [phone]  Fax: [phone]    3/20/2024    Chief Complaint   Patient presents with    Knee Pain     RT knee     Needs refill tramadol.  Taking one pill 2-3 times a week. Quantity of 60 is lasting her 3 months.     Last injection: 12/13/23  Taking anticoagulants/antiplatelets: No  Diabetic: No  Febrile/active infection: No    Ambulatory Procedure Time Out  Correct Patient: Yes  Correct Procedure: Yes  Correct Site/Side: Yes  Correct Site(s) Marked: Yes  Informed Consent Signed: Yes  Allergies Verified: Yes  Staff Present & Credential:: Mackenzie Wolf LPN/ Dr. Elvie Joe    Diagnosis:  1. Pain in extremity, unspecified extremity    2. Primary osteoarthritis of right knee    3. Primary osteoarthritis of both knees          After explaining the indications, risks, benefits and alternatives of a left knee joint injection, the patient agreed to proceed.  A permit was signed and scanned into the chart. The skin on the lateral knee was prepared with chloraprep. Using sterile technique, a 22 gauge, 1.5\" needle with 1 cc of Kenalog 40mg/cc and 8 cc of 1% lidocaine was directed to the knee joint using US guidance. After negative aspiration, the medication was injected.  Adequate hemostasis was achieved and a bandage applied. The patient tolerated the procedure well and was educated in post injection care. The patient was clinically monitored after the injection and left the office without incident. There was post injection reduction in pain. Ultrasound images are scanned separately into the EMR.     Controlled Substance Monitoring:    Acute and Chronic Pain Monitoring:   RX Monitoring Periodic Controlled Substance Monitoring   3/20/2024   2:31 PM No signs of potential drug abuse or diversion identified.       Orders Placed This Encounter   Medications    triamcinolone acetonide (KENALOG-40)

## 2024-03-05 DIAGNOSIS — E55.9 VITAMIN D DEFICIENCY: ICD-10-CM

## 2024-03-05 RX ORDER — ERGOCALCIFEROL 1.25 MG/1
CAPSULE ORAL
Qty: 12 CAPSULE | Refills: 1 | Status: SHIPPED | OUTPATIENT
Start: 2024-03-05

## 2024-03-07 ENCOUNTER — OFFICE VISIT (OUTPATIENT)
Dept: BARIATRICS/WEIGHT MGMT | Age: 49
End: 2024-03-07
Payer: MEDICARE

## 2024-03-07 VITALS
WEIGHT: 191 LBS | HEART RATE: 93 BPM | SYSTOLIC BLOOD PRESSURE: 110 MMHG | HEIGHT: 66 IN | BODY MASS INDEX: 30.7 KG/M2 | DIASTOLIC BLOOD PRESSURE: 74 MMHG

## 2024-03-07 DIAGNOSIS — E66.09 CLASS 1 OBESITY DUE TO EXCESS CALORIES WITHOUT SERIOUS COMORBIDITY WITH BODY MASS INDEX (BMI) OF 31.0 TO 31.9 IN ADULT: Primary | ICD-10-CM

## 2024-03-07 DIAGNOSIS — K59.00 CONSTIPATION, UNSPECIFIED CONSTIPATION TYPE: ICD-10-CM

## 2024-03-07 PROCEDURE — G8484 FLU IMMUNIZE NO ADMIN: HCPCS | Performed by: NURSE PRACTITIONER

## 2024-03-07 PROCEDURE — G8427 DOCREV CUR MEDS BY ELIG CLIN: HCPCS | Performed by: NURSE PRACTITIONER

## 2024-03-07 PROCEDURE — 4004F PT TOBACCO SCREEN RCVD TLK: CPT | Performed by: NURSE PRACTITIONER

## 2024-03-07 PROCEDURE — 99211 OFF/OP EST MAY X REQ PHY/QHP: CPT | Performed by: NURSE PRACTITIONER

## 2024-03-07 PROCEDURE — G8417 CALC BMI ABV UP PARAM F/U: HCPCS | Performed by: NURSE PRACTITIONER

## 2024-03-07 PROCEDURE — 99213 OFFICE O/P EST LOW 20 MIN: CPT | Performed by: NURSE PRACTITIONER

## 2024-03-07 PROCEDURE — 3017F COLORECTAL CA SCREEN DOC REV: CPT | Performed by: NURSE PRACTITIONER

## 2024-03-07 RX ORDER — POLYETHYLENE GLYCOL 3350 17 G/17G
17 POWDER, FOR SOLUTION ORAL DAILY PRN
Qty: 30 PACKET | Refills: 0 | Status: SHIPPED | OUTPATIENT
Start: 2024-03-07 | End: 2024-04-06

## 2024-03-07 RX ORDER — TOPIRAMATE 25 MG/1
25 TABLET ORAL NIGHTLY
Qty: 30 TABLET | Refills: 1 | Status: SHIPPED
Start: 2024-03-07 | End: 2024-03-27 | Stop reason: ALTCHOICE

## 2024-03-07 RX ORDER — PHENTERMINE HYDROCHLORIDE 37.5 MG/1
37.5 TABLET ORAL
Qty: 30 TABLET | Refills: 0 | Status: SHIPPED | OUTPATIENT
Start: 2024-03-07 | End: 2024-04-06

## 2024-03-07 NOTE — PROGRESS NOTES
Chief Complaint   Patient presents with    Medication Check     phentermine       HPI:  Patient presents today for follow up of phentermine. She states that she is having issues with constipation at this time, however denies any other negative side effects. She would like to try something in addition to the phentermine at this time.     11/14/2023 - 227 lb - started on phentermine   12/14/2023 - 205 lb  1/11/2024 - 194 lb  2/8/2024 - 194 lb  3/7/2024 - 191 lb - phentermine and topamax    In the past she was has been on orlistat, did not tolerate side effects well. She has been following a keto diet. She is limited on physical activity due to chronic knee pain.       Last Surgical Weight Loss:      11/14/2023     1:02 PM 12/14/2023     1:20 PM 1/11/2024    10:35 AM 2/8/2024     1:18 PM   Surgical Weight Loss Tracker   Consult Date 7/20/2016      Initial Height 5' 6\"      Initial Weight 460 lb      Initial BMI 74.24      Ideal Body Weight 159 lb      Pre-Surgical Height 5' 6\"      Pre-Surgical Weight 471 lb      Pre Surgery BMI 76.01      Weight to Lose 312 lb      Date 11/14/2023 12/14/2023 1/11/2024 2/8/2024   Height 5' 6\" 5' 6\" 5' 6\" 5' 5.984\"   Weight 227 lb 205 lb 194 lb 194 lb   BMI 36.63 33.08 31.31 31.32   Weight Change -244 lb -22 lb -11 lb 0 oz   Total Weight Change -244 lb -266 lb -277 lb -277 lb   % EBWL 78% 85% 89% 89%         Prior to Visit Medications    Medication Sig Taking? Authorizing Provider   vitamin D (ERGOCALCIFEROL) 1.25 MG (13097 UT) CAPS capsule take 1 capsule by mouth every week Yes Juanis Ruiz DO   phentermine (ADIPEX-P) 37.5 MG tablet Take 1 tablet by mouth every morning (before breakfast) for 30 days. Max Daily Amount: 37.5 mg Yes Nasima Trujillo APRN - CNP   traMADol (ULTRAM) 50 MG tablet Take 1 tablet by mouth as needed for Pain. Yes Provider, MD Leslee   tiZANidine (ZANAFLEX) 2 MG tablet Take 1 tablet by mouth 4 times daily as needed (shoulder pain) Yes Arian Arroyo

## 2024-03-07 NOTE — PATIENT INSTRUCTIONS
4.1 oz bottle miralax  32 ounces of gatorade zero     Fiber - metamucil or benefiber - start with 1/4 of what is recommended    Patient instructed on proper administration of medication. Patient was educated on negative side effects such as increased heart rate, elevated blood pressure, dry mouth, insomnia, constipation and nervousness. Patient verbalized understanding and will stop this medication right away if they experience any of these symptoms.     Please continue to take your vitamin and mineral supplements as instructed.      If you received a blood work prescription today for laboratory monitoring due prior to your next routine follow-up visit, please have this blood work obtained 10 to 14 days prior to your next visit.  It is important to fast for 12 hours prior to routine weight loss surgery blood work, EXCEPT for drinking water, to ensure accuracy of results.    Please report nausea, vomiting, abdominal pain, or any other problems you experience to your surgeon.  For problems related to weight loss surgery, it is best to go to [facility] Emergency Department and have your surgeon paged.    Last Surgical Weight Loss:      11/14/2023     1:02 PM 12/14/2023     1:20 PM 1/11/2024    10:35 AM 2/8/2024     1:18 PM   Surgical Weight Loss Tracker   Consult Date 7/20/2016      Initial Height 5' 6\"      Initial Weight 460 lb      Initial BMI 74.24      Ideal Body Weight 159 lb      Pre-Surgical Height 5' 6\"      Pre-Surgical Weight 471 lb      Pre Surgery BMI 76.01      Weight to Lose 312 lb      Date 11/14/2023 12/14/2023 1/11/2024 2/8/2024   Height 5' 6\" 5' 6\" 5' 6\" 5' 5.984\"   Weight 227 lb 205 lb 194 lb 194 lb   BMI 36.63 33.08 31.31 31.32   Weight Change -244 lb -22 lb -11 lb 0 oz   Total Weight Change -244 lb -266 lb -277 lb -277 lb   % EBWL 78% 85% 89% 89%

## 2024-03-19 NOTE — PROGRESS NOTES
Elvie Joe D.O.  [facility]  [address] [place] [address]  Phone: [phone]  Fax: [phone]        3/27/24    Chief Complaint   Patient presents with    Knee Pain     Left knee injection       HPI:  Ana Luisa Crow is a 48 y.o. year old woman seen today in follow up regarding bilateral knee pain     Interval history: Since the last visit the patient reports her right knee has become unstable.  She just had a right knee CSI last week. There was no intervening injury. She is getting give way weakness in the right knee when she is walking and it is causing her to fall.  She is using a cane.  Denies any medication side effects.  Average pain level is 7/10 with medication. Without medication it is a 9/10.     PMH, PSH, FH,SH reviewed in Epic.     Review of Systems:  No new weakness, paresthesia, incontinence of bowel or bladder, saddle anesthesia, falls or gait dysfunction. Otherwise, per HPI.     Physical Exam:   Blood pressure 109/76, pulse 93, temperature 97.2 °F (36.2 °C), temperature source Temporal, weight 84.4 kg (186 lb), last menstrual period 10/21/2019, not currently breastfeeding.  GENERAL: The patient is in no apparent distress. Body habitus is obese.  MSK: There is no joint effusion, deformity, instability, swelling, erythema or warmth.  AROM is full in the spine and extremities. Spinal curvatures are normal.    Tender right median knee joint. Lachman negative.  Valgus stress test positive. Varus stress test negative. Luz negative.No appreciable effusion. Crepitant and painful knee AROM.   NEURO: Gait is antalgic. No focal sensorimotor deficit.  Reflexes 2+ and symmetric in lower extremities.     Impression:   1. Pain in extremity, unspecified extremity    2. Primary osteoarthritis of right knee    3. Sprain of medial collateral ligament of right knee, sequela          Plan:  L knee CSI today.   Continue home exercises.   Medications

## 2024-03-20 ENCOUNTER — OFFICE VISIT (OUTPATIENT)
Dept: PHYSICAL MEDICINE AND REHAB | Age: 49
End: 2024-03-20
Payer: MEDICARE

## 2024-03-20 VITALS — BODY MASS INDEX: 30.18 KG/M2 | TEMPERATURE: 98.1 F | WEIGHT: 187 LBS

## 2024-03-20 DIAGNOSIS — M17.0 PRIMARY OSTEOARTHRITIS OF BOTH KNEES: ICD-10-CM

## 2024-03-20 DIAGNOSIS — M79.609 PAIN IN EXTREMITY, UNSPECIFIED EXTREMITY: Primary | ICD-10-CM

## 2024-03-20 DIAGNOSIS — M17.11 PRIMARY OSTEOARTHRITIS OF RIGHT KNEE: ICD-10-CM

## 2024-03-20 PROCEDURE — 20611 DRAIN/INJ JOINT/BURSA W/US: CPT | Performed by: PHYSICAL MEDICINE & REHABILITATION

## 2024-03-20 RX ORDER — HYDROXYZINE HYDROCHLORIDE 10 MG/1
10 TABLET, FILM COATED ORAL 3 TIMES DAILY PRN
COMMUNITY
Start: 2024-02-23

## 2024-03-20 RX ORDER — TRIAMCINOLONE ACETONIDE 40 MG/ML
40 INJECTION, SUSPENSION INTRA-ARTICULAR; INTRAMUSCULAR ONCE
Status: COMPLETED | OUTPATIENT
Start: 2024-03-20 | End: 2024-03-20

## 2024-03-20 RX ORDER — CLOBETASOL PROPIONATE 0.5 MG/G
CREAM TOPICAL 2 TIMES DAILY
COMMUNITY
Start: 2024-01-26

## 2024-03-20 RX ORDER — TRAMADOL HYDROCHLORIDE 50 MG/1
100 TABLET ORAL 2 TIMES DAILY PRN
Qty: 120 TABLET | Refills: 2 | Status: CANCELLED | OUTPATIENT
Start: 2024-03-20 | End: 2024-06-18

## 2024-03-20 RX ORDER — TRAMADOL HYDROCHLORIDE 50 MG/1
50 TABLET ORAL 2 TIMES DAILY PRN
Qty: 60 TABLET | Refills: 0 | Status: SHIPPED | OUTPATIENT
Start: 2024-03-20 | End: 2024-04-19

## 2024-03-20 RX ORDER — BETAMETHASONE DIPROPIONATE 0.05 %
OINTMENT (GRAM) TOPICAL 2 TIMES DAILY
COMMUNITY
Start: 2024-02-23

## 2024-03-20 RX ORDER — LIDOCAINE HYDROCHLORIDE 10 MG/ML
8 INJECTION, SOLUTION EPIDURAL; INFILTRATION; INTRACAUDAL; PERINEURAL ONCE
Status: COMPLETED | OUTPATIENT
Start: 2024-03-20 | End: 2024-03-20

## 2024-03-20 RX ORDER — POLYETHYLENE GLYCOL 3350 17 G/17G
POWDER, FOR SOLUTION ORAL
COMMUNITY
Start: 2024-03-08 | End: 2024-03-20 | Stop reason: SDUPTHER

## 2024-03-20 RX ADMIN — TRIAMCINOLONE ACETONIDE 40 MG: 40 INJECTION, SUSPENSION INTRA-ARTICULAR; INTRAMUSCULAR at 14:26

## 2024-03-20 RX ADMIN — LIDOCAINE HYDROCHLORIDE 8 ML: 10 INJECTION, SOLUTION EPIDURAL; INFILTRATION; INTRACAUDAL; PERINEURAL at 14:25

## 2024-03-27 ENCOUNTER — OFFICE VISIT (OUTPATIENT)
Dept: PHYSICAL MEDICINE AND REHAB | Age: 49
End: 2024-03-27

## 2024-03-27 ENCOUNTER — OFFICE VISIT (OUTPATIENT)
Dept: FAMILY MEDICINE CLINIC | Age: 49
End: 2024-03-27
Payer: MEDICARE

## 2024-03-27 VITALS
DIASTOLIC BLOOD PRESSURE: 64 MMHG | HEART RATE: 70 BPM | BODY MASS INDEX: 29.09 KG/M2 | WEIGHT: 181 LBS | RESPIRATION RATE: 19 BRPM | SYSTOLIC BLOOD PRESSURE: 110 MMHG | OXYGEN SATURATION: 98 % | TEMPERATURE: 97.4 F | HEIGHT: 66 IN

## 2024-03-27 VITALS
TEMPERATURE: 97.2 F | SYSTOLIC BLOOD PRESSURE: 109 MMHG | BODY MASS INDEX: 30.02 KG/M2 | HEART RATE: 93 BPM | WEIGHT: 186 LBS | DIASTOLIC BLOOD PRESSURE: 76 MMHG

## 2024-03-27 DIAGNOSIS — Z13.220 SCREENING CHOLESTEROL LEVEL: ICD-10-CM

## 2024-03-27 DIAGNOSIS — M17.11 PRIMARY OSTEOARTHRITIS OF RIGHT KNEE: ICD-10-CM

## 2024-03-27 DIAGNOSIS — R53.82 CHRONIC FATIGUE: ICD-10-CM

## 2024-03-27 DIAGNOSIS — R79.89 ELEVATED LFTS: ICD-10-CM

## 2024-03-27 DIAGNOSIS — J44.1 COPD EXACERBATION (HCC): ICD-10-CM

## 2024-03-27 DIAGNOSIS — Z12.31 BREAST CANCER SCREENING BY MAMMOGRAM: ICD-10-CM

## 2024-03-27 DIAGNOSIS — R73.9 HYPERGLYCEMIA: ICD-10-CM

## 2024-03-27 DIAGNOSIS — E55.9 VITAMIN D DEFICIENCY: ICD-10-CM

## 2024-03-27 DIAGNOSIS — S83.411S SPRAIN OF MEDIAL COLLATERAL LIGAMENT OF RIGHT KNEE, SEQUELA: ICD-10-CM

## 2024-03-27 DIAGNOSIS — M79.609 PAIN IN EXTREMITY, UNSPECIFIED EXTREMITY: Primary | ICD-10-CM

## 2024-03-27 DIAGNOSIS — Z00.00 INITIAL MEDICARE ANNUAL WELLNESS VISIT: Primary | ICD-10-CM

## 2024-03-27 LAB
ABSOLUTE IMMATURE GRANULOCYTE: 0.03 K/UL (ref 0–0.58)
ALBUMIN SERPL-MCNC: 4.1 G/DL (ref 3.5–5.2)
ALP BLD-CCNC: 88 U/L (ref 35–104)
ALT SERPL-CCNC: 17 U/L (ref 0–32)
ANION GAP SERPL CALCULATED.3IONS-SCNC: 14 MMOL/L (ref 7–16)
AST SERPL-CCNC: 21 U/L (ref 0–31)
BASOPHILS ABSOLUTE: 0.08 K/UL (ref 0–0.2)
BASOPHILS RELATIVE PERCENT: 1 % (ref 0–2)
BILIRUB SERPL-MCNC: 0.4 MG/DL (ref 0–1.2)
BUN BLDV-MCNC: 11 MG/DL (ref 6–20)
CALCIUM SERPL-MCNC: 9.1 MG/DL (ref 8.6–10.2)
CHLORIDE BLD-SCNC: 103 MMOL/L (ref 98–107)
CHOLESTEROL: 173 MG/DL
CO2: 22 MMOL/L (ref 22–29)
CREAT SERPL-MCNC: 0.7 MG/DL (ref 0.5–1)
EOSINOPHILS ABSOLUTE: 0.06 K/UL (ref 0.05–0.5)
EOSINOPHILS RELATIVE PERCENT: 1 % (ref 0–6)
GFR SERPL CREATININE-BSD FRML MDRD: >90 ML/MIN/1.73M2
GLUCOSE BLD-MCNC: 90 MG/DL (ref 74–99)
HBA1C MFR BLD: 5.4 % (ref 4–5.6)
HCT VFR BLD CALC: 43.4 % (ref 34–48)
HDLC SERPL-MCNC: 83 MG/DL
HEMOGLOBIN: 14 G/DL (ref 11.5–15.5)
IMMATURE GRANULOCYTES: 0 % (ref 0–5)
LDL CHOLESTEROL: 80 MG/DL
LYMPHOCYTES ABSOLUTE: 2.82 K/UL (ref 1.5–4)
LYMPHOCYTES RELATIVE PERCENT: 32 % (ref 20–42)
MCH RBC QN AUTO: 30.8 PG (ref 26–35)
MCHC RBC AUTO-ENTMCNC: 32.3 G/DL (ref 32–34.5)
MCV RBC AUTO: 95.4 FL (ref 80–99.9)
MONOCYTES ABSOLUTE: 0.6 K/UL (ref 0.1–0.95)
MONOCYTES RELATIVE PERCENT: 7 % (ref 2–12)
NEUTROPHILS ABSOLUTE: 5.11 K/UL (ref 1.8–7.3)
NEUTROPHILS RELATIVE PERCENT: 59 % (ref 43–80)
PDW BLD-RTO: 15.3 % (ref 11.5–15)
PLATELET # BLD: 440 K/UL (ref 130–450)
PMV BLD AUTO: 8.8 FL (ref 7–12)
POTASSIUM SERPL-SCNC: 4.5 MMOL/L (ref 3.5–5)
RBC # BLD: 4.55 M/UL (ref 3.5–5.5)
SODIUM BLD-SCNC: 139 MMOL/L (ref 132–146)
TOTAL PROTEIN: 6.9 G/DL (ref 6.4–8.3)
TRIGL SERPL-MCNC: 52 MG/DL
TSH SERPL DL<=0.05 MIU/L-ACNC: 1.69 UIU/ML (ref 0.27–4.2)
VITAMIN D 25-HYDROXY: 43.8 NG/ML (ref 30–100)
VLDLC SERPL CALC-MCNC: 10 MG/DL
WBC # BLD: 8.7 K/UL (ref 4.5–11.5)

## 2024-03-27 PROCEDURE — G8484 FLU IMMUNIZE NO ADMIN: HCPCS | Performed by: STUDENT IN AN ORGANIZED HEALTH CARE EDUCATION/TRAINING PROGRAM

## 2024-03-27 PROCEDURE — G0438 PPPS, INITIAL VISIT: HCPCS | Performed by: STUDENT IN AN ORGANIZED HEALTH CARE EDUCATION/TRAINING PROGRAM

## 2024-03-27 PROCEDURE — 36415 COLL VENOUS BLD VENIPUNCTURE: CPT | Performed by: STUDENT IN AN ORGANIZED HEALTH CARE EDUCATION/TRAINING PROGRAM

## 2024-03-27 PROCEDURE — 3017F COLORECTAL CA SCREEN DOC REV: CPT | Performed by: STUDENT IN AN ORGANIZED HEALTH CARE EDUCATION/TRAINING PROGRAM

## 2024-03-27 RX ORDER — TRIAMCINOLONE ACETONIDE 40 MG/ML
40 INJECTION, SUSPENSION INTRA-ARTICULAR; INTRAMUSCULAR ONCE
Status: COMPLETED | OUTPATIENT
Start: 2024-03-27 | End: 2024-03-27

## 2024-03-27 RX ORDER — LIDOCAINE HYDROCHLORIDE 10 MG/ML
8 INJECTION, SOLUTION EPIDURAL; INFILTRATION; INTRACAUDAL; PERINEURAL ONCE
Status: COMPLETED | OUTPATIENT
Start: 2024-03-27 | End: 2024-03-27

## 2024-03-27 RX ADMIN — LIDOCAINE HYDROCHLORIDE 8 ML: 10 INJECTION, SOLUTION EPIDURAL; INFILTRATION; INTRACAUDAL; PERINEURAL at 15:07

## 2024-03-27 RX ADMIN — TRIAMCINOLONE ACETONIDE 40 MG: 40 INJECTION, SUSPENSION INTRA-ARTICULAR; INTRAMUSCULAR at 15:08

## 2024-03-27 SDOH — ECONOMIC STABILITY: INCOME INSECURITY: HOW HARD IS IT FOR YOU TO PAY FOR THE VERY BASICS LIKE FOOD, HOUSING, MEDICAL CARE, AND HEATING?: VERY HARD

## 2024-03-27 SDOH — ECONOMIC STABILITY: FOOD INSECURITY: WITHIN THE PAST 12 MONTHS, THE FOOD YOU BOUGHT JUST DIDN'T LAST AND YOU DIDN'T HAVE MONEY TO GET MORE.: SOMETIMES TRUE

## 2024-03-27 SDOH — ECONOMIC STABILITY: FOOD INSECURITY: WITHIN THE PAST 12 MONTHS, YOU WORRIED THAT YOUR FOOD WOULD RUN OUT BEFORE YOU GOT MONEY TO BUY MORE.: SOMETIMES TRUE

## 2024-03-27 ASSESSMENT — PATIENT HEALTH QUESTIONNAIRE - PHQ9
SUM OF ALL RESPONSES TO PHQ QUESTIONS 1-9: 16
SUM OF ALL RESPONSES TO PHQ9 QUESTIONS 1 & 2: 4
10. IF YOU CHECKED OFF ANY PROBLEMS, HOW DIFFICULT HAVE THESE PROBLEMS MADE IT FOR YOU TO DO YOUR WORK, TAKE CARE OF THINGS AT HOME, OR GET ALONG WITH OTHER PEOPLE: SOMEWHAT DIFFICULT
SUM OF ALL RESPONSES TO PHQ QUESTIONS 1-9: 16
2. FEELING DOWN, DEPRESSED OR HOPELESS: MORE THAN HALF THE DAYS
SUM OF ALL RESPONSES TO PHQ QUESTIONS 1-9: 16
6. FEELING BAD ABOUT YOURSELF - OR THAT YOU ARE A FAILURE OR HAVE LET YOURSELF OR YOUR FAMILY DOWN: NEARLY EVERY DAY
5. POOR APPETITE OR OVEREATING: NOT AT ALL
4. FEELING TIRED OR HAVING LITTLE ENERGY: NEARLY EVERY DAY
7. TROUBLE CONCENTRATING ON THINGS, SUCH AS READING THE NEWSPAPER OR WATCHING TELEVISION: NEARLY EVERY DAY
SUM OF ALL RESPONSES TO PHQ QUESTIONS 1-9: 16
8. MOVING OR SPEAKING SO SLOWLY THAT OTHER PEOPLE COULD HAVE NOTICED. OR THE OPPOSITE, BEING SO FIGETY OR RESTLESS THAT YOU HAVE BEEN MOVING AROUND A LOT MORE THAN USUAL: NOT AT ALL
9. THOUGHTS THAT YOU WOULD BE BETTER OFF DEAD, OR OF HURTING YOURSELF: NOT AT ALL
3. TROUBLE FALLING OR STAYING ASLEEP: NEARLY EVERY DAY
1. LITTLE INTEREST OR PLEASURE IN DOING THINGS: MORE THAN HALF THE DAYS

## 2024-03-27 ASSESSMENT — LIFESTYLE VARIABLES
HOW MANY STANDARD DRINKS CONTAINING ALCOHOL DO YOU HAVE ON A TYPICAL DAY: 1 OR 2
HOW OFTEN DO YOU HAVE A DRINK CONTAINING ALCOHOL: MONTHLY OR LESS

## 2024-03-27 ASSESSMENT — COLUMBIA-SUICIDE SEVERITY RATING SCALE - C-SSRS
6. HAVE YOU EVER DONE ANYTHING, STARTED TO DO ANYTHING, OR PREPARED TO DO ANYTHING TO END YOUR LIFE?: NO
2. HAVE YOU ACTUALLY HAD ANY THOUGHTS OF KILLING YOURSELF?: NO
1. WITHIN THE PAST MONTH, HAVE YOU WISHED YOU WERE DEAD OR WISHED YOU COULD GO TO SLEEP AND NOT WAKE UP?: NO

## 2024-03-27 NOTE — PROGRESS NOTES
times daily Yes Provider, MD Leslee       CareTeam (Including outside providers/suppliers regularly involved in providing care):   Patient Care Team:  Juanis Ruiz DO as PCP - General (Family Medicine)  Juanis Ruiz DO as PCP - Empaneled Provider  Cyrus Luevano MD as Consulting Physician (Pulmonary Disease)  Elvie Joe DO as Consulting Physician (Physical Medicine and Rehab)     Reviewed and updated this visit:  Tobacco  Allergies  Meds  Problems  Med Hx  Surg Hx  Soc Hx  Fam Hx

## 2024-03-27 NOTE — PATIENT INSTRUCTIONS
online.  You need 0988-1056 mg of calcium and 2855-0501 IU of vitamin D per day. It is possible to meet your calcium requirement with diet alone, but a vitamin D supplement is usually necessary to meet this goal.  When exposed to the sun, use a sunscreen that protects against both UVA and UVB radiation with an SPF of 30 or greater. Reapply every 2 to 3 hours or after sweating, drying off with a towel, or swimming.  Always wear a seat belt when traveling in a car. Always wear a helmet when riding a bicycle or motorcycle.

## 2024-04-03 NOTE — PROGRESS NOTES
Patient for EGD today with Dr. Jackie Salas  Additional questions and concerns addressed  Labs/chart reviewed  Consent signed  [id number] [address] Miguel to proceed.     JOHN Mathur CNP 3/11/2022 10:02 AM n/a

## 2024-04-05 ENCOUNTER — OFFICE VISIT (OUTPATIENT)
Dept: BARIATRICS/WEIGHT MGMT | Age: 49
End: 2024-04-05
Payer: MEDICARE

## 2024-04-05 VITALS
HEART RATE: 98 BPM | BODY MASS INDEX: 29.09 KG/M2 | DIASTOLIC BLOOD PRESSURE: 70 MMHG | SYSTOLIC BLOOD PRESSURE: 110 MMHG | WEIGHT: 181 LBS | HEIGHT: 66 IN

## 2024-04-05 DIAGNOSIS — K91.2 MALNUTRITION FOLLOWING GASTROINTESTINAL SURGERY: ICD-10-CM

## 2024-04-05 DIAGNOSIS — E66.09 CLASS 1 OBESITY DUE TO EXCESS CALORIES WITHOUT SERIOUS COMORBIDITY WITH BODY MASS INDEX (BMI) OF 31.0 TO 31.9 IN ADULT: Primary | ICD-10-CM

## 2024-04-05 PROCEDURE — 99213 OFFICE O/P EST LOW 20 MIN: CPT | Performed by: NURSE PRACTITIONER

## 2024-04-05 PROCEDURE — 3017F COLORECTAL CA SCREEN DOC REV: CPT | Performed by: NURSE PRACTITIONER

## 2024-04-05 PROCEDURE — G8417 CALC BMI ABV UP PARAM F/U: HCPCS | Performed by: NURSE PRACTITIONER

## 2024-04-05 PROCEDURE — 4004F PT TOBACCO SCREEN RCVD TLK: CPT | Performed by: NURSE PRACTITIONER

## 2024-04-05 PROCEDURE — G8427 DOCREV CUR MEDS BY ELIG CLIN: HCPCS | Performed by: NURSE PRACTITIONER

## 2024-04-05 RX ORDER — PHENTERMINE HYDROCHLORIDE 37.5 MG/1
37.5 TABLET ORAL
Qty: 30 TABLET | Refills: 0 | Status: SHIPPED | OUTPATIENT
Start: 2024-04-05 | End: 2024-05-05

## 2024-04-05 ASSESSMENT — ENCOUNTER SYMPTOMS
VOMITING: 0
CONSTIPATION: 0
COUGH: 0
NAUSEA: 0
SHORTNESS OF BREATH: 0
WHEEZING: 0
DIARRHEA: 0

## 2024-04-05 NOTE — PROGRESS NOTES
Provider, MD Leslee   clobetasol (TEMOVATE) 0.05 % cream Apply topically 2 times daily APPLY TO AFFECTED AREA Yes ProviderLeslee MD   traMADol (ULTRAM) 50 MG tablet Take 1 tablet by mouth 2 times daily as needed for Pain for up to 30 days. Max Daily Amount: 100 mg Yes Elvie Joe DO   polyethylene glycol (MIRALAX) 17 g packet Take 1 packet by mouth daily as needed for Constipation Yes Nasima Trujillo APRN - CNP   vitamin D (ERGOCALCIFEROL) 1.25 MG (13268 UT) CAPS capsule take 1 capsule by mouth every week Yes Juanis Ruiz DO   ondansetron (ZOFRAN-ODT) 4 MG disintegrating tablet Place 1 tablet under the tongue every 8 hours as needed for Nausea or Vomiting Yes Juanis Ruiz DO   omeprazole (PRILOSEC) 20 MG delayed release capsule take 1 capsule by mouth once daily Yes Juanis Ruiz DO   albuterol sulfate HFA (VENTOLIN HFA) 108 (90 Base) MCG/ACT inhaler Inhale 2 puffs into the lungs every 6 hours as needed for Wheezing Yes Eric Golden MD   LORazepam (ATIVAN) 0.5 MG tablet take 1 tablet by mouth once daily if needed for anxiety for 1 month Yes ProviderLeslee MD   Multiple Vitamin (MVI, BARIATRIC ADVANTAGE MULTI-FORMULA, CHEW TAB) Take 1 tablet by mouth every other day Yes Leslee Marti MD   buPROPion (WELLBUTRIN SR) 200 MG extended release tablet Take 1 tablet by mouth 2 times daily Yes ProviderLeslee MD         Allergies   Allergen Reactions    Pcn [Penicillins] Anaphylaxis     CHILDHOOD ALLERGY    Food Hives     ALLERGY IS TO BERRIES    Ibuprofen Other (See Comments)     PATIENT HAS BARIATRIC SX         Review of Systems  Review of Systems   Constitutional:  Negative for chills and fever.   HENT:  Negative for congestion and nosebleeds.    Respiratory:  Negative for cough, shortness of breath and wheezing.    Cardiovascular:  Negative for chest pain, palpitations and leg swelling.   Gastrointestinal:  Negative for constipation, diarrhea, nausea and vomiting.

## 2024-04-05 NOTE — PATIENT INSTRUCTIONS
Please continue to take your vitamin and mineral supplements as instructed.      If you received a blood work prescription today for laboratory monitoring due prior to your next routine follow-up visit, please have this blood work obtained 10 to 14 days prior to your next visit.  It is important to fast for 12 hours prior to routine weight loss surgery blood work, EXCEPT for drinking water, to ensure accuracy of results.    Please report nausea, vomiting, abdominal pain, or any other problems you experience to your surgeon.  For problems related to weight loss surgery, it is best to go to [facility] Emergency Department and have your surgeon paged.    Last Surgical Weight Loss:      11/14/2023     1:02 PM 12/14/2023     1:20 PM 1/11/2024    10:35 AM 2/8/2024     1:18 PM 3/7/2024     1:20 PM 4/5/2024     1:05 PM   Surgical Weight Loss Tracker   Consult Date 7/20/2016        Initial Height 5' 6\"        Initial Weight 460 lb        Initial BMI 74.24        Ideal Body Weight 159 lb        Pre-Surgical Height 5' 6\"        Pre-Surgical Weight 471 lb        Pre Surgery BMI 76.01        Weight to Lose 312 lb        Date 11/14/2023 12/14/2023 1/11/2024 2/8/2024 3/7/2024 4/5/2024   Height 5' 6\" 5' 6\" 5' 6\" 5' 5.984\" 5' 5.984\" 5' 6\"   Weight 227 lb 205 lb 194 lb 194 lb 194 lb 181 lb   BMI 36.63 33.08 31.31 31.32 31.32 29.21   Weight Change -244 lb -22 lb -11 lb 0 oz -0 oz -13 lb   Total Weight Change -244 lb -266 lb -277 lb -277 lb -277 lb -290 lb   % EBWL 78% 85% 89% 89% 89% 93%

## 2024-04-09 ENCOUNTER — HOSPITAL ENCOUNTER (OUTPATIENT)
Dept: MRI IMAGING | Age: 49
Discharge: HOME | End: 2024-04-11
Payer: MEDICARE

## 2024-04-09 DIAGNOSIS — S83.411S SPRAIN OF MEDIAL COLLATERAL LIGAMENT OF RIGHT KNEE, SEQUELA: ICD-10-CM

## 2024-04-09 DIAGNOSIS — M17.11 PRIMARY OSTEOARTHRITIS OF RIGHT KNEE: ICD-10-CM

## 2024-04-09 PROCEDURE — 73721 MRI JNT OF LWR EXTRE W/O DYE: CPT

## 2024-04-11 ENCOUNTER — TELEPHONE (OUTPATIENT)
Dept: PHYSICAL MEDICINE AND REHAB | Age: 49
End: 2024-04-11

## 2024-04-11 NOTE — TELEPHONE ENCOUNTER
----- Message from Elvie Joe DO sent at 4/11/2024  8:56 AM EDT -----  Test result is abnormal. Please schedule for follow up to discuss results and determine plan.

## 2024-04-11 NOTE — TELEPHONE ENCOUNTER
Called and spoke with the patient and scheduled her an appointment to go over MRI knee results.  Patient is scheduled on 4-18-24.

## 2024-04-12 NOTE — PROGRESS NOTES
Contacted the patient and left a message today that Tamanna spoke with Dr. Keys and they are not wanting to remove the clot unless the swelling gets increasingly worse.  I left our office number should the patient have any questions.    Vaginal bleeding reviewed with patient by nestor zabala cnm. Plan of care established. Lab orders given to patient with instructions to have done at lab. Urine pregnancy test done and neg. Discharge instructions given by nestor zabala cnm.

## 2024-04-14 DIAGNOSIS — Z98.84 S/P GASTRIC BYPASS: ICD-10-CM

## 2024-04-14 DIAGNOSIS — K28.9 MARGINAL ULCER: ICD-10-CM

## 2024-04-15 RX ORDER — OMEPRAZOLE 20 MG/1
CAPSULE, DELAYED RELEASE ORAL
Qty: 90 CAPSULE | Refills: 2 | Status: SHIPPED | OUTPATIENT
Start: 2024-04-15

## 2024-04-15 NOTE — PROGRESS NOTES
Elvie Joe D.O.  [facility]  [address] [place] [address]  Phone: [phone]  Fax: [phone]        4/21/24    Chief Complaint   Patient presents with    Knee Pain     Follow up and results MRI        HPI:  Ana Luisa Crow is a 48 y.o. year old woman seen today in follow up regarding bilateral knee pain     Interval history: Since the last visit the patient had knee injections in March and has continued relief with the injections. Patient had an MRI of right knee on 4/9/24 which she is here to discuss today.  Denies any medication side effects.  Average pain level is 7/10 with medication. Without medication it is a 9/10. Overall feels mechanical symptoms and pain are improving.     Personally reviewed right knee MRI:  4/9/2024 medial meniscus tear, effusion, OA/chondromalacia, Baker's cyst.    PMH, PSH, FH,SH reviewed in Epic.     Review of Systems:  No new weakness, paresthesia, incontinence of bowel or bladder, saddle anesthesia, falls or gait dysfunction. Otherwise, per HPI.     Physical Exam:   Blood pressure 122/76, height 1.651 m (5' 5\"), weight 85.7 kg (189 lb), last menstrual period 10/21/2019, not currently breastfeeding.  GENERAL: The patient is in no apparent distress. Body habitus is obese.  MSK: There is no joint effusion, deformity, instability, swelling, erythema or warmth.  AROM is full in the spine and extremities. Spinal curvatures are normal.    Tender right median knee joint. No appreciable effusion. Crepitant and painful knee AROM.   NEURO: Gait is antalgic. No focal sensorimotor deficit.  Reflexes 2+ and symmetric in lower extremities.     Impression:   1. Primary osteoarthritis of right knee    2. Tear of medial meniscus of right knee, current, unspecified tear type, sequela            Plan:  Patient wishes to treat meniscus tear conservatively. Will continue with current plan.   Continue home exercises.   Medications reviewed,

## 2024-04-15 NOTE — TELEPHONE ENCOUNTER
Last Appointment:  3/27/2024  Future Appointments   Date Time Provider Department Center   4/18/2024  9:45 AM Elvie Joe DO [place] [facility]   5/3/2024 12:45 PM Nasima Trujillo APRN - CNP Surg Weight [facility]   6/4/2024  1:30 PM Elvie Joe DO [facility] [facility]   9/13/2024 10:00 AM [facility]C FAST TRACK 3 [facility] Med Onc [facility]   9/13/2024 10:15 AM Luanne Augustin MD MED ONC [facility]   11/14/2024 12:30 PM Nasima Trujillo APRN - CNP Surg Weight [facility]   3/28/2025  8:30 AM Juanis Ruiz DO [facility]

## 2024-04-18 ENCOUNTER — OFFICE VISIT (OUTPATIENT)
Dept: PHYSICAL MEDICINE AND REHAB | Age: 49
End: 2024-04-18
Payer: MEDICARE

## 2024-04-18 VITALS
HEIGHT: 65 IN | BODY MASS INDEX: 31.49 KG/M2 | SYSTOLIC BLOOD PRESSURE: 122 MMHG | WEIGHT: 189 LBS | DIASTOLIC BLOOD PRESSURE: 76 MMHG

## 2024-04-18 DIAGNOSIS — M17.11 PRIMARY OSTEOARTHRITIS OF RIGHT KNEE: Primary | ICD-10-CM

## 2024-04-18 DIAGNOSIS — S83.241S TEAR OF MEDIAL MENISCUS OF RIGHT KNEE, CURRENT, UNSPECIFIED TEAR TYPE, SEQUELA: ICD-10-CM

## 2024-04-18 PROCEDURE — 4004F PT TOBACCO SCREEN RCVD TLK: CPT | Performed by: PHYSICAL MEDICINE & REHABILITATION

## 2024-04-18 PROCEDURE — G8417 CALC BMI ABV UP PARAM F/U: HCPCS | Performed by: PHYSICAL MEDICINE & REHABILITATION

## 2024-04-18 PROCEDURE — G8427 DOCREV CUR MEDS BY ELIG CLIN: HCPCS | Performed by: PHYSICAL MEDICINE & REHABILITATION

## 2024-04-18 PROCEDURE — 99214 OFFICE O/P EST MOD 30 MIN: CPT | Performed by: PHYSICAL MEDICINE & REHABILITATION

## 2024-04-18 PROCEDURE — 3017F COLORECTAL CA SCREEN DOC REV: CPT | Performed by: PHYSICAL MEDICINE & REHABILITATION

## 2024-05-03 ENCOUNTER — OFFICE VISIT (OUTPATIENT)
Dept: BARIATRICS/WEIGHT MGMT | Age: 49
End: 2024-05-03
Payer: MEDICARE

## 2024-05-03 VITALS
SYSTOLIC BLOOD PRESSURE: 110 MMHG | HEART RATE: 98 BPM | DIASTOLIC BLOOD PRESSURE: 74 MMHG | BODY MASS INDEX: 31.65 KG/M2 | WEIGHT: 190 LBS | HEIGHT: 65 IN

## 2024-05-03 DIAGNOSIS — K91.2 MALNUTRITION FOLLOWING GASTROINTESTINAL SURGERY: ICD-10-CM

## 2024-05-03 DIAGNOSIS — E66.09 CLASS 1 OBESITY DUE TO EXCESS CALORIES WITHOUT SERIOUS COMORBIDITY WITH BODY MASS INDEX (BMI) OF 31.0 TO 31.9 IN ADULT: Primary | ICD-10-CM

## 2024-05-03 PROCEDURE — 99213 OFFICE O/P EST LOW 20 MIN: CPT | Performed by: NURSE PRACTITIONER

## 2024-05-03 PROCEDURE — 4004F PT TOBACCO SCREEN RCVD TLK: CPT | Performed by: NURSE PRACTITIONER

## 2024-05-03 PROCEDURE — G8417 CALC BMI ABV UP PARAM F/U: HCPCS | Performed by: NURSE PRACTITIONER

## 2024-05-03 PROCEDURE — G8427 DOCREV CUR MEDS BY ELIG CLIN: HCPCS | Performed by: NURSE PRACTITIONER

## 2024-05-03 PROCEDURE — 3017F COLORECTAL CA SCREEN DOC REV: CPT | Performed by: NURSE PRACTITIONER

## 2024-05-03 RX ORDER — PHENTERMINE HYDROCHLORIDE 37.5 MG/1
37.5 TABLET ORAL
Qty: 30 TABLET | Refills: 0 | Status: SHIPPED | OUTPATIENT
Start: 2024-05-03 | End: 2024-06-02

## 2024-05-03 ASSESSMENT — ENCOUNTER SYMPTOMS
VOMITING: 0
DIARRHEA: 0
CONSTIPATION: 0
SHORTNESS OF BREATH: 0
WHEEZING: 0
NAUSEA: 0
COUGH: 0

## 2024-05-03 NOTE — PATIENT INSTRUCTIONS
Please continue to take your vitamin and mineral supplements as instructed.      If you received a blood work prescription today for laboratory monitoring due prior to your next routine follow-up visit, please have this blood work obtained 10 to 14 days prior to your next visit.  It is important to fast for 12 hours prior to routine weight loss surgery blood work, EXCEPT for drinking water, to ensure accuracy of results.    Please report nausea, vomiting, abdominal pain, or any other problems you experience to your surgeon.  For problems related to weight loss surgery, it is best to go to [facility] Emergency Department and have your surgeon paged.    Last Surgical Weight Loss:      11/14/2023     1:02 PM 12/14/2023     1:20 PM 1/11/2024    10:35 AM 2/8/2024     1:18 PM 3/7/2024     1:20 PM 4/5/2024     1:05 PM 5/3/2024     1:02 PM   Surgical Weight Loss Tracker   Consult Date 7/20/2016         Initial Height 5' 6\"         Initial Weight 460 lb         Initial BMI 74.24         Ideal Body Weight 159 lb         Pre-Surgical Height 5' 6\"         Pre-Surgical Weight 471 lb         Pre Surgery BMI 76.01         Weight to Lose 312 lb         Date 11/14/2023 12/14/2023 1/11/2024 2/8/2024 3/7/2024 4/5/2024 5/3/2024   Height 5' 6\" 5' 6\" 5' 6\" 5' 5.984\" 5' 5.984\" 5' 6\" 5' 5\"   Weight 227 lb 205 lb 194 lb 194 lb 194 lb 181 lb 190 lb   BMI 36.63 33.08 31.31 31.32 31.32 29.21 31.61   Weight Change -244 lb -22 lb -11 lb 0 oz -0 oz -13 lb 9 lb   Total Weight Change -244 lb -266 lb -277 lb -277 lb -277 lb -290 lb -281 lb   % EBWL 78% 85% 89% 89% 89% 93% 90%

## 2024-05-03 NOTE — PROGRESS NOTES
Chief Complaint   Patient presents with    Medication Check     Phentermine        HPI:  Patient presents today for follow up of phentermine. She was also on keto, however tells me that she has been baking cookies recently for a wedding and has been taste testing them. She knows that she is no longer in ketosis. She does continue to have issues with her knees which makes it difficult to exercise.     11/14/2023 - 227 lb - started on phentermine   12/14/2023 - 205 lb  1/11/2024 - 194 lb  2/8/2024 - 194 lb  3/7/2024 - 191 lb - phentermine and topamax  4/5/2024 - 181 lb   5/3/2024 - 190 lb      Prior to Visit Medications    Medication Sig Taking? Authorizing Provider   omeprazole (PRILOSEC) 20 MG delayed release capsule take 1 capsule by mouth once daily Yes Juanis Ruiz DO   phentermine (ADIPEX-P) 37.5 MG tablet Take 1 tablet by mouth every morning (before breakfast) for 30 days. Max Daily Amount: 37.5 mg Yes Nasima Trujillo APRN - CNP   betamethasone dipropionate 0.05 % ointment Apply topically 2 times daily APPLY TO AFFECTED AREA Yes ProviderLeslee MD   hydrOXYzine HCl (ATARAX) 10 MG tablet Take 1 tablet by mouth 3 times daily as needed Yes Leslee Marti MD   clobetasol (TEMOVATE) 0.05 % cream Apply topically 2 times daily APPLY TO AFFECTED AREA Yes ProviderLeslee MD   vitamin D (ERGOCALCIFEROL) 1.25 MG (54357 UT) CAPS capsule take 1 capsule by mouth every week Yes Juanis Ruiz DO   ondansetron (ZOFRAN-ODT) 4 MG disintegrating tablet Place 1 tablet under the tongue every 8 hours as needed for Nausea or Vomiting Yes Juanis Ruiz DO   albuterol sulfate HFA (VENTOLIN HFA) 108 (90 Base) MCG/ACT inhaler Inhale 2 puffs into the lungs every 6 hours as needed for Wheezing Yes Eric Golden MD   LORazepam (ATIVAN) 0.5 MG tablet take 1 tablet by mouth once daily if needed for anxiety for 1 month Yes ProviderLeslee MD   Multiple Vitamin (MVI, BARIATRIC ADVANTAGE MULTI-FORMULA,

## 2024-06-07 ENCOUNTER — OFFICE VISIT (OUTPATIENT)
Dept: BARIATRICS/WEIGHT MGMT | Age: 49
End: 2024-06-07
Payer: MEDICARE

## 2024-06-07 VITALS
WEIGHT: 185 LBS | HEART RATE: 96 BPM | DIASTOLIC BLOOD PRESSURE: 74 MMHG | BODY MASS INDEX: 30.82 KG/M2 | HEIGHT: 65 IN | SYSTOLIC BLOOD PRESSURE: 110 MMHG

## 2024-06-07 DIAGNOSIS — K91.2 MALNUTRITION FOLLOWING GASTROINTESTINAL SURGERY: Primary | ICD-10-CM

## 2024-06-07 DIAGNOSIS — E66.09 CLASS 1 OBESITY DUE TO EXCESS CALORIES WITHOUT SERIOUS COMORBIDITY WITH BODY MASS INDEX (BMI) OF 31.0 TO 31.9 IN ADULT: ICD-10-CM

## 2024-06-07 PROCEDURE — G8427 DOCREV CUR MEDS BY ELIG CLIN: HCPCS | Performed by: NURSE PRACTITIONER

## 2024-06-07 PROCEDURE — 99213 OFFICE O/P EST LOW 20 MIN: CPT | Performed by: NURSE PRACTITIONER

## 2024-06-07 PROCEDURE — 3017F COLORECTAL CA SCREEN DOC REV: CPT | Performed by: NURSE PRACTITIONER

## 2024-06-07 PROCEDURE — G8417 CALC BMI ABV UP PARAM F/U: HCPCS | Performed by: NURSE PRACTITIONER

## 2024-06-07 PROCEDURE — 4004F PT TOBACCO SCREEN RCVD TLK: CPT | Performed by: NURSE PRACTITIONER

## 2024-06-07 RX ORDER — PHENTERMINE HYDROCHLORIDE 37.5 MG/1
37.5 TABLET ORAL
Qty: 30 TABLET | Refills: 0 | Status: SHIPPED | OUTPATIENT
Start: 2024-06-07 | End: 2024-07-07

## 2024-06-07 ASSESSMENT — ENCOUNTER SYMPTOMS
NAUSEA: 0
COUGH: 0
WHEEZING: 0
CONSTIPATION: 0
VOMITING: 0
SHORTNESS OF BREATH: 0
DIARRHEA: 0

## 2024-06-07 NOTE — PROGRESS NOTES
surgery  Follow up as scheduled   Patient instructed on proper administration of medication. Patient was educated on negative side effects such as increased heart rate, elevated blood pressure, dry mouth, insomnia, constipation and nervousness. Patient verbalized understanding and will stop this medication right away if they experience any of these symptoms.    - phentermine (ADIPEX-P) 37.5 MG tablet; Take 1 tablet by mouth every morning (before breakfast) for 30 days. Max Daily Amount: 37.5 mg  Dispense: 30 tablet; Refill: 0    2. Class 1 obesity due to excess calories without serious comorbidity with body mass index (BMI) of 31.0 to 31.9 in adult    - phentermine (ADIPEX-P) 37.5 MG tablet; Take 1 tablet by mouth every morning (before breakfast) for 30 days. Max Daily Amount: 37.5 mg  Dispense: 30 tablet; Refill: 0    Return in about 1 month (around 7/7/2024), or if symptoms worsen or fail to improve.        Nasima Trujillo, APRN - CNP

## 2024-06-07 NOTE — PATIENT INSTRUCTIONS
What is the next step to proceed with weight loss surgery?    Please be aware that any co-pays or deductibles may be requested prior to testing and / or procedures.    You will need to schedule a psychological evaluation for weight loss surgery.  Patients will be required to complete all psychological testing as required by the mental health provider. Patients must also follow all of the provider's recommendations before weight loss surgery can be scheduled.     The evaluation must be done a standard way for weight loss surgery. We strongly recommend that you contact one of our preferred providers listed below to arrange this:      Jose Alfredo Prieto, [facility]-  [address], [place]   [phone]    Dr. Elaine Sweeney, PhD , [facility]  [facility] [phone]      Dr. Silverio Armendariz, PhD     [address], [place]    [phone]      You will also need to plan on attending a 2 hour nutrition class at [facility] prior to your surgery.  We will schedule this for you when we schedule your surgery.    Please remember to have your labs drawn 10 days prior to your first scheduled dietary appointment.    Please remember, that while we will submit your case to insurance for surgery authorization, it is your responsibility to know if your plan covers weight loss surgery and keep up-to-date with changes to your insurance coverage.  We will do everything possible to help you get approved for weight loss surgery, but cannot guarantee an approval.     Please note that you will not be submitted to your insurance company until all pre-operative testing requirements are met.    Last Surgical Weight Loss:      12/14/2023     1:20 PM 1/11/2024    10:35 AM 2/8/2024     1:18 PM 3/7/2024     1:20 PM 4/5/2024     1:05 PM 5/3/2024     1:02 PM 6/7/2024     1:17 PM   Surgical Weight Loss Tracker   Date 12/14/2023 1/11/2024 2/8/2024 3/7/2024 4/5/2024 5/3/2024 6/7/2024

## 2024-06-14 RX ORDER — LIDOCAINE HYDROCHLORIDE 10 MG/ML
8 INJECTION, SOLUTION EPIDURAL; INFILTRATION; INTRACAUDAL; PERINEURAL ONCE
Status: CANCELLED | OUTPATIENT
Start: 2024-06-14 | End: 2024-06-14

## 2024-06-14 NOTE — PROGRESS NOTES
mouth 2 times daily as needed for Pain for up to 30 days. Max Daily Amount: 100 mg     Dispense:  60 tablet     Refill:  0     Reduce doses taken as pain becomes manageable    lidocaine 1 % injection 8 mL     Controlled Substance Monitoring:    Acute and Chronic Pain Monitoring:   RX Monitoring Periodic Controlled Substance Monitoring   7/3/2024   1:19 PM No signs of potential drug abuse or diversion identified.             Elvie Joe D.O., P.T.  Board Certified Physical Medicine and Rehabilitation  Board Certified Electrodiagnostic Medicine

## 2024-06-25 ENCOUNTER — OFFICE VISIT (OUTPATIENT)
Dept: BARIATRICS/WEIGHT MGMT | Age: 49
End: 2024-06-25
Payer: MEDICARE

## 2024-06-25 VITALS
BODY MASS INDEX: 30.82 KG/M2 | RESPIRATION RATE: 20 BRPM | SYSTOLIC BLOOD PRESSURE: 131 MMHG | HEIGHT: 65 IN | WEIGHT: 185 LBS | TEMPERATURE: 98.2 F | DIASTOLIC BLOOD PRESSURE: 84 MMHG | HEART RATE: 78 BPM

## 2024-06-25 DIAGNOSIS — K91.2 MALNUTRITION FOLLOWING GASTROINTESTINAL SURGERY: Primary | ICD-10-CM

## 2024-06-25 DIAGNOSIS — E66.09 CLASS 1 OBESITY DUE TO EXCESS CALORIES WITHOUT SERIOUS COMORBIDITY WITH BODY MASS INDEX (BMI) OF 31.0 TO 31.9 IN ADULT: ICD-10-CM

## 2024-06-25 PROCEDURE — G8427 DOCREV CUR MEDS BY ELIG CLIN: HCPCS | Performed by: NURSE PRACTITIONER

## 2024-06-25 PROCEDURE — 4004F PT TOBACCO SCREEN RCVD TLK: CPT | Performed by: NURSE PRACTITIONER

## 2024-06-25 PROCEDURE — G8417 CALC BMI ABV UP PARAM F/U: HCPCS | Performed by: NURSE PRACTITIONER

## 2024-06-25 PROCEDURE — 99213 OFFICE O/P EST LOW 20 MIN: CPT | Performed by: NURSE PRACTITIONER

## 2024-06-25 PROCEDURE — 99211 OFF/OP EST MAY X REQ PHY/QHP: CPT

## 2024-06-25 PROCEDURE — 3017F COLORECTAL CA SCREEN DOC REV: CPT | Performed by: NURSE PRACTITIONER

## 2024-06-25 ASSESSMENT — ENCOUNTER SYMPTOMS
VOMITING: 0
NAUSEA: 0
CONSTIPATION: 0
SHORTNESS OF BREATH: 0
DIARRHEA: 0
WHEEZING: 0
COUGH: 0

## 2024-06-25 NOTE — PROGRESS NOTES
Chief Complaint   Patient presents with    Weight Management       HPI:  Patient presents today for follow up of phentermine. She has not lost any weight over the past month despite taking the medications. She follows a dirty keto diet as well. She has a hard time with exercises due to leg and back pain. She is 7 years s/p LRYGB. No surgical complaints at this time.       11/14/2023 - 227 lb - started on phentermine   12/14/2023 - 205 lb  1/11/2024 - 194 lb  2/8/2024 - 194 lb  3/7/2024 - 191 lb - phentermine and Topamax  4/5/2024 - 181 lb  - Topamax dc'd - side effects  5/3/2024 - 190 lb  6/7/2024 - 185  6/25/2024 - 185 lb    Last Surgical Weight Loss:      1/11/2024    10:35 AM 2/8/2024     1:18 PM 3/7/2024     1:20 PM 4/5/2024     1:05 PM 5/3/2024     1:02 PM 6/7/2024     1:17 PM 6/25/2024    12:46 PM   Surgical Weight Loss Tracker   Date 1/11/2024 2/8/2024 3/7/2024 4/5/2024 5/3/2024 6/7/2024 6/25/2024   Height 5' 6\" 5' 5.984\" 5' 5.984\" 5' 6\" 5' 5\" 5' 5\" 5' 5\"   Weight 194 lb 194 lb 194 lb 181 lb 190 lb 185 lb 185 lb   BMI 31.31 31.32 31.32 29.21 31.61 30.78 30.78   Weight Change -11 lb 0 oz -0 oz -13 lb 9 lb -5 lb 0 lb   Total Weight Change -277 lb -277 lb -277 lb -290 lb -281 lb -286 lb -286 lb   % EBWL 89% 89% 89% 93% 90% 92% 92%       Prior to Visit Medications    Medication Sig Taking? Authorizing Provider   phentermine (ADIPEX-P) 37.5 MG tablet Take 1 tablet by mouth every morning (before breakfast) for 30 days. Max Daily Amount: 37.5 mg Yes Nasima Trujillo, APRN - CNP   omeprazole (PRILOSEC) 20 MG delayed release capsule take 1 capsule by mouth once daily Yes Juanis Ruiz DO   betamethasone dipropionate 0.05 % ointment Apply topically 2 times daily APPLY TO AFFECTED AREA Yes Provider, Historical, MD   hydrOXYzine HCl (ATARAX) 10 MG tablet Take 1 tablet by mouth 3 times daily as needed Yes Provider, Historical, MD   clobetasol (TEMOVATE) 0.05 % cream Apply topically 2 times daily APPLY TO AFFECTED

## 2024-06-28 RX ORDER — LIDOCAINE HYDROCHLORIDE 10 MG/ML
8 INJECTION, SOLUTION EPIDURAL; INFILTRATION; INTRACAUDAL; PERINEURAL ONCE
Status: CANCELLED | OUTPATIENT
Start: 2024-06-28 | End: 2024-06-28

## 2024-06-28 NOTE — PROGRESS NOTES
Elvie Joe D.O.  [facility]  [address] [place] [address]  Phone: [phone]  Fax: [phone]    7/17/2024    Chief Complaint   Patient presents with    Knee Pain     LT knee injection       Last injection: 3/27/24  Taking anticoagulants/antiplatelets: No  Diabetic: No  Febrile/active infection: No    Ambulatory Procedure Time Out  Correct Patient: Yes  Correct Procedure: Yes  Correct Site/Side: Yes  Correct Site(s) Marked: Yes  Informed Consent Signed: Yes  Allergies Verified: Yes  Staff Present & Credential:: Mackenzie Wolf LPN/ Dr. Elvie Joe    Diagnosis:  1. Pain in extremity, unspecified extremity    2. Primary osteoarthritis of both knees            After explaining the indications, risks, benefits and alternatives of a left knee joint injection, the patient agreed to proceed.  A permit was signed and scanned into the chart. The skin on the lateral knee was prepared with chloraprep. Using sterile technique, a 22 gauge, 1.5\" needle with 1 cc of Kenalog 40mg/cc and 8 cc of 1% lidocaine was directed to the knee joint using US guidance. After negative aspiration, the medication was injected.  Adequate hemostasis was achieved and a bandage applied. The patient tolerated the procedure well and was educated in post injection care. The patient was clinically monitored after the injection and left the office without incident. There was post injection reduction in pain. Ultrasound images are scanned separately into the EMR.       lEvie Joe D.O., P.T.  Board Certified Physical Medicine and Rehabilitation  Board Certified Electrodiagnostic Medicine    Administrations This Visit       lidocaine 1 % injection 8 mL       Admin Date  07/17/2024  11:57 Action  Given Dose  8 mL Route  IntraDERmal Site  Other Administered By  Maddie Carrington MA    Ordering Provider: Elvie Joe DO    NDC: 0976-0099-42    Lot#: [id number]    : HOSPIRA

## 2024-07-03 ENCOUNTER — OFFICE VISIT (OUTPATIENT)
Dept: PHYSICAL MEDICINE AND REHAB | Age: 49
End: 2024-07-03
Payer: MEDICARE

## 2024-07-03 VITALS
HEART RATE: 79 BPM | SYSTOLIC BLOOD PRESSURE: 100 MMHG | BODY MASS INDEX: 31.62 KG/M2 | TEMPERATURE: 97.7 F | DIASTOLIC BLOOD PRESSURE: 71 MMHG | WEIGHT: 190 LBS

## 2024-07-03 DIAGNOSIS — M17.0 PRIMARY OSTEOARTHRITIS OF BOTH KNEES: ICD-10-CM

## 2024-07-03 PROCEDURE — 20611 DRAIN/INJ JOINT/BURSA W/US: CPT | Performed by: PHYSICAL MEDICINE & REHABILITATION

## 2024-07-03 RX ORDER — TRIAMCINOLONE ACETONIDE 40 MG/ML
40 INJECTION, SUSPENSION INTRA-ARTICULAR; INTRAMUSCULAR ONCE
Status: COMPLETED | OUTPATIENT
Start: 2024-07-03 | End: 2024-07-03

## 2024-07-03 RX ORDER — TRAMADOL HYDROCHLORIDE 50 MG/1
50 TABLET ORAL 2 TIMES DAILY PRN
Qty: 60 TABLET | Refills: 0 | Status: SHIPPED | OUTPATIENT
Start: 2024-07-03 | End: 2024-08-02

## 2024-07-03 RX ORDER — LIDOCAINE HYDROCHLORIDE 10 MG/ML
8 INJECTION, SOLUTION INFILTRATION; PERINEURAL ONCE
Status: COMPLETED | OUTPATIENT
Start: 2024-07-03 | End: 2024-07-03

## 2024-07-03 RX ADMIN — TRIAMCINOLONE ACETONIDE 40 MG: 40 INJECTION, SUSPENSION INTRA-ARTICULAR; INTRAMUSCULAR at 13:25

## 2024-07-03 RX ADMIN — LIDOCAINE HYDROCHLORIDE 8 ML: 10 INJECTION, SOLUTION INFILTRATION; PERINEURAL at 13:23

## 2024-07-09 RX ORDER — LIDOCAINE HYDROCHLORIDE 10 MG/ML
8 INJECTION, SOLUTION INFILTRATION; PERINEURAL ONCE
Status: CANCELLED | OUTPATIENT
Start: 2024-07-09 | End: 2024-07-09

## 2024-07-09 NOTE — PATIENT INSTRUCTIONS
41 year old wnwd female presents to ER with c/o severe headache, n,v, and chest pain since yesterday. 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 Pt states she has vomited two times today, denies any shortness of breath except when walkingPt is AA&O x 4, NAD at this time.   Vitamin D3 50k IU three times each week for 4 weeks           Ferrous sulfate 325 mg, one tablet with breakfast every other day take with Vitamin C   Take a multvitamin without iron two daily          Return to see me in five weeks          Repeat Vit D 25OH, ferritin, and zinc levels 2-3 days prior to appt with me

## 2024-07-11 ENCOUNTER — TELEPHONE (OUTPATIENT)
Dept: PHYSICAL MEDICINE AND REHAB | Age: 49
End: 2024-07-11

## 2024-07-15 ENCOUNTER — OFFICE VISIT (OUTPATIENT)
Dept: PRIMARY CARE CLINIC | Age: 49
End: 2024-07-15
Payer: MEDICARE

## 2024-07-15 VITALS
OXYGEN SATURATION: 98 % | DIASTOLIC BLOOD PRESSURE: 82 MMHG | BODY MASS INDEX: 31.02 KG/M2 | SYSTOLIC BLOOD PRESSURE: 129 MMHG | WEIGHT: 193 LBS | RESPIRATION RATE: 16 BRPM | TEMPERATURE: 98.8 F | HEIGHT: 66 IN | HEART RATE: 88 BPM

## 2024-07-15 DIAGNOSIS — R21 RASH AND NONSPECIFIC SKIN ERUPTION: Primary | ICD-10-CM

## 2024-07-15 PROCEDURE — 4004F PT TOBACCO SCREEN RCVD TLK: CPT | Performed by: NURSE PRACTITIONER

## 2024-07-15 PROCEDURE — 3017F COLORECTAL CA SCREEN DOC REV: CPT | Performed by: NURSE PRACTITIONER

## 2024-07-15 PROCEDURE — 99213 OFFICE O/P EST LOW 20 MIN: CPT | Performed by: NURSE PRACTITIONER

## 2024-07-15 PROCEDURE — G8417 CALC BMI ABV UP PARAM F/U: HCPCS | Performed by: NURSE PRACTITIONER

## 2024-07-15 PROCEDURE — G8427 DOCREV CUR MEDS BY ELIG CLIN: HCPCS | Performed by: NURSE PRACTITIONER

## 2024-07-15 RX ORDER — METHYLPREDNISOLONE 4 MG/1
TABLET ORAL
Qty: 1 KIT | Refills: 0 | Status: SHIPPED | OUTPATIENT
Start: 2024-07-15

## 2024-07-15 NOTE — PROGRESS NOTES
Chief Complaint:   Rash (Yesterday her and her BFF was swinging on the swings and after they were done ended up in rashes. Just on front of legs and her BFF has them too. Used clabetasol cream)    History of Present Illness   Source of history provided by:  patient.      Ana Luisa Crow is a 48 y.o. old female who presents to walk-in, for constant of red and flat area on bilateral knees which began 1 day(s) prior to arrival.  The symptoms were caused by unknown cause.  Patient states she was sitting outside on a swing when she noticed a stinging sensation to her knees. Patient states she was wearing jeans at the time and did not think anything of it but upon removing her jeans later that night noticed the rash. Patient denies known exposure to any new detergents, lotions, soaps or environmental agents. Patient states she had a blueberry cheesecake prior to symptom onset. Since onset the symptoms have been improving.   Prior history of similar episodes: No.   Her symptoms are associated with rash and relieved by nothing.  She denies any additional symptoms, difficulty breathing, difficulty swallowing, wheezing, itching, lightheadedness, dizziness, facial swelling, lip swelling, tongue swelling, fever, chills, chest pain, increased redness, or increased swelling.    ROS   Unless otherwise stated in this report or unable to obtain because of the patient's clinical or mental status as evidenced by the medical record, this patients's positive and negative responses for Review of Systems, constitutional, psych, eyes, ENT, cardiovascular, respiratory, gastrointestinal, neurological, genitourinary, musculoskeletal, integument systems and systems related to the presenting problem are either stated in the preceding or were not pertinent or were negative for the symptoms and/or complaints related to the medical problem.    Past Medical History:  has a past medical history of Anemia, Arthritis, Asthma, COVID-19,

## 2024-07-17 ENCOUNTER — OFFICE VISIT (OUTPATIENT)
Dept: PHYSICAL MEDICINE AND REHAB | Age: 49
End: 2024-07-17
Payer: MEDICARE

## 2024-07-17 VITALS
DIASTOLIC BLOOD PRESSURE: 78 MMHG | SYSTOLIC BLOOD PRESSURE: 128 MMHG | HEIGHT: 65 IN | BODY MASS INDEX: 32.49 KG/M2 | WEIGHT: 195 LBS | TEMPERATURE: 96.8 F

## 2024-07-17 DIAGNOSIS — M79.609 PAIN IN EXTREMITY, UNSPECIFIED EXTREMITY: Primary | ICD-10-CM

## 2024-07-17 DIAGNOSIS — M17.0 PRIMARY OSTEOARTHRITIS OF BOTH KNEES: ICD-10-CM

## 2024-07-17 PROCEDURE — 20611 DRAIN/INJ JOINT/BURSA W/US: CPT | Performed by: PHYSICAL MEDICINE & REHABILITATION

## 2024-07-17 RX ORDER — TRIAMCINOLONE ACETONIDE 40 MG/ML
40 INJECTION, SUSPENSION INTRA-ARTICULAR; INTRAMUSCULAR ONCE
Status: COMPLETED | OUTPATIENT
Start: 2024-07-17 | End: 2024-07-17

## 2024-07-17 RX ORDER — LIDOCAINE HYDROCHLORIDE 10 MG/ML
8 INJECTION, SOLUTION INFILTRATION; PERINEURAL ONCE
Status: COMPLETED | OUTPATIENT
Start: 2024-07-17 | End: 2024-07-17

## 2024-07-17 RX ADMIN — LIDOCAINE HYDROCHLORIDE 8 ML: 10 INJECTION, SOLUTION INFILTRATION; PERINEURAL at 11:57

## 2024-07-17 RX ADMIN — TRIAMCINOLONE ACETONIDE 40 MG: 40 INJECTION, SUSPENSION INTRA-ARTICULAR; INTRAMUSCULAR at 11:57

## 2024-07-25 ENCOUNTER — OFFICE VISIT (OUTPATIENT)
Dept: BARIATRICS/WEIGHT MGMT | Age: 49
End: 2024-07-25
Payer: MEDICARE

## 2024-07-25 VITALS
SYSTOLIC BLOOD PRESSURE: 122 MMHG | WEIGHT: 190 LBS | BODY MASS INDEX: 31.65 KG/M2 | HEART RATE: 96 BPM | HEIGHT: 65 IN | DIASTOLIC BLOOD PRESSURE: 76 MMHG

## 2024-07-25 DIAGNOSIS — E66.09 CLASS 1 OBESITY DUE TO EXCESS CALORIES WITHOUT SERIOUS COMORBIDITY WITH BODY MASS INDEX (BMI) OF 31.0 TO 31.9 IN ADULT: ICD-10-CM

## 2024-07-25 DIAGNOSIS — K91.2 MALNUTRITION FOLLOWING GASTROINTESTINAL SURGERY: Primary | ICD-10-CM

## 2024-07-25 DIAGNOSIS — M25.561 CHRONIC PAIN OF BOTH KNEES: ICD-10-CM

## 2024-07-25 DIAGNOSIS — M25.562 CHRONIC PAIN OF BOTH KNEES: ICD-10-CM

## 2024-07-25 DIAGNOSIS — G89.29 CHRONIC PAIN OF BOTH KNEES: ICD-10-CM

## 2024-07-25 PROCEDURE — G8427 DOCREV CUR MEDS BY ELIG CLIN: HCPCS | Performed by: NURSE PRACTITIONER

## 2024-07-25 PROCEDURE — 3017F COLORECTAL CA SCREEN DOC REV: CPT | Performed by: NURSE PRACTITIONER

## 2024-07-25 PROCEDURE — G8417 CALC BMI ABV UP PARAM F/U: HCPCS | Performed by: NURSE PRACTITIONER

## 2024-07-25 PROCEDURE — 99211 OFF/OP EST MAY X REQ PHY/QHP: CPT

## 2024-07-25 PROCEDURE — 4004F PT TOBACCO SCREEN RCVD TLK: CPT | Performed by: NURSE PRACTITIONER

## 2024-07-25 PROCEDURE — 99213 OFFICE O/P EST LOW 20 MIN: CPT | Performed by: NURSE PRACTITIONER

## 2024-07-25 ASSESSMENT — ENCOUNTER SYMPTOMS
CONSTIPATION: 0
VOMITING: 0
NAUSEA: 0
SHORTNESS OF BREATH: 0
DIARRHEA: 0
COUGH: 0
WHEEZING: 0

## 2024-07-25 NOTE — PATIENT INSTRUCTIONS
Please continue to take your vitamin and mineral supplements as instructed.      If you received a blood work prescription today for laboratory monitoring due prior to your next routine follow-up visit, please have this blood work obtained 10 to 14 days prior to your next visit.  It is important to fast for 12 hours prior to routine weight loss surgery blood work, EXCEPT for drinking water, to ensure accuracy of results.    Please report nausea, vomiting, abdominal pain, or any other problems you experience to your surgeon.  For problems related to weight loss surgery, it is best to go to [facility] Emergency Department and have your surgeon paged.    Last Surgical Weight Loss:      1/11/2024    10:35 AM 2/8/2024     1:18 PM 3/7/2024     1:20 PM 4/5/2024     1:05 PM 5/3/2024     1:02 PM 6/7/2024     1:17 PM 6/25/2024    12:46 PM   Surgical Weight Loss Tracker   Date 1/11/2024 2/8/2024 3/7/2024 4/5/2024 5/3/2024 6/7/2024 6/25/2024   Height 5' 6\" 5' 5.984\" 5' 5.984\" 5' 6\" 5' 5\" 5' 5\" 5' 5\"   Weight 194 lb 194 lb 194 lb 181 lb 190 lb 185 lb 185 lb   BMI 31.31 31.32 31.32 29.21 31.61 30.78 30.78   Weight Change -11 lb 0 oz -0 oz -13 lb 9 lb -5 lb 0 lb   Total Weight Change -277 lb -277 lb -277 lb -290 lb -281 lb -286 lb -286 lb   % EBWL 89% 89% 89% 93% 90% 92% 92%

## 2024-07-25 NOTE — PROGRESS NOTES
Chief Complaint   Patient presents with    Medication Check     Pt states she has not been taking medication for a month.   Wt check 190lb       HPI:  Patient presents today for follow up of phentermine. She denies any negative side effects at this time. She does report that she has not been eating well this past month. It is hard for her to exercise due to chronic leg and back pain. She did gain 5 pounds this past month. She has been off of this for the past 3 weeks    She is 7 years s/p LRYGB  11/14/2023 - 227 lb - started on phentermine   12/14/2023 - 205 lb  1/11/2024 - 194 lb  2/8/2024 - 194 lb  3/7/2024 - 191 lb - phentermine and Topamax  4/5/2024 - 181 lb  - Topamax dc'd - side effects  5/3/2024 - 190 lb  6/7/2024 - 185  6/25/2024 - 185 lb - stopped phentermine due to no weight loss  7/25/2024 - 190 lb    She has been on metformin and Topamax with negative side effects. Phentermine did initially work for her, however her weight loss has plateau.     Prior to Visit Medications    Medication Sig Taking? Authorizing Provider   traMADol (ULTRAM) 50 MG tablet Take 1 tablet by mouth 2 times daily as needed for Pain for up to 30 days. Max Daily Amount: 100 mg Yes Elvie Joe DO   omeprazole (PRILOSEC) 20 MG delayed release capsule take 1 capsule by mouth once daily Yes Juanis Ruiz DO   betamethasone dipropionate 0.05 % ointment Apply topically 2 times daily APPLY TO AFFECTED AREA Yes Provider, MD Leslee   hydrOXYzine HCl (ATARAX) 10 MG tablet Take 1 tablet by mouth 3 times daily as needed Yes Provider, MD Leslee   clobetasol (TEMOVATE) 0.05 % cream Apply topically 2 times daily APPLY TO AFFECTED AREA Yes Provider, MD Leslee   vitamin D (ERGOCALCIFEROL) 1.25 MG (18765 UT) CAPS capsule take 1 capsule by mouth every week Yes Juanis Ruiz DO   ondansetron (ZOFRAN-ODT) 4 MG disintegrating tablet Place 1 tablet under the tongue every 8 hours as needed for Nausea or Vomiting Yes Joseph

## 2024-07-29 DIAGNOSIS — K28.9 MARGINAL ULCER: ICD-10-CM

## 2024-07-29 DIAGNOSIS — Z98.84 S/P GASTRIC BYPASS: ICD-10-CM

## 2024-07-29 DIAGNOSIS — E55.9 VITAMIN D DEFICIENCY: ICD-10-CM

## 2024-07-29 RX ORDER — HYDROXYZINE HYDROCHLORIDE 10 MG/1
10 TABLET, FILM COATED ORAL 3 TIMES DAILY PRN
Status: CANCELLED | OUTPATIENT
Start: 2024-07-29

## 2024-07-29 RX ORDER — LORAZEPAM 0.5 MG/1
TABLET ORAL
Status: CANCELLED | OUTPATIENT
Start: 2024-07-29

## 2024-07-29 RX ORDER — ONDANSETRON 4 MG/1
4 TABLET, ORALLY DISINTEGRATING ORAL EVERY 8 HOURS PRN
Qty: 90 TABLET | Refills: 3 | Status: SHIPPED | OUTPATIENT
Start: 2024-07-29

## 2024-07-29 RX ORDER — BUPROPION HYDROCHLORIDE 200 MG/1
200 TABLET, EXTENDED RELEASE ORAL DAILY
Qty: 60 TABLET | Status: CANCELLED | OUTPATIENT
Start: 2024-07-29

## 2024-07-29 RX ORDER — ERGOCALCIFEROL 1.25 MG/1
50000 CAPSULE, LIQUID FILLED ORAL WEEKLY
Qty: 12 CAPSULE | Refills: 1 | Status: SHIPPED | OUTPATIENT
Start: 2024-07-29

## 2024-08-23 ENCOUNTER — OFFICE VISIT (OUTPATIENT)
Dept: BARIATRICS/WEIGHT MGMT | Age: 49
End: 2024-08-23
Payer: MEDICARE

## 2024-08-23 VITALS
DIASTOLIC BLOOD PRESSURE: 76 MMHG | SYSTOLIC BLOOD PRESSURE: 109 MMHG | BODY MASS INDEX: 33.24 KG/M2 | WEIGHT: 199.5 LBS | TEMPERATURE: 97 F | OXYGEN SATURATION: 98 % | HEIGHT: 65 IN | HEART RATE: 78 BPM

## 2024-08-23 DIAGNOSIS — M25.561 CHRONIC PAIN OF BOTH KNEES: ICD-10-CM

## 2024-08-23 DIAGNOSIS — E66.09 CLASS 1 OBESITY DUE TO EXCESS CALORIES WITHOUT SERIOUS COMORBIDITY WITH BODY MASS INDEX (BMI) OF 31.0 TO 31.9 IN ADULT: Primary | ICD-10-CM

## 2024-08-23 DIAGNOSIS — M25.562 CHRONIC PAIN OF BOTH KNEES: ICD-10-CM

## 2024-08-23 DIAGNOSIS — G89.29 CHRONIC PAIN OF BOTH KNEES: ICD-10-CM

## 2024-08-23 DIAGNOSIS — K91.2 MALNUTRITION FOLLOWING GASTROINTESTINAL SURGERY: ICD-10-CM

## 2024-08-23 PROCEDURE — G8417 CALC BMI ABV UP PARAM F/U: HCPCS | Performed by: NURSE PRACTITIONER

## 2024-08-23 PROCEDURE — 4004F PT TOBACCO SCREEN RCVD TLK: CPT | Performed by: NURSE PRACTITIONER

## 2024-08-23 PROCEDURE — 3017F COLORECTAL CA SCREEN DOC REV: CPT | Performed by: NURSE PRACTITIONER

## 2024-08-23 PROCEDURE — G8427 DOCREV CUR MEDS BY ELIG CLIN: HCPCS | Performed by: NURSE PRACTITIONER

## 2024-08-23 PROCEDURE — 99213 OFFICE O/P EST LOW 20 MIN: CPT | Performed by: NURSE PRACTITIONER

## 2024-08-23 RX ORDER — PHENTERMINE HYDROCHLORIDE 37.5 MG/1
37.5 TABLET ORAL
Qty: 30 TABLET | Refills: 0 | Status: SHIPPED | OUTPATIENT
Start: 2024-08-23 | End: 2024-09-22

## 2024-08-23 ASSESSMENT — ENCOUNTER SYMPTOMS
CONSTIPATION: 0
VOMITING: 0
NAUSEA: 0
COUGH: 0
SHORTNESS OF BREATH: 0
DIARRHEA: 0
WHEEZING: 0

## 2024-08-23 NOTE — PROGRESS NOTES
Chief Complaint   Patient presents with    Weight Management       HPI:  Patient presents today for follow up of weight loss medication. She reports that she had to put her dog down last week. She reports that she has been stress eating.     She is 7 years s/p LRYGB  11/14/2023 - 227 lb - started on phentermine   12/14/2023 - 205 lb  1/11/2024 - 194 lb  2/8/2024 - 194 lb  3/7/2024 - 191 lb - phentermine and Topamax  4/5/2024 - 181 lb  - Topamax dc'd - side effects  5/3/2024 - 190 lb  6/7/2024 - 185  6/25/2024 - 185 lb - stopped phentermine due to no weight loss  7/25/2024 - 190 lb  8/23/2024 - 199 lb      Prior to Visit Medications    Medication Sig Taking? Authorizing Provider   phentermine (ADIPEX-P) 37.5 MG tablet Take 1 tablet by mouth every morning (before breakfast) for 30 days. Max Daily Amount: 37.5 mg Yes Nasima Trujillo APRN - CNP   omeprazole (PRILOSEC) 20 MG delayed release capsule Take 1 capsule by mouth daily  Chelsy Zheng DO   vitamin D (ERGOCALCIFEROL) 1.25 MG (40396 UT) CAPS capsule Take 1 capsule by mouth Once a week at 5 PM  Chelsy Zheng DO   ondansetron (ZOFRAN-ODT) 4 MG disintegrating tablet Place 1 tablet under the tongue every 8 hours as needed for Nausea or Vomiting  Chelsy Zheng DO   methylPREDNISolone (MEDROL DOSEPACK) 4 MG tablet Take by mouth.  Patient not taking: Reported on 7/25/2024  Ambrose Naik APRN - CNP   betamethasone dipropionate 0.05 % ointment Apply topically 2 times daily APPLY TO AFFECTED AREA  ProviderLeslee MD   hydrOXYzine HCl (ATARAX) 10 MG tablet Take 1 tablet by mouth 3 times daily as needed  Leslee Marti MD   clobetasol (TEMOVATE) 0.05 % cream Apply topically 2 times daily APPLY TO AFFECTED AREA  ProviderLeslee MD   albuterol sulfate HFA (VENTOLIN HFA) 108 (90 Base) MCG/ACT inhaler Inhale 2 puffs into the lungs every 6 hours as needed for Wheezing  Eric Golden MD   LORazepam (ATIVAN) 0.5 MG tablet take 1 tablet by

## 2024-09-08 NOTE — PATIENT INSTRUCTIONS
Please continue to take your vitamin and mineral supplements as instructed.      If you received a blood work prescription today for laboratory monitoring due prior to your next routine follow-up visit, please have this blood work obtained 10 to 14 days prior to your next visit.  It is important to fast for 12 hours prior to routine weight loss surgery blood work, EXCEPT for drinking water, to ensure accuracy of results.    Please report nausea, vomiting, abdominal pain, or any other problems you experience to your surgeon.  For problems related to weight loss surgery, it is best to go to [facility] Emergency Department and have your surgeon paged.    Last Surgical Weight Loss:      1/11/2024    10:35 AM 2/8/2024     1:18 PM 3/7/2024     1:20 PM 4/5/2024     1:05 PM 5/3/2024     1:02 PM 6/7/2024     1:17 PM 6/25/2024    12:46 PM   Surgical Weight Loss Tracker   Date 1/11/2024 2/8/2024 3/7/2024 4/5/2024 5/3/2024 6/7/2024 6/25/2024   Height 5' 6\" 5' 5.984\" 5' 5.984\" 5' 6\" 5' 5\" 5' 5\" 5' 5\"   Weight 194 lb 194 lb 194 lb 181 lb 190 lb 185 lb 185 lb   BMI 31.31 31.32 31.32 29.21 31.61 30.78 30.78   Weight Change -11 lb 0 oz -0 oz -13 lb 9 lb -5 lb 0 lb   Total Weight Change -277 lb -277 lb -277 lb -290 lb -281 lb -286 lb -286 lb   % EBWL 89% 89% 89% 93% 90% 92% 92%         
Yes

## 2024-09-12 NOTE — PROGRESS NOTES
Elvie Joe D.O.  [facility]  [address] [place] [address]  Phone: [phone]  Fax: [phone]    10/8/2024    Chief Complaint   Patient presents with    Knee Pain     Right knee injection          Last injection: 07/03/24  Taking anticoagulants/antiplatelets: No  Diabetic: No  Febrile/active infection: No    Ambulatory Procedure Time Out  Correct Patient: Yes  Correct Procedure: Yes  Correct Site/Side: Yes  Correct Site(s) Marked: Yes  Informed Consent Signed: Yes  Allergies Verified: Yes  Staff Present & Credential:: Mackenzie Wolf LPN/ Dr. Elvie Joe    Diagnosis:  1. Pain in extremity, unspecified extremity    2. Primary osteoarthritis of both knees              After explaining the indications, risks, benefits and alternatives of a  right knee joint injection, the patient agreed to proceed.  A permit was signed and scanned into the chart. The skin on the lateral knee was prepared with chloraprep. Using sterile technique, a 22 gauge, 1.5\" needle with 1 cc of Kenalog 40mg/cc and 8 cc of 1% lidocaine was directed to the knee joint using US guidance. After negative aspiration, the medication was injected.  Adequate hemostasis was achieved and a bandage applied. The patient tolerated the procedure well and was educated in post injection care. The patient was clinically monitored after the injection and left the office without incident. There was post injection reduction in pain. Ultrasound images are scanned separately into the EMR.     Controlled Substance Monitoring:    Acute and Chronic Pain Monitoring:   RX Monitoring Periodic Controlled Substance Monitoring   10/7/2024   4:51 PM No signs of potential drug abuse or diversion identified.       Orders Placed This Encounter   Medications    lidocaine 1 % injection 8 mL    triamcinolone acetonide (KENALOG-40) injection 40 mg    traMADol (ULTRAM) 50 MG tablet     Sig: Take 1 tablet by mouth 2 times

## 2024-09-17 NOTE — PROGRESS NOTES
Elvie Joe D.O.  [facility]  [address] [place] [address]  Phone: [phone]  Fax: [phone]    10/10/2024    Chief Complaint   Patient presents with    Knee Pain     Left knee injection       Last injection: 07/17/2024  Taking anticoagulants/antiplatelets: No  Diabetic: No  Febrile/active infection: No    Ambulatory Procedure Time Out  Correct Patient: Yes  Correct Procedure: Yes  Correct Site/Side: Yes  Correct Site(s) Marked: Yes  Informed Consent Signed: Yes  Allergies Verified: Yes  Staff Present & Credential:: Mackenzie Wolf LPN/ Dr. Elvie Joe    Diagnosis:  1. Pain in extremity, unspecified extremity    2. Primary osteoarthritis of both knees              After explaining the indications, risks, benefits and alternatives of a left knee joint injection, the patient agreed to proceed.  A permit was signed and scanned into the chart. The skin on the lateral knee was prepared with chloraprep. Using sterile technique, a 22 gauge, 1.5\" needle with 1 cc of Kenalog 40mg/cc and 8 cc of 1% lidocaine was directed to the knee joint using US guidance. After negative aspiration, the medication was injected.  Adequate hemostasis was achieved and a bandage applied. The patient tolerated the procedure well and was educated in post injection care. The patient was clinically monitored after the injection and left the office without incident. There was post injection reduction in pain. Ultrasound images are scanned separately into the EMR.       Elvie Joe D.O., P.T.  Board Certified Physical Medicine and Rehabilitation  Board Certified Electrodiagnostic Medicine    Administrations This Visit       lidocaine 1 % injection 8 mL       Admin Date  10/10/2024  11:32 Action  Given Dose  8 mL Route  Intra-artICUlar Site   Documented By  Mackenzie Wolf LPN    NDC: 2751-0193-39    Lot#: [id number]    : HOSPIRA    Patient Supplied?: No

## 2024-10-01 ENCOUNTER — OFFICE VISIT (OUTPATIENT)
Dept: BARIATRICS/WEIGHT MGMT | Age: 49
End: 2024-10-01
Payer: MEDICARE

## 2024-10-01 VITALS
SYSTOLIC BLOOD PRESSURE: 118 MMHG | DIASTOLIC BLOOD PRESSURE: 74 MMHG | HEIGHT: 65 IN | BODY MASS INDEX: 34.82 KG/M2 | HEART RATE: 73 BPM | WEIGHT: 209 LBS

## 2024-10-01 DIAGNOSIS — E66.09 CLASS 1 OBESITY DUE TO EXCESS CALORIES WITHOUT SERIOUS COMORBIDITY WITH BODY MASS INDEX (BMI) OF 31.0 TO 31.9 IN ADULT: Primary | ICD-10-CM

## 2024-10-01 DIAGNOSIS — K91.2 MALNUTRITION FOLLOWING GASTROINTESTINAL SURGERY: ICD-10-CM

## 2024-10-01 DIAGNOSIS — E66.811 CLASS 1 OBESITY DUE TO EXCESS CALORIES WITHOUT SERIOUS COMORBIDITY WITH BODY MASS INDEX (BMI) OF 31.0 TO 31.9 IN ADULT: Primary | ICD-10-CM

## 2024-10-01 PROCEDURE — 99213 OFFICE O/P EST LOW 20 MIN: CPT | Performed by: NURSE PRACTITIONER

## 2024-10-01 PROCEDURE — 4004F PT TOBACCO SCREEN RCVD TLK: CPT | Performed by: NURSE PRACTITIONER

## 2024-10-01 PROCEDURE — G8484 FLU IMMUNIZE NO ADMIN: HCPCS | Performed by: NURSE PRACTITIONER

## 2024-10-01 PROCEDURE — 3017F COLORECTAL CA SCREEN DOC REV: CPT | Performed by: NURSE PRACTITIONER

## 2024-10-01 PROCEDURE — G8427 DOCREV CUR MEDS BY ELIG CLIN: HCPCS | Performed by: NURSE PRACTITIONER

## 2024-10-01 PROCEDURE — G8417 CALC BMI ABV UP PARAM F/U: HCPCS | Performed by: NURSE PRACTITIONER

## 2024-10-01 ASSESSMENT — ENCOUNTER SYMPTOMS
COUGH: 0
VOMITING: 0
DIARRHEA: 0
WHEEZING: 0
NAUSEA: 0
SHORTNESS OF BREATH: 0
CONSTIPATION: 0

## 2024-10-01 NOTE — PATIENT INSTRUCTIONS
Please continue to take your vitamin and mineral supplements as instructed.      If you received a blood work prescription today for laboratory monitoring due prior to your next routine follow-up visit, please have this blood work obtained 10 to 14 days prior to your next visit.  It is important to fast for 12 hours prior to routine weight loss surgery blood work, EXCEPT for drinking water, to ensure accuracy of results.    Please report nausea, vomiting, abdominal pain, or any other problems you experience to your surgeon.  For problems related to weight loss surgery, it is best to go to [facility] Emergency Department and have your surgeon paged.    Last Surgical Weight Loss:      3/7/2024     1:20 PM 4/5/2024     1:05 PM 5/3/2024     1:02 PM 6/7/2024     1:17 PM 6/25/2024    12:46 PM 8/23/2024    12:37 PM 10/1/2024    12:56 PM   Surgical Weight Loss Tracker   Consult Date      7/20/2016    Initial Height      5' 5\"    Initial Weight      460 lb    Initial BMI      76.54    Ideal Body Weight      159 lb    Pre-Surgical Height      5' 5\"    Pre-Surgical Weight      471 lb    Pre Surgery BMI      78.37    Weight to Lose      312 lb    Date 3/7/2024 4/5/2024 5/3/2024 6/7/2024 6/25/2024 8/23/2024 10/1/2024   Height 5' 5.984\" 5' 6\" 5' 5\" 5' 5\" 5' 5\" 5' 5\" 5' 5\"   Weight 194 lb 181 lb 190 lb 185 lb 185 lb 199 lb 12.8 oz 209 lb   BMI 31.32 29.21 31.61 30.78 30.78 33.24 34.78   Weight Change -0 oz -13 lb 9 lb -5 lb 0 lb 14 lb 12.8 oz 9 lb 3.2 oz   Total Weight Change -277 lb -290 lb -281 lb -286 lb -286 lb -271 lb 3.2 oz -262 lb   % EBWL 89% 93% 90% 92% 92% 87% 84%

## 2024-10-01 NOTE — PROGRESS NOTES
Chief Complaint   Patient presents with    Medication Check     Phentermine        HPI:  Patient presents today for follow up of phentermine. She reports that she has gained weight this past month despite taking the phentermine. She is having a hard time staying on track and eating what she is supposed to. She does have bilateral lower leg edema, which is acting up for her. She has been on several other medications that we use in this office for weight loss.     She is 7 years s/p LRYGB  11/14/2023 - 227 lb - started on phentermine   12/14/2023 - 205 lb  1/11/2024 - 194 lb  2/8/2024 - 194 lb  3/7/2024 - 191 lb - phentermine and Topamax  4/5/2024 - 181 lb  - Topamax dc'd - side effects  5/3/2024 - 190 lb  6/7/2024 - 185 lb  6/25/2024 - 185 lb - stopped phentermine due to no weight loss  7/25/2024 - 190 lb  8/23/2024 - 199 lb  10/1/2024 - 209 lb      Last Surgical Weight Loss:      3/7/2024     1:20 PM 4/5/2024     1:05 PM 5/3/2024     1:02 PM 6/7/2024     1:17 PM 6/25/2024    12:46 PM 8/23/2024    12:37 PM 10/1/2024    12:56 PM   Surgical Weight Loss Tracker   Consult Date      7/20/2016    Initial Height      5' 5\"    Initial Weight      460 lb    Initial BMI      76.54    Ideal Body Weight      159 lb    Pre-Surgical Height      5' 5\"    Pre-Surgical Weight      471 lb    Pre Surgery BMI      78.37    Weight to Lose      312 lb    Date 3/7/2024 4/5/2024 5/3/2024 6/7/2024 6/25/2024 8/23/2024 10/1/2024   Height 5' 5.984\" 5' 6\" 5' 5\" 5' 5\" 5' 5\" 5' 5\" 5' 5\"   Weight 194 lb 181 lb 190 lb 185 lb 185 lb 199 lb 12.8 oz 209 lb   BMI 31.32 29.21 31.61 30.78 30.78 33.24 34.78   Weight Change -0 oz -13 lb 9 lb -5 lb 0 lb 14 lb 12.8 oz 9 lb 3.2 oz   Total Weight Change -277 lb -290 lb -281 lb -286 lb -286 lb -271 lb 3.2 oz -262 lb   % EBWL 89% 93% 90% 92% 92% 87% 84%           Prior to Visit Medications    Medication Sig Taking? Authorizing Provider   omeprazole (PRILOSEC) 20 MG delayed release capsule Take 1 capsule by

## 2024-10-03 ENCOUNTER — OFFICE VISIT (OUTPATIENT)
Dept: PHYSICAL MEDICINE AND REHAB | Age: 49
End: 2024-10-03
Payer: MEDICARE

## 2024-10-03 VITALS
BODY MASS INDEX: 35.65 KG/M2 | SYSTOLIC BLOOD PRESSURE: 122 MMHG | HEIGHT: 65 IN | WEIGHT: 214 LBS | DIASTOLIC BLOOD PRESSURE: 68 MMHG

## 2024-10-03 DIAGNOSIS — M79.609 PAIN IN EXTREMITY, UNSPECIFIED EXTREMITY: Primary | ICD-10-CM

## 2024-10-03 DIAGNOSIS — M17.0 PRIMARY OSTEOARTHRITIS OF BOTH KNEES: ICD-10-CM

## 2024-10-07 RX ORDER — TRAMADOL HYDROCHLORIDE 50 MG/1
50 TABLET ORAL 2 TIMES DAILY PRN
Qty: 60 TABLET | Refills: 0 | Status: SHIPPED | OUTPATIENT
Start: 2024-10-07 | End: 2024-11-06

## 2024-10-07 RX ORDER — TRIAMCINOLONE ACETONIDE 40 MG/ML
40 INJECTION, SUSPENSION INTRA-ARTICULAR; INTRAMUSCULAR ONCE
Status: COMPLETED | OUTPATIENT
Start: 2024-10-07 | End: 2024-10-08

## 2024-10-07 RX ORDER — LIDOCAINE HYDROCHLORIDE 10 MG/ML
8 INJECTION, SOLUTION INFILTRATION; PERINEURAL ONCE
Status: COMPLETED | OUTPATIENT
Start: 2024-10-07 | End: 2024-10-08

## 2024-10-08 PROCEDURE — 20611 DRAIN/INJ JOINT/BURSA W/US: CPT | Performed by: PHYSICAL MEDICINE & REHABILITATION

## 2024-10-08 RX ADMIN — TRIAMCINOLONE ACETONIDE 40 MG: 40 INJECTION, SUSPENSION INTRA-ARTICULAR; INTRAMUSCULAR at 07:53

## 2024-10-08 RX ADMIN — LIDOCAINE HYDROCHLORIDE 8 ML: 10 INJECTION, SOLUTION INFILTRATION; PERINEURAL at 07:53

## 2024-10-10 ENCOUNTER — OFFICE VISIT (OUTPATIENT)
Dept: PHYSICAL MEDICINE AND REHAB | Age: 49
End: 2024-10-10
Payer: MEDICARE

## 2024-10-10 VITALS
BODY MASS INDEX: 35.32 KG/M2 | WEIGHT: 212 LBS | SYSTOLIC BLOOD PRESSURE: 120 MMHG | HEIGHT: 65 IN | DIASTOLIC BLOOD PRESSURE: 70 MMHG

## 2024-10-10 DIAGNOSIS — M17.0 PRIMARY OSTEOARTHRITIS OF BOTH KNEES: ICD-10-CM

## 2024-10-10 DIAGNOSIS — M79.609 PAIN IN EXTREMITY, UNSPECIFIED EXTREMITY: Primary | ICD-10-CM

## 2024-10-10 PROCEDURE — 20611 DRAIN/INJ JOINT/BURSA W/US: CPT | Performed by: PHYSICAL MEDICINE & REHABILITATION

## 2024-10-10 RX ORDER — TRIAMCINOLONE ACETONIDE 40 MG/ML
40 INJECTION, SUSPENSION INTRA-ARTICULAR; INTRAMUSCULAR ONCE
Status: COMPLETED | OUTPATIENT
Start: 2024-10-10 | End: 2024-10-10

## 2024-10-10 RX ORDER — LIDOCAINE HYDROCHLORIDE 10 MG/ML
8 INJECTION, SOLUTION INFILTRATION; PERINEURAL ONCE
Status: COMPLETED | OUTPATIENT
Start: 2024-10-10 | End: 2024-10-10

## 2024-10-10 RX ADMIN — TRIAMCINOLONE ACETONIDE 40 MG: 40 INJECTION, SUSPENSION INTRA-ARTICULAR; INTRAMUSCULAR at 11:33

## 2024-10-10 RX ADMIN — LIDOCAINE HYDROCHLORIDE 8 ML: 10 INJECTION, SOLUTION INFILTRATION; PERINEURAL at 11:32

## 2024-10-28 ENCOUNTER — TELEPHONE (OUTPATIENT)
Dept: FAMILY MEDICINE CLINIC | Age: 49
End: 2024-10-28

## 2024-10-28 DIAGNOSIS — E55.9 VITAMIN D DEFICIENCY: ICD-10-CM

## 2024-10-28 DIAGNOSIS — K28.9 MARGINAL ULCER: ICD-10-CM

## 2024-10-28 DIAGNOSIS — K91.2 MALNUTRITION FOLLOWING GASTROINTESTINAL SURGERY: Primary | ICD-10-CM

## 2024-10-28 DIAGNOSIS — Z98.84 S/P GASTRIC BYPASS: ICD-10-CM

## 2024-10-28 RX ORDER — ERGOCALCIFEROL 1.25 MG/1
50000 CAPSULE, LIQUID FILLED ORAL WEEKLY
Qty: 12 CAPSULE | Refills: 1 | Status: SHIPPED | OUTPATIENT
Start: 2024-10-28

## 2024-10-28 RX ORDER — LORAZEPAM 0.5 MG/1
0.5 TABLET ORAL
OUTPATIENT
Start: 2024-10-28

## 2024-10-28 RX ORDER — BUPROPION HYDROCHLORIDE 200 MG/1
200 TABLET, EXTENDED RELEASE ORAL DAILY
Qty: 90 TABLET | Refills: 1 | OUTPATIENT
Start: 2024-10-28

## 2024-10-28 NOTE — TELEPHONE ENCOUNTER
New pharmacy [facility] [address]  Bupropion 200 MG  Omeprazole 20 MG  Lorazepam 0.5 mg  vitamin d 2  Needs these refilled and has a new pharmacy

## 2024-10-29 RX ORDER — BUPROPION HYDROCHLORIDE 200 MG/1
200 TABLET, EXTENDED RELEASE ORAL DAILY
Qty: 90 TABLET | Refills: 0 | Status: SHIPPED | OUTPATIENT
Start: 2024-10-29

## 2024-10-29 NOTE — TELEPHONE ENCOUNTER
Ana Luisa called in regarding the Bupropion 200 MG and the Lorazepam 0.5 mg. She said this medication was filled by her psychiatrist when she was going to [facility] care but her insurance no longer allows her to see this doctor. She wants to know how she's suppose to go about getting these filled since she not longer sees a psychiatrist?    Please call Ana Luisa back at [phone]

## 2024-10-29 NOTE — TELEPHONE ENCOUNTER
I can refill until she gets in with another psychiatrist, I would have her call her insurance to see who they cover

## 2024-10-29 NOTE — TELEPHONE ENCOUNTER
Name of Medication(s) Requested:  Requested Prescriptions     Pending Prescriptions Disp Refills    buPROPion (WELLBUTRIN SR) 200 MG extended release tablet 90 tablet 0     Sig: Take 1 tablet by mouth daily       Medication is on current medication list Yes    Dosage and directions were verified? Yes    Quantity verified: 90 day supply     Pharmacy Verified?  Yes    Last Appointment:  3/27/2024    Future appts:  Future Appointments   Date Time Provider Department Center   11/14/2024 12:30 PM Nasima Trujillo, APRN - CNP Surg Weight [facility]   1/3/2025  1:00 PM Elvie Joe DO [place] [facility]   1/10/2025 11:30 AM Elvie Joe DO [place] [facility]   3/28/2025  8:30 AM Juanis Ruiz DO [facility]        (If no appt send self scheduling link. .REFILLAPPT)  Scheduling request sent?     [] Yes  [x] No    Does patient need updated?  [] Yes  [x] No

## 2024-11-08 ENCOUNTER — HOSPITAL ENCOUNTER (OUTPATIENT)
Age: 49
Discharge: HOME | End: 2024-11-08
Payer: MEDICARE

## 2024-11-08 DIAGNOSIS — K91.2 MALNUTRITION FOLLOWING GASTROINTESTINAL SURGERY: ICD-10-CM

## 2024-11-08 LAB
25(OH)D3 SERPL-MCNC: 26.2 NG/ML (ref 30–100)
ALBUMIN SERPL-MCNC: 3.8 G/DL (ref 3.5–5.2)
ALP SERPL-CCNC: 93 U/L (ref 35–104)
ALT SERPL-CCNC: 12 U/L (ref 0–32)
ANION GAP SERPL CALCULATED.3IONS-SCNC: 8 MMOL/L (ref 7–16)
AST SERPL-CCNC: 18 U/L (ref 0–31)
BILIRUB SERPL-MCNC: 0.3 MG/DL (ref 0–1.2)
BUN SERPL-MCNC: 15 MG/DL (ref 6–20)
CALCIUM SERPL-MCNC: 8.6 MG/DL (ref 8.6–10.2)
CHLORIDE SERPL-SCNC: 102 MMOL/L (ref 98–107)
CHOLEST SERPL-MCNC: 182 MG/DL
CO2 SERPL-SCNC: 26 MMOL/L (ref 22–29)
CREAT SERPL-MCNC: 0.6 MG/DL (ref 0.5–1)
ERYTHROCYTE [DISTWIDTH] IN BLOOD BY AUTOMATED COUNT: 15 % (ref 11.5–15)
FERRITIN SERPL-MCNC: 11 NG/ML
FOLATE SERPL-MCNC: 15.8 NG/ML (ref 4.8–24.2)
GFR, ESTIMATED: >90 ML/MIN/1.73M2
GLUCOSE SERPL-MCNC: 85 MG/DL (ref 74–99)
HCT VFR BLD AUTO: 39.9 % (ref 34–48)
HDLC SERPL-MCNC: 97 MG/DL
HGB BLD-MCNC: 12.8 G/DL (ref 11.5–15.5)
LDLC SERPL CALC-MCNC: 74 MG/DL
MCH RBC QN AUTO: 29.8 PG (ref 26–35)
MCHC RBC AUTO-ENTMCNC: 32.1 G/DL (ref 32–34.5)
MCV RBC AUTO: 93 FL (ref 80–99.9)
PLATELET # BLD AUTO: 498 K/UL (ref 130–450)
PMV BLD AUTO: 7.9 FL (ref 7–12)
POTASSIUM SERPL-SCNC: 4.3 MMOL/L (ref 3.5–5)
PREALB SERPL-MCNC: 22 MG/DL (ref 20–40)
PROT SERPL-MCNC: 6.8 G/DL (ref 6.4–8.3)
RBC # BLD AUTO: 4.29 M/UL (ref 3.5–5.5)
SODIUM SERPL-SCNC: 136 MMOL/L (ref 132–146)
TRIGL SERPL-MCNC: 57 MG/DL
VIT B12 SERPL-MCNC: 337 PG/ML (ref 211–946)
VLDLC SERPL CALC-MCNC: 11 MG/DL
WBC OTHER # BLD: 8.8 K/UL (ref 4.5–11.5)

## 2024-11-08 PROCEDURE — 80053 COMPREHEN METABOLIC PANEL: CPT

## 2024-11-08 PROCEDURE — 82306 VITAMIN D 25 HYDROXY: CPT

## 2024-11-08 PROCEDURE — 84630 ASSAY OF ZINC: CPT

## 2024-11-08 PROCEDURE — 80061 LIPID PANEL: CPT

## 2024-11-08 PROCEDURE — 84134 ASSAY OF PREALBUMIN: CPT

## 2024-11-08 PROCEDURE — 84255 ASSAY OF SELENIUM: CPT

## 2024-11-08 PROCEDURE — 85027 COMPLETE CBC AUTOMATED: CPT

## 2024-11-08 PROCEDURE — 82746 ASSAY OF FOLIC ACID SERUM: CPT

## 2024-11-08 PROCEDURE — 82607 VITAMIN B-12: CPT

## 2024-11-08 PROCEDURE — 82525 ASSAY OF COPPER: CPT

## 2024-11-08 PROCEDURE — 82728 ASSAY OF FERRITIN: CPT

## 2024-11-08 PROCEDURE — 36415 COLL VENOUS BLD VENIPUNCTURE: CPT

## 2024-11-08 PROCEDURE — 84425 ASSAY OF VITAMIN B-1: CPT

## 2024-11-08 PROCEDURE — 84590 ASSAY OF VITAMIN A: CPT

## 2024-11-11 LAB
COPPER SERPL-MCNC: 141 UG/DL (ref 80–155)
SELENIUM SERPL-MCNC: 116.9 UG/L (ref 23–190)
ZINC SERPL-MCNC: 66.3 UG/DL (ref 60–120)

## 2024-11-12 LAB
RETINYL PALMITATE: <0.02 MG/L (ref 0–0.1)
VITAMIN A LEVEL: 0.49 MG/L (ref 0.3–1.2)
VITAMIN A, INTERP: NORMAL

## 2024-11-14 ENCOUNTER — OFFICE VISIT (OUTPATIENT)
Dept: BARIATRICS/WEIGHT MGMT | Age: 49
End: 2024-11-14
Payer: MEDICARE

## 2024-11-14 VITALS
WEIGHT: 218 LBS | HEIGHT: 65 IN | SYSTOLIC BLOOD PRESSURE: 100 MMHG | BODY MASS INDEX: 36.32 KG/M2 | HEART RATE: 72 BPM | DIASTOLIC BLOOD PRESSURE: 68 MMHG

## 2024-11-14 DIAGNOSIS — K91.2 MALNUTRITION FOLLOWING GASTROINTESTINAL SURGERY: Primary | ICD-10-CM

## 2024-11-14 DIAGNOSIS — E55.9 VITAMIN D DEFICIENCY: ICD-10-CM

## 2024-11-14 LAB — VIT B1 PYROPHOSHATE BLD-SCNC: 106 NMOL/L (ref 70–180)

## 2024-11-14 PROCEDURE — 3017F COLORECTAL CA SCREEN DOC REV: CPT | Performed by: NURSE PRACTITIONER

## 2024-11-14 PROCEDURE — G8417 CALC BMI ABV UP PARAM F/U: HCPCS | Performed by: NURSE PRACTITIONER

## 2024-11-14 PROCEDURE — 99213 OFFICE O/P EST LOW 20 MIN: CPT | Performed by: NURSE PRACTITIONER

## 2024-11-14 PROCEDURE — G8427 DOCREV CUR MEDS BY ELIG CLIN: HCPCS | Performed by: NURSE PRACTITIONER

## 2024-11-14 PROCEDURE — 4004F PT TOBACCO SCREEN RCVD TLK: CPT | Performed by: NURSE PRACTITIONER

## 2024-11-14 PROCEDURE — G8484 FLU IMMUNIZE NO ADMIN: HCPCS | Performed by: NURSE PRACTITIONER

## 2024-11-14 PROCEDURE — 99211 OFF/OP EST MAY X REQ PHY/QHP: CPT

## 2024-11-14 NOTE — PROGRESS NOTES
Ana Luisa Crow  11/14/2024  [facility]    Viviane-en- Y Gastric Bypass  7 year Post-Operative Follow-up     Chief Complaint   Patient presents with    Bariatrics Post Op Follow Up     LRYGB 7 yr po. Water intake 32oz daily. Protein through food. Vitamins when she remembers. Normal bm        Ana Luisa Crow is a 49 y.o. female who is 7 years post Laparoscopic Viviane-en-Y Gastric Bypass surgery.  Reports that she is doing good. She tells me that she started back on keto earlier this month. She tells me that she did have the \"keto flu\", however she reports that she does feel better now.  She is not having swallowing difficulty. Patient denies nausea and vomiting. Patient denies gastric reflux symptoms. Bowel movements are usually normal per patient, does occasionally have constipation.     Patient is noncompliant some of the time with the MVI gummies 2 per day. She is not meeting fluid recommendations of at least 64 ounces per day, she does drink more coffee and tea. She is meeting protein recommendations. She  is not exercising: no regular exercise. She is limited due to chronic pain issues.     Last Surgical Weight Loss:      4/5/2024     1:05 PM 5/3/2024     1:02 PM 6/7/2024     1:17 PM 6/25/2024    12:46 PM 8/23/2024    12:37 PM 10/1/2024    12:56 PM 11/14/2024    12:47 PM   Surgical Weight Loss Tracker   Consult Date     7/20/2016     Initial Height     5' 5\"     Initial Weight     460 lb     Initial BMI     76.54     Ideal Body Weight     159 lb     Pre-Surgical Height     5' 5\"     Pre-Surgical Weight     471 lb     Pre Surgery BMI     78.37     Weight to Lose     312 lb     Date 4/5/2024 5/3/2024 6/7/2024 6/25/2024 8/23/2024 10/1/2024 11/14/2024   Height 5' 6\" 5' 5\" 5' 5\" 5' 5\" 5' 5\" 5' 5\" 5' 5\"   Weight 181 lb 190 lb 185 lb 185 lb 199 lb 12.8 oz 209 lb 218 lb   BMI 29.21 31.61 30.78 30.78 33.24 34.78 36.27   Weight Change -13 lb 9 lb -5 lb 0 lb 14 lb 12.8 oz 9 lb 3.2 oz 9 lb

## 2024-11-14 NOTE — PATIENT INSTRUCTIONS
Please continue to take your vitamin and mineral supplements as instructed.      If you received a blood work prescription today for laboratory monitoring due prior to your next routine follow-up visit, please have this blood work obtained 10 to 14 days prior to your next visit.  It is important to fast for 12 hours prior to routine weight loss surgery blood work, EXCEPT for drinking water, to ensure accuracy of results.    Please report nausea, vomiting, abdominal pain, or any other problems you experience to your surgeon.  For problems related to weight loss surgery, it is best to go to [facility] Emergency Department and have your surgeon paged.    Last Surgical Weight Loss:      4/5/2024     1:05 PM 5/3/2024     1:02 PM 6/7/2024     1:17 PM 6/25/2024    12:46 PM 8/23/2024    12:37 PM 10/1/2024    12:56 PM 11/14/2024    12:47 PM   Surgical Weight Loss Tracker   Consult Date     7/20/2016     Initial Height     5' 5\"     Initial Weight     460 lb     Initial BMI     76.54     Ideal Body Weight     159 lb     Pre-Surgical Height     5' 5\"     Pre-Surgical Weight     471 lb     Pre Surgery BMI     78.37     Weight to Lose     312 lb     Date 4/5/2024 5/3/2024 6/7/2024 6/25/2024 8/23/2024 10/1/2024 11/14/2024   Height 5' 6\" 5' 5\" 5' 5\" 5' 5\" 5' 5\" 5' 5\" 5' 5\"   Weight 181 lb 190 lb 185 lb 185 lb 199 lb 12.8 oz 209 lb 218 lb   BMI 29.21 31.61 30.78 30.78 33.24 34.78 36.27   Weight Change -13 lb 9 lb -5 lb 0 lb 14 lb 12.8 oz 9 lb 3.2 oz 9 lb   Total Weight Change -290 lb -281 lb -286 lb -286 lb -271 lb 3.2 oz -262 lb -253 lb   % EBWL 93% 90% 92% 92% 87% 84% 81%

## 2024-12-09 NOTE — PROGRESS NOTES
Elvie Joe D.O.  [facility]  [address] [place] [address]  Phone: [phone]  Fax: [phone]    1/3/2025    Chief Complaint   Patient presents with    Knee Pain     Rt knee injection          Last injection: 10/03/2024  Taking anticoagulants/antiplatelets: No  Diabetic: No  Febrile/active infection: No    Ambulatory Procedure Time Out  Correct Patient: Yes  Correct Procedure: Yes  Correct Site/Side: Yes  Correct Site(s) Marked: Yes  Informed Consent Signed: Yes  Allergies Verified: Yes  Staff Present & Credential:: Mackenzie Wolf LPN/ Dr. Elvie Joe    Diagnosis:  1. Pain in extremity, unspecified extremity    2. Primary osteoarthritis of both knees                After explaining the indications, risks, benefits and alternatives of a  right knee joint injection, the patient agreed to proceed.  A permit was signed and scanned into the chart. The skin on the lateral knee was prepared with chloraprep. Using sterile technique, a 22 gauge, 1.5\" needle with 1 cc of Kenalog 40mg/cc and 8 cc of 1% lidocaine was directed to the knee joint using US guidance. After negative aspiration, the medication was injected.  Adequate hemostasis was achieved and a bandage applied. The patient tolerated the procedure well and was educated in post injection care. The patient was clinically monitored after the injection and left the office without incident. There was post injection reduction in pain. Ultrasound images are scanned separately into the EMR.     Controlled Substance Monitoring:    Acute and Chronic Pain Monitoring:   RX Monitoring Periodic Controlled Substance Monitoring   1/3/2025   1:35 PM No signs of potential drug abuse or diversion identified.       Orders Placed This Encounter   Medications    triamcinolone acetonide (KENALOG-40) injection 40 mg    lidocaine 1 % injection 8 mL    traMADol (ULTRAM) 50 MG tablet     Sig: Take 1 tablet by mouth 2 times daily

## 2024-12-18 NOTE — PROGRESS NOTES
Elvie Joe D.O.  [facility]  [address] [place] [address]  Phone: [phone]  Fax: [phone]    1/10/2025    Chief Complaint   Patient presents with    Knee Pain     Left knee injection       Last injection: 10/10/2024  Taking anticoagulants/antiplatelets: No  Diabetic: No  Febrile/active infection: No    Ambulatory Procedure Time Out  Correct Patient: Yes  Correct Procedure: Yes  Correct Site/Side: Yes  Correct Site(s) Marked: Yes  Informed Consent Signed: Yes  Allergies Verified: Yes  Staff Present & Credential:: Mackenzie Wolf LPN/ Dr. Elvie Joe    Diagnosis:  1. Pain in extremity, unspecified extremity    2. Primary osteoarthritis of both knees                After explaining the indications, risks, benefits and alternatives of a left knee joint injection, the patient agreed to proceed.  A permit was signed and scanned into the chart. The skin on the lateral knee was prepared with chloraprep. Using sterile technique, a 22 gauge, 1.5\" needle with 1 cc of Kenalog 40mg/cc and 8 cc of 1% lidocaine was directed to the knee joint using US guidance. After negative aspiration, the medication was injected.  Adequate hemostasis was achieved and a bandage applied. The patient tolerated the procedure well and was educated in post injection care. The patient was clinically monitored after the injection and left the office without incident. There was post injection reduction in pain. Ultrasound images are scanned separately into the EMR.       Elvie Joe D.O., P.T.  Board Certified Physical Medicine and Rehabilitation  Board Certified Electrodiagnostic Medicine    Administrations This Visit       lidocaine 1 % injection 8 mL       Admin Date  01/10/2025  12:11 Action  Given Dose  8 mL Route  Intra-artICUlar Site   Documented By  Maddie Carrington MA    NDC: 7531-9611-46    Lot#: [id number]    : HOSPIRA    Patient Supplied?: No

## 2025-01-03 ENCOUNTER — OFFICE VISIT (OUTPATIENT)
Dept: PHYSICAL MEDICINE AND REHAB | Age: 50
End: 2025-01-03

## 2025-01-03 VITALS
DIASTOLIC BLOOD PRESSURE: 72 MMHG | BODY MASS INDEX: 38.65 KG/M2 | WEIGHT: 232 LBS | SYSTOLIC BLOOD PRESSURE: 110 MMHG | HEART RATE: 74 BPM | HEIGHT: 65 IN

## 2025-01-03 DIAGNOSIS — M17.0 PRIMARY OSTEOARTHRITIS OF BOTH KNEES: ICD-10-CM

## 2025-01-03 DIAGNOSIS — M79.609 PAIN IN EXTREMITY, UNSPECIFIED EXTREMITY: Primary | ICD-10-CM

## 2025-01-03 RX ORDER — TRAMADOL HYDROCHLORIDE 50 MG/1
50 TABLET ORAL 2 TIMES DAILY PRN
Qty: 60 TABLET | Refills: 0 | Status: SHIPPED | OUTPATIENT
Start: 2025-01-03 | End: 2025-02-02

## 2025-01-03 RX ORDER — TRIAMCINOLONE ACETONIDE 40 MG/ML
40 INJECTION, SUSPENSION INTRA-ARTICULAR; INTRAMUSCULAR ONCE
Status: COMPLETED | OUTPATIENT
Start: 2025-01-03 | End: 2025-01-03

## 2025-01-03 RX ORDER — TRAMADOL HYDROCHLORIDE 50 MG/1
50 TABLET ORAL 2 TIMES DAILY PRN
Qty: 60 TABLET | Refills: 0 | Status: CANCELLED | OUTPATIENT
Start: 2025-01-03 | End: 2025-02-02

## 2025-01-03 RX ORDER — LIDOCAINE HYDROCHLORIDE 10 MG/ML
8 INJECTION, SOLUTION INFILTRATION; PERINEURAL ONCE
Status: COMPLETED | OUTPATIENT
Start: 2025-01-03 | End: 2025-01-03

## 2025-01-03 RX ADMIN — LIDOCAINE HYDROCHLORIDE 8 ML: 10 INJECTION, SOLUTION INFILTRATION; PERINEURAL at 13:35

## 2025-01-03 RX ADMIN — TRIAMCINOLONE ACETONIDE 40 MG: 40 INJECTION, SUSPENSION INTRA-ARTICULAR; INTRAMUSCULAR at 13:35

## 2025-01-10 ENCOUNTER — OFFICE VISIT (OUTPATIENT)
Dept: PHYSICAL MEDICINE AND REHAB | Age: 50
End: 2025-01-10

## 2025-01-10 VITALS
BODY MASS INDEX: 37.49 KG/M2 | SYSTOLIC BLOOD PRESSURE: 106 MMHG | WEIGHT: 225 LBS | HEART RATE: 87 BPM | DIASTOLIC BLOOD PRESSURE: 57 MMHG | HEIGHT: 65 IN

## 2025-01-10 DIAGNOSIS — M17.0 PRIMARY OSTEOARTHRITIS OF BOTH KNEES: ICD-10-CM

## 2025-01-10 DIAGNOSIS — M79.609 PAIN IN EXTREMITY, UNSPECIFIED EXTREMITY: Primary | ICD-10-CM

## 2025-01-10 RX ORDER — TRIAMCINOLONE ACETONIDE 40 MG/ML
40 INJECTION, SUSPENSION INTRA-ARTICULAR; INTRAMUSCULAR ONCE
Status: COMPLETED | OUTPATIENT
Start: 2025-01-10 | End: 2025-01-10

## 2025-01-10 RX ORDER — LIDOCAINE HYDROCHLORIDE 10 MG/ML
8 INJECTION, SOLUTION INFILTRATION; PERINEURAL ONCE
Status: COMPLETED | OUTPATIENT
Start: 2025-01-10 | End: 2025-01-10

## 2025-01-10 RX ADMIN — TRIAMCINOLONE ACETONIDE 40 MG: 40 INJECTION, SUSPENSION INTRA-ARTICULAR; INTRAMUSCULAR at 12:11

## 2025-01-10 RX ADMIN — LIDOCAINE HYDROCHLORIDE 8 ML: 10 INJECTION, SOLUTION INFILTRATION; PERINEURAL at 12:11

## 2025-01-24 NOTE — TELEPHONE ENCOUNTER
Name of Medication(s) Requested:  Requested Prescriptions     Pending Prescriptions Disp Refills    buPROPion (WELLBUTRIN SR) 200 MG extended release tablet 90 tablet 0     Sig: Take 1 tablet by mouth daily    LORazepam (ATIVAN) 0.5 MG tablet         Medication is on current medication list Yes    Dosage and directions were verified? Yes    Quantity verified: 30 day supply     Pharmacy Verified?  Yes    Last Appointment:  Visit date not found    Future appts:  Future Appointments   Date Time Provider Department Center   3/28/2025  8:30 AM Juanis Ruiz DO [facility] DEP   4/3/2025 10:30 AM Elvie Joe DO [facility]   4/10/2025 10:45 AM Elvie Joe DO [facility]   11/13/2025 12:30 PM Nasima Trujillo, APRN - CNP Surg Weight [facility]        (If no appt send self scheduling link. .REFILLAPPT)  Scheduling request sent?     [] Yes  [x] No    Does patient need updated?  [x] Yes  [] No

## 2025-01-24 NOTE — TELEPHONE ENCOUNTER
Pt requesting a refill. Pt unable to go to therapy due to insurance. Pt has an appt 3/4/25 for therapy. Just need a refill to get her through to that appt.

## 2025-01-27 RX ORDER — BUPROPION HYDROCHLORIDE 200 MG/1
200 TABLET, EXTENDED RELEASE ORAL DAILY
Qty: 90 TABLET | Refills: 0 | Status: SHIPPED | OUTPATIENT
Start: 2025-01-27

## 2025-01-27 RX ORDER — LORAZEPAM 0.5 MG/1
TABLET ORAL
OUTPATIENT
Start: 2025-01-27

## 2025-01-27 NOTE — TELEPHONE ENCOUNTER
I will send in the wellbutrin as I have prescribed that, she has been without the other medication for almost a year and needs to be discussed

## 2025-01-27 NOTE — TELEPHONE ENCOUNTER
She also has not picked it up since march of 2024 if she is not taking, I would not fill for her until she sees the psychiatrist to make sure it is still appropriate

## 2025-01-27 NOTE — TELEPHONE ENCOUNTER
If she is only seeing a therapist in march, they will not prescribe medications, is she seeing a prescriber as well?

## 2025-03-14 NOTE — PROGRESS NOTES
Elvie Joe D.O.  [facility]  [address] [place] [address]  Phone: [phone]  Fax: [phone]    4/3/2025    Chief Complaint   Patient presents with    Knee Pain     Right knee pain  for injection          Last injection: 1/3/2025  Taking anticoagulants/antiplatelets: No  Diabetic: No  Febrile/active infection: No    Ambulatory Procedure Time Out  Correct Patient: Yes  Correct Procedure: Yes  Correct Site/Side: Yes  Correct Site(s) Marked: Yes  Informed Consent Signed: Yes  Allergies Verified: Yes  Staff Present & Credential:: Elidia Thakur/    Diagnosis:  1. Pain in extremity, unspecified extremity    2. Primary osteoarthritis of both knees                  After explaining the indications, risks, benefits and alternatives of a  right knee joint injection, the patient agreed to proceed.  A permit was signed and scanned into the chart. The skin on the lateral knee was prepared with chloraprep. Using sterile technique, a 22 gauge, 1.5\" needle with 1 cc of Kenalog 40mg/cc and 8 cc of 1% lidocaine was directed to the knee joint using US guidance. After negative aspiration, the medication was injected.  Adequate hemostasis was achieved and a bandage applied. The patient tolerated the procedure well and was educated in post injection care. The patient was clinically monitored after the injection and left the office without incident. There was post injection reduction in pain. Ultrasound images are scanned separately into the EMR.     Controlled Substance Monitoring:    Acute and Chronic Pain Monitoring:   RX Monitoring Periodic Controlled Substance Monitoring   4/3/2025  12:27 PM No signs of potential drug abuse or diversion identified.       Orders Placed This Encounter   Medications    lidocaine 1 % injection 8 mL    triamcinolone acetonide (KENALOG-40) injection 40 mg    traMADol (ULTRAM) 50 MG tablet     Sig: Take 1 tablet by mouth 2 times

## 2025-03-28 ENCOUNTER — OFFICE VISIT (OUTPATIENT)
Dept: FAMILY MEDICINE CLINIC | Age: 50
End: 2025-03-28

## 2025-03-28 VITALS
DIASTOLIC BLOOD PRESSURE: 70 MMHG | TEMPERATURE: 97.4 F | RESPIRATION RATE: 18 BRPM | WEIGHT: 230 LBS | HEART RATE: 71 BPM | BODY MASS INDEX: 38.32 KG/M2 | OXYGEN SATURATION: 97 % | SYSTOLIC BLOOD PRESSURE: 104 MMHG | HEIGHT: 65 IN

## 2025-03-28 DIAGNOSIS — Z12.31 BREAST CANCER SCREENING BY MAMMOGRAM: ICD-10-CM

## 2025-03-28 DIAGNOSIS — R73.9 HYPERGLYCEMIA: ICD-10-CM

## 2025-03-28 DIAGNOSIS — Z00.00 MEDICARE ANNUAL WELLNESS VISIT, SUBSEQUENT: Primary | ICD-10-CM

## 2025-03-28 DIAGNOSIS — R53.82 CHRONIC FATIGUE: ICD-10-CM

## 2025-03-28 DIAGNOSIS — Z98.84 S/P GASTRIC BYPASS: ICD-10-CM

## 2025-03-28 DIAGNOSIS — E55.9 VITAMIN D DEFICIENCY: ICD-10-CM

## 2025-03-28 DIAGNOSIS — Z83.79 FAMILY HISTORY OF CELIAC DISEASE: ICD-10-CM

## 2025-03-28 DIAGNOSIS — D50.9 IRON DEFICIENCY ANEMIA, UNSPECIFIED IRON DEFICIENCY ANEMIA TYPE: ICD-10-CM

## 2025-03-28 DIAGNOSIS — Z13.220 SCREENING CHOLESTEROL LEVEL: ICD-10-CM

## 2025-03-28 DIAGNOSIS — R79.89 ELEVATED LFTS: ICD-10-CM

## 2025-03-28 DIAGNOSIS — E66.01 MORBID (SEVERE) OBESITY DUE TO EXCESS CALORIES: ICD-10-CM

## 2025-03-28 PROBLEM — J44.1 COPD EXACERBATION (HCC): Status: RESOLVED | Noted: 2023-03-23 | Resolved: 2025-03-28

## 2025-03-28 LAB
BASOPHILS ABSOLUTE: 0.09 K/UL (ref 0–0.2)
BASOPHILS RELATIVE PERCENT: 1 % (ref 0–2)
EOSINOPHILS ABSOLUTE: 0.11 K/UL (ref 0.05–0.5)
EOSINOPHILS RELATIVE PERCENT: 1 % (ref 0–6)
HBA1C MFR BLD: 4.9 % (ref 4–5.6)
HCT VFR BLD CALC: 38.5 % (ref 34–48)
HEMOGLOBIN: 12.4 G/DL (ref 11.5–15.5)
IMMATURE GRANULOCYTES %: 0 % (ref 0–5)
IMMATURE GRANULOCYTES ABSOLUTE: <0.03 K/UL (ref 0–0.58)
LYMPHOCYTES ABSOLUTE: 1.99 K/UL (ref 1.5–4)
LYMPHOCYTES RELATIVE PERCENT: 25 % (ref 20–42)
MCH RBC QN AUTO: 30 PG (ref 26–35)
MCHC RBC AUTO-ENTMCNC: 32.2 G/DL (ref 32–34.5)
MCV RBC AUTO: 93 FL (ref 80–99.9)
MONOCYTES ABSOLUTE: 0.43 K/UL (ref 0.1–0.95)
MONOCYTES RELATIVE PERCENT: 5 % (ref 2–12)
NEUTROPHILS ABSOLUTE: 5.38 K/UL (ref 1.8–7.3)
NEUTROPHILS RELATIVE PERCENT: 67 % (ref 43–80)
PDW BLD-RTO: 16.3 % (ref 11.5–15)
PLATELET # BLD: 430 K/UL (ref 130–450)
PMV BLD AUTO: 9.3 FL (ref 7–12)
RBC # BLD: 4.14 M/UL (ref 3.5–5.5)
WBC # BLD: 8 K/UL (ref 4.5–11.5)

## 2025-03-28 SDOH — ECONOMIC STABILITY: FOOD INSECURITY: WITHIN THE PAST 12 MONTHS, YOU WORRIED THAT YOUR FOOD WOULD RUN OUT BEFORE YOU GOT MONEY TO BUY MORE.: NEVER TRUE

## 2025-03-28 SDOH — ECONOMIC STABILITY: INCOME INSECURITY: IN THE LAST 12 MONTHS, WAS THERE A TIME WHEN YOU WERE NOT ABLE TO PAY THE MORTGAGE OR RENT ON TIME?: NO

## 2025-03-28 SDOH — ECONOMIC STABILITY: FOOD INSECURITY: WITHIN THE PAST 12 MONTHS, THE FOOD YOU BOUGHT JUST DIDN'T LAST AND YOU DIDN'T HAVE MONEY TO GET MORE.: NEVER TRUE

## 2025-03-28 SDOH — HEALTH STABILITY: PHYSICAL HEALTH: ON AVERAGE, HOW MANY MINUTES DO YOU ENGAGE IN EXERCISE AT THIS LEVEL?: 10 MIN

## 2025-03-28 SDOH — HEALTH STABILITY: PHYSICAL HEALTH: ON AVERAGE, HOW MANY DAYS PER WEEK DO YOU ENGAGE IN MODERATE TO STRENUOUS EXERCISE (LIKE A BRISK WALK)?: 1 DAY

## 2025-03-28 ASSESSMENT — PATIENT HEALTH QUESTIONNAIRE - PHQ9
1. LITTLE INTEREST OR PLEASURE IN DOING THINGS: SEVERAL DAYS
SUM OF ALL RESPONSES TO PHQ QUESTIONS 1-9: 14
7. TROUBLE CONCENTRATING ON THINGS, SUCH AS READING THE NEWSPAPER OR WATCHING TELEVISION: SEVERAL DAYS
6. FEELING BAD ABOUT YOURSELF - OR THAT YOU ARE A FAILURE OR HAVE LET YOURSELF OR YOUR FAMILY DOWN: NEARLY EVERY DAY
10. IF YOU CHECKED OFF ANY PROBLEMS, HOW DIFFICULT HAVE THESE PROBLEMS MADE IT FOR YOU TO DO YOUR WORK, TAKE CARE OF THINGS AT HOME, OR GET ALONG WITH OTHER PEOPLE: VERY DIFFICULT
8. MOVING OR SPEAKING SO SLOWLY THAT OTHER PEOPLE COULD HAVE NOTICED. OR THE OPPOSITE, BEING SO FIGETY OR RESTLESS THAT YOU HAVE BEEN MOVING AROUND A LOT MORE THAN USUAL: SEVERAL DAYS
3. TROUBLE FALLING OR STAYING ASLEEP: NEARLY EVERY DAY
9. THOUGHTS THAT YOU WOULD BE BETTER OFF DEAD, OR OF HURTING YOURSELF: NOT AT ALL
SUM OF ALL RESPONSES TO PHQ QUESTIONS 1-9: 14
SUM OF ALL RESPONSES TO PHQ QUESTIONS 1-9: 14
5. POOR APPETITE OR OVEREATING: SEVERAL DAYS
2. FEELING DOWN, DEPRESSED OR HOPELESS: SEVERAL DAYS
SUM OF ALL RESPONSES TO PHQ QUESTIONS 1-9: 14
4. FEELING TIRED OR HAVING LITTLE ENERGY: NEARLY EVERY DAY

## 2025-03-28 ASSESSMENT — LIFESTYLE VARIABLES
HOW OFTEN DO YOU HAVE A DRINK CONTAINING ALCOHOL: 2
HOW MANY STANDARD DRINKS CONTAINING ALCOHOL DO YOU HAVE ON A TYPICAL DAY: 1
HOW OFTEN DO YOU HAVE A DRINK CONTAINING ALCOHOL: MONTHLY OR LESS
HOW MANY STANDARD DRINKS CONTAINING ALCOHOL DO YOU HAVE ON A TYPICAL DAY: 1 OR 2
HOW OFTEN DO YOU HAVE SIX OR MORE DRINKS ON ONE OCCASION: 1

## 2025-03-28 NOTE — PATIENT INSTRUCTIONS
This includes smoking and vaping. If you need help quitting, talk to your doctor about stop-smoking programs and medicines. These can increase your chances of quitting for good. Quitting is one of the most important things you can do to protect your heart. It is never too late to quit. Try to avoid secondhand smoke too.     Stay at a weight that's healthy for you. Talk to your doctor if you need help losing weight.     Try to get 7 to 9 hours of sleep each night.     Limit alcohol to 2 drinks a day for men and 1 drink a day for women. Too much alcohol can cause health problems.     Manage other health problems such as diabetes, high blood pressure, and high cholesterol. If you think you may have a problem with alcohol or drug use, talk to your doctor.   Medicines    Take your medicines exactly as prescribed. Call your doctor if you think you are having a problem with your medicine.     If your doctor recommends aspirin, take the amount directed each day. Make sure you take aspirin and not another kind of pain reliever, such as acetaminophen (Tylenol).   When should you call for help?   Call 911 if you have symptoms of a heart attack. These may include:    Chest pain or pressure, or a strange feeling in the chest.     Sweating.     Shortness of breath.     Pain, pressure, or a strange feeling in the back, neck, jaw, or upper belly or in one or both shoulders or arms.     Lightheadedness or sudden weakness.     A fast or irregular heartbeat.   After you call 911, the  may tell you to chew 1 adult-strength or 2 to 4 low-dose aspirin. Wait for an ambulance. Do not try to drive yourself.  Watch closely for changes in your health, and be sure to contact your doctor if you have any problems.  Where can you learn more?  Go to https://www.healthwise.net/patientEd and enter F075 to learn more about \"A Healthy Heart: Care Instructions.\"  Current as of: July 31, 2024  Content Version: 14.4  © 1761-7538 [employer]

## 2025-03-28 NOTE — PROGRESS NOTES
Medicare Annual Wellness Visit    Ana Luisa Crow is here for Medicare AWV and Other (Pt would labs to be checked )    Assessment & Plan   Medicare annual wellness visit, subsequent  Vitamin D deficiency  -     Vitamin D 25 Hydroxy  Hyperglycemia  -     Hemoglobin A1C  -     Comprehensive Metabolic Panel  -     CBC with Auto Differential  -     Insulin, total  Elevated LFTs  -     Comprehensive Metabolic Panel  -     CBC with Auto Differential  Chronic fatigue  -     TSH  -     Vitamin B12  Iron deficiency anemia, unspecified iron deficiency anemia type  -     Iron and TIBC  -     Ferritin  S/P gastric bypass  Screening cholesterol level  -     Lipid Panel  Morbid (severe) obesity due to excess calories (E66.01)  Body mass index [BMI] 38.0-38.9, adult (Z68.38)  Family history of celiac disease  Breast cancer screening by mammogram  -     SAGAR LARA DIGITAL SCREEN BILATERAL; Future     Return in 3 months (on 6/28/2025) for Medicare Annual Wellness Visit in 1 year.     Subjective   The following acute and/or chronic problems were also addressed today:  History of Present Illness  The patient presents for evaluation of weight gain, knee pain, lymphedema, and health maintenance.    A general sense of malaise is reported, and she has expressed interest in having her B12 levels, fasting glucose, HbA1c, and fasting insulin checked. Adherence to a predominantly vegan diet since the New Year is noted, with occasional dairy consumption. Her diet includes sources of B12 such as nutritional yeast and fortified foods, and she ensures adequate protein intake through tofu and beans. Testing for gluten intolerance is being considered due to a family history of celiac disease in her aunt. She reports experiencing loose stools and foul-smelling gas. A history of eczema is noted, which she believes improved after eliminating animal products from her diet but has since recurred, possibly due to increased carbohydrate intake. She

## 2025-03-29 LAB
ALBUMIN: 3.6 G/DL (ref 3.5–5.2)
ALP BLD-CCNC: 96 U/L (ref 35–104)
ALT SERPL-CCNC: 7 U/L (ref 0–32)
ANION GAP SERPL CALCULATED.3IONS-SCNC: 16 MMOL/L (ref 7–16)
AST SERPL-CCNC: 19 U/L (ref 0–31)
BILIRUB SERPL-MCNC: 0.3 MG/DL (ref 0–1.2)
BUN BLDV-MCNC: 12 MG/DL (ref 6–20)
CALCIUM SERPL-MCNC: 8.7 MG/DL (ref 8.6–10.2)
CHLORIDE BLD-SCNC: 104 MMOL/L (ref 98–107)
CHOLESTEROL, TOTAL: 166 MG/DL
CO2: 20 MMOL/L (ref 22–29)
CREAT SERPL-MCNC: 0.7 MG/DL (ref 0.5–1)
FERRITIN: 17 NG/ML
GFR, ESTIMATED: >90 ML/MIN/1.73M2
GLUCOSE BLD-MCNC: 84 MG/DL (ref 74–99)
HDLC SERPL-MCNC: 75 MG/DL
IRON % SATURATION: 25 % (ref 15–50)
IRON: 82 UG/DL (ref 37–145)
LDL CHOLESTEROL: 80 MG/DL
POTASSIUM SERPL-SCNC: 4.6 MMOL/L (ref 3.5–5)
SODIUM BLD-SCNC: 140 MMOL/L (ref 132–146)
TOTAL IRON BINDING CAPACITY: 334 UG/DL (ref 250–450)
TOTAL PROTEIN: 6.5 G/DL (ref 6.4–8.3)
TRIGL SERPL-MCNC: 53 MG/DL
TSH SERPL DL<=0.05 MIU/L-ACNC: 1.83 UIU/ML (ref 0.27–4.2)
VITAMIN B-12: 764 PG/ML (ref 211–946)
VITAMIN D 25-HYDROXY: 36.3 NG/ML (ref 30–100)
VLDLC SERPL CALC-MCNC: 11 MG/DL

## 2025-03-30 LAB
INSULIN COMMENT: NORMAL
INSULIN REFERENCE RANGE:: NORMAL
INSULIN: 1.4 MU/L

## 2025-03-31 ENCOUNTER — RESULTS FOLLOW-UP (OUTPATIENT)
Dept: FAMILY MEDICINE CLINIC | Age: 50
End: 2025-03-31

## 2025-04-03 ENCOUNTER — OFFICE VISIT (OUTPATIENT)
Dept: PHYSICAL MEDICINE AND REHAB | Age: 50
End: 2025-04-03
Payer: MEDICARE

## 2025-04-03 VITALS
DIASTOLIC BLOOD PRESSURE: 68 MMHG | BODY MASS INDEX: 39.32 KG/M2 | HEART RATE: 74 BPM | TEMPERATURE: 97.2 F | HEIGHT: 65 IN | SYSTOLIC BLOOD PRESSURE: 116 MMHG | WEIGHT: 236 LBS

## 2025-04-03 DIAGNOSIS — M79.609 PAIN IN EXTREMITY, UNSPECIFIED EXTREMITY: Primary | ICD-10-CM

## 2025-04-03 DIAGNOSIS — M17.0 PRIMARY OSTEOARTHRITIS OF BOTH KNEES: ICD-10-CM

## 2025-04-03 PROCEDURE — 20611 DRAIN/INJ JOINT/BURSA W/US: CPT | Performed by: PHYSICAL MEDICINE & REHABILITATION

## 2025-04-03 RX ORDER — TRIAMCINOLONE ACETONIDE 40 MG/ML
40 INJECTION, SUSPENSION INTRA-ARTICULAR; INTRAMUSCULAR ONCE
Status: COMPLETED | OUTPATIENT
Start: 2025-04-03 | End: 2025-04-03

## 2025-04-03 RX ORDER — LIDOCAINE HYDROCHLORIDE 10 MG/ML
8 INJECTION, SOLUTION INFILTRATION; PERINEURAL ONCE
Status: COMPLETED | OUTPATIENT
Start: 2025-04-03 | End: 2025-04-03

## 2025-04-03 RX ORDER — TRAMADOL HYDROCHLORIDE 50 MG/1
50 TABLET ORAL 2 TIMES DAILY PRN
Qty: 60 TABLET | Refills: 0 | Status: SHIPPED | OUTPATIENT
Start: 2025-04-03 | End: 2025-05-03

## 2025-04-03 RX ADMIN — TRIAMCINOLONE ACETONIDE 40 MG: 40 INJECTION, SUSPENSION INTRA-ARTICULAR; INTRAMUSCULAR at 11:32

## 2025-04-03 RX ADMIN — LIDOCAINE HYDROCHLORIDE 8 ML: 10 INJECTION, SOLUTION INFILTRATION; PERINEURAL at 11:31

## 2025-04-10 ENCOUNTER — OFFICE VISIT (OUTPATIENT)
Dept: PHYSICAL MEDICINE AND REHAB | Age: 50
End: 2025-04-10
Payer: MEDICARE

## 2025-04-10 VITALS
HEIGHT: 65 IN | SYSTOLIC BLOOD PRESSURE: 137 MMHG | DIASTOLIC BLOOD PRESSURE: 84 MMHG | WEIGHT: 236 LBS | HEART RATE: 83 BPM | BODY MASS INDEX: 39.32 KG/M2

## 2025-04-10 DIAGNOSIS — M17.0 PRIMARY OSTEOARTHRITIS OF BOTH KNEES: ICD-10-CM

## 2025-04-10 DIAGNOSIS — M79.609 PAIN IN EXTREMITY, UNSPECIFIED EXTREMITY: Primary | ICD-10-CM

## 2025-04-10 PROCEDURE — 20611 DRAIN/INJ JOINT/BURSA W/US: CPT | Performed by: PHYSICAL MEDICINE & REHABILITATION

## 2025-04-10 RX ORDER — LIDOCAINE HYDROCHLORIDE 10 MG/ML
8 INJECTION, SOLUTION INFILTRATION; PERINEURAL ONCE
Status: COMPLETED | OUTPATIENT
Start: 2025-04-10 | End: 2025-04-10

## 2025-04-10 RX ORDER — TRIAMCINOLONE ACETONIDE 40 MG/ML
40 INJECTION, SUSPENSION INTRA-ARTICULAR; INTRAMUSCULAR ONCE
Status: COMPLETED | OUTPATIENT
Start: 2025-04-10 | End: 2025-04-10

## 2025-04-10 RX ADMIN — LIDOCAINE HYDROCHLORIDE 8 ML: 10 INJECTION, SOLUTION INFILTRATION; PERINEURAL at 12:47

## 2025-04-10 RX ADMIN — TRIAMCINOLONE ACETONIDE 40 MG: 40 INJECTION, SUSPENSION INTRA-ARTICULAR; INTRAMUSCULAR at 12:52

## 2025-04-23 DIAGNOSIS — E55.9 VITAMIN D DEFICIENCY: ICD-10-CM

## 2025-04-23 RX ORDER — ERGOCALCIFEROL 1.25 MG/1
CAPSULE, LIQUID FILLED ORAL
Qty: 12 CAPSULE | Refills: 0 | Status: SHIPPED | OUTPATIENT
Start: 2025-04-23

## 2025-04-23 NOTE — TELEPHONE ENCOUNTER
Name of Medication(s) Requested:  Requested Prescriptions     Pending Prescriptions Disp Refills    vitamin D (ERGOCALCIFEROL) 1.25 MG (60717 UT) CAPS capsule [Pharmacy Med Name: Vitamin D (Ergocalciferol) Oral Capsule 1.25 MG (87817 UT)] 12 capsule 0     Sig: TAKE ONE CAPSULE BY MOUTH ONCE A WEEK AT 5 PM       Medication is on current medication list Yes    Dosage and directions were verified? Yes    Quantity verified: 90 day supply     Pharmacy Verified?  Yes    Last Appointment:  3/28/2025    Future appts:  Future Appointments   Date Time Provider Department Center   6/26/2025 10:45 AM Elvie Joe DO [place] [facility]   7/1/2025  8:45 AM Juanis Ruiz DO [facility] DEP   7/3/2025  9:45 AM Elvie Joe DO [place] [facility]   11/13/2025 12:30 PM Nasima Trujillo, JOHN - CNP Surg Weight [facility]   4/3/2026 11:30 AM Juanis Ruiz DO [facility] DEP        (If no appt send self scheduling link. .REFILLAPPT)  Scheduling request sent?     [] Yes  [x] No    Does patient need updated?  [] Yes  [x] No

## 2025-04-28 ENCOUNTER — TELEPHONE (OUTPATIENT)
Dept: FAMILY MEDICINE CLINIC | Age: 50
End: 2025-04-28

## 2025-04-28 DIAGNOSIS — J01.90 ACUTE BACTERIAL SINUSITIS: Primary | ICD-10-CM

## 2025-04-28 DIAGNOSIS — B96.89 ACUTE BACTERIAL SINUSITIS: Primary | ICD-10-CM

## 2025-04-28 RX ORDER — DOXYCYCLINE HYCLATE 100 MG
100 TABLET ORAL 2 TIMES DAILY
Qty: 20 TABLET | Refills: 0 | Status: SHIPPED | OUTPATIENT
Start: 2025-04-28 | End: 2025-05-08

## 2025-04-28 NOTE — TELEPHONE ENCOUNTER
Pt stated she has mild cough, congestion, headache, head and sinus pressure. Feels like it is going in her chest and she is coughing up green/yellow phlegm that has been going on x 5 days

## 2025-04-28 NOTE — TELEPHONE ENCOUNTER
Has all the symptoms of sinus infection and wondered if dr chávez would call something in because she does not have a working car to come in or go to walk in  I told her a nurse would be calling her back on what dr chávez advises

## 2025-06-03 NOTE — PROGRESS NOTES
Medicine and Rehabilitation  Board Certified Electrodiagnostic Medicine    Administrations This Visit       lidocaine 1 % injection 8 mL       Admin Date  06/26/2025  11:19 Action  Given Dose  8 mL Route  Intra-artICUlar Site   Documented By  Mackenzie Wolf LPN    NDC: 7640-6782-45    Lot#: [id number]    : HOSPIRA    Patient Supplied?: No              triamcinolone acetonide (KENALOG-40) injection 40 mg       Admin Date  06/26/2025  11:20 Action  Given Dose  40 mg Route  IntraMUSCular Site  Other Documented By  Mackenzie Wolf LPN    NDC: 1659-8421-86    Lot#: [id number]    : [employer] (PRIMARY CARE)    Patient Supplied?: No

## 2025-06-05 NOTE — PROGRESS NOTES
Elvie Joe D.O.  [facility]  [address] [place] [address]  Phone: [phone]  Fax: [phone]    7/9/2025    Chief Complaint   Patient presents with    Knee Pain     Left knee injection       Last injection: 4/10/2025  Taking anticoagulants/antiplatelets: No  Diabetic: No  Febrile/active infection: No    Ambulatory Procedure Time Out  Correct Patient: Yes  Correct Procedure: Yes  Correct Site/Side: Yes  Correct Site(s) Marked: Yes  Informed Consent Signed: Yes  Allergies Verified: Yes  Staff Present & Credential:: Mackenzie Wolf LPN/ Dr. Joe    Diagnosis:  1. Pain in extremity, unspecified extremity    2. Primary osteoarthritis of both knees        Controlled Substance Monitoring:    Acute and Chronic Pain Monitoring:   RX Monitoring Periodic Controlled Substance Monitoring   7/8/2025   5:24 PM No signs of potential drug abuse or diversion identified.       Orders Placed This Encounter   Medications    lidocaine 1 % injection 8 mL    triamcinolone acetonide (KENALOG-40) injection 40 mg    traMADol (ULTRAM) 50 MG tablet     Sig: Take 1 tablet by mouth 2 times daily as needed for Pain for up to 30 days. Max Daily Amount: 100 mg     Dispense:  60 tablet     Refill:  0     Reduce doses taken as pain becomes manageable           After explaining the indications, risks, benefits and alternatives of a left knee joint injection, the patient agreed to proceed.  A permit was signed and scanned into the chart. The skin on the lateral knee was prepared with chloraprep. Using sterile technique, a 22 gauge, 1.5\" needle with 1 cc of Kenalog 40mg/cc and 8 cc of 1% lidocaine was directed to the knee joint using US guidance. After negative aspiration, the medication was injected.  Adequate hemostasis was achieved and a bandage applied. The patient tolerated the procedure well and was educated in post injection care. The patient was clinically monitored after the injection

## 2025-06-26 ENCOUNTER — OFFICE VISIT (OUTPATIENT)
Dept: PHYSICAL MEDICINE AND REHAB | Age: 50
End: 2025-06-26
Payer: MEDICARE

## 2025-06-26 VITALS
HEIGHT: 65 IN | HEART RATE: 78 BPM | TEMPERATURE: 97.8 F | SYSTOLIC BLOOD PRESSURE: 119 MMHG | DIASTOLIC BLOOD PRESSURE: 84 MMHG | WEIGHT: 244 LBS | BODY MASS INDEX: 40.65 KG/M2

## 2025-06-26 DIAGNOSIS — M17.0 PRIMARY OSTEOARTHRITIS OF BOTH KNEES: ICD-10-CM

## 2025-06-26 DIAGNOSIS — M79.609 PAIN IN EXTREMITY, UNSPECIFIED EXTREMITY: Primary | ICD-10-CM

## 2025-06-26 PROCEDURE — 20611 DRAIN/INJ JOINT/BURSA W/US: CPT | Performed by: PHYSICAL MEDICINE & REHABILITATION

## 2025-06-26 RX ORDER — LIDOCAINE HYDROCHLORIDE 10 MG/ML
8 INJECTION, SOLUTION INFILTRATION; PERINEURAL ONCE
Status: COMPLETED | OUTPATIENT
Start: 2025-06-26 | End: 2025-06-26

## 2025-06-26 RX ORDER — TRIAMCINOLONE ACETONIDE 40 MG/ML
40 INJECTION, SUSPENSION INTRA-ARTICULAR; INTRAMUSCULAR ONCE
Status: COMPLETED | OUTPATIENT
Start: 2025-06-26 | End: 2025-06-26

## 2025-06-26 RX ADMIN — LIDOCAINE HYDROCHLORIDE 8 ML: 10 INJECTION, SOLUTION INFILTRATION; PERINEURAL at 11:19

## 2025-06-26 RX ADMIN — TRIAMCINOLONE ACETONIDE 40 MG: 40 INJECTION, SUSPENSION INTRA-ARTICULAR; INTRAMUSCULAR at 11:20

## 2025-07-01 ENCOUNTER — OFFICE VISIT (OUTPATIENT)
Dept: FAMILY MEDICINE CLINIC | Age: 50
End: 2025-07-01
Payer: MEDICARE

## 2025-07-01 VITALS
SYSTOLIC BLOOD PRESSURE: 104 MMHG | TEMPERATURE: 97 F | BODY MASS INDEX: 38.65 KG/M2 | OXYGEN SATURATION: 98 % | RESPIRATION RATE: 18 BRPM | HEART RATE: 72 BPM | WEIGHT: 232 LBS | DIASTOLIC BLOOD PRESSURE: 70 MMHG | HEIGHT: 65 IN

## 2025-07-01 DIAGNOSIS — Z91.09 ENVIRONMENTAL ALLERGIES: ICD-10-CM

## 2025-07-01 DIAGNOSIS — L40.9 PSORIASIS: Primary | ICD-10-CM

## 2025-07-01 DIAGNOSIS — E55.9 VITAMIN D DEFICIENCY: ICD-10-CM

## 2025-07-01 DIAGNOSIS — E66.01 MORBID (SEVERE) OBESITY DUE TO EXCESS CALORIES (HCC): ICD-10-CM

## 2025-07-01 DIAGNOSIS — Z12.31 BREAST CANCER SCREENING BY MAMMOGRAM: ICD-10-CM

## 2025-07-01 PROCEDURE — 99214 OFFICE O/P EST MOD 30 MIN: CPT | Performed by: STUDENT IN AN ORGANIZED HEALTH CARE EDUCATION/TRAINING PROGRAM

## 2025-07-01 RX ORDER — LORATADINE 10 MG/1
10 TABLET ORAL DAILY
Qty: 90 TABLET | Refills: 1 | Status: SHIPPED | OUTPATIENT
Start: 2025-07-01 | End: 2025-07-03

## 2025-07-01 RX ORDER — ERGOCALCIFEROL 1.25 MG/1
50000 CAPSULE, LIQUID FILLED ORAL WEEKLY
Qty: 12 CAPSULE | Refills: 1 | Status: SHIPPED | OUTPATIENT
Start: 2025-07-01

## 2025-07-01 RX ORDER — CLOBETASOL PROPIONATE 0.5 MG/G
CREAM TOPICAL 2 TIMES DAILY
Status: CANCELLED | OUTPATIENT
Start: 2025-07-01

## 2025-07-01 RX ORDER — CLOBETASOL PROPIONATE 0.5 MG/G
OINTMENT TOPICAL
Qty: 60 G | Refills: 3 | Status: SHIPPED | OUTPATIENT
Start: 2025-07-01

## 2025-07-01 ASSESSMENT — ENCOUNTER SYMPTOMS
VOMITING: 0
EYE REDNESS: 0
NAUSEA: 1
RHINORRHEA: 0
CONSTIPATION: 0
SINUS PAIN: 0
ABDOMINAL PAIN: 0
COUGH: 0
BACK PAIN: 1
EYE PAIN: 0
DIARRHEA: 0
SHORTNESS OF BREATH: 0
SINUS PRESSURE: 0
SORE THROAT: 0

## 2025-07-01 NOTE — PROGRESS NOTES
AND CURETTAGE OF UTERUS  10/23/2019    HERNIA REPAIR      HYSTERECTOMY (CERVIX STATUS UNKNOWN)  2020    [facility]    LAPAROSCOPY N/A 2020    DIAGNOSTIC  LAPAROSCOPY, REPAIR PERFORATED MARGINAL ULCER, UPPER ENDOSCOPY. GRAHM PATCH performed by [place] Sandoval [place] [facility] [place]    LAPAROSCOPY N/A 2023    LAPAROSCOPY DIAGNOSTIC, POSSIBLE INTERNAL HERNIA REPAIR WITH EGD performed by Justino Callejas MD at [facility] OR    LITHOTRIPSY Right 2019    CYSTOSCOPY RETROGRADE PYELOGRAM URETEROSCOPY J STENT LASER LITHOTRIPSY RIGHT performed by Vladimir Chambers DO at [facility] OR    LITHOTRIPSY      OK OFFICE/OUTPT VISIT,PROCEDURE ONLY N/A 10/29/2018    DIAGNOSTIC LAPAROSCOPY REPAIR PERFORATED VISCUS performed by Justino Callejas MD at [facility] OR    NATHANAEL-EN-Y GASTRIC BYPASS  2017    STOMACH SURGERY      UPPER GASTROINTESTINAL ENDOSCOPY N/A 2019    EGD ESOPHAGOGASTRODUODENOSCOPY performed by Justino Callejas MD at [facility] OR    UPPER GASTROINTESTINAL ENDOSCOPY N/A 2020    EGD BIOPSY performed by Justino Callejas MD at [facility] ENDOSCOPY    UPPER GASTROINTESTINAL ENDOSCOPY N/A 2020    EGD DIAGNOSTIC ONLY performed by [place] Sand[place] [facility] [place]    UPPER GASTROINTESTINAL ENDOSCOPY N/A 2022    EGD BIOPSY performed by Mark Obrien MD at [facility] ENDOSCOPY       Social History     Socioeconomic History    Marital status: Single     Spouse name: Not on file    Number of children: Not on file    Years of education: Not on file    Highest education level: Not on file   Occupational History    Occupation: unemployed/disability   Tobacco Use    Smoking status: Former     Current packs/day: 0.00     Average packs/day: 0.5 packs/day for 33.2 years (16.6 ttl pk-yrs)     Types: Cigarettes     Start date: 10/29/1991     Quit date: 2025     Years since quittin.4    Smokeless tobacco: Never    Tobacco comments:     States quit    Vaping Use    Vaping status: Never Used   Substance and Sexual Activity

## 2025-07-03 ENCOUNTER — OFFICE VISIT (OUTPATIENT)
Dept: PHYSICAL MEDICINE AND REHAB | Age: 50
End: 2025-07-03
Payer: MEDICARE

## 2025-07-03 VITALS
SYSTOLIC BLOOD PRESSURE: 110 MMHG | HEIGHT: 65 IN | DIASTOLIC BLOOD PRESSURE: 70 MMHG | BODY MASS INDEX: 38.65 KG/M2 | WEIGHT: 232 LBS

## 2025-07-03 DIAGNOSIS — M17.0 PRIMARY OSTEOARTHRITIS OF BOTH KNEES: ICD-10-CM

## 2025-07-03 DIAGNOSIS — M79.609 PAIN IN EXTREMITY, UNSPECIFIED EXTREMITY: Primary | ICD-10-CM

## 2025-07-03 PROCEDURE — 20611 DRAIN/INJ JOINT/BURSA W/US: CPT | Performed by: PHYSICAL MEDICINE & REHABILITATION

## 2025-07-05 DIAGNOSIS — M17.0 PRIMARY OSTEOARTHRITIS OF BOTH KNEES: ICD-10-CM

## 2025-07-07 RX ORDER — TRAMADOL HYDROCHLORIDE 50 MG/1
TABLET ORAL
Qty: 60 TABLET | Refills: 0 | OUTPATIENT
Start: 2025-07-07

## 2025-07-08 ENCOUNTER — TELEPHONE (OUTPATIENT)
Dept: PHYSICAL MEDICINE AND REHAB | Age: 50
End: 2025-07-08

## 2025-07-08 RX ORDER — LIDOCAINE HYDROCHLORIDE 10 MG/ML
8 INJECTION, SOLUTION INFILTRATION; PERINEURAL ONCE
Status: COMPLETED | OUTPATIENT
Start: 2025-07-08 | End: 2025-07-09

## 2025-07-08 RX ORDER — TRIAMCINOLONE ACETONIDE 40 MG/ML
40 INJECTION, SUSPENSION INTRA-ARTICULAR; INTRAMUSCULAR ONCE
Status: COMPLETED | OUTPATIENT
Start: 2025-07-08 | End: 2025-07-09

## 2025-07-08 RX ORDER — TRAMADOL HYDROCHLORIDE 50 MG/1
50 TABLET ORAL 2 TIMES DAILY PRN
Qty: 60 TABLET | Refills: 0 | Status: SHIPPED | OUTPATIENT
Start: 2025-07-08 | End: 2025-08-07

## 2025-07-08 NOTE — TELEPHONE ENCOUNTER
Patient called in stating she is still waiting for her tramadol to be sent   she was here last Thursday her chart needs signed off  thanks

## 2025-07-09 DIAGNOSIS — K28.9 MARGINAL ULCER: ICD-10-CM

## 2025-07-09 DIAGNOSIS — Z98.84 S/P GASTRIC BYPASS: ICD-10-CM

## 2025-07-09 RX ORDER — OMEPRAZOLE 20 MG/1
20 CAPSULE, DELAYED RELEASE ORAL DAILY
Qty: 90 CAPSULE | Refills: 0 | Status: SHIPPED | OUTPATIENT
Start: 2025-07-09

## 2025-07-09 RX ADMIN — TRIAMCINOLONE ACETONIDE 40 MG: 40 INJECTION, SUSPENSION INTRA-ARTICULAR; INTRAMUSCULAR at 07:19

## 2025-07-09 RX ADMIN — LIDOCAINE HYDROCHLORIDE 8 ML: 10 INJECTION, SOLUTION INFILTRATION; PERINEURAL at 07:19

## 2025-07-09 NOTE — TELEPHONE ENCOUNTER
Name of Medication(s) Requested:  Requested Prescriptions     Pending Prescriptions Disp Refills    omeprazole (PRILOSEC) 20 MG delayed release capsule [Pharmacy Med Name: Omeprazole Oral Capsule Delayed Release 20 MG] 90 capsule 0     Sig: TAKE ONE CAPSULE BY MOUTH DAILY       Medication is on current medication list Yes    Dosage and directions were verified? Yes    Quantity verified: 90 day supply     Pharmacy Verified?  Yes    Last Appointment:  7/1/2025    Future appts:  Future Appointments   Date Time Provider Department Center   9/18/2025 10:30 AM Elvie Joe DO [place] [facility]   9/25/2025 10:45 AM Elvie Joe DO [facility]   11/13/2025 12:30 PM Nasima Trujillo, APRN - CNP Surg Weight [facility]   4/3/2026 11:30 AM Juanis Ruiz DO [facility] DEP        (If no appt send self scheduling link. .REFILLAPPT)  Scheduling request sent?     [] Yes  [x] No    Does patient need updated?  [] Yes  [x] No

## 2025-09-03 ENCOUNTER — OFFICE VISIT (OUTPATIENT)
Age: 50
End: 2025-09-03
Payer: MEDICARE

## 2025-09-03 VITALS
HEIGHT: 65 IN | WEIGHT: 249 LBS | HEART RATE: 74 BPM | SYSTOLIC BLOOD PRESSURE: 110 MMHG | BODY MASS INDEX: 41.48 KG/M2 | DIASTOLIC BLOOD PRESSURE: 72 MMHG

## 2025-09-03 DIAGNOSIS — E66.811 CLASS 1 OBESITY DUE TO EXCESS CALORIES WITHOUT SERIOUS COMORBIDITY WITH BODY MASS INDEX (BMI) OF 31.0 TO 31.9 IN ADULT: ICD-10-CM

## 2025-09-03 DIAGNOSIS — E66.09 CLASS 1 OBESITY DUE TO EXCESS CALORIES WITHOUT SERIOUS COMORBIDITY WITH BODY MASS INDEX (BMI) OF 31.0 TO 31.9 IN ADULT: ICD-10-CM

## 2025-09-03 DIAGNOSIS — K91.2 MALNUTRITION FOLLOWING GASTROINTESTINAL SURGERY: Primary | ICD-10-CM

## 2025-09-03 DIAGNOSIS — R63.2 BINGE EATING: ICD-10-CM

## 2025-09-03 PROCEDURE — 99214 OFFICE O/P EST MOD 30 MIN: CPT | Performed by: NURSE PRACTITIONER

## 2025-09-03 RX ORDER — PHENTERMINE HYDROCHLORIDE 37.5 MG/1
37.5 TABLET ORAL
Qty: 30 TABLET | Refills: 0 | Status: SHIPPED | OUTPATIENT
Start: 2025-09-03 | End: 2025-10-03

## 2025-09-03 ASSESSMENT — ENCOUNTER SYMPTOMS
COUGH: 0
NAUSEA: 0
CONSTIPATION: 0
DIARRHEA: 0
VOMITING: 0
WHEEZING: 0

## (undated) DEVICE — AGENT HEMSTAT W2XL4IN OXIDIZED REGENERATED CELOS ABSRB

## (undated) DEVICE — ELECTRODE ES 36CM LAP FLAT L HK COAT DISP CLEANCOAT

## (undated) DEVICE — TROCAR: Brand: KII FIOS FIRST ENTRY

## (undated) DEVICE — DEVICE SUT FOR TRCR SITE INCIS ENDO CLOSE

## (undated) DEVICE — SURE SET SINGLE BASIN-LF: Brand: MEDLINE INDUSTRIES, INC.

## (undated) DEVICE — SWABSTICK SURG PREP BENZOIN TINCTURE SINGLE ST

## (undated) DEVICE — SYRINGE MED 50ML LUERSLIP TIP

## (undated) DEVICE — SOLUTION SURG PREP ANTIMICROBIAL 4 OZ SKIN WND EXIDINE

## (undated) DEVICE — OSTOMY POUCH CLAMP: Brand: HOLLISTER

## (undated) DEVICE — DRAPE,LAP,CHOLE,W/TROUGHS,STERILE: Brand: MEDLINE

## (undated) DEVICE — PACK PROCEDURE SURG GEN CUST

## (undated) DEVICE — CONTAINER SPEC 480ML CLR POLYSTYR 10% NEUT BUFF FRMLN ZN

## (undated) DEVICE — GARMENT,MEDLINE,DVT,INT,CALF,MED, GEN2: Brand: MEDLINE

## (undated) DEVICE — CONTROL SYRINGE LUER-LOCK TIP: Brand: MONOJECT

## (undated) DEVICE — SURGICAL PROCEDURE PACK BASIC

## (undated) DEVICE — Z INACTIVE USE 2660664 SOLUTION IRRIG 3000ML 0.9% SOD CHL USP UROMATIC PLAS CONT

## (undated) DEVICE — COVER HNDL LT DISP

## (undated) DEVICE — SUTURE MCRYL SZ 4-0 L18IN ABSRB UD L19MM PS-2 3/8 CIR PRIM Y496G

## (undated) DEVICE — GLOVE SURG L12IN SZ 65FNGR THK94MIL TRNSLUC YEL LTX

## (undated) DEVICE — GLOVE ORANGE PI 8   MSG9080

## (undated) DEVICE — BLOCK BITE 60FR CAREGUARD

## (undated) DEVICE — PAD SANITARY CRTY MTRN REG ADH STRP DISP NS

## (undated) DEVICE — CHLORAPREP 26ML ORANGE

## (undated) DEVICE — FORCEPS BX L240CM JAW DIA2.4MM ORNG L CAP W/ NDL DISP RAD

## (undated) DEVICE — PUMP SUC IRR TBNG L10FT W/ HNDPC ASSEMB STRYKEFLOW 2

## (undated) DEVICE — TRAY,VAG PREP,2PR VNYL GLV,4 C: Brand: MEDLINE INDUSTRIES, INC.

## (undated) DEVICE — INSUFFLATION NEEDLE TO ESTABLISH PNEUMOPERITONEUM.: Brand: INSUFFLATION NEEDLE

## (undated) DEVICE — REAGENT TEST UREASE RAPD CLOTEST F/

## (undated) DEVICE — Z DISCONTINUED USE 2744636  DRESSING AQUACEL 14 IN ALG W3.5XL14IN POLYUR FLM CVR W/ HYDRCOLL

## (undated) DEVICE — GAUZE SPONGES,8 PLY: Brand: CURITY

## (undated) DEVICE — Z DUP USE 2139333 GUIDEWIRE UROLOGICAL STR STD 0.035 IN X150 CM REG ZIPWIRE LF

## (undated) DEVICE — 6 X 9  1.75MIL 4-WALL LABGUARD: Brand: MINIGRIP COMMERCIAL LLC

## (undated) DEVICE — PLUMEPORT LAPAROSCOPIC SMOKE FILTRATION DEVICE: Brand: PLUMEPORT ACTIV

## (undated) DEVICE — COVER,LIGHT HANDLE,FLX,1/PK: Brand: MEDLINE INDUSTRIES, INC.

## (undated) DEVICE — SYRINGE 20ML LL S/C 50

## (undated) DEVICE — STANDARD HYPODERMIC NEEDLE,POLYPROPYLENE HUB: Brand: MONOJECT

## (undated) DEVICE — FORCEPS LAP DIA5MM BCOCK FEN TIP ROT NONARTICULATING LOK

## (undated) DEVICE — MARKER,SKIN,WI/RULER AND LABELS: Brand: MEDLINE

## (undated) DEVICE — NEEDLE CLOSURE OMNICLOSE

## (undated) DEVICE — CLOTH SURG PREP PREOPERATIVE CHLORHEXIDINE GLUC 2% READYPREP

## (undated) DEVICE — SPONGE GZ W4XL4IN RAYON POLY FILL CVR W/ NONWOVEN FAB

## (undated) DEVICE — PATIENT RETURN ELECTRODE, SINGLE-USE, CONTACT QUALITY MONITORING, ADULT, WITH 9FT CORD, FOR PATIENTS WEIGING OVER 33LBS. (15KG): Brand: MEGADYNE

## (undated) DEVICE — TROCAR ENDOSCP L100MM DIA12MM BLDELSS OBT RADLUC STBL SL

## (undated) DEVICE — SPONGE LAP W18XL18IN WHT COT 4 PLY FLD STRUNG RADPQ DISP ST

## (undated) DEVICE — CONNECTOR IRRIGATION AUXILIARY H2O JET W/ PRT MTL THRD HYDR

## (undated) DEVICE — GOWN,SIRUS,FABRNF,XL,20/CS: Brand: MEDLINE

## (undated) DEVICE — TOWEL,OR,DSP,ST,BLUE,STD,6/PK,12PK/CS: Brand: MEDLINE

## (undated) DEVICE — NEEDLE INSUF L150MM DIA2MM DISP FOR PNEUMOPERI ENDOPATH

## (undated) DEVICE — BITEBLOCK 54FR W/ DENT RIM BLOX

## (undated) DEVICE — KIT BEDSIDE REVITAL OX 500ML

## (undated) DEVICE — CAMERA ENDOSCP VID HI DEF GEN

## (undated) DEVICE — SOLUTION IV IRRIG POUR BRL 0.9% SODIUM CHL 2F7124

## (undated) DEVICE — GAUZE,SPONGE,2"X2",8PLY,STERILE,LF,2'S: Brand: MEDLINE

## (undated) DEVICE — CATHETER CHOLGM LAPSCP 5 MMX32 CM

## (undated) DEVICE — SKIN AFFIX SURG ADHESIVE 72/CS 0.55ML: Brand: MEDLINE

## (undated) DEVICE — SHEARS ENDOSCP L36CM DIA5MM ULTRASONIC CRV TIP HARM

## (undated) DEVICE — BLADE CLIPPER GEN PURP NS

## (undated) DEVICE — PACK SURG LAP CHOLE CUSTOM

## (undated) DEVICE — GOWN,SIRUS,NONRNF,SETINSLV,XL,20/CS: Brand: MEDLINE

## (undated) DEVICE — INTENDED FOR TISSUE SEPARATION, AND OTHER PROCEDURES THAT REQUIRE A SHARP SURGICAL BLADE TO PUNCTURE OR CUT.: Brand: BARD-PARKER ® STAINLESS STEEL BLADES

## (undated) DEVICE — Z INACTIVE USE 2635503 SOLUTION IRRIG 3000ML ST H2O USP UROMATIC PLAS CONT

## (undated) DEVICE — Z DUP USE 2257490 ADHESIVE SKIN CLSRE 036ML TPCL 2CTL CNCRLTE HIGH VSCSTY DRMB

## (undated) DEVICE — PLUMEPORT ACTIV LAPAROSCOPIC SMOKE FILTRATION DEVICE: Brand: PLUMEPORT ACTIVE

## (undated) DEVICE — ACCESS PLATFORM FOR MINIMALLY INVASIVE SURGERY.: Brand: GELPORT® LAPAROSCOPIC  SYSTEM

## (undated) DEVICE — GAUZE,SPONGE,POST-OP,4X3,STRL,LF: Brand: MEDLINE

## (undated) DEVICE — GRADUATE TRIANG MEASURE 1000ML BLK PRNT

## (undated) DEVICE — Z DISCONTINUED PER MEDLINE USE 2741944 DRESSING AQUACEL 12 IN SURG W9XL30CM SIL CVR WTRPRF VIR BACT BARR ANTIMIC

## (undated) DEVICE — PACK,UNIV, II AURORA: Brand: MEDLINE

## (undated) DEVICE — STAPLER EXT 65MM S STL AUTO DISP PURSTRING

## (undated) DEVICE — BLADE ES ELASTOMERIC COAT INSUL DURABLE BEND UPTO 90DEG

## (undated) DEVICE — BAG SPEC REM 224ML W4XL6IN DIA10MM 1 HND GYN DISP ENDOPCH

## (undated) DEVICE — SYRINGE MED 10ML TRNSLUC BRL PLUNG BLK MRK POLYPR CTRL

## (undated) DEVICE — KIT,ANTI FOG,W/SPONGE & FLUID,SOFT PACK: Brand: MEDLINE

## (undated) DEVICE — SCISSORS ENDOSCP DIA5MM CRV MPLR CAUT W/ RATCH HNDL

## (undated) DEVICE — ELECTRODE PT RET AD L9FT HI MOIST COND ADH HYDRGEL CORDED

## (undated) DEVICE — BLOCK BITE 60FR RUBBER ADLT DENTAL

## (undated) DEVICE — PACK,AURORA,LAVH: Brand: MEDLINE

## (undated) DEVICE — Z DISCONTINUED NO SUB IDED BAG SPEC RETRV M C240ML MOUTH 7.3MM L17CM SHFT 10MM NYL EZEE

## (undated) DEVICE — DOUBLE BASIN SET: Brand: MEDLINE INDUSTRIES, INC.

## (undated) DEVICE — NEEDLE INSUF L120MM DIA2MM DISP FOR PNEUMOPERI ENDOPATH

## (undated) DEVICE — INSUFFLATION TUBING SET WITH FILTER, FUNNEL CONNECTOR AND LUER LOCK: Brand: JOSNOE MEDICAL INC

## (undated) DEVICE — NEEDLE HYPO 25GA L1.5IN BLU POLYPR HUB S STL REG BVL STR

## (undated) DEVICE — CONTAINER SPEC COLL 960ML POLYPR TRIANG GRAD INTAKE/OUTPUT

## (undated) DEVICE — TOTAL TRAY, 16FR 10ML SIL FOLEY, URN: Brand: MEDLINE

## (undated) DEVICE — MEDI-VAC YANKAUER SUCTION HANDLE W/BULBOUS TIP: Brand: CARDINAL HEALTH

## (undated) DEVICE — ANTI-FOG SOLUTION WITH FOAM PAD: Brand: DEVON

## (undated) DEVICE — BAG DRNGE COMB PK

## (undated) DEVICE — LOOP LIG SUT SZ 0 L18IN ABSRB POLYDIOXANONE MFIL PDS II

## (undated) DEVICE — MEDIA CONTRAST ISOVUE  300 10X50ML

## (undated) DEVICE — SPONGE GZ 4IN 4IN 4 PLY N WVN AVANT

## (undated) DEVICE — LUBRICANT SURG JELLY ST BACTER TUBE 4.25OZ

## (undated) DEVICE — E-Z CLEAN, NON-STICK, PTFE COATED, ELECTROSURGICAL BLADE ELECTRODE, MODIFIED EXTENDED INSULATION, 2.5 INCH (6.35 CM): Brand: MEGADYNE

## (undated) DEVICE — SOLUTION IV IRRIG WATER 1000ML POUR BRL 2F7114

## (undated) DEVICE — APPLIER CLP M/L SHFT DIA5MM 15 LIG LIGAMAX 5

## (undated) DEVICE — TRAY PROCED DILATATION CURETTAGE

## (undated) DEVICE — SYRINGE MED 20ML STD CLR PLAS LUERLOCK TIP N CTRL DISP

## (undated) DEVICE — MASK,FACE,MAXFLUIDPROTECT,SHIELD/ERLPS: Brand: MEDLINE

## (undated) DEVICE — TROCAR: Brand: KII SLEEVE

## (undated) DEVICE — FORCEPS BX OVL CUP FEN DISPOSABLE CAP L 160CM RAD JAW 4

## (undated) DEVICE — GAUZE,SPONGE,4"X4",8PLY,STRL,LF,10/TRAY: Brand: MEDLINE

## (undated) DEVICE — KIT INCLUDES: GTB17, ALEXIS CONTAINED EXTRACTION SYSTEM;  CNGL2, GELPOINT ADVANCED ACCESS PLATFORM: Brand: ALEXIS® CONTAINED EXTRACTION SYSTEM WITH GELPOINT® ADVANCED ACCESS PLATFORM

## (undated) DEVICE — MEDI-VAC NON-CONDUCTIVE SUCTION TUBING: Brand: CARDINAL HEALTH

## (undated) DEVICE — CYSTO PACK: Brand: MEDLINE INDUSTRIES, INC.

## (undated) DEVICE — DRAIN SURG 15FR SIL RND CHN W/ TRCR FULL FLUT DBL WRP TRAD

## (undated) DEVICE — SYRINGE MED 10ML LUERLOCK TIP W/O SFTY DISP

## (undated) DEVICE — GLOVE SURG SZ 65 L12IN FNGR THK79MIL GRN LTX FREE

## (undated) DEVICE — Z DISCONTINUED PER MEDLINE USE 2741943 DRESSING AQUACEL 10 IN ALG W9XL25CM SIL CVR WTRPRF VIR BACT BARR ANTIMIC

## (undated) DEVICE — CATHETER IV 18GA L1.16IN OD1.308MM ID0.978MM GRN VIALON

## (undated) DEVICE — KENDALL 450 SERIES MONITORING FOAM ELECTRODE - RECTANGULAR SHAPE ( 3/PK): Brand: KENDALL

## (undated) DEVICE — SEALER TISS L45CM ADV BPLR STR TIP LAP APPRCH ENSEAL G2

## (undated) DEVICE — [HIGH FLOW INSUFFLATOR,  DO NOT USE IF PACKAGE IS DAMAGED,  KEEP DRY,  KEEP AWAY FROM SUNLIGHT,  PROTECT FROM HEAT AND RADIOACTIVE SOURCES.]: Brand: PNEUMOSURE

## (undated) DEVICE — STANDARD HYPODERMIC NEEDLE,ALUMINUM HUB: Brand: MONOJECT

## (undated) DEVICE — PAD,SANITARY,11 IN,MAXI,N-STRL,IND WRAP: Brand: MEDLINE

## (undated) DEVICE — GLOVE ORANGE PI 7 1/2   MSG9075

## (undated) DEVICE — PMI PTFE COATED LAPAROSCOPIC WIRE L-HOOK 44 CM: Brand: PMI

## (undated) DEVICE — VALVE SUCTION AIR H2O HYDR H2O JET CONN STRL ORCA POD + DISP

## (undated) DEVICE — 40586 ADVANCED TRENDELENBURG POSITIONING KIT: Brand: 40586 ADVANCED TRENDELENBURG POSITIONING KIT

## (undated) DEVICE — 1-PIECE DRAINABLE OSTOMY POUCH, CERAPLUS: Brand: PREMIER

## (undated) DEVICE — GLOVE SURG SZ 75 L12IN FNGR THK94MIL TRNSLUC YEL LTX

## (undated) DEVICE — SET ENDO INSTR LAPAROSCOPIC STGENLAP

## (undated) DEVICE — HOSE CONN FOR WST MGMT SYS NEPTUNE 2

## (undated) DEVICE — Z DISCONTINUED PER MEDLINE USE 2741942 DRESSING AQUACEL 6 IN ALG W9XL15CM SIL CVR WTRPRF VIR BACT BARR ANTIMIC

## (undated) DEVICE — TROCAR ENDOSCP L100MM DIA5MM BLDELSS STBL SL OBT RADLUC

## (undated) DEVICE — Device

## (undated) DEVICE — 4-PORT MANIFOLD: Brand: NEPTUNE 2

## (undated) DEVICE — DEFENDO AIR WATER SUCTION AND BIOPSY VALVE KIT FOR  OLYMPUS: Brand: DEFENDO AIR/WATER/SUCTION AND BIOPSY VALVE

## (undated) DEVICE — GOWN,SIRUS,FABRNF,L,20/CS: Brand: MEDLINE

## (undated) DEVICE — SUTURE ENDOLOOP SZ 0 L18IN ABSRB VLT LIG SLDE KNOT VCRL

## (undated) DEVICE — DRESSING COMP W4XL4IN N ADH PD W2.5XL2.5IN GZ BORDERED ADH

## (undated) DEVICE — CONNECTOR TBNG AUX H2O JET DISP FOR OLY 160/180 SER

## (undated) DEVICE — INSTRUMENT POUCH: Brand: DEROYAL

## (undated) DEVICE — CAMERA STRYKER 1488 HD GEN

## (undated) DEVICE — SYRINGE, LUER LOCK, 10ML: Brand: MEDLINE

## (undated) DEVICE — Z DISCONTINUED NO SUB IDED FIBER LSR DIA365UM FLEXSHIELD COAT HOLM HI PWR POLISHED

## (undated) DEVICE — CONTAINER SPEC 60ML PH 7NEUTRAL BUFF FRMLN RDY TO USE

## (undated) DEVICE — VALVE SUCTION AIR H2O SET ORCA POD + DISP

## (undated) DEVICE — TROCAR: Brand: KII® SLEEVE

## (undated) DEVICE — PMI PTFE COATED LAPAROSCOPIC WIRE L-HOOK 33 CM: Brand: PMI

## (undated) DEVICE — SYRINGE MED 10ML POLYPR LUERSLIP TIP FLAT TOP W/O SFTY DISP

## (undated) DEVICE — Z DISCONTINUED NO SUB IDED TUBING ETCO2 AD L6.5FT NSL ORAL CVD PRNG NONFLARED TIP OVR

## (undated) DEVICE — APPLICATOR MEDICATED 26 CC SOLUTION HI LT ORNG CHLORAPREP

## (undated) DEVICE — FORCEPS BX L240CM JAW DIA2.8MM L CAP W/ NDL MIC MESH TOOTH

## (undated) DEVICE — TROCAR ENDOSCP L100MM DIA5MM BLDELSS STBL SL THRD OPT VW

## (undated) DEVICE — GOWN ISOLATN REG YEL M WT MULTIPLY SIDETIE LEV 2

## (undated) DEVICE — GOWN,BREATHABLE SLV,AURORA,XLG,STRL: Brand: MEDLINE

## (undated) DEVICE — SEALER ENDOSCP NANO COAT OPN DIV CRV L JAW LIGASURE IMPACT